# Patient Record
Sex: FEMALE | Race: OTHER | HISPANIC OR LATINO | Employment: UNEMPLOYED | URBAN - METROPOLITAN AREA
[De-identification: names, ages, dates, MRNs, and addresses within clinical notes are randomized per-mention and may not be internally consistent; named-entity substitution may affect disease eponyms.]

---

## 2017-01-03 ENCOUNTER — GENERIC CONVERSION - ENCOUNTER (OUTPATIENT)
Dept: OTHER | Facility: OTHER | Age: 52
End: 2017-01-03

## 2017-01-18 ENCOUNTER — GENERIC CONVERSION - ENCOUNTER (OUTPATIENT)
Dept: OTHER | Facility: OTHER | Age: 52
End: 2017-01-18

## 2017-01-31 ENCOUNTER — HOSPITAL ENCOUNTER (EMERGENCY)
Facility: HOSPITAL | Age: 52
Discharge: HOME | End: 2017-01-31
Attending: EMERGENCY MEDICINE | Admitting: EMERGENCY MEDICINE
Payer: COMMERCIAL

## 2017-01-31 VITALS
OXYGEN SATURATION: 99 % | DIASTOLIC BLOOD PRESSURE: 73 MMHG | SYSTOLIC BLOOD PRESSURE: 136 MMHG | RESPIRATION RATE: 16 BRPM | TEMPERATURE: 98 F | WEIGHT: 209.44 LBS | HEART RATE: 92 BPM

## 2017-01-31 DIAGNOSIS — R73.9 HYPERGLYCEMIA: Primary | ICD-10-CM

## 2017-01-31 LAB
ALBUMIN SERPL BCP-MCNC: 3.6 G/DL (ref 3.5–5)
ALP SERPL-CCNC: 194 U/L (ref 46–116)
ALT SERPL W P-5'-P-CCNC: 105 U/L (ref 12–78)
ANION GAP SERPL CALCULATED.3IONS-SCNC: 7 MMOL/L (ref 4–13)
AST SERPL W P-5'-P-CCNC: 43 U/L (ref 5–45)
BASOPHILS # BLD AUTO: 0.02 THOUSANDS/ΜL (ref 0–0.1)
BASOPHILS NFR BLD AUTO: 0 % (ref 0–1)
BILIRUB SERPL-MCNC: 0.25 MG/DL (ref 0.2–1)
BILIRUB UR QL STRIP: NEGATIVE
BUN SERPL-MCNC: 18 MG/DL (ref 5–25)
CALCIUM SERPL-MCNC: 9.1 MG/DL (ref 8.3–10.1)
CHLORIDE SERPL-SCNC: 97 MMOL/L (ref 100–108)
CLARITY UR: CLEAR
CO2 SERPL-SCNC: 29 MMOL/L (ref 21–32)
COLOR UR: YELLOW
COLOR, POC: NORMAL
CREAT SERPL-MCNC: 1.08 MG/DL (ref 0.6–1.3)
EOSINOPHIL # BLD AUTO: 0.09 THOUSAND/ΜL (ref 0–0.61)
EOSINOPHIL NFR BLD AUTO: 1 % (ref 0–6)
ERYTHROCYTE [DISTWIDTH] IN BLOOD BY AUTOMATED COUNT: 12.6 % (ref 11.6–15.1)
GFR SERPL CREATININE-BSD FRML MDRD: >60 ML/MIN/1.73SQ M
GLUCOSE SERPL-MCNC: 289 MG/DL (ref 65–140)
GLUCOSE SERPL-MCNC: 439 MG/DL (ref 65–140)
GLUCOSE SERPL-MCNC: 439 MG/DL (ref 65–140)
GLUCOSE UR STRIP-MCNC: ABNORMAL MG/DL
HCT VFR BLD AUTO: 42.6 % (ref 34.8–46.1)
HGB BLD-MCNC: 15.4 G/DL (ref 11.5–15.4)
HGB UR QL STRIP.AUTO: NEGATIVE
KETONES UR STRIP-MCNC: NEGATIVE MG/DL
LEUKOCYTE ESTERASE UR QL STRIP: NEGATIVE
LIPASE SERPL-CCNC: 263 U/L (ref 73–393)
LYMPHOCYTES # BLD AUTO: 2.6 THOUSANDS/ΜL (ref 0.6–4.47)
LYMPHOCYTES NFR BLD AUTO: 32 % (ref 14–44)
MCH RBC QN AUTO: 34.4 PG (ref 26.8–34.3)
MCHC RBC AUTO-ENTMCNC: 36.2 G/DL (ref 31.4–37.4)
MCV RBC AUTO: 95 FL (ref 82–98)
MONOCYTES # BLD AUTO: 0.32 THOUSAND/ΜL (ref 0.17–1.22)
MONOCYTES NFR BLD AUTO: 4 % (ref 4–12)
NEUTROPHILS # BLD AUTO: 4.99 THOUSANDS/ΜL (ref 1.85–7.62)
NEUTS SEG NFR BLD AUTO: 63 % (ref 43–75)
NITRITE UR QL STRIP: NEGATIVE
NRBC BLD AUTO-RTO: 0 /100 WBCS
PH UR STRIP.AUTO: 7 [PH] (ref 4.5–8)
PLATELET # BLD AUTO: 281 THOUSANDS/UL (ref 149–390)
PMV BLD AUTO: 10.5 FL (ref 8.9–12.7)
POTASSIUM SERPL-SCNC: 4 MMOL/L (ref 3.5–5.3)
PROT SERPL-MCNC: 8.1 G/DL (ref 6.4–8.2)
PROT UR STRIP-MCNC: NEGATIVE MG/DL
RBC # BLD AUTO: 4.48 MILLION/UL (ref 3.81–5.12)
SODIUM SERPL-SCNC: 133 MMOL/L (ref 136–145)
SP GR UR STRIP.AUTO: 1.01 (ref 1–1.03)
UROBILINOGEN UR QL STRIP.AUTO: 0.2 E.U./DL
WBC # BLD AUTO: 8.02 THOUSAND/UL (ref 4.31–10.16)

## 2017-01-31 PROCEDURE — 96361 HYDRATE IV INFUSION ADD-ON: CPT

## 2017-01-31 PROCEDURE — 96372 THER/PROPH/DIAG INJ SC/IM: CPT

## 2017-01-31 PROCEDURE — 85025 COMPLETE CBC W/AUTO DIFF WBC: CPT | Performed by: EMERGENCY MEDICINE

## 2017-01-31 PROCEDURE — 82948 REAGENT STRIP/BLOOD GLUCOSE: CPT

## 2017-01-31 PROCEDURE — 96360 HYDRATION IV INFUSION INIT: CPT

## 2017-01-31 PROCEDURE — 81002 URINALYSIS NONAUTO W/O SCOPE: CPT | Performed by: EMERGENCY MEDICINE

## 2017-01-31 PROCEDURE — 81003 URINALYSIS AUTO W/O SCOPE: CPT

## 2017-01-31 PROCEDURE — 83690 ASSAY OF LIPASE: CPT | Performed by: EMERGENCY MEDICINE

## 2017-01-31 PROCEDURE — 36415 COLL VENOUS BLD VENIPUNCTURE: CPT | Performed by: EMERGENCY MEDICINE

## 2017-01-31 PROCEDURE — 99285 EMERGENCY DEPT VISIT HI MDM: CPT

## 2017-01-31 PROCEDURE — 80053 COMPREHEN METABOLIC PANEL: CPT | Performed by: EMERGENCY MEDICINE

## 2017-01-31 RX ORDER — ZOLPIDEM TARTRATE 5 MG/1
10 TABLET ORAL
COMMUNITY

## 2017-01-31 RX ORDER — GLIPIZIDE 5 MG/1
5 TABLET ORAL DAILY
COMMUNITY
End: 2018-06-14

## 2017-01-31 RX ORDER — AZATHIOPRINE 50 MG/1
50 TABLET ORAL DAILY
COMMUNITY

## 2017-01-31 RX ORDER — LORAZEPAM 1 MG/1
1 TABLET ORAL 2 TIMES DAILY
COMMUNITY

## 2017-01-31 RX ORDER — PALIPERIDONE 1.5 MG/1
1.5 TABLET, EXTENDED RELEASE ORAL EVERY MORNING
COMMUNITY
End: 2018-06-14

## 2017-01-31 RX ORDER — QUETIAPINE FUMARATE 100 MG/1
100 TABLET, FILM COATED ORAL
COMMUNITY

## 2017-01-31 RX ADMIN — SODIUM CHLORIDE 1000 ML: 0.9 INJECTION, SOLUTION INTRAVENOUS at 14:30

## 2017-01-31 RX ADMIN — INSULIN HUMAN 10 UNITS: 100 INJECTION, SOLUTION PARENTERAL at 16:09

## 2017-04-25 ENCOUNTER — ALLSCRIPTS OFFICE VISIT (OUTPATIENT)
Dept: OTHER | Facility: OTHER | Age: 52
End: 2017-04-25

## 2017-06-10 ENCOUNTER — TRANSCRIBE ORDERS (OUTPATIENT)
Dept: ADMINISTRATIVE | Facility: HOSPITAL | Age: 52
End: 2017-06-10

## 2017-06-10 ENCOUNTER — APPOINTMENT (OUTPATIENT)
Dept: LAB | Facility: HOSPITAL | Age: 52
End: 2017-06-10
Payer: COMMERCIAL

## 2017-06-10 DIAGNOSIS — K58.1 IRRITABLE BOWEL SYNDROME WITH CONSTIPATION: ICD-10-CM

## 2017-06-10 DIAGNOSIS — E66.01 MORBID OBESITY, UNSPECIFIED OBESITY TYPE (HCC): ICD-10-CM

## 2017-06-10 DIAGNOSIS — K75.4 CHRONIC AGGRESSIVE HEPATITIS (HCC): Primary | ICD-10-CM

## 2017-06-10 DIAGNOSIS — E55.9 UNSPECIFIED VITAMIN D DEFICIENCY: ICD-10-CM

## 2017-06-10 DIAGNOSIS — Z98.84 BARIATRIC SURGERY STATUS: ICD-10-CM

## 2017-06-10 DIAGNOSIS — K75.4 CHRONIC AGGRESSIVE HEPATITIS (HCC): ICD-10-CM

## 2017-06-10 DIAGNOSIS — E13.8 DIABETES MELLITUS OF OTHER TYPE WITH COMPLICATION: ICD-10-CM

## 2017-06-10 DIAGNOSIS — E13.8 DIABETES MELLITUS OF OTHER TYPE WITH COMPLICATION: Primary | ICD-10-CM

## 2017-06-10 DIAGNOSIS — K90.2 POSTOPERATIVE BLIND LOOP SYNDROME: ICD-10-CM

## 2017-06-10 LAB
25(OH)D3 SERPL-MCNC: 30.3 NG/ML (ref 30–100)
ALBUMIN SERPL BCP-MCNC: 3.7 G/DL (ref 3.5–5)
ALP SERPL-CCNC: 133 U/L (ref 46–116)
ALT SERPL W P-5'-P-CCNC: 41 U/L (ref 12–78)
ANION GAP SERPL CALCULATED.3IONS-SCNC: 10 MMOL/L (ref 4–13)
AST SERPL W P-5'-P-CCNC: 32 U/L (ref 5–45)
BASOPHILS # BLD AUTO: 0.01 THOUSANDS/ΜL (ref 0–0.1)
BASOPHILS NFR BLD AUTO: 0 % (ref 0–1)
BILIRUB SERPL-MCNC: 0.49 MG/DL (ref 0.2–1)
BUN SERPL-MCNC: 10 MG/DL (ref 5–25)
CALCIUM SERPL-MCNC: 9.2 MG/DL (ref 8.3–10.1)
CHLORIDE SERPL-SCNC: 102 MMOL/L (ref 100–108)
CHOLEST SERPL-MCNC: 196 MG/DL (ref 50–200)
CO2 SERPL-SCNC: 26 MMOL/L (ref 21–32)
CREAT SERPL-MCNC: 0.86 MG/DL (ref 0.6–1.3)
EOSINOPHIL # BLD AUTO: 0.08 THOUSAND/ΜL (ref 0–0.61)
EOSINOPHIL NFR BLD AUTO: 1 % (ref 0–6)
ERYTHROCYTE [DISTWIDTH] IN BLOOD BY AUTOMATED COUNT: 13.5 % (ref 11.6–15.1)
EST. AVERAGE GLUCOSE BLD GHB EST-MCNC: 232 MG/DL
FERRITIN SERPL-MCNC: 142 NG/ML (ref 8–388)
FOLATE SERPL-MCNC: >20 NG/ML (ref 3.1–17.5)
GFR SERPL CREATININE-BSD FRML MDRD: >60 ML/MIN/1.73SQ M
GLUCOSE P FAST SERPL-MCNC: 187 MG/DL (ref 65–99)
HBA1C MFR BLD: 9.7 % (ref 4.2–6.3)
HCT VFR BLD AUTO: 43.7 % (ref 34.8–46.1)
HDLC SERPL-MCNC: 86 MG/DL (ref 40–60)
HGB BLD-MCNC: 14.9 G/DL (ref 11.5–15.4)
INR PPP: 0.93 (ref 0.86–1.16)
IRON SERPL-MCNC: 143 UG/DL (ref 50–170)
LDLC SERPL CALC-MCNC: 88 MG/DL (ref 0–100)
LYMPHOCYTES # BLD AUTO: 2.12 THOUSANDS/ΜL (ref 0.6–4.47)
LYMPHOCYTES NFR BLD AUTO: 27 % (ref 14–44)
MCH RBC QN AUTO: 33.1 PG (ref 26.8–34.3)
MCHC RBC AUTO-ENTMCNC: 34.1 G/DL (ref 31.4–37.4)
MCV RBC AUTO: 97 FL (ref 82–98)
MONOCYTES # BLD AUTO: 0.28 THOUSAND/ΜL (ref 0.17–1.22)
MONOCYTES NFR BLD AUTO: 4 % (ref 4–12)
NEUTROPHILS # BLD AUTO: 5.32 THOUSANDS/ΜL (ref 1.85–7.62)
NEUTS SEG NFR BLD AUTO: 68 % (ref 43–75)
PLATELET # BLD AUTO: 186 THOUSANDS/UL (ref 149–390)
PMV BLD AUTO: 12.2 FL (ref 8.9–12.7)
POTASSIUM SERPL-SCNC: 3.4 MMOL/L (ref 3.5–5.3)
PROT SERPL-MCNC: 7.1 G/DL (ref 6.4–8.2)
PROTHROMBIN TIME: 12.5 SECONDS (ref 12.1–14.4)
PTH-INTACT SERPL-MCNC: 29.9 PG/ML (ref 14–72)
RBC # BLD AUTO: 4.5 MILLION/UL (ref 3.81–5.12)
SODIUM SERPL-SCNC: 138 MMOL/L (ref 136–145)
TRIGL SERPL-MCNC: 112 MG/DL
TSH SERPL DL<=0.05 MIU/L-ACNC: 1.59 UIU/ML (ref 0.36–3.74)
VIT B12 SERPL-MCNC: 1734 PG/ML (ref 100–900)
WBC # BLD AUTO: 7.81 THOUSAND/UL (ref 4.31–10.16)

## 2017-06-10 PROCEDURE — 83540 ASSAY OF IRON: CPT

## 2017-06-10 PROCEDURE — 85025 COMPLETE CBC W/AUTO DIFF WBC: CPT

## 2017-06-10 PROCEDURE — 82607 VITAMIN B-12: CPT

## 2017-06-10 PROCEDURE — 82746 ASSAY OF FOLIC ACID SERUM: CPT

## 2017-06-10 PROCEDURE — 83970 ASSAY OF PARATHORMONE: CPT

## 2017-06-10 PROCEDURE — 80061 LIPID PANEL: CPT

## 2017-06-10 PROCEDURE — 85610 PROTHROMBIN TIME: CPT

## 2017-06-10 PROCEDURE — 84443 ASSAY THYROID STIM HORMONE: CPT

## 2017-06-10 PROCEDURE — 82306 VITAMIN D 25 HYDROXY: CPT

## 2017-06-10 PROCEDURE — 80053 COMPREHEN METABOLIC PANEL: CPT

## 2017-06-10 PROCEDURE — 84425 ASSAY OF VITAMIN B-1: CPT

## 2017-06-10 PROCEDURE — 36415 COLL VENOUS BLD VENIPUNCTURE: CPT

## 2017-06-10 PROCEDURE — 83036 HEMOGLOBIN GLYCOSYLATED A1C: CPT

## 2017-06-10 PROCEDURE — 82728 ASSAY OF FERRITIN: CPT

## 2017-06-14 LAB — VIT B1 BLD-SCNC: 134.2 NMOL/L (ref 66.5–200)

## 2017-12-13 ENCOUNTER — APPOINTMENT (OUTPATIENT)
Dept: LAB | Facility: HOSPITAL | Age: 52
End: 2017-12-13
Payer: COMMERCIAL

## 2017-12-13 ENCOUNTER — TRANSCRIBE ORDERS (OUTPATIENT)
Dept: ADMINISTRATIVE | Facility: HOSPITAL | Age: 52
End: 2017-12-13

## 2017-12-13 DIAGNOSIS — K75.4 CHRONIC AGGRESSIVE HEPATITIS (HCC): Primary | ICD-10-CM

## 2017-12-13 DIAGNOSIS — K75.4 CHRONIC AGGRESSIVE HEPATITIS (HCC): ICD-10-CM

## 2017-12-13 LAB
ALBUMIN SERPL BCP-MCNC: 3.8 G/DL (ref 3.5–5)
ALP SERPL-CCNC: 145 U/L (ref 46–116)
ALT SERPL W P-5'-P-CCNC: 41 U/L (ref 12–78)
ANION GAP SERPL CALCULATED.3IONS-SCNC: 14 MMOL/L (ref 4–13)
AST SERPL W P-5'-P-CCNC: 18 U/L (ref 5–45)
BILIRUB SERPL-MCNC: 0.48 MG/DL (ref 0.2–1)
BUN SERPL-MCNC: 23 MG/DL (ref 5–25)
CALCIUM SERPL-MCNC: 9.3 MG/DL (ref 8.3–10.1)
CHLORIDE SERPL-SCNC: 100 MMOL/L (ref 100–108)
CO2 SERPL-SCNC: 24 MMOL/L (ref 21–32)
CREAT SERPL-MCNC: 0.99 MG/DL (ref 0.6–1.3)
ERYTHROCYTE [DISTWIDTH] IN BLOOD BY AUTOMATED COUNT: 13 % (ref 11.6–15.1)
GFR SERPL CREATININE-BSD FRML MDRD: 77 ML/MIN/1.73SQ M
GLUCOSE SERPL-MCNC: 382 MG/DL (ref 65–140)
HCT VFR BLD AUTO: 40.3 % (ref 34.8–46.1)
HGB BLD-MCNC: 13.7 G/DL (ref 11.5–15.4)
INR PPP: 0.92 (ref 0.86–1.16)
MCH RBC QN AUTO: 34.3 PG (ref 26.8–34.3)
MCHC RBC AUTO-ENTMCNC: 34 G/DL (ref 31.4–37.4)
MCV RBC AUTO: 101 FL (ref 82–98)
PLATELET # BLD AUTO: 249 THOUSANDS/UL (ref 149–390)
PMV BLD AUTO: 10 FL (ref 8.9–12.7)
POTASSIUM SERPL-SCNC: 4.2 MMOL/L (ref 3.5–5.3)
PROT SERPL-MCNC: 6.7 G/DL (ref 6.4–8.2)
PROTHROMBIN TIME: 12.4 SECONDS (ref 12.1–14.4)
RBC # BLD AUTO: 4 MILLION/UL (ref 3.81–5.12)
SODIUM SERPL-SCNC: 138 MMOL/L (ref 136–145)
WBC # BLD AUTO: 9.88 THOUSAND/UL (ref 4.31–10.16)

## 2017-12-13 PROCEDURE — 80053 COMPREHEN METABOLIC PANEL: CPT

## 2017-12-13 PROCEDURE — 85027 COMPLETE CBC AUTOMATED: CPT

## 2017-12-13 PROCEDURE — 36415 COLL VENOUS BLD VENIPUNCTURE: CPT

## 2017-12-13 PROCEDURE — 85610 PROTHROMBIN TIME: CPT

## 2018-01-13 VITALS
TEMPERATURE: 97.3 F | DIASTOLIC BLOOD PRESSURE: 80 MMHG | WEIGHT: 210 LBS | HEIGHT: 62 IN | RESPIRATION RATE: 16 BRPM | HEART RATE: 102 BPM | OXYGEN SATURATION: 97 % | BODY MASS INDEX: 38.64 KG/M2 | SYSTOLIC BLOOD PRESSURE: 132 MMHG

## 2018-01-16 ENCOUNTER — HOSPITAL ENCOUNTER (EMERGENCY)
Facility: HOSPITAL | Age: 53
Discharge: HOME | End: 2018-01-16
Attending: EMERGENCY MEDICINE | Admitting: EMERGENCY MEDICINE
Payer: COMMERCIAL

## 2018-01-16 ENCOUNTER — APPOINTMENT (EMERGENCY)
Dept: CT IMAGING | Facility: HOSPITAL | Age: 53
End: 2018-01-16
Payer: COMMERCIAL

## 2018-01-16 VITALS
DIASTOLIC BLOOD PRESSURE: 57 MMHG | WEIGHT: 161 LBS | RESPIRATION RATE: 18 BRPM | BODY MASS INDEX: 29.63 KG/M2 | HEART RATE: 84 BPM | OXYGEN SATURATION: 99 % | TEMPERATURE: 98 F | SYSTOLIC BLOOD PRESSURE: 104 MMHG | HEIGHT: 62 IN

## 2018-01-16 DIAGNOSIS — N20.0 RENAL CALCULUS, LEFT: Primary | ICD-10-CM

## 2018-01-16 LAB
ALBUMIN SERPL BCP-MCNC: 3.6 G/DL (ref 3.5–5)
ALP SERPL-CCNC: 158 U/L (ref 46–116)
ALT SERPL W P-5'-P-CCNC: 32 U/L (ref 12–78)
ANION GAP SERPL CALCULATED.3IONS-SCNC: 7 MMOL/L (ref 4–13)
AST SERPL W P-5'-P-CCNC: 24 U/L (ref 5–45)
BACTERIA UR QL AUTO: ABNORMAL /HPF
BASOPHILS # BLD AUTO: 0.01 THOUSANDS/ΜL (ref 0–0.1)
BASOPHILS NFR BLD AUTO: 0 % (ref 0–1)
BILIRUB SERPL-MCNC: 0.4 MG/DL (ref 0.2–1)
BILIRUB UR QL STRIP: NEGATIVE
BILIRUB UR QL STRIP: NEGATIVE
BUN SERPL-MCNC: 23 MG/DL (ref 5–25)
CALCIUM SERPL-MCNC: 8.8 MG/DL (ref 8.3–10.1)
CAOX CRY URNS QL MICRO: ABNORMAL /HPF
CHLORIDE SERPL-SCNC: 97 MMOL/L (ref 100–108)
CLARITY UR: CLEAR
CLARITY UR: CLEAR
CO2 SERPL-SCNC: 28 MMOL/L (ref 21–32)
COLOR UR: YELLOW
COLOR UR: YELLOW
COLOR, POC: NORMAL
CREAT SERPL-MCNC: 0.85 MG/DL (ref 0.6–1.3)
EOSINOPHIL # BLD AUTO: 0.01 THOUSAND/ΜL (ref 0–0.61)
EOSINOPHIL NFR BLD AUTO: 0 % (ref 0–6)
ERYTHROCYTE [DISTWIDTH] IN BLOOD BY AUTOMATED COUNT: 14.1 % (ref 11.6–15.1)
GFR SERPL CREATININE-BSD FRML MDRD: 92 ML/MIN/1.73SQ M
GLUCOSE SERPL-MCNC: 376 MG/DL (ref 65–140)
GLUCOSE UR STRIP-MCNC: ABNORMAL MG/DL
GLUCOSE UR STRIP-MCNC: ABNORMAL MG/DL
HCT VFR BLD AUTO: 40.6 % (ref 34.8–46.1)
HGB BLD-MCNC: 14 G/DL (ref 11.5–15.4)
HGB UR QL STRIP.AUTO: ABNORMAL
HGB UR QL STRIP.AUTO: ABNORMAL
KETONES UR STRIP-MCNC: NEGATIVE MG/DL
KETONES UR STRIP-MCNC: NEGATIVE MG/DL
LEUKOCYTE ESTERASE UR QL STRIP: ABNORMAL
LEUKOCYTE ESTERASE UR QL STRIP: ABNORMAL
LIPASE SERPL-CCNC: 110 U/L (ref 73–393)
LYMPHOCYTES # BLD AUTO: 1.17 THOUSANDS/ΜL (ref 0.6–4.47)
LYMPHOCYTES NFR BLD AUTO: 12 % (ref 14–44)
MCH RBC QN AUTO: 35.7 PG (ref 26.8–34.3)
MCHC RBC AUTO-ENTMCNC: 34.5 G/DL (ref 31.4–37.4)
MCV RBC AUTO: 104 FL (ref 82–98)
MONOCYTES # BLD AUTO: 0.13 THOUSAND/ΜL (ref 0.17–1.22)
MONOCYTES NFR BLD AUTO: 1 % (ref 4–12)
NEUTROPHILS # BLD AUTO: 8.82 THOUSANDS/ΜL (ref 1.85–7.62)
NEUTS SEG NFR BLD AUTO: 87 % (ref 43–75)
NITRITE UR QL STRIP: NEGATIVE
NITRITE UR QL STRIP: NEGATIVE
NON-SQ EPI CELLS URNS QL MICRO: ABNORMAL /HPF
NRBC BLD AUTO-RTO: 0 /100 WBCS
PH UR STRIP.AUTO: 5 [PH] (ref 4.5–8)
PH UR STRIP.AUTO: 5 [PH] (ref 4.5–8)
PLATELET # BLD AUTO: 265 THOUSANDS/UL (ref 149–390)
PMV BLD AUTO: 11 FL (ref 8.9–12.7)
POTASSIUM SERPL-SCNC: 4.2 MMOL/L (ref 3.5–5.3)
PROT SERPL-MCNC: 6.8 G/DL (ref 6.4–8.2)
PROT UR STRIP-MCNC: NEGATIVE MG/DL
PROT UR STRIP-MCNC: NEGATIVE MG/DL
RBC # BLD AUTO: 3.92 MILLION/UL (ref 3.81–5.12)
RBC #/AREA URNS AUTO: ABNORMAL /HPF
SODIUM SERPL-SCNC: 132 MMOL/L (ref 136–145)
SP GR UR STRIP.AUTO: 1.02 (ref 1–1.03)
SP GR UR STRIP.AUTO: 1.02 (ref 1–1.03)
UROBILINOGEN UR QL STRIP.AUTO: 0.2 E.U./DL
UROBILINOGEN UR QL STRIP.AUTO: 0.2 E.U./DL
WBC # BLD AUTO: 10.14 THOUSAND/UL (ref 4.31–10.16)
WBC #/AREA URNS AUTO: ABNORMAL /HPF

## 2018-01-16 PROCEDURE — 99284 EMERGENCY DEPT VISIT MOD MDM: CPT

## 2018-01-16 PROCEDURE — 96375 TX/PRO/DX INJ NEW DRUG ADDON: CPT

## 2018-01-16 PROCEDURE — 96374 THER/PROPH/DIAG INJ IV PUSH: CPT

## 2018-01-16 PROCEDURE — 74176 CT ABD & PELVIS W/O CONTRAST: CPT

## 2018-01-16 PROCEDURE — 81001 URINALYSIS AUTO W/SCOPE: CPT

## 2018-01-16 PROCEDURE — 80053 COMPREHEN METABOLIC PANEL: CPT | Performed by: PHYSICIAN ASSISTANT

## 2018-01-16 PROCEDURE — 81002 URINALYSIS NONAUTO W/O SCOPE: CPT | Performed by: EMERGENCY MEDICINE

## 2018-01-16 PROCEDURE — 96361 HYDRATE IV INFUSION ADD-ON: CPT

## 2018-01-16 PROCEDURE — 36415 COLL VENOUS BLD VENIPUNCTURE: CPT | Performed by: PHYSICIAN ASSISTANT

## 2018-01-16 PROCEDURE — 83690 ASSAY OF LIPASE: CPT | Performed by: PHYSICIAN ASSISTANT

## 2018-01-16 PROCEDURE — 85025 COMPLETE CBC W/AUTO DIFF WBC: CPT | Performed by: PHYSICIAN ASSISTANT

## 2018-01-16 RX ORDER — NAPROXEN 250 MG/1
250 TABLET ORAL 2 TIMES DAILY WITH MEALS
Qty: 10 TABLET | Refills: 0 | Status: SHIPPED | OUTPATIENT
Start: 2018-01-16 | End: 2018-06-14

## 2018-01-16 RX ORDER — KETOROLAC TROMETHAMINE 30 MG/ML
15 INJECTION, SOLUTION INTRAMUSCULAR; INTRAVENOUS ONCE
Status: COMPLETED | OUTPATIENT
Start: 2018-01-16 | End: 2018-01-16

## 2018-01-16 RX ORDER — ONDANSETRON 2 MG/ML
4 INJECTION INTRAMUSCULAR; INTRAVENOUS ONCE
Status: COMPLETED | OUTPATIENT
Start: 2018-01-16 | End: 2018-01-16

## 2018-01-16 RX ORDER — HYDROMORPHONE HCL 110MG/55ML
1 PATIENT CONTROLLED ANALGESIA SYRINGE INTRAVENOUS ONCE
Status: DISCONTINUED | OUTPATIENT
Start: 2018-01-16 | End: 2018-01-16

## 2018-01-16 RX ADMIN — KETOROLAC TROMETHAMINE 15 MG: 30 INJECTION, SOLUTION INTRAMUSCULAR at 09:25

## 2018-01-16 RX ADMIN — SODIUM CHLORIDE 1000 ML: 0.9 INJECTION, SOLUTION INTRAVENOUS at 09:02

## 2018-01-16 RX ADMIN — ONDANSETRON 4 MG: 2 INJECTION INTRAMUSCULAR; INTRAVENOUS at 09:03

## 2018-01-16 NOTE — ED PROVIDER NOTES
History  Chief Complaint   Patient presents with    Flank Pain     Left sided flank pain x 3 days, + nausea  Denies urinary symptoms  History of kidney stones     49 yo female with PMHx including renal calculi x 10 presents with L flank pain x 3 days  Started out intermittent, has been constant since yesterday  Admits to nausea but no vomiting  No change in bowel movements  Denies urinary symptoms  Denies hematuria  Denies abdominal pain  ROS positive for headache intermittently x the last few weeks; hx of migraines; not as significant as her previous migraines  Flank pain reminiscent of previous episodes of renal calculi  Rates pain 10/10  Has been taking meloxicam at home for pain  Last CT in our system was 8/2016 identifying nonobstructive stones in the bilateral kidneys  Hx of hysterectomy  Prior to Admission Medications   Prescriptions Last Dose Informant Patient Reported? Taking? LORazepam (ATIVAN) 1 mg tablet   Yes No   Sig: Take 1 mg by mouth every 6 (six) hours as needed for anxiety   QUEtiapine (SEROquel) 100 mg tablet   Yes No   Sig: Take 100 mg by mouth daily at bedtime   azaTHIOprine (IMURAN) 50 mg tablet   Yes No   Sig: Take 50 mg by mouth daily   glipiZIDE (GLUCOTROL) 5 mg tablet   Yes No   Sig: Take 5 mg by mouth daily   oxyCODONE-acetaminophen (PERCOCET) 5-325 mg per tablet   No No   Sig: Take 1 tablet by mouth every 4 (four) hours as needed for moderate pain   Max Daily Amount: 6 tablets   paliperidone (INVEGA) 1 5 MG 24 hr tablet   Yes No   Sig: Take 1 5 mg by mouth every morning   zolpidem (AMBIEN) 5 mg tablet   Yes No   Sig: Take 5 mg by mouth daily at bedtime as needed for sleep      Facility-Administered Medications: None       Past Medical History:   Diagnosis Date    [facility]     [facility]     [facility]     Renal disorder     kidney stones       Past Surgical History:   Procedure Laterality Date    BUNIONECTOMY      HYSTERECTOMY      JOINT REPLACEMENT      KIDNEY STONE SURGERY         History reviewed  No pertinent family history  I have reviewed and agree with the history as documented  Social History   Substance Use Topics    Smoking status: Current Every Day Smoker     Types: Cigarettes    Smokeless tobacco: Never Used    Alcohol use No        Review of Systems   Constitutional: Negative for chills and fever  HENT: Negative for congestion, rhinorrhea and sore throat  Respiratory: Negative for cough, chest tightness and shortness of breath  Cardiovascular: Negative for chest pain  Gastrointestinal: Positive for nausea  Negative for abdominal pain, blood in stool, diarrhea and vomiting  Genitourinary: Positive for flank pain  Negative for dysuria and hematuria  Neurological: Positive for headaches  Negative for dizziness  All other systems reviewed and are negative  Physical Exam  ED Triage Vitals   Temperature Pulse Respirations Blood Pressure SpO2   01/16/18 0812 01/16/18 0812 01/16/18 0812 01/16/18 0812 01/16/18 0812   98 °F (36 7 °C) 87 18 102/55 99 %      Temp Source Heart Rate Source Patient Position - Orthostatic VS BP Location FiO2 (%)   01/16/18 0812 01/16/18 0812 01/16/18 0924 01/16/18 0812 --   Temporal Monitor Lying Right arm       Pain Score       01/16/18 0812       9           Orthostatic Vital Signs  Vitals:    01/16/18 0812 01/16/18 0924   BP: 102/55 104/57   Pulse: 87 84   Patient Position - Orthostatic VS:  Lying       Physical Exam   Constitutional: She is oriented to person, place, and time  She appears well-developed and well-nourished  No distress  HENT:   Head: Normocephalic and atraumatic  Right Ear: External ear normal    Left Ear: External ear normal    Mouth/Throat: Oropharynx is clear and moist    Eyes: Conjunctivae and EOM are normal  Pupils are equal, round, and reactive to light  Neck: Normal range of motion     Cardiovascular: Normal rate, regular rhythm and normal heart sounds  Exam reveals no friction rub  No murmur heard  Pulmonary/Chest: Effort normal and breath sounds normal  No respiratory distress  She has no wheezes  She has no rales  She exhibits no tenderness  Abdominal: Soft  Mild LUQ tenderness but no guarding or rigidity  Tender over the L flank   Musculoskeletal: Normal range of motion  Neurological: She is alert and oriented to person, place, and time  Skin: Skin is warm and dry  She is not diaphoretic  Psychiatric: She has a normal mood and affect  Her behavior is normal  Judgment and thought content normal        ED Medications  Medications   sodium chloride 0 9 % bolus 1,000 mL (1,000 mL Intravenous New Bag 1/16/18 0902)   ondansetron (ZOFRAN) injection 4 mg (4 mg Intravenous Given 1/16/18 0903)   ketorolac (TORADOL) injection 15 mg (15 mg Intravenous Given 1/16/18 0925)       Diagnostic Studies  Results Reviewed     Procedure Component Value Units Date/Time    Comprehensive metabolic panel [[id number]]  (Abnormal) Collected:  01/16/18 0902    Lab Status:  Final result Specimen:  Blood from Hand, Left Updated:  01/16/18 0932     Sodium 132 (L) mmol/L      Potassium 4 2 mmol/L      Chloride 97 (L) mmol/L      CO2 28 mmol/L      Anion Gap 7 mmol/L      BUN 23 mg/dL      Creatinine 0 85 mg/dL      Glucose 376 (H) mg/dL      Calcium 8 8 mg/dL      AST 24 U/L      ALT 32 U/L      Alkaline Phosphatase 158 (H) U/L      Total Protein 6 8 g/dL      Albumin 3 6 g/dL      Total Bilirubin 0 40 mg/dL      eGFR 92 ml/min/1 73sq m     Narrative:         National Kidney Disease Education Program recommendations are as follows:  GFR calculation is accurate only with a steady state creatinine  Chronic Kidney disease less than 60 ml/min/1 73 sq  meters  Kidney failure less than 15 ml/min/1 73 sq  meters      Lipase [[id number]]  (Normal) Collected:  01/16/18 0902    Lab Status:  Final result Specimen:  Blood from Hand, Left Updated:  01/16/18 0932     Lipase 110 u/L     Urine Microscopic [[id number]]  (Abnormal) Collected:  01/16/18 0820    Lab Status:  Final result Specimen:  Urine from Urine, Clean Catch Updated:  01/16/18 0914     RBC, UA 30-50 (A) /hpf      WBC, UA 4-10 (A) /hpf      Epithelial Cells Occasional /hpf      Bacteria, UA Occasional /hpf      Ca Oxalate Agatha, UA Occasional (A) /hpf     CBC and differential [[id number]]  (Abnormal) Collected:  01/16/18 0902    Lab Status:  Final result Specimen:  Blood from Hand, Left Updated:  01/16/18 0913     WBC 10 14 Thousand/uL      RBC 3 92 Million/uL      Hemoglobin 14 0 g/dL      Hematocrit 40 6 %       (H) fL      MCH 35 7 (H) pg      MCHC 34 5 g/dL      RDW 14 1 %      MPV 11 0 fL      Platelets 887 Thousands/uL      nRBC 0 /100 WBCs      Neutrophils Relative 87 (H) %      Lymphocytes Relative 12 (L) %      Monocytes Relative 1 (L) %      Eosinophils Relative 0 %      Basophils Relative 0 %      Neutrophils Absolute 8 82 (H) Thousands/µL      Lymphocytes Absolute 1 17 Thousands/µL      Monocytes Absolute 0 13 (L) Thousand/µL      Eosinophils Absolute 0 01 Thousand/µL      Basophils Absolute 0 01 Thousands/µL     UA w Reflex to Microscopic w Reflex to Culture [[id number]]  (Abnormal) Collected:  01/16/18 0835    Lab Status:  Final result Specimen:  Urine from Urine, Clean Catch Updated:  01/16/18 0858     Color, UA Yellow     Clarity, UA Clear     Specific [place], UA 1 020     pH, UA 5 0     Leukocytes, UA Elevated glucose may cause decreased leukocyte values   See urine microscopic for [facility] result/ (A)     Nitrite, UA Negative     Protein, UA Negative mg/dl      Glucose, UA >=1000 (1%) (A) mg/dl      Ketones, UA Negative mg/dl      Urobilinogen, UA 0 2 E U /dl      Bilirubin, UA Negative     Blood, UA Moderate (A)    POCT urinalysis dipstick [[id number]]  (Normal) Resulted:  01/16/18 0821    Lab Status:  Final result Updated:  01/16/18 0827     Color, UA -    ED Urine Macroscopic [[id number]]  (Abnormal) Collected:  01/16/18 0820    Lab Status:  Final result Specimen:  Urine Updated:  01/16/18 0821     Color, UA Yellow     Clarity, UA Clear     pH, UA 5 0     Leukocytes, UA Elevated glucose may cause decreased leukocyte values  See urine microscopic for [facility] result/ (A)     Nitrite, UA Negative     Protein, UA Negative mg/dl      Glucose, UA >=1000 (1%) (A) mg/dl      Ketones, UA Negative mg/dl      Urobilinogen, UA 0 2 E U /dl      Bilirubin, UA Negative     Blood, UA Moderate (A)     Specific [place], UA 1 025    Narrative:       CLINITEK RESULT                 CT renal stone study abdomen pelvis without contrast   Final Result by Shaquille Potter MD (01/16 0945)      0 3 cm calculus in the mid left ureter at the level of L5-S1 with mild upstream hydronephroureterosis  0 2 cm calculus upper pole left kidney  0 1 cm calculus lower pole right kidney  Workstation performed: [id number]                    Procedures  Procedures       Phone Contacts  ED Phone Contact    ED Course  ED Course as of Jan 16 1010   Tue Jan 16, 2018   0941 Glucosuria noted; BG is 376  Pt states she has been on steroids daily for 2 years for tx of autoimmune hepatitis  States her BG can be up to 500 at home  Giving NSS currently  Awaiting CT report  MDM  Number of Diagnoses or Management Options  Renal calculus, left: established and worsening  Diagnosis management comments: 51 yo female with hx of recurrent renal calculi presents with L flank pain x 3 days  Workup positive for hematuria  Creatinine normal  (Glucose incidentally quite elevated at 376; is diabetic; actually not unusual for the patient given her chronic steroid therapy ) CT showed a 3mm stone in the L ureter with a 1mm stone in the R kidney and 2mm stone in the L kidney  Her pain improved with Toradol  OK for discharge with Naprosyn for pain  Advised follow up with PCP and urology  Gave info for urology   Advised to return to the ED for any changes or worsening pain  She understood and agreed with the tx plan and had no questions  Amount and/or Complexity of Data Reviewed  Clinical lab tests: ordered and reviewed  Tests in the radiology section of CPT®: ordered and reviewed  Tests in the medicine section of CPT®: ordered and reviewed    Patient Progress  Patient progress: improved    CritCare Time    Disposition  Final diagnoses:   Renal calculus, left     Time reflects when diagnosis was documented in both MDM as applicable and the Disposition within this note     Time User Action Codes Description Comment    1/16/2018  9:59 AM Starla Wayne Second Add [N20 0] Renal calculus, left       ED Disposition     ED Disposition Condition Comment    Discharge  Johann Luther discharge to home/self care  Condition at discharge: Good        Follow-up Information     Follow up With Specialties Details Why Contact Info    Gardenia Castanon MD  Call in 1 day  [address] [facility]      Alyx Harper MD Urology Call in 1 day  [address] [facility]  [phone]          Patient's Medications   Discharge Prescriptions    NAPROXEN (NAPROSYN) 250 MG TABLET    Take 1 tablet by mouth 2 (two) times a day with meals for 5 days       Start Date: 1/16/2018 End Date: 1/21/2018       Order Dose: 250 mg       Quantity: 10 tablet    Refills: 0     No discharge procedures on file      ED Provider  Electronically Signed by           Samuel Anderson PA-C  01/16/18 1016

## 2018-01-16 NOTE — DISCHARGE INSTRUCTIONS
Kidney Stones   WHAT YOU NEED TO KNOW:   Kidney stones form in the urinary system when the water and waste in your urine are out of balance  When this happens, certain types of waste crystals separate from the urine  The crystals build up and form kidney stones  You may have 1 or more kidney stones  DISCHARGE INSTRUCTIONS:   Seek care immediately:   · You have vomiting that is not relieved by medicine  Contact your healthcare provider if:   · You have a fever  · You have trouble passing urine  · You see blood in your urine  · You have severe pain  · You have any questions or concerns about your condition or care  Medicines:   · NSAIDs , such as ibuprofen, help decrease swelling, pain, and fever  This medicine is available with or without a doctor's order  NSAIDs can cause stomach bleeding or kidney problems in certain people  If you take blood thinner medicine, always ask your healthcare provider if NSAIDs are safe for you  Always read the medicine label and follow directions  · Prescription medicine  may be given  Ask how to take this medicine safely  · Medicines  to balance your electrolytes may be needed  · Take your medicine as directed  Contact your healthcare provider if you think your medicine is not helping or if you have side effects  Tell him or her if you are allergic to any medicine  Keep a list of the medicines, vitamins, and herbs you take  Include the amounts, and when and why you take them  Bring the list or the pill bottles to follow-up visits  Carry your medicine list with you in case of an emergency  Follow up with your healthcare provider as directed: You may need to return for more tests  Write down your questions so you remember to ask them during your visits  Self-care:   · Drink plenty of liquids  Your healthcare provider may tell you to drink at least 8 to 12 (eight-ounce) cups of liquids each day  This helps flush out the kidney stones when you urinate  Water is the best liquid to drink  · Strain your urine every time you go to the bathroom  Urinate through a strainer or a piece of thin cloth to catch the stones  Take the stones to your healthcare provider so they can be sent to the lab for tests  This will help your healthcare providers plan the best treatment for you  · Eat a variety of healthy foods  Healthy foods include fruits, vegetables, whole-grain breads, low-fat dairy products, beans, and fish  You may need to limit how much sodium (salt) or protein you eat  Ask for information about the best foods for you  · Stay active  Your stones may pass more easily by if you stay active  Ask about the best activities for you  After you pass your kidney stones:  Once you have passed your kidney stones, your healthcare provider may  order a 24-hour urine test  Results from a 24-hour urine test will help your healthcare provider plan ways to prevent more stones from forming  If you are told to do a 24-hour test, your healthcare provider will give you more instructions  © 2017 [facility] Information is for End User's use only and may not be sold, redistributed or otherwise used for commercial purposes  All illustrations and images included in CareNotes® are the copyrighted property of A D A M [employer] Inc  or Byrson Lindsey  The above information is an  only  It is not intended as medical advice for individual conditions or treatments  Talk to your doctor, nurse or pharmacist before following any medical regimen to see if it is safe and effective for you

## 2018-01-16 NOTE — ED ATTENDING ATTESTATION
Martha Amaya MD, saw and evaluated the patient  I have discussed the patient with the resident/non-physician practitioner and agree with the resident's/non-physician practitioner's findings, Plan of Care, and MDM as documented in the resident's/non-physician practitioner's note, except where noted  All available labs and Radiology studies were reviewed  At this point I agree with the current assessment done in the Emergency Department  I have conducted an independent evaluation of this patient a history and physical is as follows:  Patient with hx of DM, Kidney stones, comes with c/o left flank pain for past 3 days, associated with nausea, denies fever, chills, dysuria; feels like her previous kidney stones  On exam, no acute distress, VSS, mild left CVA tenderness, no anterior abdominal tenderness  Chart reviewed including Care Everywhere ([facility]); last CT in August 2016  We will check labs, CT Stone study; give IVF, Zofran, pain meds  UPDATE: Labs reviewed, Gkucose 300s with normal CO2, no anion gap, patient on steriods for Autoimmune hepatitis; IVF give  CT shows small 0 3 cm left ureteric stone with hydronephrosis; Urine no infection  Pain well controlled  We will discharge with urology follow up      Critical Care Time  CritCare Time    Procedures

## 2018-03-16 ENCOUNTER — HOSPITAL ENCOUNTER (EMERGENCY)
Facility: HOSPITAL | Age: 53
Discharge: HOME | End: 2018-03-16
Admitting: EMERGENCY MEDICINE
Payer: COMMERCIAL

## 2018-03-16 VITALS
SYSTOLIC BLOOD PRESSURE: 131 MMHG | TEMPERATURE: 97.6 F | OXYGEN SATURATION: 99 % | DIASTOLIC BLOOD PRESSURE: 87 MMHG | BODY MASS INDEX: 29.45 KG/M2 | WEIGHT: 161 LBS | RESPIRATION RATE: 18 BRPM | HEART RATE: 74 BPM

## 2018-03-16 DIAGNOSIS — K21.9 LARYNGOPHARYNGEAL REFLUX (LPR): Primary | ICD-10-CM

## 2018-03-16 PROCEDURE — 99282 EMERGENCY DEPT VISIT SF MDM: CPT

## 2018-03-16 RX ORDER — PANTOPRAZOLE SODIUM 20 MG/1
20 TABLET, DELAYED RELEASE ORAL 2 TIMES DAILY
Qty: 40 TABLET | Refills: 0 | Status: SHIPPED | OUTPATIENT
Start: 2018-03-16 | End: 2018-07-03

## 2018-03-16 NOTE — ED PROVIDER NOTES
History  Chief Complaint   Patient presents with    Sore Throat     patient states she quit smoking about 2 months ago, reporting a burning sensation in her throat  Symptoms X2 weeks  Denies fevers  Patient presents to the emergency department with mild coarseness a tickle in her throat increased throat clearing and mild soreness this been ongoing for the past month to 6 weeks  Patient reports that she quit smoking about 2 months ago since then her appetite has dramatically increased and she is states that she is eating in the evening and will wake up in the night hungry and will eat in the middle of the night  Patient is on Protonix 20 mg in the morning  She is not having any other true reflux type symptoms  She is not having any dysphagia  Suspect LP are  Discussed importance of follow-up with ENT for direct visualization to ensure nothing to suggest cancer in the larynx patient understands and agrees to ENT follow-up  Discussed instructions for reducing reflux symptoms at night  Prior to Admission Medications   Prescriptions Last Dose Informant Patient Reported? Taking? LORazepam (ATIVAN) 1 mg tablet   Yes No   Sig: Take 1 mg by mouth every 6 (six) hours as needed for anxiety   QUEtiapine (SEROquel) 100 mg tablet   Yes No   Sig: Take 100 mg by mouth daily at bedtime   azaTHIOprine (IMURAN) 50 mg tablet   Yes No   Sig: Take 50 mg by mouth daily   glipiZIDE (GLUCOTROL) 5 mg tablet   Yes No   Sig: Take 5 mg by mouth daily   naproxen (NAPROSYN) 250 mg tablet   No No   Sig: Take 1 tablet by mouth 2 (two) times a day with meals for 5 days   oxyCODONE-acetaminophen (PERCOCET) 5-325 mg per tablet   No No   Sig: Take 1 tablet by mouth every 4 (four) hours as needed for moderate pain   Max Daily Amount: 6 tablets   paliperidone (INVEGA) 1 5 MG 24 hr tablet   Yes No   Sig: Take 1 5 mg by mouth every morning   zolpidem (AMBIEN) 5 mg tablet   Yes No   Sig: Take 5 mg by mouth daily at bedtime as needed for sleep      Facility-Administered Medications: None       Past Medical History:   Diagnosis Date    [facility]     Bipolar 1 disorder ([facility]     Diabetes mellitus [facility]     Renal disorder     kidney stones       Past Surgical History:   Procedure Laterality Date    BUNIONECTOMY      HYSTERECTOMY      JOINT REPLACEMENT      KIDNEY STONE SURGERY         History reviewed  No pertinent family history  I have reviewed and agree with the history as documented  Social History   Substance Use Topics    Smoking status: Former Smoker     Types: Cigarettes    Smokeless tobacco: Never Used    Alcohol use No        Review of Systems    Physical Exam  ED Triage Vitals   Temperature Pulse Respirations Blood Pressure SpO2   03/16/18 1748 03/16/18 1747 03/16/18 1747 03/16/18 1747 03/16/18 1747   97 6 °F (36 4 °C) 74 18 131/87 99 %      Temp Source Heart Rate Source Patient Position - Orthostatic VS BP Location FiO2 (%)   03/16/18 1748 03/16/18 1747 03/16/18 1747 03/16/18 1747 --   Temporal Monitor Sitting Right arm       Pain Score       03/16/18 1747       8           Orthostatic Vital Signs  Vitals:    03/16/18 1747   BP: 131/87   Pulse: 74   Patient Position - Orthostatic VS: Sitting       Physical Exam   Constitutional: She is oriented to person, place, and time  She appears well-developed and well-nourished  HENT:   Head: Normocephalic and atraumatic  Right Ear: External ear normal    Left Ear: External ear normal    Mouth/Throat: Oropharynx is clear and moist    Eyes: Conjunctivae and EOM are normal    Neck: Neck supple  Cardiovascular: Normal rate, regular rhythm and normal heart sounds  Pulmonary/Chest: Effort normal and breath sounds normal    Abdominal: Soft  Bowel sounds are normal    Neurological: She is alert and oriented to person, place, and time  Skin: Skin is warm  Psychiatric: She has a normal mood and affect   Her behavior is normal    Nursing note and vitals reviewed  ED Medications  Medications - No data to display    Diagnostic Studies  Results Reviewed     None                 No orders to display              Procedures  Procedures       Phone Contacts  ED Phone Contact    ED Course  ED Course                                MDM  Number of Diagnoses or Management Options  Laryngopharyngeal reflux (LPR): new and does not require workup  Diagnosis management comments: Will need to see ENT for an and PL  Patient understands the importance of this examination  Risk of Complications, Morbidity, and/or Mortality  General comments: Instructions reviewed including stopping    Patient Progress  Patient progress: stable    CritCare Time    Disposition  Final diagnoses:   Laryngopharyngeal reflux (LPR)     Time reflects when diagnosis was documented in both MDM as applicable and the Disposition within this note     Time User Action Codes Description Comment    3/16/2018  6:17 PM Maggie Matt Add [K21 9] Laryngopharyngeal reflux (LPR)       ED Disposition     ED Disposition Condition Comment    Discharge  Alessandra Savage discharge to home/self care  Condition at discharge: Good        Follow-up Information     Follow up With Specialties Details Why [address]  [facility]   [id number] [address]  [phone]          Patient's Medications   Discharge Prescriptions    PANTOPRAZOLE (PROTONIX) 20 MG TABLET    Take 1 tablet (20 mg total) by mouth 2 (two) times a day Take twice a day before breakfast and half an hour to an hour before dinner  Start Date: 3/16/2018 End Date: --       Order Dose: 20 mg       Quantity: 40 tablet    Refills: 0     No discharge procedures on file      ED Provider  Electronically Signed by           Dennis Singleton PA-C  03/16/18 1696

## 2018-03-16 NOTE — DISCHARGE INSTRUCTIONS
Nothing to eat 2-3 hours before bed  Take medications twice a day  It is very important that she follow up with ear nose and throat to have your larynx directly looked at and further evaluated  Gastroesophageal Reflux Disease   WHAT YOU NEED TO KNOW:   Gastroesophageal reflux occurs when acid and food in the stomach back up into the esophagus  Gastroesophageal reflux disease (GERD) is reflux that occurs more than twice a week for a few weeks  It usually causes heartburn and other symptoms  GERD can cause other health problems over time if it is not treated  DISCHARGE INSTRUCTIONS:   Return to the emergency department if:   · You feel full and cannot burp or vomit  · You have severe chest pain and sudden trouble breathing  · Your bowel movements are black, bloody, or tarry-looking  · Your vomit looks like coffee grounds or has blood in it  Contact your healthcare provider if:   · You vomit large amounts, or you vomit often  · You have trouble breathing after you vomit  · You have trouble swallowing, or pain with swallowing  · You are losing weight without trying  · Your symptoms get worse or do not improve with treatment  · You have questions or concerns about your condition or care  Medicines:   · Medicines  are used to decrease stomach acid  Medicine may also be used to help your lower esophageal sphincter and stomach contract (tighten) more  · Take your medicine as directed  Contact your healthcare provider if you think your medicine is not helping or if you have side effects  Tell him of her if you are allergic to any medicine  Keep a list of the medicines, vitamins, and herbs you take  Include the amounts, and when and why you take them  Bring the list or the pill bottles to follow-up visits  Carry your medicine list with you in case of an emergency  Manage GERD:   · Do not have foods or drinks that may increase heartburn    These include chocolate, peppermint, fried or fatty foods, drinks that contain caffeine, or carbonated drinks (soda)  Other foods include spicy foods, onions, tomatoes, and tomato-based foods  Do not have foods or drinks that can irritate your esophagus, such as citrus fruits, juices, and alcohol  · Do not eat large meals  When you eat a lot of food at one time, your stomach needs more acid to digest it  Eat 6 small meals each day instead of 3 large ones, and eat slowly  Do not eat meals 2 to 3 hours before bedtime  · Elevate the head of your bed  Place 6-inch blocks under the head of your bed frame  You may also use more than one pillow under your head and shoulders while you sleep  · Maintain a healthy weight  If you are overweight, weight loss may help relieve symptoms of GERD  · Do not smoke  Smoking weakens the lower esophageal sphincter and increases the risk of GERD  Ask your healthcare provider for information if you currently smoke and need help to quit  E-cigarettes or smokeless tobacco still contain nicotine  Talk to your healthcare provider before you use these products  · Do not wear clothing that is tight around your waist   Tight clothing can put pressure on your stomach and cause or worsen GERD symptoms  Follow up with your healthcare provider as directed:  Write down your questions so you remember to ask them during your visits  © 2017 [employer]  Information is for End User's use only and may not be sold, redistributed or otherwise used for commercial purposes  All illustrations and images included in CareNotes® are the copyrighted property of [employer]  or [employer]  The above information is an  only  It is not intended as medical advice for individual conditions or treatments  Talk to your doctor, nurse or pharmacist before following any medical regimen to see if it is safe and effective for you        Laryngitis   WHAT YOU NEED TO KNOW:   Laryngitis is when your larynx is swollen because of an infection or irritation  The larynx is also called the voice box because it contains your vocal cords  Your vocal cords also swell and change shape, which can cause your voice to sound different  DISCHARGE INSTRUCTIONS:   Take your medicine as directed  Contact your healthcare provider if you think your medicine is not helping or if you have side effects  Tell him of her if you are allergic to any medicine  Keep a list of the medicines, vitamins, and herbs you take  Include the amounts, and when and why you take them  Bring the list or the pill bottles to follow-up visits  Carry your medicine list with you in case of an emergency  Prevent laryngitis:   · Rest your voice:  Do not shout or scream if you get laryngitis often  This will help prevent swelling and irritation of your larynx  · Avoid irritants and harmful substances:  Do not breathe in chemicals or allergens, such as pollen  Alcohol and tobacco can also irritate your larynx  · Avoid foods and liquids that can cause acid reflux: These may include carbonated drinks, spicy foods and sauces, citrus fruit, peppermint, and chocolate  · Avoid the spread of germs:        [facility] your hands often with soap and water  Carry germ-killing gel with you  You can use the gel to clean your hands when there is no soap and water available  ¨ Do not touch your eyes, nose, or mouth unless you have washed your hands first     ¨ Always cover your mouth when you cough  Cough into a tissue or your shirtsleeve so you do not spread germs from your hands  ¨ Try to avoid people who have a cold or the flu  If you are sick, stay away from others as much as possible  Follow up with your healthcare provider as directed:  Write down your questions so you remember to ask them during your visits  Contact your healthcare provider if:   · You have a fever  · You feel large, tender lumps in your neck      · You are hoarse for more than 7 days     · You have new or increased throat pain  · You have questions about your condition or care  Return to the emergency department if:   · Your throat is bleeding  · You are hoarse for more than 7 days and your chest feels tight  · You are drooling and have trouble swallowing  · You have trouble breathing  © 2017 [employer] Information is for End User's use only and may not be sold, redistributed or otherwise used for commercial purposes  All illustrations and images included in CareNotes® are the copyrighted property of [employer]  or [employer]son Lindsey  The above information is an  only  It is not intended as medical advice for individual conditions or treatments  Talk to your doctor, nurse or pharmacist before following any medical regimen to see if it is safe and effective for you

## 2018-04-07 LAB
ABSOL LYMPHOCYTES (HISTORICAL): 1.5 K/UL (ref 0.5–4)
ALBUMIN SERPL BCP-MCNC: 3.7 G/DL (ref 3–5.2)
ALP SERPL-CCNC: 172 U/L (ref 43–122)
ALT SERPL W P-5'-P-CCNC: 46 U/L (ref 9–52)
ANION GAP SERPL CALCULATED.3IONS-SCNC: 9 MMOL/L (ref 5–14)
AST SERPL W P-5'-P-CCNC: 47 U/L (ref 14–36)
BASOPHILS # BLD AUTO: 0 % (ref 0–1)
BASOPHILS # BLD AUTO: 0 K/UL (ref 0–0.1)
BILIRUB SERPL-MCNC: 0.3 MG/DL
BUN SERPL-MCNC: 21 MG/DL (ref 5–25)
CALCIUM SERPL-MCNC: 9.4 MG/DL (ref 8.4–10.2)
CHLORIDE SERPL-SCNC: 101 MEQ/L (ref 97–108)
CO2 SERPL-SCNC: 29 MMOL/L (ref 22–30)
CREATINE, SERUM (HISTORICAL): 0.59 MG/DL (ref 0.6–1.2)
DEPRECATED RDW RBC AUTO: 12.6 %
EGFR (HISTORICAL): >60 ML/MIN/1.73 M2
EOSINOPHIL # BLD AUTO: 0.1 K/UL (ref 0–0.4)
EOSINOPHIL NFR BLD AUTO: 1 % (ref 0–6)
GLUCOSE SERPL-MCNC: 148 MG/DL (ref 70–99)
HCT VFR BLD AUTO: 39.6 % (ref 36–46)
HGB BLD-MCNC: 13.6 G/DL (ref 12–16)
LYMPHOCYTES NFR BLD AUTO: 14 % (ref 25–45)
MCH RBC QN AUTO: 33.4 PG (ref 26–34)
MCHC RBC AUTO-ENTMCNC: 34.3 % (ref 31–36)
MCV RBC AUTO: 97 FL (ref 80–100)
MONOCYTES # BLD AUTO: 0.6 K/UL (ref 0.2–0.9)
MONOCYTES NFR BLD AUTO: 5 % (ref 1–10)
NEUTROPHILS ABS COUNT (HISTORICAL): 8.5 K/UL (ref 1.8–7.8)
NEUTS SEG NFR BLD AUTO: 80 % (ref 45–65)
PLATELET # BLD AUTO: 320 K/MCL (ref 150–450)
POTASSIUM SERPL-SCNC: 4.1 MEQ/L (ref 3.6–5)
PT - I.N. RATIO (HISTORICAL): 1 RATIO(INR)
PT, PATIENT (HISTORICAL): 9.6 SEC (ref 9.2–11.1)
RBC # BLD AUTO: 4.07 M/MCL (ref 4–5.2)
SODIUM SERPL-SCNC: 139 MEQ/L (ref 137–147)
TOTAL PROTEIN (HISTORICAL): 6.1 G/DL (ref 5.9–8.4)
WBC # BLD AUTO: 10.7 K/MCL (ref 4.5–11)

## 2018-04-23 LAB
ABSOL LYMPHOCYTES (HISTORICAL): 1.3 K/UL (ref 0.5–4)
ALBUMIN SERPL BCP-MCNC: 3.5 G/DL (ref 3–5.2)
ALP SERPL-CCNC: 247 U/L (ref 43–122)
ALT SERPL W P-5'-P-CCNC: 53 U/L (ref 9–52)
ANION GAP SERPL CALCULATED.3IONS-SCNC: 11 MMOL/L (ref 5–14)
AST SERPL W P-5'-P-CCNC: 34 U/L (ref 14–36)
BASOPHILS # BLD AUTO: 0 K/UL (ref 0–0.1)
BASOPHILS # BLD AUTO: 1 % (ref 0–1)
BILIRUB SERPL-MCNC: 0.5 MG/DL
BUN SERPL-MCNC: 18 MG/DL (ref 5–25)
CALCIUM SERPL-MCNC: 9.5 MG/DL (ref 8.4–10.2)
CHLORIDE SERPL-SCNC: 101 MEQ/L (ref 97–108)
CO2 SERPL-SCNC: 28 MMOL/L (ref 22–30)
CREATINE, SERUM (HISTORICAL): 0.5 MG/DL (ref 0.6–1.2)
DEPRECATED RDW RBC AUTO: 12.4 %
EGFR (HISTORICAL): >60 ML/MIN/1.73 M2
EOSINOPHIL # BLD AUTO: 0.1 K/UL (ref 0–0.4)
EOSINOPHIL NFR BLD AUTO: 1 % (ref 0–6)
GLUCOSE SERPL-MCNC: 171 MG/DL (ref 70–99)
HCT VFR BLD AUTO: 37.9 % (ref 36–46)
HGB BLD-MCNC: 12.8 G/DL (ref 12–16)
LYMPHOCYTES NFR BLD AUTO: 14 % (ref 25–45)
MCH RBC QN AUTO: 33.4 PG (ref 26–34)
MCHC RBC AUTO-ENTMCNC: 33.9 % (ref 31–36)
MCV RBC AUTO: 98 FL (ref 80–100)
MONOCYTES # BLD AUTO: 0.5 K/UL (ref 0.2–0.9)
MONOCYTES NFR BLD AUTO: 6 % (ref 1–10)
NEUTROPHILS ABS COUNT (HISTORICAL): 7.9 K/UL (ref 1.8–7.8)
NEUTS SEG NFR BLD AUTO: 78 % (ref 45–65)
PLATELET # BLD AUTO: 423 K/MCL (ref 150–450)
POTASSIUM SERPL-SCNC: 3.9 MEQ/L (ref 3.6–5)
PT - I.N. RATIO (HISTORICAL): 1 RATIO(INR)
PT, PATIENT (HISTORICAL): 9.7 SEC (ref 9.2–11.1)
RBC # BLD AUTO: 3.85 M/MCL (ref 4–5.2)
SODIUM SERPL-SCNC: 139 MEQ/L (ref 137–147)
TOTAL PROTEIN (HISTORICAL): 6.5 G/DL (ref 5.9–8.4)
WBC # BLD AUTO: 9.9 K/MCL (ref 4.5–11)

## 2018-05-25 ENCOUNTER — TRANSCRIBE ORDERS (OUTPATIENT)
Dept: ADMINISTRATIVE | Facility: HOSPITAL | Age: 53
End: 2018-05-25

## 2018-05-25 ENCOUNTER — APPOINTMENT (OUTPATIENT)
Dept: LAB | Facility: HOSPITAL | Age: 53
End: 2018-05-25
Payer: COMMERCIAL

## 2018-05-25 DIAGNOSIS — K75.4 CHRONIC AGGRESSIVE HEPATITIS (HCC): ICD-10-CM

## 2018-05-25 DIAGNOSIS — K75.4 CHRONIC AGGRESSIVE HEPATITIS (HCC): Primary | ICD-10-CM

## 2018-05-25 LAB
ALBUMIN SERPL BCP-MCNC: 3.5 G/DL (ref 3.5–5)
ALP SERPL-CCNC: 213 U/L (ref 46–116)
ALT SERPL W P-5'-P-CCNC: 45 U/L (ref 12–78)
ANION GAP SERPL CALCULATED.3IONS-SCNC: 6 MMOL/L (ref 4–13)
AST SERPL W P-5'-P-CCNC: 50 U/L (ref 5–45)
BILIRUB SERPL-MCNC: 0.45 MG/DL (ref 0.2–1)
BUN SERPL-MCNC: 19 MG/DL (ref 5–25)
CALCIUM SERPL-MCNC: 9.1 MG/DL (ref 8.3–10.1)
CHLORIDE SERPL-SCNC: 102 MMOL/L (ref 100–108)
CO2 SERPL-SCNC: 30 MMOL/L (ref 21–32)
CREAT SERPL-MCNC: 0.83 MG/DL (ref 0.6–1.3)
ERYTHROCYTE [DISTWIDTH] IN BLOOD BY AUTOMATED COUNT: 13.1 % (ref 11.6–15.1)
GFR SERPL CREATININE-BSD FRML MDRD: 94 ML/MIN/1.73SQ M
GLUCOSE P FAST SERPL-MCNC: 157 MG/DL (ref 65–99)
HCT VFR BLD AUTO: 41.2 % (ref 34.8–46.1)
HGB BLD-MCNC: 14 G/DL (ref 11.5–15.4)
IGG SERPL-MCNC: 1050 MG/DL (ref 700–1600)
MCH RBC QN AUTO: 33.5 PG (ref 26.8–34.3)
MCHC RBC AUTO-ENTMCNC: 34 G/DL (ref 31.4–37.4)
MCV RBC AUTO: 99 FL (ref 82–98)
PLATELET # BLD AUTO: 270 THOUSANDS/UL (ref 149–390)
PMV BLD AUTO: 10.4 FL (ref 8.9–12.7)
POTASSIUM SERPL-SCNC: 3.6 MMOL/L (ref 3.5–5.3)
PROT SERPL-MCNC: 7.2 G/DL (ref 6.4–8.2)
RBC # BLD AUTO: 4.18 MILLION/UL (ref 3.81–5.12)
SODIUM SERPL-SCNC: 138 MMOL/L (ref 136–145)
WBC # BLD AUTO: 8.6 THOUSAND/UL (ref 4.31–10.16)

## 2018-05-25 PROCEDURE — 36415 COLL VENOUS BLD VENIPUNCTURE: CPT

## 2018-05-25 PROCEDURE — 80053 COMPREHEN METABOLIC PANEL: CPT

## 2018-05-25 PROCEDURE — 85027 COMPLETE CBC AUTOMATED: CPT

## 2018-05-25 PROCEDURE — 82784 ASSAY IGA/IGD/IGG/IGM EACH: CPT

## 2018-06-05 LAB
ABSOL LYMPHOCYTES (HISTORICAL): 1.8 K/UL (ref 0.5–4)
ALBUMIN SERPL BCP-MCNC: 3.8 G/DL (ref 3–5.2)
ALP SERPL-CCNC: 211 U/L (ref 43–122)
ALT SERPL W P-5'-P-CCNC: 46 U/L (ref 9–52)
ANION GAP SERPL CALCULATED.3IONS-SCNC: 7 MMOL/L (ref 5–14)
AST SERPL W P-5'-P-CCNC: 48 U/L (ref 14–36)
BASOPHILS # BLD AUTO: 0 K/UL (ref 0–0.1)
BASOPHILS # BLD AUTO: 1 % (ref 0–1)
BILIRUB SERPL-MCNC: 0.4 MG/DL
BUN SERPL-MCNC: 17 MG/DL (ref 5–25)
CALCIUM SERPL-MCNC: 9.5 MG/DL (ref 8.4–10.2)
CHLORIDE SERPL-SCNC: 100 MEQ/L (ref 97–108)
CHOLEST SERPL-MCNC: 168 MG/DL
CHOLEST/HDLC SERPL: 2.3 {RATIO}
CO2 SERPL-SCNC: 29 MMOL/L (ref 22–30)
CREATINE, SERUM (HISTORICAL): 0.52 MG/DL (ref 0.6–1.2)
DEPRECATED RDW RBC AUTO: 13.3 %
EGFR (HISTORICAL): >60 ML/MIN/1.73 M2
EOSINOPHIL # BLD AUTO: 0 K/UL (ref 0–0.4)
EOSINOPHIL NFR BLD AUTO: 0 % (ref 0–6)
EST. AVERAGE GLUCOSE BLD GHB EST-MCNC: 180 MG/DL
FERRITIN SERPL-MCNC: 57 NG/ML (ref 11–264)
FOLATE SERPL-MCNC: >20 NG/ML
GLUCOSE FASTING (HISTORICAL): 174 MG/DL (ref 70–99)
HBA1C MFR BLD HPLC: 7.9 %
HCT VFR BLD AUTO: 40.7 % (ref 36–46)
HDLC SERPL-MCNC: 74 MG/DL
HGB BLD-MCNC: 14.2 G/DL (ref 12–16)
IRON SERPL-MCNC: 101 UG/DL (ref 37–170)
LDL/HDL RATIO (HISTORICAL): 1
LDLC SERPL CALC-MCNC: 73 MG/DL
LYMPHOCYTES NFR BLD AUTO: 21 % (ref 25–45)
MCH RBC QN AUTO: 34.3 PG (ref 26–34)
MCHC RBC AUTO-ENTMCNC: 35 % (ref 31–36)
MCV RBC AUTO: 98 FL (ref 80–100)
MONOCYTES # BLD AUTO: 0.4 K/UL (ref 0.2–0.9)
MONOCYTES NFR BLD AUTO: 5 % (ref 1–10)
NEUTROPHILS ABS COUNT (HISTORICAL): 6.3 K/UL (ref 1.8–7.8)
NEUTS SEG NFR BLD AUTO: 73 % (ref 45–65)
PLATELET # BLD AUTO: 311 K/MCL (ref 150–450)
POTASSIUM SERPL-SCNC: 4.1 MEQ/L (ref 3.6–5)
PTH-INTACT SERPL-MCNC: 17.5 PG/ML (ref 16.7–78.9)
RBC # BLD AUTO: 4.15 M/MCL (ref 4–5.2)
SODIUM SERPL-SCNC: 136 MEQ/L (ref 137–147)
TOTAL PROTEIN (HISTORICAL): 6.8 G/DL (ref 5.9–8.4)
TRIGL SERPL-MCNC: 106 MG/DL
TSH SERPL DL<=0.05 MIU/L-ACNC: 0.03 UIU/ML (ref 0.47–4.68)
VIT B12 SERPL-MCNC: >1000 PG/ML (ref 239–931)
VITAMIN D25-HYDROXY (HISTORICAL): 30.7 NG/ML (ref 30–100)
VLDLC SERPL CALC-MCNC: 21 MG/DL (ref 0–40)
WBC # BLD AUTO: 8.5 K/MCL (ref 4.5–11)

## 2018-06-07 ENCOUNTER — APPOINTMENT (OUTPATIENT)
Dept: LAB | Facility: HOSPITAL | Age: 53
End: 2018-06-07
Payer: COMMERCIAL

## 2018-06-07 DIAGNOSIS — K75.4 CHRONIC AGGRESSIVE HEPATITIS (HCC): ICD-10-CM

## 2018-06-07 LAB
ALBUMIN SERPL BCP-MCNC: 3.4 G/DL (ref 3.5–5)
ALP SERPL-CCNC: 217 U/L (ref 46–116)
ALT SERPL W P-5'-P-CCNC: 46 U/L (ref 12–78)
ANION GAP SERPL CALCULATED.3IONS-SCNC: 7 MMOL/L (ref 4–13)
AST SERPL W P-5'-P-CCNC: 36 U/L (ref 5–45)
BILIRUB SERPL-MCNC: 0.36 MG/DL (ref 0.2–1)
BUN SERPL-MCNC: 20 MG/DL (ref 5–25)
CALCIUM SERPL-MCNC: 9 MG/DL (ref 8.3–10.1)
CHLORIDE SERPL-SCNC: 99 MMOL/L (ref 100–108)
CO2 SERPL-SCNC: 28 MMOL/L (ref 21–32)
CREAT SERPL-MCNC: 0.86 MG/DL (ref 0.6–1.3)
ERYTHROCYTE [DISTWIDTH] IN BLOOD BY AUTOMATED COUNT: 12.8 % (ref 11.6–15.1)
GFR SERPL CREATININE-BSD FRML MDRD: 90 ML/MIN/1.73SQ M
GLUCOSE P FAST SERPL-MCNC: 328 MG/DL (ref 65–99)
HCT VFR BLD AUTO: 41.9 % (ref 34.8–46.1)
HGB BLD-MCNC: 14.2 G/DL (ref 11.5–15.4)
IGG SERPL-MCNC: 966 MG/DL (ref 700–1600)
MCH RBC QN AUTO: 33.2 PG (ref 26.8–34.3)
MCHC RBC AUTO-ENTMCNC: 33.9 G/DL (ref 31.4–37.4)
MCV RBC AUTO: 98 FL (ref 82–98)
PLATELET # BLD AUTO: 301 THOUSANDS/UL (ref 149–390)
PMV BLD AUTO: 10 FL (ref 8.9–12.7)
POTASSIUM SERPL-SCNC: 3.8 MMOL/L (ref 3.5–5.3)
PROT SERPL-MCNC: 7.3 G/DL (ref 6.4–8.2)
RBC # BLD AUTO: 4.28 MILLION/UL (ref 3.81–5.12)
SODIUM SERPL-SCNC: 134 MMOL/L (ref 136–145)
WBC # BLD AUTO: 10.52 THOUSAND/UL (ref 4.31–10.16)

## 2018-06-07 PROCEDURE — 82784 ASSAY IGA/IGD/IGG/IGM EACH: CPT

## 2018-06-07 PROCEDURE — 80053 COMPREHEN METABOLIC PANEL: CPT

## 2018-06-07 PROCEDURE — 85027 COMPLETE CBC AUTOMATED: CPT

## 2018-06-07 PROCEDURE — 36415 COLL VENOUS BLD VENIPUNCTURE: CPT

## 2018-06-09 LAB — VITAMIN B1, WHOLE BLOOD (HISTORICAL): 82

## 2018-06-14 ENCOUNTER — HOSPITAL ENCOUNTER (EMERGENCY)
Facility: HOSPITAL | Age: 53
Discharge: HOME | End: 2018-06-14
Payer: COMMERCIAL

## 2018-06-14 ENCOUNTER — HOSPITAL ENCOUNTER (EMERGENCY)
Facility: HOSPITAL | Age: 53
Discharge: HOME | End: 2018-06-14
Attending: EMERGENCY MEDICINE | Admitting: EMERGENCY MEDICINE
Payer: COMMERCIAL

## 2018-06-14 ENCOUNTER — APPOINTMENT (EMERGENCY)
Dept: CT IMAGING | Facility: HOSPITAL | Age: 53
End: 2018-06-14
Payer: COMMERCIAL

## 2018-06-14 VITALS
SYSTOLIC BLOOD PRESSURE: 121 MMHG | HEART RATE: 95 BPM | WEIGHT: 162 LBS | DIASTOLIC BLOOD PRESSURE: 74 MMHG | TEMPERATURE: 98.1 F | BODY MASS INDEX: 29.63 KG/M2 | OXYGEN SATURATION: 95 % | RESPIRATION RATE: 16 BRPM

## 2018-06-14 VITALS
OXYGEN SATURATION: 98 % | DIASTOLIC BLOOD PRESSURE: 80 MMHG | HEART RATE: 80 BPM | TEMPERATURE: 97.8 F | RESPIRATION RATE: 16 BRPM | SYSTOLIC BLOOD PRESSURE: 126 MMHG

## 2018-06-14 DIAGNOSIS — N20.0 KIDNEY STONE ON RIGHT SIDE: Primary | ICD-10-CM

## 2018-06-14 LAB
ALBUMIN SERPL BCP-MCNC: 3.2 G/DL (ref 3.5–5)
ALP SERPL-CCNC: 227 U/L (ref 46–116)
ALT SERPL W P-5'-P-CCNC: 60 U/L (ref 12–78)
ANION GAP SERPL CALCULATED.3IONS-SCNC: 11 MMOL/L (ref 4–13)
AST SERPL W P-5'-P-CCNC: 52 U/L (ref 5–45)
BACTERIA UR QL AUTO: ABNORMAL /HPF
BASOPHILS # BLD AUTO: 0.02 THOUSANDS/ΜL (ref 0–0.1)
BASOPHILS NFR BLD AUTO: 0 % (ref 0–1)
BILIRUB SERPL-MCNC: 0.52 MG/DL (ref 0.2–1)
BILIRUB UR QL STRIP: ABNORMAL
BUN SERPL-MCNC: 19 MG/DL (ref 5–25)
CALCIUM SERPL-MCNC: 9.1 MG/DL (ref 8.3–10.1)
CAOX CRY URNS QL MICRO: ABNORMAL /HPF
CHLORIDE SERPL-SCNC: 99 MMOL/L (ref 100–108)
CLARITY UR: ABNORMAL
CO2 SERPL-SCNC: 27 MMOL/L (ref 21–32)
COLOR UR: YELLOW
CREAT SERPL-MCNC: 0.82 MG/DL (ref 0.6–1.3)
EOSINOPHIL # BLD AUTO: 0.05 THOUSAND/ΜL (ref 0–0.61)
EOSINOPHIL NFR BLD AUTO: 1 % (ref 0–6)
ERYTHROCYTE [DISTWIDTH] IN BLOOD BY AUTOMATED COUNT: 12.7 % (ref 11.6–15.1)
GFR SERPL CREATININE-BSD FRML MDRD: 96 ML/MIN/1.73SQ M
GLUCOSE SERPL-MCNC: 194 MG/DL (ref 65–140)
GLUCOSE UR STRIP-MCNC: ABNORMAL MG/DL
HCT VFR BLD AUTO: 39.2 % (ref 34.8–46.1)
HGB BLD-MCNC: 13.5 G/DL (ref 11.5–15.4)
HGB UR QL STRIP.AUTO: ABNORMAL
KETONES UR STRIP-MCNC: ABNORMAL MG/DL
LEUKOCYTE ESTERASE UR QL STRIP: NEGATIVE
LYMPHOCYTES # BLD AUTO: 1.57 THOUSANDS/ΜL (ref 0.6–4.47)
LYMPHOCYTES NFR BLD AUTO: 16 % (ref 14–44)
MCH RBC QN AUTO: 33.2 PG (ref 26.8–34.3)
MCHC RBC AUTO-ENTMCNC: 34.4 G/DL (ref 31.4–37.4)
MCV RBC AUTO: 96 FL (ref 82–98)
MONOCYTES # BLD AUTO: 0.63 THOUSAND/ΜL (ref 0.17–1.22)
MONOCYTES NFR BLD AUTO: 7 % (ref 4–12)
NEUTROPHILS # BLD AUTO: 7.37 THOUSANDS/ΜL (ref 1.85–7.62)
NEUTS SEG NFR BLD AUTO: 77 % (ref 43–75)
NITRITE UR QL STRIP: NEGATIVE
NON-SQ EPI CELLS URNS QL MICRO: ABNORMAL /HPF
PH UR STRIP.AUTO: 5.5 [PH] (ref 4.5–8)
PLATELET # BLD AUTO: 322 THOUSANDS/UL (ref 149–390)
PMV BLD AUTO: 9.9 FL (ref 8.9–12.7)
POTASSIUM SERPL-SCNC: 4 MMOL/L (ref 3.5–5.3)
PROT SERPL-MCNC: 6.7 G/DL (ref 6.4–8.2)
PROT UR STRIP-MCNC: ABNORMAL MG/DL
RBC # BLD AUTO: 4.07 MILLION/UL (ref 3.81–5.12)
RBC #/AREA URNS AUTO: ABNORMAL /HPF
SODIUM SERPL-SCNC: 137 MMOL/L (ref 136–145)
SP GR UR STRIP.AUTO: >=1.03 (ref 1–1.03)
UROBILINOGEN UR QL STRIP.AUTO: 1 E.U./DL
WBC # BLD AUTO: 9.64 THOUSAND/UL (ref 4.31–10.16)
WBC #/AREA URNS AUTO: ABNORMAL /HPF

## 2018-06-14 PROCEDURE — 36415 COLL VENOUS BLD VENIPUNCTURE: CPT | Performed by: PHYSICIAN ASSISTANT

## 2018-06-14 PROCEDURE — 80053 COMPREHEN METABOLIC PANEL: CPT | Performed by: PHYSICIAN ASSISTANT

## 2018-06-14 PROCEDURE — 96374 THER/PROPH/DIAG INJ IV PUSH: CPT

## 2018-06-14 PROCEDURE — 81001 URINALYSIS AUTO W/SCOPE: CPT

## 2018-06-14 PROCEDURE — 99284 EMERGENCY DEPT VISIT MOD MDM: CPT

## 2018-06-14 PROCEDURE — 85025 COMPLETE CBC W/AUTO DIFF WBC: CPT | Performed by: PHYSICIAN ASSISTANT

## 2018-06-14 PROCEDURE — 96361 HYDRATE IV INFUSION ADD-ON: CPT

## 2018-06-14 PROCEDURE — 81002 URINALYSIS NONAUTO W/O SCOPE: CPT | Performed by: EMERGENCY MEDICINE

## 2018-06-14 PROCEDURE — 74176 CT ABD & PELVIS W/O CONTRAST: CPT

## 2018-06-14 RX ORDER — TRAMADOL HYDROCHLORIDE 50 MG/1
50 TABLET ORAL DAILY
COMMUNITY
Start: 2018-05-07 | End: 2019-05-07

## 2018-06-14 RX ORDER — CETIRIZINE HYDROCHLORIDE 10 MG/1
10 TABLET ORAL
COMMUNITY
Start: 2018-04-25 | End: 2019-04-25

## 2018-06-14 RX ORDER — KETOTIFEN FUMARATE 0.35 MG/ML
SOLUTION/ DROPS OPHTHALMIC DAILY
COMMUNITY
Start: 2015-07-04

## 2018-06-14 RX ORDER — BUDESONIDE 3 MG/1
CAPSULE, COATED PELLETS ORAL
COMMUNITY
Start: 2018-04-04

## 2018-06-14 RX ORDER — ASPIRIN 81 MG/1
81 TABLET ORAL DAILY
COMMUNITY
Start: 2017-12-18

## 2018-06-14 RX ORDER — VARENICLINE TARTRATE 1 MG/1
TABLET, FILM COATED ORAL
COMMUNITY
Start: 2017-12-26 | End: 2018-06-25 | Stop reason: ALTCHOICE

## 2018-06-14 RX ORDER — BIOTIN 10 MG
10 TABLET ORAL
COMMUNITY
Start: 2018-04-25

## 2018-06-14 RX ORDER — FLUOXETINE HYDROCHLORIDE 40 MG/1
40 CAPSULE ORAL DAILY
COMMUNITY
Start: 2017-09-05

## 2018-06-14 RX ORDER — OXYCODONE HYDROCHLORIDE AND ACETAMINOPHEN 5; 325 MG/1; MG/1
1 TABLET ORAL EVERY 4 HOURS PRN
Qty: 6 TABLET | Refills: 0 | Status: SHIPPED | OUTPATIENT
Start: 2018-06-14 | End: 2018-06-17

## 2018-06-14 RX ORDER — KETOROLAC TROMETHAMINE 30 MG/ML
15 INJECTION, SOLUTION INTRAMUSCULAR; INTRAVENOUS ONCE
Status: COMPLETED | OUTPATIENT
Start: 2018-06-14 | End: 2018-06-14

## 2018-06-14 RX ADMIN — KETOROLAC TROMETHAMINE 15 MG: 30 INJECTION, SOLUTION INTRAMUSCULAR at 09:06

## 2018-06-14 RX ADMIN — SODIUM CHLORIDE 1000 ML: 0.9 INJECTION, SOLUTION INTRAVENOUS at 09:06

## 2018-06-14 NOTE — DISCHARGE INSTRUCTIONS
Kidney Stones   WHAT YOU NEED TO KNOW:   Kidney stones form in the urinary system when the water and waste in your urine are out of balance  When this happens, certain types of waste crystals separate from the urine  The crystals build up and form kidney stones  You may have 1 or more kidney stones  DISCHARGE INSTRUCTIONS:   Return to the emergency department if:   · You have vomiting that is not relieved by medicine  Contact your healthcare provider if:   · You have a fever  · You have trouble passing urine  · You see blood in your urine  · You have severe pain  · You have any questions or concerns about your condition or care  Medicines:   · NSAIDs , such as ibuprofen, help decrease swelling, pain, and fever  This medicine is available with or without a doctor's order  NSAIDs can cause stomach bleeding or kidney problems in certain people  If you take blood thinner medicine, always ask your healthcare provider if NSAIDs are safe for you  Always read the medicine label and follow directions  · Prescription medicine  may be given  Ask how to take this medicine safely  · Medicines  to balance your electrolytes may be needed  · Take your medicine as directed  Contact your healthcare provider if you think your medicine is not helping or if you have side effects  Tell him or her if you are allergic to any medicine  Keep a list of the medicines, vitamins, and herbs you take  Include the amounts, and when and why you take them  Bring the list or the pill bottles to follow-up visits  Carry your medicine list with you in case of an emergency  Follow up with your healthcare provider as directed: You may need to return for more tests  Write down your questions so you remember to ask them during your visits  Self-care:   · Drink plenty of liquids  Your healthcare provider may tell you to drink at least 8 to 12 (eight-ounce) cups of liquids each day   This helps flush out the kidney stones when you urinate  Water is the best liquid to drink  · Strain your urine every time you go to the bathroom  Urinate through a strainer or a piece of thin cloth to catch the stones  Take the stones to your healthcare provider so they can be sent to the lab for tests  This will help your healthcare providers plan the best treatment for you  · Eat a variety of healthy foods  Healthy foods include fruits, vegetables, whole-grain breads, low-fat dairy products, beans, and fish  You may need to limit how much sodium (salt) or protein you eat  Ask for information about the best foods for you  · Stay active  Your stones may pass more easily by if you stay active  Ask about the best activities for you  After you pass your kidney stones:  Once you have passed your kidney stones, your healthcare provider may  order a 24-hour urine test  Results from a 24-hour urine test will help your healthcare provider plan ways to prevent more stones from forming  If you are told to do a 24-hour test, your healthcare provider will give you more instructions  © 2017 [facility] [facility] Information is for End User's use only and may not be sold, redistributed or otherwise used for commercial purposes  All illustrations and images included in CareNotes® are the copyrighted property of [employer]  or Bryson Lindsey  The above information is an  only  It is not intended as medical advice for individual conditions or treatments  Talk to your doctor, nurse or pharmacist before following any medical regimen to see if it is safe and effective for you

## 2018-06-14 NOTE — ED PROVIDER NOTES
History  Chief Complaint   Patient presents with    Flank Pain     right sided flank pain x 2 days  Per pt she has reaccurent kidney stones  Pt states she has also had diarrhea  Pt denies urinary symptoms/fevers/nausea/vomiting/cp/sob      47 yo F with PMH DM and recurrent kidney stones x 15 presenting with 2 day history of R flank pain  Pt reports that this is the same pain she has with all of her kidney stones in the past  She reports radiation from the R flank anteriorly towards the groin  Pt reports taking ibuprofen at home without relief  Pt reports diarrhea today  She denies any fevers, chills, night sweats, nausea, vomiting, dysuria, hematuria, urinary urgency or frequency  Differential diagnosis includes kidney stone, uti, pyelo, muscle strain        Flank Pain       Prior to Admission Medications   Prescriptions Last Dose Informant Patient Reported? Taking? Biotin 10 MG TABS   Yes Yes   Sig: Take 10 mg by mouth   FLUoxetine (PROzac) 40 MG capsule   Yes Yes   LORazepam (ATIVAN) 1 mg tablet   Yes Yes   Sig: Take 1 mg by mouth every 6 (six) hours as needed for anxiety   QUEtiapine (SEROquel) 100 mg tablet   Yes Yes   Sig: Take 100 mg by mouth daily at bedtime   aspirin (ASPIR-LOW) 81 mg EC tablet   Yes Yes   azaTHIOprine (IMURAN) 50 mg tablet   Yes Yes   Sig: Take 50 mg by mouth daily   budesonide (ENTOCORT EC) 3 MG capsule   Yes Yes   calcium citrate-Vitamin D 200 mg-250 units   Yes Yes   Sig: Take 2 tablets by mouth   cetirizine (ZyrTEC) 10 mg tablet   Yes Yes   Sig: Take 10 mg by mouth   ketotifen (ZADITOR) 0 025 % ophthalmic solution   Yes Yes   liraglutide (VICTOZA) injection   Yes Yes   pantoprazole (PROTONIX) 20 mg tablet   No Yes   Sig: Take 1 tablet (20 mg total) by mouth 2 (two) times a day Take twice a day before breakfast and half an hour to an hour before dinner     traMADol (ULTRAM) 50 mg tablet   Yes Yes   Sig: Take 50 mg by mouth 2 (two) times a day   varenicline (CHANTIX) 1 mg tablet   Yes Yes   zolpidem (AMBIEN) 5 mg tablet   Yes Yes   Sig: Take 5 mg by mouth daily at bedtime as needed for sleep      Facility-Administered Medications: None       Past Medical History:   Diagnosis Date    Autoimmune hepatitis ([facility] )     Bipolar 1 disorder ([facility] )     Diabetes mellitus ([place] [address] )     Renal disorder     kidney stones       Past Surgical History:   Procedure Laterality Date    BUNIONECTOMY      HYSTERECTOMY      JOINT REPLACEMENT      KIDNEY STONE SURGERY         History reviewed  No pertinent family history  I have reviewed and agree with the history as documented  Social History   Substance Use Topics    Smoking status: Former Smoker     Types: Cigarettes    Smokeless tobacco: Never Used    Alcohol use No        Review of Systems   Genitourinary: Positive for flank pain  All other systems reviewed and are negative  Physical Exam  Physical Exam   Constitutional: She is oriented to person, place, and time  She appears well-developed and well-nourished  No distress  Appears uncomfortable   HENT:   Head: Normocephalic  Eyes: Conjunctivae and EOM are normal    Neck: Normal range of motion  Neck supple  Cardiovascular: Normal rate and regular rhythm  Pulmonary/Chest: Effort normal    Abdominal: Soft  Bowel sounds are normal    Musculoskeletal: Normal range of motion  Pain reproducible to palpation R flank   Neurological: She is alert and oriented to person, place, and time  Skin: Skin is warm and dry  Capillary refill takes less than 2 seconds  She is not diaphoretic  Psychiatric: She has a normal mood and affect  Her behavior is normal    Nursing note and vitals reviewed        Vital Signs  ED Triage Vitals   Temperature Pulse Respirations Blood Pressure SpO2   06/14/18 0833 06/14/18 0827 06/14/18 0827 06/14/18 0827 06/14/18 0827   98 5 °F (36 9 °C) 89 18 125/83 95 %      Temp Source Heart Rate Source Patient Position - Orthostatic VS BP Location FiO2 (%)   06/14/18 0833 06/14/18 0827 06/14/18 0827 06/14/18 0827 --   Oral Monitor Sitting Right arm       Pain Score       06/14/18 0946       Worst Possible Pain           Vitals:    06/14/18 0827 06/14/18 0830 06/14/18 0946   BP: 125/83 125/83 126/80   Pulse: 89 90 80   Patient Position - Orthostatic VS: Sitting  Lying       Visual Acuity      ED Medications  Medications   sodium chloride 0 9 % bolus 1,000 mL (1,000 mL Intravenous New Bag 6/14/18 0906)   ketorolac (TORADOL) injection 15 mg (15 mg Intravenous Given 6/14/18 0906)       Diagnostic Studies  Results Reviewed     Procedure Component Value Units Date/Time    Urine Microscopic [[id number]]  (Abnormal) Collected:  06/14/18 0840    Lab Status:  Final result Specimen:  Urine from Urine, Clean Catch Updated:  06/14/18 0928     RBC, UA Innumerable (A) /hpf      WBC, UA 0-1 (A) /hpf      Epithelial Cells Occasional /hpf      Bacteria, UA Occasional /hpf      Ca Oxalate Agatha, UA Occasional (A) /hpf     CBC and differential [[id number]]  (Abnormal) Collected:  06/14/18 0908    Lab Status:  Final result Specimen:  Blood from Arm, Right Updated:  06/14/18 0916     WBC 9 64 Thousand/uL      RBC 4 07 Million/uL      Hemoglobin 13 5 g/dL      Hematocrit 39 2 %      MCV 96 fL      MCH 33 2 pg      MCHC 34 4 g/dL      RDW 12 7 %      MPV 9 9 fL      Platelets 624 Thousands/uL      Neutrophils Relative 77 (H) %      Lymphocytes Relative 16 %      Monocytes Relative 7 %      Eosinophils Relative 1 %      Basophils Relative 0 %      Neutrophils Absolute 7 37 Thousands/µL      Lymphocytes Absolute 1 57 Thousands/µL      Monocytes Absolute 0 63 Thousand/µL      Eosinophils Absolute 0 05 Thousand/µL      Basophils Absolute 0 02 Thousands/µL     Comprehensive metabolic panel [[id number]] Collected:  06/14/18 0908    Lab Status:   In process Specimen:  Blood from Arm, Right Updated:  06/14/18 0911    POCT urinalysis dipstick [[id number]]  (Abnormal) Resulted:  06/14/18 0837    Lab Status:  Final result Updated: 06/14/18 0837    ED Urine Macroscopic [[id number]]  (Abnormal) Collected:  06/14/18 0840    Lab Status:  Final result Specimen:  Urine Updated:  06/14/18 0836     Color, UA Yellow     Clarity, UA Cloudy     pH, UA 5 5     Leukocytes, UA Negative     Nitrite, UA Negative     Protein,  (2+) (A) mg/dl      Glucose,  (1/10%) (A) mg/dl      Ketones, UA Trace (A) mg/dl      Urobilinogen, UA 1 0 E U /dl      Bilirubin, UA Interference- unable to analyze (A)     Blood, UA Large (A)     Specific Gravity, UA >=1 030    Narrative:       CLIN[facility] RESULT                 CT renal stone study abdomen pelvis without contrast   Final Result by Clyde Irvin MD (06/14 2027)      Mildly obstructing 3 mm calculus in the right distal ureter  Nonobstructing left renal calculi  Workstation performed: [id number]                    Procedures  Procedures       Phone Contacts  ED Phone Contact    ED Course  ED Course as of Jun 14 0948   Thu Jun 14, 2018   9685 Re-assessment of pt-pt with continued pain but did improved with toradol, spoke with pt about imaging and lab results, pt is asking to go home                                MDM  Number of Diagnoses or Management Options  Diagnosis management comments: Hpi, ros and pe all consistent with kidney stone, Spoke with pt about imaging and lab results, will send home with f/u with urology, strainer and instructed to increase fluids, pt understands and is agreeable to plan, stable for d/c    CritCare Time    Disposition  Final diagnoses:   Kidney stone on right side     Time reflects when diagnosis was documented in both MDM as applicable and the Disposition within this note     Time User Action Codes Description Comment    6/14/2018  9:46 AM Jack SAUCEDO Add [N20 0] Kidney stone on right side       ED Disposition     ED Disposition Condition Comment    Discharge  Lu Pallas discharge to home/self care      Condition at discharge: Stable        Follow-up Information     Follow up With Specialties Details Why Contact Info    Megan Aranda, [address]  Nurse Practitioner   [id number] [address]   [address]  [phone] [facility] [place] [facility]   [facility] [id number] [phone]          Patient's Medications   Discharge Prescriptions    OXYCODONE-ACETAMINOPHEN (PERCOCET) 5-325 MG PER TABLET    Take 1 tablet by mouth every 4 (four) hours as needed for moderate pain for up to 2 days Max Daily Amount: 6 tablets       Start Date: 6/14/2018 End Date: 6/16/2018       Order Dose: 1 tablet       Quantity: 6 tablet    Refills: 0     No discharge procedures on file      ED Provider  Electronically Signed by           Ministerio Lawson PA-C  06/14/18 6997

## 2018-06-15 ENCOUNTER — HOSPITAL ENCOUNTER (EMERGENCY)
Facility: HOSPITAL | Age: 53
Discharge: HOME | End: 2018-06-15
Attending: EMERGENCY MEDICINE
Payer: COMMERCIAL

## 2018-06-15 VITALS
WEIGHT: 159 LBS | SYSTOLIC BLOOD PRESSURE: 149 MMHG | DIASTOLIC BLOOD PRESSURE: 86 MMHG | BODY MASS INDEX: 29.08 KG/M2 | TEMPERATURE: 98.1 F | OXYGEN SATURATION: 97 % | HEART RATE: 86 BPM | RESPIRATION RATE: 18 BRPM

## 2018-06-15 DIAGNOSIS — R10.9 RIGHT FLANK PAIN: Primary | ICD-10-CM

## 2018-06-15 PROCEDURE — 99283 EMERGENCY DEPT VISIT LOW MDM: CPT

## 2018-06-15 RX ORDER — OXYCODONE HYDROCHLORIDE AND ACETAMINOPHEN 5; 325 MG/1; MG/1
1 TABLET ORAL ONCE
Status: DISCONTINUED | OUTPATIENT
Start: 2018-06-15 | End: 2018-06-15 | Stop reason: HOSPADM

## 2018-06-15 RX ORDER — MORPHINE SULFATE 4 MG/ML
4 INJECTION, SOLUTION INTRAMUSCULAR; INTRAVENOUS ONCE
Status: DISCONTINUED | OUTPATIENT
Start: 2018-06-15 | End: 2018-06-15 | Stop reason: HOSPADM

## 2018-06-15 NOTE — ED NOTES
Patient very angry, stating "this is taking to long where is the doctor, I am in unimaginable pain"  Explained to patient that we are very busy and that the provider would be in when he is available        Moi Blanton RN  06/14/18 2006

## 2018-06-15 NOTE — ED NOTES
Upon entering room, patient not there, bathrooms checked, room checked several times       Libra Roy RN  06/14/18 2009

## 2018-06-16 NOTE — ED NOTES
Went in to give pt percocet, pt states "well, why would I take that here, I have it at home and it doesn't work" Offered pt shot of toradol that was offered to her earlier, pt states "no, I'm just gonna go, I'm tired" Provider aware  Pt briskly walking towards exit with family member        Jessica Tabares RN  06/15/18 2044

## 2018-06-16 NOTE — ED NOTES
1 unsuccessful IV attempt made, flash of blood seen, catheter would not thread  Pt states "get it out get it out - just give me a percocet or the shot" Provider made aware        Maria Elena Hodge RN  06/15/18 2035

## 2018-06-16 NOTE — ED PROVIDER NOTES
History  Chief Complaint   Patient presents with    Flank Pain     Right sided flank pain starting 3 days ago  Was seen here yesterday and dx with a nonobstructing 3mm stone  "I just can't take the pain, when's it Nara Patel go away?" Reports nausea, no vomiting  Denies urinary sx       53y  o female with PMH of hepatitis, bipolar, kidney stones and DM presents to the ER for right flank pain for 3 days  Patient has been taking Percocet for pain without relief  She describes her pain as sharp and non-radiating  She rates her pain 10/10 and states it comes and goes  She denies fever, chills, chest pain, dyspnea, vomiting, abdominal pain, urinary symptoms, weakness or paresthesias  Associated symptoms: nausea and diarrhea  Patient was seen yesterday in our ER and had blood work and CT which showed a 3mm nonobstructing stone  History provided by:  Patient   used: No        Prior to Admission Medications   Prescriptions Last Dose Informant Patient Reported? Taking?    Biotin 10 MG TABS   Yes Yes   Sig: Take 10 mg by mouth   FLUoxetine (PROzac) 40 MG capsule   Yes Yes   LORazepam (ATIVAN) 1 mg tablet   Yes Yes   Sig: Take 1 mg by mouth every 6 (six) hours as needed for anxiety   QUEtiapine (SEROquel) 100 mg tablet   Yes Yes   Sig: Take 100 mg by mouth daily at bedtime   aspirin (ASPIR-LOW) 81 mg EC tablet   Yes Yes   azaTHIOprine (IMURAN) 50 mg tablet   Yes Yes   Sig: Take 50 mg by mouth daily   budesonide (ENTOCORT EC) 3 MG capsule   Yes Yes   calcium citrate-Vitamin D 200 mg-250 units   Yes Yes   Sig: Take 2 tablets by mouth   cetirizine (ZyrTEC) 10 mg tablet   Yes Yes   Sig: Take 10 mg by mouth   ketotifen (ZADITOR) 0 025 % ophthalmic solution   Yes Yes   liraglutide (VICTOZA) injection   Yes Yes   oxyCODONE-acetaminophen (PERCOCET) 5-325 mg per tablet   No Yes   Sig: Take 1 tablet by mouth every 4 (four) hours as needed for moderate pain for up to 2 days Max Daily Amount: 6 tablets pantoprazole (PROTONIX) 20 mg tablet   No Yes   Sig: Take 1 tablet (20 mg total) by mouth 2 (two) times a day Take twice a day before breakfast and half an hour to an hour before dinner  traMADol (ULTRAM) 50 mg tablet   Yes Yes   Sig: Take 50 mg by mouth 2 (two) times a day   varenicline (CHANTIX) 1 mg tablet   Yes Yes   zolpidem (AMBIEN) 5 mg tablet   Yes Yes   Sig: Take 5 mg by mouth daily at bedtime as needed for sleep      Facility-Administered Medications: None       Past Medical History:   Diagnosis Date    Autoimmune hepatitis ([facility] )     Bipolar 1 disorder ([facility] )     Diabetes mellitus ([place] [address] )     Renal disorder     kidney stones       Past Surgical History:   Procedure Laterality Date    BUNIONECTOMY      HYSTERECTOMY      JOINT REPLACEMENT      KIDNEY STONE SURGERY         History reviewed  No pertinent family history  I have reviewed and agree with the history as documented  Social History   Substance Use Topics    Smoking status: Current Some Day Smoker     Types: Cigarettes    Smokeless tobacco: Never Used    Alcohol use No        Review of Systems   Constitutional: Negative for activity change, appetite change, chills and fever  Eyes: Negative for redness  Respiratory: Negative for shortness of breath  Cardiovascular: Negative for chest pain  Gastrointestinal: Positive for diarrhea and nausea  Negative for abdominal pain and vomiting  Genitourinary: Positive for flank pain  Negative for dysuria, frequency, hematuria and urgency  Musculoskeletal: Negative for neck stiffness  Skin: Negative for rash  Allergic/Immunologic: Negative for food allergies  Neurological: Negative for weakness and numbness  Physical Exam  Physical Exam   Constitutional:  Non-toxic appearance  No distress  HENT:   Head: Normocephalic and atraumatic  Right Ear: Tympanic membrane, external ear and ear canal normal  No drainage, swelling or tenderness  No foreign bodies   Tympanic membrane is not erythematous  No hemotympanum  Left Ear: Tympanic membrane, external ear and ear canal normal  No drainage, swelling or tenderness  No foreign bodies  Tympanic membrane is not erythematous  No hemotympanum  Nose: Nose normal    Mouth/Throat: Uvula is midline, oropharynx is clear and moist and mucous membranes are normal  No uvula swelling  No posterior oropharyngeal edema, posterior oropharyngeal erythema or tonsillar abscesses  No tonsillar exudate  Neck: Normal range of motion and phonation normal  Neck supple  No tracheal deviation present  Cardiovascular: Normal rate, regular rhythm, S1 normal, S2 normal and normal heart sounds  Exam reveals no gallop and no friction rub  No murmur heard  Pulmonary/Chest: Effort normal and breath sounds normal  No respiratory distress  She has no decreased breath sounds  She has no wheezes  She has no rhonchi  She has no rales  She exhibits no tenderness  Abdominal: Soft  Bowel sounds are normal  She exhibits no distension  There is no tenderness  There is CVA tenderness (right)  There is no rebound and no guarding  Neurological: She is alert  GCS eye subscore is 4  GCS verbal subscore is 5  GCS motor subscore is 6  Skin: Skin is warm and dry  No rash noted  Psychiatric: She has a normal mood and affect  Nursing note and vitals reviewed        Vital Signs  ED Triage Vitals [06/15/18 1853]   Temperature Pulse Respirations Blood Pressure SpO2   98 1 °F (36 7 °C) 86 18 149/86 97 %      Temp Source Heart Rate Source Patient Position - Orthostatic VS BP Location FiO2 (%)   Temporal Monitor Sitting Right arm --      Pain Score       --           Vitals:    06/15/18 1853   BP: 149/86   Pulse: 86   Patient Position - Orthostatic VS: Sitting       Visual Acuity      ED Medications  Medications - No data to display    Diagnostic Studies  Results Reviewed     None                 No orders to display              Procedures  Procedures       Phone Contacts  ED Phone Contact    ED Course  ED Course as of Rafiq 16 0138   Fri Rafiq 15, 2018   2037 Patient was stuck for IV and vein blew  Patient would like to try a dose of Percocet for pain  2043 When offered Percocet for pain, patient now stating she would just like to leave  Patient left department prior to me reassessing her  MDM  Number of Diagnoses or Management Options  Right flank pain: established and worsening  Diagnosis management comments: DDX consists of but not limited to: kidney stone, intractable pain    Patient would rather not be admitted to the hospital  Will give IV morphine and reassess pain  If pain improves and patient still wants to go home, will discharge  If pain not improved, will talk about admission  Patient agreeable  2037 Patient was stuck for IV and vein blew  Patient told nurse she would like to try a dose of Percocet for pain  2043 When offered Percocet for pain, which patient requested, patient now stating she would just like to leave  Patient left department prior to me reassessing her or speaking with her  Per nursing staff, patient was stable at time of departure from the ER           Amount and/or Complexity of Data Reviewed  Review and summarize past medical records: yes    Patient Progress  Patient progress: stable    CritCare Time    Disposition  Final diagnoses:   Right flank pain     Time reflects when diagnosis was documented in both MDM as applicable and the Disposition within this note     Time User Action Codes Description Comment    6/15/2018  8:44 PM Tahmina Saldivar Add [R10 9] Right flank pain       ED Disposition     ED Disposition Condition Comment    Left from Room after Provider Exam        Follow-up Information    None         Discharge Medication List as of 6/15/2018  8:45 PM      CONTINUE these medications which have NOT CHANGED    Details   aspirin (ASPIR-LOW) 81 mg EC tablet Starting Mon 12/18/2017, Historical Med azaTHIOprine (IMURAN) 50 mg tablet Take 50 mg by mouth daily, Until Discontinued, Historical Med      Biotin 10 MG TABS Take 10 mg by mouth, Starting Wed 4/25/2018, Historical Med      budesonide (ENTOCORT EC) 3 MG capsule Starting Wed 4/4/2018, Historical Med      calcium citrate-Vitamin D 200 mg-250 units Take 2 tablets by mouth, Starting Tue 1/2/2018, Historical Med      cetirizine (ZyrTEC) 10 mg tablet Take 10 mg by mouth, Starting Wed 4/25/2018, Until Thu 4/25/2019, Historical Med      FLUoxetine (PROzac) 40 MG capsule Starting Tue 9/5/2017, Historical Med      ketotifen (ZADITOR) 0 025 % ophthalmic solution Starting Sat 7/4/2015, Historical Med      liraglutide (VICTOZA) injection Starting Mon 3/19/2018, Historical Med      LORazepam (ATIVAN) 1 mg tablet Take 1 mg by mouth every 6 (six) hours as needed for anxiety, Until Discontinued, Historical Med      oxyCODONE-acetaminophen (PERCOCET) 5-325 mg per tablet Take 1 tablet by mouth every 4 (four) hours as needed for moderate pain for up to 2 days Max Daily Amount: 6 tablets, Starting Thu 6/14/2018, Until Sat 6/16/2018, Print      pantoprazole (PROTONIX) 20 mg tablet Take 1 tablet (20 mg total) by mouth 2 (two) times a day Take twice a day before breakfast and half an hour to an hour before dinner , Starting Fri 3/16/2018, Print      QUEtiapine (SEROquel) 100 mg tablet Take 100 mg by mouth daily at bedtime, Until Discontinued, Historical Med      traMADol (ULTRAM) 50 mg tablet Take 50 mg by mouth 2 (two) times a day, Starting Mon 5/7/2018, Until Tue 5/7/2019, Historical Med      varenicline (CHANTIX) 1 mg tablet Starting Tue 12/26/2017, Historical Med      zolpidem (AMBIEN) 5 mg tablet Take 5 mg by mouth daily at bedtime as needed for sleep, Until Discontinued, Historical Med           No discharge procedures on file      ED Provider  Electronically Signed by           Airam Walker PA-C  06/16/18 0140

## 2018-06-17 ENCOUNTER — HOSPITAL ENCOUNTER (EMERGENCY)
Facility: HOSPITAL | Age: 53
Discharge: HOME | End: 2018-06-17
Attending: EMERGENCY MEDICINE | Admitting: EMERGENCY MEDICINE
Payer: COMMERCIAL

## 2018-06-17 ENCOUNTER — APPOINTMENT (EMERGENCY)
Dept: CT IMAGING | Facility: HOSPITAL | Age: 53
End: 2018-06-17
Payer: COMMERCIAL

## 2018-06-17 VITALS
DIASTOLIC BLOOD PRESSURE: 64 MMHG | WEIGHT: 158.73 LBS | RESPIRATION RATE: 16 BRPM | BODY MASS INDEX: 29.03 KG/M2 | SYSTOLIC BLOOD PRESSURE: 124 MMHG | OXYGEN SATURATION: 96 % | HEART RATE: 76 BPM | TEMPERATURE: 98.3 F

## 2018-06-17 DIAGNOSIS — N20.0 RIGHT KIDNEY STONE: Primary | ICD-10-CM

## 2018-06-17 LAB
ALBUMIN SERPL BCP-MCNC: 3.4 G/DL (ref 3.5–5)
ALP SERPL-CCNC: 219 U/L (ref 46–116)
ALT SERPL W P-5'-P-CCNC: 52 U/L (ref 12–78)
ANION GAP SERPL CALCULATED.3IONS-SCNC: 6 MMOL/L (ref 4–13)
AST SERPL W P-5'-P-CCNC: 39 U/L (ref 5–45)
BACTERIA UR QL AUTO: ABNORMAL /HPF
BASOPHILS # BLD AUTO: 0.01 THOUSANDS/ΜL (ref 0–0.1)
BASOPHILS NFR BLD AUTO: 0 % (ref 0–1)
BILIRUB SERPL-MCNC: 0.26 MG/DL (ref 0.2–1)
BILIRUB UR QL STRIP: ABNORMAL
BUN SERPL-MCNC: 23 MG/DL (ref 5–25)
CALCIUM SERPL-MCNC: 9.1 MG/DL (ref 8.3–10.1)
CAOX CRY URNS QL MICRO: ABNORMAL /HPF
CHLORIDE SERPL-SCNC: 103 MMOL/L (ref 100–108)
CLARITY UR: CLEAR
CO2 SERPL-SCNC: 31 MMOL/L (ref 21–32)
COLOR UR: YELLOW
CREAT SERPL-MCNC: 0.76 MG/DL (ref 0.6–1.3)
EOSINOPHIL # BLD AUTO: 0.12 THOUSAND/ΜL (ref 0–0.61)
EOSINOPHIL NFR BLD AUTO: 2 % (ref 0–6)
ERYTHROCYTE [DISTWIDTH] IN BLOOD BY AUTOMATED COUNT: 13.2 % (ref 11.6–15.1)
GFR SERPL CREATININE-BSD FRML MDRD: 91 ML/MIN/1.73SQ M
GLUCOSE SERPL-MCNC: 127 MG/DL (ref 65–140)
GLUCOSE UR STRIP-MCNC: ABNORMAL MG/DL
HCT VFR BLD AUTO: 38.1 % (ref 34.8–46.1)
HGB BLD-MCNC: 12.7 G/DL (ref 11.5–15.4)
HGB UR QL STRIP.AUTO: ABNORMAL
KETONES UR STRIP-MCNC: NEGATIVE MG/DL
LEUKOCYTE ESTERASE UR QL STRIP: NEGATIVE
LIPASE SERPL-CCNC: 95 U/L (ref 73–393)
LYMPHOCYTES # BLD AUTO: 2.75 THOUSANDS/ΜL (ref 0.6–4.47)
LYMPHOCYTES NFR BLD AUTO: 36 % (ref 14–44)
MAGNESIUM SERPL-MCNC: 2 MG/DL (ref 1.6–2.6)
MCH RBC QN AUTO: 32.9 PG (ref 26.8–34.3)
MCHC RBC AUTO-ENTMCNC: 33.3 G/DL (ref 31.4–37.4)
MCV RBC AUTO: 99 FL (ref 82–98)
MONOCYTES # BLD AUTO: 0.6 THOUSAND/ΜL (ref 0.17–1.22)
MONOCYTES NFR BLD AUTO: 8 % (ref 4–12)
NEUTROPHILS # BLD AUTO: 4.19 THOUSANDS/ΜL (ref 1.85–7.62)
NEUTS SEG NFR BLD AUTO: 55 % (ref 43–75)
NITRITE UR QL STRIP: NEGATIVE
NON-SQ EPI CELLS URNS QL MICRO: ABNORMAL /HPF
NRBC BLD AUTO-RTO: 0 /100 WBCS
PH UR STRIP.AUTO: 5.5 [PH] (ref 4.5–8)
PLATELET # BLD AUTO: 292 THOUSANDS/UL (ref 149–390)
PMV BLD AUTO: 9.9 FL (ref 8.9–12.7)
POTASSIUM SERPL-SCNC: 4.1 MMOL/L (ref 3.5–5.3)
PROT SERPL-MCNC: 7 G/DL (ref 6.4–8.2)
PROT UR STRIP-MCNC: ABNORMAL MG/DL
RBC # BLD AUTO: 3.86 MILLION/UL (ref 3.81–5.12)
RBC #/AREA URNS AUTO: ABNORMAL /HPF
SODIUM SERPL-SCNC: 140 MMOL/L (ref 136–145)
SP GR UR STRIP.AUTO: >=1.03 (ref 1–1.03)
UROBILINOGEN UR QL STRIP.AUTO: 0.2 E.U./DL
WBC # BLD AUTO: 7.67 THOUSAND/UL (ref 4.31–10.16)
WBC #/AREA URNS AUTO: ABNORMAL /HPF

## 2018-06-17 PROCEDURE — 85025 COMPLETE CBC W/AUTO DIFF WBC: CPT | Performed by: EMERGENCY MEDICINE

## 2018-06-17 PROCEDURE — 80053 COMPREHEN METABOLIC PANEL: CPT | Performed by: EMERGENCY MEDICINE

## 2018-06-17 PROCEDURE — 74176 CT ABD & PELVIS W/O CONTRAST: CPT

## 2018-06-17 PROCEDURE — 81001 URINALYSIS AUTO W/SCOPE: CPT

## 2018-06-17 PROCEDURE — 36415 COLL VENOUS BLD VENIPUNCTURE: CPT | Performed by: EMERGENCY MEDICINE

## 2018-06-17 PROCEDURE — 83735 ASSAY OF MAGNESIUM: CPT | Performed by: EMERGENCY MEDICINE

## 2018-06-17 PROCEDURE — 96374 THER/PROPH/DIAG INJ IV PUSH: CPT

## 2018-06-17 PROCEDURE — 99284 EMERGENCY DEPT VISIT MOD MDM: CPT

## 2018-06-17 PROCEDURE — 96375 TX/PRO/DX INJ NEW DRUG ADDON: CPT

## 2018-06-17 PROCEDURE — 87086 URINE CULTURE/COLONY COUNT: CPT

## 2018-06-17 PROCEDURE — 83690 ASSAY OF LIPASE: CPT | Performed by: EMERGENCY MEDICINE

## 2018-06-17 PROCEDURE — 96361 HYDRATE IV INFUSION ADD-ON: CPT

## 2018-06-17 RX ORDER — NAPROXEN 500 MG/1
500 TABLET ORAL 2 TIMES DAILY WITH MEALS
Qty: 20 TABLET | Refills: 0 | Status: SHIPPED | OUTPATIENT
Start: 2018-06-17 | End: 2018-06-27

## 2018-06-17 RX ORDER — TAMSULOSIN HYDROCHLORIDE 0.4 MG/1
0.4 CAPSULE ORAL
Qty: 20 CAPSULE | Refills: 0 | Status: SHIPPED | OUTPATIENT
Start: 2018-06-17 | End: 2018-07-06 | Stop reason: HOSPADM

## 2018-06-17 RX ORDER — OXYCODONE HYDROCHLORIDE AND ACETAMINOPHEN 5; 325 MG/1; MG/1
1 TABLET ORAL EVERY 4 HOURS PRN
Qty: 10 TABLET | Refills: 0 | Status: SHIPPED | OUTPATIENT
Start: 2018-06-17 | End: 2018-06-22 | Stop reason: SDUPTHER

## 2018-06-17 RX ORDER — KETOROLAC TROMETHAMINE 30 MG/ML
15 INJECTION, SOLUTION INTRAMUSCULAR; INTRAVENOUS ONCE
Status: COMPLETED | OUTPATIENT
Start: 2018-06-17 | End: 2018-06-17

## 2018-06-17 RX ORDER — MORPHINE SULFATE 4 MG/ML
4 INJECTION, SOLUTION INTRAMUSCULAR; INTRAVENOUS ONCE
Status: COMPLETED | OUTPATIENT
Start: 2018-06-17 | End: 2018-06-17

## 2018-06-17 RX ORDER — ONDANSETRON 2 MG/ML
4 INJECTION INTRAMUSCULAR; INTRAVENOUS ONCE
Status: COMPLETED | OUTPATIENT
Start: 2018-06-17 | End: 2018-06-17

## 2018-06-17 RX ORDER — ONDANSETRON 4 MG/1
4 TABLET, ORALLY DISINTEGRATING ORAL EVERY 6 HOURS PRN
Qty: 20 TABLET | Refills: 0 | Status: SHIPPED | OUTPATIENT
Start: 2018-06-17

## 2018-06-17 RX ADMIN — ONDANSETRON 4 MG: 2 INJECTION INTRAMUSCULAR; INTRAVENOUS at 19:51

## 2018-06-17 RX ADMIN — SODIUM CHLORIDE 1000 ML: 0.9 INJECTION, SOLUTION INTRAVENOUS at 18:48

## 2018-06-17 RX ADMIN — MORPHINE SULFATE 4 MG: 4 INJECTION INTRAVENOUS at 19:55

## 2018-06-17 RX ADMIN — KETOROLAC TROMETHAMINE 15 MG: 30 INJECTION, SOLUTION INTRAMUSCULAR at 18:48

## 2018-06-17 NOTE — DISCHARGE INSTRUCTIONS
Take the naprosyn regularly, twice daily  Use the percocet if the naprosyn is not working, do not drive while taking this as it will make you drowsy  Use the zofran as needed for nausea, this can be placed under the tongue to dissolve if you are vomiting  Take the tamsulosin regularly, 1 pill daily  The number for another urologist is included in these discharge instructions, if you cannot get in touch with your own urologist, call to be seen in the office for further management of your kidney stone  Kidney Stones   WHAT YOU NEED TO KNOW:   Kidney stones form in the urinary system when the water and waste in your urine are out of balance  When this happens, certain types of waste crystals separate from the urine  The crystals build up and form kidney stones  You may have 1 or more kidney stones  DISCHARGE INSTRUCTIONS:   Seek care immediately:   · You have vomiting that is not relieved by medicine  Contact your healthcare provider if:   · You have a fever  · You have trouble passing urine  · You see blood in your urine  · You have severe pain  · You have any questions or concerns about your condition or care  Medicines:   · NSAIDs , such as ibuprofen, help decrease swelling, pain, and fever  This medicine is available with or without a doctor's order  NSAIDs can cause stomach bleeding or kidney problems in certain people  If you take blood thinner medicine, always ask your healthcare provider if NSAIDs are safe for you  Always read the medicine label and follow directions  · Prescription medicine  may be given  Ask how to take this medicine safely  · Medicines  to balance your electrolytes may be needed  · Take your medicine as directed  Contact your healthcare provider if you think your medicine is not helping or if you have side effects  Tell him or her if you are allergic to any medicine  Keep a list of the medicines, vitamins, and herbs you take   Include the amounts, and when and why you take them  Bring the list or the pill bottles to follow-up visits  Carry your medicine list with you in case of an emergency  Follow up with your healthcare provider as directed: You may need to return for more tests  Write down your questions so you remember to ask them during your visits  Self-care:   · Drink plenty of liquids  Your healthcare provider may tell you to drink at least 8 to 12 (eight-ounce) cups of liquids each day  This helps flush out the kidney stones when you urinate  Water is the best liquid to drink  · Strain your urine every time you go to the bathroom  Urinate through a strainer or a piece of thin cloth to catch the stones  Take the stones to your healthcare provider so they can be sent to the lab for tests  This will help your healthcare providers plan the best treatment for you  · Eat a variety of healthy foods  Healthy foods include fruits, vegetables, whole-grain breads, low-fat dairy products, beans, and fish  You may need to limit how much sodium (salt) or protein you eat  Ask for information about the best foods for you  · Stay active  Your stones may pass more easily by if you stay active  Ask about the best activities for you  After you pass your kidney stones:  Once you have passed your kidney stones, your healthcare provider may  order a 24-hour urine test  Results from a 24-hour urine test will help your healthcare provider plan ways to prevent more stones from forming  If you are told to do a 24-hour test, your healthcare provider will give you more instructions  © 2017 [employer] Information is for End User's use only and may not be sold, redistributed or otherwise used for commercial purposes  All illustrations and images included in CareNotes® are the copyrighted property of [employer]  or [employer]  The above information is an  only   It is not intended as medical advice for individual conditions or treatments  Talk to your doctor, nurse or pharmacist before following any medical regimen to see if it is safe and effective for you

## 2018-06-19 LAB — BACTERIA UR CULT: NORMAL

## 2018-06-20 ENCOUNTER — OFFICE VISIT (OUTPATIENT)
Dept: UROLOGY | Facility: AMBULATORY SURGERY CENTER | Age: 53
End: 2018-06-20
Payer: COMMERCIAL

## 2018-06-20 VITALS
HEIGHT: 63 IN | SYSTOLIC BLOOD PRESSURE: 100 MMHG | DIASTOLIC BLOOD PRESSURE: 70 MMHG | WEIGHT: 162.6 LBS | HEART RATE: 64 BPM | BODY MASS INDEX: 28.81 KG/M2

## 2018-06-20 DIAGNOSIS — N20.0 NEPHROLITHIASIS: Primary | ICD-10-CM

## 2018-06-20 LAB
SL AMB  POCT GLUCOSE, UA: 0
SL AMB LEUKOCYTE ESTERASE,UA: NORMAL
SL AMB POCT BILIRUBIN,UA: 0
SL AMB POCT BLOOD,UA: NORMAL
SL AMB POCT CLARITY,UA: CLEAR
SL AMB POCT COLOR,UA: NORMAL
SL AMB POCT KETONES,UA: 0
SL AMB POCT NITRITE,UA: 0
SL AMB POCT PH,UA: 5
SL AMB POCT SPECIFIC GRAVITY,UA: 1.03
SL AMB POCT URINE PROTEIN: 0
SL AMB POCT UROBILINOGEN: 0

## 2018-06-20 PROCEDURE — 87086 URINE CULTURE/COLONY COUNT: CPT | Performed by: UROLOGY

## 2018-06-20 PROCEDURE — 99214 OFFICE O/P EST MOD 30 MIN: CPT | Performed by: UROLOGY

## 2018-06-20 PROCEDURE — 81002 URINALYSIS NONAUTO W/O SCOPE: CPT | Performed by: UROLOGY

## 2018-06-21 ENCOUNTER — EVALUATION (OUTPATIENT)
Dept: PHYSICAL THERAPY | Facility: CLINIC | Age: 53
End: 2018-06-21
Payer: COMMERCIAL

## 2018-06-21 ENCOUNTER — TELEPHONE (OUTPATIENT)
Dept: UROLOGY | Facility: AMBULATORY SURGERY CENTER | Age: 53
End: 2018-06-21

## 2018-06-21 DIAGNOSIS — M72.2 PLANTAR FASCIAL FIBROMATOSIS OF BOTH FEET: Primary | ICD-10-CM

## 2018-06-21 DIAGNOSIS — N20.0 RIGHT KIDNEY STONE: ICD-10-CM

## 2018-06-21 LAB — BACTERIA UR CULT: NORMAL

## 2018-06-21 PROCEDURE — G8979 MOBILITY GOAL STATUS: HCPCS | Performed by: PHYSICAL THERAPIST

## 2018-06-21 PROCEDURE — 97140 MANUAL THERAPY 1/> REGIONS: CPT | Performed by: PHYSICAL THERAPIST

## 2018-06-21 PROCEDURE — G8978 MOBILITY CURRENT STATUS: HCPCS | Performed by: PHYSICAL THERAPIST

## 2018-06-21 PROCEDURE — 97162 PT EVAL MOD COMPLEX 30 MIN: CPT | Performed by: PHYSICAL THERAPIST

## 2018-06-21 NOTE — TELEPHONE ENCOUNTER
Patient called with pain with her stone  She was just seen yesterday and not scheduled for surgery yet  Patient had been given Naprosyn in ER 6/17 will discuss with provider

## 2018-06-21 NOTE — PROGRESS NOTES
PT Evaluation     Today's date: 2018  Patient name: Mila Jackson  : 1965  MRN: [id number]  Referring provider: OMER Lopez  Dx:   Encounter Diagnosis     ICD-10-CM    1  Plantar fascial fibromatosis of both feet M72 2                   Assessment  Impairments: abnormal or restricted ROM, activity intolerance, impaired balance, impaired physical strength and pain with function    Assessment details: Mila Jackson is a 48 y o  female who presents to physical therapy with Plantar fascial fibromatosis of both feet  Pt has deficits in bilateral LE strength and ankle ROM  Pt has soft tissue restrictions in bilateral plantar fascia and in bilateral gastrocs  Pt has poor joint mobility at 1st MTP joints bilaterally  Pt has difficulty with prolonged walking, sitting, and standing  Mila Jackson would benefit from formal physical therapy to address impairments as detailed, decrease pain, and restore maximal level of function for all home and mobility tasks  Thank you for this referral     Understanding of Dx/Px/POC: good   Prognosis: good    Goals  Short-term goals:  1  Pt will decrease pain by 1-2 points within 4 weeks  2  Pt will improve ROM by 5-10 degrees within 4 weeks  3  Pt will improve strength by 1/2 grade within 4 weeks  4  Pt will improve physical FS score by 7 points by discharge  Long-term goals:  1  Pt will demonstrate independence with HEP by discharge  2  Pt will return to all home tasks with good body mechanics and pain <3/10 by discharge  3  Pt will walk for greater than 1 hour with pain <3/10 by discharge      Plan  Patient would benefit from: PT eval and skilled PT  Planned modality interventions: cryotherapy and thermotherapy: hydrocollator packs  Planned therapy interventions: joint mobilization, manual therapy, neuromuscular re-education, patient education, strengthening, stretching, therapeutic exercise, flexibility, functional ROM exercises and home exercise program  Other planned therapy interventions: IASTM  Frequency: 2x week  Duration in weeks: 4  Treatment plan discussed with: patient        Subjective Evaluation    History of Present Illness  Mechanism of injury: Pt walks a lot  Pt was diagnosed with plantar fasciitis  Pt states that she has good days and bad days  Pt reports that she has pain all day  Pt reports pain is located over central plantar heel  Pt denies arch pain  Pt does not feel that one side is worse than the other  Pt is able to walk for 30-60 minutes prior to increase in pain  Pt had steroid injections that helped for about 3 months but pain returned  Pt takes Meloxicam without relief  Pt has been rolling feet on frozen water bottle with good relief  Pt states that she usually wears flip flops and has recently ordered Crocs  Pt denies numbness and tingling into toes  Pt reports that her toes "lock" with significant pain, resolved with massage to plantar fascial medial band and passive motion of toes  Pt limps when she has significant pain  Pain  At best pain ratin  At worst pain ratin  Location: B feet  Quality: sharp  Relieving factors: ice  Progression: no change    Social Support  Stairs in house: no   Lives with: cousin  Employment status: not working  Exercise history: walking for fitness    Treatments  Previous treatment: injection treatment  Patient Goals  Patient goal: enjoy my summer and be able to walk        Objective     Static Posture     Ankle/Foot   Ankle/Foot (Left): Hallux valgus, metatarsus abductus, pronated and rectus  Ankle/Foot (Right): Hallux valgus, metatarsus abductus, pronated and rectus  Comments  Medium arch height bilaterally    Observations   Left Ankle/Foot   Positive for adhesive scar  Right Ankle/Foot   Positive for adhesive scar  Additional Observation Details  B bunionectomy scars well-healed    Tenderness   Left Ankle/Foot   Tenderness in the medial calcaneus and mid-plantar aspect       Right Ankle/Foot   Tenderness in the medial calcaneus and mid-plantar aspect  Active Range of Motion   Left Ankle/Foot   Dorsiflexion (ke): 12 degrees   Plantar flexion: 40 degrees   Inversion: 38 degrees   Eversion: 20 degrees     Right Ankle/Foot   Dorsiflexion (ke): 8 degrees   Plantar flexion: 34 degrees   Inversion: 25 degrees   Eversion: 12 degrees     Passive Range of Motion   Left Ankle/Foot    Dorsiflexion (ke): 15 degrees     Right Ankle/Foot    Dorsiflexion (ke): 10 degrees   Plantar flexion: 40 degrees   Inversion: 30 degrees   Eversion: 20 degrees     Joint Play   Left Ankle/Foot  Joints within functional limits are the talocrural joint and subtalar joint  Hypomobile in the midfoot and forefoot  Right Ankle/Foot  Joints within functional limits are the talocrural joint and subtalar joint  Hypomobile in the midfoot and forefoot  Additional Joint Play Details  Pt has heel pain with end range dorsiflexion    Strength/Myotome Testing     Left Hip   Planes of Motion   Flexion: 4  External rotation: 4  Internal rotation: 4    Right Hip   Planes of Motion   Flexion: 4  External rotation: 4  Internal rotation: 4    Left Knee   Flexion: 4+  Extension: 4+    Right Knee   Flexion: 4+  Extension: 4+    Left Ankle/Foot   Dorsiflexion: 5  Plantar flexion: 4+  Inversion: 4+  Eversion: 4  Great toe flexion: 4  Great toe extension: 5    Right Ankle/Foot   Dorsiflexion: 5  Plantar flexion: 4+  Inversion: 4+  Eversion: 4  Great toe flexion: 4  Great toe extension: 5    Tests   Left Ankle/Foot   Negative for Summers and Tinel's sign (tarsal tunnel)  Right Ankle/Foot   Negative for Ruben Meals and Tinel's sign (tarsal tunnel)  Ambulation     Quality of Movement During Gait     Additional Quality of Movement During Gait Details  Minimal pronation with gait    Functional Assessment   Squat   Left tibial anterior translation beyond toes and right tibial anterior translation beyond toes  No pain       Single Leg Stance   Left: 5 seconds  Right: 4 seconds    Comments  10x HR: good resupination, even height and WB      Flowsheet Rows      Most Recent Value   PT/OT G-Codes   Current Score  45   Projected Score  40   FOTO information reviewed  Yes   Assessment Type  Evaluation   G code set  Mobility: Walking & Moving Around   Mobility: Walking and Moving Around Current Status ()  CK   Mobility: Walking and Moving Around Goal Status ()  CK          Precautions: arthritis, back pain, depression, DM, headaches, autoimmune hepatitis, kidney stones    Daily Treatment Diary     Manual  6/21/18            IASTM B plantar fascia and gastrocs 12 min            B 1st MPT mobs, gr 2-3 nv                                                       Exercise Diary  6/21/18            Plantar fascia towel stretch 15"x3 ea            gastroc towel stretch 15"x2 ea            bike nv            Side stepping nv            BAPS standing nv            rockerboard nv            SLS nv            Step ups nv            Star drill nv            prostretch nv            Ankle TB 4 way nv            Tandem walking fwd/bwd nv            Towel curls flx/ext nv                                                                                                           Modalities  6/21/18            CP PRN nv

## 2018-06-22 PROBLEM — N20.0 NEPHROLITHIASIS: Status: ACTIVE | Noted: 2018-06-22

## 2018-06-22 RX ORDER — OXYCODONE HYDROCHLORIDE AND ACETAMINOPHEN 5; 325 MG/1; MG/1
1 TABLET ORAL EVERY 4 HOURS PRN
Qty: 10 TABLET | Refills: 0 | Status: ON HOLD | OUTPATIENT
Start: 2018-06-22 | End: 2018-07-06

## 2018-06-22 RX ORDER — OXYCODONE HYDROCHLORIDE AND ACETAMINOPHEN 5; 325 MG/1; MG/1
1 TABLET ORAL EVERY 4 HOURS PRN
Qty: 10 TABLET | Refills: 0 | Status: SHIPPED | OUTPATIENT
Start: 2018-06-22 | End: 2018-06-22

## 2018-06-25 ENCOUNTER — OFFICE VISIT (OUTPATIENT)
Dept: PHYSICAL THERAPY | Facility: CLINIC | Age: 53
End: 2018-06-25
Payer: COMMERCIAL

## 2018-06-25 DIAGNOSIS — M72.2 PLANTAR FASCIAL FIBROMATOSIS OF BOTH FEET: Primary | ICD-10-CM

## 2018-06-25 PROCEDURE — 97110 THERAPEUTIC EXERCISES: CPT | Performed by: PHYSICAL THERAPIST

## 2018-06-25 PROCEDURE — 97150 GROUP THERAPEUTIC PROCEDURES: CPT | Performed by: PHYSICAL THERAPIST

## 2018-06-25 PROCEDURE — 97140 MANUAL THERAPY 1/> REGIONS: CPT | Performed by: PHYSICAL THERAPIST

## 2018-06-25 NOTE — ED PROVIDER NOTES
History  Chief Complaint   Patient presents with    Flank Pain     Patient reports Hx of Kidney stones  Seen here on Friday for R sided flank pain  Dx with kidney stone  Patient reports she has had 15 in the past  Reports ongoing pain  49 YO female presents with Right flank pain  States this has been present for the last 3-4 days  Pt describes this as sharp and aching, constant, radiating around to the Right groin, no known exacerbating or alleviating factors, it has been associated with nausea, no vomiting, states she has diarrhea when the pain is bad  Additionally notes less frequent urination though no burning on urination or blood in urine  Pt states she has a known 3mm stone in the Right, has had ~15 stones in the past and has required extractions for stone smaller than her current one  Pt denies CP/SOB/F/C  History provided by:  Patient and medical records   used: No    Flank Pain   Pain location:  R flank  Pain quality: aching and sharp    Pain radiates to:  Groin  Pain severity:  Moderate  Onset quality:  Gradual  Duration:  6 days  Timing:  Constant  Progression:  Waxing and waning  Chronicity:  Recurrent  Relieved by:  Nothing  Worsened by:  Nothing  Ineffective treatments:  NSAIDs  Associated symptoms: diarrhea and nausea    Associated symptoms: no belching, no chest pain, no chills, no constipation, no cough, no dysuria, no fatigue, no fever, no shortness of breath and no vomiting    Diarrhea:     Quality:  Semi-solid    Severity:  Mild    Duration:  4 days    Timing:  Intermittent    Progression:  Unchanged  Nausea:     Severity:  Moderate    Onset quality:  Gradual    Duration:  4 days    Timing:  Intermittent    Progression:  Waxing and waning      Prior to Admission Medications   Prescriptions Last Dose Informant Patient Reported? Taking?    Biotin 10 MG TABS   Yes Yes   Sig: Take 10 mg by mouth   FLUoxetine (PROzac) 40 MG capsule   Yes Yes   LORazepam (ATIVAN) 1 mg tablet   Yes Yes   Sig: Take 1 mg by mouth every 6 (six) hours as needed for anxiety   QUEtiapine (SEROquel) 100 mg tablet   Yes Yes   Sig: Take 100 mg by mouth daily at bedtime   aspirin (ASPIR-LOW) 81 mg EC tablet   Yes Yes   azaTHIOprine (IMURAN) 50 mg tablet   Yes Yes   Sig: Take 50 mg by mouth daily   budesonide (ENTOCORT EC) 3 MG capsule   Yes Yes   calcium citrate-Vitamin D 200 mg-250 units   Yes Yes   Sig: Take 2 tablets by mouth   cetirizine (ZyrTEC) 10 mg tablet   Yes Yes   Sig: Take 10 mg by mouth   ketotifen (ZADITOR) 0 025 % ophthalmic solution   Yes Yes   liraglutide (VICTOZA) injection   Yes Yes   oxyCODONE-acetaminophen (PERCOCET) 5-325 mg per tablet   No Yes   Sig: Take 1 tablet by mouth every 4 (four) hours as needed for moderate pain for up to 2 days Max Daily Amount: 6 tablets   pantoprazole (PROTONIX) 20 mg tablet   No Yes   Sig: Take 1 tablet (20 mg total) by mouth 2 (two) times a day Take twice a day before breakfast and half an hour to an hour before dinner  traMADol (ULTRAM) 50 mg tablet   Yes Yes   Sig: Take 50 mg by mouth 2 (two) times a day   varenicline (CHANTIX) 1 mg tablet   Yes Yes   zolpidem (AMBIEN) 5 mg tablet   Yes Yes   Sig: Take 5 mg by mouth daily at bedtime as needed for sleep      Facility-Administered Medications: None       Past Medical History:   Diagnosis Date    Autoimmune hepatitis ([facility] )     Bipolar 1 disorder ([facility] )     Diabetes mellitus ([facility] )     Renal disorder     kidney stones       Past Surgical History:   Procedure Laterality Date    BUNIONECTOMY      HYSTERECTOMY      JOINT REPLACEMENT      KIDNEY STONE SURGERY         History reviewed  No pertinent family history  I have reviewed and agree with the history as documented      Social History   Substance Use Topics    Smoking status: Current Some Day Smoker     Types: Cigarettes    Smokeless tobacco: Never Used    Alcohol use No        Review of Systems   Constitutional: Negative for chills, fatigue and fever    HENT: Negative for dental problem  Eyes: Negative for visual disturbance  Respiratory: Negative for cough and shortness of breath  Cardiovascular: Negative for chest pain  Gastrointestinal: Positive for diarrhea and nausea  Negative for abdominal pain, constipation and vomiting  Genitourinary: Positive for flank pain  Negative for dysuria and frequency  Musculoskeletal: Negative for arthralgias  Skin: Negative for rash  Neurological: Negative for dizziness, weakness and light-headedness  Psychiatric/Behavioral: Negative for agitation, behavioral problems and confusion  All other systems reviewed and are negative  Physical Exam  Physical Exam   Constitutional: She is oriented to person, place, and time  She appears well-developed and well-nourished  HENT:   Head: Normocephalic and atraumatic  Eyes: EOM are normal    Neck: Normal range of motion  Cardiovascular: Normal rate, regular rhythm and normal heart sounds  Pulmonary/Chest: Effort normal and breath sounds normal    Abdominal: Soft  There is no tenderness  Right CVA tenderness  Musculoskeletal: Normal range of motion  Neurological: She is alert and oriented to person, place, and time  Skin: Skin is warm and dry  Psychiatric: She has a normal mood and affect  Her behavior is normal  Thought content normal    Nursing note and vitals reviewed        Vital Signs  ED Triage Vitals [06/17/18 1752]   Temperature Pulse Respirations Blood Pressure SpO2   98 3 °F (36 8 °C) 92 18 122/55 96 %      Temp Source Heart Rate Source Patient Position - Orthostatic VS BP Location FiO2 (%)   Oral Monitor Sitting Right arm --      Pain Score       Worst Possible Pain           Vitals:    06/17/18 1752 06/17/18 1953 06/17/18 2007   BP: 122/55 142/70 124/64   Pulse: 92 80 76   Patient Position - Orthostatic VS: Sitting         Visual Acuity      ED Medications  Medications   sodium chloride 0 9 % bolus 1,000 mL (0 mL Intravenous Stopped 6/17/18 2008)   ketorolac (TORADOL) injection 15 mg (15 mg Intravenous Given 6/17/18 1848)   morphine (PF) 4 mg/mL injection 4 mg (4 mg Intravenous Given 6/17/18 1955)   ondansetron (ZOFRAN) injection 4 mg (4 mg Intravenous Given 6/17/18 1951)       Diagnostic Studies  Results Reviewed     Procedure Component Value Units Date/Time    Urine Microscopic [[id number]]  (Abnormal) Collected:  06/17/18 1857    Lab Status:  Final result Specimen:  Urine from Urine, Clean Catch Updated:  06/17/18 1945     RBC, UA Innumerable (A) /hpf      WBC, UA 10-20 (A) /hpf      Epithelial Cells Moderate (A) /hpf      Bacteria, UA Occasional /hpf      Ca Oxalate Agatha, UA Moderate (A) /hpf     Urine culture [[id number]] Collected:  06/17/18 1857    Lab Status:   In process Specimen:  Urine from Urine, Clean Catch Updated:  06/17/18 1945    POCT urinalysis dipstick [[id number]]  (Abnormal) Resulted:  06/17/18 1854    Lab Status:  Final result Specimen:  Urine Updated:  06/17/18 1854    ED Urine Macroscopic [[id number]]  (Abnormal) Collected:  06/17/18 1857    Lab Status:  Final result Specimen:  Urine Updated:  06/17/18 1853     Color, UA Yellow     Clarity, UA Clear     pH, UA 5 5     Leukocytes, UA Negative     Nitrite, UA Negative     Protein, UA 30 (1+) (A) mg/dl      Glucose,  (1/4%) (A) mg/dl      Ketones, UA Negative mg/dl      Urobilinogen, UA 0 2 E U /dl      Bilirubin, UA Interference- unable to analyze (A)     Blood, UA Large (A)     Specific Gravity, UA >=1 030    Narrative:       CLINITEK RESULT    Comprehensive metabolic panel [[id number]]  (Abnormal) Collected:  06/17/18 1825    Lab Status:  Final result Specimen:  Blood from Hand, Right Updated:  06/17/18 1853     Sodium 140 mmol/L      Potassium 4 1 mmol/L      Chloride 103 mmol/L      CO2 31 mmol/L      Anion Gap 6 mmol/L      BUN 23 mg/dL      Creatinine 0 76 mg/dL      Glucose 127 mg/dL      Calcium 9 1 mg/dL      AST 39 U/L      ALT 52 U/L      Alkaline Phosphatase 219 (H) U/L      Total Protein 7 0 g/dL      Albumin 3 4 (L) g/dL      Total Bilirubin 0 26 mg/dL      eGFR 91 ml/min/1 73sq m     Narrative:         National Kidney Disease Education Program recommendations are as follows:  GFR calculation is accurate only with a steady state creatinine  Chronic Kidney disease less than 60 ml/min/1 73 sq  meters  Kidney failure less than 15 ml/min/1 73 sq  meters  Magnesium [[id number]]  (Normal) Collected:  06/17/18 1825    Lab Status:  Final result Specimen:  Blood from Hand, Right Updated:  06/17/18 1853     Magnesium 2 0 mg/dL     Lipase [[id number]]  (Normal) Collected:  06/17/18 1825    Lab Status:  Final result Specimen:  Blood from Hand, Right Updated:  06/17/18 1853     Lipase 95 u/L     CBC and differential [[id number]]  (Abnormal) Collected:  06/17/18 1825    Lab Status:  Final result Specimen:  Blood from Hand, Right Updated:  06/17/18 1833     WBC 7 67 Thousand/uL      RBC 3 86 Million/uL      Hemoglobin 12 7 g/dL      Hematocrit 38 1 %      MCV 99 (H) fL      MCH 32 9 pg      MCHC 33 3 g/dL      RDW 13 2 %      MPV 9 9 fL      Platelets 796 Thousands/uL      nRBC 0 /100 WBCs      Neutrophils Relative 55 %      Lymphocytes Relative 36 %      Monocytes Relative 8 %      Eosinophils Relative 2 %      Basophils Relative 0 %      Neutrophils Absolute 4 19 Thousands/µL      Lymphocytes Absolute 2 75 Thousands/µL      Monocytes Absolute 0 60 Thousand/µL      Eosinophils Absolute 0 12 Thousand/µL      Basophils Absolute 0 01 Thousands/µL                  CT renal stone study abdomen pelvis without contrast   Final Result by Tobias Juarez MD (06/17 1848)      Stable 3 mm distal right ureteral calculus with persistent right hydroureteronephrosis  The stone has not changed its position  Stable left lower pole nonobstructing calculus measures 3 mm                 Workstation performed: [id number]                    Procedures  Procedures       Phone Contacts  ED Phone Contact    ED Course  ED Course as of Jun 18 1029   Sun Jun 17, 2018   1932 Spoke with Dr Irma Ross, urology, regarding pt's return visit to the ED for same pain  Ureteral stone has not moved since previous imaging, pain has not changed  Urology states this would likely still be treated conservatively and a procedure for extraction would likely not be performed  If the pt does wish to stay in the hospital for urology consultation, feels this may be appropriate  Did discuss the options of staying for urology vs continued outpatient management  Offered admission, pt states she would like to continue outpatient management  Will call her urologist again  Will also give number for Dr Irma Ross  Have discussed reasons for return with pt and she states she will come back if necessary  MDM  Number of Diagnoses or Management Options  Right kidney stone: new and requires workup  Diagnosis management comments: 1  Kidney stone - Pt states Hx of similar, known stone (distal uretur)  Will give fluids, pain medications, check urine for infection, electrolytes for kidney function  Will discuss case with urology  Amount and/or Complexity of Data Reviewed  Clinical lab tests: ordered and reviewed  Tests in the radiology section of CPT®: ordered and reviewed  Review and summarize past medical records: yes  Discuss the patient with other providers: yes  Independent visualization of images, tracings, or specimens: yes    Patient Progress  Patient progress: stable    CritCare Time    Disposition  Final diagnoses:   Right kidney stone     Time reflects when diagnosis was documented in both MDM as applicable and the Disposition within this note     Time User Action Codes Description Comment    6/17/2018  7:36 PM Jamin SONG Add [N20 0] Right kidney stone       ED Disposition     ED Disposition Condition Comment    Discharge  Sol Garduno discharge to home/self care      Condition at discharge: Stable        Follow-up Information     Follow up With Specialties Details Why Contact Info    Meli Costa MD Urology Schedule an appointment as soon as possible for a visit  [phone]   [phone]            Discharge Medication List as of 6/17/2018  7:38 PM      START taking these medications    Details   naproxen (NAPROSYN) 500 mg tablet Take 1 tablet (500 mg total) by mouth 2 (two) times a day with meals for 10 days, Starting Sun 6/17/2018, Until Wed 6/27/2018, Print      ondansetron (ZOFRAN-ODT) 4 mg disintegrating tablet Take 1 tablet (4 mg total) by mouth every 6 (six) hours as needed for nausea or vomiting, Starting Sun 6/17/2018, Print      !! oxyCODONE-acetaminophen (PERCOCET) 5-325 mg per tablet Take 1 tablet by mouth every 4 (four) hours as needed for moderate pain for up to 10 days Max Daily Amount: 6 tablets, Starting Sun 6/17/2018, Until Wed 6/27/2018, Print      tamsulosin (FLOMAX) 0 4 mg Take 1 capsule (0 4 mg total) by mouth daily with dinner, Starting Sun 6/17/2018, Print       !! - Potential duplicate medications found  Please discuss with provider        CONTINUE these medications which have NOT CHANGED    Details   aspirin (ASPIR-LOW) 81 mg EC tablet Starting Mon 12/18/2017, Historical Med      azaTHIOprine (IMURAN) 50 mg tablet Take 50 mg by mouth daily, Until Discontinued, Historical Med      Biotin 10 MG TABS Take 10 mg by mouth, Starting Wed 4/25/2018, Historical Med      budesonide (ENTOCORT EC) 3 MG capsule Starting Wed 4/4/2018, Historical Med      calcium citrate-Vitamin D 200 mg-250 units Take 2 tablets by mouth, Starting Tue 1/2/2018, Historical Med      cetirizine (ZyrTEC) 10 mg tablet Take 10 mg by mouth, Starting Wed 4/25/2018, Until Thu 4/25/2019, Historical Med      FLUoxetine (PROzac) 40 MG capsule Starting Tue 9/5/2017, Historical Med      ketotifen (ZADITOR) 0 025 % ophthalmic solution Starting Sat 7/4/2015, Historical Med liraglutide (VICTOZA) injection Starting Mon 3/19/2018, Historical Med      LORazepam (ATIVAN) 1 mg tablet Take 1 mg by mouth every 6 (six) hours as needed for anxiety, Until Discontinued, Historical Med      pantoprazole (PROTONIX) 20 mg tablet Take 1 tablet (20 mg total) by mouth 2 (two) times a day Take twice a day before breakfast and half an hour to an hour before dinner , Starting Fri 3/16/2018, Print      QUEtiapine (SEROquel) 100 mg tablet Take 100 mg by mouth daily at bedtime, Until Discontinued, Historical Med      traMADol (ULTRAM) 50 mg tablet Take 50 mg by mouth 2 (two) times a day, Starting Mon 5/7/2018, Until Tue 5/7/2019, Historical Med      varenicline (CHANTIX) 1 mg tablet Starting Tue 12/26/2017, Historical Med      zolpidem (AMBIEN) 5 mg tablet Take 5 mg by mouth daily at bedtime as needed for sleep, Until Discontinued, Historical Med      !! oxyCODONE-acetaminophen (PERCOCET) 5-325 mg per tablet Take 1 tablet by mouth every 4 (four) hours as needed for moderate pain for up to 2 days Max Daily Amount: 6 tablets, Starting Thu 6/14/2018, Until Sun 6/17/2018, Print       !! - Potential duplicate medications found  Please discuss with provider  No discharge procedures on file      ED Provider  Electronically Signed by           Aziza Polanco MD  06/18/18 5257 The patient underwent a LEFT TOTAL KNEE REPLACEMENT on 6/25/18. The patient received antibiotics consistent with SCIP guidelines. The patient underwent the procedure and had no intra-operative complications. Post-operatively, the patient was seen by medicine and PT. The patient received ASPIRIN for DVTP. The patient received pain medications per orthopedic pain managment protocol and the pain was appropriately controlled. The patient did not have any post-operative medical complications. The patient was discharged in stable condition.

## 2018-06-25 NOTE — ASSESSMENT & PLAN NOTE
I reviewed the imaging results with the patient  She does have a right ureteral calculus and a left nonobstructing intrarenal calculus  We discussed options and the patient wishes to proceed with ureteroscopy and laser lithotripsy  She wishes to have both kidneys treated at the same time  We discussed the risks and benefits of this  She was consented for the procedure and will schedule this in the near future

## 2018-06-25 NOTE — PROGRESS NOTES
Assessment/Plan:    Nephrolithiasis  I reviewed the imaging results with the patient  She does have a right ureteral calculus and a left nonobstructing intrarenal calculus  We discussed options and the patient wishes to proceed with ureteroscopy and laser lithotripsy  She wishes to have both kidneys treated at the same time  We discussed the risks and benefits of this  She was consented for the procedure and will schedule this in the near future  Diagnoses and all orders for this visit:    Nephrolithiasis  -     POCT urine dip  -     Case request operating room: CYSTOSCOPY URETEROSCOPY WITH LITHOTRIPSY HOLMIUM LASER, RETROGRADE PYELOGRAM AND INSERTION STENT URETERAL; Standing  -     Case request operating room: CYSTOSCOPY URETEROSCOPY WITH LITHOTRIPSY HOLMIUM LASER, RETROGRADE PYELOGRAM AND INSERTION STENT URETERAL  -     Urine culture    Other orders  -     Diet NPO; Sips with meds; Standing  -     Place sequential compression device; Standing  -     ceFAZolin (ANCEF) IVPB (premix) 1,000 mg; Infuse 50 mL (1,000 mg total) into a venous catheter once   -     Urine pregnancy test-Holding area only; Standing          Total visit time was 40 minutes of which over 50% was spent on counseling  Subjective:     Patient ID: Kylah Alexis is a 48 y o  female    61-year-old female with history of nephrolithiasis presents for follow-up  The patient was seen on June 14th in the emergency department with right-sided flank pain  CT scan demonstrated a 3 mm distal right ureteral calculus as well as a nonobstructing left intrarenal calculus  The patient re-presented to the ER with the same complaints 3 days later and CT scan demonstrates that the stone has not moved  The patient continues to have intermittent pain  She also states she is now starting have pain on the left side as well  She denies any gross hematuria  She denies any fevers or chills  She has no other complaints            The following portions of the patient's history were reviewed and updated as appropriate: allergies, current medications, past family history, past medical history, past social history, past surgical history and problem list     Review of Systems   Constitutional: Negative  HENT: Negative  Eyes: Negative  Respiratory: Negative  Cardiovascular: Negative  Gastrointestinal: Negative  Endocrine: Negative  Genitourinary:        As noted per HPI   Musculoskeletal: Negative  Skin: Negative  Allergic/Immunologic: Negative  Neurological: Negative  Hematological: Negative  Psychiatric/Behavioral: Negative  Urinary Incontinence Screening      Most Recent Value   Urinary Incontinence   Urinary Incontinence? No   Incomplete emptying? No   Urinary frequency? No   Urinary urgency? No   Urinary hesitancy? Yes   Dysuria (painful difficult urination)? No   Nocturia (waking up to use the bathroom)? Yes [2-3x]   Straining (having to push to go)? No   Weak stream?  No   Intermittent stream?  No   Vaginal pressure? No          Objective:    Physical Exam   Constitutional: She is oriented to person, place, and time  She appears well-developed and well-nourished  HENT:   Head: Normocephalic and atraumatic  Neck: Normal range of motion  Neck supple  Cardiovascular: Intact distal pulses  Pulmonary/Chest: Effort normal    Abdominal: Soft  Bowel sounds are normal  She exhibits no distension and no mass  There is no tenderness  There is no rebound and no guarding  Musculoskeletal: Normal range of motion  Neurological: She is alert and oriented to person, place, and time  Skin: Skin is warm and dry  Psychiatric: She has a normal mood and affect  Vitals reviewed          Results  No results found for: PSA  Lab Results   Component Value Date    GLUCOSE 127 06/17/2018    CALCIUM 9 1 06/17/2018     06/17/2018    K 4 1 06/17/2018    CO2 31 06/17/2018     06/17/2018    BUN 23 06/17/2018 CREATININE 0 76 06/17/2018     Lab Results   Component Value Date    WBC 7 67 06/17/2018    HGB 12 7 06/17/2018    HCT 38 1 06/17/2018    MCV 99 (H) 06/17/2018     06/17/2018       No results found for this or any previous visit (from the past 1 hour(s)) ]

## 2018-06-25 NOTE — PROGRESS NOTES
Daily Note     Today's date: 2018  Patient name: Gurvinder Vilchis  : 1965  MRN: [id number]  Referring provider: OMER Merrill  Dx:   Encounter Diagnosis     ICD-10-CM    1  Plantar fascial fibromatosis of both feet M72 2                   Subjective: Pt arrives with new shoes  Pt states that her slippers at home were uncomfortable but she had fairly good tolerance to new shoes  New Crocs provide adequate length and width but may have too much volume; will continue to monitor  Pt has no new changes in bilateral feet  Reviewed wear schedule with pt who expressed verbal understanding  Pt rates pain 4/10 at start of session with pain worse on L foot  Objective: See treatment diary below    Precautions: arthritis, back pain, depression, DM, headaches, autoimmune hepatitis, kidney stones    Daily Treatment Diary     Manual  18           IASTM B plantar fascia and gastrocs 12 min 15 min           B 1st MPT mobs, gr 2-3 nv nv                                                      Exercise Diary  18           Plantar fascia towel stretch 15"x3 ea 30"x3 ea           gastroc towel stretch 15"x2 ea 30"x3 ea           bike nv 5 min           Side stepping nv @ HR 3 laps           BAPS standing nv nv           rockerboard nv AP/ML 15x ea           SLS nv 15"x3 ea           Step ups nv nv           Star drill nv nv           prostretch nv 30"x3 ea           Ankle TB 4 way nv Org 15x ea           Tandem walking fwd/bwd nv 3 laps ea           Towel curls flx/ext nv 30" ea                                                                                                          Modalities  18           CP PRN nv np                                             Assessment: Tolerated treatment well   Patient demonstrated fatigue post treatment and would benefit from continued PT Pt has good tolerance to IASTM with moderate soft tissue restrictions in bilateral gastroc and medial bands of plantar fascia  Pt has greater difficulty with balance and isolating inversion/eversion, L > R  Pt states that pain is unchanged at end of session, rated 4/10  Pt will apply ice at home as needed  Plan: Continue per plan of care  Progress treatment as tolerated

## 2018-06-25 NOTE — PRE-PROCEDURE INSTRUCTIONS
Pre-Surgery Instructions:   Medication Instructions    aspirin (ASPIR-LOW) 81 mg EC tablet Instructed patient per Anesthesia Guidelines   azaTHIOprine (IMURAN) 50 mg tablet Instructed patient per Anesthesia Guidelines   Biotin 10 MG TABS Instructed patient per Anesthesia Guidelines   budesonide (ENTOCORT EC) 3 MG capsule Instructed patient per Anesthesia Guidelines   calcium citrate-Vitamin D 200 mg-250 units Instructed patient per Anesthesia Guidelines   cetirizine (ZyrTEC) 10 mg tablet Instructed patient per Anesthesia Guidelines   FLUoxetine (PROzac) 40 MG capsule Instructed patient per Anesthesia Guidelines   ketotifen (ZADITOR) 0 025 % ophthalmic solution Instructed patient per Anesthesia Guidelines   liraglutide (VICTOZA) injection Instructed patient per Anesthesia Guidelines   LORazepam (ATIVAN) 1 mg tablet Instructed patient per Anesthesia Guidelines   naproxen (NAPROSYN) 500 mg tablet Instructed patient per Anesthesia Guidelines   ondansetron (ZOFRAN-ODT) 4 mg disintegrating tablet Instructed patient per Anesthesia Guidelines   oxyCODONE-acetaminophen (PERCOCET) 5-325 mg per tablet Instructed patient per Anesthesia Guidelines   pantoprazole (PROTONIX) 20 mg tablet Instructed patient per Anesthesia Guidelines   QUEtiapine (SEROquel) 100 mg tablet Instructed patient per Anesthesia Guidelines   tamsulosin (FLOMAX) 0 4 mg Instructed patient per Anesthesia Guidelines   traMADol (ULTRAM) 50 mg tablet Instructed patient per Anesthesia Guidelines   zolpidem (AMBIEN) 5 mg tablet Instructed patient per Anesthesia Guidelines  Pre-op Showering Instructions for Surgery Patients    Before your operation, you play an important role in decreasing your risk for infection by washing with special antiseptic soap  This is an effective way to reduce bacteria on the skin which may help to prevent infections at the surgical site      Please read the following directions in advance  1  In the week before your operation, purchase a 4 ounce bottle of antiseptic soap containing chlorhexidine gluconate (CHG)  4%  Some brand names include: Aplicare®, Endure, and Hibiclens®  The cost is usually less than $5 00   For your convenience, the [employer] carries the soap   It may also be available at your doctors office or pre-admission testing center, and at most retail pharmacies   If you are allergic or sensitive to soaps containing CHG, please let your doctor know so another antiseptic can be suggested   CHG antiseptic soap is for external use only  2  The day before your operation, follow these instructions carefully to get ready   Please clean linens (sheets) on your bed; you should sleep on clean sheets after your evening shower   Get clean towels and washcloth ready - you need enough for 2 showers   Set aside clean underwear, pajamas, and clothing to wear after the showers     Reminders:   DO NOT use any other soap or body rinse on your skin during or after the antiseptic showers   DO NOT use lotion, powder, deodorant, or perfume/aftershave of any kind on your skin after your antiseptic shower   DO NOT shave any body parts in the 24 hours/day before your operation   DO NOT get the antiseptic soap in your eyes, ears, nose, mouth, or vaginal area    3  You will need to shower the night before AND the morning of your surgery  Shower 1:   The first evening before the operation, take the first shower   First, shampoo your hair with regular shampoo and rinse it completely before you use the antiseptic soap  After washing and rinsing your hair, rinse your body   Next, use a clean washcloth to apply the antiseptic soap and wash your body from the neck down to your toes using ½ bottle of the antiseptic soap   You will use the other ½ bottle for the second shower   Clean the area where your incision will be; lather this area well for about 2 minutes   If you are having head or neck surgery, wash areas with the antiseptic soap   Rinse yourself completely with running water   Use a clean towel to dry off   Wear clean underwear and clothing/pajamas  Shower 2   The morning of your operation, take the second shower following the same steps as Shower 1 using the second ½ of the bottle of antiseptic soap   Use clean cloths and towels to wash and dry yourself   Wear clean underwear and clothing

## 2018-06-27 ENCOUNTER — APPOINTMENT (OUTPATIENT)
Dept: PHYSICAL THERAPY | Facility: CLINIC | Age: 53
End: 2018-06-27
Payer: COMMERCIAL

## 2018-07-02 ENCOUNTER — OFFICE VISIT (OUTPATIENT)
Dept: PHYSICAL THERAPY | Facility: CLINIC | Age: 53
End: 2018-07-02
Payer: COMMERCIAL

## 2018-07-02 DIAGNOSIS — M72.2 PLANTAR FASCIAL FIBROMATOSIS OF BOTH FEET: Primary | ICD-10-CM

## 2018-07-02 DIAGNOSIS — K21.9 GASTROESOPHAGEAL REFLUX DISEASE WITHOUT ESOPHAGITIS: Primary | ICD-10-CM

## 2018-07-02 PROCEDURE — 97140 MANUAL THERAPY 1/> REGIONS: CPT

## 2018-07-02 PROCEDURE — 97110 THERAPEUTIC EXERCISES: CPT

## 2018-07-02 RX ORDER — PANTOPRAZOLE SODIUM 40 MG/1
TABLET, DELAYED RELEASE ORAL EVERY 24 HOURS
COMMUNITY
Start: 2018-03-14 | End: 2018-07-03 | Stop reason: SDUPTHER

## 2018-07-02 NOTE — PROGRESS NOTES
Daily Note     Today's date: 2018  Patient name: Siomara Smith  : 1965  MRN: [id number]  Referring provider: OMER Alejandro  Dx:   Encounter Diagnosis     ICD-10-CM    1  Plantar fascial fibromatosis of both feet M72 2                   Subjective: Pt arrives with new sandals  She states she wants to use these to walk with when she goes to [place] 18  DPT ED advised pt that there is not enough heel support/stability  Pt was advised to wear the shoes she brought to last appointment  She states she can not walk fast in those shoes  She states she will take both pair and switch shoes through out the day  Pt denies pain today but reports pain yesterday grading the pain a 6-7/10 in plantar surface of L heel  Pt continues to deny pain in B feet post PT session          Objective: See treatment diary below    Precautions: arthritis, back pain, depression, DM, headaches, autoimmune hepatitis, kidney stones    Daily Treatment Diary     Manual  18          IASTM B plantar fascia and gastrocs 12 min 15 min PK          B 1st MPT mobs, gr 2-3 nv nv nv                                                     Exercise Diary  18          Plantar fascia towel stretch 15"x3 ea 30"x3 ea 30"x3 ea          gastroc towel stretch 15"x2 ea 30"x3 ea 30"x3 ea          bike nv 5 min 5 min          Side stepping nv @ HR 3 laps [place] TB  x2 laps          BAPS standing nv nv nv          rockerboard nv AP/ML 15x ea AP/ML 20x ea          SLS nv 15"x3 ea np          Step ups nv nv 15x ea          Star drill nv nv nv          prostretch nv 30"x3 ea 30"x3 ea          Ankle TB 4 way nv Org 15x ea Org 20x ea          Tandem walking fwd/bwd nv 3 laps ea 3 laps ea          Towel curls flx/ext nv 30" ea nv                                                                                                         Modalities  18          CP PRN nv np np Assessment: Tolerated treatment well  Patient demonstrated fatigue post treatment and would benefit from continued PT  Pt conintues to have good tolerance to IASTM  Pt declined CP stating she will apply at home  Progress NV if able  Plan: Continue per plan of care  Progress treatment as tolerated

## 2018-07-02 NOTE — TELEPHONE ENCOUNTER
[facility] request refill for Pantoprazole 40mg po q daily  Pharmacy updated  Request routed to Dr Bret Bullock

## 2018-07-03 ENCOUNTER — ANESTHESIA EVENT (OUTPATIENT)
Dept: PERIOP | Facility: HOSPITAL | Age: 53
End: 2018-07-03
Payer: COMMERCIAL

## 2018-07-03 DIAGNOSIS — K21.9 GASTROESOPHAGEAL REFLUX DISEASE WITHOUT ESOPHAGITIS: ICD-10-CM

## 2018-07-03 RX ORDER — PANTOPRAZOLE SODIUM 20 MG/1
TABLET, DELAYED RELEASE ORAL
COMMUNITY
End: 2018-07-19 | Stop reason: SDUPTHER

## 2018-07-03 RX ORDER — PANTOPRAZOLE SODIUM 40 MG/1
40 TABLET, DELAYED RELEASE ORAL EVERY 24 HOURS
Qty: 30 TABLET | Refills: 1 | Status: SHIPPED | OUTPATIENT
Start: 2018-07-03 | End: 2018-07-25 | Stop reason: SDUPTHER

## 2018-07-03 NOTE — TELEPHONE ENCOUNTER
Pt called requested refill protonix also very concerned about bruising  She has stopped aspirin x1 week for surgery but still has bruising

## 2018-07-05 ENCOUNTER — APPOINTMENT (OUTPATIENT)
Dept: PHYSICAL THERAPY | Facility: CLINIC | Age: 53
End: 2018-07-05
Payer: COMMERCIAL

## 2018-07-05 RX ORDER — PANTOPRAZOLE SODIUM 20 MG/1
TABLET, DELAYED RELEASE ORAL
Refills: 0 | OUTPATIENT
Start: 2018-07-05

## 2018-07-05 RX ORDER — PANTOPRAZOLE SODIUM 40 MG/1
40 TABLET, DELAYED RELEASE ORAL EVERY 24 HOURS
Qty: 30 TABLET | Refills: 0 | OUTPATIENT
Start: 2018-07-05

## 2018-07-06 ENCOUNTER — TELEPHONE (OUTPATIENT)
Dept: UROLOGY | Facility: HOSPITAL | Age: 53
End: 2018-07-06

## 2018-07-06 ENCOUNTER — ANESTHESIA (OUTPATIENT)
Dept: PERIOP | Facility: HOSPITAL | Age: 53
End: 2018-07-06
Payer: COMMERCIAL

## 2018-07-06 ENCOUNTER — APPOINTMENT (OUTPATIENT)
Dept: RADIOLOGY | Facility: HOSPITAL | Age: 53
End: 2018-07-06
Payer: COMMERCIAL

## 2018-07-06 ENCOUNTER — LAB REQUISITION (OUTPATIENT)
Dept: LAB | Facility: HOSPITAL | Age: 53
End: 2018-07-06
Payer: COMMERCIAL

## 2018-07-06 ENCOUNTER — HOSPITAL ENCOUNTER (OUTPATIENT)
Facility: HOSPITAL | Age: 53
Setting detail: OUTPATIENT SURGERY
Discharge: HOME | End: 2018-07-06
Attending: UROLOGY | Admitting: UROLOGY
Payer: COMMERCIAL

## 2018-07-06 VITALS
TEMPERATURE: 98.7 F | WEIGHT: 158 LBS | HEIGHT: 62 IN | RESPIRATION RATE: 18 BRPM | OXYGEN SATURATION: 94 % | HEART RATE: 76 BPM | BODY MASS INDEX: 29.08 KG/M2 | DIASTOLIC BLOOD PRESSURE: 70 MMHG | SYSTOLIC BLOOD PRESSURE: 130 MMHG

## 2018-07-06 DIAGNOSIS — N20.0 NEPHROLITHIASIS: ICD-10-CM

## 2018-07-06 DIAGNOSIS — N20.0 CALCULUS OF KIDNEY: ICD-10-CM

## 2018-07-06 DIAGNOSIS — N20.0 RIGHT KIDNEY STONE: ICD-10-CM

## 2018-07-06 LAB — GLUCOSE SERPL-MCNC: 175 MG/DL (ref 65–140)

## 2018-07-06 PROCEDURE — 82360 CALCULUS ASSAY QUANT: CPT | Performed by: UROLOGY

## 2018-07-06 PROCEDURE — 52332 CYSTOSCOPY AND TREATMENT: CPT | Performed by: UROLOGY

## 2018-07-06 PROCEDURE — 52352 CYSTOURETERO W/STONE REMOVE: CPT | Performed by: UROLOGY

## 2018-07-06 PROCEDURE — C2617 STENT, NON-COR, TEM W/O DEL: HCPCS | Performed by: UROLOGY

## 2018-07-06 PROCEDURE — 88300 SURGICAL PATH GROSS: CPT | Performed by: PATHOLOGY

## 2018-07-06 PROCEDURE — C1769 GUIDE WIRE: HCPCS | Performed by: UROLOGY

## 2018-07-06 PROCEDURE — 74450 X-RAY URETHRA/BLADDER: CPT

## 2018-07-06 PROCEDURE — 82948 REAGENT STRIP/BLOOD GLUCOSE: CPT

## 2018-07-06 DEVICE — INLAY OPTIMA URETERAL STENT W/O GUIDEWIRE
Type: IMPLANTABLE DEVICE | Site: URETER | Status: NON-FUNCTIONAL
Brand: BARD® INLAY OPTIMA® URETERAL STENT
Removed: 2022-06-16

## 2018-07-06 RX ORDER — ONDANSETRON 2 MG/ML
4 INJECTION INTRAMUSCULAR; INTRAVENOUS ONCE AS NEEDED
Status: DISCONTINUED | OUTPATIENT
Start: 2018-07-06 | End: 2018-07-06 | Stop reason: HOSPADM

## 2018-07-06 RX ORDER — OXYCODONE HYDROCHLORIDE AND ACETAMINOPHEN 5; 325 MG/1; MG/1
1 TABLET ORAL EVERY 4 HOURS PRN
Status: DISCONTINUED | OUTPATIENT
Start: 2018-07-06 | End: 2018-07-06 | Stop reason: HOSPADM

## 2018-07-06 RX ORDER — SODIUM CHLORIDE, SODIUM LACTATE, POTASSIUM CHLORIDE, CALCIUM CHLORIDE 600; 310; 30; 20 MG/100ML; MG/100ML; MG/100ML; MG/100ML
75 INJECTION, SOLUTION INTRAVENOUS CONTINUOUS
Status: DISCONTINUED | OUTPATIENT
Start: 2018-07-06 | End: 2018-07-06 | Stop reason: HOSPADM

## 2018-07-06 RX ORDER — FENTANYL CITRATE/PF 50 MCG/ML
50 SYRINGE (ML) INJECTION
Status: DISCONTINUED | OUTPATIENT
Start: 2018-07-06 | End: 2018-07-06 | Stop reason: HOSPADM

## 2018-07-06 RX ORDER — ONDANSETRON 2 MG/ML
INJECTION INTRAMUSCULAR; INTRAVENOUS AS NEEDED
Status: DISCONTINUED | OUTPATIENT
Start: 2018-07-06 | End: 2018-07-06 | Stop reason: SURG

## 2018-07-06 RX ORDER — FENTANYL CITRATE 50 UG/ML
INJECTION, SOLUTION INTRAMUSCULAR; INTRAVENOUS AS NEEDED
Status: DISCONTINUED | OUTPATIENT
Start: 2018-07-06 | End: 2018-07-06 | Stop reason: SURG

## 2018-07-06 RX ORDER — OXYBUTYNIN CHLORIDE 5 MG/1
5 TABLET ORAL 3 TIMES DAILY PRN
Qty: 20 TABLET | Refills: 0 | Status: SHIPPED | OUTPATIENT
Start: 2018-07-06

## 2018-07-06 RX ORDER — PROPOFOL 10 MG/ML
INJECTION, EMULSION INTRAVENOUS AS NEEDED
Status: DISCONTINUED | OUTPATIENT
Start: 2018-07-06 | End: 2018-07-06

## 2018-07-06 RX ORDER — MIDAZOLAM HYDROCHLORIDE 1 MG/ML
INJECTION INTRAMUSCULAR; INTRAVENOUS AS NEEDED
Status: DISCONTINUED | OUTPATIENT
Start: 2018-07-06 | End: 2018-07-06 | Stop reason: SURG

## 2018-07-06 RX ORDER — OXYCODONE HYDROCHLORIDE AND ACETAMINOPHEN 5; 325 MG/1; MG/1
1 TABLET ORAL EVERY 4 HOURS PRN
Qty: 20 TABLET | Refills: 0 | Status: SHIPPED | OUTPATIENT
Start: 2018-07-06

## 2018-07-06 RX ORDER — CIPROFLOXACIN 500 MG/1
500 TABLET, FILM COATED ORAL 2 TIMES DAILY
Qty: 6 TABLET | Refills: 0 | Status: SHIPPED | OUTPATIENT
Start: 2018-07-06 | End: 2018-07-09

## 2018-07-06 RX ORDER — FENTANYL CITRATE 50 UG/ML
INJECTION, SOLUTION INTRAMUSCULAR; INTRAVENOUS AS NEEDED
Status: DISCONTINUED | OUTPATIENT
Start: 2018-07-06 | End: 2018-07-06

## 2018-07-06 RX ORDER — PROPOFOL 10 MG/ML
INJECTION, EMULSION INTRAVENOUS AS NEEDED
Status: DISCONTINUED | OUTPATIENT
Start: 2018-07-06 | End: 2018-07-06 | Stop reason: SURG

## 2018-07-06 RX ADMIN — FENTANYL CITRATE 50 MCG: 50 INJECTION, SOLUTION INTRAMUSCULAR; INTRAVENOUS at 08:28

## 2018-07-06 RX ADMIN — FENTANYL CITRATE 50 MCG: 50 INJECTION, SOLUTION INTRAMUSCULAR; INTRAVENOUS at 08:47

## 2018-07-06 RX ADMIN — FENTANYL CITRATE 50 MCG: 50 INJECTION, SOLUTION INTRAMUSCULAR; INTRAVENOUS at 09:32

## 2018-07-06 RX ADMIN — SODIUM CHLORIDE, SODIUM LACTATE, POTASSIUM CHLORIDE, AND CALCIUM CHLORIDE 75 ML/HR: .6; .31; .03; .02 INJECTION, SOLUTION INTRAVENOUS at 07:40

## 2018-07-06 RX ADMIN — ONDANSETRON 4 MG: 2 INJECTION INTRAMUSCULAR; INTRAVENOUS at 09:02

## 2018-07-06 RX ADMIN — MIDAZOLAM HYDROCHLORIDE 2 MG: 1 INJECTION, SOLUTION INTRAMUSCULAR; INTRAVENOUS at 08:09

## 2018-07-06 RX ADMIN — CEFAZOLIN SODIUM 1000 MG: 1 SOLUTION INTRAVENOUS at 08:28

## 2018-07-06 RX ADMIN — MIDAZOLAM HYDROCHLORIDE 2 MG: 1 INJECTION, SOLUTION INTRAMUSCULAR; INTRAVENOUS at 08:25

## 2018-07-06 RX ADMIN — PROPOFOL 200 MG: 10 INJECTION, EMULSION INTRAVENOUS at 08:28

## 2018-07-06 NOTE — TELEPHONE ENCOUNTER
Patient called back and explained everything to her that I did to her cousin Octaviano Delgado  She is a lttle anxious   I explained once I talk to Dr Jagdeep Jackson we will call her to set up an appointment  Patient called after surgery wondering about her stent removal   Called back and talked to her cousin Octaviano Delgado as she just stepped out to walk the dog  Explained that it would be in the office but I hadn't even spoke to Dr Jagdeep Jackson yet of the plan    She is having some bleeding which I explained can be normal she is to drink plenty of water and can take 600 mg of ibuprfen for the pain

## 2018-07-06 NOTE — OP NOTE
OPERATIVE REPORT  PATIENT NAME: Nabeel Khanna    :  1965  MRN: [id number]  Pt Location:  OR ROOM 02    SURGERY DATE: 2018    Surgeon(s) and Role:     Sugar Dobbins MD - Primary    Preop Diagnosis:  Nephrolithiasis [N20 0]    Post-Op Diagnosis Codes:     * Nephrolithiasis [N20 0]    Procedure(s) (LRB):  CYSTOSCOPY GALDINO  URETEROSCOPY;GALDINO  RETROGRADE PYELOGRAM AND INSERTION GALDINO  STENT URETERAL (Bilateral) stone extraction left    Specimen(s):  ID Type Source Tests Collected by Time Destination   1 : Left renal calculus Calculus Ureter, Left TISSUE EXAM Candance Netter, MD 2018 [id number]        Estimated Blood Loss:   Minimal    Drains:       Anesthesia Type:   General    Operative Indications:  Nephrolithiasis [N20 0]      Operative Findings: The right stone appeared to have passed  There were some small stones in the lower pole of the left kidney  The largest one was basketed and removed  Complications:   None    Procedure and Technique:  After informed consent was obtained the patient was brought to the operating room  Preoperative antibiotics were given for infection prophylaxis  Bilateral sequential compressive devices were placed on the lower extremities for DVT prophylaxis  A timeout was performed to identify the patient, surgery to be performed, and correct laterality  The patient was then prepped and draped in standard sterile fashion in the dorsal lithotomy position  A 22 [place] rigid cystoscope was inserted per urethra and into the bladder  A diagnostic cystoscopy was performed that demonstrated no urothelial lesions  The right Ureteral orifice was identified and cannulated with a sensor guidewire up to the level of the renal pelvis under fluoroscopic guidance  The cystoscope was removed and a semirigid ureteroscope was then advanced up the ureter alongside the wire  The entire ureter was inspected and no ureteral calculi was identified   The ureteroscope was backed out and a retrograde pyelogram was performed with a ureteral catheter over the remaining guidewire  There was some mild to moderate hydronephrosis but no filling defects  The guidewire was replaced and a 6 x 22 double-J stent was placed without a string  We then turned our attention to the left ureteral orifice  Again the ureter was cannulated and scoped with a semi rigid ureteroscope in the similar fashion as the right side  No stone was identified within the ureter  We then performed flexible ureteroscopy with a 5 [place] flexible ureteroscope and the entire left kidney was inspected  Some small stones were noted in the lower pole  The largest of the stones was grasped with a 0 tip Nitinol basket and removed  A 5 Ma[place] open-ended ureteral catheter was then placed over the remaining guidewire and a retrograde pyelogram was performed that demonstrated moderate hydronephrosis and no filling defects  The wire was then replaced and the open-ended ureteral catheter was removed  A 6X22 stent was placed under both visual and fluoroscopic guidance over the wire  Image of the stent coiled within the renal pelvis and bladder were saved for the medical record  No string was left on the stent  The patient was awoken and taken to the postanesthesia care unit in stable condition           I was present for the entire procedure    Patient Disposition:  PACU     SIGNATURE: Ute Esquivel MD  DATE: July 6, 2018  TIME: 9:15 AM

## 2018-07-06 NOTE — H&P (VIEW-ONLY)
Assessment/Plan:    Nephrolithiasis  I reviewed the imaging results with the patient  She does have a right ureteral calculus and a left nonobstructing intrarenal calculus  We discussed options and the patient wishes to proceed with ureteroscopy and laser lithotripsy  She wishes to have both kidneys treated at the same time  We discussed the risks and benefits of this  She was consented for the procedure and will schedule this in the near future  Diagnoses and all orders for this visit:    Nephrolithiasis  -     POCT urine dip  -     Case request operating room: CYSTOSCOPY URETEROSCOPY WITH LITHOTRIPSY HOLMIUM LASER, RETROGRADE PYELOGRAM AND INSERTION STENT URETERAL; Standing  -     Case request operating room: CYSTOSCOPY URETEROSCOPY WITH LITHOTRIPSY HOLMIUM LASER, RETROGRADE PYELOGRAM AND INSERTION STENT URETERAL  -     Urine culture    Other orders  -     Diet NPO; Sips with meds; Standing  -     Place sequential compression device; Standing  -     ceFAZolin (ANCEF) IVPB (premix) 1,000 mg; Infuse 50 mL (1,000 mg total) into a venous catheter once   -     Urine pregnancy test-Holding area only; Standing          Total visit time was 40 minutes of which over 50% was spent on counseling  Subjective:     Patient ID: Venkata Vidal is a 48 y o  female    82-year-old female with history of nephrolithiasis presents for follow-up  The patient was seen on June 14th in the emergency department with right-sided flank pain  CT scan demonstrated a 3 mm distal right ureteral calculus as well as a nonobstructing left intrarenal calculus  The patient re-presented to the ER with the same complaints 3 days later and CT scan demonstrates that the stone has not moved  The patient continues to have intermittent pain  She also states she is now starting have pain on the left side as well  She denies any gross hematuria  She denies any fevers or chills  She has no other complaints            The following portions of the patient's history were reviewed and updated as appropriate: allergies, current medications, past family history, past medical history, past social history, past surgical history and problem list     Review of Systems   Constitutional: Negative  HENT: Negative  Eyes: Negative  Respiratory: Negative  Cardiovascular: Negative  Gastrointestinal: Negative  Endocrine: Negative  Genitourinary:        As noted per HPI   Musculoskeletal: Negative  Skin: Negative  Allergic/Immunologic: Negative  Neurological: Negative  Hematological: Negative  Psychiatric/Behavioral: Negative  Urinary Incontinence Screening      Most Recent Value   Urinary Incontinence   Urinary Incontinence? No   Incomplete emptying? No   Urinary frequency? No   Urinary urgency? No   Urinary hesitancy? Yes   Dysuria (painful difficult urination)? No   Nocturia (waking up to use the bathroom)? Yes [2-3x]   Straining (having to push to go)? No   Weak stream?  No   Intermittent stream?  No   Vaginal pressure? No          Objective:    Physical Exam   Constitutional: She is oriented to person, place, and time  She appears well-developed and well-nourished  HENT:   Head: Normocephalic and atraumatic  Neck: Normal range of motion  Neck supple  Cardiovascular: Intact distal pulses  Pulmonary/Chest: Effort normal    Abdominal: Soft  Bowel sounds are normal  She exhibits no distension and no mass  There is no tenderness  There is no rebound and no guarding  Musculoskeletal: Normal range of motion  Neurological: She is alert and oriented to person, place, and time  Skin: Skin is warm and dry  Psychiatric: She has a normal mood and affect  Vitals reviewed          Results  No results found for: PSA  Lab Results   Component Value Date    GLUCOSE 127 06/17/2018    CALCIUM 9 1 06/17/2018     06/17/2018    K 4 1 06/17/2018    CO2 31 06/17/2018     06/17/2018    BUN 23 06/17/2018 CREATININE 0 76 06/17/2018     Lab Results   Component Value Date    WBC 7 67 06/17/2018    HGB 12 7 06/17/2018    HCT 38 1 06/17/2018    MCV 99 (H) 06/17/2018     06/17/2018       No results found for this or any previous visit (from the past 1 hour(s)) ]

## 2018-07-06 NOTE — ANESTHESIA POSTPROCEDURE EVALUATION
Post-Op Assessment Note      CV Status:  Stable    Mental Status:  Alert and awake    Hydration Status:  Euvolemic    PONV Controlled:  Controlled    Airway Patency:  Patent    Post Op Vitals Reviewed:  Yes              BP (P) 118/59 (07/06/18 0914)    Temp 98 °F (36 7 °C) (07/06/18 0914)    Pulse (P) 67 (07/06/18 0914)   Resp (P) 18 (07/06/18 0914)    SpO2 (P) 100 % (07/06/18 0914)

## 2018-07-06 NOTE — DISCHARGE INSTRUCTIONS
CYSTOSCOPY, TURBT, PROSTATE BIOPSY, BLADDER BIOPSY   DISCHARGE INSTRUCTIONS    Care after your surgery:  1  You may have burning or red colored urine the first 24 hours  Your burning should  stop in 24 hours and your urine should clear in 24-48 hours  2  You should drink more fluids unless Dr Libia Noonan advises you not to    3  You should return to normal activities when your urine is clear again  4  You may have a small amount of red drainage from your rectum if you had a prostate  biopsy performed  This should stop in 24 hours  5  Take pain medication as prescribed by your doctor  Call Dr Libia Noonan if you have any of the followin  You can not urinate or you have active or persistent bleeding, clots, back pain, or  pain when you urinate  2  You have chills, fever above 101 degrees, or feel sick  3  You have persistent nausea or vomiting, persistent dizziness, or lightheadedness  4  Call Dr Libia Noonan if you have any questions or concerns about your procedure at:  [phone]      Follow-up with Dr Libia Noonan in 1 week for cysto stent removal in the office

## 2018-07-09 ENCOUNTER — TELEPHONE (OUTPATIENT)
Dept: UROLOGY | Facility: CLINIC | Age: 53
End: 2018-07-09

## 2018-07-09 ENCOUNTER — APPOINTMENT (OUTPATIENT)
Dept: PHYSICAL THERAPY | Facility: CLINIC | Age: 53
End: 2018-07-09
Payer: COMMERCIAL

## 2018-07-09 NOTE — TELEPHONE ENCOUNTER
Patient can also be offered Flomax once a day  Patient may need a stronger bowel regimen such as magnesium citrate if stool softener not effective  Patient needs reassurance that stent colic will improve with hydration and resolution of constipation

## 2018-07-09 NOTE — TELEPHONE ENCOUNTER
Pt managed by Dr Tiffanie Rondon in the [place] office  Pt had surgery 07/06/2018 for bilateral ureteroscopy and bilateral stent insertion  Pt called requesting an appointment for stent removal  Pt experiencing dysuria and right flank pain  Pt stopped drinking  Fluids due to dysuria and urinary frequency  Pt taking Ditropan 5mg TID  Pt has not  her bowels since day before surgery  Taking ibuprofen without relief  Advised pt to start increasing her fluid intake, will help with dysuria  Also to begin stool softeners today BID  Explained to pt need to speak with Dr Tiffanie Rondon re stent removal date  Will email Yaima Gerber and MD Caryle Joanna  Any other meds you can prescribe for stent colic    Thanks

## 2018-07-10 NOTE — TELEPHONE ENCOUNTER
Spoke with patient  Patient reports she is feeling better today  Reports she has now had a bowel movement and has been hydrating with water  Patient instructed to continue to hydrate well with water and take stool softeners  Patient instructed that dehydration and constipation can make stent discomfort worse  Patient verbalized understanding  Offered patient Flomax, patient declined at this time, reports she is more comfortable  Instructed patient that office will call her with appointment for stent removal  Patient knows to call in the meantime with any further concerns or questions

## 2018-07-10 NOTE — TELEPHONE ENCOUNTER
Attempted to call patient to offer recommendations per Zita Clas, left message for patient to return phone call

## 2018-07-10 NOTE — TELEPHONE ENCOUNTER
LM for patient stating that we could removed stent Thursday July 12 @ 3:00 - scheduled and she is to call main office if this does not work for her  Emmie Su confirmed appointment

## 2018-07-11 ENCOUNTER — APPOINTMENT (OUTPATIENT)
Dept: PHYSICAL THERAPY | Facility: CLINIC | Age: 53
End: 2018-07-11
Payer: COMMERCIAL

## 2018-07-12 ENCOUNTER — PROCEDURE VISIT (OUTPATIENT)
Dept: UROLOGY | Facility: AMBULATORY SURGERY CENTER | Age: 53
End: 2018-07-12
Payer: COMMERCIAL

## 2018-07-12 VITALS
BODY MASS INDEX: 29.26 KG/M2 | SYSTOLIC BLOOD PRESSURE: 126 MMHG | DIASTOLIC BLOOD PRESSURE: 72 MMHG | WEIGHT: 159 LBS | HEIGHT: 62 IN | HEART RATE: 70 BPM

## 2018-07-12 DIAGNOSIS — N20.0 NEPHROLITHIASIS: Primary | ICD-10-CM

## 2018-07-12 PROCEDURE — 52310 CYSTOSCOPY AND TREATMENT: CPT | Performed by: UROLOGY

## 2018-07-16 ENCOUNTER — TELEPHONE (OUTPATIENT)
Dept: UROLOGY | Facility: HOSPITAL | Age: 53
End: 2018-07-16

## 2018-07-16 NOTE — TELEPHONE ENCOUNTER
Patient had kidney stone surgery on 7/6/18 with Dr Yanelis Vigil and has ureteral stents removed 7/12  At this point she should have no activity restrictions  May resume physical therapy for her foot  Called and spoke to patient and reviewed that at this time there are no activity restrictions  Offered faxing or emailing the letter and she confirmed we could email it

## 2018-07-16 NOTE — LETTER
July 16, 2018     Alethea Roe is a 48 y o  female  [id number]      To Whom it May Concern:    Alethea Roe is under my professional care  She was seen in my office on 07/12/2018  She may resume physical therapy for her foot as of today  If you have any questions or concerns, please don't hesitate to call           Sincerely,          Dr Joi Gomez      CC: No Recipients

## 2018-07-16 NOTE — TELEPHONE ENCOUNTER
Patient called in regards to a request for a release back to foot therapy after surgery  If approved, letter to be mailed to her home address as listed  Thank you

## 2018-07-17 ENCOUNTER — TRANSCRIBE ORDERS (OUTPATIENT)
Dept: ADMINISTRATIVE | Facility: HOSPITAL | Age: 53
End: 2018-07-17

## 2018-07-17 ENCOUNTER — HOSPITAL ENCOUNTER (OUTPATIENT)
Dept: RADIOLOGY | Facility: HOSPITAL | Age: 53
Discharge: HOME | End: 2018-07-17
Attending: UROLOGY
Payer: COMMERCIAL

## 2018-07-17 ENCOUNTER — APPOINTMENT (OUTPATIENT)
Dept: LAB | Facility: HOSPITAL | Age: 53
End: 2018-07-17
Attending: UROLOGY
Payer: COMMERCIAL

## 2018-07-17 DIAGNOSIS — K75.4 CHRONIC AGGRESSIVE HEPATITIS (HCC): Primary | ICD-10-CM

## 2018-07-17 DIAGNOSIS — K75.4 CHRONIC AGGRESSIVE HEPATITIS (HCC): ICD-10-CM

## 2018-07-17 DIAGNOSIS — N20.0 NEPHROLITHIASIS: ICD-10-CM

## 2018-07-17 LAB
ALBUMIN SERPL BCP-MCNC: 3.5 G/DL (ref 3.5–5)
ALP SERPL-CCNC: 226 U/L (ref 46–116)
ALT SERPL W P-5'-P-CCNC: 38 U/L (ref 12–78)
ANION GAP SERPL CALCULATED.3IONS-SCNC: 9 MMOL/L (ref 4–13)
AST SERPL W P-5'-P-CCNC: 47 U/L (ref 5–45)
BILIRUB SERPL-MCNC: 0.36 MG/DL (ref 0.2–1)
BUN SERPL-MCNC: 17 MG/DL (ref 5–25)
CALCIUM SERPL-MCNC: 8.8 MG/DL (ref 8.3–10.1)
CHLORIDE SERPL-SCNC: 103 MMOL/L (ref 100–108)
CO2 SERPL-SCNC: 29 MMOL/L (ref 21–32)
CREAT SERPL-MCNC: 0.77 MG/DL (ref 0.6–1.3)
ERYTHROCYTE [DISTWIDTH] IN BLOOD BY AUTOMATED COUNT: 13.6 % (ref 11.6–15.1)
GFR SERPL CREATININE-BSD FRML MDRD: 90 ML/MIN/1.73SQ M
GLUCOSE P FAST SERPL-MCNC: 175 MG/DL (ref 65–99)
HCT VFR BLD AUTO: 41 % (ref 34.8–46.1)
HGB BLD-MCNC: 13.5 G/DL (ref 11.5–15.4)
IGG SERPL-MCNC: 824 MG/DL (ref 700–1600)
MAGNESIUM SERPL-MCNC: 1.9 MG/DL (ref 1.6–2.6)
MCH RBC QN AUTO: 33.6 PG (ref 26.8–34.3)
MCHC RBC AUTO-ENTMCNC: 32.9 G/DL (ref 31.4–37.4)
MCV RBC AUTO: 102 FL (ref 82–98)
PHOSPHATE SERPL-MCNC: 3.5 MG/DL (ref 2.7–4.5)
PLATELET # BLD AUTO: 357 THOUSANDS/UL (ref 149–390)
PMV BLD AUTO: 10.3 FL (ref 8.9–12.7)
POTASSIUM SERPL-SCNC: 3.4 MMOL/L (ref 3.5–5.3)
PROT SERPL-MCNC: 6.9 G/DL (ref 6.4–8.2)
PTH-INTACT SERPL-MCNC: 67.1 PG/ML (ref 18.4–80.1)
RBC # BLD AUTO: 4.02 MILLION/UL (ref 3.81–5.12)
SODIUM SERPL-SCNC: 141 MMOL/L (ref 136–145)
URATE SERPL-MCNC: 4.1 MG/DL (ref 2–6.8)
WBC # BLD AUTO: 9.23 THOUSAND/UL (ref 4.31–10.16)

## 2018-07-17 PROCEDURE — 84550 ASSAY OF BLOOD/URIC ACID: CPT

## 2018-07-17 PROCEDURE — 82784 ASSAY IGA/IGD/IGG/IGM EACH: CPT

## 2018-07-17 PROCEDURE — 85027 COMPLETE CBC AUTOMATED: CPT

## 2018-07-17 PROCEDURE — 36415 COLL VENOUS BLD VENIPUNCTURE: CPT

## 2018-07-17 PROCEDURE — 80053 COMPREHEN METABOLIC PANEL: CPT

## 2018-07-17 PROCEDURE — 74018 RADEX ABDOMEN 1 VIEW: CPT

## 2018-07-17 PROCEDURE — 83970 ASSAY OF PARATHORMONE: CPT

## 2018-07-17 PROCEDURE — 84100 ASSAY OF PHOSPHORUS: CPT

## 2018-07-17 PROCEDURE — 83735 ASSAY OF MAGNESIUM: CPT

## 2018-07-17 NOTE — PROGRESS NOTES
Written Consent Obtained  Indications for Procedure:  Bilateral stent removal after ureteroscopy    Physical Exam    Constitutional   General appearance: No acute distress, well appearing and well nourished  Pulmonary   Respiratory effort: No increased work of breathing or signs of respiratory distress  Cardiovascular   Examination of extremities for edema and/or varicosities: Normal     Abdomen   Abdomen: Non-tender, no masses  Liver and spleen: No hepatomegaly or splenomegaly  Genitourinary   External genitalia and vagina: Normal, no lesions appreciated  Urethra: Normal, no discharge  Bladder: Not distended, no tenderness  Musculoskeletal   Gait and station: Normal     Skin   Skin and subcutaneous tissue: Normal without rashes or lesions  Lymphatic   Palpation of lymph nodes in groin: No lymphadenopathy  Additional Exam: Neuro exam nonfocal           The flexible cystoscope was introduced into the urethra and advanced into the bladder  URETHRA:  Normal,  without strictures or lesions  TRIGONE & UOs:   Bilateral stents noted and grasped with a grasper and removed sequentially  BLADDER MUCOSA:  Normal, without neoplasms or other lesions  DETRUSOR: Normal capacity, without flaccidity, without excessive compliance, without trabeculation or diverticula, without uninhibited bladder contractions on fillings  Plan:  Stents removed without difficulty today  We will proceed with metabolic workup  She will follow up in 6 weeks to review these results with imaging

## 2018-07-18 ENCOUNTER — OFFICE VISIT (OUTPATIENT)
Dept: PHYSICAL THERAPY | Facility: CLINIC | Age: 53
End: 2018-07-18
Payer: COMMERCIAL

## 2018-07-18 DIAGNOSIS — M72.2 PLANTAR FASCIAL FIBROMATOSIS OF BOTH FEET: Primary | ICD-10-CM

## 2018-07-18 PROCEDURE — 97110 THERAPEUTIC EXERCISES: CPT

## 2018-07-18 PROCEDURE — 97140 MANUAL THERAPY 1/> REGIONS: CPT

## 2018-07-18 PROCEDURE — 97112 NEUROMUSCULAR REEDUCATION: CPT

## 2018-07-18 NOTE — PROGRESS NOTES
Daily Note     Today's date: 2018  Patient name: Erick Johnston  : 1965  MRN: [id number]  Referring provider: OMER Graham  Dx:   Encounter Diagnosis     ICD-10-CM    1  Plantar fascial fibromatosis of both feet M72 2                   Subjective: Pt presents to PT reporting decreased soreness in B PF stating 4/10 at it's worst since we saw her last   She states her back has been "sore" since surgery; pt was advised to let clinician know  Pt reports a verbal understanding  Pt reports "soreness" in B PF grading it a 3/10  Objective: See treatment diary below    Precautions: arthritis, back pain, depression, DM, headaches, autoimmune hepatitis, kidney stones    Daily Treatment Diary     Manual  18         IASTM B plantar fascia and gastrocs 12 min 15 min PK PK         B 1st MPT mobs, gr 2-3 nv nv nv nv                                                    Exercise Diary  18         Plantar fascia towel stretch 15"x3 ea 30"x3 ea 30"x3 ea 30"x3 ea         gastroc towel stretch 15"x2 ea 30"x3 ea 30"x3 ea 30"x3 ea         bike nv 5 min 5 min 5 min         Side stepping nv @ HR 3 laps [place] TB  x2 laps [place] TB  x3 laps         BAPS standing nv nv nv          rockerboard nv AP/ML 15x ea AP/ML 20x ea AP/ML 30x ea         SLS nv 15"x3 ea np 20"x3 ea         Step ups nv nv 15x ea 1R  20x ea         Star drill nv nv nv nv         prostretch nv 30"x3 ea 30"x3 ea 30"x3 ea         Ankle TB 4 way nv Org 15x ea Org 20x ea nv         Tandem walking fwd/bwd nv 3 laps ea 3 laps ea 3 laps ea         Towel curls flx/ext nv 30" ea nv nv                                                                                                        Modalities  18         CP PRN nv np np np                                           Assessment: Pt demonstrates good tolerance to progressions with no complaints    Progress NV if able       Plan: Continue per plan of care  Progress treatment as tolerated

## 2018-07-19 ENCOUNTER — OFFICE VISIT (OUTPATIENT)
Dept: ENDOCRINOLOGY | Facility: CLINIC | Age: 53
End: 2018-07-19
Payer: COMMERCIAL

## 2018-07-19 VITALS
DIASTOLIC BLOOD PRESSURE: 78 MMHG | BODY MASS INDEX: 29.24 KG/M2 | HEIGHT: 62 IN | WEIGHT: 158.9 LBS | SYSTOLIC BLOOD PRESSURE: 118 MMHG | TEMPERATURE: 97 F | RESPIRATION RATE: 16 BRPM | HEART RATE: 80 BPM

## 2018-07-19 DIAGNOSIS — R94.6 ABNORMAL THYROID FUNCTION TEST: ICD-10-CM

## 2018-07-19 DIAGNOSIS — IMO0001 UNCONTROLLED TYPE 2 DIABETES MELLITUS WITHOUT COMPLICATION, WITHOUT LONG-TERM CURRENT USE OF INSULIN: Primary | ICD-10-CM

## 2018-07-19 PROBLEM — J44.9 COPD (CHRONIC OBSTRUCTIVE PULMONARY DISEASE) (HCC): Status: ACTIVE | Noted: 2017-04-26

## 2018-07-19 LAB
CA PHOS MFR STONE: 5 %
CALCIUM OXALATE DIHYDRATE MFR STONE IR: 20 %
COLOR STONE: NORMAL
COM MFR STONE: 75 %
COMMENT-STONE3: NORMAL
COMPOSITION: NORMAL
LABORATORY COMMENT REPORT: NORMAL
NIDUS STONE QL: NORMAL
PHOTO: NORMAL
SIZE STONE: NORMAL MM
STONE ANALYSIS-IMP: NORMAL
SURFACE CRYSTALS: NORMAL
WT STONE: 2.3 MG

## 2018-07-19 PROCEDURE — 99213 OFFICE O/P EST LOW 20 MIN: CPT | Performed by: INTERNAL MEDICINE

## 2018-07-19 NOTE — PROGRESS NOTES
Xavier Medel 48 y o  female presents for follow up today  Last seen in 4 2018    Interim history  Doing well  No new medical problems or hospitalizations  Autoimmune hepatitis under control- she is taking budesonide for rx  Denies polydipsia/polyuria  Has stopped going to the gym as she couldn't afford it anymore  medication compliance: compliant all of the time,   diabetic diet compliance: compliant most of the time, admits to sweet cravingsin the evenings- eating Ritas, candies  home glucose monitoring: is performed 2 to 3 times a day  Range- fastings 130 to 150, predinner 140 to 160, bedtime 180 to 230  Hypoglycemia- no  Hypoglycemia unawareness-no   weight has remained stable  Current medication regimen- victoza 1 8mg daily  Taking aspirin-yes  Statin rx- no, she has autoimmune hepatitis  SMBG- Glucometer-accuchek    History of Present Illness:  Diabetes mellitus began in 2015  Risk factors include: family history diabetes  mellitus, obesity and over age 41years old  She Has been managed with oral medications  Associated symptoms  At presentation include: blurred vision, burning of extremities, constant hunger, frequent urination, nocturia and polydipsia  Pertinent negatives include chest pain, dental disease, dysesthesias, dyspnea, foot ulcers, frequent infections, hypoglycemic  episodes, increased fatigue, slow healing wounds / sores and weight gain      Last visit with ophthalmology- March 2018  Family history of diabetes- yes    I have reviewed labs which reveal-HgbA1c 7 9% in 6 2018, prior 10 6% on 3 13 18 worsened from HBA1c 7 4 on 11 29 17, normal GFR and LDL controlled at 78      Review of systems  Constitutional- denies fatigue, fever  Eyes:denies redness/swelling/itching/visual loss/diplopia  Ears:denies ear pain/hearing difficulty  Neck: denies neck swelling/pain/stridor  RS: denies wheezing/chest pain/cough/difficulty breathing  CVS: denies palpitations/chest pain/leg swelling/orthopnea/syncope  GI: denies abdominal pain/changes in bowel movements/changes in appetite/refux/nausea  : denies changes in urine color/flow/nocturia  Musculoskeletal: denies difficulty walking/ leg pain/back pain/lfocal weakness  Neuro: denies numbness/tingling/dysesthesias/tremors  Skin/hair: has noticed bruising easily in the past 2 months, denies rash/dry skin/hair loss       Current Outpatient Prescriptions:     aspirin (ASPIR-LOW) 81 mg EC tablet, Take 81 mg by mouth daily  , Disp: , Rfl:     azaTHIOprine (IMURAN) 50 mg tablet, Take 50 mg by mouth daily, Disp: , Rfl:     Biotin 10 MG TABS, Take 10 mg by mouth, Disp: , Rfl:     budesonide (ENTOCORT EC) 3 MG capsule, , Disp: , Rfl:     calcium citrate-Vitamin D 200 mg-250 units, Take 2 tablets by mouth, Disp: , Rfl:     cetirizine (ZyrTEC) 10 mg tablet, Take 10 mg by mouth, Disp: , Rfl:     FLUoxetine (PROzac) 40 MG capsule, Take 40 mg by mouth daily  , Disp: , Rfl:     ketotifen (ZADITOR) 0 025 % ophthalmic solution, Administer to both eyes daily  , Disp: , Rfl:     liraglutide (VICTOZA) injection, Inject 1 8 mg under the skin daily  , Disp: , Rfl:     LORazepam (ATIVAN) 1 mg tablet, Take 1 mg by mouth 2 (two) times a day  , Disp: , Rfl:     naproxen (NAPROSYN) 500 mg tablet, Take 1 tablet (500 mg total) by mouth 2 (two) times a day with meals for 10 days, Disp: 20 tablet, Rfl: 0    ondansetron (ZOFRAN-ODT) 4 mg disintegrating tablet, Take 1 tablet (4 mg total) by mouth every 6 (six) hours as needed for nausea or vomiting (Patient taking differently: Take 4 mg by mouth as needed for nausea or vomiting  ), Disp: 20 tablet, Rfl: 0    oxybutynin (DITROPAN) 5 mg tablet, Take 1 tablet (5 mg total) by mouth 3 (three) times a day as needed (stent discomfort), Disp: 20 tablet, Rfl: 0    oxyCODONE-acetaminophen (PERCOCET) 5-325 mg per tablet, Take 1 tablet by mouth every 4 (four) hours as needed for moderate pain for up to 10 doses Max Daily Amount: 6 tablets, Disp: 20 tablet, Rfl: 0    pantoprazole (PROTONIX) 20 mg tablet, pantoprazole 20 mg tablet,delayed release, Disp: , Rfl:     pantoprazole (PROTONIX) 40 mg tablet, Take 1 tablet (40 mg total) by mouth every 24 hours, Disp: 30 tablet, Rfl: 1    QUEtiapine (SEROquel) 100 mg tablet, Take 100 mg by mouth daily at bedtime, Disp: , Rfl:     traMADol (ULTRAM) 50 mg tablet, Take 50 mg by mouth daily  , Disp: , Rfl:     zolpidem (AMBIEN) 5 mg tablet, Take 10 mg by mouth daily at bedtime as needed for sleep  , Disp: , Rfl:     Allergies   Allergen Reactions    No Active Allergies        Patient Active Problem List   Diagnosis    Nephrolithiasis       Family History   Problem Relation Age of Onset    Diabetes Father     Heart disease Father     Diabetes Mother     Heart disease Mother     Diabetes Sister     Diabetes Family         MELLITUS    Depression Family     Mental illness Family     Obesity Family     Osteoarthritis Family        Social History     Social History    Marital status: Single     Spouse name: N/A    Number of children: N/A    Years of education: N/A     Occupational History    Not on file  Social History Main Topics    Smoking status: Current Every Day Smoker     Years: 30 00     Types: Cigarettes    Smokeless tobacco: Never Used      Comment: FORMER SMOKER; 5 CIGARETTES/DAY, QUIT DATE: 1/1/18 TOBACCO USE AS PER [employer]    Alcohol use No    Drug use: No    Sexual activity: No     Other Topics Concern    Not on file     Social History Narrative    No narrative on file       PHYSICAL EXAM    Vitals:    07/19/18 0945   BP: 118/78   BP Location: Left arm   Patient Position: Sitting   Cuff Size: Adult   Pulse: 80   Resp: 16   Temp: (!) 97 °F (36 1 °C)   TempSrc: Temporal   Weight: 72 1 kg (158 lb 14 4 oz)   Height: 5' 2" (1 575 m)   Body mass index is 29 06 kg/m²        General: AAOx3, NAD, overweight  Eyes; no conjunctival redness, cornea clear, no proptosis, pupils round, reactive to ight  Ears: normal external ears  Mouth/Throat: buccal mucosa moist, no patches, poor dental hygiene uvula midline  Neck: no abnormal cervical LN  Thyroid: no visible goiter, nontender, smooth surface, no palpable nodules, no thrill/bruit  RS: chest clear to auscultation, no added sounds  CVS: no JVD, RRR, no murmurs  Abdomen: obese, no palpable masses/free fluid, percussion tympanitic, normal BS  Extremities: no pedal edema, no cyanosis/clubbing  Neuro: no gross focal neurologic deficits, no tremors  Dermatologic: no skin rash/no wide purple striae, no ecchymoses, no focal hair loss, skin moist to touch  Psych: appropriate mood and affect      ASSESSMENT AND PLAN    1  DIABETES MELLITUS:     Glycemic control:Improving, but still not controlled- dietary counseling provided in detail  Continue victoza, consider adding second agent if hyperglycemia persists or worsens despite dietary changes in 3 months  Labs:Check HgbA1c CMP lipid panel in 9 2018  POC FS monitoring at home: 2 to 3 times a day    2  HTN-Controlled    3  Abnormal TSH- Suppressed to 0 029  Patient asymptomatic,  Probably artefactual result from biotin interference  Recommend checking off biotin in 1 week    4  Overweight BMI 29  Lifestyle changes:  -I counseled patient regarding recommendation to lose 5 to 10% of initial body weight through lifestyle changes, which should include a combination of dietary changes and regular exercise   -I recommend starting a food diary as a first step to beginning dietary changes  - Goal daily caloric intake is not to exceed 1200 calories  - There are multiple free websites available that can also help with counting calories  I gave some specific examples to the patient   -I also recommended exercise with a goal of at least 150 minutes of exercise weekly,  suggested that this could be achieved by at least 30 minutes of moderate physical activity  approximately 5 days per week                Orders Placed This Encounter Procedures    TSH baseline     Standing Status:   Future     Standing Expiration Date:   7/19/2019    T4, free     Standing Status:   Future     Standing Expiration Date:   7/19/2019       RTC in 3 months

## 2018-07-23 ENCOUNTER — APPOINTMENT (OUTPATIENT)
Dept: LAB | Facility: HOSPITAL | Age: 53
End: 2018-07-23
Payer: COMMERCIAL

## 2018-07-23 ENCOUNTER — EVALUATION (OUTPATIENT)
Dept: PHYSICAL THERAPY | Facility: CLINIC | Age: 53
End: 2018-07-23
Payer: COMMERCIAL

## 2018-07-23 ENCOUNTER — APPOINTMENT (OUTPATIENT)
Dept: LAB | Facility: HOSPITAL | Age: 53
End: 2018-07-23
Attending: UROLOGY
Payer: COMMERCIAL

## 2018-07-23 DIAGNOSIS — IMO0001 UNCONTROLLED TYPE 2 DIABETES MELLITUS WITHOUT COMPLICATION, WITHOUT LONG-TERM CURRENT USE OF INSULIN: ICD-10-CM

## 2018-07-23 DIAGNOSIS — N20.0 NEPHROLITHIASIS: ICD-10-CM

## 2018-07-23 DIAGNOSIS — M72.2 PLANTAR FASCIAL FIBROMATOSIS OF BOTH FEET: Primary | ICD-10-CM

## 2018-07-23 LAB
ALBUMIN SERPL BCP-MCNC: 3.6 G/DL (ref 3.5–5)
ALP SERPL-CCNC: 211 U/L (ref 46–116)
ALT SERPL W P-5'-P-CCNC: 28 U/L (ref 12–78)
ANION GAP SERPL CALCULATED.3IONS-SCNC: 8 MMOL/L (ref 4–13)
AST SERPL W P-5'-P-CCNC: 20 U/L (ref 5–45)
BILIRUB SERPL-MCNC: 0.26 MG/DL (ref 0.2–1)
BUN SERPL-MCNC: 14 MG/DL (ref 5–25)
CALCIUM SERPL-MCNC: 9.1 MG/DL (ref 8.3–10.1)
CHLORIDE SERPL-SCNC: 103 MMOL/L (ref 100–108)
CHOLEST SERPL-MCNC: 185 MG/DL (ref 50–200)
CO2 SERPL-SCNC: 30 MMOL/L (ref 21–32)
CREAT SERPL-MCNC: 0.69 MG/DL (ref 0.6–1.3)
CREAT UR-MCNC: 78.1 MG/DL
EST. AVERAGE GLUCOSE BLD GHB EST-MCNC: 166 MG/DL
GFR SERPL CREATININE-BSD FRML MDRD: 101 ML/MIN/1.73SQ M
GLUCOSE P FAST SERPL-MCNC: 143 MG/DL (ref 65–99)
HBA1C MFR BLD: 7.4 % (ref 4.2–6.3)
HDLC SERPL-MCNC: 74 MG/DL (ref 40–60)
LDLC SERPL CALC-MCNC: 97 MG/DL (ref 0–100)
MICROALBUMIN UR-MCNC: 6.1 MG/L (ref 0–20)
MICROALBUMIN/CREAT 24H UR: 8 MG/G CREATININE (ref 0–30)
NONHDLC SERPL-MCNC: 111 MG/DL
POTASSIUM SERPL-SCNC: 3.6 MMOL/L (ref 3.5–5.3)
PROT SERPL-MCNC: 7.1 G/DL (ref 6.4–8.2)
SODIUM SERPL-SCNC: 141 MMOL/L (ref 136–145)
T4 FREE SERPL-MCNC: 0.75 NG/DL (ref 0.76–1.46)
TRIGL SERPL-MCNC: 71 MG/DL
TSH SERPL DL<=0.05 MIU/L-ACNC: 0.58 UIU/ML (ref 0.36–3.74)

## 2018-07-23 PROCEDURE — 82340 ASSAY OF CALCIUM IN URINE: CPT

## 2018-07-23 PROCEDURE — 82436 ASSAY OF URINE CHLORIDE: CPT

## 2018-07-23 PROCEDURE — 82570 ASSAY OF URINE CREATININE: CPT

## 2018-07-23 PROCEDURE — 83735 ASSAY OF MAGNESIUM: CPT

## 2018-07-23 PROCEDURE — 82043 UR ALBUMIN QUANTITATIVE: CPT

## 2018-07-23 PROCEDURE — 80061 LIPID PANEL: CPT

## 2018-07-23 PROCEDURE — 84133 ASSAY OF URINE POTASSIUM: CPT

## 2018-07-23 PROCEDURE — 83945 ASSAY OF OXALATE: CPT

## 2018-07-23 PROCEDURE — 80053 COMPREHEN METABOLIC PANEL: CPT

## 2018-07-23 PROCEDURE — 84443 ASSAY THYROID STIM HORMONE: CPT

## 2018-07-23 PROCEDURE — 84105 ASSAY OF URINE PHOSPHORUS: CPT

## 2018-07-23 PROCEDURE — 82507 ASSAY OF CITRATE: CPT

## 2018-07-23 PROCEDURE — G8979 MOBILITY GOAL STATUS: HCPCS | Performed by: PHYSICAL THERAPIST

## 2018-07-23 PROCEDURE — 84392 ASSAY OF URINE SULFATE: CPT

## 2018-07-23 PROCEDURE — 36415 COLL VENOUS BLD VENIPUNCTURE: CPT

## 2018-07-23 PROCEDURE — 84300 ASSAY OF URINE SODIUM: CPT

## 2018-07-23 PROCEDURE — 84439 ASSAY OF FREE THYROXINE: CPT

## 2018-07-23 PROCEDURE — 97140 MANUAL THERAPY 1/> REGIONS: CPT | Performed by: PHYSICAL THERAPIST

## 2018-07-23 PROCEDURE — 84560 ASSAY OF URINE/URIC ACID: CPT

## 2018-07-23 PROCEDURE — 82140 ASSAY OF AMMONIA: CPT

## 2018-07-23 PROCEDURE — 97150 GROUP THERAPEUTIC PROCEDURES: CPT | Performed by: PHYSICAL THERAPIST

## 2018-07-23 PROCEDURE — G8978 MOBILITY CURRENT STATUS: HCPCS | Performed by: PHYSICAL THERAPIST

## 2018-07-23 PROCEDURE — 83935 ASSAY OF URINE OSMOLALITY: CPT

## 2018-07-23 PROCEDURE — 97110 THERAPEUTIC EXERCISES: CPT | Performed by: PHYSICAL THERAPIST

## 2018-07-23 PROCEDURE — 83036 HEMOGLOBIN GLYCOSYLATED A1C: CPT

## 2018-07-23 PROCEDURE — 81003 URINALYSIS AUTO W/O SCOPE: CPT

## 2018-07-23 PROCEDURE — 82131 AMINO ACIDS SINGLE QUANT: CPT

## 2018-07-23 NOTE — PROGRESS NOTES
PT Re-Evaluation     Today's date: 2018  Patient name: Dave Moise  : 1965  MRN: [id number]  Referring provider: OMER Knapp  Dx:   Encounter Diagnosis     ICD-10-CM    1  Plantar fascial fibromatosis of both feet M72 2                   Assessment  Impairments: abnormal or restricted ROM, activity intolerance, impaired balance, impaired physical strength and pain with function    Assessment details: Dave Moise is a 48 y o  female who presents to physical therapy with Plantar fascial fibromatosis of both feet  Pt states that "sitting is a problem because of my back, standing is a problem because of the feet " Pt continues to have varying tolerance to walking  Pt denies difficulty with stairclimbing  Pt has less soft tissue restrictions in bilateral gastrocs and plantar fascia, with moderate restrictions remaining over bilateral medial calcaneal tubercles  Pt has improved strength, balance and ROM compared to initial eval but continues to have deficits in all areas  Pt has greater difficulty with balance and proprioception on the Dorla Tremayne would benefit from continued formal physical therapy to address impairments as detailed, decrease pain, and restore maximal level of function for all home and mobility tasks  Thank you for this referral     Understanding of Dx/Px/POC: good   Prognosis: good    Goals  Short-term goals:  1  Pt will decrease pain by 1-2 points within 4 weeks  - partially met  2  Pt will improve ROM by 5-10 degrees within 4 weeks  - met  3  Pt will improve strength by 1/2 grade within 4 weeks  - met  4  Pt will improve physical FS score by 7 points by discharge  - partially met  Long-term goals:  1  Pt will demonstrate independence with HEP by discharge  - partially met  2  Pt will return to all home tasks with good body mechanics and pain <3/10 by discharge  - partially met  3  Pt will walk for greater than 1 hour with pain <3/10 by discharge  - partially met    Plan  Patient would benefit from: PT eval  Planned modality interventions: cryotherapy and thermotherapy: hydrocollator packs  Planned therapy interventions: joint mobilization, manual therapy, neuromuscular re-education, patient education, strengthening, stretching, therapeutic exercise, flexibility, functional ROM exercises and home exercise program  Other planned therapy interventions: IASTM  Frequency: 2x week  Duration in weeks: 4  Treatment plan discussed with: patient  Plan details: 2-4 more weeks, then discharge to [facility]        Subjective Evaluation    History of Present Illness  Mechanism of injury: Pt feels about 60% improved since starting therapy  Pt continues to have good days and bad days related to symptoms  Pt denies numbness and tingling into lower extremities  Pt has not noticed significant difference since changing footwear  Pt states that L side is worse than R  Pt reports worst pain over L medial calcaneal tubercle  Pt had 2 week hiatus of therapy secondary to surgery to address kidney stones  Pt does not have follow up with MD scheduled at this time  Pain  At best pain ratin  At worst pain ratin  Location: B feet  Quality: sharp  Relieving factors: ice  Progression: improved    Social Support  Stairs in house: no   Lives with: cousin  Employment status: not working  Exercise history: walking for fitness      Diagnostic Tests  No diagnostic tests performed  Treatments  Previous treatment: injection treatment  Patient Goals  Patient goal: enjoy my summer and be able to walk- partially met        Objective     Static Posture     Ankle/Foot   Ankle/Foot (Left): Hallux valgus, metatarsus abductus, pronated and rectus  Ankle/Foot (Right): Hallux valgus, metatarsus abductus, pronated and rectus  Comments  Medium arch height bilaterally    Observations   Left Ankle/Foot   Positive for adhesive scar  Right Ankle/Foot   Positive for adhesive scar       Additional Observation Details  B bunionectomy scars well-healed; unchanged at RE    Tenderness   Left Ankle/Foot   Tenderness in the medial calcaneus  No tenderness in the mid-plantar aspect  Right Ankle/Foot   Tenderness in the medial calcaneus  No tenderness in the mid-plantar aspect  Additional Tenderness Details  At RE pt continues to have soreness only in mid-plantar fascia    Active Range of Motion   Left Ankle/Foot   Dorsiflexion (ke): 15 degrees   Plantar flexion: 40 degrees   Inversion: 38 degrees   Eversion: 11 degrees     Right Ankle/Foot   Dorsiflexion (ke): 10 degrees   Plantar flexion: 38 degrees   Inversion: 33 degrees   Eversion: 17 degrees     Additional Active Range of Motion Details  At RE- pt flexes toes with active R eversion    Passive Range of Motion   Left Ankle/Foot    Eversion: 20 degrees     Right Ankle/Foot    Dorsiflexion (ke): 14 degrees   Plantar flexion: 43 degrees   Eversion: 20 degrees     Joint Play   Left Ankle/Foot  Joints within functional limits are the talocrural joint, subtalar joint and midfoot  Hypomobile in the forefoot  Right Ankle/Foot  Joints within functional limits are the talocrural joint, subtalar joint, midfoot and forefoot  Additional Joint Play Details  Pt has heel pain with end range dorsiflexion; resolved at RE    Strength/Myotome Testing     Left Hip   Planes of Motion   Flexion: 4+  External rotation: 5  Internal rotation: 4+    Right Hip   Planes of Motion   Flexion: 4  External rotation: 5  Internal rotation: 4+    Left Knee   Flexion: 4+  Extension: 5    Right Knee   Flexion: 4+  Extension: 4+    Left Ankle/Foot   Dorsiflexion: 5  Plantar flexion: 5  Inversion: 4+  Eversion: 4  Great toe flexion: 4+  Great toe extension: 5    Right Ankle/Foot   Dorsiflexion: 5  Plantar flexion: 5  Inversion: 4+  Eversion: 4+  Great toe flexion: 5  Great toe extension: 5    Tests   Left Ankle/Foot   Negative for Summers and Tinel's sign (tarsal tunnel)       Right Ankle/Foot Negative for Colonel Gear and Tinel's sign (tarsal tunnel)  Ambulation     Quality of Movement During Gait     Additional Quality of Movement During Gait Details  Minimal pronation with gait; improved L at RE, unchanged on R    Functional Assessment   Squat   Left tibial anterior translation beyond toes and right tibial anterior translation beyond toes  No pain       Single Leg Stance   Left: 8 seconds  Right: 30 (L hip drop) seconds    Comments  10x HR: good resupination, even height and WB; unchanged at RE          Precautions: arthritis, back pain, depression, DM, headaches, autoimmune hepatitis, kidney stones    Daily Treatment Diary     Manual  6/21/18 6/25/18 7-2-18 7/18/18 7/23/18        IASTM B plantar fascia and gastrocs 12 min 15 min PK PK ED        B 1st MPT mobs, gr 2-3 nv nv nv nv nv        measurements     ED                                      Exercise Diary  6/21/18 6/25/18 7/2/18 7/18/18 7/23/18        Plantar fascia towel stretch 15"x3 ea 30"x3 ea 30"x3 ea 30"x3 ea 30"x3 ea        gastroc towel stretch 15"x2 ea 30"x3 ea 30"x3 ea 30"x3 ea 30"x3 ea        bike nv 5 min 5 min 5 min 5 min        Side stepping nv @ HR 3 laps [place] TB  x2 laps [place] TB  x3 laps nv        BAPS standing nv nv nv  nv        rockerboard nv AP/ML 15x ea AP/ML 20x ea AP/ML 30x ea nv        SLS nv 15"x3 ea np 20"x3 ea nv        Step ups nv nv 15x ea 1R  20x ea nv        Star drill nv nv nv nv nv        prostretch nv 30"x3 ea 30"x3 ea 30"x3 ea 30"x3 ea        Ankle TB 4 way nv Org 15x ea Org 20x ea nv nv        Tandem walking fwd/bwd nv 3 laps ea 3 laps ea 3 laps ea nv        Towel curls flx/ext nv 30" ea nv nv nv        clamshells     nv        Standing SL TB rotation     nv                                                                             Modalities  6/21/18 6/25/18 7-2-18 7/18/18 7/23/18        CP PRN nv np np np np

## 2018-07-24 ENCOUNTER — TELEPHONE (OUTPATIENT)
Dept: ENDOCRINOLOGY | Facility: CLINIC | Age: 53
End: 2018-07-24

## 2018-07-24 DIAGNOSIS — K21.9 GASTROESOPHAGEAL REFLUX DISEASE WITHOUT ESOPHAGITIS: ICD-10-CM

## 2018-07-24 NOTE — TELEPHONE ENCOUNTER
Vicky,  Please call patient and let her know her HbA1c is improving ( 7 4%) but could be better  She must follow my dietary advise      Thanks

## 2018-07-25 RX ORDER — PANTOPRAZOLE SODIUM 40 MG/1
40 TABLET, DELAYED RELEASE ORAL 2 TIMES DAILY
Qty: 60 TABLET | Refills: 0 | Status: SHIPPED | OUTPATIENT
Start: 2018-07-25

## 2018-07-25 NOTE — TELEPHONE ENCOUNTER
Vicky,  I have ordered protonix bid for 1 month through [facility]  She must reduce dose to 40 mg daily thereafter, as bid dosing is not advisable for long periods  Please let pt know    Thanks

## 2018-07-26 ENCOUNTER — APPOINTMENT (OUTPATIENT)
Dept: PHYSICAL THERAPY | Facility: CLINIC | Age: 53
End: 2018-07-26
Payer: COMMERCIAL

## 2018-07-31 ENCOUNTER — OFFICE VISIT (OUTPATIENT)
Dept: PHYSICAL THERAPY | Facility: CLINIC | Age: 53
End: 2018-07-31
Payer: COMMERCIAL

## 2018-07-31 DIAGNOSIS — M72.2 PLANTAR FASCIAL FIBROMATOSIS OF BOTH FEET: Primary | ICD-10-CM

## 2018-07-31 PROCEDURE — 97140 MANUAL THERAPY 1/> REGIONS: CPT | Performed by: PHYSICAL THERAPIST

## 2018-07-31 PROCEDURE — 97150 GROUP THERAPEUTIC PROCEDURES: CPT | Performed by: PHYSICAL THERAPIST

## 2018-07-31 PROCEDURE — 97112 NEUROMUSCULAR REEDUCATION: CPT | Performed by: PHYSICAL THERAPIST

## 2018-07-31 PROCEDURE — 97110 THERAPEUTIC EXERCISES: CPT | Performed by: PHYSICAL THERAPIST

## 2018-07-31 NOTE — PROGRESS NOTES
Daily Note     Today's date: 2018  Patient name: Winnie Odell  : 1965  MRN: [id number]  Referring provider: OMER Andrade  Dx:   Encounter Diagnosis     ICD-10-CM    1  Plantar fascial fibromatosis of both feet M72 2                   Subjective: Pt rates foot pain at about 6/10 at start of session  Pt declined bike for warmup secondary to abscess on medial thigh        Objective: See treatment diary below    Precautions: arthritis, back pain, depression, DM, headaches, autoimmune hepatitis, kidney stones    Daily Treatment Diary     Manual  18       IASTM B plantar fascia and gastrocs 12 min 15 min PK PK ED ED       B 1st MPT mobs, gr 2-3 nv nv nv nv nv nv       measurements     ED                                      Exercise Diary  18       Plantar fascia towel stretch 15"x3 ea 30"x3 ea 30"x3 ea 30"x3 ea 30"x3 ea 30"x3 ea       gastroc towel stretch 15"x2 ea 30"x3 ea 30"x3 ea 30"x3 ea 30"x3 ea nv       bike nv 5 min 5 min 5 min 5 min treadmill, 5 min       Side stepping nv @ HR 3 laps [place] TB  x2 laps [place] TB  x3 laps nv nv       BAPS standing nv nv nv  nv attempted p!       rockerboard nv AP/ML 15x ea AP/ML 20x ea AP/ML 30x ea nv AP/ML 30x ea       SLS nv 15"x3 ea np 20"x3 ea nv 30"x2 ea       Step ups nv nv 15x ea 1R  20x ea nv 1R 20x ea       Star drill nv nv nv nv nv nv       prostretch nv 30"x3 ea 30"x3 ea 30"x3 ea 30"x3 ea 30"x3 ea       Ankle TB 4 way nv Org 15x ea Org 20x ea nv nv grn 20x ea       Tandem walking fwd/bwd nv 3 laps ea 3 laps ea 3 laps ea nv 3 laps ea       Towel curls flx/ext nv 30" ea nv nv nv 1 min ea       clamshells     nv 3"x15 ea       Standing SL TB rotation     nv [place] TB 5x ea                                                                            Modalities  18 7-18       CP PRN nv np np np np declined Assessment: Tolerated treatment well  Patient demonstrated fatigue post treatment and would benefit from continued PT Pt has greater heel restriction with IASTM on R plantar heel at medial calcaneal tubercle  Pt has minimal soft tissue restrictions over bilateral gastrocs and plantar fascia  Pt had greater challenge with balancing on L LE  Pt attempted BAPS standing but had too much pain  Pt was challenged with SL rotations but denies additional pain  Pt reports "it's good" when asked about pain at the end of session, stating that the L foot now bothers her more than the R       Plan: Continue per plan of care  Progress treatment as tolerated

## 2018-08-02 LAB
AMMONIA 24H UR-MRATE: 39 MEQ/24 HR
AMMONIA UR-SCNC: ABNORMAL UG/DL
CA H2 PHOS DIHYD CRY URNS QL MICRO: 1.36 RATIO (ref 0–3)
CALCIUM 24H UR-MCNC: 14.4 MG/DL
CALCIUM 24H UR-MRATE: 316.8 MG/24 HR (ref 100–300)
CHLORIDE 24H UR-SCNC: 51 MMOL/L
CHLORIDE 24H UR-SRATE: 112 MMOL/24 HR (ref 110–250)
CITRATE 24H UR-MCNC: 284 MG/L
CITRATE 24H UR-MRATE: 625 MG/24 HR (ref 320–1240)
COM CRY STONE QL IR: 13.62 RATIO (ref 0–6)
CREAT 24H UR-MCNC: 38.6 MG/DL
CREAT 24H UR-MRATE: 849.2 MG/24 HR (ref 800–1800)
CYSTINE 24H UR-MCNC: 18.47 MG/L
CYSTINE 24H UR-MRATE: 40.63 MG/24 HR (ref 10–100)
MAGNESIUM 24H UR-MRATE: 110 MG/24 HR (ref 12–293)
MAGNESIUM UR-MCNC: 5 MG/DL
NA URATE CRY STONE QL IR: 1.25 RATIO (ref 0–4)
OSMOLALITY UR: 349 MOSMOL/KG (ref 300–900)
OXALATE 24H UR-MRATE: 64 MG/24 HR (ref 4–31)
OXALATE UR-MCNC: 29 MG/L
PH 24H UR: 6.1 [PH]
PHOSPHATE 24H UR-MCNC: 28 MG/DL
PHOSPHATE 24H UR-MRATE: 616 MG/24 HR (ref 400–1300)
PLEASE NOTE (STONE RISK): ABNORMAL
POTASSIUM 24H UR-SCNC: 35.6 MMOL/24 HR (ref 25–125)
POTASSIUM UR-SCNC: 16.2 MMOL/L
PRESERVED URINE: 2200 ML/24 HR (ref 600–1600)
SODIUM 24H UR-SCNC: 56 MMOL/L
SODIUM 24H UR-SRATE: 123 MMOL/24 HR (ref 39–258)
SPECIMEN VOL 24H UR: 2200 ML/24 HR (ref 600–1600)
SULFATE 24H UR-MCNC: 13 MEQ/24 HR (ref 0–30)
SULFATE UR-MCNC: 6 MEQ/L
TRI-PHOS CRY STONE MICRO: 0.01 RATIO (ref 0–1)
URATE 24H UR-MCNC: 20.6 MG/DL
URATE 24H UR-MRATE: 453 MG/24 HR (ref 250–750)
URATE DIHYD CRY STONE QL IR: 0.69 RATIO (ref 0–1.2)

## 2018-08-04 ENCOUNTER — TRANSCRIBE ORDERS (OUTPATIENT)
Dept: ADMINISTRATIVE | Facility: HOSPITAL | Age: 53
End: 2018-08-04

## 2018-08-04 ENCOUNTER — APPOINTMENT (OUTPATIENT)
Dept: LAB | Facility: HOSPITAL | Age: 53
End: 2018-08-04
Payer: COMMERCIAL

## 2018-08-04 DIAGNOSIS — K75.4 AUTOIMMUNE HEPATITIS (HCC): ICD-10-CM

## 2018-08-04 DIAGNOSIS — K75.4 AUTOIMMUNE HEPATITIS (HCC): Primary | ICD-10-CM

## 2018-08-04 LAB
ALBUMIN SERPL BCP-MCNC: 3.6 G/DL (ref 3.5–5)
ALP SERPL-CCNC: 227 U/L (ref 46–116)
ALT SERPL W P-5'-P-CCNC: 34 U/L (ref 12–78)
ANION GAP SERPL CALCULATED.3IONS-SCNC: 8 MMOL/L (ref 4–13)
AST SERPL W P-5'-P-CCNC: 23 U/L (ref 5–45)
BILIRUB SERPL-MCNC: 0.44 MG/DL (ref 0.2–1)
BUN SERPL-MCNC: 13 MG/DL (ref 5–25)
CALCIUM SERPL-MCNC: 9.2 MG/DL (ref 8.3–10.1)
CHLORIDE SERPL-SCNC: 101 MMOL/L (ref 100–108)
CO2 SERPL-SCNC: 30 MMOL/L (ref 21–32)
CREAT SERPL-MCNC: 0.74 MG/DL (ref 0.6–1.3)
ERYTHROCYTE [DISTWIDTH] IN BLOOD BY AUTOMATED COUNT: 13.5 % (ref 11.6–15.1)
GFR SERPL CREATININE-BSD FRML MDRD: 94 ML/MIN/1.73SQ M
GLUCOSE P FAST SERPL-MCNC: 190 MG/DL (ref 65–99)
HCT VFR BLD AUTO: 41.1 % (ref 34.8–46.1)
HGB BLD-MCNC: 13.6 G/DL (ref 11.5–15.4)
IGG SERPL-MCNC: 915 MG/DL (ref 700–1600)
MCH RBC QN AUTO: 32.5 PG (ref 26.8–34.3)
MCHC RBC AUTO-ENTMCNC: 33.1 G/DL (ref 31.4–37.4)
MCV RBC AUTO: 98 FL (ref 82–98)
PLATELET # BLD AUTO: 287 THOUSANDS/UL (ref 149–390)
PMV BLD AUTO: 10.2 FL (ref 8.9–12.7)
POTASSIUM SERPL-SCNC: 3.5 MMOL/L (ref 3.5–5.3)
PROT SERPL-MCNC: 7.2 G/DL (ref 6.4–8.2)
RBC # BLD AUTO: 4.18 MILLION/UL (ref 3.81–5.12)
SODIUM SERPL-SCNC: 139 MMOL/L (ref 136–145)
WBC # BLD AUTO: 8.13 THOUSAND/UL (ref 4.31–10.16)

## 2018-08-04 PROCEDURE — 80053 COMPREHEN METABOLIC PANEL: CPT

## 2018-08-04 PROCEDURE — 85027 COMPLETE CBC AUTOMATED: CPT

## 2018-08-04 PROCEDURE — 82784 ASSAY IGA/IGD/IGG/IGM EACH: CPT

## 2018-08-04 PROCEDURE — 36415 COLL VENOUS BLD VENIPUNCTURE: CPT

## 2018-08-08 ENCOUNTER — OFFICE VISIT (OUTPATIENT)
Dept: PHYSICAL THERAPY | Facility: CLINIC | Age: 53
End: 2018-08-08
Payer: COMMERCIAL

## 2018-08-08 DIAGNOSIS — M72.2 PLANTAR FASCIAL FIBROMATOSIS OF BOTH FEET: Primary | ICD-10-CM

## 2018-08-08 PROCEDURE — 97140 MANUAL THERAPY 1/> REGIONS: CPT

## 2018-08-08 PROCEDURE — 97112 NEUROMUSCULAR REEDUCATION: CPT

## 2018-08-08 PROCEDURE — 97110 THERAPEUTIC EXERCISES: CPT

## 2018-08-08 NOTE — PROGRESS NOTES
Daily Note     Today's date: 2018  Patient name: Siomara Smith  : 1965  MRN: [id number]  Referring provider: OMER Alejandro  Dx:   Encounter Diagnosis     ICD-10-CM    1  Plantar fascial fibromatosis of both feet M72 2                   Subjective: Pt presents to PT denying pain in B feet  She states she had pain yesterday in B heels grading the pain a 7/10  Pt does not know what increased her pain stating she did not do anything different than she normally does  Pt denies pain post PT session  Pt occ c/o discomfort in R hip with treadmill and pro stretch but not with other TE performed  Modified TE to reduce discomfort          Objective: See treatment diary below    Precautions: arthritis, back pain, depression, DM, headaches, autoimmune hepatitis, kidney stones    Daily Treatment Diary     Manual  18      IASTM B plantar fascia and gastrocs 12 min 15 min PK PK ED ED PK      B 1st MPT mobs, gr 2-3 nv nv nv nv nv nv nv      measurements     ED                                      Exercise Diary  18      Plantar fascia towel stretch 15"x3 ea 30"x3 ea 30"x3 ea 30"x3 ea 30"x3 ea 30"x3 ea 30"x3 ea      gastroc towel stretch 15"x2 ea 30"x3 ea 30"x3 ea 30"x3 ea 30"x3 ea nv 30"x3 ea      bike nv 5 min 5 min 5 min 5 min treadmill, 5 min Bike 5 min      Side stepping nv @ HR 3 laps [place] TB  x2 laps [place] TB  x3 laps nv nv Org TB  x2 laps      BAPS standing nv nv nv  nv attempted p! np      rockerboard nv AP/ML 15x ea AP/ML 20x ea AP/ML 30x ea nv AP/ML 30x ea AP/ML 30x ea      SLS nv 15"x3 ea np 20"x3 ea nv 30"x2 ea 30"x 3 ea      Step ups nv nv 15x ea 1R  20x ea nv 1R 20x ea 2 R 15x ea      Star drill nv nv nv nv nv nv nv      prostretch nv 30"x3 ea 30"x3 ea 30"x3 ea 30"x3 ea 30"x3 ea 30"x3 ea      Ankle TB 4 way nv Org 15x ea Org 20x ea nv nv grn 20x ea nv      Tandem walking fwd/bwd nv 3 laps ea 3 laps ea 3 laps ea nv 3 laps ea 3 laps ea      Towel curls flx/ext nv 30" ea nv nv nv 1 min ea nv      clamshells     nv 3"x15 ea nv      Standing SL TB rotation     nv [place] TB 5x ea nv                                                                           Modalities  6/21/18 6/25/18 7-2-18 7/18/18 7/23/18 7/31/18 8/8/18      CP PRN nv np np np np declined declined                                        Assessment: Tolerated treatment well  Pt challenged with SLS balance TE  Patient demonstrated fatigue post treatment and would benefit from continued PT  Plan: Continue per plan of care  Progress treatment as tolerated

## 2018-08-13 ENCOUNTER — OFFICE VISIT (OUTPATIENT)
Dept: PHYSICAL THERAPY | Facility: CLINIC | Age: 53
End: 2018-08-13
Payer: COMMERCIAL

## 2018-08-13 DIAGNOSIS — M72.2 PLANTAR FASCIAL FIBROMATOSIS OF BOTH FEET: Primary | ICD-10-CM

## 2018-08-13 PROCEDURE — 97110 THERAPEUTIC EXERCISES: CPT

## 2018-08-13 PROCEDURE — 97112 NEUROMUSCULAR REEDUCATION: CPT

## 2018-08-13 NOTE — PROGRESS NOTES
Daily Note     Today's date: 2018  Patient name: Monique Wilkinson  : 1965  MRN: [id number]  Referring provider: OMER Saldivar  Dx:   Encounter Diagnosis     ICD-10-CM    1  Plantar fascial fibromatosis of both feet M72 2                   Subjective: Pt presents to PT denying pain in B feet  She reports increased pain Saturday but states "I may have over done it"  She states prolonged walking and frequent trips up and down steps  She also reports performing chores in the house  Pt continues to deny pain post PT session  Pt arrived about 10 mins late and states mid way through treatment session she needed to leave at 8:45 secondary to bus transportation  Therefore manual not performed          Objective: See treatment diary below    Precautions: arthritis, back pain, depression, DM, headaches, autoimmune hepatitis, kidney stones    Daily Treatment Diary     Manual  18     IASTM B plantar fascia and gastrocs 12 min 15 min PK PK ED ED PK nv     B 1st MPT mobs, gr 2-3 nv nv nv nv nv nv nv nv     measurements     ED                                      Exercise Diary  18     Plantar fascia towel stretch 15"x3 ea 30"x3 ea 30"x3 ea 30"x3 ea 30"x3 ea 30"x3 ea 30"x3 ea 30"x3 ea     gastroc towel stretch 15"x2 ea 30"x3 ea 30"x3 ea 30"x3 ea 30"x3 ea nv 30"x3 ea      bike nv 5 min 5 min 5 min 5 min treadmill, 5 min Bike 5 min Bike 5 min     Side stepping nv @ HR 3 laps [place] TB  x2 laps [place] TB  x3 laps nv nv Org TB  x2 laps Org TB  x3 laps     BAPS standing nv nv nv  nv attempted p! np      rockerboard nv AP/ML 15x ea AP/ML 20x ea AP/ML 30x ea nv AP/ML 30x ea AP/ML 30x ea AP/ML 30x ea     SLS nv 15"x3 ea np 20"x3 ea nv 30"x2 ea 30"x 3 ea 30"x 3 ea     Step ups nv nv 15x ea 1R  20x ea nv 1R 20x ea 2 R 15x ea 2 R 15x ea     Star drill nv nv nv nv nv nv nv      prostretch nv 30"x3 ea 30"x3 ea 30"x3 ea 30"x3 ea 30"x3 ea 30"x3 ea      Ankle TB 4 way nv Org 15x ea Org 20x ea nv nv grn 20x ea nv      Tandem walking fwd/bwd nv 3 laps ea 3 laps ea 3 laps ea nv 3 laps ea 3 laps ea 3 laps ea     Towel curls flx/ext nv 30" ea nv nv nv 1 min ea nv      clamshells     nv 3"x15 ea nv      Standing SL TB rotation     nv [place] TB 5x ea nv                                                                           Modalities  6/21/18 6/25/18 7-2-18 7/18/18 7/23/18 7/31/18 8/8/18 8/13/18     CP PRN nv np np np np declined declined declined                                       Assessment: Tolerated treatment well  Patient demonstrated fatigue post treatment and would benefit from continued PT  Plan: Continue per plan of care  Progress treatment as tolerated

## 2018-08-15 ENCOUNTER — APPOINTMENT (OUTPATIENT)
Dept: PHYSICAL THERAPY | Facility: CLINIC | Age: 53
End: 2018-08-15
Payer: COMMERCIAL

## 2018-08-22 ENCOUNTER — EVALUATION (OUTPATIENT)
Dept: PHYSICAL THERAPY | Facility: CLINIC | Age: 53
End: 2018-08-22
Payer: COMMERCIAL

## 2018-08-22 DIAGNOSIS — M72.2 PLANTAR FASCIAL FIBROMATOSIS OF BOTH FEET: Primary | ICD-10-CM

## 2018-08-22 PROCEDURE — G8980 MOBILITY D/C STATUS: HCPCS | Performed by: PHYSICAL THERAPIST

## 2018-08-22 PROCEDURE — 97140 MANUAL THERAPY 1/> REGIONS: CPT | Performed by: PHYSICAL THERAPIST

## 2018-08-22 PROCEDURE — G8979 MOBILITY GOAL STATUS: HCPCS | Performed by: PHYSICAL THERAPIST

## 2018-08-22 NOTE — PROGRESS NOTES
PT Re-Evaluation  and PT Discharge    Today's date: 2018  Patient name: Nette Flannery  : 1965  MRN: [id number]  Referring provider: OMER Nobles  Dx:   Encounter Diagnosis     ICD-10-CM    1  Plantar fascial fibromatosis of both feet M72 2                   Assessment    Assessment details: Nette Flannery is a 48 y o  female who presents to physical therapy with Plantar fascial fibromatosis of both feet  Pt has been able to walk without difficulty and reports better tolerance to standing  Pt reports occasional cramping into plantar fascia, worse at night, not inhibiting activity  Pt has improved bilateral foot and ankle strength, ROM, balance and ambulation compared to last RE  Pt has reached maximal benefit of formal physical therapy and will be discharged to comprehensive [facility] at this time  Pt is agreeable with this plan and will contact office with any additional concerns  Thank you for this referral     Understanding of Dx/Px/POC: good   Prognosis: good    Goals  Short-term goals:  1  Pt will decrease pain by 1-2 points within 4 weeks  - met  2  Pt will improve ROM by 5-10 degrees within 4 weeks  - met  3  Pt will improve strength by 1/2 grade within 4 weeks  - met  4  Pt will improve physical FS score by 7 points by discharge  - met  Long-term goals:  1  Pt will demonstrate independence with HEP by discharge  - met  2  Pt will return to all home tasks with good body mechanics and pain <3/10 by discharge  - met  3  Pt will walk for greater than 1 hour with pain <3/10 by discharge  - met    Plan  Planned therapy interventions: patient education and home exercise program  Other planned therapy interventions: IASTM  Treatment plan discussed with: patient  Plan details: discharge to [facility]        Subjective Evaluation    History of Present Illness  Mechanism of injury: Pt feels about 90% improved since starting therapy  Pt states that she has more good days than bad days   Pt states that "both sides are good " Pt reports cramping in plantar fascia, R > L, at night  Pt has been moderately compliant with therapy attendance, compliant with HEP  Pain  At best pain ratin  At worst pain ratin  Location: B feet  Quality: sharp  Relieving factors: ice  Progression: improved    Social Support  Stairs in house: no   Lives with: cousin  Employment status: not working  Exercise history: walking for fitness      Diagnostic Tests  No diagnostic tests performed  Treatments  Previous treatment: injection treatment  Patient Goals  Patient goal: enjoy my summer and be able to walk- met        Objective     Static Posture     Ankle/Foot   Ankle/Foot (Left): Hallux valgus, metatarsus abductus, pronated and rectus  Ankle/Foot (Right): Hallux valgus, metatarsus abductus, pronated and rectus  Comments  Medium arch height bilaterally    Observations   Left Ankle/Foot   Positive for adhesive scar  Right Ankle/Foot   Positive for adhesive scar  Additional Observation Details  B bunionectomy scars well-healed; unchanged at RE    Tenderness   Left Ankle/Foot   Tenderness in the mid-plantar aspect  No tenderness in the medial calcaneus  Right Ankle/Foot   No tenderness in the medial calcaneus and mid-plantar aspect  Additional Tenderness Details  At RE pt continues to have soreness only in mid-plantar fascia    Active Range of Motion   Left Ankle/Foot   Dorsiflexion (ke): 17 degrees   Plantar flexion: 43 degrees   Inversion: 31 degrees   Eversion: 22 degrees     Right Ankle/Foot   Dorsiflexion (ke): 13 degrees   Plantar flexion: 45 degrees   Inversion: 30 degrees   Eversion: 20 degrees     Joint Play   Left Ankle/Foot  Joints within functional limits are the talocrural joint, subtalar joint, midfoot and forefoot  Right Ankle/Foot  Joints within functional limits are the talocrural joint, subtalar joint, midfoot and forefoot       Additional Joint Play Details  Pt has heel pain with end range dorsiflexion; resolved at RE    Strength/Myotome Testing     Left Hip   Planes of Motion   Flexion: 5  External rotation: 5  Internal rotation: 5    Right Hip   Planes of Motion   Flexion: 5  External rotation: 5  Internal rotation: 5    Left Knee   Flexion: 4+  Extension: 5    Right Knee   Flexion: 4+  Extension: 5    Left Ankle/Foot   Dorsiflexion: 5  Plantar flexion: 5  Inversion: 4+  Eversion: 5  Great toe flexion: 5  Great toe extension: 5    Right Ankle/Foot   Dorsiflexion: 5  Plantar flexion: 5  Inversion: 5  Eversion: 4+  Great toe flexion: 5  Great toe extension: 5    Tests   Left Ankle/Foot   Negative for Summers and Tinel's sign (tarsal tunnel)  Right Ankle/Foot   Negative for Ruben Meals and Tinel's sign (tarsal tunnel)  Ambulation     Quality of Movement During Gait     Additional Quality of Movement During Gait Details  Minimal pronation with gait; improved L at RE, unchanged on R; at RE gait is WNL    Functional Assessment   Squat No pain, no left tibial anterior translation beyond toes and no right tibial anterior translation beyond toes       Single Leg Stance   Left: 17 seconds  Right: 30 seconds    Comments  10x HR: good resupination, even height and WB; unchanged at RE

## 2018-08-27 ENCOUNTER — APPOINTMENT (OUTPATIENT)
Dept: PHYSICAL THERAPY | Facility: CLINIC | Age: 53
End: 2018-08-27
Payer: COMMERCIAL

## 2018-08-29 ENCOUNTER — TELEPHONE (OUTPATIENT)
Dept: ENDOCRINOLOGY | Facility: CLINIC | Age: 53
End: 2018-08-29

## 2018-08-29 NOTE — TELEPHONE ENCOUNTER
Pharmacy lm needs refill of victoza   Called back pharmacist states they spoke to someone who is sending a script for 9ml or 3 pens of Victoza

## 2018-08-31 ENCOUNTER — APPOINTMENT (OUTPATIENT)
Dept: PHYSICAL THERAPY | Facility: CLINIC | Age: 53
End: 2018-08-31
Payer: COMMERCIAL

## 2018-09-07 ENCOUNTER — TELEPHONE (OUTPATIENT)
Dept: BARIATRICS | Facility: CLINIC | Age: 53
End: 2018-09-07

## 2018-09-07 NOTE — TELEPHONE ENCOUNTER
Pt called spoke with front office needs refill of Victoza she stated to Adam Drilling she will be following Dr Lucille Lugo but she is out of med   Per  1 months supply called in to [facility] pharmacy

## 2018-10-11 ENCOUNTER — TELEPHONE (OUTPATIENT)
Dept: ENDOCRINOLOGY | Facility: CLINIC | Age: 53
End: 2018-10-11

## 2018-10-11 NOTE — TELEPHONE ENCOUNTER
Received fax from pharmacy requesting refill Accu-chek smartview strips faxed back to pharmacy without out refill pt followed Dr Julissa Tang at her new facility

## 2018-10-27 ENCOUNTER — TRANSCRIBE ORDERS (OUTPATIENT)
Dept: ADMINISTRATIVE | Facility: HOSPITAL | Age: 53
End: 2018-10-27

## 2018-10-27 ENCOUNTER — APPOINTMENT (OUTPATIENT)
Dept: LAB | Facility: HOSPITAL | Age: 53
End: 2018-10-27
Payer: COMMERCIAL

## 2018-10-27 DIAGNOSIS — K75.4 CHRONIC AGGRESSIVE HEPATITIS (HCC): ICD-10-CM

## 2018-10-27 DIAGNOSIS — K75.4 CHRONIC AGGRESSIVE HEPATITIS (HCC): Primary | ICD-10-CM

## 2018-10-27 LAB
ALBUMIN SERPL BCP-MCNC: 3.8 G/DL (ref 3–5.2)
ALP SERPL-CCNC: 147 U/L (ref 43–122)
ALT SERPL W P-5'-P-CCNC: 27 U/L (ref 9–52)
AST SERPL W P-5'-P-CCNC: 23 U/L (ref 14–36)
BILIRUB DIRECT SERPL-MCNC: 0.1 MG/DL
BILIRUB SERPL-MCNC: 0.5 MG/DL
PROT SERPL-MCNC: 6.6 G/DL (ref 5.9–8.4)

## 2018-10-27 PROCEDURE — 80076 HEPATIC FUNCTION PANEL: CPT

## 2018-10-27 PROCEDURE — 36415 COLL VENOUS BLD VENIPUNCTURE: CPT

## 2018-11-10 ENCOUNTER — TRANSCRIBE ORDERS (OUTPATIENT)
Dept: ADMINISTRATIVE | Facility: HOSPITAL | Age: 53
End: 2018-11-10

## 2018-11-10 ENCOUNTER — APPOINTMENT (OUTPATIENT)
Dept: LAB | Facility: HOSPITAL | Age: 53
End: 2018-11-10
Payer: COMMERCIAL

## 2018-11-10 DIAGNOSIS — K75.4 AUTOIMMUNE HEPATITIS (HCC): Primary | ICD-10-CM

## 2018-11-10 DIAGNOSIS — K75.4 AUTOIMMUNE HEPATITIS (HCC): ICD-10-CM

## 2018-11-10 LAB
AFP-TM SERPL-MCNC: 3 NG/ML (ref 0.5–8)
ALBUMIN SERPL BCP-MCNC: 3.9 G/DL (ref 3–5.2)
ALP SERPL-CCNC: 149 U/L (ref 43–122)
ALT SERPL W P-5'-P-CCNC: 16 U/L (ref 9–52)
ANION GAP SERPL CALCULATED.3IONS-SCNC: 9 MMOL/L (ref 5–14)
AST SERPL W P-5'-P-CCNC: 33 U/L (ref 14–36)
BILIRUB SERPL-MCNC: 0.7 MG/DL
BUN SERPL-MCNC: 19 MG/DL (ref 5–25)
CALCIUM SERPL-MCNC: 9.4 MG/DL (ref 8.4–10.2)
CHLORIDE SERPL-SCNC: 102 MMOL/L (ref 97–108)
CO2 SERPL-SCNC: 26 MMOL/L (ref 22–30)
CREAT SERPL-MCNC: 0.59 MG/DL (ref 0.6–1.2)
ERYTHROCYTE [DISTWIDTH] IN BLOOD BY AUTOMATED COUNT: 13.1 %
GFR SERPL CREATININE-BSD FRML MDRD: 106 ML/MIN/1.73SQ M
GLUCOSE P FAST SERPL-MCNC: 140 MG/DL (ref 70–99)
HCT VFR BLD AUTO: 40 % (ref 36–46)
HGB BLD-MCNC: 13.8 G/DL (ref 12–16)
IGG SERPL-MCNC: 898 MG/DL (ref 700–1600)
MCH RBC QN AUTO: 34.5 PG (ref 26–34)
MCHC RBC AUTO-ENTMCNC: 34.6 G/DL (ref 31–36)
MCV RBC AUTO: 100 FL (ref 80–100)
PLATELET # BLD AUTO: 289 THOUSANDS/UL (ref 150–450)
PMV BLD AUTO: 9.1 FL (ref 8.9–12.7)
POTASSIUM SERPL-SCNC: 3.9 MMOL/L (ref 3.6–5)
PROT SERPL-MCNC: 6.8 G/DL (ref 5.9–8.4)
RBC # BLD AUTO: 4.01 MILLION/UL (ref 4–5.2)
SODIUM SERPL-SCNC: 137 MMOL/L (ref 137–147)
WBC # BLD AUTO: 6.3 THOUSAND/UL (ref 4.5–11)

## 2018-11-10 PROCEDURE — 85027 COMPLETE CBC AUTOMATED: CPT

## 2018-11-10 PROCEDURE — 80053 COMPREHEN METABOLIC PANEL: CPT

## 2018-11-10 PROCEDURE — 36415 COLL VENOUS BLD VENIPUNCTURE: CPT

## 2018-11-10 PROCEDURE — 82105 ALPHA-FETOPROTEIN SERUM: CPT

## 2018-11-10 PROCEDURE — 82784 ASSAY IGA/IGD/IGG/IGM EACH: CPT

## 2018-12-08 ENCOUNTER — APPOINTMENT (OUTPATIENT)
Dept: LAB | Facility: HOSPITAL | Age: 53
End: 2018-12-08
Payer: COMMERCIAL

## 2018-12-08 ENCOUNTER — TRANSCRIBE ORDERS (OUTPATIENT)
Dept: ADMINISTRATIVE | Facility: HOSPITAL | Age: 53
End: 2018-12-08

## 2018-12-08 DIAGNOSIS — K75.4 CHRONIC AGGRESSIVE HEPATITIS (HCC): Primary | ICD-10-CM

## 2018-12-08 DIAGNOSIS — K75.4 CHRONIC AGGRESSIVE HEPATITIS (HCC): ICD-10-CM

## 2018-12-08 LAB
ALBUMIN SERPL BCP-MCNC: 3.9 G/DL (ref 3–5.2)
ALP SERPL-CCNC: 161 U/L (ref 43–122)
ALT SERPL W P-5'-P-CCNC: 17 U/L (ref 9–52)
ANION GAP SERPL CALCULATED.3IONS-SCNC: 8 MMOL/L (ref 5–14)
AST SERPL W P-5'-P-CCNC: 22 U/L (ref 14–36)
BASOPHILS # BLD AUTO: 0.1 THOUSANDS/ΜL (ref 0–0.1)
BASOPHILS NFR BLD AUTO: 1 % (ref 0–1)
BILIRUB SERPL-MCNC: 0.5 MG/DL
BUN SERPL-MCNC: 16 MG/DL (ref 5–25)
CALCIUM SERPL-MCNC: 9.5 MG/DL (ref 8.4–10.2)
CHLORIDE SERPL-SCNC: 101 MMOL/L (ref 97–108)
CO2 SERPL-SCNC: 28 MMOL/L (ref 22–30)
CREAT SERPL-MCNC: 0.62 MG/DL (ref 0.6–1.2)
EOSINOPHIL # BLD AUTO: 0.1 THOUSAND/ΜL (ref 0–0.4)
EOSINOPHIL NFR BLD AUTO: 2 % (ref 0–6)
ERYTHROCYTE [DISTWIDTH] IN BLOOD BY AUTOMATED COUNT: 12.9 %
GFR SERPL CREATININE-BSD FRML MDRD: 105 ML/MIN/1.73SQ M
GLUCOSE P FAST SERPL-MCNC: 172 MG/DL (ref 70–99)
HCT VFR BLD AUTO: 40.2 % (ref 36–46)
HGB BLD-MCNC: 13.8 G/DL (ref 12–16)
INR PPP: 0.99 (ref 0.89–1.1)
LYMPHOCYTES # BLD AUTO: 1.9 THOUSANDS/ΜL (ref 0.5–4)
LYMPHOCYTES NFR BLD AUTO: 25 % (ref 25–45)
MCH RBC QN AUTO: 34 PG (ref 26–34)
MCHC RBC AUTO-ENTMCNC: 34.3 G/DL (ref 31–36)
MCV RBC AUTO: 99 FL (ref 80–100)
MONOCYTES # BLD AUTO: 0.4 THOUSAND/ΜL (ref 0.2–0.9)
MONOCYTES NFR BLD AUTO: 5 % (ref 1–10)
NEUTROPHILS # BLD AUTO: 5.2 THOUSANDS/ΜL (ref 1.8–7.8)
NEUTS SEG NFR BLD AUTO: 68 % (ref 45–65)
PLATELET # BLD AUTO: 340 THOUSANDS/UL (ref 150–450)
PMV BLD AUTO: 8.5 FL (ref 8.9–12.7)
POTASSIUM SERPL-SCNC: 3.8 MMOL/L (ref 3.6–5)
PROT SERPL-MCNC: 7 G/DL (ref 5.9–8.4)
PROTHROMBIN TIME: 10.5 SECONDS (ref 9.5–11.6)
RBC # BLD AUTO: 4.05 MILLION/UL (ref 4–5.2)
SODIUM SERPL-SCNC: 137 MMOL/L (ref 137–147)
WBC # BLD AUTO: 7.6 THOUSAND/UL (ref 4.5–11)

## 2018-12-08 PROCEDURE — 80053 COMPREHEN METABOLIC PANEL: CPT

## 2018-12-08 PROCEDURE — 36415 COLL VENOUS BLD VENIPUNCTURE: CPT

## 2018-12-08 PROCEDURE — 85610 PROTHROMBIN TIME: CPT

## 2018-12-08 PROCEDURE — 85025 COMPLETE CBC W/AUTO DIFF WBC: CPT

## 2019-01-12 ENCOUNTER — APPOINTMENT (OUTPATIENT)
Dept: LAB | Facility: HOSPITAL | Age: 54
End: 2019-01-12
Payer: COMMERCIAL

## 2019-01-12 ENCOUNTER — TRANSCRIBE ORDERS (OUTPATIENT)
Dept: ADMINISTRATIVE | Facility: HOSPITAL | Age: 54
End: 2019-01-12

## 2019-01-12 DIAGNOSIS — K75.4 CHRONIC AGGRESSIVE HEPATITIS (HCC): ICD-10-CM

## 2019-01-12 DIAGNOSIS — K75.4 CHRONIC AGGRESSIVE HEPATITIS (HCC): Primary | ICD-10-CM

## 2019-01-12 LAB
ALBUMIN SERPL BCP-MCNC: 3.7 G/DL (ref 3–5.2)
ALP SERPL-CCNC: 150 U/L (ref 43–122)
ALT SERPL W P-5'-P-CCNC: 18 U/L (ref 9–52)
AST SERPL W P-5'-P-CCNC: 22 U/L (ref 14–36)
BILIRUB DIRECT SERPL-MCNC: 0.1 MG/DL
BILIRUB SERPL-MCNC: 0.3 MG/DL
PROT SERPL-MCNC: 6.5 G/DL (ref 5.9–8.4)

## 2019-01-12 PROCEDURE — 36415 COLL VENOUS BLD VENIPUNCTURE: CPT

## 2019-01-12 PROCEDURE — 80076 HEPATIC FUNCTION PANEL: CPT

## 2019-02-16 ENCOUNTER — TRANSCRIBE ORDERS (OUTPATIENT)
Dept: ADMINISTRATIVE | Facility: HOSPITAL | Age: 54
End: 2019-02-16

## 2019-02-16 ENCOUNTER — APPOINTMENT (OUTPATIENT)
Dept: LAB | Facility: HOSPITAL | Age: 54
End: 2019-02-16
Payer: COMMERCIAL

## 2019-02-16 DIAGNOSIS — K75.4 AUTOIMMUNE HEPATITIS (HCC): ICD-10-CM

## 2019-02-16 DIAGNOSIS — K75.4 AUTOIMMUNE HEPATITIS (HCC): Primary | ICD-10-CM

## 2019-02-16 LAB
ALBUMIN SERPL BCP-MCNC: 3.9 G/DL (ref 3–5.2)
ALP SERPL-CCNC: 160 U/L (ref 43–122)
ALT SERPL W P-5'-P-CCNC: 7 U/L (ref 9–52)
AST SERPL W P-5'-P-CCNC: 39 U/L (ref 14–36)
BILIRUB DIRECT SERPL-MCNC: 0.3 MG/DL
BILIRUB SERPL-MCNC: 0.5 MG/DL
PROT SERPL-MCNC: 6.7 G/DL (ref 5.9–8.4)

## 2019-02-16 PROCEDURE — 80076 HEPATIC FUNCTION PANEL: CPT

## 2019-02-16 PROCEDURE — 36415 COLL VENOUS BLD VENIPUNCTURE: CPT

## 2019-04-12 ENCOUNTER — HOSPITAL ENCOUNTER (EMERGENCY)
Facility: HOSPITAL | Age: 54
Discharge: HOME | End: 2019-04-12
Attending: EMERGENCY MEDICINE | Admitting: EMERGENCY MEDICINE
Payer: COMMERCIAL

## 2019-04-12 VITALS
TEMPERATURE: 95.3 F | SYSTOLIC BLOOD PRESSURE: 112 MMHG | HEIGHT: 62 IN | HEART RATE: 104 BPM | DIASTOLIC BLOOD PRESSURE: 71 MMHG | OXYGEN SATURATION: 97 % | WEIGHT: 156.13 LBS | RESPIRATION RATE: 16 BRPM | BODY MASS INDEX: 28.73 KG/M2

## 2019-04-12 DIAGNOSIS — H00.019 HORDEOLUM EXTERNUM (STYE): Primary | ICD-10-CM

## 2019-04-12 PROCEDURE — 99283 EMERGENCY DEPT VISIT LOW MDM: CPT

## 2019-04-12 PROCEDURE — 99282 EMERGENCY DEPT VISIT SF MDM: CPT | Performed by: EMERGENCY MEDICINE

## 2019-04-12 RX ORDER — ERYTHROMYCIN 5 MG/G
0.5 OINTMENT OPHTHALMIC ONCE
Status: COMPLETED | OUTPATIENT
Start: 2019-04-12 | End: 2019-04-12

## 2019-04-12 RX ORDER — ERYTHROMYCIN 5 MG/G
OINTMENT OPHTHALMIC
Status: COMPLETED
Start: 2019-04-12 | End: 2019-04-12

## 2019-04-12 RX ORDER — ERYTHROMYCIN 5 MG/G
OINTMENT OPHTHALMIC
Qty: 1 G | Refills: 0 | Status: SHIPPED | OUTPATIENT
Start: 2019-04-12

## 2019-04-12 RX ADMIN — ERYTHROMYCIN 0.5 INCH: 5 OINTMENT OPHTHALMIC at 06:32

## 2019-05-16 ENCOUNTER — APPOINTMENT (OUTPATIENT)
Dept: LAB | Facility: HOSPITAL | Age: 54
End: 2019-05-16
Payer: COMMERCIAL

## 2019-05-16 ENCOUNTER — TRANSCRIBE ORDERS (OUTPATIENT)
Dept: ADMINISTRATIVE | Facility: HOSPITAL | Age: 54
End: 2019-05-16

## 2019-05-16 DIAGNOSIS — K75.4 CHRONIC AGGRESSIVE HEPATITIS (HCC): ICD-10-CM

## 2019-05-16 DIAGNOSIS — K75.4 CHRONIC AGGRESSIVE HEPATITIS (HCC): Primary | ICD-10-CM

## 2019-05-16 LAB
ALBUMIN SERPL BCP-MCNC: 4.1 G/DL (ref 3–5.2)
ALP SERPL-CCNC: 181 U/L (ref 43–122)
ALT SERPL W P-5'-P-CCNC: 15 U/L (ref 9–52)
AST SERPL W P-5'-P-CCNC: 39 U/L (ref 14–36)
BASOPHILS # BLD AUTO: 0 THOUSANDS/ΜL (ref 0–0.1)
BASOPHILS NFR BLD AUTO: 1 % (ref 0–1)
BILIRUB DIRECT SERPL-MCNC: 0.1 MG/DL
BILIRUB SERPL-MCNC: 0.4 MG/DL
EOSINOPHIL # BLD AUTO: 0.2 THOUSAND/ΜL (ref 0–0.4)
EOSINOPHIL NFR BLD AUTO: 3 % (ref 0–6)
ERYTHROCYTE [DISTWIDTH] IN BLOOD BY AUTOMATED COUNT: 13.2 %
HCT VFR BLD AUTO: 40.3 % (ref 36–46)
HGB BLD-MCNC: 13.6 G/DL (ref 12–16)
LYMPHOCYTES # BLD AUTO: 2.3 THOUSANDS/ΜL (ref 0.5–4)
LYMPHOCYTES NFR BLD AUTO: 41 % (ref 25–45)
MCH RBC QN AUTO: 32.8 PG (ref 26–34)
MCHC RBC AUTO-ENTMCNC: 33.7 G/DL (ref 31–36)
MCV RBC AUTO: 97 FL (ref 80–100)
MONOCYTES # BLD AUTO: 0.3 THOUSAND/ΜL (ref 0.2–0.9)
MONOCYTES NFR BLD AUTO: 6 % (ref 1–10)
NEUTROPHILS # BLD AUTO: 2.7 THOUSANDS/ΜL (ref 1.8–7.8)
NEUTS SEG NFR BLD AUTO: 49 % (ref 45–65)
PLATELET # BLD AUTO: 298 THOUSANDS/UL (ref 150–450)
PMV BLD AUTO: 8.9 FL (ref 8.9–12.7)
PROT SERPL-MCNC: 7.1 G/DL (ref 5.9–8.4)
RBC # BLD AUTO: 4.13 MILLION/UL (ref 4–5.2)
WBC # BLD AUTO: 5.6 THOUSAND/UL (ref 4.5–11)

## 2019-05-16 PROCEDURE — 80076 HEPATIC FUNCTION PANEL: CPT

## 2019-05-16 PROCEDURE — 36415 COLL VENOUS BLD VENIPUNCTURE: CPT

## 2019-05-16 PROCEDURE — 85025 COMPLETE CBC W/AUTO DIFF WBC: CPT

## 2019-06-12 ENCOUNTER — TRANSCRIBE ORDERS (OUTPATIENT)
Dept: ADMINISTRATIVE | Facility: HOSPITAL | Age: 54
End: 2019-06-12

## 2019-06-12 ENCOUNTER — APPOINTMENT (OUTPATIENT)
Dept: LAB | Facility: HOSPITAL | Age: 54
End: 2019-06-12
Payer: COMMERCIAL

## 2019-06-12 DIAGNOSIS — E11.9 DIABETES MELLITUS WITHOUT COMPLICATION (HCC): ICD-10-CM

## 2019-06-12 DIAGNOSIS — E11.9 DIABETES MELLITUS WITHOUT COMPLICATION (HCC): Primary | ICD-10-CM

## 2019-06-12 LAB
ALBUMIN SERPL BCP-MCNC: 4.1 G/DL (ref 3–5.2)
ALP SERPL-CCNC: 198 U/L (ref 43–122)
ALT SERPL W P-5'-P-CCNC: 17 U/L (ref 9–52)
ANION GAP SERPL CALCULATED.3IONS-SCNC: 7 MMOL/L (ref 5–14)
AST SERPL W P-5'-P-CCNC: 24 U/L (ref 14–36)
BILIRUB SERPL-MCNC: 0.3 MG/DL
BUN SERPL-MCNC: 14 MG/DL (ref 5–25)
CALCIUM SERPL-MCNC: 9.3 MG/DL (ref 8.4–10.2)
CHLORIDE SERPL-SCNC: 103 MMOL/L (ref 97–108)
CHOLEST SERPL-MCNC: 192 MG/DL
CO2 SERPL-SCNC: 31 MMOL/L (ref 22–30)
CREAT SERPL-MCNC: 0.73 MG/DL (ref 0.6–1.2)
CREAT UR-MCNC: 77.1 MG/DL
EST. AVERAGE GLUCOSE BLD GHB EST-MCNC: 128 MG/DL
GFR SERPL CREATININE-BSD FRML MDRD: 95 ML/MIN/1.73SQ M
GLUCOSE P FAST SERPL-MCNC: 98 MG/DL (ref 70–99)
HBA1C MFR BLD: 6.1 % (ref 4.2–6.3)
HDLC SERPL-MCNC: 48 MG/DL (ref 40–59)
LDLC SERPL CALC-MCNC: 114 MG/DL
MICROALBUMIN UR-MCNC: 16.6 MG/L (ref 0–20)
MICROALBUMIN/CREAT 24H UR: 22 MG/G CREATININE (ref 0–30)
NONHDLC SERPL-MCNC: 144 MG/DL
POTASSIUM SERPL-SCNC: 3.8 MMOL/L (ref 3.6–5)
PROT SERPL-MCNC: 7.2 G/DL (ref 5.9–8.4)
SODIUM SERPL-SCNC: 141 MMOL/L (ref 137–147)
TRIGL SERPL-MCNC: 148 MG/DL

## 2019-06-12 PROCEDURE — 82570 ASSAY OF URINE CREATININE: CPT | Performed by: NURSE PRACTITIONER

## 2019-06-12 PROCEDURE — 80053 COMPREHEN METABOLIC PANEL: CPT

## 2019-06-12 PROCEDURE — 36415 COLL VENOUS BLD VENIPUNCTURE: CPT | Performed by: NURSE PRACTITIONER

## 2019-06-12 PROCEDURE — 82043 UR ALBUMIN QUANTITATIVE: CPT | Performed by: NURSE PRACTITIONER

## 2019-06-12 PROCEDURE — 80061 LIPID PANEL: CPT

## 2019-06-12 PROCEDURE — 83036 HEMOGLOBIN GLYCOSYLATED A1C: CPT | Performed by: NURSE PRACTITIONER

## 2019-07-06 ENCOUNTER — APPOINTMENT (OUTPATIENT)
Dept: LAB | Facility: HOSPITAL | Age: 54
End: 2019-07-06
Payer: COMMERCIAL

## 2019-07-06 ENCOUNTER — TRANSCRIBE ORDERS (OUTPATIENT)
Dept: ADMINISTRATIVE | Facility: HOSPITAL | Age: 54
End: 2019-07-06

## 2019-07-06 DIAGNOSIS — K75.4 CHRONIC AGGRESSIVE HEPATITIS (HCC): Primary | ICD-10-CM

## 2019-07-06 DIAGNOSIS — K75.4 CHRONIC AGGRESSIVE HEPATITIS (HCC): ICD-10-CM

## 2019-07-06 LAB
ALBUMIN SERPL BCP-MCNC: 3.9 G/DL (ref 3–5.2)
ALP SERPL-CCNC: 172 U/L (ref 43–122)
ALT SERPL W P-5'-P-CCNC: 19 U/L (ref 9–52)
AST SERPL W P-5'-P-CCNC: 26 U/L (ref 14–36)
BILIRUB DIRECT SERPL-MCNC: 0.1 MG/DL
BILIRUB SERPL-MCNC: 0.3 MG/DL
PROT SERPL-MCNC: 7.1 G/DL (ref 5.9–8.4)

## 2019-07-06 PROCEDURE — 80076 HEPATIC FUNCTION PANEL: CPT

## 2019-07-06 PROCEDURE — 36415 COLL VENOUS BLD VENIPUNCTURE: CPT

## 2019-12-07 ENCOUNTER — APPOINTMENT (EMERGENCY)
Dept: CT IMAGING | Facility: HOSPITAL | Age: 54
End: 2019-12-07
Payer: COMMERCIAL

## 2019-12-07 ENCOUNTER — HOSPITAL ENCOUNTER (EMERGENCY)
Facility: HOSPITAL | Age: 54
Discharge: HOME | End: 2019-12-07
Attending: EMERGENCY MEDICINE
Payer: COMMERCIAL

## 2019-12-07 VITALS
BODY MASS INDEX: 29.56 KG/M2 | TEMPERATURE: 97.3 F | OXYGEN SATURATION: 97 % | DIASTOLIC BLOOD PRESSURE: 82 MMHG | WEIGHT: 161.6 LBS | SYSTOLIC BLOOD PRESSURE: 137 MMHG | RESPIRATION RATE: 18 BRPM | HEART RATE: 103 BPM

## 2019-12-07 DIAGNOSIS — K52.9 COLITIS: Primary | ICD-10-CM

## 2019-12-07 DIAGNOSIS — A04.72 COLITIS DUE TO CLOSTRIDIOIDES DIFFICILE: ICD-10-CM

## 2019-12-07 LAB
ALBUMIN SERPL BCP-MCNC: 4.2 G/DL (ref 3–5.2)
ALP SERPL-CCNC: 164 U/L (ref 43–122)
ALT SERPL W P-5'-P-CCNC: 15 U/L (ref 9–52)
ANION GAP SERPL CALCULATED.3IONS-SCNC: 7 MMOL/L (ref 5–14)
APTT PPP: 29 SECONDS (ref 25–32)
AST SERPL W P-5'-P-CCNC: 19 U/L (ref 14–36)
BACTERIA UR QL AUTO: ABNORMAL /HPF
BASOPHILS # BLD AUTO: 0.1 THOUSANDS/ΜL (ref 0–0.1)
BASOPHILS NFR BLD AUTO: 1 % (ref 0–1)
BILIRUB SERPL-MCNC: 0.4 MG/DL
BILIRUB UR QL STRIP: NEGATIVE
BUN SERPL-MCNC: 13 MG/DL (ref 5–25)
CALCIUM SERPL-MCNC: 9.3 MG/DL (ref 8.4–10.2)
CHLORIDE SERPL-SCNC: 103 MMOL/L (ref 97–108)
CLARITY UR: CLEAR
CO2 SERPL-SCNC: 27 MMOL/L (ref 22–30)
COLOR UR: ABNORMAL
CREAT SERPL-MCNC: 0.62 MG/DL (ref 0.6–1.2)
EOSINOPHIL # BLD AUTO: 0.2 THOUSAND/ΜL (ref 0–0.4)
EOSINOPHIL NFR BLD AUTO: 3 % (ref 0–6)
ERYTHROCYTE [DISTWIDTH] IN BLOOD BY AUTOMATED COUNT: 12.9 %
GFR SERPL CREATININE-BSD FRML MDRD: 104 ML/MIN/1.73SQ M
GLUCOSE SERPL-MCNC: 114 MG/DL (ref 70–99)
GLUCOSE UR STRIP-MCNC: NEGATIVE MG/DL
HCT VFR BLD AUTO: 39.9 % (ref 36–46)
HGB BLD-MCNC: 14 G/DL (ref 12–16)
HGB UR QL STRIP.AUTO: NEGATIVE
INR PPP: 0.94 (ref 0.91–1.09)
KETONES UR STRIP-MCNC: NEGATIVE MG/DL
LEUKOCYTE ESTERASE UR QL STRIP: 25
LIPASE SERPL-CCNC: 57 U/L (ref 23–300)
LYMPHOCYTES # BLD AUTO: 2.2 THOUSANDS/ΜL (ref 0.5–4)
LYMPHOCYTES NFR BLD AUTO: 31 % (ref 25–45)
MCH RBC QN AUTO: 34.1 PG (ref 26–34)
MCHC RBC AUTO-ENTMCNC: 35 G/DL (ref 31–36)
MCV RBC AUTO: 98 FL (ref 80–100)
MONOCYTES # BLD AUTO: 0.4 THOUSAND/ΜL (ref 0.2–0.9)
MONOCYTES NFR BLD AUTO: 6 % (ref 1–10)
NEUTROPHILS # BLD AUTO: 4.3 THOUSANDS/ΜL (ref 1.8–7.8)
NEUTS SEG NFR BLD AUTO: 60 % (ref 45–65)
NITRITE UR QL STRIP: NEGATIVE
NON-SQ EPI CELLS URNS QL MICRO: ABNORMAL /HPF
PH UR STRIP.AUTO: 5 [PH]
PLATELET # BLD AUTO: 300 THOUSANDS/UL (ref 150–450)
PMV BLD AUTO: 8.5 FL (ref 8.9–12.7)
POTASSIUM SERPL-SCNC: 3.9 MMOL/L (ref 3.6–5)
PROT SERPL-MCNC: 7.3 G/DL (ref 5.9–8.4)
PROT UR STRIP-MCNC: NEGATIVE MG/DL
PROTHROMBIN TIME: 10.3 SECONDS (ref 9.8–12)
RBC # BLD AUTO: 4.09 MILLION/UL (ref 4–5.2)
RBC #/AREA URNS AUTO: ABNORMAL /HPF
SODIUM SERPL-SCNC: 137 MMOL/L (ref 137–147)
SP GR UR STRIP.AUTO: 1.01 (ref 1–1.04)
UROBILINOGEN UA: NEGATIVE MG/DL
WBC # BLD AUTO: 7.1 THOUSAND/UL (ref 4.5–11)
WBC #/AREA URNS AUTO: ABNORMAL /HPF

## 2019-12-07 PROCEDURE — 80053 COMPREHEN METABOLIC PANEL: CPT | Performed by: PHYSICIAN ASSISTANT

## 2019-12-07 PROCEDURE — 81003 URINALYSIS AUTO W/O SCOPE: CPT | Performed by: PHYSICIAN ASSISTANT

## 2019-12-07 PROCEDURE — 81001 URINALYSIS AUTO W/SCOPE: CPT | Performed by: PHYSICIAN ASSISTANT

## 2019-12-07 PROCEDURE — 87505 NFCT AGENT DETECTION GI: CPT | Performed by: PHYSICIAN ASSISTANT

## 2019-12-07 PROCEDURE — 85610 PROTHROMBIN TIME: CPT | Performed by: PHYSICIAN ASSISTANT

## 2019-12-07 PROCEDURE — 85025 COMPLETE CBC W/AUTO DIFF WBC: CPT | Performed by: PHYSICIAN ASSISTANT

## 2019-12-07 PROCEDURE — 83690 ASSAY OF LIPASE: CPT | Performed by: PHYSICIAN ASSISTANT

## 2019-12-07 PROCEDURE — 96374 THER/PROPH/DIAG INJ IV PUSH: CPT

## 2019-12-07 PROCEDURE — 74177 CT ABD & PELVIS W/CONTRAST: CPT

## 2019-12-07 PROCEDURE — 99284 EMERGENCY DEPT VISIT MOD MDM: CPT

## 2019-12-07 PROCEDURE — 85730 THROMBOPLASTIN TIME PARTIAL: CPT | Performed by: PHYSICIAN ASSISTANT

## 2019-12-07 PROCEDURE — 96361 HYDRATE IV INFUSION ADD-ON: CPT

## 2019-12-07 PROCEDURE — 99284 EMERGENCY DEPT VISIT MOD MDM: CPT | Performed by: PHYSICIAN ASSISTANT

## 2019-12-07 PROCEDURE — 36415 COLL VENOUS BLD VENIPUNCTURE: CPT | Performed by: PHYSICIAN ASSISTANT

## 2019-12-07 RX ORDER — VANCOMYCIN HYDROCHLORIDE 125 MG/1
125 CAPSULE ORAL 4 TIMES DAILY
Qty: 40 CAPSULE | Refills: 0 | Status: SHIPPED | OUTPATIENT
Start: 2019-12-07 | End: 2019-12-17

## 2019-12-07 RX ORDER — DICYCLOMINE HCL 20 MG
20 TABLET ORAL 2 TIMES DAILY
Qty: 20 TABLET | Refills: 0 | Status: SHIPPED | OUTPATIENT
Start: 2019-12-07

## 2019-12-07 RX ORDER — ONDANSETRON 2 MG/ML
4 INJECTION INTRAMUSCULAR; INTRAVENOUS ONCE
Status: COMPLETED | OUTPATIENT
Start: 2019-12-07 | End: 2019-12-07

## 2019-12-07 RX ORDER — DICYCLOMINE HCL 20 MG
20 TABLET ORAL ONCE
Status: COMPLETED | OUTPATIENT
Start: 2019-12-07 | End: 2019-12-07

## 2019-12-07 RX ADMIN — SODIUM CHLORIDE 1000 ML: 0.9 INJECTION, SOLUTION INTRAVENOUS at 16:50

## 2019-12-07 RX ADMIN — ONDANSETRON 4 MG: 2 INJECTION INTRAMUSCULAR; INTRAVENOUS at 16:49

## 2019-12-07 RX ADMIN — IOHEXOL 100 ML: 350 INJECTION, SOLUTION INTRAVENOUS at 17:29

## 2019-12-07 RX ADMIN — DICYCLOMINE HYDROCHLORIDE 20 MG: 20 TABLET ORAL at 16:51

## 2019-12-07 NOTE — ED PROVIDER NOTES
History  Chief Complaint   Patient presents with    Abdominal Pain     abd pain, nausea, diarrhea for 2 weeks  states she has felt full since yesterday  staets it started on last day of vacation in [facility]     61-year-old female with past medical history of diabetes and hepatitis presenting for evaluation of generalized abdominal cramping and diarrhea  Patient reports she has been having symptoms over the past 2 weeks after coming back from a cruise to the [facility]  Patient states that she started with diarrhea after eating lobster on the cruise  She denies any sick contacts at home or work  She reports going frequently approximately every 20 minutes and describes the diarrhea as watery and very malodorous  Denies any melena or hematochezia  States that she did see her PCP who started her on Imodium and provided sample cup to send for stool cultures  She reports no improvement with the Imodium and came to the ED for evaluation  She denies any dysuria vomiting flank pain fevers chills sweats  Prior to Admission Medications   Prescriptions Last Dose Informant Patient Reported? Taking?    Biotin 10 MG TABS  Self Yes No   Sig: Take 10 mg by mouth   FLUoxetine (PROzac) 40 MG capsule  Self Yes No   Sig: Take 40 mg by mouth daily     LORazepam (ATIVAN) 1 mg tablet  Self Yes No   Sig: Take 1 mg by mouth 2 (two) times a day     QUEtiapine (SEROquel) 100 mg tablet  Self Yes No   Sig: Take 100 mg by mouth daily at bedtime   aspirin (ASPIR-LOW) 81 mg EC tablet  Self Yes No   Sig: Take 81 mg by mouth daily     azaTHIOprine (IMURAN) 50 mg tablet  Self Yes No   Sig: Take 50 mg by mouth daily   budesonide (ENTOCORT EC) 3 MG capsule  Self Yes No   calcium citrate-Vitamin D 200 mg-250 units  Self Yes No   Sig: Take 2 tablets by mouth   cetirizine (ZyrTEC) 10 mg tablet  Self Yes No   Sig: Take 10 mg by mouth   erythromycin (ILOTYCIN) ophthalmic ointment   No No   Sig: Place a 1/2 inch ribbon of ointment into the lower eyelid    ketotifen (ZADITOR) 0 025 % ophthalmic solution  Self Yes No   Sig: Administer to both eyes daily     liraglutide (VICTOZA) injection  Self Yes No   Sig: Inject 1 8 mg under the skin daily     naproxen (NAPROSYN) 500 mg tablet  Self No No   Sig: Take 1 tablet (500 mg total) by mouth 2 (two) times a day with meals for 10 days   ondansetron (ZOFRAN-ODT) 4 mg disintegrating tablet  Self No No   Sig: Take 1 tablet (4 mg total) by mouth every 6 (six) hours as needed for nausea or vomiting   Patient taking differently: Take 4 mg by mouth as needed for nausea or vomiting     oxyCODONE-acetaminophen (PERCOCET) 5-325 mg per tablet  Self No No   Sig: Take 1 tablet by mouth every 4 (four) hours as needed for moderate pain for up to 10 doses Max Daily Amount: 6 tablets   oxybutynin (DITROPAN) 5 mg tablet  Self No No   Sig: Take 1 tablet (5 mg total) by mouth 3 (three) times a day as needed (stent discomfort)   pantoprazole (PROTONIX) 40 mg tablet   No No   Sig: Take 1 tablet (40 mg total) by mouth 2 (two) times a day   zolpidem (AMBIEN) 5 mg tablet  Self Yes No   Sig: Take 10 mg by mouth daily at bedtime as needed for sleep        Facility-Administered Medications: None       Past Medical History:   Diagnosis Date    [facility]immune [facility]     Bipolar 1 disorder ([facility]     Diabetes mellitus [facility]     Renal disorder     kidney stones       Past Surgical History:   Procedure Laterality Date    BARIATRIC SURGERY  05/2017    BUNIONECTOMY      CYSTOSCOPY  07/12/2018    Stent Removal    GASTRIC BYPASS  05/22/2017    HIATAL HERNIA REPAIR  05/22/2017    HYSTERECTOMY      JOINT REPLACEMENT      KIDNEY STONE SURGERY      DC CYSTO/URETERO W/LITHOTRIPSY &INDWELL STENT INSRT Bilateral 7/6/2018    Procedure: CYSTOSCOPY GALDINO  URETEROSCOPY;GALDINO  RETROGRADE PYELOGRAM AND INSERTION GALDINO  STENT URETERAL;  Surgeon: Aysha Dalton MD;  Location: [facility];  Service: Urology    TOTAL SHOULDER REPLACEMENT  2002    VAGINAL HYSTERECTOMY      PARTIAL       Family History   Problem Relation Age of Onset    Diabetes Father     Heart disease Father     Diabetes Mother     Heart disease Mother     Diabetes Sister     Diabetes Family         MELLITUS    Depression Family     Mental illness Family     Obesity Family     Osteoarthritis Family      I have reviewed and agree with the history as documented  Social History     Tobacco Use    Smoking status: Current Every Day Smoker     Packs/day: 1 00     Years: 30 00     Pack years: 30 00     Types: Cigarettes    Smokeless tobacco: Never Used   Substance Use Topics    Alcohol use: No    Drug use: No        Review of Systems   All other systems reviewed and are negative  Physical Exam  Physical Exam   Constitutional: She is oriented to person, place, and time  She appears well-nourished  Non-toxic appearance  She does not appear ill  No distress  overweight   HENT:   Head: Normocephalic and atraumatic  Eyes: Conjunctivae are normal    EOM grossly intact   Neck: Normal range of motion  Neck supple  No JVD present  Cardiovascular: Normal rate  Pulmonary/Chest: Effort normal    Abdominal: Soft  Normal appearance  Bowel sounds are increased  There is tenderness (generalized)  There is no rigidity, no rebound and no guarding  Musculoskeletal:   FROM, steady gait, cap refill brisk, strength and sensation grossly intact throughout   Neurological: She is alert and oriented to person, place, and time  Skin: Skin is warm and dry  Capillary refill takes less than 2 seconds  Psychiatric: She has a normal mood and affect  Her behavior is normal    Nursing note and vitals reviewed        Vital Signs  ED Triage Vitals [12/07/19 1605]   Temperature Pulse Respirations Blood Pressure SpO2   (!) 97 3 °F (36 3 °C) 103 18 137/82 97 %      Temp Source Heart Rate Source Patient Position - Orthostatic VS BP Location FiO2 (%)   Tympanic Monitor Sitting Left arm --      Pain Score --           Vitals:    12/07/19 1605   BP: 137/82   Pulse: 103   Patient Position - Orthostatic VS: Sitting         Visual Acuity      ED Medications  Medications   sodium chloride 0 9 % bolus 1,000 mL (0 mL Intravenous Stopped 12/7/19 1826)   dicyclomine (BENTYL) tablet 20 mg (20 mg Oral Given 12/7/19 1651)   ondansetron (ZOFRAN) injection 4 mg (4 mg Intravenous Given 12/7/19 1649)   iohexol (OMNIPAQUE) 350 MG/ML injection (MULTI-DOSE) 100 mL (100 mL Intravenous Given 12/7/19 1729)       Diagnostic Studies  Results Reviewed     Procedure Component Value Units Date/Time    Urine Microscopic [[id number]]  (Abnormal) Collected:  12/07/19 1747    Lab Status:  Final result Specimen:  Urine, Other Updated:  12/07/19 1808     RBC, UA 0-1 /hpf      WBC, UA 0-1 /hpf      Epithelial Cells Occasional /hpf      Bacteria, UA Occasional /hpf     UA w Reflex to Microscopic w Reflex to Culture [[id number]]  (Abnormal) Collected:  12/07/19 1747    Lab Status:  Final result Specimen:  Urine, Other Updated:  12/07/19 1800     Color, UA Straw     Clarity, UA Clear     Specific Gravity, UA 1 010     pH, UA 5 0     Leukocytes, UA 25 0     Nitrite, UA Negative     Protein, UA Negative mg/dl      Glucose, UA Negative mg/dl      Ketones, UA Negative mg/dl      Bilirubin, UA Negative     Blood, UA Negative     UROBILINOGEN UA Negative mg/dL     Protime-INR [[id number]]  (Normal) Collected:  12/07/19 1645    Lab Status:  Final result Specimen:  Blood from Arm, Left Updated:  12/07/19 1711     Protime 10 3 seconds      INR 0 94    APTT [[id number]]  (Normal) Collected:  12/07/19 1645    Lab Status:  Final result Specimen:  Blood from Arm, Left Updated:  12/07/19 1711     PTT 29 seconds     Lipase [[id number]]  (Normal) Collected:  12/07/19 1645    Lab Status:  Final result Specimen:  Blood from Arm, Left Updated:  12/07/19 1709     Lipase 57 u/L     Comprehensive metabolic panel [[id number]]  (Abnormal) Collected:  12/07/19 1645    Lab Status: Final result Specimen:  Blood from Arm, Left Updated:  12/07/19 1709     Sodium 137 mmol/L      Potassium 3 9 mmol/L      Chloride 103 mmol/L      CO2 27 mmol/L      ANION GAP 7 mmol/L      BUN 13 mg/dL      Creatinine 0 62 mg/dL      Glucose 114 mg/dL      Calcium 9 3 mg/dL      AST 19 U/L      ALT 15 U/L      Alkaline Phosphatase 164 U/L      Total Protein 7 3 g/dL      Albumin 4 2 g/dL      Total Bilirubin 0 40 mg/dL      eGFR 104 ml/min/1 73sq m     Narrative:       National Kidney Disease Foundation guidelines for Chronic Kidney Disease (CKD):     Stage 1 with normal or high GFR (GFR > 90 mL/min/1 73 square meters)    Stage 2 Mild CKD (GFR = 60-89 mL/min/1 73 square meters)    Stage 3A Moderate CKD (GFR = 45-59 mL/min/1 73 square meters)    Stage 3B Moderate CKD (GFR = 30-44 mL/min/1 73 square meters)    Stage 4 Severe CKD (GFR = 15-29 mL/min/1 73 square meters)    Stage 5 End Stage CKD (GFR <15 mL/min/1 73 square meters)  Note: GFR calculation is accurate only with a steady state creatinine    CBC and differential [[id number]]  (Abnormal) Collected:  12/07/19 1645    Lab Status:  Final result Specimen:  Blood from Arm, Left Updated:  12/07/19 1705     WBC 7 10 Thousand/uL      RBC 4 09 Million/uL      Hemoglobin 14 0 g/dL      Hematocrit 39 9 %      MCV 98 fL      MCH 34 1 pg      MCHC 35 0 g/dL      RDW 12 9 %      MPV 8 5 fL      Platelets 758 Thousands/uL      Neutrophils Relative 60 %      Lymphocytes Relative 31 %      Monocytes Relative 6 %      Eosinophils Relative 3 %      Basophils Relative 1 %      Neutrophils Absolute 4 30 Thousands/µL      Lymphocytes Absolute 2 20 Thousands/µL      Monocytes Absolute 0 40 Thousand/µL      Eosinophils Absolute 0 20 Thousand/µL      Basophils Absolute 0 10 Thousands/µL     Stool Enteric Bacterial Panel by PCR [[id number]] Collected:  12/07/19 1645    Lab Status:   In process Specimen:  Stool from Rectum Updated:  12/07/19 1656    Clostridium difficile toxin by PCR [[id number]]     Lab Status:  No result Specimen:  Stool from Per Rectum                  CT abdomen pelvis with contrast   Final Result by Mina Sagastume MD (12/07 1749)      Status post gastric bypass  No bowel obstruction  Mild thickening of the excludnonspecific enteritis/colitis     ed portion of the stomach may indicate a gastritis  Fluid-filled hyperemic loops of distal small bowel and proximal colon in keeping with a nonspecific enteritis/colitis  The study was marked in [facility] for immediate notification  Workstation performed: [id number]                    Procedures  Procedures         ED Course                               MDM  Number of Diagnoses or Management Options  Colitis due to Clostridioides difficile:   Colitis:   Diagnosis management comments: 49 yo F presenting for evaluation of generalized abdominal cramping and diarrhea, pt did bring stool sample with her today that was provided by her PCP for outpatient testing, she was unable to give us an additional sample for cdiff testing here, pt describes the diarrhea as very malodorous and watery with frequent episodes daily, concern for cdiff, will place her on vanc PO, she is afebrile, labs unremarkable, ct does show evidence to suggest enteritis/colitis, advised her to increase fluids, vitals are stable and reassuring, f/u with pcp and gi as an outpatient    All labs and imaging discussed with patient, strict return to ED precautions discussed  Pt verbalizes understanding and agrees with plan  Pt is stable for discharge    Portions of the record may have been created with voice recognition software  Occasional wrong word or "sound a like" substitutions may have occurred due to the inherent limitations of voice recognition software  Read the chart carefully and recognize, using context, where substitutions have occurred            Disposition  Final diagnoses:   Colitis   Colitis due to Clostridioides difficile     Time reflects when diagnosis was documented in both MDM as applicable and the Disposition within this note     Time User Action Codes Description Comment    12/7/2019  6:15 PM Josiah Deleon Add [K52 9] Colitis     12/7/2019  6:17 PM Josiah Deleon Add [A04 72] Colitis due to Clostridioides difficile       ED Disposition     ED Disposition Condition Date/Time Comment    Discharge Stable Sat Dec 7, 2019  6:15 PM Yvonne Sanchez discharge to home/self care              Follow-up Information     Follow up With Specialties Details Why Contact Info Additional [address] Family Medicine Call in 1 day  [address], [address] [place] [phone]  [address], [address], [address] [facility], [address] [place] [address], [address] [place] [address] [facility] Call in 1 day  [address] [facility] [address] [facility] [address]  Agata Guallpa [address] [facility] [place], [address], [address] [place] [address], [facility], [address] [place] [address], [address] [phone]          Discharge Medication List as of 12/7/2019  6:18 PM      START taking these medications    Details   dicyclomine (BENTYL) 20 mg tablet Take 1 tablet (20 mg total) by mouth 2 (two) times a day, Starting Sat 12/7/2019, Print      vancomycin (VANCOCIN) 125 MG capsule Take 1 capsule (125 mg total) by mouth 4 (four) times a day for 10 days, Starting Sat 12/7/2019, Until Tue 12/17/2019, Print         CONTINUE these medications which have NOT CHANGED    Details   aspirin (ASPIR-LOW) 81 mg EC tablet Take 81 mg by mouth daily  , Starting Mon 12/18/2017, Historical Med      azaTHIOprine (IMURAN) 50 mg tablet Take 50 mg by mouth daily, Historical Med      Biotin 10 MG TABS Take 10 mg by mouth, Starting Wed 4/25/2018, Historical Med      budesonide (ENTOCORT EC) 3 MG capsule Starting Wed 4/4/2018, Historical Med calcium citrate-Vitamin D 200 mg-250 units Take 2 tablets by mouth, Starting Tue 1/2/2018, Historical Med      cetirizine (ZyrTEC) 10 mg tablet Take 10 mg by mouth, Starting Wed 4/25/2018, Until Thu 4/25/2019, Historical Med      erythromycin (ILOTYCIN) ophthalmic ointment Place a 1/2 inch ribbon of ointment into the lower eyelid  , Print      FLUoxetine (PROzac) 40 MG capsule Take 40 mg by mouth daily  , Starting Tue 9/5/2017, Historical Med      ketotifen (ZADITOR) 0 025 % ophthalmic solution Administer to both eyes daily  , Starting Sat 7/4/2015, Historical Med      liraglutide (VICTOZA) injection Inject 1 8 mg under the skin daily  , Starting Mon 3/19/2018, Historical Med      LORazepam (ATIVAN) 1 mg tablet Take 1 mg by mouth 2 (two) times a day  , Historical Med      naproxen (NAPROSYN) 500 mg tablet Take 1 tablet (500 mg total) by mouth 2 (two) times a day with meals for 10 days, Starting Sun 6/17/2018, Until Wed 6/27/2018, Print      ondansetron (ZOFRAN-ODT) 4 mg disintegrating tablet Take 1 tablet (4 mg total) by mouth every 6 (six) hours as needed for nausea or vomiting, Starting Sun 6/17/2018, Print      oxybutynin (DITROPAN) 5 mg tablet Take 1 tablet (5 mg total) by mouth 3 (three) times a day as needed (stent discomfort), Starting Fri 7/6/2018, Print      oxyCODONE-acetaminophen (PERCOCET) 5-325 mg per tablet Take 1 tablet by mouth every 4 (four) hours as needed for moderate pain for up to 10 doses Max Daily Amount: 6 tablets, Starting Fri 7/6/2018, Print      pantoprazole (PROTONIX) 40 mg tablet Take 1 tablet (40 mg total) by mouth 2 (two) times a day, Starting Wed 7/25/2018, Normal      QUEtiapine (SEROquel) 100 mg tablet Take 100 mg by mouth daily at bedtime, Historical Med      zolpidem (AMBIEN) 5 mg tablet Take 10 mg by mouth daily at bedtime as needed for sleep  , Historical Med           No discharge procedures on file      ED Provider  Electronically Signed by           Debora Brink TERESA  12/07/19 2016

## 2019-12-08 LAB
CAMPYLOBACTER DNA SPEC NAA+PROBE: NORMAL
SALMONELLA DNA SPEC QL NAA+PROBE: NORMAL
SHIGA TOXIN STX GENE SPEC NAA+PROBE: NORMAL
SHIGELLA DNA SPEC QL NAA+PROBE: NORMAL

## 2020-11-04 ENCOUNTER — LAB (OUTPATIENT)
Dept: LAB | Facility: HOSPITAL | Age: 55
End: 2020-11-04
Payer: COMMERCIAL

## 2020-11-04 ENCOUNTER — TRANSCRIBE ORDERS (OUTPATIENT)
Dept: LAB | Facility: HOSPITAL | Age: 55
End: 2020-11-04

## 2020-11-04 DIAGNOSIS — R41.3 MEMORY LOSS: Primary | ICD-10-CM

## 2020-11-04 DIAGNOSIS — R41.3 MEMORY LOSS: ICD-10-CM

## 2020-11-04 LAB
BASOPHILS # BLD AUTO: 0.05 THOUSAND/UL (ref 0–0.1)
BASOPHILS NFR MAR MANUAL: 1 % (ref 0–1)
CRP SERPL QL: 8.2 MG/L
EOSINOPHIL # BLD AUTO: 0.2 THOUSAND/UL (ref 0–0.4)
EOSINOPHIL NFR BLD MANUAL: 4 % (ref 0–6)
ERYTHROCYTE [DISTWIDTH] IN BLOOD BY AUTOMATED COUNT: 13.2 %
ERYTHROCYTE [SEDIMENTATION RATE] IN BLOOD: 21 MM/HOUR (ref 0–29)
HCT VFR BLD AUTO: 41.7 % (ref 36–46)
HCYS SERPL-SCNC: 11.5 UMOL/L (ref 3.7–11.2)
HGB BLD-MCNC: 14.1 G/DL (ref 12–16)
LYMPHOCYTES # BLD AUTO: 1.67 THOUSAND/UL (ref 0.5–4)
LYMPHOCYTES # BLD AUTO: 34 % (ref 25–45)
MCH RBC QN AUTO: 33.8 PG (ref 26–34)
MCHC RBC AUTO-ENTMCNC: 33.8 G/DL (ref 31–36)
MCV RBC AUTO: 100 FL (ref 80–100)
MONOCYTES # BLD AUTO: 0.2 THOUSAND/UL (ref 0.2–0.9)
MONOCYTES NFR BLD AUTO: 4 % (ref 1–10)
NEUTS BAND NFR BLD MANUAL: 1 % (ref 0–8)
NEUTS SEG # BLD: 2.74 THOUSAND/UL (ref 1.8–7.8)
NEUTS SEG NFR BLD AUTO: 55 %
PLATELET # BLD AUTO: 321 THOUSANDS/UL (ref 150–450)
PLATELET BLD QL SMEAR: ADEQUATE
PMV BLD AUTO: 8.5 FL (ref 8.9–12.7)
RBC # BLD AUTO: 4.17 MILLION/UL (ref 4–5.2)
RBC MORPH BLD: NORMAL
TOTAL CELLS COUNTED SPEC: 100
TSH SERPL DL<=0.05 MIU/L-ACNC: 1.35 UIU/ML (ref 0.47–4.68)
VARIANT LYMPHS # BLD AUTO: 1 % (ref 0–0)
VIT B12 SERPL-MCNC: 826 PG/ML (ref 100–900)
WBC # BLD AUTO: 4.9 THOUSAND/UL (ref 4.5–11)

## 2020-11-04 PROCEDURE — 85007 BL SMEAR W/DIFF WBC COUNT: CPT

## 2020-11-04 PROCEDURE — 85027 COMPLETE CBC AUTOMATED: CPT

## 2020-11-04 PROCEDURE — 83090 ASSAY OF HOMOCYSTEINE: CPT

## 2020-11-04 PROCEDURE — 85652 RBC SED RATE AUTOMATED: CPT

## 2020-11-04 PROCEDURE — 86140 C-REACTIVE PROTEIN: CPT

## 2020-11-04 PROCEDURE — 82607 VITAMIN B-12: CPT

## 2020-11-04 PROCEDURE — 36415 COLL VENOUS BLD VENIPUNCTURE: CPT

## 2020-11-04 PROCEDURE — 84443 ASSAY THYROID STIM HORMONE: CPT

## 2020-12-08 ENCOUNTER — TRANSCRIBE ORDERS (OUTPATIENT)
Dept: ADMINISTRATIVE | Facility: HOSPITAL | Age: 55
End: 2020-12-08

## 2020-12-08 DIAGNOSIS — R29.818 OTHER SYMPTOMS AND SIGNS INVOLVING THE NERVOUS SYSTEM: Primary | ICD-10-CM

## 2021-03-10 DIAGNOSIS — Z23 ENCOUNTER FOR IMMUNIZATION: ICD-10-CM

## 2021-03-16 ENCOUNTER — IMMUNIZATIONS (OUTPATIENT)
Dept: FAMILY MEDICINE CLINIC | Facility: HOSPITAL | Age: 56
End: 2021-03-16

## 2021-03-16 DIAGNOSIS — Z23 ENCOUNTER FOR IMMUNIZATION: Primary | ICD-10-CM

## 2021-03-16 PROCEDURE — 91301 SARS-COV-2 / COVID-19 MRNA VACCINE (MODERNA) 100 MCG: CPT

## 2021-03-16 PROCEDURE — 0011A SARS-COV-2 / COVID-19 MRNA VACCINE (MODERNA) 100 MCG: CPT

## 2021-03-23 ENCOUNTER — TRANSCRIBE ORDERS (OUTPATIENT)
Dept: LAB | Facility: HOSPITAL | Age: 56
End: 2021-03-23

## 2021-03-23 ENCOUNTER — APPOINTMENT (OUTPATIENT)
Dept: LAB | Facility: HOSPITAL | Age: 56
End: 2021-03-23
Payer: COMMERCIAL

## 2021-03-23 DIAGNOSIS — K75.4 AUTOIMMUNE HEPATITIS (HCC): Primary | ICD-10-CM

## 2021-03-23 DIAGNOSIS — K75.4 AUTOIMMUNE HEPATITIS (HCC): ICD-10-CM

## 2021-03-23 LAB
ALBUMIN SERPL BCP-MCNC: 4 G/DL (ref 3–5.2)
ALP SERPL-CCNC: 168 U/L (ref 43–122)
ALT SERPL W P-5'-P-CCNC: 17 U/L (ref 9–52)
ANION GAP SERPL CALCULATED.3IONS-SCNC: 4 MMOL/L (ref 5–14)
AST SERPL W P-5'-P-CCNC: 31 U/L (ref 14–36)
BILIRUB SERPL-MCNC: 0.4 MG/DL
BUN SERPL-MCNC: 16 MG/DL (ref 5–25)
CALCIUM SERPL-MCNC: 9.6 MG/DL (ref 8.4–10.2)
CHLORIDE SERPL-SCNC: 101 MMOL/L (ref 97–108)
CO2 SERPL-SCNC: 31 MMOL/L (ref 22–30)
CREAT SERPL-MCNC: 0.59 MG/DL (ref 0.6–1.2)
GFR SERPL CREATININE-BSD FRML MDRD: 104 ML/MIN/1.73SQ M
GLUCOSE P FAST SERPL-MCNC: 119 MG/DL (ref 70–99)
POTASSIUM SERPL-SCNC: 4.1 MMOL/L (ref 3.6–5)
PROT SERPL-MCNC: 7.1 G/DL (ref 5.9–8.4)
SODIUM SERPL-SCNC: 136 MMOL/L (ref 137–147)

## 2021-03-23 PROCEDURE — 80053 COMPREHEN METABOLIC PANEL: CPT

## 2021-03-23 PROCEDURE — 36415 COLL VENOUS BLD VENIPUNCTURE: CPT

## 2021-04-14 ENCOUNTER — APPOINTMENT (OUTPATIENT)
Dept: LAB | Facility: HOSPITAL | Age: 56
End: 2021-04-14
Payer: COMMERCIAL

## 2021-04-14 ENCOUNTER — IMMUNIZATIONS (OUTPATIENT)
Dept: FAMILY MEDICINE CLINIC | Facility: HOSPITAL | Age: 56
End: 2021-04-14

## 2021-04-14 DIAGNOSIS — K75.4 AUTOIMMUNE HEPATITIS (HCC): ICD-10-CM

## 2021-04-14 DIAGNOSIS — Z23 ENCOUNTER FOR IMMUNIZATION: Primary | ICD-10-CM

## 2021-04-14 LAB
ALBUMIN SERPL BCP-MCNC: 4 G/DL (ref 3–5.2)
ALP SERPL-CCNC: 166 U/L (ref 43–122)
ALT SERPL W P-5'-P-CCNC: 32 U/L
ANION GAP SERPL CALCULATED.3IONS-SCNC: 5 MMOL/L (ref 5–14)
AST SERPL W P-5'-P-CCNC: 40 U/L (ref 14–36)
BILIRUB SERPL-MCNC: 0.25 MG/DL
BUN SERPL-MCNC: 13 MG/DL (ref 5–25)
CALCIUM SERPL-MCNC: 9.6 MG/DL (ref 8.4–10.2)
CHLORIDE SERPL-SCNC: 99 MMOL/L (ref 97–108)
CO2 SERPL-SCNC: 32 MMOL/L (ref 22–30)
CREAT SERPL-MCNC: 0.56 MG/DL (ref 0.6–1.2)
ERYTHROCYTE [DISTWIDTH] IN BLOOD BY AUTOMATED COUNT: 13.8 %
GFR SERPL CREATININE-BSD FRML MDRD: 106 ML/MIN/1.73SQ M
GLUCOSE P FAST SERPL-MCNC: 129 MG/DL (ref 70–99)
HCT VFR BLD AUTO: 39 % (ref 36–46)
HGB BLD-MCNC: 12.9 G/DL (ref 12–16)
MCH RBC QN AUTO: 33.9 PG (ref 26–34)
MCHC RBC AUTO-ENTMCNC: 33.1 G/DL (ref 31–36)
MCV RBC AUTO: 102 FL (ref 80–100)
PLATELET # BLD AUTO: 324 THOUSANDS/UL (ref 150–450)
PMV BLD AUTO: 9.2 FL (ref 8.9–12.7)
POTASSIUM SERPL-SCNC: 3.9 MMOL/L (ref 3.6–5)
PROT SERPL-MCNC: 7.1 G/DL (ref 5.9–8.4)
RBC # BLD AUTO: 3.81 MILLION/UL (ref 4–5.2)
SODIUM SERPL-SCNC: 136 MMOL/L (ref 137–147)
WBC # BLD AUTO: 5.4 THOUSAND/UL (ref 4.5–11)

## 2021-04-14 PROCEDURE — 80053 COMPREHEN METABOLIC PANEL: CPT

## 2021-04-14 PROCEDURE — 91301 SARS-COV-2 / COVID-19 MRNA VACCINE (MODERNA) 100 MCG: CPT

## 2021-04-14 PROCEDURE — 86256 FLUORESCENT ANTIBODY TITER: CPT

## 2021-04-14 PROCEDURE — 85027 COMPLETE CBC AUTOMATED: CPT

## 2021-04-14 PROCEDURE — 0012A SARS-COV-2 / COVID-19 MRNA VACCINE (MODERNA) 100 MCG: CPT

## 2021-04-14 PROCEDURE — 36415 COLL VENOUS BLD VENIPUNCTURE: CPT

## 2021-04-15 LAB — MITOCHONDRIA M2 IGG SER-ACNC: <20 UNITS (ref 0–20)

## 2021-09-22 ENCOUNTER — HOSPITAL ENCOUNTER (EMERGENCY)
Facility: HOSPITAL | Age: 56
Discharge: HOME | End: 2021-09-22
Attending: EMERGENCY MEDICINE
Payer: COMMERCIAL

## 2021-09-22 VITALS
RESPIRATION RATE: 16 BRPM | TEMPERATURE: 96.4 F | DIASTOLIC BLOOD PRESSURE: 81 MMHG | WEIGHT: 151.9 LBS | BODY MASS INDEX: 27.78 KG/M2 | OXYGEN SATURATION: 96 % | HEART RATE: 101 BPM | SYSTOLIC BLOOD PRESSURE: 134 MMHG

## 2021-09-22 DIAGNOSIS — G43.909 MIGRAINE HEADACHE: Primary | ICD-10-CM

## 2021-09-22 PROCEDURE — 96375 TX/PRO/DX INJ NEW DRUG ADDON: CPT

## 2021-09-22 PROCEDURE — 96374 THER/PROPH/DIAG INJ IV PUSH: CPT

## 2021-09-22 PROCEDURE — 99284 EMERGENCY DEPT VISIT MOD MDM: CPT | Performed by: EMERGENCY MEDICINE

## 2021-09-22 PROCEDURE — 96361 HYDRATE IV INFUSION ADD-ON: CPT

## 2021-09-22 PROCEDURE — 99283 EMERGENCY DEPT VISIT LOW MDM: CPT

## 2021-09-22 RX ORDER — METOCLOPRAMIDE HYDROCHLORIDE 5 MG/ML
10 INJECTION INTRAMUSCULAR; INTRAVENOUS ONCE
Status: COMPLETED | OUTPATIENT
Start: 2021-09-22 | End: 2021-09-22

## 2021-09-22 RX ORDER — DIPHENHYDRAMINE HYDROCHLORIDE 50 MG/ML
25 INJECTION INTRAMUSCULAR; INTRAVENOUS ONCE
Status: COMPLETED | OUTPATIENT
Start: 2021-09-22 | End: 2021-09-22

## 2021-09-22 RX ORDER — KETOROLAC TROMETHAMINE 30 MG/ML
30 INJECTION, SOLUTION INTRAMUSCULAR; INTRAVENOUS ONCE
Status: COMPLETED | OUTPATIENT
Start: 2021-09-22 | End: 2021-09-22

## 2021-09-22 RX ADMIN — KETOROLAC TROMETHAMINE 30 MG: 30 INJECTION, SOLUTION INTRAMUSCULAR; INTRAVENOUS at 19:29

## 2021-09-22 RX ADMIN — DIPHENHYDRAMINE HYDROCHLORIDE 25 MG: 50 INJECTION, SOLUTION INTRAMUSCULAR; INTRAVENOUS at 19:31

## 2021-09-22 RX ADMIN — METOCLOPRAMIDE 10 MG: 5 INJECTION, SOLUTION INTRAMUSCULAR; INTRAVENOUS at 19:35

## 2021-09-22 RX ADMIN — SODIUM CHLORIDE 1000 ML: 0.9 INJECTION, SOLUTION INTRAVENOUS at 19:24

## 2021-09-22 NOTE — ED PROVIDER NOTES
History  Chief Complaint   Patient presents with    Migraine     Pt states she had a concussion in December, with frequent migraines since then  Today her headache is not managed by her medications  Has nausea, some vision changes on right side, which pt reports is usual for her migraines     48 yo female with a history of DM, bipolar disorder, nephrolithiasis, and migraine headaches presents to the ED complaining of a headache  The patient says she suffered a concussion in December and has had frequent migraine headaches since that time  She reports a severe "throbbing" pain behind the right eye that radiates into the right temple  (+) Associated photophobia, "blurred" vision, and nausea  No vomiting  No neck pain/stiffness  She denies fevers/chills  The patient says her symptoms are entirely consistent with her usual migraine headaches  She took her home medications without relief then came to the ED  No other specific complaints  Prior to Admission Medications   Prescriptions Last Dose Informant Patient Reported? Taking?    Biotin 10 MG TABS  Self Yes No   Sig: Take 10 mg by mouth   FLUoxetine (PROzac) 40 MG capsule  Self Yes No   Sig: Take 40 mg by mouth daily     LORazepam (ATIVAN) 1 mg tablet  Self Yes No   Sig: Take 1 mg by mouth 2 (two) times a day     QUEtiapine (SEROquel) 100 mg tablet  Self Yes No   Sig: Take 100 mg by mouth daily at bedtime   aspirin (ASPIR-LOW) 81 mg EC tablet  Self Yes No   Sig: Take 81 mg by mouth daily     azaTHIOprine (IMURAN) 50 mg tablet  Self Yes No   Sig: Take 50 mg by mouth daily   budesonide (ENTOCORT EC) 3 MG capsule  Self Yes No   calcium citrate-Vitamin D 200 mg-250 units  Self Yes No   Sig: Take 2 tablets by mouth   cetirizine (ZyrTEC) 10 mg tablet  Self Yes No   Sig: Take 10 mg by mouth   dicyclomine (BENTYL) 20 mg tablet   No No   Sig: Take 1 tablet (20 mg total) by mouth 2 (two) times a day   erythromycin (ILOTYCIN) ophthalmic ointment   No No   Sig: Place a 1/2 inch ribbon of ointment into the lower eyelid    ketotifen (ZADITOR) 0 025 % ophthalmic solution  Self Yes No   Sig: Administer to both eyes daily     liraglutide (VICTOZA) injection  Self Yes No   Sig: Inject 1 8 mg under the skin daily     naproxen (NAPROSYN) 500 mg tablet  Self No No   Sig: Take 1 tablet (500 mg total) by mouth 2 (two) times a day with meals for 10 days   ondansetron (ZOFRAN-ODT) 4 mg disintegrating tablet  Self No No   Sig: Take 1 tablet (4 mg total) by mouth every 6 (six) hours as needed for nausea or vomiting   Patient taking differently: Take 4 mg by mouth as needed for nausea or vomiting     oxyCODONE-acetaminophen (PERCOCET) 5-325 mg per tablet  Self No No   Sig: Take 1 tablet by mouth every 4 (four) hours as needed for moderate pain for up to 10 doses Max Daily Amount: 6 tablets   oxybutynin (DITROPAN) 5 mg tablet  Self No No   Sig: Take 1 tablet (5 mg total) by mouth 3 (three) times a day as needed (stent discomfort)   pantoprazole (PROTONIX) 40 mg tablet   No No   Sig: Take 1 tablet (40 mg total) by mouth 2 (two) times a day   zolpidem (AMBIEN) 5 mg tablet  Self Yes No   Sig: Take 10 mg by mouth daily at bedtime as needed for sleep        Facility-Administered Medications: None       Past Medical History:   Diagnosis Date    Autoimmune hepatitis ([facility]     Bipolar 1 disorder ([facility] [address] [facility]     [facility]     Renal disorder     kidney stones       Past Surgical History:   Procedure Laterality Date    BARIATRIC SURGERY  05/2017    BUNIONECTOMY      CYSTOSCOPY  07/12/2018    Stent Removal    GASTRIC BYPASS  05/22/2017    HIATAL HERNIA REPAIR  05/22/2017    HYSTERECTOMY      JOINT REPLACEMENT      KIDNEY STONE SURGERY      CA CYSTO/URETERO W/LITHOTRIPSY &INDWELL STENT INSRT Bilateral 7/6/2018    Procedure: CYSTOSCOPY GALDINO  URETEROSCOPY;GALDINO  RETROGRADE PYELOGRAM AND INSERTION GALDINO  STENT URETERAL;  Surgeon: Ashley Andres MD;  Location:  MAIN OR;  Service: Urology    TOTAL SHOULDER REPLACEMENT  2002    VAGINAL HYSTERECTOMY      PARTIAL       Family History   Problem Relation Age of Onset    Diabetes Father     Heart disease Father     Diabetes Mother     Heart disease Mother     Diabetes Sister     Diabetes Family         MELLITUS    Depression Family     Mental illness Family     Obesity Family     Osteoarthritis Family      I have reviewed and agree with the history as documented  E-Cigarette/Vaping     E-Cigarette/Vaping Substances     Social History     Tobacco Use    Smoking status: Former Smoker     Packs/day: 1 00     Years: 30 00     Pack years: 30 00     Types: Cigarettes    Smokeless tobacco: Never Used   Substance Use Topics    Alcohol use: No    Drug use: No       Review of Systems   Constitutional: Negative for chills and fever  HENT: Negative for sore throat  Eyes: Positive for photophobia and visual disturbance  Negative for pain and redness  Respiratory: Negative for shortness of breath  Cardiovascular: Negative for chest pain  Gastrointestinal: Positive for nausea  Negative for abdominal pain, diarrhea and vomiting  Endocrine: Negative for cold intolerance and heat intolerance  Genitourinary: Negative for dysuria and frequency  Musculoskeletal: Negative for back pain  Skin: Negative for rash  Allergic/Immunologic: Negative for immunocompromised state  Neurological: Positive for headaches  Negative for dizziness, weakness, light-headedness and numbness  Hematological: Negative for adenopathy  Psychiatric/Behavioral: Negative for self-injury  Physical Exam  Physical Exam  Constitutional:       General: She is not in acute distress  Appearance: She is well-developed  HENT:      Head: Normocephalic and atraumatic  Eyes:      Extraocular Movements: Extraocular movements intact  Conjunctiva/sclera: Conjunctivae normal       Pupils: Pupils are equal, round, and reactive to light     Cardiovascular: Rate and Rhythm: Normal rate and regular rhythm  Pulmonary:      Effort: Pulmonary effort is normal       Breath sounds: Normal breath sounds  Abdominal:      General: There is no distension  Palpations: Abdomen is soft  Tenderness: There is no abdominal tenderness  Musculoskeletal:         General: Normal range of motion  Cervical back: Normal range of motion and neck supple  Skin:     General: Skin is warm and dry  Neurological:      General: No focal deficit present  Mental Status: She is alert and oriented to person, place, and time  Cranial Nerves: Cranial nerves are intact  Sensory: Sensation is intact  Motor: Motor function is intact  Coordination: Coordination is intact  Gait: Gait is intact  Deep Tendon Reflexes: Reflexes are normal and symmetric           Vital Signs  ED Triage Vitals [09/22/21 1857]   Temperature Pulse Respirations Blood Pressure SpO2   (!) 96 4 °F (35 8 °C) 101 16 134/81 96 %      Temp Source Heart Rate Source Patient Position - Orthostatic VS BP Location FiO2 (%)   Tympanic Monitor Sitting Left arm --      Pain Score       Worst Possible Pain           Vitals:    09/22/21 1857   BP: 134/81   Pulse: 101   Patient Position - Orthostatic VS: Sitting         Visual Acuity      ED Medications  Medications   sodium chloride 0 9 % bolus 1,000 mL (0 mL Intravenous Stopped 9/22/21 2020)   ketorolac (TORADOL) injection 30 mg (30 mg Intravenous Given 9/22/21 1929)   metoclopramide (REGLAN) injection 10 mg (10 mg Intravenous Given 9/22/21 1935)   diphenhydrAMINE (BENADRYL) injection 25 mg (25 mg Intravenous Given 9/22/21 1931)       Diagnostic Studies  Results Reviewed     None                 No orders to display              Procedures  Procedures         ED Course                             SBIRT 20yo+      Most Recent Value   SBIRT (24 yo +)   In order to provide better care to our patients, we are screening all of our patients for alcohol and drug use  Would it be okay to ask you these screening questions? Yes Filed at: 09/22/2021 1901   Initial Alcohol Screen: US AUDIT-C    1  How often do you have a drink containing alcohol?  0 Filed at: 09/22/2021 1901   2  How many drinks containing alcohol do you have on a typical day you are drinking? 0 Filed at: 09/22/2021 1901   3b  FEMALE Any Age, or MALE 65+: How often do you have 4 or more drinks on one occassion? 0 Filed at: 09/22/2021 1901   Audit-C Score  0 Filed at: 09/22/2021 1901   SARITA: How many times in the past year have you    Used an illegal drug or used a prescription medication for non-medical reasons? Never Filed at: 09/22/2021 1901                    [facility]  Number of Diagnoses or Management Options  Migraine headache  Diagnosis management comments: The patient is uncomfortable appearing with stable vital signs and a benign exam  Headache is entirely consistent with her usual migraines  Very low clinical suspicion for an acute life-threatening intracranial emergency  Will administer IVFs, Reglan, Benadryl, and Toradol  Will continue to monitor in the ED     2009 All symptoms resolved  The patient says she feels "great" and is requesting discharge  Plan for follow up with her PCP later this week for reassessment  She is agreeable to this plan  Strict return precautions provided  Patient Progress  Patient progress: improved      Disposition  Final diagnoses:   Migraine headache     Time reflects when diagnosis was documented in both MDM as applicable and the Disposition within this note     Time User Action Codes Description Comment    9/22/2021  8:10 PM Chao Clarke [[id number]] Migraine headache       ED Disposition     ED Disposition Condition Date/Time Comment    Discharge Stable Wed Sep 22, 2021  8:10 PM Pam Martinez discharge to home/self care              Follow-up Information     Follow up With Specialties Details Why Contact Info    Noni Rosario MD [facility] Medicine Schedule an appointment as soon as possible for a visit   [address] [place] [id number]            Discharge Medication List as of 9/22/2021  8:12 PM      CONTINUE these medications which have NOT CHANGED    Details   aspirin (ASPIR-LOW) 81 mg EC tablet Take 81 mg by mouth daily  , Starting Mon 12/18/2017, Historical Med      azaTHIOprine (IMURAN) 50 mg tablet Take 50 mg by mouth daily, Historical Med      Biotin 10 MG TABS Take 10 mg by mouth, Starting Wed 4/25/2018, Historical Med      budesonide (ENTOCORT EC) 3 MG capsule Starting Wed 4/4/2018, Historical Med      calcium citrate-Vitamin D 200 mg-250 units Take 2 tablets by mouth, Starting Tue 1/2/2018, Historical Med      cetirizine (ZyrTEC) 10 mg tablet Take 10 mg by mouth, Starting Wed 4/25/2018, Until Thu 4/25/2019, Historical Med      dicyclomine (BENTYL) 20 mg tablet Take 1 tablet (20 mg total) by mouth 2 (two) times a day, Starting Sat 12/7/2019, Print      erythromycin (ILOTYCIN) ophthalmic ointment Place a 1/2 inch ribbon of ointment into the lower eyelid  , Print      FLUoxetine (PROzac) 40 MG capsule Take 40 mg by mouth daily  , Starting Tue 9/5/2017, Historical Med      ketotifen (ZADITOR) 0 025 % ophthalmic solution Administer to both eyes daily  , Starting Sat 7/4/2015, Historical Med      liraglutide (VICTOZA) injection Inject 1 8 mg under the skin daily  , Starting Mon 3/19/2018, Historical Med      LORazepam (ATIVAN) 1 mg tablet Take 1 mg by mouth 2 (two) times a day  , Historical Med      naproxen (NAPROSYN) 500 mg tablet Take 1 tablet (500 mg total) by mouth 2 (two) times a day with meals for 10 days, Starting Sun 6/17/2018, Until Wed 6/27/2018, Print      ondansetron (ZOFRAN-ODT) 4 mg disintegrating tablet Take 1 tablet (4 mg total) by mouth every 6 (six) hours as needed for nausea or vomiting, Starting Sun 6/17/2018, Print      oxybutynin (DITROPAN) 5 mg tablet Take 1 tablet (5 mg total) by mouth 3 (three) times a day as needed (stent discomfort), Starting Fri 7/6/2018, Print      oxyCODONE-acetaminophen (PERCOCET) 5-325 mg per tablet Take 1 tablet by mouth every 4 (four) hours as needed for moderate pain for up to 10 doses Max Daily Amount: 6 tablets, Starting Fri 7/6/2018, Print      pantoprazole (PROTONIX) 40 mg tablet Take 1 tablet (40 mg total) by mouth 2 (two) times a day, Starting Wed 7/25/2018, Normal      QUEtiapine (SEROquel) 100 mg tablet Take 100 mg by mouth daily at bedtime, Historical Med      zolpidem (AMBIEN) 5 mg tablet Take 10 mg by mouth daily at bedtime as needed for sleep  , Historical Med           No discharge procedures on file      PDMP Review     None          ED Provider  Electronically Signed by           Peter Koo MD  09/22/21 1213

## 2022-02-18 ENCOUNTER — APPOINTMENT (OUTPATIENT)
Dept: LAB | Facility: HOSPITAL | Age: 57
End: 2022-02-18
Payer: COMMERCIAL

## 2022-02-18 ENCOUNTER — OFFICE VISIT (OUTPATIENT)
Dept: LAB | Facility: HOSPITAL | Age: 57
End: 2022-02-18
Payer: COMMERCIAL

## 2022-02-18 DIAGNOSIS — E55.9 AVITAMINOSIS D: ICD-10-CM

## 2022-02-18 DIAGNOSIS — Z79.899 ENCOUNTER FOR LONG-TERM (CURRENT) USE OF OTHER MEDICATIONS: ICD-10-CM

## 2022-02-18 LAB
ATRIAL RATE: 91 BPM
P AXIS: 49 DEGREES
PR INTERVAL: 156 MS
QRS AXIS: 2 DEGREES
QRSD INTERVAL: 76 MS
QT INTERVAL: 388 MS
QTC INTERVAL: 477 MS
T WAVE AXIS: 28 DEGREES
VENTRICULAR RATE: 91 BPM

## 2022-02-18 PROCEDURE — 80061 LIPID PANEL: CPT

## 2022-02-18 PROCEDURE — 80053 COMPREHEN METABOLIC PANEL: CPT

## 2022-02-18 PROCEDURE — 93010 ELECTROCARDIOGRAM REPORT: CPT | Performed by: INTERNAL MEDICINE

## 2022-02-18 PROCEDURE — 93005 ELECTROCARDIOGRAM TRACING: CPT

## 2022-02-21 ENCOUNTER — APPOINTMENT (OUTPATIENT)
Dept: LAB | Facility: HOSPITAL | Age: 57
End: 2022-02-21
Payer: COMMERCIAL

## 2022-02-21 LAB
25(OH)D3 SERPL-MCNC: 18.7 NG/ML (ref 30–100)
ALBUMIN SERPL BCP-MCNC: 4.3 G/DL (ref 3–5.2)
ALP SERPL-CCNC: 286 U/L (ref 43–122)
ALT SERPL W P-5'-P-CCNC: 63 U/L
ANION GAP SERPL CALCULATED.3IONS-SCNC: 8 MMOL/L (ref 5–14)
AST SERPL W P-5'-P-CCNC: 68 U/L (ref 14–36)
BILIRUB SERPL-MCNC: 0.26 MG/DL
BUN SERPL-MCNC: 14 MG/DL (ref 5–25)
CALCIUM SERPL-MCNC: 9 MG/DL (ref 8.4–10.2)
CHLORIDE SERPL-SCNC: 100 MMOL/L (ref 97–108)
CHOLEST SERPL-MCNC: 170 MG/DL
CO2 SERPL-SCNC: 29 MMOL/L (ref 22–30)
CREAT SERPL-MCNC: 0.54 MG/DL (ref 0.6–1.2)
GFR SERPL CREATININE-BSD FRML MDRD: 106 ML/MIN/1.73SQ M
GLUCOSE P FAST SERPL-MCNC: 242 MG/DL (ref 70–99)
HDLC SERPL-MCNC: 62 MG/DL
LDLC SERPL CALC-MCNC: 73 MG/DL
NONHDLC SERPL-MCNC: 108 MG/DL
POTASSIUM SERPL-SCNC: 4.1 MMOL/L (ref 3.6–5)
PROT SERPL-MCNC: 8 G/DL (ref 5.9–8.4)
SODIUM SERPL-SCNC: 137 MMOL/L (ref 137–147)
T4 FREE SERPL-MCNC: 0.67 NG/DL (ref 0.76–1.46)
TRIGL SERPL-MCNC: 176 MG/DL

## 2022-02-21 PROCEDURE — 36415 COLL VENOUS BLD VENIPUNCTURE: CPT

## 2022-02-21 PROCEDURE — 82306 VITAMIN D 25 HYDROXY: CPT

## 2022-02-21 PROCEDURE — 84439 ASSAY OF FREE THYROXINE: CPT

## 2022-02-22 ENCOUNTER — APPOINTMENT (OUTPATIENT)
Dept: LAB | Facility: HOSPITAL | Age: 57
End: 2022-02-22
Payer: COMMERCIAL

## 2022-02-22 DIAGNOSIS — E55.9 AVITAMINOSIS D: ICD-10-CM

## 2022-02-22 DIAGNOSIS — Z79.899 ENCOUNTER FOR LONG-TERM (CURRENT) USE OF OTHER MEDICATIONS: ICD-10-CM

## 2022-02-22 LAB
BASOPHILS # BLD AUTO: 0 THOUSANDS/ΜL (ref 0–0.1)
BASOPHILS NFR BLD AUTO: 0 % (ref 0–1)
EOSINOPHIL # BLD AUTO: 0.1 THOUSAND/ΜL (ref 0–0.4)
EOSINOPHIL NFR BLD AUTO: 2 % (ref 0–6)
ERYTHROCYTE [DISTWIDTH] IN BLOOD BY AUTOMATED COUNT: 13.9 %
HCT VFR BLD AUTO: 40.9 % (ref 36–46)
HGB BLD-MCNC: 13.7 G/DL (ref 12–16)
LYMPHOCYTES # BLD AUTO: 1.8 THOUSANDS/ΜL (ref 0.5–4)
LYMPHOCYTES NFR BLD AUTO: 24 % (ref 25–45)
MCH RBC QN AUTO: 32.6 PG (ref 26–34)
MCHC RBC AUTO-ENTMCNC: 33.5 G/DL (ref 31–36)
MCV RBC AUTO: 97 FL (ref 80–100)
MONOCYTES # BLD AUTO: 0.5 THOUSAND/ΜL (ref 0.2–0.9)
MONOCYTES NFR BLD AUTO: 6 % (ref 1–10)
NEUTROPHILS # BLD AUTO: 5 THOUSANDS/ΜL (ref 1.8–7.8)
NEUTS SEG NFR BLD AUTO: 67 % (ref 45–65)
PLATELET # BLD AUTO: 277 THOUSANDS/UL (ref 150–450)
PMV BLD AUTO: 9.1 FL (ref 8.9–12.7)
RBC # BLD AUTO: 4.2 MILLION/UL (ref 4–5.2)
WBC # BLD AUTO: 7.4 THOUSAND/UL (ref 4.5–11)

## 2022-02-22 PROCEDURE — 85025 COMPLETE CBC W/AUTO DIFF WBC: CPT

## 2022-02-22 PROCEDURE — 36415 COLL VENOUS BLD VENIPUNCTURE: CPT

## 2022-05-24 ENCOUNTER — APPOINTMENT (OUTPATIENT)
Dept: LAB | Facility: HOSPITAL | Age: 57
End: 2022-05-24
Payer: COMMERCIAL

## 2022-05-24 DIAGNOSIS — K75.4 CHRONIC AGGRESSIVE HEPATITIS (HCC): ICD-10-CM

## 2022-05-24 LAB
ALBUMIN SERPL BCP-MCNC: 4.3 G/DL (ref 3–5.2)
ALP SERPL-CCNC: 175 U/L (ref 43–122)
ALT SERPL W P-5'-P-CCNC: 21 U/L
AST SERPL W P-5'-P-CCNC: 28 U/L (ref 14–36)
BILIRUB DIRECT SERPL-MCNC: 0.29 MG/DL
BILIRUB SERPL-MCNC: 0.43 MG/DL
PROT SERPL-MCNC: 7.7 G/DL (ref 5.9–8.4)

## 2022-05-24 PROCEDURE — 36415 COLL VENOUS BLD VENIPUNCTURE: CPT

## 2022-05-24 PROCEDURE — 80076 HEPATIC FUNCTION PANEL: CPT

## 2022-06-14 ENCOUNTER — HOSPITAL ENCOUNTER (EMERGENCY)
Facility: HOSPITAL | Age: 57
Discharge: HOME | End: 2022-06-14
Attending: EMERGENCY MEDICINE | Admitting: EMERGENCY MEDICINE
Payer: COMMERCIAL

## 2022-06-14 ENCOUNTER — TELEPHONE (OUTPATIENT)
Dept: OTHER | Facility: HOSPITAL | Age: 57
End: 2022-06-14

## 2022-06-14 ENCOUNTER — APPOINTMENT (EMERGENCY)
Dept: CT IMAGING | Facility: HOSPITAL | Age: 57
End: 2022-06-14
Payer: COMMERCIAL

## 2022-06-14 VITALS
WEIGHT: 148.37 LBS | RESPIRATION RATE: 18 BRPM | DIASTOLIC BLOOD PRESSURE: 85 MMHG | SYSTOLIC BLOOD PRESSURE: 141 MMHG | BODY MASS INDEX: 27.14 KG/M2 | OXYGEN SATURATION: 99 % | TEMPERATURE: 98.5 F | HEART RATE: 98 BPM

## 2022-06-14 DIAGNOSIS — N13.30 HYDRONEPHROSIS OF LEFT KIDNEY: ICD-10-CM

## 2022-06-14 DIAGNOSIS — N20.0 NEPHROLITHIASIS: Primary | ICD-10-CM

## 2022-06-14 LAB
ALBUMIN SERPL BCP-MCNC: 3.9 G/DL (ref 3–5.2)
ALP SERPL-CCNC: 187 U/L (ref 43–122)
ALT SERPL W P-5'-P-CCNC: 30 U/L
ANION GAP SERPL CALCULATED.3IONS-SCNC: 6 MMOL/L (ref 5–14)
AST SERPL W P-5'-P-CCNC: 59 U/L (ref 14–36)
BACTERIA UR QL AUTO: ABNORMAL /HPF
BASOPHILS # BLD AUTO: 0.03 THOUSANDS/ΜL (ref 0–0.1)
BASOPHILS NFR BLD AUTO: 0 % (ref 0–1)
BILIRUB SERPL-MCNC: 0.18 MG/DL
BILIRUB UR QL STRIP: NEGATIVE
BUN SERPL-MCNC: 19 MG/DL (ref 5–25)
CALCIUM SERPL-MCNC: 8.7 MG/DL (ref 8.4–10.2)
CHLORIDE SERPL-SCNC: 106 MMOL/L (ref 97–108)
CLARITY UR: CLEAR
CO2 SERPL-SCNC: 27 MMOL/L (ref 22–30)
COLOR UR: ABNORMAL
CREAT SERPL-MCNC: 0.52 MG/DL (ref 0.6–1.2)
EOSINOPHIL # BLD AUTO: 0.17 THOUSAND/ΜL (ref 0–0.61)
EOSINOPHIL NFR BLD AUTO: 2 % (ref 0–6)
ERYTHROCYTE [DISTWIDTH] IN BLOOD BY AUTOMATED COUNT: 12.4 % (ref 11.6–15.1)
GFR SERPL CREATININE-BSD FRML MDRD: 107 ML/MIN/1.73SQ M
GLUCOSE SERPL-MCNC: 93 MG/DL (ref 70–99)
GLUCOSE UR STRIP-MCNC: ABNORMAL MG/DL
HCT VFR BLD AUTO: 39.3 % (ref 34.8–46.1)
HGB BLD-MCNC: 12.8 G/DL (ref 11.5–15.4)
HGB UR QL STRIP.AUTO: 150
IMM GRANULOCYTES # BLD AUTO: 0.01 THOUSAND/UL (ref 0–0.2)
IMM GRANULOCYTES NFR BLD AUTO: 0 % (ref 0–2)
KETONES UR STRIP-MCNC: NEGATIVE MG/DL
LEUKOCYTE ESTERASE UR QL STRIP: NEGATIVE
LIPASE SERPL-CCNC: 96 U/L (ref 23–300)
LYMPHOCYTES # BLD AUTO: 2.15 THOUSANDS/ΜL (ref 0.6–4.47)
LYMPHOCYTES NFR BLD AUTO: 31 % (ref 14–44)
MCH RBC QN AUTO: 32.5 PG (ref 26.8–34.3)
MCHC RBC AUTO-ENTMCNC: 32.6 G/DL (ref 31.4–37.4)
MCV RBC AUTO: 100 FL (ref 82–98)
MONOCYTES # BLD AUTO: 0.43 THOUSAND/ΜL (ref 0.17–1.22)
MONOCYTES NFR BLD AUTO: 6 % (ref 4–12)
NEUTROPHILS # BLD AUTO: 4.26 THOUSANDS/ΜL (ref 1.85–7.62)
NEUTS SEG NFR BLD AUTO: 61 % (ref 43–75)
NITRITE UR QL STRIP: NEGATIVE
NON-SQ EPI CELLS URNS QL MICRO: ABNORMAL /HPF
NRBC BLD AUTO-RTO: 0 /100 WBCS
PH UR STRIP.AUTO: 6 [PH]
PLATELET # BLD AUTO: 331 THOUSANDS/UL (ref 149–390)
PMV BLD AUTO: 9.3 FL (ref 8.9–12.7)
POTASSIUM SERPL-SCNC: 3.7 MMOL/L (ref 3.6–5)
PROT SERPL-MCNC: 7.2 G/DL (ref 5.9–8.4)
PROT UR STRIP-MCNC: NEGATIVE MG/DL
RBC # BLD AUTO: 3.94 MILLION/UL (ref 3.81–5.12)
RBC #/AREA URNS AUTO: ABNORMAL /HPF
SODIUM SERPL-SCNC: 139 MMOL/L (ref 137–147)
SP GR UR STRIP.AUTO: 1.02 (ref 1–1.04)
UROBILINOGEN UA: NEGATIVE MG/DL
WBC # BLD AUTO: 7.05 THOUSAND/UL (ref 4.31–10.16)
WBC #/AREA URNS AUTO: ABNORMAL /HPF

## 2022-06-14 PROCEDURE — 99285 EMERGENCY DEPT VISIT HI MDM: CPT | Performed by: EMERGENCY MEDICINE

## 2022-06-14 PROCEDURE — 81001 URINALYSIS AUTO W/SCOPE: CPT | Performed by: EMERGENCY MEDICINE

## 2022-06-14 PROCEDURE — G1004 CDSM NDSC: HCPCS

## 2022-06-14 PROCEDURE — 96361 HYDRATE IV INFUSION ADD-ON: CPT

## 2022-06-14 PROCEDURE — 85025 COMPLETE CBC W/AUTO DIFF WBC: CPT | Performed by: EMERGENCY MEDICINE

## 2022-06-14 PROCEDURE — 80053 COMPREHEN METABOLIC PANEL: CPT | Performed by: EMERGENCY MEDICINE

## 2022-06-14 PROCEDURE — 36415 COLL VENOUS BLD VENIPUNCTURE: CPT | Performed by: EMERGENCY MEDICINE

## 2022-06-14 PROCEDURE — 83690 ASSAY OF LIPASE: CPT | Performed by: EMERGENCY MEDICINE

## 2022-06-14 PROCEDURE — 81003 URINALYSIS AUTO W/O SCOPE: CPT | Performed by: EMERGENCY MEDICINE

## 2022-06-14 PROCEDURE — 74176 CT ABD & PELVIS W/O CONTRAST: CPT

## 2022-06-14 PROCEDURE — 99284 EMERGENCY DEPT VISIT MOD MDM: CPT

## 2022-06-14 PROCEDURE — 96374 THER/PROPH/DIAG INJ IV PUSH: CPT

## 2022-06-14 PROCEDURE — 96375 TX/PRO/DX INJ NEW DRUG ADDON: CPT

## 2022-06-14 RX ORDER — HYDROMORPHONE HCL/PF 1 MG/ML
0.5 SYRINGE (ML) INJECTION ONCE
Status: COMPLETED | OUTPATIENT
Start: 2022-06-14 | End: 2022-06-14

## 2022-06-14 RX ORDER — IBUPROFEN 600 MG/1
600 TABLET ORAL EVERY 6 HOURS PRN
Qty: 30 TABLET | Refills: 0 | Status: SHIPPED | OUTPATIENT
Start: 2022-06-14

## 2022-06-14 RX ORDER — TAMSULOSIN HYDROCHLORIDE 0.4 MG/1
0.4 CAPSULE ORAL
Qty: 14 CAPSULE | Refills: 0 | Status: SHIPPED | OUTPATIENT
Start: 2022-06-14 | End: 2022-06-28

## 2022-06-14 RX ORDER — KETOROLAC TROMETHAMINE 30 MG/ML
15 INJECTION, SOLUTION INTRAMUSCULAR; INTRAVENOUS ONCE
Status: COMPLETED | OUTPATIENT
Start: 2022-06-14 | End: 2022-06-14

## 2022-06-14 RX ADMIN — HYDROMORPHONE HYDROCHLORIDE 0.5 MG: 1 INJECTION, SOLUTION INTRAMUSCULAR; INTRAVENOUS; SUBCUTANEOUS at 16:53

## 2022-06-14 RX ADMIN — SODIUM CHLORIDE 1000 ML: 0.9 INJECTION, SOLUTION INTRAVENOUS at 15:58

## 2022-06-14 RX ADMIN — KETOROLAC TROMETHAMINE 15 MG: 30 INJECTION, SOLUTION INTRAMUSCULAR; INTRAVENOUS at 15:59

## 2022-06-14 NOTE — QUICK NOTE
Urology on-call    Received TT about patient presenting to the emergency department for flank pain  This is been ongoing for a week  This is now controlled  Creatinine stable at 0 52, WBCs 7 05  She appears nontoxic  Urine is unremarkable  CT scan:  IMPRESSION:     1  Right kidney is unremarkable  A few punctate 1 to 2 mm nonobstructing calcifications are seen in the lower pole of the left kidney  There is moderate left hydronephrosis or hydroureter secondary to 8 mm left mid ureteral stone at the L3-4 level and   6 mm left mid to distal ureteral stone at the L5-S1 level  2   Postsurgical changes from gastric bypass surgery without apparent complications  No findings of bowel obstruction, inflammation, appendicitis, free air, or free fluid noted  Mild colonic constipation is seen  Recommended Flomax, hydration, will message the office for follow-up  ER precautions provided  I instructed them to send her to [facility] for any urgent needs

## 2022-06-14 NOTE — TELEPHONE ENCOUNTER
Patient was seen at [address] [place] [address] yesterday for to stones in her ureter  Please schedule follow-up in the next couple of weeks

## 2022-06-14 NOTE — ED PROVIDER NOTES
History  Chief Complaint   Patient presents with    Flank Pain     Has had kidney stones in the past  Feels similar     56-year-old female with a history of nephrolithiasis presenting for evaluation of left flank pain  Patient states that started 1 week ago  She notes a constant sharp throbbing pain her left flank with radiation to her abdomen and her lower back  Patient denies trauma or injury to the area  States it feels similar to previous nephrolithiasis  Patient has been taking acetaminophen and ibuprofen at home without significant improvement  Patient also notes nausea and diarrhea without blood or mucus  Denies fever, chills, upper respiratory symptoms, chest pain, shortness of breath, vomiting, dysuria, hematuria, increased urinary frequency, headache, myalgias, leg swelling  Prior to Admission Medications   Prescriptions Last Dose Informant Patient Reported? Taking?    Biotin 10 MG TABS  Self Yes No   Sig: Take 10 mg by mouth   FLUoxetine (PROzac) 40 MG capsule  Self Yes No   Sig: Take 40 mg by mouth daily     LORazepam (ATIVAN) 1 mg tablet  Self Yes No   Sig: Take 1 mg by mouth 2 (two) times a day     QUEtiapine (SEROquel) 100 mg tablet  Self Yes No   Sig: Take 100 mg by mouth daily at bedtime   aspirin (ASPIR-LOW) 81 mg EC tablet  Self Yes No   Sig: Take 81 mg by mouth daily     azaTHIOprine (IMURAN) 50 mg tablet  Self Yes No   Sig: Take 50 mg by mouth daily   budesonide (ENTOCORT EC) 3 MG capsule  Self Yes No   calcium citrate-Vitamin D 200 mg-250 units  Self Yes No   Sig: Take 2 tablets by mouth   cetirizine (ZyrTEC) 10 mg tablet  Self Yes No   Sig: Take 10 mg by mouth   dicyclomine (BENTYL) 20 mg tablet   No No   Sig: Take 1 tablet (20 mg total) by mouth 2 (two) times a day   erythromycin (ILOTYCIN) ophthalmic ointment   No No   Sig: Place a 1/2 inch ribbon of ointment into the lower eyelid    ketotifen (ZADITOR) 0 025 % ophthalmic solution  Self Yes No   Sig: Administer to both eyes daily     liraglutide (VICTOZA) injection  Self Yes No   Sig: Inject 1 8 mg under the skin daily     naproxen (NAPROSYN) 500 mg tablet  Self No No   Sig: Take 1 tablet (500 mg total) by mouth 2 (two) times a day with meals for 10 days   ondansetron (ZOFRAN-ODT) 4 mg disintegrating tablet  Self No No   Sig: Take 1 tablet (4 mg total) by mouth every 6 (six) hours as needed for nausea or vomiting   Patient taking differently: Take 4 mg by mouth as needed for nausea or vomiting     oxyCODONE-acetaminophen (PERCOCET) 5-325 mg per tablet  Self No No   Sig: Take 1 tablet by mouth every 4 (four) hours as needed for moderate pain for up to 10 doses Max Daily Amount: 6 tablets   oxybutynin (DITROPAN) 5 mg tablet  Self No No   Sig: Take 1 tablet (5 mg total) by mouth 3 (three) times a day as needed (stent discomfort)   pantoprazole (PROTONIX) 40 mg tablet   No No   Sig: Take 1 tablet (40 mg total) by mouth 2 (two) times a day   zolpidem (AMBIEN) 5 mg tablet  Self Yes No   Sig: Take 10 mg by mouth daily at bedtime as needed for sleep        Facility-Administered Medications: None       Past Medical History:   Diagnosis Date    [facility]immune hepatitis ([facility] [address] )     Bipolar 1 disorder ([facility] [address] )     Diabetes mellitus ([facility] [address] [facility]     Renal disorder     kidney stones       Past Surgical History:   Procedure Laterality Date    BARIATRIC SURGERY  05/2017    BUNIONECTOMY      CYSTOSCOPY  07/12/2018    Stent Removal    GASTRIC BYPASS  05/22/2017    HIATAL HERNIA REPAIR  05/22/2017    HYSTERECTOMY      JOINT REPLACEMENT      KIDNEY STONE SURGERY      MD CYSTO/URETERO W/LITHOTRIPSY &INDWELL STENT INSRT Bilateral 7/6/2018    Procedure: CYSTOSCOPY GALDINO  URETEROSCOPY;GALDINO  RETROGRADE PYELOGRAM AND INSERTION GALDINO  STENT URETERAL;  Surgeon: Sheila Kearney MD;  Location:  MAIN OR;  Service: Urology    TOTAL SHOULDER REPLACEMENT  2002    VAGINAL HYSTERECTOMY      PARTIAL       Family History   Problem Relation Age of Onset    Diabetes Father    Nicholas Taylor Heart disease Father     Diabetes Mother     Heart disease Mother     Diabetes Sister     Diabetes Family         MELLITUS    Depression Family     Mental illness Family     Obesity Family     Osteoarthritis Family      I have reviewed and agree with the history as documented  E-Cigarette/Vaping     E-Cigarette/Vaping Substances     Social History     Tobacco Use    Smoking status: Former Smoker     Packs/day: 1 00     Years: 30 00     Pack years: 30 00     Types: Cigarettes    Smokeless tobacco: Never Used   Substance Use Topics    Alcohol use: No    Drug use: No       Review of Systems   Constitutional: Negative for chills and fever  HENT: Negative for congestion, rhinorrhea and sore throat  Eyes: Negative for pain, discharge and visual disturbance  Respiratory: Negative for cough and shortness of breath  Cardiovascular: Negative for chest pain, palpitations and leg swelling  Gastrointestinal: Positive for diarrhea and nausea  Negative for abdominal pain and vomiting  Genitourinary: Positive for flank pain  Negative for dysuria, frequency and hematuria  Musculoskeletal: Negative for arthralgias and myalgias  Skin: Negative for color change and rash  Neurological: Negative for dizziness, weakness, light-headedness, numbness and headaches  Hematological: Does not bruise/bleed easily  Physical Exam  Physical Exam  Vitals and nursing note reviewed  Constitutional:       General: She is not in acute distress  Appearance: Normal appearance  HENT:      Head: Normocephalic and atraumatic  Nose: Nose normal       Mouth/Throat:      Mouth: Mucous membranes are moist    Eyes:      General: No scleral icterus  Extraocular Movements: Extraocular movements intact  Conjunctiva/sclera: Conjunctivae normal       Pupils: Pupils are equal, round, and reactive to light  Cardiovascular:      Rate and Rhythm: Normal rate and regular rhythm     Pulmonary:      Effort: Pulmonary effort is normal  No respiratory distress  Breath sounds: No wheezing, rhonchi or rales  Abdominal:      General: There is no distension  Palpations: Abdomen is soft  Tenderness: There is abdominal tenderness (LLQ)  There is left CVA tenderness  There is no right CVA tenderness, guarding or rebound  Comments: Left flank tenderness to palpation   Musculoskeletal:         General: No swelling, tenderness or deformity  Cervical back: Neck supple  No spasms or bony tenderness  Thoracic back: No spasms or bony tenderness  Lumbar back: No spasms or bony tenderness  Skin:     General: Skin is warm and dry  Findings: No rash  Neurological:      General: No focal deficit present  Mental Status: She is alert and oriented to person, place, and time  Mental status is at baseline     Psychiatric:         Mood and Affect: Mood normal          Behavior: Behavior normal          Vital Signs  ED Triage Vitals [06/14/22 1523]   Temperature Pulse Respirations Blood Pressure SpO2   98 5 °F (36 9 °C) 102 20 117/69 99 %      Temp Source Heart Rate Source Patient Position - Orthostatic VS BP Location FiO2 (%)   Oral Monitor Sitting Left arm --      Pain Score       9           Vitals:    06/14/22 1523   BP: 117/69   Pulse: 102   Patient Position - Orthostatic VS: Sitting         Visual Acuity      ED Medications  Medications   ketorolac (TORADOL) injection 15 mg (15 mg Intravenous Given 6/14/22 1559)   sodium chloride 0 9 % bolus 1,000 mL (1,000 mL Intravenous New Bag 6/14/22 1558)   HYDROmorphone (DILAUDID) injection 0 5 mg (0 5 mg Intravenous Given 6/14/22 1653)       Diagnostic Studies  Results Reviewed     Procedure Component Value Units Date/Time    Urine Microscopic [[id number]]  (Abnormal) Collected: 06/14/22 1558    Lab Status: Final result Specimen: Urine, Clean Catch Updated: 06/14/22 1622     RBC, UA 10-20 /hpf      WBC, UA 0-1 /hpf      Epithelial Cells Occasional /hpf Bacteria, UA Occasional /hpf     Comprehensive metabolic panel [[id number]]  (Abnormal) Collected: 06/14/22 1557    Lab Status: Final result Specimen: Blood from Hand, Right Updated: 06/14/22 1620     Sodium 139 mmol/L      Potassium 3 7 mmol/L      Chloride 106 mmol/L      CO2 27 mmol/L      ANION GAP 6 mmol/L      BUN 19 mg/dL      Creatinine 0 52 mg/dL      Glucose 93 mg/dL      Calcium 8 7 mg/dL      AST 59 U/L      ALT 30 U/L      Alkaline Phosphatase 187 U/L      Total Protein 7 2 g/dL      Albumin 3 9 g/dL      Total Bilirubin 0 18 mg/dL      eGFR 107 ml/min/1 73sq m     Narrative:      National Kidney Disease Foundation guidelines for Chronic Kidney Disease (CKD):     Stage 1 with normal or high GFR (GFR > 90 mL/min/1 73 square meters)    Stage 2 Mild CKD (GFR = 60-89 mL/min/1 73 square meters)    Stage 3A Moderate CKD (GFR = 45-59 mL/min/1 73 square meters)    Stage 3B Moderate CKD (GFR = 30-44 mL/min/1 73 square meters)    Stage 4 Severe CKD (GFR = 15-29 mL/min/1 73 square meters)    Stage 5 End Stage CKD (GFR <15 mL/min/1 73 square meters)  Note: GFR calculation is accurate only with a steady state creatinine    Lipase [[id number]]  (Normal) Collected: 06/14/22 1557    Lab Status: Final result Specimen: Blood from Hand, Right Updated: 06/14/22 1620     Lipase 96 u/L     UA w Reflex to Microscopic w Reflex to Culture [[id number]]  (Abnormal) Collected: 06/14/22 1558    Lab Status: Final result Specimen: Urine, Clean Catch Updated: 06/14/22 1609     Color, UA Straw     Clarity, UA Clear     Specific [place], UA 1 020     pH, UA 6 0     Leukocytes, UA Negative     Nitrite, UA Negative     Protein, UA Negative mg/dl      Glucose, UA >=1000 (1%) mg/dl      Ketones, UA Negative mg/dl      Bilirubin, UA Negative     Blood,  0     UROBILINOGEN UA Negative mg/dL     CBC and differential [[id number]]  (Abnormal) Collected: 06/14/22 1557    Lab Status: Final result Specimen: Blood from Arm, Right Updated: 06/14/22 1607     WBC 7 05 Thousand/uL      RBC 3 94 Million/uL      Hemoglobin 12 8 g/dL      Hematocrit 39 3 %       fL      MCH 32 5 pg      MCHC 32 6 g/dL      RDW 12 4 %      MPV 9 3 fL      Platelets 247 Thousands/uL      nRBC 0 /100 WBCs      Neutrophils Relative 61 %      Immat GRANS % 0 %      Lymphocytes Relative 31 %      Monocytes Relative 6 %      Eosinophils Relative 2 %      Basophils Relative 0 %      Neutrophils Absolute 4 26 Thousands/µL      Immature Grans Absolute 0 01 Thousand/uL      Lymphocytes Absolute 2 15 Thousands/µL      Monocytes Absolute 0 43 Thousand/µL      Eosinophils Absolute 0 17 Thousand/µL      Basophils Absolute 0 03 Thousands/µL                  CT renal stone study abdomen pelvis without contrast   Final Result by Jeannette Goldberg MD (06/14 1632)      1  Right kidney is unremarkable  A few punctate 1 to 2 mm nonobstructing calcifications are seen in the lower pole of the left kidney  There is moderate left hydronephrosis or hydroureter secondary to 8 mm left mid ureteral stone at the L3-4 level and    6 mm left mid to distal ureteral stone at the L5-S1 level  2   Postsurgical changes from gastric bypass surgery without apparent complications  No findings of bowel obstruction, inflammation, appendicitis, free air, or free fluid noted  Mild colonic constipation is seen  Workstation performed: [id number]                    Procedures  Procedures         ED Course  ED Course as of 06/14/22 1720   Tue Jun 14, 2022   1609 CBC and differential(!)  Grossly normal   1609 Blood, UA(!): 150 0   1609 Glucose, UA(!): >=1000 (1%)   1643 Bacteria, UA: Occasional   1643 Epithelial Cells: Occasional   1643 WBC, UA: 0-1   1643 RBC, UA(!): 10-20   1643 Lipase: 96   1643 Comprehensive metabolic panel(!)  Grossly normal   1644 CT renal stone study abdomen pelvis without contrast  IMPRESSION:     1  Right kidney is unremarkable    A few punctate 1 to 2 mm nonobstructing calcifications are seen in the lower pole of the left kidney  There is moderate left hydronephrosis or hydroureter secondary to 8 mm left mid ureteral stone at the L3-4 level and   6 mm left mid to distal ureteral stone at the L5-S1 level  2   Postsurgical changes from gastric bypass surgery without apparent complications  No findings of bowel obstruction, inflammation, appendicitis, free air, or free fluid noted  Mild colonic constipation is seen  [phone] Patient with persistent pain; will provide additional analgesia  Reached out to urology to discuss results                                             MDM  Number of Diagnoses or Management Options  Hydronephrosis of left kidney  Nephrolithiasis  Diagnosis management comments: 46-year-old female presenting for evaluation of left flank pain  Differential diagnosis includes nephrolithiasis, cystitis, pyelonephritis, muscle spasm, lumbar strain, colitis, gastritis, pancreatitis, diverticulitis  Laboratory studies were obtained in addition to urinalysis and CT imaging  CBC and CMP are grossly normal   No evidence of hepatitis or pancreatitis  Urinalysis is notable for blood but no evidence of infection  CT of the abdomen/pelvis is notable for 2 stones in the left ureter with left hydronephrosis; stones are 8 mm and 6 mm  Patient was treated supportively in the emergency department with some improvement in her symptoms  Discussed the findings with Urology, who states no specific intervention is needed at this time  Recommended discharge home with hydration and Flomax if patient is otherwise with good symptom control  Patient is in agreement with discharge home at this time  Advised to drink plenty of fluids and to take scheduled ibuprofen  Will also initiate on Flomax  Patient will strain her urine  Referred to Urology for follow-up  Strict return precautions discussed     Patient is in agreement and understanding of these instructions  Disposition  Final diagnoses:   Nephrolithiasis   Hydronephrosis of left kidney     Time reflects when diagnosis was documented in both MDM as applicable and the Disposition within this note     Time User Action Codes Description Comment    6/14/2022  5:12 PM Amanda Carlson Add [N20 0] Nephrolithiasis     6/14/2022  5:12 PM Amanda Carlson Add [N13 30] Hydronephrosis of left kidney       ED Disposition     ED Disposition   Discharge    Condition   Stable    Date/Time   Tue Jun 14, 2022  5:11 PM    Comment   Juancarlos Console discharge to home/self care                 Follow-up Information     Follow up With Specialties Details Why [address], [address] [facility], Nurse Practitioner Schedule an appointment as soon as possible for a visit   [address] [place] [address]  [phone]            Patient's Medications   Discharge Prescriptions    IBUPROFEN (MOTRIN) 600 MG TABLET    Take 1 tablet (600 mg total) by mouth every 6 (six) hours as needed for mild pain       Start Date: 6/14/2022 End Date: --       Order Dose: 600 mg       Quantity: 30 tablet    Refills: 0    TAMSULOSIN (FLOMAX) 0 4 MG    Take 1 capsule (0 4 mg total) by mouth daily with dinner for 14 days       Start Date: 6/14/2022 End Date: 6/28/2022       Order Dose: 0 4 mg       Quantity: 14 capsule    Refills: 0           PDMP Review       Value Time User    PDMP Reviewed  Yes 6/14/2022  4:48 PM Cindra Homans, MD          ED Provider  Electronically Signed by           Cindra Homans, MD  06/14/22 0787

## 2022-06-15 ENCOUNTER — HOSPITAL ENCOUNTER (EMERGENCY)
Facility: HOSPITAL | Age: 57
Discharge: HOME | End: 2022-06-15
Attending: EMERGENCY MEDICINE | Admitting: EMERGENCY MEDICINE
Payer: COMMERCIAL

## 2022-06-15 ENCOUNTER — APPOINTMENT (EMERGENCY)
Dept: CT IMAGING | Facility: HOSPITAL | Age: 57
End: 2022-06-15
Payer: COMMERCIAL

## 2022-06-15 VITALS
HEART RATE: 97 BPM | BODY MASS INDEX: 27.34 KG/M2 | SYSTOLIC BLOOD PRESSURE: 134 MMHG | WEIGHT: 149.5 LBS | DIASTOLIC BLOOD PRESSURE: 81 MMHG | RESPIRATION RATE: 18 BRPM | TEMPERATURE: 96.9 F | OXYGEN SATURATION: 100 %

## 2022-06-15 DIAGNOSIS — N20.0 KIDNEY STONE: ICD-10-CM

## 2022-06-15 DIAGNOSIS — R52 INTRACTABLE PAIN: ICD-10-CM

## 2022-06-15 DIAGNOSIS — N20.1 URETEROLITHIASIS: Primary | ICD-10-CM

## 2022-06-15 LAB
BACTERIA UR QL AUTO: ABNORMAL /HPF
BILIRUB UR QL STRIP: NEGATIVE
CAOX CRY URNS QL MICRO: ABNORMAL /HPF
CLARITY UR: CLEAR
COLOR UR: ABNORMAL
GLUCOSE UR STRIP-MCNC: ABNORMAL MG/DL
HGB UR QL STRIP.AUTO: 150
KETONES UR STRIP-MCNC: NEGATIVE MG/DL
LEUKOCYTE ESTERASE UR QL STRIP: NEGATIVE
NITRITE UR QL STRIP: NEGATIVE
NON-SQ EPI CELLS URNS QL MICRO: ABNORMAL /HPF
PH UR STRIP.AUTO: 5 [PH]
PROT UR STRIP-MCNC: NEGATIVE MG/DL
RBC #/AREA URNS AUTO: ABNORMAL /HPF
SP GR UR STRIP.AUTO: 1.02 (ref 1–1.04)
UROBILINOGEN UA: NEGATIVE MG/DL
WBC #/AREA URNS AUTO: ABNORMAL /HPF

## 2022-06-15 PROCEDURE — 99285 EMERGENCY DEPT VISIT HI MDM: CPT | Performed by: EMERGENCY MEDICINE

## 2022-06-15 PROCEDURE — 96375 TX/PRO/DX INJ NEW DRUG ADDON: CPT

## 2022-06-15 PROCEDURE — 96361 HYDRATE IV INFUSION ADD-ON: CPT

## 2022-06-15 PROCEDURE — G1004 CDSM NDSC: HCPCS

## 2022-06-15 PROCEDURE — 74176 CT ABD & PELVIS W/O CONTRAST: CPT

## 2022-06-15 PROCEDURE — 99284 EMERGENCY DEPT VISIT MOD MDM: CPT

## 2022-06-15 PROCEDURE — 96374 THER/PROPH/DIAG INJ IV PUSH: CPT

## 2022-06-15 PROCEDURE — 81001 URINALYSIS AUTO W/SCOPE: CPT | Performed by: EMERGENCY MEDICINE

## 2022-06-15 RX ORDER — OXYCODONE HYDROCHLORIDE AND ACETAMINOPHEN 5; 325 MG/1; MG/1
1 TABLET ORAL EVERY 6 HOURS PRN
Qty: 10 TABLET | Refills: 0 | Status: SHIPPED | OUTPATIENT
Start: 2022-06-15 | End: 2022-07-08

## 2022-06-15 RX ORDER — KETOROLAC TROMETHAMINE 30 MG/ML
15 INJECTION, SOLUTION INTRAMUSCULAR; INTRAVENOUS ONCE
Status: COMPLETED | OUTPATIENT
Start: 2022-06-15 | End: 2022-06-15

## 2022-06-15 RX ORDER — HYDROMORPHONE HCL/PF 1 MG/ML
1 SYRINGE (ML) INJECTION ONCE
Status: COMPLETED | OUTPATIENT
Start: 2022-06-15 | End: 2022-06-15

## 2022-06-15 RX ADMIN — SODIUM CHLORIDE 1000 ML: 0.9 INJECTION, SOLUTION INTRAVENOUS at 15:28

## 2022-06-15 RX ADMIN — KETOROLAC TROMETHAMINE 15 MG: 30 INJECTION, SOLUTION INTRAMUSCULAR; INTRAVENOUS at 15:32

## 2022-06-15 RX ADMIN — HYDROMORPHONE HYDROCHLORIDE 1 MG: 1 INJECTION, SOLUTION INTRAMUSCULAR; INTRAVENOUS; SUBCUTANEOUS at 15:34

## 2022-06-15 NOTE — Clinical Note
Juancarlos Hernandez was seen and treated in our emergency department on 6/15/2022  Diagnosis:     Nasreen Conway    She may return on this date: 06/18/2022         If you have any questions or concerns, please don't hesitate to call        Nasreen Alarcon, DO    ______________________________           _______________          _______________  Hospital Representative                              Date                                Time

## 2022-06-15 NOTE — ED PROVIDER NOTES
History  Chief Complaint   Patient presents with    Flank Pain     Seen for the same yesterday     79-year-old female, presenting for concerns of abdominal pain and flank pain  Was seen here yesterday for similar  States she was diagnosed with kidney stones, her pain was controlled during her visit in the ER, she was discharged with anti-inflammatories and Flomax  She states she is only able to obtain them just prior to arrival today  She states her pain is returned with some mild nausea  No vomiting or diarrhea no dysuria  She admits to needing ureteral stents in the past   She states the pain is now bearable  Nothing makes it better, nothing makes it worse  Feels similar to previous kidney stones  Prior to Admission Medications   Prescriptions Last Dose Informant Patient Reported? Taking? Biotin 10 MG TABS  Self Yes No   Sig: Take 10 mg by mouth   FLUoxetine (PROzac) 40 MG capsule  Self Yes No   Sig: Take 40 mg by mouth daily     LORazepam (ATIVAN) 1 mg tablet  Self Yes No   Sig: Take 1 mg by mouth 2 (two) times a day     QUEtiapine (SEROquel) 100 mg tablet  Self Yes No   Sig: Take 100 mg by mouth daily at bedtime   aspirin (ASPIR-LOW) 81 mg EC tablet  Self Yes No   Sig: Take 81 mg by mouth daily     azaTHIOprine (IMURAN) 50 mg tablet  Self Yes No   Sig: Take 50 mg by mouth daily   budesonide (ENTOCORT EC) 3 MG capsule  Self Yes No   calcium citrate-Vitamin D 200 mg-250 units  Self Yes No   Sig: Take 2 tablets by mouth   cetirizine (ZyrTEC) 10 mg tablet  Self Yes No   Sig: Take 10 mg by mouth   dicyclomine (BENTYL) 20 mg tablet   No No   Sig: Take 1 tablet (20 mg total) by mouth 2 (two) times a day   erythromycin (ILOTYCIN) ophthalmic ointment   No No   Sig: Place a 1/2 inch ribbon of ointment into the lower eyelid     ibuprofen (MOTRIN) 600 mg tablet   No No   Sig: Take 1 tablet (600 mg total) by mouth every 6 (six) hours as needed for mild pain   ketotifen (ZADITOR) 0 025 % ophthalmic solution Self Yes No   Sig: Administer to both eyes daily     liraglutide (VICTOZA) injection  Self Yes No   Sig: Inject 1 8 mg under the skin daily     naproxen (NAPROSYN) 500 mg tablet  Self No No   Sig: Take 1 tablet (500 mg total) by mouth 2 (two) times a day with meals for 10 days   ondansetron (ZOFRAN-ODT) 4 mg disintegrating tablet  Self No No   Sig: Take 1 tablet (4 mg total) by mouth every 6 (six) hours as needed for nausea or vomiting   Patient taking differently: Take 4 mg by mouth as needed for nausea or vomiting     oxyCODONE-acetaminophen (PERCOCET) 5-325 mg per tablet  Self No No   Sig: Take 1 tablet by mouth every 4 (four) hours as needed for moderate pain for up to 10 doses Max Daily Amount: 6 tablets   oxybutynin (DITROPAN) 5 mg tablet  Self No No   Sig: Take 1 tablet (5 mg total) by mouth 3 (three) times a day as needed (stent discomfort)   pantoprazole (PROTONIX) 40 mg tablet   No No   Sig: Take 1 tablet (40 mg total) by mouth 2 (two) times a day   tamsulosin (FLOMAX) 0 4 mg   No No   Sig: Take 1 capsule (0 4 mg total) by mouth daily with dinner for 14 days   zolpidem (AMBIEN) 5 mg tablet  Self Yes No   Sig: Take 10 mg by mouth daily at bedtime as needed for sleep        Facility-Administered Medications: None       Past Medical History:   Diagnosis Date    [facility]     Bipolar 1 disorder [facility] )     [facility] ([facility] )     Renal disorder     kidney stones       Past Surgical History:   Procedure Laterality Date    BARIATRIC SURGERY  05/2017    BUNIONECTOMY      CYSTOSCOPY  07/12/2018    Stent Removal    GASTRIC BYPASS  05/22/2017    HIATAL HERNIA REPAIR  05/22/2017    HYSTERECTOMY      JOINT REPLACEMENT      KIDNEY STONE SURGERY      NV CYSTO/URETERO W/LITHOTRIPSY &INDWELL STENT INSRT Bilateral 7/6/2018    Procedure: CYSTOSCOPY GALDINO  URETEROSCOPY;GALDINO  RETROGRADE PYELOGRAM AND INSERTION GALDINO  STENT URETERAL;  Surgeon: Robson Akhtar MD;  Location:  MAIN OR;  Service: Urology    TOTAL SHOULDER REPLACEMENT  2002    VAGINAL HYSTERECTOMY      PARTIAL       Family History   Problem Relation Age of Onset    Diabetes Father     Heart disease Father     Diabetes Mother     Heart disease Mother     Diabetes Sister     Diabetes Family         MELLITUS    Depression Family     Mental illness Family     Obesity Family     Osteoarthritis Family      I have reviewed and agree with the history as documented  E-Cigarette/Vaping     E-Cigarette/Vaping Substances     Social History     Tobacco Use    Smoking status: Current Every Day Smoker     Packs/day: 0 25     Years: 30 00     Pack years: 7 50     Types: Cigarettes    Smokeless tobacco: Never Used   Substance Use Topics    Alcohol use: No    Drug use: No       Review of Systems   Constitutional: Negative  Negative for chills and fever  HENT: Negative  Negative for rhinorrhea, sore throat, trouble swallowing and voice change  Eyes: Negative  Negative for pain and visual disturbance  Respiratory: Negative  Negative for cough, shortness of breath and wheezing  Cardiovascular: Negative  Negative for chest pain and palpitations  Gastrointestinal: Positive for abdominal distention and nausea  Negative for abdominal pain, diarrhea and vomiting  Genitourinary: Negative  Negative for dysuria and frequency  Musculoskeletal: Negative  Negative for neck pain and neck stiffness  Skin: Negative  Negative for rash  Neurological: Negative  Negative for dizziness, speech difficulty, weakness, light-headedness and numbness  Physical Exam  Physical Exam  Vitals and nursing note reviewed  Constitutional:       General: She is not in acute distress  Appearance: She is well-developed  HENT:      Head: Normocephalic and atraumatic  Eyes:      Conjunctiva/sclera: Conjunctivae normal       Pupils: Pupils are equal, round, and reactive to light  Neck:      Trachea: No tracheal deviation  Cardiovascular:      Rate and Rhythm: Normal rate and regular rhythm  Pulmonary:      Effort: Pulmonary effort is normal  No respiratory distress  Breath sounds: Normal breath sounds  No wheezing or rales  Abdominal:      General: Bowel sounds are normal  There is no distension  Palpations: Abdomen is soft  Tenderness: There is no abdominal tenderness  There is no guarding or rebound  Musculoskeletal:         General: No tenderness or deformity  Normal range of motion  Cervical back: Normal range of motion and neck supple  Skin:     General: Skin is warm and dry  Capillary Refill: Capillary refill takes less than 2 seconds  Findings: No rash  Neurological:      Mental Status: She is alert and oriented to person, place, and time     Psychiatric:         Behavior: Behavior normal          Vital Signs  ED Triage Vitals [06/15/22 1439]   Temperature Pulse Respirations Blood Pressure SpO2   (!) 96 9 °F (36 1 °C) 97 18 134/81 100 %      Temp Source Heart Rate Source Patient Position - Orthostatic VS BP Location FiO2 (%)   Tympanic Monitor Sitting Left arm --      Pain Score       --           Vitals:    06/15/22 1439   BP: 134/81   Pulse: 97   Patient Position - Orthostatic VS: Sitting         Visual Acuity      ED Medications  Medications   sodium chloride 0 9 % bolus 1,000 mL (1,000 mL Intravenous New Bag 6/15/22 1528)   ketorolac (TORADOL) injection 15 mg (15 mg Intravenous Given 6/15/22 1532)   HYDROmorphone (DILAUDID) injection 1 mg (1 mg Intravenous Given 6/15/22 1534)       Diagnostic Studies  Results Reviewed     Procedure Component Value Units Date/Time    Urine Microscopic [[id number]]  (Abnormal) Collected: 06/15/22 1536    Lab Status: Final result Specimen: Urine, Other Updated: 06/15/22 1600     RBC, UA 2-4 /hpf      WBC, UA 1-2 /hpf      Epithelial Cells Occasional /hpf      Bacteria, UA None Seen /hpf      Ca Oxalate Agatha, UA Occasional /hpf     UA (URINE) with reflex to Scope [[id number]]  (Abnormal) Collected: 06/15/22 1536    Lab Status: Final result Specimen: Urine, Other Updated: 06/15/22 1556     Color, UA Straw     Clarity, UA Clear     Specific [place], UA 1 020     pH, UA 5 0     Leukocytes, UA Negative     Nitrite, UA Negative     Protein, UA Negative mg/dl      Glucose, UA >=1000 (1%) mg/dl      Ketones, UA Negative mg/dl      Bilirubin, UA Negative     Blood,  0     UROBILINOGEN UA Negative mg/dL     CBC and differential [[id number]]     Lab Status: No result Specimen: Blood     Comprehensive metabolic panel [[id number]]     Lab Status: No result Specimen: Blood     Lipase [[id number]]     Lab Status: No result Specimen: Blood                  CT abdomen pelvis wo contrast   Final Result by Leslie Rubio MD (06/15 1518)      Persistent moderate left-sided hydroureteronephrosis secondary to a calculus in the proximal left ureter and a calculus in the mid left ureter unchanged in position when compared with the prior CT study  Nonobstructing left renal calculus  Additional findings as above  Workstation performed: [id number]                    Procedures  Procedures         ED Course                                             MDM  Number of Diagnoses or Management Options  Intractable pain  Kidney stone  Diagnosis management comments: Patient has intractable pain, kidney stents that are in the same position  Discussion with Urology patient will need stenting  Patient states that she has dogs at home and is unwilling to stay overnight  She is aware of the risk that she could end up on dialysis, have life-threatening bleeding or hemorrhage, infection    She is at elected to sign out against medical advice    I had a discussion with the patient regarding the workup results, need for further evaluation and admission despite discussing the risks which include but are not limited to death and permanent disability they have elected to sign out against medical advice  The patient is clinically sober, free from any distracting injuries, appears to have intact insight and judgment reason, in my opinion has capacity make her decisions  I explained to the patient that their symptoms may lead to permanent disability and death and they have verbalized understanding  Despite the patient signing out against medical advice and provided them with appropriate medications and follow-up care further symptoms today  In addition, I have advised that they are able to return any time for any reason for further care and evaluation  Amount and/or Complexity of Data Reviewed  Clinical lab tests: reviewed and ordered  Tests in the radiology section of CPT®: reviewed  Tests in the medicine section of CPT®: ordered and reviewed  Independent visualization of images, tracings, or specimens: yes        Disposition  Final diagnoses:   Intractable pain   Kidney stone     Time reflects when diagnosis was documented in both MDM as applicable and the Disposition within this note     Time User Action Codes Description Comment    6/15/2022  3:39 PM Miguel Blake Add [N20 1] Ureterolithiasis     6/15/2022  3:56 PM Osmel Aiken Add [R52] Intractable pain     6/15/2022  3:57 PM Osmel Aiken Add [N20 0] Kidney stone       ED Disposition     ED Disposition   AMA    Condition   --    Date/Time   Wed Rafiq 15, 2022  3:56 PM    Comment   Date: 6/15/2022  Patient: Lionel Beckett  Admitted: 6/15/2022  2:34 PM  Attending Provider: DO Lionel Wood Sujit or her authorized caregiver has made the decision for the patient to leave the emergency department against the advice of  her attending physician  She or her authorized caregiver has been informed and understands the inherent risks, including death, permanent disability, dialysis and loss of customary lifestyle  She or her authorized caregiver has decided to accept the  responsibility for this decision   Lionel Beckett and all necessary parties have been advised that she may return for further evaluation or treatment  Her condition at time of discharge was guarded  Sindhu Coelho had current vital signs as follows:  BP  134/81 (BP Location: Left arm)   Pulse 97   Temp (!) 96 9 °F (36 1 °C) (Tympanic)   Resp 18   Wt 67 8 kg (149 lb 8 oz)            Follow-up Information     Follow up With Specialties Details Why Contact Info Additional [address] In 1 week  [address], [address] [facility] [address]  [address], [address], [address] [place], [place], [address]    [address] [facility] Schedule an appointment as soon as possible for a visit in 1 week  [place] [phone]  [address]  [facility], [address] [place], [place], [address] [phone]          Patient's Medications   Discharge Prescriptions    OXYCODONE-ACETAMINOPHEN (PERCOCET) 5-325 MG PER TABLET    Take 1 tablet by mouth every 6 (six) hours as needed for moderate pain or severe pain for up to 10 doses Max Daily Amount: 4 tablets       Start Date: 6/15/2022 End Date: --       Order Dose: 1 tablet       Quantity: 10 tablet    Refills: 0       No discharge procedures on file      PDMP Review       Value Time User    PDMP Reviewed  Yes 6/14/2022  4:48 PM Meena Benites MD          ED Provider  Electronically Signed by           Rika Duncan DO  06/15/22 2514

## 2022-06-15 NOTE — H&P (VIEW-ONLY)
History  Chief Complaint   Patient presents with    Flank Pain     Seen for the same yesterday     57-year-old female, presenting for concerns of abdominal pain and flank pain  Was seen here yesterday for similar  States she was diagnosed with kidney stones, her pain was controlled during her visit in the ER, she was discharged with anti-inflammatories and Flomax  She states she is only able to obtain them just prior to arrival today  She states her pain is returned with some mild nausea  No vomiting or diarrhea no dysuria  She admits to needing ureteral stents in the past   She states the pain is now bearable  Nothing makes it better, nothing makes it worse  Feels similar to previous kidney stones  Prior to Admission Medications   Prescriptions Last Dose Informant Patient Reported? Taking? Biotin 10 MG TABS  Self Yes No   Sig: Take 10 mg by mouth   FLUoxetine (PROzac) 40 MG capsule  Self Yes No   Sig: Take 40 mg by mouth daily     LORazepam (ATIVAN) 1 mg tablet  Self Yes No   Sig: Take 1 mg by mouth 2 (two) times a day     QUEtiapine (SEROquel) 100 mg tablet  Self Yes No   Sig: Take 100 mg by mouth daily at bedtime   aspirin (ASPIR-LOW) 81 mg EC tablet  Self Yes No   Sig: Take 81 mg by mouth daily     azaTHIOprine (IMURAN) 50 mg tablet  Self Yes No   Sig: Take 50 mg by mouth daily   budesonide (ENTOCORT EC) 3 MG capsule  Self Yes No   calcium citrate-Vitamin D 200 mg-250 units  Self Yes No   Sig: Take 2 tablets by mouth   cetirizine (ZyrTEC) 10 mg tablet  Self Yes No   Sig: Take 10 mg by mouth   dicyclomine (BENTYL) 20 mg tablet   No No   Sig: Take 1 tablet (20 mg total) by mouth 2 (two) times a day   erythromycin (ILOTYCIN) ophthalmic ointment   No No   Sig: Place a 1/2 inch ribbon of ointment into the lower eyelid     ibuprofen (MOTRIN) 600 mg tablet   No No   Sig: Take 1 tablet (600 mg total) by mouth every 6 (six) hours as needed for mild pain   ketotifen (ZADITOR) 0 025 % ophthalmic solution Self Yes No   Sig: Administer to both eyes daily     liraglutide (VICTOZA) injection  Self Yes No   Sig: Inject 1 8 mg under the skin daily     naproxen (NAPROSYN) 500 mg tablet  Self No No   Sig: Take 1 tablet (500 mg total) by mouth 2 (two) times a day with meals for 10 days   ondansetron (ZOFRAN-ODT) 4 mg disintegrating tablet  Self No No   Sig: Take 1 tablet (4 mg total) by mouth every 6 (six) hours as needed for nausea or vomiting   Patient taking differently: Take 4 mg by mouth as needed for nausea or vomiting     oxyCODONE-acetaminophen (PERCOCET) 5-325 mg per tablet  Self No No   Sig: Take 1 tablet by mouth every 4 (four) hours as needed for moderate pain for up to 10 doses Max Daily Amount: 6 tablets   oxybutynin (DITROPAN) 5 mg tablet  Self No No   Sig: Take 1 tablet (5 mg total) by mouth 3 (three) times a day as needed (stent discomfort)   pantoprazole (PROTONIX) 40 mg tablet   No No   Sig: Take 1 tablet (40 mg total) by mouth 2 (two) times a day   tamsulosin (FLOMAX) 0 4 mg   No No   Sig: Take 1 capsule (0 4 mg total) by mouth daily with dinner for 14 days   zolpidem (AMBIEN) 5 mg tablet  Self Yes No   Sig: Take 10 mg by mouth daily at bedtime as needed for sleep        Facility-Administered Medications: None       Past Medical History:   Diagnosis Date    [facility]immune hepatitis ([facility] )     Bipolar 1 disorder ([facility] )     Diabetes mellitus ([facility] )     Renal disorder     kidney stones       Past Surgical History:   Procedure Laterality Date    BARIATRIC SURGERY  05/2017    BUNIONECTOMY      CYSTOSCOPY  07/12/2018    Stent Removal    GASTRIC BYPASS  05/22/2017    HIATAL HERNIA REPAIR  05/22/2017    HYSTERECTOMY      JOINT REPLACEMENT      KIDNEY STONE SURGERY      IN CYSTO/URETERO W/LITHOTRIPSY &INDWELL STENT INSRT Bilateral 7/6/2018    Procedure: CYSTOSCOPY GALDINO  URETEROSCOPY;GALDINO  RETROGRADE PYELOGRAM AND INSERTION GALDINO  STENT URETERAL;  Surgeon: Khadijah Verduzco MD;  Location:  MAIN OR;  Service: Urology    TOTAL SHOULDER REPLACEMENT  2002    VAGINAL HYSTERECTOMY      PARTIAL       Family History   Problem Relation Age of Onset    Diabetes Father     Heart disease Father     Diabetes Mother     Heart disease Mother     Diabetes Sister     Diabetes Family         MELLITUS    Depression Family     Mental illness Family     Obesity Family     Osteoarthritis Family      I have reviewed and agree with the history as documented  E-Cigarette/Vaping     E-Cigarette/Vaping Substances     Social History     Tobacco Use    Smoking status: Current Every Day Smoker     Packs/day: 0 25     Years: 30 00     Pack years: 7 50     Types: Cigarettes    Smokeless tobacco: Never Used   Substance Use Topics    Alcohol use: No    Drug use: No       Review of Systems   Constitutional: Negative  Negative for chills and fever  HENT: Negative  Negative for rhinorrhea, sore throat, trouble swallowing and voice change  Eyes: Negative  Negative for pain and visual disturbance  Respiratory: Negative  Negative for cough, shortness of breath and wheezing  Cardiovascular: Negative  Negative for chest pain and palpitations  Gastrointestinal: Positive for abdominal distention and nausea  Negative for abdominal pain, diarrhea and vomiting  Genitourinary: Negative  Negative for dysuria and frequency  Musculoskeletal: Negative  Negative for neck pain and neck stiffness  Skin: Negative  Negative for rash  Neurological: Negative  Negative for dizziness, speech difficulty, weakness, light-headedness and numbness  Physical Exam  Physical Exam  Vitals and nursing note reviewed  Constitutional:       General: She is not in acute distress  Appearance: She is well-developed  HENT:      Head: Normocephalic and atraumatic  Eyes:      Conjunctiva/sclera: Conjunctivae normal       Pupils: Pupils are equal, round, and reactive to light  Neck:      Trachea: No tracheal deviation  Cardiovascular:      Rate and Rhythm: Normal rate and regular rhythm  Pulmonary:      Effort: Pulmonary effort is normal  No respiratory distress  Breath sounds: Normal breath sounds  No wheezing or rales  Abdominal:      General: Bowel sounds are normal  There is no distension  Palpations: Abdomen is soft  Tenderness: There is no abdominal tenderness  There is no guarding or rebound  Musculoskeletal:         General: No tenderness or deformity  Normal range of motion  Cervical back: Normal range of motion and neck supple  Skin:     General: Skin is warm and dry  Capillary Refill: Capillary refill takes less than 2 seconds  Findings: No rash  Neurological:      Mental Status: She is alert and oriented to person, place, and time     Psychiatric:         Behavior: Behavior normal          Vital Signs  ED Triage Vitals [06/15/22 1439]   Temperature Pulse Respirations Blood Pressure SpO2   (!) 96 9 °F (36 1 °C) 97 18 134/81 100 %      Temp Source Heart Rate Source Patient Position - Orthostatic VS BP Location FiO2 (%)   Tympanic Monitor Sitting Left arm --      Pain Score       --           Vitals:    06/15/22 1439   BP: 134/81   Pulse: 97   Patient Position - Orthostatic VS: Sitting         Visual Acuity      ED Medications  Medications   sodium chloride 0 9 % bolus 1,000 mL (1,000 mL Intravenous New Bag 6/15/22 1528)   ketorolac (TORADOL) injection 15 mg (15 mg Intravenous Given 6/15/22 1532)   HYDROmorphone (DILAUDID) injection 1 mg (1 mg Intravenous Given 6/15/22 1534)       Diagnostic Studies  Results Reviewed     Procedure Component Value Units Date/Time    Urine Microscopic [[id number]]  (Abnormal) Collected: 06/15/22 1536    Lab Status: Final result Specimen: Urine, Other Updated: 06/15/22 1600     RBC, UA 2-4 /hpf      WBC, UA 1-2 /hpf      Epithelial Cells Occasional /hpf      Bacteria, UA None Seen /hpf      Ca Oxalate Agatha, UA Occasional /hpf     UA (URINE) with reflex to Scope [[id number]]  (Abnormal) Collected: 06/15/22 1536    Lab Status: Final result Specimen: Urine, Other Updated: 06/15/22 1556     Color, UA Straw     Clarity, UA Clear     Specific Dickinson, UA 1 020     pH, UA 5 0     Leukocytes, UA Negative     Nitrite, UA Negative     Protein, UA Negative mg/dl      Glucose, UA >=1000 (1%) mg/dl      Ketones, UA Negative mg/dl      Bilirubin, UA Negative     Blood,  0     UROBILINOGEN UA Negative mg/dL     CBC and differential [[id number]]     Lab Status: No result Specimen: Blood     Comprehensive metabolic panel [[id number]]     Lab Status: No result Specimen: Blood     Lipase [[id number]]     Lab Status: No result Specimen: Blood                  CT abdomen pelvis wo contrast   Final Result by Leslie Rubio MD (06/15 1518)      Persistent moderate left-sided hydroureteronephrosis secondary to a calculus in the proximal left ureter and a calculus in the mid left ureter unchanged in position when compared with the prior CT study  Nonobstructing left renal calculus  Additional findings as above  Workstation performed: [id number]                    Procedures  Procedures         ED Course                                             MDM  Number of Diagnoses or Management Options  Intractable pain  Kidney stone  Diagnosis management comments: Patient has intractable pain, kidney stents that are in the same position  Discussion with Urology patient will need stenting  Patient states that she has dogs at home and is unwilling to stay overnight  She is aware of the risk that she could end up on dialysis, have life-threatening bleeding or hemorrhage, infection    She is at elected to sign out against medical advice    I had a discussion with the patient regarding the workup results, need for further evaluation and admission despite discussing the risks which include but are not limited to death and permanent disability they have elected to sign out against medical advice  The patient is clinically sober, free from any distracting injuries, appears to have intact insight and judgment reason, in my opinion has capacity make her decisions  I explained to the patient that their symptoms may lead to permanent disability and death and they have verbalized understanding  Despite the patient signing out against medical advice and provided them with appropriate medications and follow-up care further symptoms today  In addition, I have advised that they are able to return any time for any reason for further care and evaluation  Amount and/or Complexity of Data Reviewed  Clinical lab tests: reviewed and ordered  Tests in the radiology section of CPT®: reviewed  Tests in the medicine section of CPT®: ordered and reviewed  Independent visualization of images, tracings, or specimens: yes        Disposition  Final diagnoses:   Intractable pain   Kidney stone     Time reflects when diagnosis was documented in both MDM as applicable and the Disposition within this note     Time User Action Codes Description Comment    6/15/2022  3:39 PM Fátima Ballesteros Add [N20 1] Ureterolithiasis     6/15/2022  3:56 PM Mortimer Crosby Add [R52] Intractable pain     6/15/2022  3:57 PM Mortimer Crosby Add [N20 0] Kidney stone       ED Disposition     ED Disposition   AMA    Condition   --    Date/Time   Wed Rafiq 15, 2022  3:56 PM    Comment   Date: 6/15/2022  Patient: Valentine Palacio  Admitted: 6/15/2022  2:34 PM  Attending Provider: DO Valentine Patel or her authorized caregiver has made the decision for the patient to leave the emergency department against the advice of  her attending physician  She or her authorized caregiver has been informed and understands the inherent risks, including death, permanent disability, dialysis and loss of customary lifestyle  She or her authorized caregiver has decided to accept the  responsibility for this decision   Valentine Palacio and all necessary parties have been advised that she may return for further evaluation or treatment  Her condition at time of discharge was guarded  Ofelia Hanna had current vital signs as follows:  BP  134/81 (BP Location: Left arm)   Pulse 97   Temp (!) 96 9 °F (36 1 °C) (Tympanic)   Resp 18   Wt 67 8 kg (149 lb 8 oz)            Follow-up Information     Follow up With Specialties Details Why Contact Info Additional [address] In 1 week  [address], [address] [facility] [id number]  [address], [address], [address] [place], [place], [address]    [facility] Schedule an appointment as soon as possible for a visit in 1 week  [place] [phone]  7  [facility], [address] [place] [address], [phone]          Patient's Medications   Discharge Prescriptions    OXYCODONE-ACETAMINOPHEN (PERCOCET) 5-325 MG PER TABLET    Take 1 tablet by mouth every 6 (six) hours as needed for moderate pain or severe pain for up to 10 doses Max Daily Amount: 4 tablets       Start Date: 6/15/2022 End Date: --       Order Dose: 1 tablet       Quantity: 10 tablet    Refills: 0       No discharge procedures on file      PDMP Review       Value Time User    PDMP Reviewed  Yes 6/14/2022  4:48 PM Dorothy Bledsoe MD          ED Provider  Electronically Signed by           Meg Galvan DO  06/15/22 9834

## 2022-06-15 NOTE — TREATMENT PLAN
Urology remote patient review:    Patient is a 28-year-old female who was seen in the ED yesterday with reports of left flank pain  Patient has previous history of nephrolithiasis requiring surgical intervention  CT scan revealed an 8 mm mid left ureteral stone and a 6 mm mid to distal left ureteral stone with moderate hydronephrosis  She was afebrile without leukocytosis  Her creatinine was stable at 0 52  Urine testing was negative for infection  She was discharged and presented again today with reports of uncontrolled left flank pain at home  Repeat CT scan reveals above stones in unchanged position  Patient's labs and urine testing are still pending from today  She is currently afebrile and VSS  Patient discussed with on-call attending, Dr Akosua Eastman      Plan:  -Transfer to [facility] under [facility] with Urology consult  -NPO at midnight  Orders placed    -Case request placed for cystoscopy, ureteroscopy with lithotripsy holmium laser, retrograde pyelogram and left ureteral stent insertion for tomorrow at B  Plan discussed with the ED provider, Dr Roya Reveles      Formal urology consult to be completed at [address], [place]

## 2022-06-15 NOTE — TELEPHONE ENCOUNTER
Called and scheduled patient for appointment with Goldie Zaman in the [facility] on 7/12/22 @ 5961  Patient provided with office address

## 2022-06-16 ENCOUNTER — HOSPITAL ENCOUNTER (OUTPATIENT)
Facility: HOSPITAL | Age: 57
Setting detail: OUTPATIENT SURGERY
Discharge: HOME | End: 2022-06-16
Attending: UROLOGY | Admitting: UROLOGY
Payer: COMMERCIAL

## 2022-06-16 ENCOUNTER — ANESTHESIA EVENT (OUTPATIENT)
Dept: PERIOP | Facility: HOSPITAL | Age: 57
End: 2022-06-16
Payer: COMMERCIAL

## 2022-06-16 ENCOUNTER — ANESTHESIA (OUTPATIENT)
Dept: PERIOP | Facility: HOSPITAL | Age: 57
End: 2022-06-16
Payer: COMMERCIAL

## 2022-06-16 ENCOUNTER — APPOINTMENT (OUTPATIENT)
Dept: RADIOLOGY | Facility: HOSPITAL | Age: 57
End: 2022-06-16
Payer: COMMERCIAL

## 2022-06-16 VITALS
TEMPERATURE: 97.4 F | WEIGHT: 149 LBS | OXYGEN SATURATION: 99 % | HEIGHT: 62 IN | HEART RATE: 89 BPM | DIASTOLIC BLOOD PRESSURE: 61 MMHG | SYSTOLIC BLOOD PRESSURE: 96 MMHG | RESPIRATION RATE: 16 BRPM | BODY MASS INDEX: 27.42 KG/M2

## 2022-06-16 DIAGNOSIS — N20.1 URETEROLITHIASIS: Primary | ICD-10-CM

## 2022-06-16 PROBLEM — Z98.84 S/P GASTRIC BYPASS: Status: ACTIVE | Noted: 2022-06-16

## 2022-06-16 LAB
GLUCOSE SERPL-MCNC: 102 MG/DL (ref 65–140)
GLUCOSE SERPL-MCNC: 102 MG/DL (ref 65–140)

## 2022-06-16 PROCEDURE — 82948 REAGENT STRIP/BLOOD GLUCOSE: CPT

## 2022-06-16 PROCEDURE — C1769 GUIDE WIRE: HCPCS | Performed by: UROLOGY

## 2022-06-16 PROCEDURE — C1758 CATHETER, URETERAL: HCPCS | Performed by: UROLOGY

## 2022-06-16 PROCEDURE — 52356 CYSTO/URETERO W/LITHOTRIPSY: CPT | Performed by: UROLOGY

## 2022-06-16 PROCEDURE — C2617 STENT, NON-COR, TEM W/O DEL: HCPCS | Performed by: UROLOGY

## 2022-06-16 PROCEDURE — 74420 UROGRAPHY RTRGR +-KUB: CPT

## 2022-06-16 PROCEDURE — C1894 INTRO/SHEATH, NON-LASER: HCPCS | Performed by: UROLOGY

## 2022-06-16 DEVICE — INLAY OPTIMA URETERAL STENT W/O GUIDEWIRE
Type: IMPLANTABLE DEVICE | Site: URETER | Status: FUNCTIONAL
Brand: BARD® INLAY OPTIMA® URETERAL STENT

## 2022-06-16 RX ORDER — ONDANSETRON 2 MG/ML
INJECTION INTRAMUSCULAR; INTRAVENOUS AS NEEDED
Status: DISCONTINUED | OUTPATIENT
Start: 2022-06-16 | End: 2022-06-16

## 2022-06-16 RX ORDER — FENTANYL CITRATE/PF 50 MCG/ML
50 SYRINGE (ML) INJECTION
Status: DISCONTINUED | OUTPATIENT
Start: 2022-06-16 | End: 2022-06-16 | Stop reason: HOSPADM

## 2022-06-16 RX ORDER — FENTANYL CITRATE 50 UG/ML
INJECTION, SOLUTION INTRAMUSCULAR; INTRAVENOUS AS NEEDED
Status: DISCONTINUED | OUTPATIENT
Start: 2022-06-16 | End: 2022-06-16

## 2022-06-16 RX ORDER — KETOROLAC TROMETHAMINE 30 MG/ML
INJECTION, SOLUTION INTRAMUSCULAR; INTRAVENOUS AS NEEDED
Status: DISCONTINUED | OUTPATIENT
Start: 2022-06-16 | End: 2022-06-16

## 2022-06-16 RX ORDER — MAGNESIUM HYDROXIDE 1200 MG/15ML
LIQUID ORAL AS NEEDED
Status: DISCONTINUED | OUTPATIENT
Start: 2022-06-16 | End: 2022-06-16 | Stop reason: HOSPADM

## 2022-06-16 RX ORDER — OXYCODONE HYDROCHLORIDE AND ACETAMINOPHEN 5; 325 MG/1; MG/1
1 TABLET ORAL EVERY 4 HOURS PRN
Status: DISCONTINUED | OUTPATIENT
Start: 2022-06-16 | End: 2022-06-16 | Stop reason: HOSPADM

## 2022-06-16 RX ORDER — MIDAZOLAM HYDROCHLORIDE 2 MG/2ML
INJECTION, SOLUTION INTRAMUSCULAR; INTRAVENOUS AS NEEDED
Status: DISCONTINUED | OUTPATIENT
Start: 2022-06-16 | End: 2022-06-16

## 2022-06-16 RX ORDER — HYDROMORPHONE HCL/PF 1 MG/ML
0.5 SYRINGE (ML) INJECTION
Status: DISCONTINUED | OUTPATIENT
Start: 2022-06-16 | End: 2022-06-16 | Stop reason: HOSPADM

## 2022-06-16 RX ORDER — CEPHALEXIN 500 MG/1
500 CAPSULE ORAL EVERY 12 HOURS SCHEDULED
Qty: 6 CAPSULE | Refills: 0 | Status: SHIPPED | OUTPATIENT
Start: 2022-06-16 | End: 2022-06-19

## 2022-06-16 RX ORDER — LIDOCAINE HYDROCHLORIDE 10 MG/ML
0.5 INJECTION, SOLUTION EPIDURAL; INFILTRATION; INTRACAUDAL; PERINEURAL ONCE AS NEEDED
Status: DISCONTINUED | OUTPATIENT
Start: 2022-06-16 | End: 2022-06-16 | Stop reason: HOSPADM

## 2022-06-16 RX ORDER — SODIUM CHLORIDE, SODIUM LACTATE, POTASSIUM CHLORIDE, CALCIUM CHLORIDE 600; 310; 30; 20 MG/100ML; MG/100ML; MG/100ML; MG/100ML
75 INJECTION, SOLUTION INTRAVENOUS CONTINUOUS
Status: DISCONTINUED | OUTPATIENT
Start: 2022-06-16 | End: 2022-06-16 | Stop reason: HOSPADM

## 2022-06-16 RX ORDER — ONDANSETRON 2 MG/ML
4 INJECTION INTRAMUSCULAR; INTRAVENOUS ONCE AS NEEDED
Status: DISCONTINUED | OUTPATIENT
Start: 2022-06-16 | End: 2022-06-16 | Stop reason: HOSPADM

## 2022-06-16 RX ORDER — LIDOCAINE HYDROCHLORIDE 10 MG/ML
INJECTION, SOLUTION EPIDURAL; INFILTRATION; INTRACAUDAL; PERINEURAL AS NEEDED
Status: DISCONTINUED | OUTPATIENT
Start: 2022-06-16 | End: 2022-06-16

## 2022-06-16 RX ORDER — PROPOFOL 10 MG/ML
INJECTION, EMULSION INTRAVENOUS AS NEEDED
Status: DISCONTINUED | OUTPATIENT
Start: 2022-06-16 | End: 2022-06-16

## 2022-06-16 RX ORDER — CEFAZOLIN SODIUM 1 G/3ML
INJECTION, POWDER, FOR SOLUTION INTRAMUSCULAR; INTRAVENOUS AS NEEDED
Status: DISCONTINUED | OUTPATIENT
Start: 2022-06-16 | End: 2022-06-16

## 2022-06-16 RX ADMIN — MIDAZOLAM 2 MG: 1 INJECTION INTRAMUSCULAR; INTRAVENOUS at 11:17

## 2022-06-16 RX ADMIN — ONDANSETRON 4 MG: 2 INJECTION INTRAMUSCULAR; INTRAVENOUS at 11:32

## 2022-06-16 RX ADMIN — SODIUM CHLORIDE, SODIUM LACTATE, POTASSIUM CHLORIDE, AND CALCIUM CHLORIDE: .6; .31; .03; .02 INJECTION, SOLUTION INTRAVENOUS at 11:19

## 2022-06-16 RX ADMIN — FENTANYL CITRATE 25 MCG: 50 INJECTION INTRAMUSCULAR; INTRAVENOUS at 11:32

## 2022-06-16 RX ADMIN — CEFAZOLIN SODIUM 1000 MG: 1 INJECTION, POWDER, FOR SOLUTION INTRAMUSCULAR; INTRAVENOUS at 11:23

## 2022-06-16 RX ADMIN — FENTANYL CITRATE 25 MCG: 50 INJECTION INTRAMUSCULAR; INTRAVENOUS at 11:29

## 2022-06-16 RX ADMIN — KETOROLAC TROMETHAMINE 15 MG: 30 INJECTION, SOLUTION INTRAMUSCULAR at 11:48

## 2022-06-16 RX ADMIN — PROPOFOL 200 MG: 10 INJECTION, EMULSION INTRAVENOUS at 11:22

## 2022-06-16 RX ADMIN — LIDOCAINE HYDROCHLORIDE 50 MG: 10 INJECTION, SOLUTION EPIDURAL; INFILTRATION; INTRACAUDAL; PERINEURAL at 11:22

## 2022-06-16 NOTE — ANESTHESIA POSTPROCEDURE EVALUATION
Post-Op Assessment Note    CV Status:  Stable  Pain Score: 0    Pain management: adequate     Mental Status:  Alert and awake   Hydration Status:  Euvolemic   PONV Controlled:  Controlled   Airway Patency:  Patent      Post Op Vitals Reviewed: Yes      Staff: CRNA   Comments: VSS, SV        No complications documented      BP   118/64   Temp  97 5   Pulse  91   Resp   17   SpO2   100% RA

## 2022-06-16 NOTE — INTERVAL H&P NOTE
H&P reviewed  After examining the patient I find no changes in the patients condition since the H&P had been written  Vitals:    06/16/22 0949   BP: 125/84   Pulse: 91   Resp: 18   Temp: (!) 96 8 °F (36 °C)   SpO2: 99%     Patient now presents for ureteroscopy  Procedure, risks, and benefits discussed  Consent obtained for left ureteroscopy, laser, stone extraction, stent  Understands this may require 2 stage procedure  All questions answered to their satisfaction

## 2022-06-16 NOTE — ANESTHESIA PREPROCEDURE EVALUATION
Procedure:  CYSTOSCOPY URETEROSCOPY WITH LITHOTRIPSY HOLMIUM LASER, RETROGRADE PYELOGRAM AND INSERTION STENT URETERAL (Left Bladder)    Relevant Problems   CARDIO   (+) Hypertension      ENDO   (+) Type 2 diabetes mellitus without complication (HCC)      GI/HEPATIC   (+) Autoimmune hepatitis (HCC)      /RENAL   (+) Nephrolithiasis      MUSCULOSKELETAL   (+) Arthritis      PULMONARY   (+) COPD (chronic obstructive pulmonary disease) (HCC)      Other   (+) Bipolar affective disorder (HCC)   (+) S/P gastric bypass        Physical Exam    Airway    Mallampati score: II  TM Distance: >3 FB  Neck ROM: full     Dental       Cardiovascular      Pulmonary      Other Findings        Anesthesia Plan  ASA Score- 3     Anesthesia Type- general with ASA Monitors  Additional Monitors:   Airway Plan: LMA      Comment: Recent labs personally reviewed:  Lab Results       Component                Value               Date                       WBC                      7 05                06/14/2022                 HGB                      12 8                06/14/2022                 PLT                      331                 06/14/2022            Lab Results       Component                Value               Date                       NA                       136 (L)             06/05/2018                 K                        3 7                 06/14/2022                 BUN                      19                  06/14/2022                 CREATININE               0 52 (L)            06/14/2022                 GLUCOSE                  171 (H)             04/23/2018            Lab Results       Component                Value               Date                       PTT                      29                  12/07/2019             Lab Results       Component                Value               Date                       INR                      0 94                12/07/2019              Blood type       I, [place] Darvin Hoffmann MD, have personally seen and evaluated the patient prior to anesthetic care  I have reviewed the pre-anesthetic record, medical history, allergies, medications and any other medical records if appropriate to the anesthetic care  If a CRNA is involved in the case, I have reviewed the CRNA assessment, if present, and agree  I consented the patient for general anesthesia with appropriate airway support as indicated  We reviewed the risks associated including PONV, sore throat, allergic reaction to anesthetics and management plan to address these issues  We discussed the indication and risks associated with any invasive monitors that would be placed  We discussed post op pain control and expectations  We discussed rare complications including hypoxia, perioperative cardiac and neurologic events, and death based on the patient's baseline risk  All questions and concerns were addressed          Plan Factors-Exercise tolerance (METS): >4 METS  Chart reviewed  Imaging results reviewed  Existing labs reviewed  Patient summary reviewed  Patient is a current smoker  Patient instructed to abstain from smoking on day of procedure  Patient did not smoke on day of surgery  Obstructive sleep apnea risk education given perioperatively  Induction- intravenous  Postoperative Plan-     Informed Consent- Anesthetic plan and risks discussed with patient  I personally reviewed this patient with the CRNA  Discussed and agreed on the Anesthesia Plan with the CRNA  Naomie Puri

## 2022-06-16 NOTE — OP NOTE
OPERATIVE REPORT  PATIENT NAME: Brinda Snider    :  1965  MRN: [id number]  Pt Location:  CYSTO ROOM 01    SURGERY DATE: 2022    Surgeon(s) and Role:     Dayanara Vale MD - Primary    Preop Diagnosis:  Ureterolithiasis [N20 1]    Post-Op Diagnosis Codes:     * Ureterolithiasis [N20 1]    Procedure(s) (LRB):  CYSTOSCOPY URETEROSCOPY WITH LITHOTRIPSY HOLMIUM LASER, RETROGRADE PYELOGRAM AND INSERTION STENT URETERAL (Left)    Specimen(s):  * No specimens in log *    Estimated Blood Loss:   Minimal    Drains:  * No LDAs found *    Anesthesia Type:   General    Operative Indications:  Ureterolithiasis [N20 1]      Operative Findings:  Multiple left ureteral calculi    Complications:   None    Procedure and Technique:  Patient identified, brought to the operating, placed on table supine position  After induction general anesthesia she was placed in dorsal lithotomy position prepped draped usual sterile fashion  A formal time-out was performed  The 25 Swedish rigid cystoscope was placed per urethra into the bladder  Guidewire was passed into left ureter  Fluoroscopy confirmed 2 ureteral calculi 1 more distal and 1 proximally  The semi rigid ureteral scope was placed alongside the wire  Holmium laser fiber was placed and laser lithotripsy was performed until the distal stone was completely dusted  I then passed the scope proximally  I could not reach the proximal stone with the rigid scope  A second guidewire was then placed followed by access sheath  5 3 Swedish flexible ureteral scope was then placed through the access sheath  The stone was visualized at the UPJ  Holmium laser fiber was placed only the lithotripsy was performed  The stone was then displaced into the kidney midpole  Other stone followed and completely lasered and fragmented into small fragments  These were too small to retrieve  At the conclusion there is no significant stone burden left    Ureter was carefully evaluated on withdrawal the scope  The wire was backloaded to cystoscope and 5 Georgian catheter was placed  Retrograde pyelogram was performed revealing mild hydronephrosis and no obstruction of the urinary tract  Ureteral length was measured  Wire was replaced and a 6 [employer] [place] by 24 cm double-J stent with string was placed  Position was confirmed with fluoroscopy  Patient tolerated procedure well  I was present for the entire procedure    Patient Disposition:  PACU      Plan:  Stent/string until Monday 6/20  May be removed at home or in office      SIGNATURE: Nathalie Hodgkins, MD  DATE: June 16, 2022  TIME: 11:55 AM

## 2022-06-17 NOTE — TELEPHONE ENCOUNTER
Post Op Note    Ja Becerra is a 56 y o  female s/p CYSTOSCOPY URETEROSCOPY WITH LITHOTRIPSY HOLMIUM LASER, RETROGRADE PYELOGRAM AND INSERTION STENT URETERAL (Left Bladder) performed 6/16/2022  Ja Becerra had surgery by Dr Akosua Eastman     How would you rate your pain on a scale from 1 to 10, 10 being the worst pain ever? Patient has some pain that is on/off but the pain medication prescribed assists with that kind of pain  Do you have any difficulty urinating? No     Do you have any other questions or concerns that I can address at this time? Patient states she is doing well otherwise  Advised of OTC pain medication, hydration and use of a warm heating pad  Advised of Dr Machelle Lowe recommendation of removing the stent on Monday  Patient stated she is comfortable removing it herself  Education provided on removal  Advised she can remove the stent Monday morning and I will contact her Monday afternoon to see how she is doing  Advised she can call the office if she has any questions or concerns

## 2022-06-20 ENCOUNTER — NURSE TRIAGE (OUTPATIENT)
Dept: OTHER | Facility: OTHER | Age: 57
End: 2022-06-20

## 2022-06-20 DIAGNOSIS — N20.0 CALCULUS OF KIDNEY: Primary | ICD-10-CM

## 2022-06-20 NOTE — TELEPHONE ENCOUNTER
Regarding: Post surgery pain dehydrated  ----- Message from Salena Lynn sent at 6/20/2022  9:27 AM EDT -----  "I had surgery on 6/16 and I still have stones inside and no pain meds   I'm also dehydrated "

## 2022-06-20 NOTE — TELEPHONE ENCOUNTER
Patient will wait to hear back from the office regarding if she should go back to the ER now or come into the office  She removed her stent this morning, urinating well but still having pain  Took Motrin 600mgs  Yesterday with no relief  Please call her back

## 2022-06-20 NOTE — TELEPHONE ENCOUNTER
Reason for Disposition   [1] SEVERE pain (e g , excruciating, scale 8-10) AND [2] present > 1 hour    Answer Assessment - Initial Assessment Questions  1  LOCATION: "Where does it hurt?" (e g , left, right)      Left flank pain  2  ONSET: "When did the pain start?"      It has been going on since before the surgery  She state that she did not accept and pain med script after her procedure thinking that she could deal with the pain  3  SEVERITY: "How bad is the pain?" (e g , Scale 1-10; mild, moderate, or severe)    - MILD (1-3): doesn't interfere with normal activities     - MODERATE (4-7): interferes with normal activities or awakens from sleep     - SEVERE (8-10): excruciating pain and patient unable to do normal activities (stays in bed)        #12- took Motrin 600mgs  yesterday and it did not relieve her pain  4  PATTERN: "Does the pain come and go, or is it constant?"       Comes and go  5  CAUSE: "What do you think is causing the pain?"      Kidney stone  6  OTHER SYMPTOMS:  "Do you have any other symptoms?" (e g , fever, abdominal pain, vomiting, leg weakness, burning with urination, blood in urine)      She thinks that she has a fever because she feels chilled  Does not have a thermometer  Blood in her urine  She is drinking and urinating  7  PREGNANCY:  "Is there any chance you are pregnant?" "When was your last menstrual period?"      N/A    Protocols used:  FLANK PAIN-ADULT-

## 2022-06-20 NOTE — TELEPHONE ENCOUNTER
Recommend the following for post stent pull colic  All residual fragments after lasering small enough to pass as gravel/dust per OP note    motrin 600mg every 6 hours for the next 24 hours and 60 oz water today  flomax 0 4mg once a day for the rest of the week  Finished keflex yesterday

## 2022-06-21 NOTE — TELEPHONE ENCOUNTER
Called the patient who states she is feeling much better today than yesterday  Discussed with patient follow up should be in 3-4  Months with imaging and then AP visit  Orders placed for USK/KUB  Scripts mailed to patient  Patient will schedule own imaging and then call the office for an appointment

## 2022-06-23 ENCOUNTER — HOSPITAL ENCOUNTER (EMERGENCY)
Facility: HOSPITAL | Age: 57
Discharge: HOME | End: 2022-06-23
Attending: EMERGENCY MEDICINE
Payer: COMMERCIAL

## 2022-06-23 ENCOUNTER — APPOINTMENT (EMERGENCY)
Dept: CT IMAGING | Facility: HOSPITAL | Age: 57
End: 2022-06-23
Payer: COMMERCIAL

## 2022-06-23 VITALS
OXYGEN SATURATION: 100 % | TEMPERATURE: 98.5 F | HEART RATE: 98 BPM | DIASTOLIC BLOOD PRESSURE: 75 MMHG | SYSTOLIC BLOOD PRESSURE: 150 MMHG | RESPIRATION RATE: 18 BRPM | BODY MASS INDEX: 28.1 KG/M2 | WEIGHT: 153.66 LBS

## 2022-06-23 DIAGNOSIS — N13.30 HYDRONEPHROSIS: ICD-10-CM

## 2022-06-23 DIAGNOSIS — N12 PYELONEPHRITIS: Primary | ICD-10-CM

## 2022-06-23 LAB
ALBUMIN SERPL BCP-MCNC: 3.7 G/DL (ref 3–5.2)
ALP SERPL-CCNC: 396 U/L (ref 43–122)
ALT SERPL W P-5'-P-CCNC: 57 U/L
ANION GAP SERPL CALCULATED.3IONS-SCNC: 5 MMOL/L (ref 5–14)
AST SERPL W P-5'-P-CCNC: 56 U/L (ref 14–36)
BACTERIA UR QL AUTO: ABNORMAL /HPF
BASOPHILS # BLD AUTO: 0.05 THOUSANDS/ΜL (ref 0–0.1)
BASOPHILS NFR BLD AUTO: 0 % (ref 0–1)
BILIRUB SERPL-MCNC: 0.65 MG/DL
BILIRUB UR QL STRIP: NEGATIVE
BUN SERPL-MCNC: 13 MG/DL (ref 5–25)
CALCIUM SERPL-MCNC: 8.4 MG/DL (ref 8.4–10.2)
CHLORIDE SERPL-SCNC: 101 MMOL/L (ref 97–108)
CLARITY UR: ABNORMAL
CO2 SERPL-SCNC: 28 MMOL/L (ref 22–30)
COLOR UR: ABNORMAL
CREAT SERPL-MCNC: 0.55 MG/DL (ref 0.6–1.2)
EOSINOPHIL # BLD AUTO: 0.18 THOUSAND/ΜL (ref 0–0.61)
EOSINOPHIL NFR BLD AUTO: 2 % (ref 0–6)
ERYTHROCYTE [DISTWIDTH] IN BLOOD BY AUTOMATED COUNT: 12.7 % (ref 11.6–15.1)
FLUAV RNA RESP QL NAA+PROBE: NEGATIVE
FLUBV RNA RESP QL NAA+PROBE: NEGATIVE
GFR SERPL CREATININE-BSD FRML MDRD: 105 ML/MIN/1.73SQ M
GLUCOSE SERPL-MCNC: 177 MG/DL (ref 70–99)
GLUCOSE UR STRIP-MCNC: ABNORMAL MG/DL
HCT VFR BLD AUTO: 36.1 % (ref 34.8–46.1)
HGB BLD-MCNC: 11.8 G/DL (ref 11.5–15.4)
HGB UR QL STRIP.AUTO: 150
IMM GRANULOCYTES # BLD AUTO: 0.05 THOUSAND/UL (ref 0–0.2)
IMM GRANULOCYTES NFR BLD AUTO: 0 % (ref 0–2)
KETONES UR STRIP-MCNC: NEGATIVE MG/DL
LEUKOCYTE ESTERASE UR QL STRIP: 500
LIPASE SERPL-CCNC: 25 U/L (ref 23–300)
LYMPHOCYTES # BLD AUTO: 0.85 THOUSANDS/ΜL (ref 0.6–4.47)
LYMPHOCYTES NFR BLD AUTO: 8 % (ref 14–44)
MCH RBC QN AUTO: 33.2 PG (ref 26.8–34.3)
MCHC RBC AUTO-ENTMCNC: 32.7 G/DL (ref 31.4–37.4)
MCV RBC AUTO: 102 FL (ref 82–98)
MONOCYTES # BLD AUTO: 0.93 THOUSAND/ΜL (ref 0.17–1.22)
MONOCYTES NFR BLD AUTO: 8 % (ref 4–12)
NEUTROPHILS # BLD AUTO: 9.31 THOUSANDS/ΜL (ref 1.85–7.62)
NEUTS SEG NFR BLD AUTO: 82 % (ref 43–75)
NITRITE UR QL STRIP: NEGATIVE
NON-SQ EPI CELLS URNS QL MICRO: ABNORMAL /HPF
NRBC BLD AUTO-RTO: 0 /100 WBCS
OTHER STN SPEC: ABNORMAL
PH UR STRIP.AUTO: 6 [PH]
PLATELET # BLD AUTO: 336 THOUSANDS/UL (ref 149–390)
PMV BLD AUTO: 9.3 FL (ref 8.9–12.7)
POTASSIUM SERPL-SCNC: 4.3 MMOL/L (ref 3.6–5)
PROT SERPL-MCNC: 7.4 G/DL (ref 5.9–8.4)
PROT UR STRIP-MCNC: NEGATIVE MG/DL
RBC # BLD AUTO: 3.55 MILLION/UL (ref 3.81–5.12)
RBC #/AREA URNS AUTO: ABNORMAL /HPF
RSV RNA RESP QL NAA+PROBE: NEGATIVE
SARS-COV-2 RNA RESP QL NAA+PROBE: NEGATIVE
SODIUM SERPL-SCNC: 134 MMOL/L (ref 137–147)
SP GR UR STRIP.AUTO: 1.01 (ref 1–1.04)
UROBILINOGEN UA: NEGATIVE MG/DL
WBC # BLD AUTO: 11.37 THOUSAND/UL (ref 4.31–10.16)
WBC #/AREA URNS AUTO: ABNORMAL /HPF

## 2022-06-23 PROCEDURE — 96365 THER/PROPH/DIAG IV INF INIT: CPT

## 2022-06-23 PROCEDURE — 99284 EMERGENCY DEPT VISIT MOD MDM: CPT | Performed by: EMERGENCY MEDICINE

## 2022-06-23 PROCEDURE — 85025 COMPLETE CBC W/AUTO DIFF WBC: CPT | Performed by: EMERGENCY MEDICINE

## 2022-06-23 PROCEDURE — 36415 COLL VENOUS BLD VENIPUNCTURE: CPT | Performed by: EMERGENCY MEDICINE

## 2022-06-23 PROCEDURE — 0241U HB NFCT DS VIR RESP RNA 4 TRGT: CPT | Performed by: EMERGENCY MEDICINE

## 2022-06-23 PROCEDURE — 83690 ASSAY OF LIPASE: CPT | Performed by: EMERGENCY MEDICINE

## 2022-06-23 PROCEDURE — 96375 TX/PRO/DX INJ NEW DRUG ADDON: CPT

## 2022-06-23 PROCEDURE — 80053 COMPREHEN METABOLIC PANEL: CPT | Performed by: EMERGENCY MEDICINE

## 2022-06-23 PROCEDURE — 74176 CT ABD & PELVIS W/O CONTRAST: CPT

## 2022-06-23 PROCEDURE — 99284 EMERGENCY DEPT VISIT MOD MDM: CPT

## 2022-06-23 PROCEDURE — 81001 URINALYSIS AUTO W/SCOPE: CPT | Performed by: EMERGENCY MEDICINE

## 2022-06-23 RX ORDER — SULFAMETHOXAZOLE AND TRIMETHOPRIM 800; 160 MG/1; MG/1
1 TABLET ORAL 2 TIMES DAILY
Qty: 14 TABLET | Refills: 0 | Status: SHIPPED | OUTPATIENT
Start: 2022-06-23 | End: 2022-06-30

## 2022-06-23 RX ORDER — ONDANSETRON 2 MG/ML
4 INJECTION INTRAMUSCULAR; INTRAVENOUS ONCE
Status: COMPLETED | OUTPATIENT
Start: 2022-06-23 | End: 2022-06-23

## 2022-06-23 RX ORDER — TRAMADOL HYDROCHLORIDE 50 MG/1
50 TABLET ORAL EVERY 8 HOURS PRN
Qty: 9 TABLET | Refills: 0 | Status: SHIPPED | OUTPATIENT
Start: 2022-06-23 | End: 2022-06-26

## 2022-06-23 RX ORDER — KETOROLAC TROMETHAMINE 30 MG/ML
15 INJECTION, SOLUTION INTRAMUSCULAR; INTRAVENOUS ONCE
Status: COMPLETED | OUTPATIENT
Start: 2022-06-23 | End: 2022-06-23

## 2022-06-23 RX ORDER — CEFTRIAXONE 1 G/50ML
1000 INJECTION, SOLUTION INTRAVENOUS ONCE
Status: COMPLETED | OUTPATIENT
Start: 2022-06-23 | End: 2022-06-23

## 2022-06-23 RX ADMIN — ONDANSETRON 4 MG: 2 INJECTION INTRAMUSCULAR; INTRAVENOUS at 08:26

## 2022-06-23 RX ADMIN — CEFTRIAXONE 1000 MG: 1 INJECTION, SOLUTION INTRAVENOUS at 10:00

## 2022-06-23 RX ADMIN — KETOROLAC TROMETHAMINE 15 MG: 30 INJECTION, SOLUTION INTRAMUSCULAR; INTRAVENOUS at 08:26

## 2022-06-23 NOTE — Clinical Note
Gelacio Washington was seen and treated in our emergency department on 6/23/2022  Diagnosis:     Parris Mohs    She may return on this date: 06/26/2022         If you have any questions or concerns, please don't hesitate to call        Sameer Mckay DO    ______________________________           _______________          _______________  Hospital Representative                              Date                                Time

## 2022-06-23 NOTE — ED PROVIDER NOTES
History  Chief Complaint   Patient presents with    Flank Pain     Left flank    Cough     72-year-old female, on her 3rd visit now for flank pain  History of kidney stones  Was seen at another hospital and that were, underwent lithotripsy, states she had a ureteral stent that was placed but was removed over this past weekend  States she is concerned because she also now has a headache and sore throat  Would also like to be tested for COVID this time because her family said it is possible she might have a  Nothing makes her symptoms significantly better or worse  Prior to Admission Medications   Prescriptions Last Dose Informant Patient Reported? Taking? Biotin 10 MG TABS  Self Yes No   Sig: Take 10 mg by mouth   FLUoxetine (PROzac) 40 MG capsule  Self Yes No   Sig: Take 40 mg by mouth daily     LORazepam (ATIVAN) 1 mg tablet  Self Yes No   Sig: Take 1 mg by mouth 2 (two) times a day   QUEtiapine (SEROquel) 100 mg tablet  Self Yes No   Sig: Take 100 mg by mouth daily at bedtime   aspirin (ECOTRIN LOW STRENGTH) 81 mg EC tablet  Self Yes No   Sig: Take 81 mg by mouth daily     azaTHIOprine (IMURAN) 50 mg tablet  Self Yes No   Sig: Take 50 mg by mouth daily   budesonide (ENTOCORT EC) 3 MG capsule  Self Yes No   calcium citrate-Vitamin D 200 mg-250 units  Self Yes No   Sig: Take 2 tablets by mouth   cetirizine (ZyrTEC) 10 mg tablet  Self Yes No   Sig: Take 10 mg by mouth   dicyclomine (BENTYL) 20 mg tablet   No No   Sig: Take 1 tablet (20 mg total) by mouth 2 (two) times a day   erythromycin (ILOTYCIN) ophthalmic ointment   No No   Sig: Place a 1/2 inch ribbon of ointment into the lower eyelid     ibuprofen (MOTRIN) 600 mg tablet   No No   Sig: Take 1 tablet (600 mg total) by mouth every 6 (six) hours as needed for mild pain   ketotifen (ZADITOR) 0 025 % ophthalmic solution  Self Yes No   Sig: Administer to both eyes daily     liraglutide (VICTOZA) injection  Self Yes No   Sig: Inject 1 8 mg under the skin daily     naproxen (NAPROSYN) 500 mg tablet  Self No No   Sig: Take 1 tablet (500 mg total) by mouth 2 (two) times a day with meals for 10 days   ondansetron (ZOFRAN-ODT) 4 mg disintegrating tablet  Self No No   Sig: Take 1 tablet (4 mg total) by mouth every 6 (six) hours as needed for nausea or vomiting   Patient taking differently: Take 4 mg by mouth as needed for nausea or vomiting   oxyCODONE-acetaminophen (PERCOCET) 5-325 mg per tablet  Self No No   Sig: Take 1 tablet by mouth every 4 (four) hours as needed for moderate pain for up to 10 doses Max Daily Amount: 6 tablets   oxyCODONE-acetaminophen (Percocet) 5-325 mg per tablet   No No   Sig: Take 1 tablet by mouth every 6 (six) hours as needed for moderate pain or severe pain for up to 10 doses Max Daily Amount: 4 tablets   oxybutynin (DITROPAN) 5 mg tablet  Self No No   Sig: Take 1 tablet (5 mg total) by mouth 3 (three) times a day as needed (stent discomfort)   pantoprazole (PROTONIX) 40 mg tablet   No No   Sig: Take 1 tablet (40 mg total) by mouth 2 (two) times a day   tamsulosin (FLOMAX) 0 4 mg   No No   Sig: Take 1 capsule (0 4 mg total) by mouth daily with dinner for 14 days   zolpidem (AMBIEN) 5 mg tablet  Self Yes No   Sig: Take 10 mg by mouth daily at bedtime as needed for sleep        Facility-Administered Medications: None       Past Medical History:   Diagnosis Date    [facility]     Bipolar 1 disorder [facility]     Diabetes mellitus [facility]     Kidney stone     Renal disorder     kidney stones       Past Surgical History:   Procedure Laterality Date    BARIATRIC SURGERY  05/2017    BUNIONECTOMY      CYSTOSCOPY  07/12/2018    Stent Removal    FL RETROGRADE PYELOGRAM  6/16/2022    GASTRIC BYPASS  05/22/2017    HIATAL HERNIA REPAIR  05/22/2017    HYSTERECTOMY      JOINT REPLACEMENT      KIDNEY STONE SURGERY      NV CYSTO/URETERO W/LITHOTRIPSY &INDWELL STENT INSRT Bilateral 7/6/2018    Procedure: CYSTOSCOPY GALDINO  URETEROSCOPY;GALDINO  RETROGRADE PYELOGRAM AND INSERTION GALDINO  STENT URETERAL;  Surgeon: Edward Jackson MD;  Location[facility];  Service: Urology    [place] CYSTO/URETERO W/LITHOTRIPSY &INDWELL STENT INSRT Left 6/16/2022    Procedure: CYSTOSCOPY URETEROSCOPY WITH LITHOTRIPSY HOLMIUM LASER, RETROGRADE PYELOGRAM AND INSERTION STENT URETERAL;  Surgeon: Rubina Schwartz MD;  [facility];  Service: Urology    TOTAL SHOULDER REPLACEMENT  2002    VAGINAL HYSTERECTOMY      PARTIAL       Family History   Problem Relation Age of Onset    Diabetes Father     Heart disease Father     Diabetes Mother     Heart disease Mother     Diabetes Sister     Diabetes Family         MELLITUS    Depression Family     Mental illness Family     Obesity Family     Osteoarthritis Family      I have reviewed and agree with the history as documented  E-Cigarette/Vaping     E-Cigarette/Vaping Substances     Social History     Tobacco Use    Smoking status: Current Every Day Smoker     Packs/day: 0 25     Years: 30 00     Pack years: 7 50     Types: Cigarettes    Smokeless tobacco: Never Used   Substance Use Topics    Alcohol use: No    Drug use: No       Review of Systems   Constitutional: Negative  Negative for chills and fever  HENT: Negative  Negative for rhinorrhea, sore throat, trouble swallowing and voice change  Eyes: Negative  Negative for pain and visual disturbance  Respiratory: Negative  Negative for cough, shortness of breath and wheezing  Cardiovascular: Negative  Negative for chest pain and palpitations  Gastrointestinal: Positive for abdominal pain  Negative for diarrhea, nausea and vomiting  Genitourinary: Negative  Negative for dysuria and frequency  Musculoskeletal: Negative  Negative for neck pain and neck stiffness  Skin: Negative  Negative for rash  Neurological: Negative  Negative for dizziness, speech difficulty, weakness, light-headedness and numbness         Physical Exam  Physical Exam  Vitals and nursing note reviewed  Constitutional:       General: She is not in acute distress  Appearance: She is well-developed  HENT:      Head: Normocephalic and atraumatic  Eyes:      Conjunctiva/sclera: Conjunctivae normal       Pupils: Pupils are equal, round, and reactive to light  Neck:      Trachea: No tracheal deviation  Cardiovascular:      Rate and Rhythm: Normal rate and regular rhythm  Pulmonary:      Effort: Pulmonary effort is normal  No respiratory distress  Breath sounds: Normal breath sounds  No wheezing or rales  Abdominal:      General: Bowel sounds are normal  There is no distension  Palpations: Abdomen is soft  Tenderness: There is no abdominal tenderness  There is no guarding or rebound  Musculoskeletal:         General: No tenderness or deformity  Normal range of motion  Cervical back: Normal range of motion and neck supple  Skin:     General: Skin is warm and dry  Capillary Refill: Capillary refill takes less than 2 seconds  Findings: No rash  Neurological:      Mental Status: She is alert and oriented to person, place, and time     Psychiatric:         Behavior: Behavior normal          Vital Signs  ED Triage Vitals   Temperature Pulse Respirations Blood Pressure SpO2   06/23/22 0708 06/23/22 0708 06/23/22 0708 06/23/22 0708 06/23/22 0708   98 5 °F (36 9 °C) (!) 118 18 156/77 100 %      Temp Source Heart Rate Source Patient Position - Orthostatic VS BP Location FiO2 (%)   06/23/22 0708 06/23/22 0708 06/23/22 0708 06/23/22 0708 --   Tympanic Monitor Sitting Left arm       Pain Score       06/23/22 0826       10 - Worst Possible Pain           Vitals:    06/23/22 0708 06/23/22 1001   BP: 156/77 150/75   Pulse: (!) 118 98   Patient Position - Orthostatic VS: Sitting Lying         Visual Acuity      ED Medications  Medications   ketorolac (TORADOL) injection 15 mg (15 mg Intravenous Given 6/23/22 0826)   ondansetron [facility] injection 4 mg (4 mg Intravenous Given 6/23/22 0826)   cefTRIAXone (ROCEPHIN) IVPB (premix in dextrose) 1,000 mg 50 mL (0 mg Intravenous Stopped 6/23/22 1027)       Diagnostic Studies  Results Reviewed     Procedure Component Value Units Date/Time    COVID/FLU/RSV - 2 hour TAT [[id number]]  (Normal) Collected: 06/23/22 0817    Lab Status: Final result Specimen: Nares from Nose Updated: 06/23/22 0903     SARS-CoV-2 Negative     INFLUENZA A PCR Negative     INFLUENZA B PCR Negative     RSV PCR Negative    Narrative:      FOR PEDIATRIC PATIENTS - copy/paste COVID Guidelines URL to browser: [email or web address]  [employer]    SARS-CoV-2 assay is a Nucleic Acid Amplification assay intended for the  qualitative detection of nucleic acid from SARS-CoV-2 in nasopharyngeal  swabs  Results are for the presumptive identification of SARS-CoV-2 RNA  Positive results are indicative of infection with SARS-CoV-2, the virus  causing COVID-19, but do not rule out bacterial infection or co-infection  with other viruses  Laboratories within the [place] and its  territories are required to report all positive results to the appropriate  public health authorities  Negative results do not preclude SARS-CoV-2  infection and should not be used as the sole basis for treatment or other  patient management decisions  Negative results must be combined with  clinical observations, patient history, and epidemiological information  This test has not been FDA cleared or approved  This test has been authorized by FDA under an Emergency Use Authorization  (EUA)  This test is only authorized for the duration of time the  declaration that circumstances exist justifying the authorization of the  emergency use of an in vitro diagnostic tests for detection of SARS-CoV-2  virus and/or diagnosis of COVID-19 infection under section 564(b)(1) of  the Act, 21 U  S C  444WHC-7(O)(8), unless the authorization is terminated  or revoked sooner  The test has been validated but independent review by FDA  and CLIA is pending  Test performed using [employer] GeneXpert: This RT-PCR assay targets N2,  a region unique to SARS-CoV-2  A conserved region in the E-gene was chosen  for pan-Sarbecovirus detection which includes SARS-CoV-2      Urine Microscopic [[id number]]  (Abnormal) Collected: 06/23/22 0815    Lab Status: Final result Specimen: Urine, Clean Catch Updated: 06/23/22 0845     RBC, UA 2-4 /hpf      WBC, UA Innumerable /hpf      Epithelial Cells Moderate /hpf      Bacteria, UA Moderate /hpf      OTHER OBSERVATIONS Yeast Cells Present    Comprehensive metabolic panel [[id number]]  (Abnormal) Collected: 06/23/22 0817    Lab Status: Final result Specimen: Blood from Hand, Right Updated: 06/23/22 0839     Sodium 134 mmol/L      Potassium 4 3 mmol/L      Chloride 101 mmol/L      CO2 28 mmol/L      ANION GAP 5 mmol/L      BUN 13 mg/dL      Creatinine 0 55 mg/dL      Glucose 177 mg/dL      Calcium 8 4 mg/dL      AST 56 U/L      ALT 57 U/L      Alkaline Phosphatase 396 U/L      Total Protein 7 4 g/dL      Albumin 3 7 g/dL      Total Bilirubin 0 65 mg/dL      eGFR 105 ml/min/1 73sq m     Narrative:      Hemolysis  National Kidney Disease Foundation guidelines for Chronic Kidney Disease (CKD):     Stage 1 with normal or high GFR (GFR > 90 mL/min/1 73 square meters)    Stage 2 Mild CKD (GFR = 60-89 mL/min/1 73 square meters)    Stage 3A Moderate CKD (GFR = 45-59 mL/min/1 73 square meters)    Stage 3B Moderate CKD (GFR = 30-44 mL/min/1 73 square meters)    Stage 4 Severe CKD (GFR = 15-29 mL/min/1 73 square meters)    Stage 5 End Stage CKD (GFR <15 mL/min/1 73 square meters)  Note: GFR calculation is accurate only with a steady state creatinine    Lipase [[id number]]  (Normal) Collected: 06/23/22 0817    Lab Status: Final result Specimen: Blood from Hand, Right Updated: 06/23/22 0839     Lipase 25 u/L     Narrative: Hemolysis    CBC and differential [[id number]]  (Abnormal) Collected: 06/23/22 0817    Lab Status: Final result Specimen: Blood from Hand, Right Updated: 06/23/22 0827     WBC 11 37 Thousand/uL      RBC 3 55 Million/uL      Hemoglobin 11 8 g/dL      Hematocrit 36 1 %       fL      MCH 33 2 pg      MCHC 32 7 g/dL      RDW 12 7 %      MPV 9 3 fL      Platelets 238 Thousands/uL      nRBC 0 /100 WBCs      Neutrophils Relative 82 %      Immat GRANS % 0 %      Lymphocytes Relative 8 %      Monocytes Relative 8 %      Eosinophils Relative 2 %      Basophils Relative 0 %      Neutrophils Absolute 9 31 Thousands/µL      Immature Grans Absolute 0 05 Thousand/uL      Lymphocytes Absolute 0 85 Thousands/µL      Monocytes Absolute 0 93 Thousand/µL      Eosinophils Absolute 0 18 Thousand/µL      Basophils Absolute 0 05 Thousands/µL     UA (URINE) with reflex to Scope [[id number]]  (Abnormal) Collected: 06/23/22 0815    Lab Status: Final result Specimen: Urine, Clean Catch Updated: 06/23/22 0824     Color, UA Straw     Clarity, UA Slightly Cloudy     Specific [place], UA 1 010     pH, UA 6 0     Leukocytes,  0     Nitrite, UA Negative     Protein, UA Negative mg/dl      Glucose, UA >=1000 (1%) mg/dl      Ketones, UA Negative mg/dl      Bilirubin, UA Negative     Occult Blood,  0     UROBILINOGEN UA Negative mg/dL                  CT renal stone study abdomen pelvis wo contrast   Final Result by Shaheen Malone MD (06/23 7315)      1  Status post removal of large obstructing left ureteral calculi though there is increased moderate hydroureteronephrosis with perinephric stranding  No obstructing stone is identified and this may be related to edema  2   Small calculi in the bladder and left kidney  3   Stable trace left pleural effusion  The study was marked in [facility] for immediate notification        Workstation performed: [id number]                    Procedures  Procedures         ED Course SBIRT 20yo+    Flowsheet Row Most Recent Value   SBIRT (23 yo +)    In order to provide better care to our patients, we are screening all of our patients for alcohol and drug use  Would it be okay to ask you these screening questions? No Filed at: 06/23/2022 4885                    [facility]  Number of Diagnoses or Management Options  Hydronephrosis  Pyelonephritis  Diagnosis management comments: I have reviewed the patient's visit and any testing done in the emergency department  They have verbalized their understanding of any testing done today and have no further questions or concerns regarding their care in the emergency room  They will follow up with their primary care physician as well as with any specialist in their discharge instructions  Strict return precautions were discussed  Amount and/or Complexity of Data Reviewed  Clinical lab tests: ordered and reviewed  Tests in the radiology section of CPT®: ordered and reviewed  Tests in the medicine section of CPT®: ordered and reviewed  Independent visualization of images, tracings, or specimens: yes        Disposition  Final diagnoses:   Pyelonephritis   Hydronephrosis     Time reflects when diagnosis was documented in both MDM as applicable and the Disposition within this note     Time User Action Codes Description Comment    6/23/2022 10:08 AM [place] Add [N12] Pyelonephritis     6/23/2022 10:08 AM [place] Add [N13 30] Hydronephrosis       ED Disposition     ED Disposition   Discharge    Condition   Stable    Date/Time   Thu Jun 23, 2022 [place] 66 discharge to home/self care                 Follow-up Information     Follow up With Specialties Details Why Contact Info Additional [address] In 1 week  [address], [address] [facility] [address]  [address] [place] [address], [address] [place] [address] [address], [address] [place], [place], [address]    [address] [place] [address] Urology [place] [facility]   [facility] [address]  [address]  [place] [address]  [address]  [facility] [place], [address], [place], [place], [address] [phone]          Discharge Medication List as of 6/23/2022 10:10 AM      START taking these medications    Details   sulfamethoxazole-trimethoprim (BACTRIM DS) 800-160 mg per tablet Take 1 tablet by mouth 2 (two) times a day for 7 days smx-tmp DS (BACTRIM) 800-160 mg tabs (1tab q12 D10), Starting Thu 6/23/2022, Until Thu 6/30/2022, Normal      traMADol (ULTRAM) 50 mg tablet Take 1 tablet (50 mg total) by mouth every 8 (eight) hours as needed for severe pain for up to 3 days, Starting Thu 6/23/2022, Until Sun 6/26/2022 at 2359, Normal         CONTINUE these medications which have NOT CHANGED    Details   aspirin (ECOTRIN LOW STRENGTH) 81 mg EC tablet Take 81 mg by mouth daily  , Starting Mon 12/18/2017, Historical Med      azaTHIOprine (IMURAN) 50 mg tablet Take 50 mg by mouth daily, Historical Med      Biotin 10 MG TABS Take 10 mg by mouth, Starting Wed 4/25/2018, Historical Med      budesonide (ENTOCORT EC) 3 MG capsule Starting Wed 4/4/2018, Historical Med      calcium citrate-Vitamin D 200 mg-250 units Take 2 tablets by mouth, Starting Tue 1/2/2018, Historical Med      cetirizine (ZyrTEC) 10 mg tablet Take 10 mg by mouth, Starting Wed 4/25/2018, Until Thu 4/25/2019, Historical Med      dicyclomine (BENTYL) 20 mg tablet Take 1 tablet (20 mg total) by mouth 2 (two) times a day, Starting Sat 12/7/2019, Print      erythromycin (ILOTYCIN) ophthalmic ointment Place a 1/2 inch ribbon of ointment into the lower eyelid  , Print      FLUoxetine (PROzac) 40 MG capsule Take 40 mg by mouth daily  , Starting Tue 9/5/2017, Historical Med      ibuprofen (MOTRIN) 600 mg tablet Take 1 tablet (600 mg total) by mouth every 6 (six) hours as needed for mild pain, Starting Tue 6/14/2022, Normal      ketotifen (ZADITOR) 0 025 % ophthalmic solution Administer to both eyes daily  , Starting Sat 7/4/2015, Historical Med      liraglutide (VICTOZA) injection Inject 1 8 mg under the skin daily  , Starting Mon 3/19/2018, Historical Med      LORazepam (ATIVAN) 1 mg tablet Take 1 mg by mouth 2 (two) times a day, Historical Med      naproxen (NAPROSYN) 500 mg tablet Take 1 tablet (500 mg total) by mouth 2 (two) times a day with meals for 10 days, Starting Sun 6/17/2018, Until Wed 6/27/2018, Print      ondansetron (ZOFRAN-ODT) 4 mg disintegrating tablet Take 1 tablet (4 mg total) by mouth every 6 (six) hours as needed for nausea or vomiting, Starting Sun 6/17/2018, Print      oxybutynin (DITROPAN) 5 mg tablet Take 1 tablet (5 mg total) by mouth 3 (three) times a day as needed (stent discomfort), Starting Fri 7/6/2018, Print      !! oxyCODONE-acetaminophen (PERCOCET) 5-325 mg per tablet Take 1 tablet by mouth every 4 (four) hours as needed for moderate pain for up to 10 doses Max Daily Amount: 6 tablets, Starting Fri 7/6/2018, Print      !! oxyCODONE-acetaminophen (Percocet) 5-325 mg per tablet Take 1 tablet by mouth every 6 (six) hours as needed for moderate pain or severe pain for up to 10 doses Max Daily Amount: 4 tablets, Starting Wed 6/15/2022, Normal      pantoprazole (PROTONIX) 40 mg tablet Take 1 tablet (40 mg total) by mouth 2 (two) times a day, Starting Wed 7/25/2018, Normal      QUEtiapine (SEROquel) 100 mg tablet Take 100 mg by mouth daily at bedtime, Historical Med      tamsulosin (FLOMAX) 0 4 mg Take 1 capsule (0 4 mg total) by mouth daily with dinner for 14 days, Starting Tue 6/14/2022, Until Tue 6/28/2022, Normal      zolpidem (AMBIEN) 5 mg tablet Take 10 mg by mouth daily at bedtime as needed for sleep  , Historical Med       !! - Potential duplicate medications found  Please discuss with provider            No discharge procedures on file      PDMP Review       Value Time User    PDMP Reviewed  Yes 6/14/2022  4:48 PM Migue Tapia MD          ED Provider  Electronically Signed by           Harley Wilder DO  06/23/22 4080

## 2022-07-07 ENCOUNTER — TELEPHONE (OUTPATIENT)
Dept: UROLOGY | Facility: CLINIC | Age: 57
End: 2022-07-07

## 2022-07-07 ENCOUNTER — OFFICE VISIT (OUTPATIENT)
Dept: URGENT CARE | Age: 57
End: 2022-07-07
Payer: COMMERCIAL

## 2022-07-07 VITALS
SYSTOLIC BLOOD PRESSURE: 117 MMHG | OXYGEN SATURATION: 98 % | HEIGHT: 62 IN | DIASTOLIC BLOOD PRESSURE: 70 MMHG | TEMPERATURE: 96.1 F | HEART RATE: 97 BPM | BODY MASS INDEX: 26.31 KG/M2 | WEIGHT: 143 LBS

## 2022-07-07 DIAGNOSIS — R30.0 DYSURIA: Primary | ICD-10-CM

## 2022-07-07 LAB
SL AMB  POCT GLUCOSE, UA: ABNORMAL
SL AMB LEUKOCYTE ESTERASE,UA: ABNORMAL
SL AMB POCT BILIRUBIN,UA: ABNORMAL
SL AMB POCT BLOOD,UA: ABNORMAL
SL AMB POCT CLARITY,UA: ABNORMAL
SL AMB POCT COLOR,UA: ABNORMAL
SL AMB POCT KETONES,UA: ABNORMAL
SL AMB POCT NITRITE,UA: ABNORMAL
SL AMB POCT PH,UA: 5
SL AMB POCT SPECIFIC GRAVITY,UA: 1.01
SL AMB POCT URINE PROTEIN: ABNORMAL
SL AMB POCT UROBILINOGEN: ABNORMAL

## 2022-07-07 PROCEDURE — 87106 FUNGI IDENTIFICATION YEAST: CPT | Performed by: NURSE PRACTITIONER

## 2022-07-07 PROCEDURE — 87077 CULTURE AEROBIC IDENTIFY: CPT | Performed by: NURSE PRACTITIONER

## 2022-07-07 PROCEDURE — 87086 URINE CULTURE/COLONY COUNT: CPT | Performed by: NURSE PRACTITIONER

## 2022-07-07 PROCEDURE — 81002 URINALYSIS NONAUTO W/O SCOPE: CPT | Performed by: NURSE PRACTITIONER

## 2022-07-07 PROCEDURE — 99213 OFFICE O/P EST LOW 20 MIN: CPT | Performed by: NURSE PRACTITIONER

## 2022-07-07 NOTE — PROGRESS NOTES
NAME: Joe Cueto is a 56 y o  female  : 1965    MRN: [id number]    /70   Pulse 97   Temp (!) 96 1 °F (35 6 °C) Comment: rechecked temp tympanically  Ht 5' 2" (1 575 m)   Wt 64 9 kg (143 lb)   SpO2 98%   BMI 26 16 kg/m²     Assessment and Plan   Dysuria [R30 0]  1  Dysuria  POCT urine dip    Transfer to other facility    Urine culture       Hernando Palacios was seen today for possible uti  Diagnoses and all orders for this visit:    Dysuria  -     POCT urine dip  -     Transfer to other facility  -     Urine culture        Patient Instructions   Patient Instructions   Pt going to the ER for further evaluation      Proceed to the nearest ER if symptoms worsen, Follow up with your PCP  Continue to social distance, wash your hands, and wear your masks  Please continue to follow the CDC  gov guidelines daily for they are subject to change on COVID-19    Chief Complaint     Chief Complaint   Patient presents with    Possible UTI     Had kidney stones 3 weeks ago had procedure done to remove, burns when pees- has pain, cloudy urine-         History of Present Illness     This is a 56year old female who presents with painful urination and lower pelvis pain for 2 weeks  She had left ureteroscopy, laser, stone extraction, stent placement on 22  She was seen in the ED on the 22 and placed on Bactrim for 10 days and had IV ceftriaxone that day was well  She finished it 3 days ago  She has burning with urination since the stent placed, no abdominal pain but had pelvic pain  Sugars are elevated at home and no CVA tenderness  Pt didn't call urologist and urine dip was done today  Lg amt of glucose noted and is on DM that would cause this  She has had suffered with kidney stones for the past 10 years, discussed with pt due to post op procedure, no infection noted on urine dip to call urology and discuss or would need to go to the ER  Pt [facility]         Review of Systems   Review of Systems   Constitutional: Negative  Negative for chills and fever  HENT: Negative  Gastrointestinal: Negative for abdominal pain, constipation, diarrhea and nausea  Genitourinary: Positive for dysuria, hematuria and pelvic pain  Negative for difficulty urinating, flank pain, vaginal bleeding, vaginal discharge and vaginal pain  When urinating "it hurts"   Musculoskeletal: Negative  Neurological: Negative  Current Medications       Current Outpatient Medications:     aspirin (ECOTRIN LOW STRENGTH) 81 mg EC tablet, Take 81 mg by mouth daily  , Disp: , Rfl:     azaTHIOprine (IMURAN) 50 mg tablet, Take 50 mg by mouth daily, Disp: , Rfl:     FLUoxetine (PROzac) 40 MG capsule, Take 40 mg by mouth daily  , Disp: , Rfl:     pantoprazole (PROTONIX) 40 mg tablet, Take 1 tablet (40 mg total) by mouth 2 (two) times a day, Disp: 60 tablet, Rfl: 0    zolpidem (AMBIEN) 5 mg tablet, Take 10 mg by mouth daily at bedtime as needed for sleep  , Disp: , Rfl:     Biotin 10 MG TABS, Take 10 mg by mouth (Patient not taking: Reported on 7/7/2022), Disp: , Rfl:     budesonide (ENTOCORT EC) 3 MG capsule, , Disp: , Rfl:     calcium citrate-Vitamin D 200 mg-250 units, Take 2 tablets by mouth, Disp: , Rfl:     cetirizine (ZyrTEC) 10 mg tablet, Take 10 mg by mouth, Disp: , Rfl:     dicyclomine (BENTYL) 20 mg tablet, Take 1 tablet (20 mg total) by mouth 2 (two) times a day (Patient not taking: Reported on 7/7/2022), Disp: 20 tablet, Rfl: 0    erythromycin (ILOTYCIN) ophthalmic ointment, Place a 1/2 inch ribbon of ointment into the lower eyelid   (Patient not taking: Reported on 7/7/2022), Disp: 1 g, Rfl: 0    ibuprofen (MOTRIN) 600 mg tablet, Take 1 tablet (600 mg total) by mouth every 6 (six) hours as needed for mild pain (Patient not taking: Reported on 7/7/2022), Disp: 30 tablet, Rfl: 0    ketotifen (ZADITOR) 0 025 % ophthalmic solution, Administer to both eyes daily   (Patient not taking: Reported on 7/7/2022), Disp: , Rfl:     liraglutide (VICTOZA) injection, Inject 1 8 mg under the skin daily   (Patient not taking: Reported on 7/7/2022), Disp: , Rfl:     LORazepam (ATIVAN) 1 mg tablet, Take 1 mg by mouth 2 (two) times a day (Patient not taking: Reported on 7/7/2022), Disp: , Rfl:     naproxen (NAPROSYN) 500 mg tablet, Take 1 tablet (500 mg total) by mouth 2 (two) times a day with meals for 10 days, Disp: 20 tablet, Rfl: 0    ondansetron (ZOFRAN-ODT) 4 mg disintegrating tablet, Take 1 tablet (4 mg total) by mouth every 6 (six) hours as needed for nausea or vomiting (Patient taking differently: Take 4 mg by mouth as needed for nausea or vomiting), Disp: 20 tablet, Rfl: 0    oxybutynin (DITROPAN) 5 mg tablet, Take 1 tablet (5 mg total) by mouth 3 (three) times a day as needed (stent discomfort) (Patient not taking: Reported on 7/7/2022), Disp: 20 tablet, Rfl: 0    oxyCODONE-acetaminophen (PERCOCET) 5-325 mg per tablet, Take 1 tablet by mouth every 4 (four) hours as needed for moderate pain for up to 10 doses Max Daily Amount: 6 tablets (Patient not taking: Reported on 7/7/2022), Disp: 20 tablet, Rfl: 0    oxyCODONE-acetaminophen (Percocet) 5-325 mg per tablet, Take 1 tablet by mouth every 6 (six) hours as needed for moderate pain or severe pain for up to 10 doses Max Daily Amount: 4 tablets (Patient not taking: Reported on 7/7/2022), Disp: 10 tablet, Rfl: 0    QUEtiapine (SEROquel) 100 mg tablet, Take 100 mg by mouth daily at bedtime (Patient not taking: Reported on 7/7/2022), Disp: , Rfl:     tamsulosin (FLOMAX) 0 4 mg, Take 1 capsule (0 4 mg total) by mouth daily with dinner for 14 days, Disp: 14 capsule, Rfl: 0    Current Allergies     Allergies as of 07/07/2022 - Reviewed 07/07/2022   Allergen Reaction Noted    No active allergies  06/06/2018              Past Medical History:   Diagnosis Date    [facility]immune hepatitis ([facility] )     Bipolar 1 disorder ([facility] )     Diabetes mellitus ([facility] )     Kidney stone  Renal disorder     kidney stones       Past Surgical History:   Procedure Laterality Date    BARIATRIC SURGERY  05/2017    BUNIONECTOMY      CYSTOSCOPY  07/12/2018    Stent Removal    FL RETROGRADE PYELOGRAM  6/16/2022    GASTRIC BYPASS  05/22/2017    HIATAL HERNIA REPAIR  05/22/2017    HYSTERECTOMY      JOINT REPLACEMENT      KIDNEY STONE SURGERY      AK CYSTO/URETERO W/LITHOTRIPSY &INDWELL STENT INSRT Bilateral 7/6/2018    Procedure: CYSTOSCOPY GALDINO  URETEROSCOPY;GALDINO  RETROGRADE PYELOGRAM AND INSERTION GALDINO  STENT URETERAL;  Surgeon: Forest Hartman MD;  [facility];  Service: Urology    AK CYSTO/URETERO W/LITHOTRIPSY &INDWELL STENT INSRT Left 6/16/2022    Procedure: CYSTOSCOPY URETEROSCOPY WITH LITHOTRIPSY HOLMIUM LASER, RETROGRADE PYELOGRAM AND INSERTION STENT URETERAL;  Surgeon: Jean Gonsales MD;  [facility];  Service: Urology    TOTAL SHOULDER REPLACEMENT  2002    VAGINAL HYSTERECTOMY      PARTIAL       Family History   Problem Relation Age of Onset    Diabetes Father     Heart disease Father     Diabetes Mother     Heart disease Mother     Diabetes Sister     Diabetes Family         MELLITUS    Depression Family     Mental illness Family     Obesity Family     Osteoarthritis Family          Medications have been verified  The following portions of the patient's history were reviewed and updated as appropriate: allergies, current medications, past family history, past medical history, past social history, past surgical history and problem list     Objective   /70   Pulse 97   Temp (!) 96 1 °F (35 6 °C) Comment: rechecked temp tympanically  Ht 5' 2" (1 575 m)   Wt 64 9 kg (143 lb)   SpO2 98%   BMI 26 16 kg/m²      Physical Exam     Physical Exam  Constitutional:       Appearance: Normal appearance  Cardiovascular:      Rate and Rhythm: Normal rate and regular rhythm     Pulmonary:      Effort: Pulmonary effort is normal       Breath sounds: Normal breath sounds and air entry  No decreased breath sounds, wheezing or rhonchi  Abdominal:      General: There is no distension  Palpations: Abdomen is soft  Tenderness: There is abdominal tenderness (mild)  There is no right CVA tenderness or left CVA tenderness  Comments: Low back pain present, not over flank area  Skin:     General: Skin is warm  Capillary Refill: Capillary refill takes less than 2 seconds  Neurological:      General: No focal deficit present  Mental Status: She is alert and oriented to person, place, and time  Psychiatric:         Attention and Perception: Attention normal          Mood and Affect: Mood normal                Note: Portions of this record may have been created with voice recognition software  Occasional wrong word or "sound a like" substitutions may have occurred due to the inherent limitations of voice recognition software  Please read the chart carefully and recognize, using context, where substitutions have occurred  OSMAR Turner

## 2022-07-07 NOTE — TELEPHONE ENCOUNTER
Patient called stated that she went to urgent care  she is complaining of burning and pain with urination  She had surgery with Dr Rk Serrano on 6/16/22   Looks like patient never made a follow up appointment    Scheduled pateint with enedina 7/8/22 at 8 am

## 2022-07-08 ENCOUNTER — OFFICE VISIT (OUTPATIENT)
Dept: UROLOGY | Facility: CLINIC | Age: 57
End: 2022-07-08
Payer: COMMERCIAL

## 2022-07-08 VITALS
DIASTOLIC BLOOD PRESSURE: 78 MMHG | SYSTOLIC BLOOD PRESSURE: 122 MMHG | HEIGHT: 62 IN | HEART RATE: 82 BPM | WEIGHT: 146 LBS | BODY MASS INDEX: 26.87 KG/M2

## 2022-07-08 DIAGNOSIS — R39.9 UTI SYMPTOMS: Primary | ICD-10-CM

## 2022-07-08 DIAGNOSIS — N20.0 NEPHROLITHIASIS: ICD-10-CM

## 2022-07-08 PROBLEM — R30.0 DYSURIA: Status: ACTIVE | Noted: 2022-07-08

## 2022-07-08 LAB
SL AMB  POCT GLUCOSE, UA: 2000
SL AMB LEUKOCYTE ESTERASE,UA: ABNORMAL
SL AMB POCT BILIRUBIN,UA: ABNORMAL
SL AMB POCT BLOOD,UA: ABNORMAL
SL AMB POCT CLARITY,UA: ABNORMAL
SL AMB POCT COLOR,UA: YELLOW
SL AMB POCT KETONES,UA: ABNORMAL
SL AMB POCT NITRITE,UA: ABNORMAL
SL AMB POCT PH,UA: 7
SL AMB POCT SPECIFIC GRAVITY,UA: 1
SL AMB POCT URINE PROTEIN: ABNORMAL
SL AMB POCT UROBILINOGEN: 0.2

## 2022-07-08 PROCEDURE — 81002 URINALYSIS NONAUTO W/O SCOPE: CPT | Performed by: NURSE PRACTITIONER

## 2022-07-08 PROCEDURE — 99213 OFFICE O/P EST LOW 20 MIN: CPT | Performed by: NURSE PRACTITIONER

## 2022-07-08 RX ORDER — ALBUTEROL SULFATE 90 UG/1
AEROSOL, METERED RESPIRATORY (INHALATION)
COMMUNITY
Start: 2022-02-08

## 2022-07-08 RX ORDER — PHENAZOPYRIDINE HYDROCHLORIDE 200 MG/1
200 TABLET, FILM COATED ORAL
Qty: 10 TABLET | Refills: 0 | Status: SHIPPED | OUTPATIENT
Start: 2022-07-08

## 2022-07-08 RX ORDER — CEPHALEXIN 500 MG/1
500 CAPSULE ORAL EVERY 8 HOURS SCHEDULED
Qty: 21 CAPSULE | Refills: 0 | Status: SHIPPED | OUTPATIENT
Start: 2022-07-08 | End: 2022-07-15

## 2022-07-08 NOTE — ASSESSMENT & PLAN NOTE
· Urine culture pending  · Begin Keflex 500 mg t i d  · Begin Pyridium 200 mg p r n    · ER precautions reviewed  · Follow-up if symptoms fail to improve

## 2022-07-08 NOTE — PROGRESS NOTES
Assessment and plan:     Dysuria  · Urine culture pending  · Begin Keflex 500 mg t i d  · Begin Pyridium 200 mg p r n  · ER precautions reviewed  · Follow-up if symptoms fail to improve    Nephrolithiasis  · S/P URS with stone extraction 06/16/2022  · Ultrasound kidney bladder scheduled for September 2022  · Follow-up is scheduled      OSMAR Morris    History of Present Illness     Crista Mora is a 56 y o  female s/p cystoscopy, ureteroscopy, laser lithotripsy, retrograde pyelogram insertion of ureteral stent on 06/16/2022  She presents today with urge urinary incontinence, urinary frequency and painful urination  [facility]  She was found to have moderate blood large glucose  Her leukocytes and nitrates were negative on point of care testing  Urine culture is pending  Her urine dip in the office today is positive for leukocytes and moderate blood  She has follow-up ultrasound scheduled for September 2022  Due to the upcoming weekend and her symptoms will start her on Keflex and adjust the antibiotics accordingly  Denies gross hematuria  Denies any fevers or chills  Denies hx of recurrent UTI      Laboratory     Lab Results   Component Value Date    BUN 13 06/23/2022    CREATININE 0 55 (L) 06/23/2022       No components found for: GFR    Lab Results   Component Value Date    GLUCOSE 171 (H) 04/23/2018    CALCIUM 8 4 06/23/2022     (L) 06/05/2018    K 4 3 06/23/2022    CO2 28 06/23/2022     06/23/2022       Lab Results   Component Value Date    WBC 11 37 (H) 06/23/2022    HGB 11 8 06/23/2022    HCT 36 1 06/23/2022     (H) 06/23/2022     06/23/2022       No results found for: PSA    Recent Results (from the past 1 hour(s))   POCT urine dip    Collection Time: 07/08/22  7:58 AM   Result Value Ref Range    LEUKOCYTE ESTERASE,UA +++     NITRITE,UA -     SL AMB POCT UROBILINOGEN 0 2     POCT URINE PROTEIN +      PH,UA 7 0     BLOOD,UA mod SPECIFIC GRAVITY,UA 1 005     KETONES,UA -     BILIRUBIN,UA -     GLUCOSE, UA 2,000      COLOR,UA yellow     CLARITY,UA cloudy        @RESULT(URINEMICROSCOPIC)@    @RESULT(URINECULTURE)@    Radiology     CT ABDOMEN AND PELVIS WITHOUT IV CONTRAST - LOW DOSE RENAL STONE 6/23/2022     INDICATION:   recent procedure, flank pain, hx of kidney stones      COMPARISON:  6/15/2022     TECHNIQUE:  Low radiation dose thin section CT examination of the abdomen and pelvis was performed without intravenous or oral contrast according to a protocol specifically designed to evaluate for urinary tract calculus  Axial, sagittal, and coronal 2D   reformatted images were created from the source data and submitted for interpretation  Evaluation for pathology in the abdomen and pelvis that is unrelated to urinary tract calculi is limited        Radiation dose length product (DLP) for this visit:  209 mGy-cm   This examination, like all CT scans performed in the [facility], was performed utilizing techniques to minimize radiation dose exposure, including the use of iterative   reconstruction and automated exposure control      URINARY TRACT FINDINGS:     RIGHT KIDNEY AND URETER:  No urinary tract calculi  No hydronephrosis or hydroureter      LEFT KIDNEY AND URETER:  There continues to be moderate left-sided hydroureteronephrosis to the level of the bladder with mild perinephric stranding  This is increased compared to the prior study though the previous stones have been removed  No   obstructing calculus is identified    There are small nonobstructing stone fragments within the mid and lower pole of the left kidney      URINARY BLADDER:  There is a punctate calculus in the bladder      ADDITIONAL FINDINGS:     LOWER CHEST:  There is a trace left pleural effusion, stable      SOLID VISCERA: Limited low radiation dose noncontrast CT evaluation demonstrates no clinically significant abnormality of the imaged portions of the liver, spleen, pancreas, or adrenal glands        GALLBLADDER/BILIARY TREE:  No calcified gallstones  No pericholecystic inflammatory change  No biliary dilatation      STOMACH AND BOWEL:  Status post gastric bypass without complication      APPENDIX:  No findings to suggest appendicitis      ABDOMINOPELVIC CAVITY:  No ascites  No pneumoperitoneum  No lymphadenopathy      REPRODUCTIVE ORGANS:  Surgical changes of prior hysterectomy      ABDOMINAL WALL/INGUINAL REGIONS:  Unremarkable      OSSEOUS STRUCTURES:  No acute fracture or destructive osseous lesion      IMPRESSION:     1   Status post removal of large obstructing left ureteral calculi though there is increased moderate hydroureteronephrosis with perinephric stranding  No obstructing stone is identified and this may be related to edema      2   Small calculi in the bladder and left kidney      3   Stable trace left pleural effusion      The study was marked in EPIC for immediate notification  Review of Systems     Review of Systems   Constitutional: Negative for activity change, appetite change, chills, fatigue, fever and unexpected weight change  HENT: Negative for facial swelling  Eyes: Negative for discharge  Respiratory: Negative  Negative for cough and shortness of breath  Cardiovascular: Negative for chest pain and leg swelling  Gastrointestinal: Negative  Negative for abdominal distention, abdominal pain, constipation, diarrhea, nausea and vomiting  Endocrine: Negative  Genitourinary: Positive for dysuria, frequency and urgency  Negative for decreased urine volume, difficulty urinating, enuresis, flank pain, genital sores and hematuria  Urge incontinence   Musculoskeletal: Negative for back pain and myalgias  Skin: Negative for pallor and rash  Allergic/Immunologic: Negative  Negative for immunocompromised state  Neurological: Negative for facial asymmetry and speech difficulty     Psychiatric/Behavioral: Negative for agitation and confusion  Allergies     Allergies   Allergen Reactions    No Active Allergies        Physical Exam     Physical Exam  Vitals reviewed  Constitutional:       General: She is not in acute distress  Appearance: Normal appearance  She is normal weight  She is not ill-appearing, toxic-appearing or diaphoretic  HENT:      Head: Normocephalic and atraumatic  Eyes:      General: No scleral icterus  Cardiovascular:      Rate and Rhythm: Normal rate  Pulmonary:      Effort: Pulmonary effort is normal  No respiratory distress  Abdominal:      General: Abdomen is flat  There is no distension  Palpations: Abdomen is soft  Tenderness: There is no abdominal tenderness  There is no right CVA tenderness, left CVA tenderness, guarding or rebound  Comments: Mild lower abdominal discomfort with palpation   Musculoskeletal:         General: No swelling  Cervical back: Normal range of motion  Right lower leg: No edema  Left lower leg: No edema  Skin:     General: Skin is warm and dry  Coloration: Skin is not jaundiced or pale  Findings: No rash  Neurological:      General: No focal deficit present  Mental Status: She is alert and oriented to person, place, and time  Gait: Gait normal    Psychiatric:         Mood and Affect: Mood normal          Behavior: Behavior normal          Thought Content:  Thought content normal          Judgment: Judgment normal          Vital Signs     Vitals:    07/08/22 0748   BP: 122/78   BP Location: Left arm   Patient Position: Sitting   Cuff Size: Adult   Pulse: 82   Weight: 66 2 kg (146 lb)   Height: 5' 2" (1 575 m)       Current Medications       Current Outpatient Medications:     albuterol (PROVENTIL HFA,VENTOLIN HFA) 90 mcg/act inhaler, INHALE 2 PUFFS BY MOUTH EVERY FOUR HOURS FOR FIVE DAYS, Disp: , Rfl:     aspirin (ECOTRIN LOW STRENGTH) 81 mg EC tablet, Take 81 mg by mouth daily  , Disp: , Rfl:    azaTHIOprine (IMURAN) 50 mg tablet, Take 50 mg by mouth daily, Disp: , Rfl:     cephalexin (KEFLEX) 500 mg capsule, Take 1 capsule (500 mg total) by mouth every 8 (eight) hours for 7 days, Disp: 21 capsule, Rfl: 0    FLUoxetine (PROzac) 40 MG capsule, Take 40 mg by mouth daily  , Disp: , Rfl:     pantoprazole (PROTONIX) 40 mg tablet, Take 1 tablet (40 mg total) by mouth 2 (two) times a day, Disp: 60 tablet, Rfl: 0    phenazopyridine (PYRIDIUM) 200 mg tablet, Take 1 tablet (200 mg total) by mouth 3 (three) times a day with meals, Disp: 10 tablet, Rfl: 0    QUEtiapine (SEROquel) 100 mg tablet, Take 100 mg by mouth daily at bedtime, Disp: , Rfl:     zolpidem (AMBIEN) 5 mg tablet, Take 10 mg by mouth daily at bedtime as needed for sleep  , Disp: , Rfl:     Biotin 10 MG TABS, Take 10 mg by mouth (Patient not taking: No sig reported), Disp: , Rfl:     budesonide (ENTOCORT EC) 3 MG capsule, , Disp: , Rfl:     calcium citrate-Vitamin D 200 mg-250 units, Take 2 tablets by mouth (Patient not taking: Reported on 7/8/2022), Disp: , Rfl:     cetirizine (ZyrTEC) 10 mg tablet, Take 10 mg by mouth (Patient not taking: Reported on 7/8/2022), Disp: , Rfl:     dicyclomine (BENTYL) 20 mg tablet, Take 1 tablet (20 mg total) by mouth 2 (two) times a day (Patient not taking: No sig reported), Disp: 20 tablet, Rfl: 0    erythromycin (ILOTYCIN) ophthalmic ointment, Place a 1/2 inch ribbon of ointment into the lower eyelid   (Patient not taking: No sig reported), Disp: 1 g, Rfl: 0    ibuprofen (MOTRIN) 600 mg tablet, Take 1 tablet (600 mg total) by mouth every 6 (six) hours as needed for mild pain (Patient not taking: No sig reported), Disp: 30 tablet, Rfl: 0    ketotifen (ZADITOR) 0 025 % ophthalmic solution, Administer to both eyes daily   (Patient not taking: No sig reported), Disp: , Rfl:     liraglutide (VICTOZA) injection, Inject 1 8 mg under the skin daily   (Patient not taking: No sig reported), Disp: , Rfl:    LORazepam (ATIVAN) 1 mg tablet, Take 1 mg by mouth 2 (two) times a day (Patient not taking: No sig reported), Disp: , Rfl:     naproxen (NAPROSYN) 500 mg tablet, Take 1 tablet (500 mg total) by mouth 2 (two) times a day with meals for 10 days, Disp: 20 tablet, Rfl: 0    ondansetron (ZOFRAN-ODT) 4 mg disintegrating tablet, Take 1 tablet (4 mg total) by mouth every 6 (six) hours as needed for nausea or vomiting (Patient not taking: No sig reported), Disp: 20 tablet, Rfl: 0    oxybutynin (DITROPAN) 5 mg tablet, Take 1 tablet (5 mg total) by mouth 3 (three) times a day as needed (stent discomfort) (Patient not taking: No sig reported), Disp: 20 tablet, Rfl: 0    oxyCODONE-acetaminophen (PERCOCET) 5-325 mg per tablet, Take 1 tablet by mouth every 4 (four) hours as needed for moderate pain for up to 10 doses Max Daily Amount: 6 tablets (Patient not taking: No sig reported), Disp: 20 tablet, Rfl: 0    tamsulosin (FLOMAX) 0 4 mg, Take 1 capsule (0 4 mg total) by mouth daily with dinner for 14 days, Disp: 14 capsule, Rfl: 0    Active Problems     Patient Active Problem List   Diagnosis    Nephrolithiasis    Arthritis    [facility]    Bipolar affective disorder ([facility]    COPD (chronic obstructive pulmonary disease) ([facility]    Hypertension    Type 2 diabetes mellitus without complication (HCC)    S/P gastric bypass    Dysuria       Past Medical History     Past Medical History:   Diagnosis Date    [facility]     Bipolar 1 disorder ([facility]     [facility]     Kidney stone     Renal disorder     kidney stones       Surgical History     Past Surgical History:   Procedure Laterality Date    BARIATRIC SURGERY  05/2017    BUNIONECTOMY      CYSTOSCOPY  07/12/2018    Stent Removal    FL RETROGRADE PYELOGRAM  6/16/2022    GASTRIC BYPASS  05/22/2017    HIATAL HERNIA REPAIR  05/22/2017    HYSTERECTOMY      JOINT REPLACEMENT      KIDNEY STONE SURGERY      FL CYSTO/URETERO W/LITHOTRIPSY &INDWELL STENT INSRT Bilateral 7/6/2018    Procedure: CYSTOSCOPY GALDINO  URETEROSCOPY;GALDINO  RETROGRADE PYELOGRAM AND INSERTION GALDINO  STENT URETERAL;  Surgeon: Jatinder Mancilla MD;  [facility];  Service: Urology    MA CYSTO/URETERO W/LITHOTRIPSY &INDWELL STENT INSRT Left 6/16/2022    Procedure: CYSTOSCOPY URETEROSCOPY WITH LITHOTRIPSY HOLMIUM LASER, RETROGRADE PYELOGRAM AND INSERTION STENT URETERAL;  Surgeon: Valetnin Tiwari MD;  [facility];  Service: Urology    TOTAL SHOULDER REPLACEMENT  2002    VAGINAL HYSTERECTOMY      PARTIAL       Family History     Family History   Problem Relation Age of Onset    Diabetes Father     Heart disease Father     Diabetes Mother     Heart disease Mother     Diabetes Sister     Diabetes Family         MELLITUS    Depression Family     Mental illness Family     Obesity Family     Osteoarthritis Family        Social History     Social History     Social History     Tobacco Use   Smoking Status Current Every Day Smoker    Packs/day: 0 25    Years: 30 00    Pack years: 7 50    Types: Cigarettes   Smokeless Tobacco Never Used       Past Surgical History:   Procedure Laterality Date    BARIATRIC SURGERY  05/2017    BUNIONECTOMY      CYSTOSCOPY  07/12/2018    Stent Removal    FL RETROGRADE PYELOGRAM  6/16/2022    GASTRIC BYPASS  05/22/2017    HIATAL HERNIA REPAIR  05/22/2017    HYSTERECTOMY      JOINT REPLACEMENT      KIDNEY STONE SURGERY      MA CYSTO/URETERO W/LITHOTRIPSY &INDWELL STENT INSRT Bilateral 7/6/2018    Procedure: CYSTOSCOPY GALDINO  URETEROSCOPY;GALDINO  RETROGRADE PYELOGRAM AND INSERTION GALDINO  STENT URETERAL;  Surgeon: Jatinder Mancilla MD;  [facility];  Service: Urology    MA CYSTO/URETERO W/LITHOTRIPSY &INDWELL STENT INSRT Left 6/16/2022    Procedure: CYSTOSCOPY URETEROSCOPY WITH LITHOTRIPSY HOLMIUM LASER, RETROGRADE PYELOGRAM AND INSERTION STENT URETERAL;  Surgeon: Valentin Tiwari MD;  [facility]; Service: Urology    TOTAL SHOULDER REPLACEMENT  2002    VAGINAL HYSTERECTOMY      PARTIAL         The following portions of the patient's history were reviewed and updated as appropriate: allergies, current medications, past family history, past medical history, past social history, past surgical history and problem list    Please note :  Voice dictation software has been used to create this document  There may be inadvertent transcription errors      [id number] [id number] Tricia Mini

## 2022-07-08 NOTE — ASSESSMENT & PLAN NOTE
· S/P URS with stone extraction 06/16/2022  · Ultrasound kidney bladder scheduled for September 2022  · Follow-up is scheduled

## 2022-07-11 ENCOUNTER — TELEPHONE (OUTPATIENT)
Dept: OTHER | Facility: HOSPITAL | Age: 57
End: 2022-07-11

## 2022-07-11 NOTE — TELEPHONE ENCOUNTER
Called [facility] and spoke with Holly Adams asking to ID contaminants on patient's urine culture

## 2022-07-11 NOTE — TELEPHONE ENCOUNTER
Please call the lab and see if they can place her culture separately  If they are unable to do this please have patient go for repeat urine testing  If there mixed contaminants at that time the need to platelet separately for us    Thank you

## 2022-07-12 LAB
BACTERIA UR CULT: ABNORMAL

## 2022-09-19 ENCOUNTER — NURSE TRIAGE (OUTPATIENT)
Dept: PHYSICAL THERAPY | Facility: OTHER | Age: 57
End: 2022-09-19

## 2022-09-19 DIAGNOSIS — M54.50 ACUTE EXACERBATION OF CHRONIC LOW BACK PAIN: Primary | ICD-10-CM

## 2022-09-19 DIAGNOSIS — G89.29 ACUTE EXACERBATION OF CHRONIC LOW BACK PAIN: Primary | ICD-10-CM

## 2022-09-19 NOTE — TELEPHONE ENCOUNTER
Additional Information   Negative: Is this related to a work injury?  Negative: Is this related to an MVA?  Negative: Are you currently recieving homecare services?  Negative: Has the patient had unexplained weight loss?  Negative: Is the patient experiencing blood in sputum?  Negative: Has the patient experienced major trauma? (fall from height, high speed collision, direct blow to spine) and is also experiencing nausea, light-headedness, or loss of consciousness?  Negative: Does the patient have a fever?  Negative: Is the patient experiencing urine retention?  Negative: Is the patient experiencing acute drop foot or paralysis?  Affirmative: Is this a chronic condition? Background - Initial Assessment  Clinical complaint: left sided low back pain - Denies radiation  Date of onset: Pt has hx of intermittent chronic Left sided low back pain after a fall- Progressively worsened  Acute episode started about 1 week ago- Denies any injury -  Frequency of pain: constant  Quality of pain: sharp and shooting    Protocols used: [facility] COMPREHENSIVE SPINE PROGRAM PROTOCOL    PATIENT FILLED OUT ON-LINE FORM FOR [facility] CSP  This RN did review the Comprehensive Spine Program in detail and what we offer for their back or neck pain  Patient has been seen by her PCP [facility]) who recommended physical therapy  Patient stated she attended, but would like to have the evaluation with [facility]  Patient is agreeable to triage and would like to proceed w/ Evaluation/Sessions with [facility] therapist   Triaged and NO RF s/s present  Patients information was sent to the preferred site and patient made aware clerical would be calling to schedule appointment  Contact information for the [place] site was provided for the patient as well  Nurse also offered a call from the [facility] counselor d/t SBT score  Patient declined  Patient was pleasant and appropriate during this encounter      Patient appreciative of call   Nurse wished patient well and ENCOURAGED to schedule eval   Patient did not voice any questions and/or concerns  All information about patients intended careplan reviewed  Patient in agreement with nurse's explanation of referrals and treatment plan  Nurse wished her well and to CB if needed

## 2022-12-01 ENCOUNTER — HOSPITAL ENCOUNTER (EMERGENCY)
Facility: HOSPITAL | Age: 57
Discharge: HOME | End: 2022-12-01
Attending: EMERGENCY MEDICINE

## 2022-12-01 ENCOUNTER — APPOINTMENT (EMERGENCY)
Dept: CT IMAGING | Facility: HOSPITAL | Age: 57
End: 2022-12-01

## 2022-12-01 VITALS
SYSTOLIC BLOOD PRESSURE: 137 MMHG | OXYGEN SATURATION: 97 % | HEART RATE: 91 BPM | RESPIRATION RATE: 16 BRPM | WEIGHT: 156.31 LBS | DIASTOLIC BLOOD PRESSURE: 84 MMHG | BODY MASS INDEX: 28.59 KG/M2 | TEMPERATURE: 97.6 F

## 2022-12-01 DIAGNOSIS — B37.9 YEAST INFECTION: ICD-10-CM

## 2022-12-01 DIAGNOSIS — R93.89 ABNORMAL FINDING ON CT SCAN: ICD-10-CM

## 2022-12-01 DIAGNOSIS — R10.9 RIGHT FLANK PAIN: Primary | ICD-10-CM

## 2022-12-01 DIAGNOSIS — N20.0 NEPHROLITHIASIS: ICD-10-CM

## 2022-12-01 LAB
ALBUMIN SERPL BCP-MCNC: 4.2 G/DL (ref 3.5–5)
ALP SERPL-CCNC: 188 U/L (ref 43–122)
ALT SERPL W P-5'-P-CCNC: 33 U/L
ANION GAP SERPL CALCULATED.3IONS-SCNC: 6 MMOL/L (ref 5–14)
AST SERPL W P-5'-P-CCNC: 35 U/L (ref 14–36)
BACTERIA UR QL AUTO: ABNORMAL /HPF
BASOPHILS # BLD AUTO: 0.04 THOUSANDS/ÂΜL (ref 0–0.1)
BASOPHILS NFR BLD AUTO: 1 % (ref 0–1)
BILIRUB SERPL-MCNC: 0.32 MG/DL (ref 0.2–1)
BILIRUB UR QL STRIP: NEGATIVE
BUN SERPL-MCNC: 16 MG/DL (ref 5–25)
CALCIUM SERPL-MCNC: 9.2 MG/DL (ref 8.4–10.2)
CAOX CRY URNS QL MICRO: ABNORMAL /HPF
CHLORIDE SERPL-SCNC: 98 MMOL/L (ref 96–108)
CLARITY UR: CLEAR
CO2 SERPL-SCNC: 32 MMOL/L (ref 21–32)
COLOR UR: YELLOW
CREAT SERPL-MCNC: 0.69 MG/DL (ref 0.6–1.2)
EOSINOPHIL # BLD AUTO: 0.17 THOUSAND/ÂΜL (ref 0–0.61)
EOSINOPHIL NFR BLD AUTO: 3 % (ref 0–6)
ERYTHROCYTE [DISTWIDTH] IN BLOOD BY AUTOMATED COUNT: 11.8 % (ref 11.6–15.1)
GFR SERPL CREATININE-BSD FRML MDRD: 98 ML/MIN/1.73SQ M
GLUCOSE SERPL-MCNC: 104 MG/DL (ref 70–99)
GLUCOSE UR STRIP-MCNC: ABNORMAL MG/DL
HCT VFR BLD AUTO: 43 % (ref 34.8–46.1)
HGB BLD-MCNC: 14.1 G/DL (ref 11.5–15.4)
HGB UR QL STRIP.AUTO: NEGATIVE
IMM GRANULOCYTES # BLD AUTO: 0.03 THOUSAND/UL (ref 0–0.2)
IMM GRANULOCYTES NFR BLD AUTO: 1 % (ref 0–2)
KETONES UR STRIP-MCNC: NEGATIVE MG/DL
LEUKOCYTE ESTERASE UR QL STRIP: NEGATIVE
LYMPHOCYTES # BLD AUTO: 2.22 THOUSANDS/ÂΜL (ref 0.6–4.47)
LYMPHOCYTES NFR BLD AUTO: 36 % (ref 14–44)
MCH RBC QN AUTO: 33.4 PG (ref 26.8–34.3)
MCHC RBC AUTO-ENTMCNC: 32.8 G/DL (ref 31.4–37.4)
MCV RBC AUTO: 102 FL (ref 82–98)
MONOCYTES # BLD AUTO: 0.4 THOUSAND/ÂΜL (ref 0.17–1.22)
MONOCYTES NFR BLD AUTO: 6 % (ref 4–12)
NEUTROPHILS # BLD AUTO: 3.37 THOUSANDS/ÂΜL (ref 1.85–7.62)
NEUTS SEG NFR BLD AUTO: 53 % (ref 43–75)
NITRITE UR QL STRIP: NEGATIVE
NON-SQ EPI CELLS URNS QL MICRO: ABNORMAL /HPF
NRBC BLD AUTO-RTO: 0 /100 WBCS
OTHER STN SPEC: ABNORMAL
PH UR STRIP.AUTO: 6.5 [PH]
PLATELET # BLD AUTO: 309 THOUSANDS/UL (ref 149–390)
PMV BLD AUTO: 9.2 FL (ref 8.9–12.7)
POTASSIUM SERPL-SCNC: 4 MMOL/L (ref 3.5–5.3)
PROT SERPL-MCNC: 7.9 G/DL (ref 6.4–8.4)
PROT UR STRIP-MCNC: NEGATIVE MG/DL
RBC # BLD AUTO: 4.22 MILLION/UL (ref 3.81–5.12)
RBC #/AREA URNS AUTO: ABNORMAL /HPF
SODIUM SERPL-SCNC: 136 MMOL/L (ref 135–147)
SP GR UR STRIP.AUTO: 1.01 (ref 1–1.04)
UROBILINOGEN UA: 1 MG/DL
WBC # BLD AUTO: 6.23 THOUSAND/UL (ref 4.31–10.16)
WBC #/AREA URNS AUTO: ABNORMAL /HPF

## 2022-12-01 RX ORDER — SENNOSIDES 8.6 MG
650 CAPSULE ORAL EVERY 8 HOURS PRN
Qty: 30 TABLET | Refills: 0 | Status: SHIPPED | OUTPATIENT
Start: 2022-12-01

## 2022-12-01 RX ORDER — ACETAMINOPHEN 325 MG/1
650 TABLET ORAL ONCE
Status: COMPLETED | OUTPATIENT
Start: 2022-12-01 | End: 2022-12-01

## 2022-12-01 RX ORDER — FLUCONAZOLE 100 MG/1
200 TABLET ORAL ONCE
Status: COMPLETED | OUTPATIENT
Start: 2022-12-01 | End: 2022-12-01

## 2022-12-01 RX ORDER — ONDANSETRON 2 MG/ML
4 INJECTION INTRAMUSCULAR; INTRAVENOUS ONCE
Status: COMPLETED | OUTPATIENT
Start: 2022-12-01 | End: 2022-12-01

## 2022-12-01 RX ORDER — KETOROLAC TROMETHAMINE 30 MG/ML
15 INJECTION, SOLUTION INTRAMUSCULAR; INTRAVENOUS ONCE
Status: COMPLETED | OUTPATIENT
Start: 2022-12-01 | End: 2022-12-01

## 2022-12-01 RX ORDER — LIDOCAINE 40 MG/G
CREAM TOPICAL AS NEEDED
Qty: 30 G | Refills: 0 | Status: SHIPPED | OUTPATIENT
Start: 2022-12-01

## 2022-12-01 RX ADMIN — KETOROLAC TROMETHAMINE 15 MG: 30 INJECTION, SOLUTION INTRAMUSCULAR; INTRAVENOUS at 09:05

## 2022-12-01 RX ADMIN — ONDANSETRON 4 MG: 2 INJECTION INTRAMUSCULAR; INTRAVENOUS at 09:03

## 2022-12-01 RX ADMIN — KETOROLAC TROMETHAMINE 15 MG: 30 INJECTION, SOLUTION INTRAMUSCULAR; INTRAVENOUS at 10:05

## 2022-12-01 RX ADMIN — ACETAMINOPHEN 650 MG: 325 TABLET ORAL at 10:04

## 2022-12-01 RX ADMIN — FLUCONAZOLE 200 MG: 100 TABLET ORAL at 09:56

## 2022-12-01 NOTE — ED PROVIDER NOTES
History  Chief Complaint   Patient presents with   • Flank Pain     Rt flank pain for 3 days  Has had kidney stones in the past     57-year-old female with past medical history of kidney stones, pyelonephritis, type 2 diabetes mellitus, autoimmune hepatitis, bipolar disorder presents to emergency department complaining of right flank pain x 3 days  History provided by:  Patient and medical records  Flank Pain  Pain location:  R flank  Pain radiates to:  Does not radiate  Duration:  3 days  Relieved by:  Nothing  Worsened by:  Nothing  Ineffective treatments:  None tried (she states she did not take any medications PTA because they would not work and "it'd be a waste of medication")  Associated symptoms: diarrhea and nausea    Associated symptoms: no chest pain, no chills, no constipation, no cough, no dysuria, no fatigue, no hematuria, no shortness of breath, no sore throat, no vaginal bleeding, no vaginal discharge and no vomiting    Associated symptoms comment:  +urinary frequency   Diarrhea:     Quality:  Semi-solid    Number of occurrences:  2    Diarrhea duration: yesterday only     Progression:  Resolved  Risk factors: multiple surgeries (hx of gastric bypass and B/L stents and stone removal )    Risk factors: not pregnant        Prior to Admission Medications   Prescriptions Last Dose Informant Patient Reported? Taking?    Biotin 10 MG TABS   Yes No   Sig: Take 10 mg by mouth   Patient not taking: No sig reported   FLUoxetine (PROzac) 40 MG capsule   Yes No   Sig: Take 40 mg by mouth daily     LORazepam (ATIVAN) 1 mg tablet   Yes No   Sig: Take 1 mg by mouth 2 (two) times a day   Patient not taking: No sig reported   QUEtiapine (SEROquel) 100 mg tablet   Yes No   Sig: Take 100 mg by mouth daily at bedtime   albuterol (PROVENTIL HFA,VENTOLIN HFA) 90 mcg/act inhaler   Yes No   Sig: INHALE 2 PUFFS BY MOUTH EVERY FOUR HOURS FOR FIVE DAYS   aspirin (ECOTRIN LOW STRENGTH) 81 mg EC tablet   Yes No   Sig: Take 81 mg by mouth daily     azaTHIOprine (IMURAN) 50 mg tablet   Yes No   Sig: Take 50 mg by mouth daily   budesonide (ENTOCORT EC) 3 MG capsule   Yes No   Patient not taking: No sig reported   calcium citrate-Vitamin D 200 mg-250 units   Yes No   Sig: Take 2 tablets by mouth   Patient not taking: Reported on 7/8/2022   cetirizine (ZyrTEC) 10 mg tablet   Yes No   Sig: Take 10 mg by mouth   Patient not taking: Reported on 7/8/2022   dicyclomine (BENTYL) 20 mg tablet   No No   Sig: Take 1 tablet (20 mg total) by mouth 2 (two) times a day   Patient not taking: No sig reported   erythromycin (ILOTYCIN) ophthalmic ointment   No No   Sig: Place a 1/2 inch ribbon of ointment into the lower eyelid     Patient not taking: No sig reported   ibuprofen (MOTRIN) 600 mg tablet   No No   Sig: Take 1 tablet (600 mg total) by mouth every 6 (six) hours as needed for mild pain   Patient not taking: No sig reported   ketotifen (ZADITOR) 0 025 % ophthalmic solution   Yes No   Sig: Administer to both eyes daily     Patient not taking: No sig reported   liraglutide (VICTOZA) injection   Yes No   Sig: Inject 1 8 mg under the skin daily     Patient not taking: No sig reported   naproxen (NAPROSYN) 500 mg tablet   No No   Sig: Take 1 tablet (500 mg total) by mouth 2 (two) times a day with meals for 10 days   ondansetron (ZOFRAN-ODT) 4 mg disintegrating tablet   No No   Sig: Take 1 tablet (4 mg total) by mouth every 6 (six) hours as needed for nausea or vomiting   Patient not taking: No sig reported   oxyCODONE-acetaminophen (PERCOCET) 5-325 mg per tablet   No No   Sig: Take 1 tablet by mouth every 4 (four) hours as needed for moderate pain for up to 10 doses Max Daily Amount: 6 tablets   Patient not taking: No sig reported   oxybutynin (DITROPAN) 5 mg tablet   No No   Sig: Take 1 tablet (5 mg total) by mouth 3 (three) times a day as needed (stent discomfort)   Patient not taking: No sig reported   pantoprazole (PROTONIX) 40 mg tablet   No No   Sig: Take 1 tablet (40 mg total) by mouth 2 (two) times a day   phenazopyridine (PYRIDIUM) 200 mg tablet   No No   Sig: Take 1 tablet (200 mg total) by mouth 3 (three) times a day with meals   tamsulosin (FLOMAX) 0 4 mg   No No   Sig: Take 1 capsule (0 4 mg total) by mouth daily with dinner for 14 days   zolpidem (AMBIEN) 5 mg tablet   Yes No   Sig: Take 10 mg by mouth daily at bedtime as needed for sleep        Facility-Administered Medications: None       Past Medical History:   Diagnosis Date   • Autoimmune hepatitis ([facility] )    • Bipolar 1 disorder ([facility] )    • Diabetes mellitus ([facility] )    • Kidney stone    • Renal disorder     kidney stones       Past Surgical History:   Procedure Laterality Date   • BARIATRIC SURGERY  05/2017   • BUNIONECTOMY     • CYSTOSCOPY  07/12/2018    Stent Removal   • FL RETROGRADE PYELOGRAM  6/16/2022   • GASTRIC BYPASS  05/22/2017   • HIATAL HERNIA REPAIR  05/22/2017   • HYSTERECTOMY     • JOINT REPLACEMENT     • KIDNEY STONE SURGERY     • ND CYSTO/URETERO W/LITHOTRIPSY &INDWELL STENT INSRT Bilateral 7/6/2018    Procedure: CYSTOSCOPY GALDINO  URETEROSCOPY;GALDINO  RETROGRADE PYELOGRAM AND INSERTION GALDINO  STENT URETERAL;  Surgeon: Jun Oswald MD;  Location:  MAIN OR;  Service: Urology   • ND CYSTO/URETERO W/LITHOTRIPSY &INDWELL STENT INSRT Left 6/16/2022    Procedure: CYSTOSCOPY URETEROSCOPY WITH LITHOTRIPSY HOLMIUM LASER, RETROGRADE PYELOGRAM AND INSERTION STENT URETERAL;  Surgeon: Chastity Hernandez MD;  Location:  MAIN OR;  Service: Urology   • TOTAL SHOULDER REPLACEMENT  2002   • VAGINAL HYSTERECTOMY      PARTIAL       Family History   Problem Relation Age of Onset   • Diabetes Father    • Heart disease Father    • Diabetes Mother    • Heart disease Mother    • Diabetes Sister    • Diabetes Family         MELLITUS   • Depression Family    • Mental illness Family    • Obesity Family    • Osteoarthritis Family      I have reviewed and agree with the history as documented      E-Cigarette/Vaping   • E-Cigarette Use Never User      E-Cigarette/Vaping Substances     Social History     Tobacco Use   • Smoking status: Every Day     Packs/day: 0 50     Years: 30 00     Pack years: 15 00     Types: Cigarettes   • Smokeless tobacco: Never   Vaping Use   • Vaping Use: Never used   Substance Use Topics   • Alcohol use: No   • Drug use: No       Review of Systems   Constitutional: Negative for chills and fatigue  HENT: Negative for congestion, rhinorrhea and sore throat  Respiratory: Negative for cough and shortness of breath  Cardiovascular: Negative for chest pain and leg swelling  Gastrointestinal: Positive for diarrhea and nausea  Negative for abdominal distention, abdominal pain, blood in stool, constipation and vomiting  Genitourinary: Positive for flank pain and frequency  Negative for decreased urine volume, difficulty urinating, dysuria, hematuria, pelvic pain, urgency, vaginal bleeding and vaginal discharge  Musculoskeletal: Negative for back pain and neck pain  Skin: Negative for color change, rash and wound  Neurological: Negative for weakness and headaches  All other systems reviewed and are negative  Physical Exam  Physical Exam  Vitals and nursing note reviewed  Constitutional:       General: She is awake  She is not in acute distress  Appearance: Normal appearance  She is not ill-appearing, toxic-appearing or diaphoretic  HENT:      Head: Normocephalic and atraumatic  Jaw: No swelling  Right Ear: External ear normal       Left Ear: External ear normal       Mouth/Throat:      Lips: Pink  Mouth: Mucous membranes are moist    Eyes:      General: Lids are normal  Vision grossly intact  No scleral icterus  Right eye: No discharge  Left eye: No discharge  Conjunctiva/sclera: Conjunctivae normal    Cardiovascular:      Rate and Rhythm: Normal rate and regular rhythm  Heart sounds: Normal heart sounds     Pulmonary:      Effort: Pulmonary effort is normal  No respiratory distress  Breath sounds: Normal breath sounds  Abdominal:      General: There is no distension  Palpations: Abdomen is soft  Tenderness: There is no abdominal tenderness  There is right CVA tenderness  There is no left CVA tenderness or guarding  Musculoskeletal:      Cervical back: Neck supple  Right lower leg: No edema  Left lower leg: No edema  Skin:     General: Skin is warm and dry  Coloration: Skin is not jaundiced or pale  Findings: No rash  Neurological:      Mental Status: She is alert  Gait: Gait normal    Psychiatric:         Mood and Affect: Mood normal          Behavior: Behavior normal          Thought Content:  Thought content normal          Vital Signs  ED Triage Vitals   Temperature Pulse Respirations Blood Pressure SpO2   12/01/22 0759 12/01/22 0759 12/01/22 0759 12/01/22 0759 12/01/22 0759   97 6 °F (36 4 °C) 92 20 127/79 100 %      Temp Source Heart Rate Source Patient Position - Orthostatic VS BP Location FiO2 (%)   12/01/22 0759 12/01/22 0759 12/01/22 0759 12/01/22 0759 --   Tympanic Monitor Sitting Left arm       Pain Score       12/01/22 0905       9           Vitals:    12/01/22 0759 12/01/22 0959   BP: 127/79 137/84   Pulse: 92 91   Patient Position - Orthostatic VS: Sitting Sitting         Visual Acuity      ED Medications  Medications   ketorolac (TORADOL) injection 15 mg (15 mg Intravenous Given 12/1/22 0905)   ondansetron (ZOFRAN) injection 4 mg (4 mg Intravenous Given 12/1/22 0903)   fluconazole (DIFLUCAN) tablet 200 mg (200 mg Oral Given 12/1/22 0956)   ketorolac (TORADOL) injection 15 mg (15 mg Intravenous Given 12/1/22 1005)   acetaminophen (TYLENOL) tablet 650 mg (650 mg Oral Given 12/1/22 1004)       Diagnostic Studies  Results Reviewed     Procedure Component Value Units Date/Time    Comprehensive metabolic panel [[id number]]  (Abnormal) Collected: 12/01/22 0906    Lab Status: Final result Specimen: Blood from Arm, Right Updated: 12/01/22 1000     Sodium 136 mmol/L      Potassium 4 0 mmol/L      Chloride 98 mmol/L      CO2 32 mmol/L      ANION GAP 6 mmol/L      BUN 16 mg/dL      Creatinine 0 69 mg/dL      Glucose 104 mg/dL      Calcium 9 2 mg/dL      AST 35 U/L      ALT 33 U/L      Alkaline Phosphatase 188 U/L      Total Protein 7 9 g/dL      Albumin 4 2 g/dL      Total Bilirubin 0 32 mg/dL      eGFR 98 ml/min/1 73sq m     Narrative:      National Kidney Disease Foundation guidelines for Chronic Kidney Disease (CKD):   •  Stage 1 with normal or high GFR (GFR > 90 mL/min/1 73 square meters)  •  Stage 2 Mild CKD (GFR = 60-89 mL/min/1 73 square meters)  •  Stage 3A Moderate CKD (GFR = 45-59 mL/min/1 73 square meters)  •  Stage 3B Moderate CKD (GFR = 30-44 mL/min/1 73 square meters)  •  Stage 4 Severe CKD (GFR = 15-29 mL/min/1 73 square meters)  •  Stage 5 End Stage CKD (GFR <15 mL/min/1 73 square meters)  Note: GFR calculation is accurate only with a steady state creatinine    Urine Microscopic [[id number]]  (Abnormal) Collected: 12/01/22 0855    Lab Status: Final result Specimen: Urine Updated: 12/01/22 0907     RBC, UA None Seen /hpf      WBC, UA None Seen /hpf      Epithelial Cells Occasional /hpf      Bacteria, UA Occasional /hpf      Ca Oxalate Agatha, UA Occasional /hpf      OTHER OBSERVATIONS Yeast Cells Present    CBC and differential [[id number]]  (Abnormal) Collected: 12/01/22 0900    Lab Status: Final result Specimen: Blood from Arm, Right Updated: 12/01/22 0906     WBC 6 23 Thousand/uL      RBC 4 22 Million/uL      Hemoglobin 14 1 g/dL      Hematocrit 43 0 %       fL      MCH 33 4 pg      MCHC 32 8 g/dL      RDW 11 8 %      MPV 9 2 fL      Platelets 405 Thousands/uL      nRBC 0 /100 WBCs      Neutrophils Relative 53 %      Immat GRANS % 1 %      Lymphocytes Relative 36 %      Monocytes Relative 6 %      Eosinophils Relative 3 %      Basophils Relative 1 %      Neutrophils Absolute 3 37 Thousands/µL Immature Grans Absolute 0 03 Thousand/uL      Lymphocytes Absolute 2 22 Thousands/µL      Monocytes Absolute 0 40 Thousand/µL      Eosinophils Absolute 0 17 Thousand/µL      Basophils Absolute 0 04 Thousands/µL     UA w Reflex to Microscopic w Reflex to Culture [[id number]]  (Abnormal) Collected: 12/01/22 0855    Lab Status: Final result Specimen: Urine Updated: 12/01/22 0856     Color, UA Yellow     Clarity, UA Clear     Specific Gravity, UA 1 015     pH, UA 6 5     Leukocytes, UA Negative     Nitrite, UA Negative     Protein, UA Negative mg/dl      Glucose, UA >=1000 (1%) mg/dl      Ketones, UA Negative mg/dl      Bilirubin, UA Negative     Occult Blood, UA Negative     UROBILINOGEN UA 1 0 mg/dL                  CT renal stone study abdomen pelvis wo contrast   Final Result by Ross Loya MD (12/01 1025)      1  No obstructive uropathy  Small nonobstructing renal calculi  2   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, 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   Is has been stable in retrospect for 3 years reassuring for benignity  However, given that the lesion is only partially imaged, complete evaluation    with follow-up nonemergent contrast-enhanced chest CT is recommended  The study was marked in [facility] for immediate notification  Workstation performed: [id number]                    Procedures  Procedures         ED Course  ED Course as of 12/01/22 1433   Thu Dec 01, 2022   1001 Patient requesting more pain medications  Has self reported hx of IVDA  1028 IMPRESSION:     1  No obstructive uropathy  Small nonobstructing renal calculi      2   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 No Filed at: 12/01/2022 6040                    MDM  Number of Diagnoses or Management Options  Abnormal finding on CT scan  Nephrolithiasis  Right flank pain  Yeast infection  Diagnosis management comments: VSS  Afebrile  No acute distress  Right flank tenderness noted  No abdominal tenderness  otherwise benign exam   History of pyelonephritis and ureterolithiasis requiring lithotripsy and stents  Order UA, CT   UA without hematuria, leukocytes or nitrites  Yeast noted will give dose of fluconazole  Labs without significant acute findings  CT showing bilateral nonobstructing renal calculi  No ureterolithiasis  No hydronephrosis  Additional incidental finding of stable lesion in mediastinum, patient informed of finding in that she follow-up with PCP to schedule outpatient chest CT for follow-up  Plan for supportive care at home  All imaging and/or lab testing discussed with patient, strict return to ED precautions discussed  Patient recommended to follow up promptly with appropriate outpatient provider  Patient and/or family members verbalizes understanding and agrees with plan  Patient and/or family members were given opportunity to ask questions, all questions were answered at this time  Patient is stable for discharge      Portions of the record may have been created with voice recognition software  Occasional wrong word or "sound a like" substitutions may have occurred due to the inherent limitations of voice recognition software  Read the chart carefully and recognize, using context, where substitutions have occurred            Amount and/or Complexity of Data Reviewed  Clinical lab tests: ordered and reviewed  Tests in the radiology section of CPT®: ordered and reviewed  Decide to obtain previous medical records or to obtain history from someone other than the patient: yes        Disposition  Final diagnoses:   Nephrolithiasis   Right flank pain   Abnormal finding on CT scan   Yeast infection     Time reflects when diagnosis was documented in both MDM as applicable and the Disposition within this note     Time User Action Codes Description Comment    12/1/2022 10:33 AM Zachary Haymaker Add [N20 0] Nephrolithiasis     12/1/2022 10:34 AM Zachary Haymaker Add [R10 9] Right flank pain     12/1/2022 10:34 AM Zachary Haymaker Modify [N20 0] Nephrolithiasis     12/1/2022 10:34 AM Zachary Haymaker Modify [R10 9] Right flank pain     12/1/2022 10:34 AM Zachary Haymaker Add [R93 89] Abnormal finding on CT scan     12/1/2022 10:36 AM Zachary Haymaker Add [B37 9] Yeast infection       ED Disposition     ED Disposition   Discharge    Condition   Stable    Date/Time   Thu Dec 1, 2022 10:35 AM    Comment   Marcelo Lat discharge to home/self care  Follow-up Information     Follow up With Specialties Details Why Contact Info Additional Information    Gamaliel Gamez MD Family Medicine Schedule an appointment as soon as possible for a visit  For follow up regarding your symptoms, and to discuss repeat Chest CT that was recommended today   [address]  [address]  [facility] [id number]  [facility] Schedule an appointment as soon as possible for a visit  For follow up regarding your symptoms [address] [phone]  [address]  [facility], [address], [address],  [facility]          Discharge Medication List as of 12/1/2022 10:42 AM      START taking these medications    Details   acetaminophen (TYLENOL) 650 mg CR tablet Take 1 tablet (650 mg total) by mouth every 8 (eight) hours as needed for mild pain, Starting Thu 12/1/2022, Normal      lidocaine (LMX) 4 % cream Apply topically as needed for mild pain, Starting Thu 12/1/2022, Normal         CONTINUE these medications which have NOT CHANGED    Details   albuterol (PROVENTIL HFA,VENTOLIN HFA) 90 mcg/act inhaler INHALE 2 PUFFS BY MOUTH EVERY FOUR HOURS FOR FIVE DAYS, Historical Med      aspirin (ECOTRIN LOW STRENGTH) 81 mg EC tablet Take 81 mg by mouth daily  , Starting Mon 12/18/2017, Historical Med      azaTHIOprine (IMURAN) 50 mg tablet Take 50 mg by mouth daily, Historical Med      Biotin 10 MG TABS Take 10 mg by mouth, Starting Wed 4/25/2018, Historical Med      budesonide (ENTOCORT EC) 3 MG capsule Starting Wed 4/4/2018, Historical Med      calcium citrate-Vitamin D 200 mg-250 units Take 2 tablets by mouth, Starting Tue 1/2/2018, Historical Med      cetirizine (ZyrTEC) 10 mg tablet Take 10 mg by mouth, Starting Wed 4/25/2018, Until Thu 4/25/2019, Historical Med      dicyclomine (BENTYL) 20 mg tablet Take 1 tablet (20 mg total) by mouth 2 (two) times a day, Starting Sat 12/7/2019, Print      erythromycin (ILOTYCIN) ophthalmic ointment Place a 1/2 inch ribbon of ointment into the lower eyelid  , Print      FLUoxetine (PROzac) 40 MG capsule Take 40 mg by mouth daily  , Starting Tue 9/5/2017, Historical Med      ibuprofen (MOTRIN) 600 mg tablet Take 1 tablet (600 mg total) by mouth every 6 (six) hours as needed for mild pain, Starting Tue 6/14/2022, Normal      ketotifen (ZADITOR) 0 025 % ophthalmic solution Administer to both eyes daily  , Starting Sat 7/4/2015, Historical Med      liraglutide (VICTOZA) injection Inject 1 8 mg under the skin daily  , Starting Mon 3/19/2018, Historical Med      LORazepam (ATIVAN) 1 mg tablet Take 1 mg by mouth 2 (two) times a day, Historical Med      naproxen (NAPROSYN) 500 mg tablet Take 1 tablet (500 mg total) by mouth 2 (two) times a day with meals for 10 days, Starting Sun 6/17/2018, Until Wed 6/27/2018, Print      ondansetron (ZOFRAN-ODT) 4 mg disintegrating tablet Take 1 tablet (4 mg total) by mouth every 6 (six) hours as needed for nausea or vomiting, Starting Sun 6/17/2018, Print      oxybutynin (DITROPAN) 5 mg tablet Take 1 tablet (5 mg total) by mouth 3 (three) times a day as needed (stent discomfort), Starting Fri 7/6/2018, Print      oxyCODONE-acetaminophen (PERCOCET) 5-325 mg per tablet Take 1 tablet by mouth every 4 (four) hours as needed for moderate pain for up to 10 doses Max Daily Amount: 6 tablets, Starting Fri 7/6/2018, Print      pantoprazole (PROTONIX) 40 mg tablet Take 1 tablet (40 mg total) by mouth 2 (two) times a day, Starting Wed 7/25/2018, Normal      phenazopyridine (PYRIDIUM) 200 mg tablet Take 1 tablet (200 mg total) by mouth 3 (three) times a day with meals, Starting Fri 7/8/2022, Normal      QUEtiapine (SEROquel) 100 mg tablet Take 100 mg by mouth daily at bedtime, Historical Med      tamsulosin (FLOMAX) 0 4 mg Take 1 capsule (0 4 mg total) by mouth daily with dinner for 14 days, Starting Tue 6/14/2022, Until Tue 6/28/2022, Normal      zolpidem (AMBIEN) 5 mg tablet Take 10 mg by mouth daily at bedtime as needed for sleep  , Historical Med             No discharge procedures on file      PDMP Review       Value Time User    PDMP Reviewed  Yes 6/14/2022  4:48 PM Rebekah Middleton MD          ED Provider  Electronically Signed by           Milton Kramer PA-C  12/01/22 9180

## 2022-12-01 NOTE — DISCHARGE INSTRUCTIONS
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

## 2023-01-07 ENCOUNTER — APPOINTMENT (EMERGENCY)
Dept: CT IMAGING | Facility: HOSPITAL | Age: 58
End: 2023-01-07

## 2023-01-07 ENCOUNTER — HOSPITAL ENCOUNTER (EMERGENCY)
Facility: HOSPITAL | Age: 58
Discharge: HOME | End: 2023-01-07
Attending: EMERGENCY MEDICINE

## 2023-01-07 VITALS
BODY MASS INDEX: 27.6 KG/M2 | RESPIRATION RATE: 16 BRPM | DIASTOLIC BLOOD PRESSURE: 83 MMHG | HEART RATE: 95 BPM | TEMPERATURE: 98.1 F | WEIGHT: 150.9 LBS | OXYGEN SATURATION: 99 % | SYSTOLIC BLOOD PRESSURE: 136 MMHG
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
## 2023-01-07 LAB
ANION GAP SERPL CALCULATED.3IONS-SCNC: 6 MMOL/L (ref 5–14)
BASOPHILS # BLD AUTO: 0.04 THOUSANDS/ÂΜL (ref 0–0.1)
BASOPHILS NFR BLD AUTO: 1 % (ref 0–1)
BUN SERPL-MCNC: 17 MG/DL (ref 5–25)
CALCIUM SERPL-MCNC: 9.2 MG/DL (ref 8.4–10.2)
CHLORIDE SERPL-SCNC: 101 MMOL/L (ref 96–108)
CO2 SERPL-SCNC: 28 MMOL/L (ref 21–32)
CREAT SERPL-MCNC: 0.5 MG/DL (ref 0.6–1.2)
EOSINOPHIL # BLD AUTO: 0.12 THOUSAND/ÂΜL (ref 0–0.61)
EOSINOPHIL NFR BLD AUTO: 2 % (ref 0–6)
ERYTHROCYTE [DISTWIDTH] IN BLOOD BY AUTOMATED COUNT: 12.3 % (ref 11.6–15.1)
GFR SERPL CREATININE-BSD FRML MDRD: 109 ML/MIN/1.73SQ M
GLUCOSE SERPL-MCNC: 136 MG/DL (ref 70–99)
HCT VFR BLD AUTO: 43.6 % (ref 34.8–46.1)
HGB BLD-MCNC: 14.6 G/DL (ref 11.5–15.4)
IMM GRANULOCYTES # BLD AUTO: 0.02 THOUSAND/UL (ref 0–0.2)
IMM GRANULOCYTES NFR BLD AUTO: 0 % (ref 0–2)
LYMPHOCYTES # BLD AUTO: 2.35 THOUSANDS/ÂΜL (ref 0.6–4.47)
LYMPHOCYTES NFR BLD AUTO: 36 % (ref 14–44)
MCH RBC QN AUTO: 33.6 PG (ref 26.8–34.3)
MCHC RBC AUTO-ENTMCNC: 33.5 G/DL (ref 31.4–37.4)
MCV RBC AUTO: 100 FL (ref 82–98)
MONOCYTES # BLD AUTO: 0.48 THOUSAND/ÂΜL (ref 0.17–1.22)
MONOCYTES NFR BLD AUTO: 7 % (ref 4–12)
NEUTROPHILS # BLD AUTO: 3.58 THOUSANDS/ÂΜL (ref 1.85–7.62)
NEUTS SEG NFR BLD AUTO: 54 % (ref 43–75)
NRBC BLD AUTO-RTO: 0 /100 WBCS
PLATELET # BLD AUTO: 330 THOUSANDS/UL (ref 149–390)
PMV BLD AUTO: 9.1 FL (ref 8.9–12.7)
POTASSIUM SERPL-SCNC: 4.4 MMOL/L (ref 3.5–5.3)
RBC # BLD AUTO: 4.35 MILLION/UL (ref 3.81–5.12)
SODIUM SERPL-SCNC: 135 MMOL/L (ref 135–147)
WBC # BLD AUTO: 6.59 THOUSAND/UL (ref 4.31–10.16)

## 2023-01-07 RX ORDER — KETOROLAC TROMETHAMINE 30 MG/ML
15 INJECTION, SOLUTION INTRAMUSCULAR; INTRAVENOUS ONCE
Status: COMPLETED | OUTPATIENT
Start: 2023-01-07 | End: 2023-01-07

## 2023-01-07 RX ORDER — NAPROXEN 500 MG/1
500 TABLET ORAL 2 TIMES DAILY WITH MEALS
Qty: 30 TABLET | Refills: 0 | Status: SHIPPED | OUTPATIENT
Start: 2023-01-07

## 2023-01-07 RX ORDER — METHOCARBAMOL 500 MG/1
500 TABLET, FILM COATED ORAL 2 TIMES DAILY
Qty: 20 TABLET | Refills: 0 | Status: SHIPPED | OUTPATIENT
Start: 2023-01-07

## 2023-01-07 RX ADMIN — KETOROLAC TROMETHAMINE 15 MG: 30 INJECTION, SOLUTION INTRAMUSCULAR; INTRAVENOUS at 23:10

## 2023-01-07 RX ADMIN — MORPHINE SULFATE 2 MG: 2 INJECTION, SOLUTION INTRAMUSCULAR; INTRAVENOUS at 21:38

## 2023-01-08 NOTE — DISCHARGE INSTRUCTIONS
You have been evaluated in the Emergency Department today for back and neck pain  Your evaluation did not find evidence of medical conditions requiring emergent intervention at this time  We recommend you take 600mg ibuprofen every 6 hours or tylenol 650mg every 6 hours as needed for pain  If needed, you can alternate these medications so that you take one medication every 3 hours  For instance, at noon take ibuprofen, then at 3pm take tylenol, then at 6pm take ibuprofen  You may take the naproxen instead of the ibuprofen  Please schedule an appointment for follow up with your primary care physician this week  Return to the Emergency Department if you experience worsening pain, numbness, tingling, change of color in your toes, or any other concerning symptoms  Thank you for choosing us for your care

## 2023-01-08 NOTE — ED PROVIDER NOTES
History  Chief Complaint   Patient presents with   • Fall     Pt states he fell down [place] 4 stairs backwards hitiing her head, neck, and lower back  Denies LOC  HPI  Patient is a 79-year-old female with past medical history of bipolar 1 currently controlled on Prozac, Seroquel presenting after a fall  Patient reports that she went to go take her dog for her walk  Reports that her left leg gave out on her and she fell backwards down approximately 4-8 stairs hitting her head and neck and lower back  Unknown loss of consciousness  Not currently on blood thinners other than ASA 81 mg  Denies any vision or hearing changes  Denies any abdominal pain denies any chest pain denies any extremity pain  Prior to Admission Medications   Prescriptions Last Dose Informant Patient Reported? Taking?    Biotin 10 MG TABS   Yes No   Sig: Take 10 mg by mouth   Patient not taking: No sig reported   FLUoxetine (PROzac) 40 MG capsule   Yes No   Sig: Take 40 mg by mouth daily     LORazepam (ATIVAN) 1 mg tablet   Yes No   Sig: Take 1 mg by mouth 2 (two) times a day   Patient not taking: No sig reported   QUEtiapine (SEROquel) 100 mg tablet   Yes No   Sig: Take 100 mg by mouth daily at bedtime   acetaminophen (TYLENOL) 650 mg CR tablet   No No   Sig: Take 1 tablet (650 mg total) by mouth every 8 (eight) hours as needed for mild pain   albuterol (PROVENTIL HFA,VENTOLIN HFA) 90 mcg/act inhaler   Yes No   Sig: INHALE 2 PUFFS BY MOUTH EVERY FOUR HOURS FOR FIVE DAYS   aspirin (ECOTRIN LOW STRENGTH) 81 mg EC tablet   Yes No   Sig: Take 81 mg by mouth daily     azaTHIOprine (IMURAN) 50 mg tablet   Yes No   Sig: Take 50 mg by mouth daily   budesonide (ENTOCORT EC) 3 MG capsule   Yes No   Patient not taking: No sig reported   calcium citrate-Vitamin D 200 mg-250 units   Yes No   Sig: Take 2 tablets by mouth   Patient not taking: Reported on 7/8/2022   cetirizine (ZyrTEC) 10 mg tablet   Yes No   Sig: Take 10 mg by mouth   Patient not taking: Reported on 7/8/2022   dicyclomine (BENTYL) 20 mg tablet   No No   Sig: Take 1 tablet (20 mg total) by mouth 2 (two) times a day   Patient not taking: No sig reported   erythromycin (ILOTYCIN) ophthalmic ointment   No No   Sig: Place a 1/2 inch ribbon of ointment into the lower eyelid     Patient not taking: No sig reported   ibuprofen (MOTRIN) 600 mg tablet   No No   Sig: Take 1 tablet (600 mg total) by mouth every 6 (six) hours as needed for mild pain   Patient not taking: No sig reported   ketotifen (ZADITOR) 0 025 % ophthalmic solution   Yes No   Sig: Administer to both eyes daily     Patient not taking: No sig reported   lidocaine (LMX) 4 % cream   No No   Sig: Apply topically as needed for mild pain   liraglutide (VICTOZA) injection   Yes No   Sig: Inject 1 8 mg under the skin daily     Patient not taking: No sig reported   naproxen (NAPROSYN) 500 mg tablet   No No   Sig: Take 1 tablet (500 mg total) by mouth 2 (two) times a day with meals for 10 days   ondansetron (ZOFRAN-ODT) 4 mg disintegrating tablet   No No   Sig: Take 1 tablet (4 mg total) by mouth every 6 (six) hours as needed for nausea or vomiting   Patient not taking: No sig reported   oxyCODONE-acetaminophen (PERCOCET) 5-325 mg per tablet   No No   Sig: Take 1 tablet by mouth every 4 (four) hours as needed for moderate pain for up to 10 doses Max Daily Amount: 6 tablets   Patient not taking: No sig reported   oxybutynin (DITROPAN) 5 mg tablet   No No   Sig: Take 1 tablet (5 mg total) by mouth 3 (three) times a day as needed (stent discomfort)   Patient not taking: No sig reported   pantoprazole (PROTONIX) 40 mg tablet   No No   Sig: Take 1 tablet (40 mg total) by mouth 2 (two) times a day   phenazopyridine (PYRIDIUM) 200 mg tablet   No No   Sig: Take 1 tablet (200 mg total) by mouth 3 (three) times a day with meals   tamsulosin (FLOMAX) 0 4 mg   No No   Sig: Take 1 capsule (0 4 mg total) by mouth daily with dinner for 14 days   zolpidem (AMBIEN) 5 mg tablet   Yes No   Sig: Take 10 mg by mouth daily at bedtime as needed for sleep        Facility-Administered Medications: None       Past Medical History:   Diagnosis Date   • Autoimmune hepatitis ([facility] [address] )    • Bipolar 1 disorder ([facility] [address] )    • Diabetes mellitus ([facility] [address] )    • Kidney stone    • Renal disorder     kidney stones       Past Surgical History:   Procedure Laterality Date   • BARIATRIC SURGERY  05/2017   • BUNIONECTOMY     • CYSTOSCOPY  07/12/2018    Stent Removal   • FL RETROGRADE PYELOGRAM  6/16/2022   • GASTRIC BYPASS  05/22/2017   • HIATAL HERNIA REPAIR  05/22/2017   • HYSTERECTOMY     • JOINT REPLACEMENT     • KIDNEY STONE SURGERY     • PA CYSTO/URETERO W/LITHOTRIPSY &INDWELL STENT INSRT Bilateral 7/6/2018    Procedure: CYSTOSCOPY GALDINO  URETEROSCOPY;GALDINO  RETROGRADE PYELOGRAM AND INSERTION GALDINO  STENT URETERAL;  Surgeon: Donnie Verdugo MD;  Location:  MAIN OR;  Service: Urology   • PA CYSTO/URETERO W/LITHOTRIPSY &INDWELL STENT INSRT Left 6/16/2022    Procedure: CYSTOSCOPY URETEROSCOPY WITH LITHOTRIPSY HOLMIUM LASER, RETROGRADE PYELOGRAM AND INSERTION STENT URETERAL;  Surgeon: Rachele Yeh MD;  Location: [facility];  Service: Urology   • TOTAL SHOULDER REPLACEMENT  2002   • VAGINAL HYSTERECTOMY      PARTIAL       Family History   Problem Relation Age of Onset   • Diabetes Father    • Heart disease Father    • Diabetes Mother    • Heart disease Mother    • Diabetes Sister    • Diabetes Family         MELLITUS   • Depression Family    • Mental illness Family    • Obesity Family    • Osteoarthritis Family      I have reviewed and agree with the history as documented      E-Cigarette/Vaping   • E-Cigarette Use Never User      E-Cigarette/Vaping Substances   • Nicotine No    • THC No    • CBD No    • Flavoring No    • Other No    • Unknown No      Social History     Tobacco Use   • Smoking status: Every Day     Packs/day: 0 50     Years: 30 00     Pack years: 15 00     Types: Cigarettes   • Smokeless tobacco: Never   Vaping Use   • Vaping Use: Never used   Substance Use Topics   • Alcohol use: No   • Drug use: No       Review of Systems   Constitutional: Negative for chills and fever  HENT: Negative for congestion and sore throat  Eyes: Negative for redness and visual disturbance  Respiratory: Negative for cough and shortness of breath  Cardiovascular: Negative for chest pain and palpitations  Gastrointestinal: Negative for constipation, diarrhea, nausea and vomiting  Genitourinary: Negative for dysuria, hematuria, vaginal bleeding and vaginal discharge  Musculoskeletal: Positive for back pain and neck pain  Negative for myalgias  Skin: Negative for rash and wound  Allergic/Immunologic: Negative for immunocompromised state  Neurological: Negative for seizures and syncope  Psychiatric/Behavioral: Negative for confusion, self-injury and suicidal ideas  Physical Exam  Physical Exam  Vitals and nursing note reviewed  Constitutional:       General: She is not in acute distress  Appearance: Normal appearance  She is well-developed  She is not ill-appearing  HENT:      Head: Normocephalic and atraumatic  No raccoon eyes  Right Ear: External ear normal       Left Ear: External ear normal       Nose: Nose normal  No congestion  Mouth/Throat:      Lips: Pink  Mouth: Mucous membranes are moist    Eyes:      General: Lids are normal  No scleral icterus  Conjunctiva/sclera: Conjunctivae normal    Neck:      Trachea: No tracheal deviation  Comments: Midline tenderness to the right side over C5-C6  Placed in cervical collar shortly after arrival  Cardiovascular:      Rate and Rhythm: Normal rate and regular rhythm  Pulses:           Radial pulses are 2+ on the right side and 2+ on the left side  Heart sounds: No murmur heard  No friction rub  Pulmonary:      Effort: Pulmonary effort is normal  No respiratory distress  Breath sounds:  No wheezing or rales    Abdominal:      General: Abdomen is flat  Tenderness: There is no abdominal tenderness  There is no guarding or rebound  Musculoskeletal:         General: No swelling or signs of injury  Cervical back: Normal range of motion  No rigidity or tenderness  Spinous process tenderness present  Normal range of motion  Thoracic back: Tenderness present  No signs of trauma  Normal range of motion  Lumbar back: Bony tenderness present  No tenderness  Negative right straight leg raise test and negative left straight leg raise test         Back:       Right lower leg: No edema  Left lower leg: No edema  Comments: Tenderness palpation over the thoracic to lumbar spine to the right side no obvious overlying skin abnormalities or step-offs or deformities  Skin:     General: Skin is warm and dry  Coloration: Skin is not jaundiced  Findings: No rash  Neurological:      Mental Status: She is alert and oriented to person, place, and time  Mental status is at baseline  Psychiatric:         Behavior: Behavior normal  Behavior is cooperative           Vital Signs  ED Triage Vitals [01/07/23 2048]   Temperature Pulse Respirations Blood Pressure SpO2   98 1 °F (36 7 °C) 103 20 123/81 98 %      Temp Source Heart Rate Source Patient Position - Orthostatic VS BP Location FiO2 (%)   Tympanic Monitor Sitting Left arm --      Pain Score       --           Vitals:    01/07/23 2048   BP: 123/81   Pulse: 103   Patient Position - Orthostatic VS: Sitting         Visual Acuity      ED Medications  Medications   morphine injection 2 mg (has no administration in time range)       Diagnostic Studies  Results Reviewed     Procedure Component Value Units Date/Time    CBC and differential [[id number]]     Lab Status: No result Specimen: Blood     Basic metabolic panel [[id number]]     Lab Status: No result Specimen: Blood                  CT spine cervical without contrast    (Results Pending)   CT head without contrast    (Results Pending)   CT spine thoracic without contrast    (Results Pending)   CT spine lumbar without contrast    (Results Pending)              Procedures  Procedures         ED Course  ED Course as of 01/07/23 2337   Sat Jan 07, 2023   2240 Sodium: 135   2240 Potassium: 4 4   2240 Chloride: 101   2240 Creatinine(!): 0 50   2240 WBC: 6 59   2240 Hemoglobin: 14 6   2258 IMPRESSION:     No acute intracranial abnormality  2259 IMPRESSION:     No cervical spine fracture or traumatic malalignment  2259 IMPRESSION:     No evidence of traumatic injury  2259    IMPRESSION:     No evidence of traumatic injury  Work-up is reassuring will discharge on a course of naproxen recommending Tylenol and naproxen intermittently for pain  Advise follow-up with PCP  Return precautions discussed  Will discharge in stable condition  MDM   Patient on arrival is ambulatory to room is in no acute distress, vital signs stable, afebrile  On exam lungs clear auscultation, heart without murmurs rubs or gallops abdomen soft nontender  Patient does have a significant pain in areas described above extremities without any decreased range of motion radial pulse 2+ DP pulse 2+ bilaterally  Will obtain CT head, cervical, lumbar and thoracic spine  We will treat morphine  Disposition  Final diagnoses:   None     ED Disposition     None      Follow-up Information    None         Patient's Medications   Discharge Prescriptions    No medications on file       No discharge procedures on file      PDMP Review       Value Time User    PDMP Reviewed  Yes 6/14/2022  4:48 PM Adriano Gordon MD          ED Provider  Electronically Signed by           Cee Molina MD  01/07/23 3492

## 2023-01-08 NOTE — ED NOTES
Patient was taking her dog out for a walk, left leg gave out while taking a step and she slid onto her but and down claudia her but, back, neck and the back of her head were struck when she was sliding down the flight of steps, now has pain in the back of her head, neck, upper back mid-back and down into right hip       Leo Morales RN  01/07/23 7172

## 2023-01-23 ENCOUNTER — HOSPITAL ENCOUNTER (EMERGENCY)
Facility: HOSPITAL | Age: 58
Discharge: HOME | End: 2023-01-23
Attending: EMERGENCY MEDICINE

## 2023-01-23 ENCOUNTER — APPOINTMENT (EMERGENCY)
Dept: CT IMAGING | Facility: HOSPITAL | Age: 58
End: 2023-01-23

## 2023-01-23 VITALS
HEART RATE: 91 BPM | WEIGHT: 155.9 LBS | SYSTOLIC BLOOD PRESSURE: 135 MMHG | RESPIRATION RATE: 20 BRPM | BODY MASS INDEX: 28.51 KG/M2 | DIASTOLIC BLOOD PRESSURE: 76 MMHG | TEMPERATURE: 97.4 F | OXYGEN SATURATION: 100 %

## 2023-01-23 DIAGNOSIS — K29.70 GASTRITIS: ICD-10-CM

## 2023-01-23 DIAGNOSIS — R10.9 ABDOMINAL PAIN: Primary | ICD-10-CM

## 2023-01-23 LAB
ALBUMIN SERPL BCP-MCNC: 4.1 G/DL (ref 3.5–5)
ALP SERPL-CCNC: 185 U/L (ref 43–122)
ALT SERPL W P-5'-P-CCNC: 30 U/L
ANION GAP SERPL CALCULATED.3IONS-SCNC: 6 MMOL/L (ref 5–14)
AST SERPL W P-5'-P-CCNC: 35 U/L (ref 14–36)
ATRIAL RATE: 91 BPM
BACTERIA UR QL AUTO: NORMAL /HPF
BASOPHILS # BLD AUTO: 0.03 THOUSANDS/ÂΜL (ref 0–0.1)
BASOPHILS NFR BLD AUTO: 0 % (ref 0–1)
BILIRUB SERPL-MCNC: 0.41 MG/DL (ref 0.2–1)
BILIRUB UR QL STRIP: NEGATIVE
BUN SERPL-MCNC: 17 MG/DL (ref 5–25)
CALCIUM SERPL-MCNC: 9.4 MG/DL (ref 8.4–10.2)
CHLORIDE SERPL-SCNC: 104 MMOL/L (ref 96–108)
CLARITY UR: CLEAR
CO2 SERPL-SCNC: 28 MMOL/L (ref 21–32)
COLOR UR: ABNORMAL
CREAT SERPL-MCNC: 0.63 MG/DL (ref 0.6–1.2)
EOSINOPHIL # BLD AUTO: 0.15 THOUSAND/ÂΜL (ref 0–0.61)
EOSINOPHIL NFR BLD AUTO: 2 % (ref 0–6)
ERYTHROCYTE [DISTWIDTH] IN BLOOD BY AUTOMATED COUNT: 12 % (ref 11.6–15.1)
GFR SERPL CREATININE-BSD FRML MDRD: 100 ML/MIN/1.73SQ M
GLUCOSE SERPL-MCNC: 140 MG/DL (ref 70–99)
GLUCOSE UR STRIP-MCNC: ABNORMAL MG/DL
HCT VFR BLD AUTO: 42.7 % (ref 34.8–46.1)
HGB BLD-MCNC: 14 G/DL (ref 11.5–15.4)
HGB UR QL STRIP.AUTO: NEGATIVE
IMM GRANULOCYTES # BLD AUTO: 0.02 THOUSAND/UL (ref 0–0.2)
IMM GRANULOCYTES NFR BLD AUTO: 0 % (ref 0–2)
KETONES UR STRIP-MCNC: NEGATIVE MG/DL
LEUKOCYTE ESTERASE UR QL STRIP: NEGATIVE
LIPASE SERPL-CCNC: 68 U/L (ref 23–300)
LYMPHOCYTES # BLD AUTO: 1.78 THOUSANDS/ÂΜL (ref 0.6–4.47)
LYMPHOCYTES NFR BLD AUTO: 25 % (ref 14–44)
MCH RBC QN AUTO: 33.6 PG (ref 26.8–34.3)
MCHC RBC AUTO-ENTMCNC: 32.8 G/DL (ref 31.4–37.4)
MCV RBC AUTO: 102 FL (ref 82–98)
MONOCYTES # BLD AUTO: 0.49 THOUSAND/ÂΜL (ref 0.17–1.22)
MONOCYTES NFR BLD AUTO: 7 % (ref 4–12)
NEUTROPHILS # BLD AUTO: 4.7 THOUSANDS/ÂΜL (ref 1.85–7.62)
NEUTS SEG NFR BLD AUTO: 66 % (ref 43–75)
NITRITE UR QL STRIP: NEGATIVE
NON-SQ EPI CELLS URNS QL MICRO: NORMAL /HPF
NRBC BLD AUTO-RTO: 0 /100 WBCS
P AXIS: 32 DEGREES
PH UR STRIP.AUTO: 5 [PH]
PLATELET # BLD AUTO: 317 THOUSANDS/UL (ref 149–390)
PMV BLD AUTO: 9.3 FL (ref 8.9–12.7)
POTASSIUM SERPL-SCNC: 4.4 MMOL/L (ref 3.5–5.3)
PR INTERVAL: 144 MS
PROT SERPL-MCNC: 8 G/DL (ref 6.4–8.4)
PROT UR STRIP-MCNC: NEGATIVE MG/DL
QRS AXIS: -5 DEGREES
QRSD INTERVAL: 78 MS
QT INTERVAL: 372 MS
QTC INTERVAL: 457 MS
RBC # BLD AUTO: 4.17 MILLION/UL (ref 3.81–5.12)
RBC #/AREA URNS AUTO: NORMAL /HPF
SODIUM SERPL-SCNC: 138 MMOL/L (ref 135–147)
SP GR UR STRIP.AUTO: 1.02 (ref 1–1.04)
T WAVE AXIS: 35 DEGREES
UROBILINOGEN UA: NEGATIVE MG/DL
VENTRICULAR RATE: 91 BPM
WBC # BLD AUTO: 7.17 THOUSAND/UL (ref 4.31–10.16)
WBC #/AREA URNS AUTO: NORMAL /HPF

## 2023-01-23 RX ORDER — PANTOPRAZOLE SODIUM 40 MG/10ML
40 INJECTION, POWDER, LYOPHILIZED, FOR SOLUTION INTRAVENOUS ONCE
Status: COMPLETED | OUTPATIENT
Start: 2023-01-23 | End: 2023-01-23

## 2023-01-23 RX ORDER — MAGNESIUM HYDROXIDE/ALUMINUM HYDROXICE/SIMETHICONE 120; 1200; 1200 MG/30ML; MG/30ML; MG/30ML
15 SUSPENSION ORAL ONCE
Status: COMPLETED | OUTPATIENT
Start: 2023-01-23 | End: 2023-01-23

## 2023-01-23 RX ORDER — LIDOCAINE HYDROCHLORIDE 20 MG/ML
15 SOLUTION OROPHARYNGEAL ONCE
Status: COMPLETED | OUTPATIENT
Start: 2023-01-23 | End: 2023-01-23

## 2023-01-23 RX ADMIN — SODIUM CHLORIDE 1000 ML: 0.9 INJECTION, SOLUTION INTRAVENOUS at 01:50

## 2023-01-23 RX ADMIN — IOHEXOL 85 ML: 350 INJECTION, SOLUTION INTRAVENOUS at 04:01

## 2023-01-23 RX ADMIN — IOHEXOL 30 ML: 300 INJECTION, SOLUTION INTRAVENOUS at 02:12

## 2023-01-23 RX ADMIN — LIDOCAINE HYDROCHLORIDE 15 ML: 20 SOLUTION ORAL; TOPICAL at 01:50

## 2023-01-23 RX ADMIN — PANTOPRAZOLE SODIUM 40 MG: 40 INJECTION, POWDER, FOR SOLUTION INTRAVENOUS at 01:52

## 2023-01-23 RX ADMIN — ALUMINUM HYDROXIDE, MAGNESIUM HYDROXIDE, AND SIMETHICONE 15 ML: 200; 200; 20 SUSPENSION ORAL at 01:50

## 2023-01-23 NOTE — ED NOTES
Patient returned from cat scan - states that she is feeling better and is going to leave  Patient was informed to check her chart for the results of her cat scan and follow up appropriately with her family doctor or return to the ED if needed  She states she will do same  Patient also was informed of the possible worst thing that could happen to her would be death and she understood  Patient was pleasant and cooperative with discharge process       Dorothea Ferguson RN  01/23/23 2742

## 2023-01-23 NOTE — ED NOTES
Patient has completed her omniopaque  She states that she is feeling much better and actually wants to go home  Patient was encouraged to stay since she is this far along with the cat  scan process to complete the study  Seems agreeable at this time  OOB to the bathroom       Johana Vasques RN  01/23/23   Daphne Garcia RN  01/23/23 2235

## 2023-01-23 NOTE — ED NOTES
Patient reports relief of her pain and discomfort from meds given  She states "what a relief " Is presently drinking for her cat scan       Mara Ferrer RN  01/23/23 6356

## 2023-01-23 NOTE — ED PROVIDER NOTES
History  Chief Complaint   Patient presents with   • Abdominal Pain     Pt reports 8-9 days of midepigastric/RUQ pain  Pt reports nausea and bloating wwhen eating  Last BM was "a few days ago"       Abdominal Pain  Pain location:  Epigastric and RUQ  Pain quality: aching, cramping and gnawing    Pain radiates to:  Does not radiate  Pain severity:  Moderate  Onset quality:  Gradual  Duration:  8 days  Timing:  Constant  Progression:  Waxing and waning  Chronicity:  New  Relieved by:  Nothing  Exacerbated by: eating  Ineffective treatments:  None tried  Associated symptoms: anorexia, chills, nausea and vomiting (twice)    Associated symptoms: no chest pain, no constipation, no cough, no diarrhea, no dysuria, no fatigue, no fever, no flatus, no hematemesis, no hematochezia, no hematuria, no melena, no shortness of breath, no sore throat, no vaginal bleeding and no vaginal discharge        Prior to Admission Medications   Prescriptions Last Dose Informant Patient Reported? Taking?    Biotin 10 MG TABS   Yes No   Sig: Take 10 mg by mouth   Patient not taking: No sig reported   FLUoxetine (PROzac) 40 MG capsule   Yes No   Sig: Take 40 mg by mouth daily     LORazepam (ATIVAN) 1 mg tablet   Yes No   Sig: Take 1 mg by mouth 2 (two) times a day   Patient not taking: No sig reported   QUEtiapine (SEROquel) 100 mg tablet   Yes No   Sig: Take 100 mg by mouth daily at bedtime   acetaminophen (TYLENOL) 650 mg CR tablet   No No   Sig: Take 1 tablet (650 mg total) by mouth every 8 (eight) hours as needed for mild pain   albuterol (PROVENTIL HFA,VENTOLIN HFA) 90 mcg/act inhaler   Yes No   Sig: INHALE 2 PUFFS BY MOUTH EVERY FOUR HOURS FOR FIVE DAYS   aspirin (ECOTRIN LOW STRENGTH) 81 mg EC tablet   Yes No   Sig: Take 81 mg by mouth daily     azaTHIOprine (IMURAN) 50 mg tablet   Yes No   Sig: Take 50 mg by mouth daily   budesonide (ENTOCORT EC) 3 MG capsule   Yes No   Patient not taking: No sig reported   calcium citrate-Vitamin D 200 mg-250 units   Yes No   Sig: Take 2 tablets by mouth   Patient not taking: Reported on 7/8/2022   cetirizine (ZyrTEC) 10 mg tablet   Yes No   Sig: Take 10 mg by mouth   Patient not taking: Reported on 7/8/2022   dicyclomine (BENTYL) 20 mg tablet   No No   Sig: Take 1 tablet (20 mg total) by mouth 2 (two) times a day   Patient not taking: No sig reported   erythromycin (ILOTYCIN) ophthalmic ointment   No No   Sig: Place a 1/2 inch ribbon of ointment into the lower eyelid     Patient not taking: No sig reported   ibuprofen (MOTRIN) 600 mg tablet   No No   Sig: Take 1 tablet (600 mg total) by mouth every 6 (six) hours as needed for mild pain   Patient not taking: No sig reported   ketotifen (ZADITOR) 0 025 % ophthalmic solution   Yes No   Sig: Administer to both eyes daily     Patient not taking: No sig reported   lidocaine (LMX) 4 % cream   No No   Sig: Apply topically as needed for mild pain   liraglutide (VICTOZA) injection   Yes No   Sig: Inject 1 8 mg under the skin daily     Patient not taking: No sig reported   methocarbamol (ROBAXIN) 500 mg tablet Not Taking  No No   Sig: Take 1 tablet (500 mg total) by mouth 2 (two) times a day   Patient not taking: Reported on 1/23/2023   naproxen (Naprosyn) 500 mg tablet Not Taking  No No   Sig: Take 1 tablet (500 mg total) by mouth 2 (two) times a day with meals   Patient not taking: Reported on 1/23/2023   ondansetron (ZOFRAN-ODT) 4 mg disintegrating tablet   No No   Sig: Take 1 tablet (4 mg total) by mouth every 6 (six) hours as needed for nausea or vomiting   Patient not taking: No sig reported   oxyCODONE-acetaminophen (PERCOCET) 5-325 mg per tablet   No No   Sig: Take 1 tablet by mouth every 4 (four) hours as needed for moderate pain for up to 10 doses Max Daily Amount: 6 tablets   Patient not taking: No sig reported   oxybutynin (DITROPAN) 5 mg tablet   No No   Sig: Take 1 tablet (5 mg total) by mouth 3 (three) times a day as needed (stent discomfort)   Patient not taking: No sig reported   pantoprazole (PROTONIX) 40 mg tablet   No No   Sig: Take 1 tablet (40 mg total) by mouth 2 (two) times a day   phenazopyridine (PYRIDIUM) 200 mg tablet   No No   Sig: Take 1 tablet (200 mg total) by mouth 3 (three) times a day with meals   tamsulosin (FLOMAX) 0 4 mg   No No   Sig: Take 1 capsule (0 4 mg total) by mouth daily with dinner for 14 days   zolpidem (AMBIEN) 5 mg tablet   Yes No   Sig: Take 10 mg by mouth daily at bedtime as needed for sleep        Facility-Administered Medications: None       Past Medical History:   Diagnosis Date   • Autoimmune hepatitis ([facility] )    • Bipolar 1 disorder ([facility] [address] )    • [facility]    • Kidney stone    • Renal disorder     kidney stones       Past Surgical History:   Procedure Laterality Date   • BARIATRIC SURGERY  05/2017   • BUNIONECTOMY     • CYSTOSCOPY  07/12/2018    Stent Removal   • FL RETROGRADE PYELOGRAM  6/16/2022   • GASTRIC BYPASS  05/22/2017   • HIATAL HERNIA REPAIR  05/22/2017   • HYSTERECTOMY     • JOINT REPLACEMENT     • KIDNEY STONE SURGERY     • W[place] CYSTO/URETERO W/LITHOTRIPSY &INDWELL STENT INSRT Bilateral 7/6/2018    Procedure: CYSTOSCOPY GALDINO  URETEROSCOPY;GALDINO  RETROGRADE PYELOGRAM AND INSERTION GALDINO  STENT URETERAL;  Surgeon: Inder Torres MD;  Location:  MAIN OR;  Service: Urology   • [place] CYSTO/URETERO W/LITHOTRIPSY &INDWELL STENT INSRT Left 6/16/2022    Procedure: CYSTOSCOPY URETEROSCOPY WITH LITHOTRIPSY HOLMIUM LASER, RETROGRADE PYELOGRAM AND INSERTION STENT URETERAL;  Surgeon: Carlos Hoffmann MD;  Location:  MAIN OR;  Service: Urology   • TOTAL SHOULDER REPLACEMENT  2002   • VAGINAL HYSTERECTOMY      PARTIAL       Family History   Problem Relation Age of Onset   • Diabetes Father    • Heart disease Father    • Diabetes Mother    • Heart disease Mother    • Diabetes Sister    • Diabetes Family         MELLITUS   • Depression Family    • Mental illness Family    • Obesity Family    • Osteoarthritis Family      I have reviewed and agree with the history as documented  E-Cigarette/Vaping   • E-Cigarette Use Never User      E-Cigarette/Vaping Substances   • Nicotine No    • THC No    • CBD No    • Flavoring No    • Other No    • Unknown No      Social History     Tobacco Use   • Smoking status: Every Day     Packs/day: 0 50     Years: 30 00     Pack years: 15 00     Types: Cigarettes   • Smokeless tobacco: Never   Vaping Use   • Vaping Use: Never used   Substance Use Topics   • Alcohol use: No   • Drug use: No       Review of Systems   Constitutional: Positive for chills  Negative for fatigue and fever  HENT: Negative for sore throat  Respiratory: Negative for cough, chest tightness and shortness of breath  Cardiovascular: Negative for chest pain  Gastrointestinal: Positive for abdominal pain, anorexia, nausea and vomiting (twice)  Negative for constipation, diarrhea, flatus, hematemesis, hematochezia and melena  Genitourinary: Negative for dysuria, hematuria, vaginal bleeding and vaginal discharge  All other systems reviewed and are negative  Physical Exam  Physical Exam  Vitals and nursing note reviewed  Constitutional:       General: She is not in acute distress  Appearance: Normal appearance  She is well-developed  She is not ill-appearing, toxic-appearing or diaphoretic  HENT:      Head: Normocephalic and atraumatic  Right Ear: External ear normal       Left Ear: External ear normal       Nose: Nose normal  No congestion  Mouth/Throat:      Mouth: Mucous membranes are moist       Pharynx: Oropharynx is clear  Eyes:      Conjunctiva/sclera: Conjunctivae normal       Pupils: Pupils are equal, round, and reactive to light  Cardiovascular:      Rate and Rhythm: Normal rate and regular rhythm  Heart sounds: Normal heart sounds  Pulmonary:      Effort: Pulmonary effort is normal  No respiratory distress  Breath sounds: Normal breath sounds  No wheezing     Abdominal: General: Bowel sounds are normal       Palpations: Abdomen is soft  Tenderness: There is abdominal tenderness in the right upper quadrant and epigastric area  There is no right CVA tenderness, left CVA tenderness, guarding or rebound  Negative signs include Patricio's sign and McBurney's sign  Musculoskeletal:         General: No tenderness or deformity  Cervical back: Normal range of motion and neck supple  No rigidity  Skin:     General: Skin is warm and dry  Capillary Refill: Capillary refill takes less than 2 seconds  Findings: No rash  Neurological:      General: No focal deficit present  Mental Status: She is alert and oriented to person, place, and time     Psychiatric:         Mood and Affect: Mood normal          Behavior: Behavior normal          Vital Signs  ED Triage Vitals   Temperature Pulse Respirations Blood Pressure SpO2   01/23/23 0123 01/23/23 0123 01/23/23 0123 01/23/23 0123 01/23/23 0123   (!) 97 4 °F (36 3 °C) 96 20 118/59 100 %      Temp Source Heart Rate Source Patient Position - Orthostatic VS BP Location FiO2 (%)   01/23/23 0123 01/23/23 0123 01/23/23 0123 01/23/23 0123 --   Oral Monitor Sitting Left arm       Pain Score       01/23/23 0300       No Pain           Vitals:    01/23/23 0123 01/23/23 0300 01/23/23 0411   BP: 118/59 129/67 135/76   Pulse: 96 84 91   Patient Position - Orthostatic VS: Sitting Lying Sitting         Visual Acuity      ED Medications  Medications   sodium chloride 0 9 % bolus 1,000 mL (0 mL Intravenous Stopped 1/23/23 0308)   pantoprazole (PROTONIX) injection 40 mg (40 mg Intravenous Given 1/23/23 0152)   Lidocaine Viscous HCl (XYLOCAINE) 2 % mucosal solution 15 mL (15 mL Swish & Spit Given 1/23/23 0150)   aluminum-magnesium hydroxide-simethicone (MYLANTA) oral suspension 15 mL (15 mL Oral Given 1/23/23 0150)   iohexol (OMNIPAQUE) 300 mg/mL injection 30 mL (30 mL Oral Given 1/23/23 0212)   iohexol (OMNIPAQUE) 350 MG/ML injection (SINGLE-DOSE) 85 mL (85 mL Intravenous Given 1/23/23 0401)       Diagnostic Studies  Results Reviewed     Procedure Component Value Units Date/Time    Lipase [[id number]]  (Normal) Collected: 01/23/23 0149    Lab Status: Final result Specimen: Blood from Hand, Right Updated: 01/23/23 0224     Lipase 68 u/L     Comprehensive metabolic panel [[id number]]  (Abnormal) Collected: 01/23/23 0149    Lab Status: Final result Specimen: Blood from Hand, Right Updated: 01/23/23 0224     Sodium 138 mmol/L      Potassium 4 4 mmol/L      Chloride 104 mmol/L      CO2 28 mmol/L      ANION GAP 6 mmol/L      BUN 17 mg/dL      Creatinine 0 63 mg/dL      Glucose 140 mg/dL      Calcium 9 4 mg/dL      AST 35 U/L      ALT 30 U/L      Alkaline Phosphatase 185 U/L      Total Protein 8 0 g/dL      Albumin 4 1 g/dL      Total Bilirubin 0 41 mg/dL      eGFR 100 ml/min/1 73sq m     Narrative:      National Kid[employer] Disease Foundation guidelines for Chronic Kidney Disease (CKD):   •  Stage 1 with normal or high GFR (GFR > 90 mL/min/1 73 square meters)  •  Stage 2 Mild CKD (GFR = 60-89 mL/min/1 73 square meters)  •  Stage 3A Moderate CKD (GFR = 45-59 mL/min/1 73 square meters)  •  Stage 3B Moderate CKD (GFR = 30-44 mL/min/1 73 square meters)  •  Stage 4 Severe CKD (GFR = 15-29 mL/min/1 73 square meters)  •  Stage 5 End Stage CKD (GFR <15 mL/min/1 73 square meters)  Note: GFR calculation is accurate only with a steady state creatinine    Urine Microscopic [[id number]]  (Normal) Collected: 01/23/23 0149    Lab Status: Final result Specimen: Urine, Clean Catch Updated: 01/23/23 0216     RBC, UA None Seen /hpf      WBC, UA 0-1 /hpf      Epithelial Cells Occasional /hpf      Bacteria, UA None Seen /hpf     UA (URINE) with reflex to Scope [[id number]]  (Abnormal) Collected: 01/23/23 0149    Lab Status: Final result Specimen: Urine, Clean Catch Updated: 01/23/23 0214     Color, UA Straw     Clarity, UA Clear     Specific [place], UA 1 020     pH, UA 5 0 Leukocytes, UA Negative     Nitrite, UA Negative     Protein, UA Negative mg/dl      Glucose, UA >=1000 (1%) mg/dl      Ketones, UA Negative mg/dl      Bilirubin, UA Negative     Occult Blood, UA Negative     UROBILINOGEN UA Negative mg/dL     CBC and differential [[id number]]  (Abnormal) Collected: 01/23/23 0149    Lab Status: Final result Specimen: Blood from Hand, Right Updated: 01/23/23 0203     WBC 7 17 Thousand/uL      RBC 4 17 Million/uL      Hemoglobin 14 0 g/dL      Hematocrit 42 7 %       fL      MCH 33 6 pg      MCHC 32 8 g/dL      RDW 12 0 %      MPV 9 3 fL      Platelets 254 Thousands/uL      nRBC 0 /100 WBCs      Neutrophils Relative 66 %      Immat GRANS % 0 %      Lymphocytes Relative 25 %      Monocytes Relative 7 %      Eosinophils Relative 2 %      Basophils Relative 0 %      Neutrophils Absolute 4 70 Thousands/µL      Immature Grans Absolute 0 02 Thousand/uL      Lymphocytes Absolute 1 78 Thousands/µL      Monocytes Absolute 0 49 Thousand/µL      Eosinophils Absolute 0 15 Thousand/µL      Basophils Absolute 0 03 Thousands/µL                  CT abdomen pelvis with contrast   Final Result by Jeff Salas MD (01/23 0413)      No evidence of acute intra-abdominal or pelvic pathology            Workstation performed: [id number]                    Procedures  ECG 12 Lead Documentation Only    Date/Time: 1/23/2023 1:42 AM  Performed by: Mila Pineda DO  Authorized by: Mila Pineda DO     ECG reviewed by me, the ED Provider: yes    Patient location:  ED  Rate:     ECG rate:  91    ECG rate assessment: normal    Rhythm:     Rhythm: sinus rhythm    Ectopy:     Ectopy: none    QRS:     QRS axis:  Normal  Conduction:     Conduction: normal    ST segments:     ST segments:  Normal  T waves:     T waves: non-specific               ED Course  ED Course as of 01/23/23 0416 Mon Jan 23, 2023   0327 Pain improved with meds  Labs unremarkable  CT pending       1477 Patient has returned from CAT scan and again is insisting that she be allowed to leave that she feels fine  Awaiting CT read  SBIRT 22yo+    Flowsheet Row Most Recent Value   SBIRT (23 yo +)    In order to provide better care to our patients, we are screening all of our patients for alcohol and drug use  Would it be okay to ask you these screening questions? No Filed at: 01/23/2023 0153                    Medical Decision Making  46-year-old female presents with complaint of more than 1 week of epigastric and right upper quadrant pain  She has had some nausea and vomiting but denies chest pain, difficulty breathing, or other acute issues  Differential diagnosis includes pancreatitis, gastritis, peptic ulcer disease, cholecystitis, hepatitis, or cardiac etiology  EKG and labs are unremarkable  Patient symptoms resolved fully with medications provided  She was insistent that she leave prior to the CT scan being resulted therefore prescription medications were not given although were considered  She was advised of the need for outpatient follow-up along with reasons to return to the ER  Abdominal pain: acute illness or injury  Gastritis: acute illness or injury  Amount and/or Complexity of Data Reviewed  Labs: ordered  Decision-making details documented in ED Course  Radiology: ordered and independent interpretation performed  Risk  OTC drugs  Prescription drug management            Disposition  Final diagnoses:   Abdominal pain   Gastritis     Time reflects when diagnosis was documented in both MDM as applicable and the Disposition within this note     Time User Action Codes Description Comment    1/23/2023  4:14 AM Colie Fell Add [R10 9] Abdominal pain     1/23/2023  4:14 AM Colie Fell Add [K29 70] Gastritis       ED Disposition     ED Disposition   AMA    Condition   --    Date/Time   Mon Jan 23, 2023  4:16 AM    Comment   --         Follow-up Information     Follow up With Specialties Details Why [address] [facility]  Call   [address] [facility]  [address]  [place]   [address]  [id number] [phone]            Patient's Medications   Discharge Prescriptions    No medications on file       No discharge procedures on file      PDMP Review       Value Time User    PDMP Reviewed  Yes 6/14/2022  4:48 PM Jose Ramon Maria MD          ED Provider  Electronically Signed by           Estelle Madrid DO  01/23/23 [id number]

## 2023-02-11 ENCOUNTER — APPOINTMENT (EMERGENCY)
Dept: CT IMAGING | Facility: HOSPITAL | Age: 58
End: 2023-02-11

## 2023-02-11 ENCOUNTER — HOSPITAL ENCOUNTER (EMERGENCY)
Facility: HOSPITAL | Age: 58
Discharge: HOME | End: 2023-02-11
Attending: EMERGENCY MEDICINE

## 2023-02-11 VITALS
WEIGHT: 151.7 LBS | DIASTOLIC BLOOD PRESSURE: 63 MMHG | OXYGEN SATURATION: 100 % | HEART RATE: 101 BPM | TEMPERATURE: 98 F | SYSTOLIC BLOOD PRESSURE: 151 MMHG | RESPIRATION RATE: 20 BRPM | BODY MASS INDEX: 27.75 KG/M2

## 2023-02-11 DIAGNOSIS — R51.9 HEADACHE: Primary | ICD-10-CM

## 2023-02-11 RX ORDER — KETOROLAC TROMETHAMINE 30 MG/ML
15 INJECTION, SOLUTION INTRAMUSCULAR; INTRAVENOUS ONCE
Status: COMPLETED | OUTPATIENT
Start: 2023-02-11 | End: 2023-02-11

## 2023-02-11 RX ORDER — DIPHENHYDRAMINE HYDROCHLORIDE 50 MG/ML
25 INJECTION INTRAMUSCULAR; INTRAVENOUS ONCE
Status: COMPLETED | OUTPATIENT
Start: 2023-02-11 | End: 2023-02-11

## 2023-02-11 RX ORDER — METOCLOPRAMIDE HYDROCHLORIDE 5 MG/ML
10 INJECTION INTRAMUSCULAR; INTRAVENOUS ONCE
Status: COMPLETED | OUTPATIENT
Start: 2023-02-11 | End: 2023-02-11

## 2023-02-11 RX ORDER — ACETAMINOPHEN, ASPIRIN AND CAFFEINE 250; 250; 65 MG/1; MG/1; MG/1
1 TABLET, FILM COATED ORAL EVERY 6 HOURS PRN
Qty: 30 TABLET | Refills: 0 | Status: SHIPPED | OUTPATIENT
Start: 2023-02-11

## 2023-02-11 RX ADMIN — SODIUM CHLORIDE 1000 ML: 0.9 INJECTION, SOLUTION INTRAVENOUS at 01:23

## 2023-02-11 RX ADMIN — DIPHENHYDRAMINE HYDROCHLORIDE 25 MG: 50 INJECTION, SOLUTION INTRAMUSCULAR; INTRAVENOUS at 01:23

## 2023-02-11 RX ADMIN — KETOROLAC TROMETHAMINE 15 MG: 30 INJECTION, SOLUTION INTRAMUSCULAR; INTRAVENOUS at 01:22

## 2023-02-11 RX ADMIN — METOCLOPRAMIDE 10 MG: 5 INJECTION, SOLUTION INTRAMUSCULAR; INTRAVENOUS at 01:23

## 2023-02-11 NOTE — DISCHARGE INSTRUCTIONS
Use medication as directed  Return for new or worsening complaints  Follow-up with neurology  Call tomorrow to make an appointment

## 2023-02-11 NOTE — ED PROVIDER NOTES
History  Chief Complaint   Patient presents with   • Headache     Neck and lower back     82-year-old female with history of headaches presents complaining of worsening headaches  Patient has had a history of headaches since an injury that occurred in January this year and states that she has had a headache almost every day since however in the past 1 week her headache has gotten worse and she now notices floaters in her vision  Denies any new injury or trauma  Tried taking Aleve without relief  Denies chest pain or shortness of breath  Denies any other complaints  History provided by:  Patient   used: No        Prior to Admission Medications   Prescriptions Last Dose Informant Patient Reported? Taking?    Biotin 10 MG TABS   Yes No   Sig: Take 10 mg by mouth   Patient not taking: No sig reported   FLUoxetine (PROzac) 40 MG capsule   Yes No   Sig: Take 40 mg by mouth daily     LORazepam (ATIVAN) 1 mg tablet   Yes No   Sig: Take 1 mg by mouth 2 (two) times a day   Patient not taking: No sig reported   QUEtiapine (SEROquel) 100 mg tablet   Yes No   Sig: Take 100 mg by mouth daily at bedtime   acetaminophen (TYLENOL) 650 mg CR tablet   No No   Sig: Take 1 tablet (650 mg total) by mouth every 8 (eight) hours as needed for mild pain   albuterol (PROVENTIL HFA,VENTOLIN HFA) 90 mcg/act inhaler   Yes No   Sig: INHALE 2 PUFFS BY MOUTH EVERY FOUR HOURS FOR FIVE DAYS   aspirin (ECOTRIN LOW STRENGTH) 81 mg EC tablet   Yes No   Sig: Take 81 mg by mouth daily     azaTHIOprine (IMURAN) 50 mg tablet   Yes No   Sig: Take 50 mg by mouth daily   budesonide (ENTOCORT EC) 3 MG capsule   Yes No   Patient not taking: No sig reported   calcium citrate-Vitamin D 200 mg-250 units   Yes No   Sig: Take 2 tablets by mouth   Patient not taking: Reported on 7/8/2022   cetirizine (ZyrTEC) 10 mg tablet   Yes No   Sig: Take 10 mg by mouth   Patient not taking: Reported on 7/8/2022   dicyclomine (BENTYL) 20 mg tablet   No No   Sig: Take 1 tablet (20 mg total) by mouth 2 (two) times a day   Patient not taking: No sig reported   erythromycin (ILOTYCIN) ophthalmic ointment   No No   Sig: Place a 1/2 inch ribbon of ointment into the lower eyelid     Patient not taking: No sig reported   ibuprofen (MOTRIN) 600 mg tablet   No No   Sig: Take 1 tablet (600 mg total) by mouth every 6 (six) hours as needed for mild pain   Patient not taking: No sig reported   ketotifen (ZADITOR) 0 025 % ophthalmic solution   Yes No   Sig: Administer to both eyes daily     Patient not taking: No sig reported   lidocaine (LMX) 4 % cream   No No   Sig: Apply topically as needed for mild pain   liraglutide (VICTOZA) injection   Yes No   Sig: Inject 1 8 mg under the skin daily     Patient not taking: No sig reported   methocarbamol (ROBAXIN) 500 mg tablet   No No   Sig: Take 1 tablet (500 mg total) by mouth 2 (two) times a day   Patient not taking: Reported on 1/23/2023   naproxen (Naprosyn) 500 mg tablet   No No   Sig: Take 1 tablet (500 mg total) by mouth 2 (two) times a day with meals   Patient not taking: Reported on 1/23/2023   ondansetron (ZOFRAN-ODT) 4 mg disintegrating tablet   No No   Sig: Take 1 tablet (4 mg total) by mouth every 6 (six) hours as needed for nausea or vomiting   Patient not taking: No sig reported   oxyCODONE-acetaminophen (PERCOCET) 5-325 mg per tablet   No No   Sig: Take 1 tablet by mouth every 4 (four) hours as needed for moderate pain for up to 10 doses Max Daily Amount: 6 tablets   Patient not taking: No sig reported   oxybutynin (DITROPAN) 5 mg tablet   No No   Sig: Take 1 tablet (5 mg total) by mouth 3 (three) times a day as needed (stent discomfort)   Patient not taking: No sig reported   pantoprazole (PROTONIX) 40 mg tablet   No No   Sig: Take 1 tablet (40 mg total) by mouth 2 (two) times a day   phenazopyridine (PYRIDIUM) 200 mg tablet   No No   Sig: Take 1 tablet (200 mg total) by mouth 3 (three) times a day with meals tamsulosin (FLOMAX) 0 4 mg   No No   Sig: Take 1 capsule (0 4 mg total) by mouth daily with dinner for 14 days   zolpidem (AMBIEN) 5 mg tablet   Yes No   Sig: Take 10 mg by mouth daily at bedtime as needed for sleep        Facility-Administered Medications: None       Past Medical History:   Diagnosis Date   • Autoimmune hepatitis ([facility] )    • Bipolar 1 disorder ([facility] )    • [facility] mellitus [facility] )    • Kidney stone    • Renal disorder     kidney stones       Past Surgical History:   Procedure Laterality Date   • BARIATRIC SURGERY  05/2017   • BUNIONECTOMY     • CYSTOSCOPY  07/12/2018    Stent Removal   • FL RETROGRADE PYELOGRAM  6/16/2022   • GASTRIC BYPASS  05/22/2017   • HIATAL HERNIA REPAIR  05/22/2017   • HYSTERECTOMY     • JOINT REPLACEMENT     • KIDNEY STONE SURGERY     • WI CYSTO/URETERO W/LITHOTRIPSY &INDWELL STENT INSRT Bilateral 7/6/2018    Procedure: CYSTOSCOPY GALDINO  URETEROSCOPY;GALDINO  RETROGRADE PYELOGRAM AND INSERTION GALDINO  STENT URETERAL;  Surgeon: Gearline Cooks, MD;  Location:  MAIN OR;  Service: Urology   • WI CYSTO/URETERO W/LITHOTRIPSY &INDWELL STENT INSRT Left 6/16/2022    Procedure: CYSTOSCOPY URETEROSCOPY WITH LITHOTRIPSY HOLMIUM LASER, RETROGRADE PYELOGRAM AND INSERTION STENT URETERAL;  Surgeon: Tyrone Rose MD;  Location:  MAIN OR;  Service: Urology   • TOTAL SHOULDER REPLACEMENT  2002   • VAGINAL HYSTERECTOMY      PARTIAL       Family History   Problem Relation Age of Onset   • Diabetes Father    • Heart disease Father    • Diabetes Mother    • Heart disease Mother    • Diabetes Sister    • Diabetes Family         MELLITUS   • Depression Family    • Mental illness Family    • Obesity Family    • Osteoarthritis Family      I have reviewed and agree with the history as documented      E-Cigarette/Vaping   • E-Cigarette Use Never User      E-Cigarette/Vaping Substances   • Nicotine No    • THC No    • CBD No    • Flavoring No    • Other No    • Unknown No      Social History     Tobacco Use • Smoking status: Every Day     Packs/day: 0 50     Years: 30 00     Pack years: 15 00     Types: Cigarettes   • Smokeless tobacco: Never   Vaping Use   • Vaping Use: Never used   Substance Use Topics   • Alcohol use: No   • Drug use: No       Review of Systems   Constitutional: Negative  Negative for chills and fatigue  HENT: Negative for ear pain and sore throat  Eyes: Positive for photophobia and visual disturbance  Negative for redness  Respiratory: Negative for apnea, cough and shortness of breath  Cardiovascular: Negative for chest pain  Gastrointestinal: Positive for nausea  Negative for abdominal pain and vomiting  Genitourinary: Negative for dysuria  Musculoskeletal: Negative for arthralgias, neck pain and neck stiffness  Skin: Negative for rash  Neurological: Positive for headaches  Negative for dizziness, tremors, syncope and weakness  Psychiatric/Behavioral: Negative for suicidal ideas  Physical Exam  Physical Exam  Constitutional:       General: She is not in acute distress  Appearance: She is well-developed  She is not diaphoretic  Eyes:      Pupils: Pupils are equal, round, and reactive to light  Cardiovascular:      Rate and Rhythm: Normal rate and regular rhythm  Pulmonary:      Effort: Pulmonary effort is normal  No respiratory distress  Breath sounds: Normal breath sounds  Abdominal:      General: Bowel sounds are normal  There is no distension  Palpations: Abdomen is soft  Musculoskeletal:         General: Normal range of motion  Cervical back: Normal range of motion and neck supple  Skin:     General: Skin is warm and dry  Neurological:      General: No focal deficit present  Mental Status: She is alert and oriented to person, place, and time  Cranial Nerves: No cranial nerve deficit  Sensory: No sensory deficit  Motor: No weakness        Gait: Gait normal          Vital Signs  ED Triage Vitals   Temperature Pulse Respirations Blood Pressure SpO2   02/11/23 0046 02/11/23 0046 02/11/23 0046 02/11/23 0046 02/11/23 0046   98 °F (36 7 °C) 101 20 151/63 100 %      Temp Source Heart Rate Source Patient Position - Orthostatic VS BP Location FiO2 (%)   02/11/23 0046 02/11/23 0046 02/11/23 0046 02/11/23 0046 --   Tympanic Monitor Sitting Left arm       Pain Score       02/11/23 0122       10 - Worst Possible Pain           Vitals:    02/11/23 0046   BP: 151/63   Pulse: 101   Patient Position - Orthostatic VS: Sitting         Visual Acuity      ED Medications  Medications   metoclopramide (REGLAN) injection 10 mg (10 mg Intravenous Given 2/11/23 0123)   diphenhydrAMINE (BENADRYL) injection 25 mg (25 mg Intravenous Given 2/11/23 0123)   ketorolac (TORADOL) injection 15 mg (15 mg Intravenous Given 2/11/23 0122)   sodium chloride 0 9 % bolus 1,000 mL (1,000 mL Intravenous New Bag 2/11/23 0123)       Diagnostic Studies  Results Reviewed     None                 CT spine cervical without contrast   Final Result by Jackie Clifton DO (02/11 0214)      No cervical spine fracture or traumatic malalignment  Workstation performed: [id number]         CT head without contrast   Final Result by Jackie Clifton DO (02/11 5916)      No acute intracranial abnormality  Isodense lesion in the suprasellar cistern similar to prior study  Further evaluation with MRI can be obtained  The study was marked in [facility] for immediate notification  Workstation performed: [id number]                    Procedures  Procedures         ED Course                                             Medical Decision Making  Patient presenting complaining of continued headache since a fall earlier in January  Initial differential was concussion versus intracranial pathology versus migraine headache  Patient was found to have an isodense lesion on CT scan however patient states she was already aware of this and needs to follow-up with neurology  Patient was given neurology referral and advised to get an MRI  Demonstrates understanding  Symptoms today thought to be related to migraine headache as patient had complete symptomatic relief with medications given  Patient is educated on return precautions supportive care and ambulate other department  Headache: acute illness or injury  Amount and/or Complexity of Data Reviewed  Radiology: ordered  Risk  OTC drugs  Prescription drug management  Disposition  Final diagnoses:   Headache     Time reflects when diagnosis was documented in both MDM as applicable and the Disposition within this note     Time User Action Codes Description Comment    2/11/2023  2:24 AM Chaparro Perla Add [R51 9] Headache       ED Disposition     ED Disposition   Discharge    Condition   Stable    Date/Time   Sat Feb 11, 2023  2:24 AM    Comment   Tom Needs discharge to home/self care  Follow-up Information     Follow up With Specialties Details Why Contact Info Additional [address] [facility] Emergency Department Emergency Medicine Go to  If symptoms worsen [address] [address] [address] [facility] Emergency Department    [facility] Neurology [facility] Neurology Schedule an appointment as soon as possible for a visit  If symptoms worsen [address] [facility] [id number] [phone] [facility] [place] [address], [address] [phone]          Patient's Medications   Discharge Prescriptions    ASPIRIN-ACETAMINOPHEN-CAFFEINE (EXCEDRIN MIGRAINE) 250-250-65 MG PER TABLET    Take 1 tablet by mouth every 6 (six) hours as needed for headaches       Start Date: 2/11/2023 End Date: --       Order Dose: 1 tablet       Quantity: 30 tablet    Refills: 0       No discharge procedures on file      PDMP Review Value Time User    PDMP Reviewed  Yes 6/14/2022  4:48 PM Lexi Herrera MD          ED Provider  Electronically Signed by           Harriett Velazquez PA-C  02/11/23 5902

## 2023-02-16 ENCOUNTER — CONSULT (OUTPATIENT)
Dept: NEUROSURGERY | Facility: CLINIC | Age: 58
End: 2023-02-16

## 2023-02-16 VITALS
TEMPERATURE: 97.6 F | OXYGEN SATURATION: 96 % | SYSTOLIC BLOOD PRESSURE: 116 MMHG | RESPIRATION RATE: 16 BRPM | WEIGHT: 151 LBS | DIASTOLIC BLOOD PRESSURE: 76 MMHG | HEART RATE: 102 BPM | BODY MASS INDEX: 27.79 KG/M2 | HEIGHT: 62 IN

## 2023-02-16 DIAGNOSIS — R93.0 ABNORMAL CT OF THE HEAD: Primary | ICD-10-CM

## 2023-02-16 DIAGNOSIS — M54.12 RADICULOPATHY, CERVICAL: ICD-10-CM

## 2023-02-16 NOTE — ASSESSMENT & PLAN NOTE
Patient seen outpatient office today as a new patient for further work-up and evaluation of headaches and neck pain  · Patient recently presented to the ED on 2/11 with worsening headaches  · She states her headaches and neck pain started after she sustained a fall on her steps outside on January 7 striking the back of her head and neck with positive LOC, she presented to the ED at that time  · She states over the past month her headaches and neck pain have worsened  She is also complaining of right hand clumsiness as well as dropping things over the past few weeks  · Exam: Right-sided Leilani's, R WE/WF 4/5, R IOs weakness, R hand clumsiness with rapid hand movement, B/L deltoid 4/5  · Imaging demonstrated isodense lesion in the super cellular cistern as well as anterior listhesis of C2 on C3 with multilevel cervical degenerative changes  Imaging:    CT head wo 2/11/23:No acute intracranial abnormality  Isodense lesion in the suprasellar cistern similar to prior study  Further evaluation with MRI can be obtained      CT cervical spine wo 2/1/23:Anterolisthesis of C2 on C3 is seen  Moderate multilevel cervical degenerative changes are noted  No critical central canal stenosis  Plan:  · Reviewed imaging with patient  · Patient states she started seeing physical therapy last week but only saw them once and is unsure if it is helping  She denies seeing pain management or receiving injections in the past  · Ordered MRI brain with and without contrast to further evaluate isodense lesion in super cellular cistern  · Also ordered MRI cervical spine without contrast to further assess in etiology given patient's symptoms    Also ordered cervical flexion/extension x-rays given anterolisthesis of C2 on C3  · Also referred patient to ophthalmology for ongoing right eye floaters  · Patient will follow-up in the next few weeks once imaging completed or sooner symptoms worsen  · Patient made aware to seek care sooner if she develops any new or worsening neurological change or red flag signs  · Patient made aware to contact neurosurgery with any further questions or concerns

## 2023-02-16 NOTE — PROGRESS NOTES
Neurosurgery Office Note  Ashley Ryan 66 y o  female MRN: [id number]      Assessment/Plan     Abnormal CT of the head  Patient seen outpatient office today as a new patient for further work-up and evaluation of headaches and neck pain  · Patient recently presented to the ED on 2/11 with worsening headaches  · She states her headaches and neck pain started after she sustained a fall on her steps outside on January 7 striking the back of her head and neck with positive LOC, she presented to the ED at that time  · She states over the past month her headaches and neck pain have worsened  She is also complaining of right hand clumsiness as well as dropping things over the past few weeks  · Exam: Right-sided Leilani's, R WE/WF 4/5, R IOs weakness, R hand clumsiness with rapid hand movement, B/L deltoid 4/5  · Imaging demonstrated isodense lesion in the super cellular cistern as well as anterior listhesis of C2 on C3 with multilevel cervical degenerative changes  Imaging:    CT head wo 2/11/23:No acute intracranial abnormality  Isodense lesion in the suprasellar cistern similar to prior study  Further evaluation with MRI can be obtained      CT cervical spine wo 2/1/23:Anterolisthesis of C2 on C3 is seen  Moderate multilevel cervical degenerative changes are noted  No critical central canal stenosis  Plan:  · Reviewed imaging with patient  · Patient states she started seeing physical therapy last week but only saw them once and is unsure if it is helping  She denies seeing pain management or receiving injections in the past  · Ordered MRI brain with and without contrast to further evaluate isodense lesion in super cellular cistern  · Also ordered MRI cervical spine without contrast to further assess in etiology given patient's symptoms    Also ordered cervical flexion/extension x-rays given anterolisthesis of C2 on C3  · Also referred patient to ophthalmology for ongoing right eye floaters  · Patient will follow-up in the next few weeks once imaging completed or sooner symptoms worsen  · Patient made aware to seek care sooner if she develops any new or worsening neurological change or red flag signs  · Patient made aware to contact neurosurgery with any further questions or concerns  Radiculopathy, cervical  See above plan       Diagnoses and all orders for this visit:    Abnormal CT of the head  -     MRI brain with and without contrast; Future  -     Ambulatory Referral to Ophthalmology; Future  -     BUN; Future  -     Creatinine, serum; Future    Radiculopathy, cervical  -     MRI cervical spine without contrast; Future  -     XR spine cervical complete 6+ vw flex/ext/obl; Future              CHIEF COMPLAINT    Chief Complaint   Patient presents with   • Consult   • Neck Pain       HISTORY    History of Present Illness     66y o  year old female with past medical history significant for type 2 diabetes, COPD, hypertension, arthritis, bipolar disorder, and kidney stones  Patient seen in outpatient office today for further work-up and evaluation of headaches and neck pain  Patient states her headaches and neck pain started in January when she sustained a fall on her step striking the back of her head and neck with positive LOC, patient states she did present to the ED at that time  She states since that time her headaches and neck pain have significantly worsened over the past month  She is currently complaining of 10/10 skull base pain as well as headache which is constant, she states her neck pain radiates into the tops of her bilateral shoulders  Patient states sleeping will worsen her pain and she has to get up and move around  She states she has tried multiple medications without any relief  She states she has also tried heat or ice  She also endorses more right hand clumsiness as well as dropping things over the past few weeks    Patient also endorses photosensitivity as well as some eye pain with eye movement  She also reports since her fall in January she has right eye floaters which has been ongoing and she has not seen ophthalmologist   She denies any recent falls or traumas but states her balance is off at times secondary to her left leg giving out on her since she slipped and fell on ice 2-3 years ago  She denies any difficulty with fine motor skills but does endorse dropping objects with her right hand  She recently presented to the ED on 2/11 with worsening headaches, imaging demonstrated a isodense lesion in super cellular cistern as well as degenerative changes in her cervical spine with anterior listhesis of C2-3  Patient states she started physical therapy last week but is unsure if it helps and she has only gone once  She states she has not seen pain management or received any injections in the past   Patient states she is on aspirin for heart health  Patient also with history of type 2 diabetes, she states she has not checked her blood sugar like she supposed to, reiterated the importance of checking her blood sugar as prescribed by her PCP, spoke with patient about reaching out to her PCP in regards to diabetes management  Spoke with patient how if her blood sugars are significantly elevated this can cause eye problems  Ordered MRI brain with and without contrast to further evaluate super cellular cistern lesion  Also ordered MRI cervical spine without contrast to further assess for any etiology given patient's symptoms as well as right hand clumsiness and right Leilani's  Also ordered cervical spine flexion/extension x-rays to assess for any instability  Also referred patient to ophthalmology for ongoing right eye floaters  Patient will follow-up in the next few weeks once imaging completed or sooner if symptoms worsen    HPI    See Discussion    REVIEW OF SYSTEMS    Review of Systems   Constitutional: Negative  Eyes: Positive for visual disturbance (floaters)  Respiratory: Negative  Cardiovascular: Negative  Gastrointestinal: Negative  Endocrine: Negative  Genitourinary: Negative  Musculoskeletal: Positive for gait problem, neck pain (radiatng into shoulder blades) and neck stiffness  PT- started last week   Allergic/Immunologic: Negative  Neurological: Positive for dizziness and headaches (occipital HA)  Hematological: Bruises/bleeds easily (asa 81 mg)  Psychiatric/Behavioral: Negative        ROS reviewed with patient and agree and changes are made as needed    Meds/Allergies     Current Outpatient Medications   Medication Sig Dispense Refill   • aspirin (ECOTRIN LOW STRENGTH) 81 mg EC tablet Take 81 mg by mouth daily       • aspirin-acetaminophen-caffeine (EXCEDRIN MIGRAINE) 250-250-65 MG per tablet Take 1 tablet by mouth every 6 (six) hours as needed for headaches 30 tablet 0   • azaTHIOprine (IMURAN) 50 mg tablet Take 50 mg by mouth daily     • FLUoxetine (PROzac) 40 MG capsule Take 40 mg by mouth daily       • QUEtiapine (SEROquel) 100 mg tablet Take 100 mg by mouth daily at bedtime     • zolpidem (AMBIEN) 5 mg tablet Take 10 mg by mouth daily at bedtime as needed for sleep       • acetaminophen (TYLENOL) 650 mg CR tablet Take 1 tablet (650 mg total) by mouth every 8 (eight) hours as needed for mild pain 30 tablet 0   • albuterol (PROVENTIL HFA,VENTOLIN HFA) 90 mcg/act inhaler INHALE 2 PUFFS BY MOUTH EVERY FOUR HOURS FOR FIVE DAYS     • Biotin 10 MG TABS Take 10 mg by mouth (Patient not taking: No sig reported)     • budesonide (ENTOCORT EC) 3 MG capsule  (Patient not taking: No sig reported)     • calcium citrate-Vitamin D 200 mg-250 units Take 2 tablets by mouth (Patient not taking: Reported on 7/8/2022)     • cetirizine (ZyrTEC) 10 mg tablet Take 10 mg by mouth (Patient not taking: Reported on 7/8/2022)     • dicyclomine (BENTYL) 20 mg tablet Take 1 tablet (20 mg total) by mouth 2 (two) times a day (Patient not taking: No sig reported) 20 tablet 0   • erythromycin (ILOTYCIN) ophthalmic ointment Place a 1/2 inch ribbon of ointment into the lower eyelid   (Patient not taking: No sig reported) 1 g 0   • ibuprofen (MOTRIN) 600 mg tablet Take 1 tablet (600 mg total) by mouth every 6 (six) hours as needed for mild pain (Patient not taking: No sig reported) 30 tablet 0   • ketotifen (ZADITOR) 0 025 % ophthalmic solution Administer to both eyes daily   (Patient not taking: No sig reported)     • lidocaine (LMX) 4 % cream Apply topically as needed for mild pain 30 g 0   • liraglutide (VICTOZA) injection Inject 1 8 mg under the skin daily   (Patient not taking: No sig reported)     • LORazepam (ATIVAN) 1 mg tablet Take 1 mg by mouth 2 (two) times a day (Patient not taking: Reported on 7/7/2022)     • methocarbamol (ROBAXIN) 500 mg tablet Take 1 tablet (500 mg total) by mouth 2 (two) times a day (Patient not taking: Reported on 1/23/2023) 20 tablet 0   • naproxen (Naprosyn) 500 mg tablet Take 1 tablet (500 mg total) by mouth 2 (two) times a day with meals (Patient not taking: Reported on 1/23/2023) 30 tablet 0   • ondansetron (ZOFRAN-ODT) 4 mg disintegrating tablet Take 1 tablet (4 mg total) by mouth every 6 (six) hours as needed for nausea or vomiting (Patient not taking: No sig reported) 20 tablet 0   • oxybutynin (DITROPAN) 5 mg tablet Take 1 tablet (5 mg total) by mouth 3 (three) times a day as needed (stent discomfort) (Patient not taking: No sig reported) 20 tablet 0   • oxyCODONE-acetaminophen (PERCOCET) 5-325 mg per tablet Take 1 tablet by mouth every 4 (four) hours as needed for moderate pain for up to 10 doses Max Daily Amount: 6 tablets (Patient not taking: No sig reported) 20 tablet 0   • pantoprazole (PROTONIX) 40 mg tablet Take 1 tablet (40 mg total) by mouth 2 (two) times a day 60 tablet 0   • phenazopyridine (PYRIDIUM) 200 mg tablet Take 1 tablet (200 mg total) by mouth 3 (three) times a day with meals 10 tablet 0   • tamsulosin (FLOMAX) 0 4 mg Take 1 capsule (0 4 mg total) by mouth daily with dinner for 14 days 14 capsule 0     No current facility-administered medications for this visit  Allergies   Allergen Reactions   • No Active Allergies        PAST HISTORY    Past Medical History:   Diagnosis Date   • Autoimmune hepatitis ([facility] )    • Bipolar 1 disorder ([facility] )    • Diabetes mellitus ([facility] )    • Kidney stone    • Renal disorder     kidney stones       Past Surgical History:   Procedure Laterality Date   • BARIATRIC SURGERY  05/2017   • BUNIONECTOMY     • CYSTOSCOPY  07/12/2018    Stent Removal   • FL RETROGRADE PYELOGRAM  6/16/2022   • GASTRIC BYPASS  05/22/2017   • HIATAL HERNIA REPAIR  05/22/2017   • HYSTERECTOMY     • JOINT REPLACEMENT     • KIDNEY STONE SURGERY     • AZ CYSTO/URETERO W/LITHOTRIPSY &INDWELL STENT INSRT Bilateral 7/6/2018    Procedure: CYSTOSCOPY GALDINO  URETEROSCOPY;GALDINO  RETROGRADE PYELOGRAM AND INSERTION GALDINO  STENT URETERAL;  Surgeon: Tiara Owens MD;  Location:  MAIN OR;  Service: Urology   • AZ CYSTO/URETERO W/LITHOTRIPSY &INDWELL STENT INSRT Left 6/16/2022    Procedure: CYSTOSCOPY URETEROSCOPY WITH LITHOTRIPSY HOLMIUM LASER, RETROGRADE PYELOGRAM AND INSERTION STENT URETERAL;  Surgeon: Alycia Weldon MD;  Location: [facility];  Service: Urology   • TOTAL SHOULDER REPLACEMENT  2002   • VAGINAL HYSTERECTOMY      PARTIAL       Social History     Tobacco Use   • Smoking status: Every Day     Packs/day: 0 50     Years: 30 00     Pack years: 15 00     Types: Cigarettes   • Smokeless tobacco: Never   Vaping Use   • Vaping Use: Never used   Substance Use Topics   • Alcohol use: No   • Drug use: No       Family History   Problem Relation Age of Onset   • Diabetes Father    • Heart disease Father    • Diabetes Mother    • Heart disease Mother    • Diabetes Sister    • Diabetes Family         MELLITUS   • Depression Family    • Mental illness Family    • Obesity Family    • Osteoarthritis Family          Above history personally reviewed  EXAM    Vitals:Blood pressure 116/76, pulse 102, temperature 97 6 °F (36 4 °C), resp  rate 16, height 5' 2" (1 575 m), weight 68 5 kg (151 lb), SpO2 96 %  ,Body mass index is 27 62 kg/m²  Physical Exam  Vitals reviewed  Constitutional:       General: She is awake  She is not in acute distress  Appearance: Normal appearance  She is not ill-appearing  HENT:      Head: Normocephalic and atraumatic  Eyes:      Extraocular Movements: Extraocular movements intact and EOM normal       Conjunctiva/sclera: Conjunctivae normal    Cardiovascular:      Rate and Rhythm: Normal rate  Pulmonary:      Effort: Pulmonary effort is normal  No respiratory distress  Chest:      Chest wall: No tenderness  Abdominal:      General: There is no distension  Palpations: Abdomen is soft  Tenderness: There is no abdominal tenderness  Musculoskeletal:         General: Normal range of motion  Cervical back: Normal range of motion and neck supple  Tenderness present  Spinous process tenderness and muscular tenderness present  Skin:     General: Skin is warm and dry  Neurological:      Mental Status: She is alert and oriented to person, place, and time  Coordination: Finger-Nose-Finger Test normal       Gait: Gait is intact  Deep Tendon Reflexes:      Reflex Scores:       Bicep reflexes are 2+ on the right side and 2+ on the left side  Patellar reflexes are 2+ on the right side and 2+ on the left side  Psychiatric:         Attention and Perception: Attention and perception normal          Mood and Affect: Mood and affect normal          Speech: Speech normal          Behavior: Behavior normal  Behavior is cooperative  Thought Content: Thought content normal          Cognition and Memory: Cognition and memory normal          Judgment: Judgment normal          Neurologic Exam     Mental Status   Oriented to person, place, and time  Follows 2 step commands  Attention: normal  Concentration: normal    Speech: speech is normal   Level of consciousness: alert  Knowledge: good  Able to perform simple calculations  Able to name object  Normal comprehension  Cranial Nerves     CN II   Right visual field deficit: intact except supranasal stated 2 fingers when it was one  Left visual field deficit: none     CN III, IV, VI   Extraocular motions are normal    CN III: no CN III palsy  CN VI: no CN VI palsy  Nystagmus: none   Diplopia: none  Conjugate gaze: present    CN V   Facial sensation intact  CN VII   Facial expression full, symmetric  CN VIII   CN VIII normal    Hearing: intact    CN IX, X   CN IX normal      CN XI   CN XI normal      CN XII   CN XII normal    Patient would not let me assess pupils 2/2 photophobia    Patient also complains of eye pain with moving eyes to right side     Motor Exam   Muscle bulk: normal  Overall muscle tone: normal  Right arm pronator drift: absent  Left arm pronator drift: absent    Strength   Strength 5/5 except as noted  R WE/WF 4/5  R IOs weakness  Right hand clumsiness with rapid hand movement    B/L deltoid 4/5     Sensory Exam   Light touch normal    Proprioception normal    JPS and DST intact     Gait, Coordination, and Reflexes     Gait  Gait: normal    Coordination   Finger to nose coordination: normal    Tremor   Resting tremor: absent  Intention tremor: absent  Action tremor: absent    Reflexes   Right biceps: 2+  Left biceps: 2+  Right patellar: 2+  Left patellar: 2+  Right Diane: present  Left Diane: absent  Right ankle clonus: absent  Left ankle clonus: absent        MEDICAL DECISION MAKING    Imaging Studies:     CT head without contrast    Result Date: 2/11/2023  Narrative: CT BRAIN - WITHOUT CONTRAST INDICATION:   Headache, chronic, new features or increased frequency headache new features  COMPARISON:  January 7, 2023 TECHNIQUE:  CT examination of the brain was performed    In addition to axial images, sagittal and coronal 2D reformatted images were created and submitted for interpretation  Radiation dose length product (DLP) for this visit:  806 mGy-cm   This examination, like all CT scans performed in the [facility], was performed utilizing techniques to minimize radiation dose exposure, including the use of iterative reconstruction and automated exposure control  IMAGE QUALITY:  Diagnostic  FINDINGS: PARENCHYMA:  No intracranial mass, mass effect or midline shift  No CT signs of acute infarction  No acute parenchymal hemorrhage  VENTRICLES AND EXTRA-AXIAL SPACES:  There is a 1 2 x 1 3 cm isodense lesion seen within the suprasellar cistern similar to prior study  VISUALIZED ORBITS: Normal visualized orbits  PARANASAL SINUSES: Normal visualized paranasal sinuses  CALVARIUM AND EXTRACRANIAL SOFT TISSUES:  Normal      Impression: No acute intracranial abnormality  Isodense lesion in the suprasellar cistern similar to prior study  Further evaluation with MRI can be obtained  The study was marked in [facility] for immediate notification  Workstation performed: [id number]     CT spine cervical without contrast    Result Date: 2/11/2023  Narrative: CT CERVICAL SPINE - WITHOUT CONTRAST INDICATION:   midline pain  COMPARISON:  January 7, 2023 TECHNIQUE:  CT examination of the cervical spine was performed without intravenous contrast   Contiguous axial images were obtained  Sagittal and coronal reconstructions were performed  Radiation dose length product (DLP) for this visit:  412 mGy-cm   This examination, like all CT scans performed in the [facility], was performed utilizing techniques to minimize radiation dose exposure, including the use of iterative reconstruction and automated exposure control  IMAGE QUALITY:  Diagnostic  FINDINGS: ALIGNMENT:  Normal alignment of the cervical spine  Anterolisthesis of C2 on C3 is seen  VERTEBRAE:  No fracture   DEGENERATIVE CHANGES:  Moderate multilevel cervical degenerative changes are noted  No critical central canal stenosis  PREVERTEBRAL AND PARASPINAL SOFT TISSUES: Unremarkable THORACIC INLET:  Normal      Impression: No cervical spine fracture or traumatic malalignment  Workstation performed: [id number]     CT abdomen pelvis with contrast    Result Date: 1/23/2023  Narrative: CT ABDOMEN AND PELVIS WITH IV CONTRAST INDICATION:   Epigastric pain epigastric/ruq pain  COMPARISON:  None  TECHNIQUE:  CT examination of the abdomen and pelvis was performed  Axial, sagittal, and coronal 2D reformatted images were created from the source data and submitted for interpretation  Radiation dose length product (DLP) for this visit:  558 mGy-cm   This examination, like all CT scans performed in the [facility], was performed utilizing techniques to minimize radiation dose exposure, including the use of iterative reconstruction and automated exposure control  IV Contrast:  30 mL of iohexol (OMNIPAQUE) 85 mL of iohexol (OMNIPAQUE) Enteric Contrast:  Enteric contrast was not administered  FINDINGS: ABDOMEN LOWER CHEST:  Stable cystic lesion along the left margin of 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  LIVER/BILIARY TREE:  Unremarkable  GALLBLADDER:  No calcified gallstones  No pericholecystic inflammatory change  SPLEEN:  Unremarkable  PANCREAS:  Unremarkable  ADRENAL GLANDS:  Unremarkable  KIDNEYS/URETERS:  Unremarkable  No hydronephrosis  STOMACH AND BOWEL:  Status post gastric bypass  APPENDIX:  No findings to suggest appendicitis  ABDOMINOPELVIC CAVITY:  No ascites  No pneumoperitoneum  No lymphadenopathy  VESSELS:  Unremarkable for patient's age  PELVIS REPRODUCTIVE ORGANS:  Surgical changes of prior hysterectomy  URINARY BLADDER:  Unremarkable  ABDOMINAL WALL/INGUINAL REGIONS:  Unremarkable  OSSEOUS STRUCTURES:  No acute fracture or destructive osseous lesion       Impression: No evidence of acute intra-abdominal or pelvic pathology Workstation performed: [id number]       I have personally reviewed pertinent reports     and I have personally reviewed pertinent films in PACS

## 2023-02-16 NOTE — PATIENT INSTRUCTIONS
Ordered MRI brain with and without contrast to assess further assess isodense lesion in super cellular cistern    Also ordered MRI cervical spine without contrast to assess for any etiology given patient's symptoms    Also ordered cervical spine flexion/extension x-rays to assess for any instability given C2-3 anterolisthesis    Also referred patient to ophthalmology for ongoing right eye floaters    Spoke with patient but reaching out to her PCP in regards to diabetic management and to check blood sugars more frequent

## 2023-02-20 ENCOUNTER — TELEPHONE (OUTPATIENT)
Dept: NEUROLOGY | Facility: CLINIC | Age: 58
End: 2023-02-20

## 2023-02-20 NOTE — TELEPHONE ENCOUNTER
Patient left voicemail requesting pain medication  Call returned to patient  [phone]  Left voicemail that she has not been seen by [facility] neurology, per chart review she follows with [facility]

## 2023-02-22 ENCOUNTER — TELEPHONE (OUTPATIENT)
Dept: NEUROSURGERY | Facility: CLINIC | Age: 58
End: 2023-02-22

## 2023-02-22 NOTE — TELEPHONE ENCOUNTER
Got notification that  this pt cx her follow up with us for 3/13/23 last night via [facility] - but her 2 mris are still on ?  Rosalia will call pt today

## 2023-02-22 NOTE — TELEPHONE ENCOUNTER
Called and LVM for patient advising her that I sent her records over to [facility] for sight (ophthalmology who takes their insurance) I also asked her to call the office back to r/s her f/u appt

## 2023-06-09 ENCOUNTER — APPOINTMENT (EMERGENCY)
Dept: CT IMAGING | Facility: HOSPITAL | Age: 58
End: 2023-06-09
Payer: COMMERCIAL

## 2023-06-09 ENCOUNTER — HOSPITAL ENCOUNTER (EMERGENCY)
Facility: HOSPITAL | Age: 58
Discharge: HOME | End: 2023-06-09
Attending: EMERGENCY MEDICINE
Payer: COMMERCIAL

## 2023-06-09 VITALS
DIASTOLIC BLOOD PRESSURE: 74 MMHG | TEMPERATURE: 98 F | OXYGEN SATURATION: 93 % | SYSTOLIC BLOOD PRESSURE: 142 MMHG | RESPIRATION RATE: 18 BRPM | HEART RATE: 104 BPM | BODY MASS INDEX: 27.44 KG/M2 | WEIGHT: 150 LBS

## 2023-06-09 DIAGNOSIS — S30.1XXA ABDOMINAL HEMATOMA: ICD-10-CM
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
## 2023-06-09 LAB
ALBUMIN SERPL BCP-MCNC: 3.9 G/DL (ref 3.5–5)
ALP SERPL-CCNC: 173 U/L (ref 34–104)
ALT SERPL W P-5'-P-CCNC: 34 U/L (ref 7–52)
ANION GAP SERPL CALCULATED.3IONS-SCNC: 9 MMOL/L (ref 4–13)
AST SERPL W P-5'-P-CCNC: 43 U/L (ref 13–39)
BASOPHILS # BLD AUTO: 0.03 THOUSANDS/ÂΜL (ref 0–0.1)
BASOPHILS NFR BLD AUTO: 1 % (ref 0–1)
BILIRUB SERPL-MCNC: 0.25 MG/DL (ref 0.2–1)
BUN SERPL-MCNC: 19 MG/DL (ref 5–25)
CALCIUM SERPL-MCNC: 9 MG/DL (ref 8.4–10.2)
CHLORIDE SERPL-SCNC: 104 MMOL/L (ref 96–108)
CO2 SERPL-SCNC: 22 MMOL/L (ref 21–32)
CREAT SERPL-MCNC: 0.79 MG/DL (ref 0.6–1.3)
EOSINOPHIL # BLD AUTO: 0.11 THOUSAND/ÂΜL (ref 0–0.61)
EOSINOPHIL NFR BLD AUTO: 2 % (ref 0–6)
ERYTHROCYTE [DISTWIDTH] IN BLOOD BY AUTOMATED COUNT: 11.9 % (ref 11.6–15.1)
GFR SERPL CREATININE-BSD FRML MDRD: 83 ML/MIN/1.73SQ M
GLUCOSE SERPL-MCNC: 336 MG/DL (ref 65–140)
HCT VFR BLD AUTO: 39.8 % (ref 34.8–46.1)
HGB BLD-MCNC: 13.1 G/DL (ref 11.5–15.4)
IMM GRANULOCYTES # BLD AUTO: 0.01 THOUSAND/UL (ref 0–0.2)
IMM GRANULOCYTES NFR BLD AUTO: 0 % (ref 0–2)
LYMPHOCYTES # BLD AUTO: 2.1 THOUSANDS/ÂΜL (ref 0.6–4.47)
LYMPHOCYTES NFR BLD AUTO: 45 % (ref 14–44)
MCH RBC QN AUTO: 33.4 PG (ref 26.8–34.3)
MCHC RBC AUTO-ENTMCNC: 32.9 G/DL (ref 31.4–37.4)
MCV RBC AUTO: 102 FL (ref 82–98)
MONOCYTES # BLD AUTO: 0.29 THOUSAND/ÂΜL (ref 0.17–1.22)
MONOCYTES NFR BLD AUTO: 6 % (ref 4–12)
NEUTROPHILS # BLD AUTO: 2.17 THOUSANDS/ÂΜL (ref 1.85–7.62)
NEUTS SEG NFR BLD AUTO: 46 % (ref 43–75)
NRBC BLD AUTO-RTO: 0 /100 WBCS
PLATELET # BLD AUTO: 247 THOUSANDS/UL (ref 149–390)
PMV BLD AUTO: 9.7 FL (ref 8.9–12.7)
POTASSIUM SERPL-SCNC: 3.4 MMOL/L (ref 3.5–5.3)
PROT SERPL-MCNC: 6.8 G/DL (ref 6.4–8.4)
RBC # BLD AUTO: 3.92 MILLION/UL (ref 3.81–5.12)
SODIUM SERPL-SCNC: 135 MMOL/L (ref 135–147)
WBC # BLD AUTO: 4.71 THOUSAND/UL (ref 4.31–10.16)

## 2023-06-09 PROCEDURE — G1004 CDSM NDSC: HCPCS

## 2023-06-09 PROCEDURE — 74177 CT ABD & PELVIS W/CONTRAST: CPT

## 2023-06-09 PROCEDURE — 99284 EMERGENCY DEPT VISIT MOD MDM: CPT

## 2023-06-09 PROCEDURE — 80053 COMPREHEN METABOLIC PANEL: CPT | Performed by: EMERGENCY MEDICINE

## 2023-06-09 PROCEDURE — 71260 CT THORAX DX C+: CPT

## 2023-06-09 PROCEDURE — 36415 COLL VENOUS BLD VENIPUNCTURE: CPT | Performed by: EMERGENCY MEDICINE

## 2023-06-09 PROCEDURE — 85025 COMPLETE CBC W/AUTO DIFF WBC: CPT | Performed by: EMERGENCY MEDICINE

## 2023-06-09 RX ORDER — MORPHINE SULFATE 4 MG/ML
4 INJECTION, SOLUTION INTRAMUSCULAR; INTRAVENOUS ONCE
Status: COMPLETED | OUTPATIENT
Start: 2023-06-09 | End: 2023-06-09

## 2023-06-09 RX ORDER — OXYCODONE HYDROCHLORIDE AND ACETAMINOPHEN 5; 325 MG/1; MG/1
1 TABLET ORAL EVERY 6 HOURS PRN
Qty: 12 TABLET | Refills: 0 | Status: SHIPPED | OUTPATIENT
Start: 2023-06-09 | End: 2023-06-19

## 2023-06-09 RX ADMIN — IOHEXOL 100 ML: 350 INJECTION, SOLUTION INTRAVENOUS at 18:10

## 2023-06-09 RX ADMIN — MORPHINE SULFATE 4 MG: 4 INJECTION, SOLUTION INTRAMUSCULAR; INTRAVENOUS at 17:56
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
## 2023-07-05 ENCOUNTER — OFFICE VISIT (OUTPATIENT)
Dept: OBGYN CLINIC | Facility: OTHER | Age: 58
End: 2023-07-05
Payer: COMMERCIAL

## 2023-07-05 ENCOUNTER — APPOINTMENT (OUTPATIENT)
Dept: RADIOLOGY | Facility: OTHER | Age: 58
End: 2023-07-05
Payer: COMMERCIAL

## 2023-07-05 VITALS
HEIGHT: 62 IN | BODY MASS INDEX: 26.68 KG/M2 | DIASTOLIC BLOOD PRESSURE: 79 MMHG | HEART RATE: 101 BPM | WEIGHT: 145 LBS | SYSTOLIC BLOOD PRESSURE: 114 MMHG

## 2023-07-05 DIAGNOSIS — M25.551 RIGHT HIP PAIN: ICD-10-CM

## 2023-07-05 DIAGNOSIS — M70.62 TROCHANTERIC BURSITIS OF BOTH HIPS: ICD-10-CM

## 2023-07-05 DIAGNOSIS — M25.532 LEFT WRIST PAIN: ICD-10-CM

## 2023-07-05 DIAGNOSIS — M54.16 RADICULOPATHY, LUMBAR REGION: ICD-10-CM

## 2023-07-05 DIAGNOSIS — M25.551 RIGHT HIP PAIN: Primary | ICD-10-CM

## 2023-07-05 DIAGNOSIS — M70.61 TROCHANTERIC BURSITIS OF BOTH HIPS: ICD-10-CM

## 2023-07-05 PROCEDURE — 73521 X-RAY EXAM HIPS BI 2 VIEWS: CPT

## 2023-07-05 PROCEDURE — 99204 OFFICE O/P NEW MOD 45 MIN: CPT | Performed by: ORTHOPAEDIC SURGERY

## 2023-07-05 PROCEDURE — 20610 DRAIN/INJ JOINT/BURSA W/O US: CPT | Performed by: ORTHOPAEDIC SURGERY

## 2023-07-05 RX ORDER — CARBOXYMETHYLCELLULOSE SODIUM 0.5 G/100ML
SOLUTION/ DROPS OPHTHALMIC
COMMUNITY
Start: 2023-06-16

## 2023-07-05 RX ORDER — TIZANIDINE HYDROCHLORIDE 6 MG/1
6 CAPSULE, GELATIN COATED ORAL 3 TIMES DAILY
COMMUNITY
Start: 2023-06-01 | End: 2024-05-31

## 2023-07-05 RX ORDER — TRAMADOL HYDROCHLORIDE 50 MG/1
TABLET ORAL
COMMUNITY
Start: 2023-06-16

## 2023-07-05 RX ORDER — AMITRIPTYLINE HYDROCHLORIDE 50 MG/1
TABLET, FILM COATED ORAL
COMMUNITY
Start: 2023-07-03

## 2023-07-05 RX ORDER — TRIAMCINOLONE ACETONIDE 40 MG/ML
40 INJECTION, SUSPENSION INTRA-ARTICULAR; INTRAMUSCULAR
Status: COMPLETED | OUTPATIENT
Start: 2023-07-05 | End: 2023-07-05

## 2023-07-05 RX ORDER — VARENICLINE TARTRATE 1 MG/1
TABLET, FILM COATED ORAL
COMMUNITY
Start: 2023-03-30

## 2023-07-05 RX ORDER — AMITRIPTYLINE HYDROCHLORIDE 25 MG/1
TABLET, FILM COATED ORAL
COMMUNITY
Start: 2023-06-20

## 2023-07-05 RX ORDER — TIZANIDINE 4 MG/1
TABLET ORAL
COMMUNITY
Start: 2023-07-05

## 2023-07-05 RX ORDER — FAMOTIDINE 40 MG/1
TABLET, FILM COATED ORAL
COMMUNITY
Start: 2023-05-30

## 2023-07-05 RX ORDER — TOPIRAMATE 25 MG/1
25 TABLET ORAL 2 TIMES DAILY
COMMUNITY
Start: 2023-03-03 | End: 2024-03-02

## 2023-07-05 RX ORDER — RIZATRIPTAN BENZOATE 10 MG/1
TABLET ORAL
COMMUNITY
Start: 2023-06-16

## 2023-07-05 RX ORDER — DULAGLUTIDE 1.5 MG/.5ML
INJECTION, SOLUTION SUBCUTANEOUS
COMMUNITY
Start: 2023-06-27

## 2023-07-05 RX ORDER — TIZANIDINE 2 MG/1
TABLET ORAL
COMMUNITY
Start: 2023-05-15

## 2023-07-05 RX ORDER — EMPAGLIFLOZIN 25 MG/1
TABLET, FILM COATED ORAL
COMMUNITY
Start: 2023-05-01

## 2023-07-05 RX ADMIN — TRIAMCINOLONE ACETONIDE 40 MG: 40 INJECTION, SUSPENSION INTRA-ARTICULAR; INTRAMUSCULAR at 09:15

## 2023-07-05 NOTE — PROGRESS NOTES
Orthopaedic Surgery - Office Note  Mariana Clark (10 y.o. female)   : 1965   MRN: [id number]  Encounter Date: 2023    Chief Complaint   Patient presents with   • Right Hip - Pain   • Left Hip - Pain       Assessment / Plan  B/l hip trochanteric bursitis, lumbar radiculopathy. Most likely is symptomatic from both etiologies. She needs to work with PT and do HEP as well as see the spine and pain team. She was provided with a left hip trochanteric bursa injection today but should not continue to get CSI. · CSI of left trochanteric bursa was performed  · Activity as tolerated  · WBAT  · Home exercise program reviewed  · Focus on progressing strength  · Anti-inflammatories or Tylenol prn pain  Return if symptoms worsen or fail to improve. Referral to comprehensive spine and pain    History of Present Illness  Mariana Clark is a 66 y.o. female who presents for evaluation of b/l hip pain. She has lateral hip pain for 6 years. L>R hip pain, pain is dull, occasionally radiates to her buttock and posterior leg on the left. She was seen by Dr Nathaniel Julien on 23 for CSI L troch bursa which significant relief but only helps for a few months. She gets b/l hip bursa injects every 5 months. She has completed PT in the past but does not keep up with exercises. She had MRI R hip last year that showed abductor tendonitis/partial tear and MRI lumbar showing L5/S1 HNP with L L5 nerve root impingement. She takes tramadol but is not helping. Her occupation is a care giver. Review of Systems  Pertinent items are noted in HPI. All other systems were reviewed and are negative. Physical Exam  /79 (BP Location: Left arm, Patient Position: Sitting, Cuff Size: Adult)   Pulse 101   Ht 5' 2" (1.575 m) Comment: verbal  Wt 65.8 kg (145 lb)   BMI 26.52 kg/m²   Cons: Appears well. No apparent distress. Psych: Alert. Oriented x3. Mood and affect normal.  Eyes: PERRLA, EOMI  Resp: Normal effort.   No audible wheezing or stridor. CV: Palpable pulse. No discernable arrhythmia. No LE edema. Lymph:  No palpable cervical, axillary, or inguinal lymphadenopathy. Skin: Warm. No palpable masses. No visible lesions. Neuro: Normal muscle tone. Normal and symmetric DTR's. Bilateral Hip Exam  Alignment / Posture:  Normal lumbar alignment. Normal resting hip posture. Normal knee alignment. Inspection:  No swelling. No edema. No erythema. No ecchymosis. No muscle atrophy. No deformity. Palpation:  b/l trochanters, L SIJ tenderness. No effusion. No warmth. No crepitus. No clicking, catching, or snapping. ROM:  Hip Flexion 120. Hip Abduction 40. Hip ER 40. Hip IR 20. Normal knee ROM. Strength:  5/5 hip flexors and abductors. 5/5 quadriceps and hamstrings. Stability:  (-) Logroll. (-) Abd-Ext-ER test.  Tests:  (+) FADDIR. (+) JUSTO. (-) Stinchfield. (-) Monty Huber. (-) Straight leg raise. Neurovascular:  Sensation intact in DP/SP/George/Sa/T nerve distributions. 2+ DP & PT pulses. Gait:  Normal.    Studies Reviewed  XR of bilateral hip - There is calcification adjacent to the right trochanteric bursa and sublte cam lesion to right femoral head. no fracture, dislocation, or significant degenerative changes. Large joint arthrocentesis: L greater trochanteric bursa  Universal Protocol:  Consent: Verbal consent obtained. Risks and benefits: risks, benefits and alternatives were discussed  Consent given by: patient  Time out: Immediately prior to procedure a "time out" was called to verify the correct patient, procedure, equipment, support staff and site/side marked as required.   Timeout called at: 7/5/2023 10:16 AM.  Patient understanding: patient states understanding of the procedure being performed  Site marked: the operative site was marked  Patient identity confirmed: verbally with patient    Supporting Documentation  Indications: pain and diagnostic evaluation   Procedure Details  Location: hip - L greater trochanteric bursa  Preparation: Patient was prepped and draped in the usual sterile fashion  Needle size: 22 G  Ultrasound guidance: no  Approach: lateral  Medications administered: 40 mg triamcinolone acetonide 40 mg/mL    Patient tolerance: patient tolerated the procedure well with no immediate complications  Dressing:  Sterile dressing applied             Medical, Surgical, Family, and Social History  The patient's medical history, family history, and social history, were reviewed and updated as appropriate. Past Medical History:   Diagnosis Date   • Autoimmune hepatitis ([address])    • Bipolar 1 disorder ([address])    • Diabetes mellitus ([address])    • Kidney stone    • Renal disorder     kidney stones       Past Surgical History:   Procedure Laterality Date   • BARIATRIC SURGERY  05/2017   • BUNIONECTOMY     • CYSTOSCOPY  07/12/2018    Stent Removal   • FL RETROGRADE PYELOGRAM  6/16/2022   • GASTRIC BYPASS  05/22/2017   • HIATAL HERNIA REPAIR  05/22/2017   • HYSTERECTOMY     • JOINT REPLACEMENT     • KIDNEY STONE SURGERY     • NY CYSTO/URETERO W/LITHOTRIPSY &INDWELL STENT INSRT Bilateral 7/6/2018    Procedure: CYSTOSCOPY GALDINO. URETEROSCOPY;GALDINO. RETROGRADE PYELOGRAM AND INSERTION GALDINO. STENT URETERAL;  Surgeon: Cristobal Keating MD;  Location:  MAIN OR;  Service: Urology   • [place] CYSTO/URETERO W/LITHOTRIPSY &INDWELL STENT INSRT Left 6/16/2022    Procedure: CYSTOSCOPY URETEROSCOPY WITH LITHOTRIPSY HOLMIUM LASER, RETROGRADE PYELOGRAM AND INSERTION STENT URETERAL;  Surgeon: Priscila Nicole MD;  Location: [facility];  Service: Urology   • TOTAL SHOULDER REPLACEMENT  2002   • VAGINAL HYSTERECTOMY      PARTIAL       Family History   Problem Relation Age of Onset   • Diabetes Father    • Heart disease Father    • Diabetes Mother    • Heart disease Mother    • Diabetes Sister    • Diabetes Family         MELLITUS   • Depression Family    • Mental illness Family    • Obesity Family    • Osteoarthritis Family        Social History     Occupational History   • Not on file   Tobacco Use   • Smoking status: Every Day     Packs/day: 0.25     Years: 30.00     Total pack years: 7.50     Types: Cigarettes   • Smokeless tobacco: Never   Vaping Use   • Vaping Use: Never used   Substance and Sexual Activity   • Alcohol use: No   • Drug use: No   • Sexual activity: Not on file       Allergies   Allergen Reactions   • No Active Allergies          Current Outpatient Medications:   •  amitriptyline (ELAVIL) 25 mg tablet, , Disp: , Rfl:   •  amitriptyline (ELAVIL) 50 mg tablet, , Disp: , Rfl:   •  aspirin (ECOTRIN LOW STRENGTH) 81 mg EC tablet, Take 81 mg by mouth daily  , Disp: , Rfl:   •  aspirin-acetaminophen-caffeine (EXCEDRIN MIGRAINE) 250-250-65 MG per tablet, Take 1 tablet by mouth every 6 (six) hours as needed for headaches, Disp: 30 tablet, Rfl: 0  •  azaTHIOprine (IMURAN) 50 mg tablet, Take 50 mg by mouth daily, Disp: , Rfl:   •  famotidine (PEPCID) 40 MG tablet, , Disp: , Rfl:   •  FLUoxetine (PROzac) 40 MG capsule, Take 40 mg by mouth daily  , Disp: , Rfl:   •  Jardiance 25 MG TABS, , Disp: , Rfl:   •  LORazepam (ATIVAN) 1 mg tablet, Take 1 mg by mouth 2 (two) times a day, Disp: , Rfl:   •  Lubricating Plus Eye Drops 0.5 % SOLN, , Disp: , Rfl:   •  QUEtiapine (SEROquel) 100 mg tablet, Take 400 mg by mouth daily at bedtime, Disp: , Rfl:   •  rizatriptan (MAXALT) 10 mg tablet, , Disp: , Rfl:   •  tiZANidine (ZANAFLEX) 2 mg tablet, , Disp: , Rfl:   •  tiZANidine (ZANAFLEX) 4 mg tablet, , Disp: , Rfl:   •  TiZANidine (ZANAFLEX) 6 MG capsule, Take 6 mg by mouth Three times a day, Disp: , Rfl:   •  topiramate (TOPAMAX) 25 mg tablet, Take 25 mg by mouth 2 (two) times a day, Disp: , Rfl:   •  traMADol (ULTRAM) 50 mg tablet, , Disp: , Rfl:   •  Trulicity 1.5 YG/7.5JI injection, , Disp: , Rfl:   •  varenicline (CHANTIX) 1 mg tablet, , Disp: , Rfl:   •  zolpidem (AMBIEN) 5 mg tablet, Take 10 mg by mouth daily at bedtime as needed for sleep  , Disp: , Rfl:   • acetaminophen (TYLENOL) 650 mg CR tablet, Take 1 tablet (650 mg total) by mouth every 8 (eight) hours as needed for mild pain, Disp: 30 tablet, Rfl: 0  •  albuterol (PROVENTIL HFA,VENTOLIN HFA) 90 mcg/act inhaler, INHALE 2 PUFFS BY MOUTH EVERY FOUR HOURS FOR FIVE DAYS, Disp: , Rfl:   •  Biotin 10 MG TABS, Take 10 mg by mouth (Patient not taking: No sig reported), Disp: , Rfl:   •  budesonide (ENTOCORT EC) 3 MG capsule, , Disp: , Rfl:   •  calcium citrate-Vitamin D 200 mg-250 units, Take 2 tablets by mouth (Patient not taking: Reported on 7/8/2022), Disp: , Rfl:   •  cetirizine (ZyrTEC) 10 mg tablet, Take 10 mg by mouth (Patient not taking: Reported on 7/8/2022), Disp: , Rfl:   •  dicyclomine (BENTYL) 20 mg tablet, Take 1 tablet (20 mg total) by mouth 2 (two) times a day (Patient not taking: No sig reported), Disp: 20 tablet, Rfl: 0  •  erythromycin (ILOTYCIN) ophthalmic ointment, Place a 1/2 inch ribbon of ointment into the lower eyelid.  (Patient not taking: No sig reported), Disp: 1 g, Rfl: 0  •  ibuprofen (MOTRIN) 600 mg tablet, Take 1 tablet (600 mg total) by mouth every 6 (six) hours as needed for mild pain (Patient not taking: No sig reported), Disp: 30 tablet, Rfl: 0  •  ketotifen (ZADITOR) 0.025 % ophthalmic solution, Administer to both eyes daily   (Patient not taking: No sig reported), Disp: , Rfl:   •  lidocaine (LMX) 4 % cream, Apply topically as needed for mild pain, Disp: 30 g, Rfl: 0  •  liraglutide (VICTOZA) injection, Inject 1.8 mg under the skin daily   (Patient not taking: No sig reported), Disp: , Rfl:   •  methocarbamol (ROBAXIN) 500 mg tablet, Take 1 tablet (500 mg total) by mouth 2 (two) times a day (Patient not taking: Reported on 1/23/2023), Disp: 20 tablet, Rfl: 0  •  naproxen (Naprosyn) 500 mg tablet, Take 1 tablet (500 mg total) by mouth 2 (two) times a day with meals (Patient not taking: Reported on 1/23/2023), Disp: 30 tablet, Rfl: 0  •  ondansetron (ZOFRAN-ODT) 4 mg disintegrating tablet, Take 1 tablet (4 mg total) by mouth every 6 (six) hours as needed for nausea or vomiting (Patient not taking: No sig reported), Disp: 20 tablet, Rfl: 0  •  oxybutynin (DITROPAN) 5 mg tablet, Take 1 tablet (5 mg total) by mouth 3 (three) times a day as needed (stent discomfort) (Patient not taking: No sig reported), Disp: 20 tablet, Rfl: 0  •  oxyCODONE-acetaminophen (PERCOCET) 5-325 mg per tablet, Take 1 tablet by mouth every 4 (four) hours as needed for moderate pain for up to 10 doses Max Daily Amount: 6 tablets (Patient not taking: No sig reported), Disp: 20 tablet, Rfl: 0  •  pantoprazole (PROTONIX) 40 mg tablet, Take 1 tablet (40 mg total) by mouth 2 (two) times a day, Disp: 60 tablet, Rfl: 0  •  phenazopyridine (PYRIDIUM) 200 mg tablet, Take 1 tablet (200 mg total) by mouth 3 (three) times a day with meals, Disp: 10 tablet, Rfl: 0  •  tamsulosin (FLOMAX) 0.4 mg, Take 1 capsule (0.4 mg total) by mouth daily with dinner for 14 days, Disp: 14 capsule, Rfl: 0      Isiah Flores MD    Scribe Attestation    I,:   am acting as a scribe while in the presence of the attending physician.:       I,:   personally performed the services described in this documentation    as scribed in my presence.:

## 2023-07-06 ENCOUNTER — TELEPHONE (OUTPATIENT)
Dept: PHYSICAL THERAPY | Facility: OTHER | Age: 58
End: 2023-07-06

## 2023-07-06 NOTE — TELEPHONE ENCOUNTER
Call placed to the patient per [facility] referral.    Message states the person you are calling is unable to take calls at this time. No option to LVM. This is the 1st attempt to reach the patient. Will defer per protocol.

## 2023-07-14 NOTE — TELEPHONE ENCOUNTER
Call placed to the patient per [facility] referral.     UNABLE TO LEAVE A MESSAGE:  "the person you are calling is unable to take calls at this time". .     This is the 2nd attempt to reach the patient.   Will defer per protocol.

## 2023-07-15 ENCOUNTER — APPOINTMENT (EMERGENCY)
Dept: RADIOLOGY | Facility: HOSPITAL | Age: 58
End: 2023-07-15
Payer: COMMERCIAL

## 2023-07-15 ENCOUNTER — HOSPITAL ENCOUNTER (EMERGENCY)
Facility: HOSPITAL | Age: 58
Discharge: HOME | End: 2023-07-15
Attending: EMERGENCY MEDICINE | Admitting: EMERGENCY MEDICINE
Payer: COMMERCIAL

## 2023-07-15 VITALS
TEMPERATURE: 97.5 F | DIASTOLIC BLOOD PRESSURE: 88 MMHG | RESPIRATION RATE: 20 BRPM | HEART RATE: 98 BPM | BODY MASS INDEX: 26.06 KG/M2 | OXYGEN SATURATION: 95 % | SYSTOLIC BLOOD PRESSURE: 141 MMHG | WEIGHT: 142.5 LBS

## 2023-07-15 DIAGNOSIS — S99.912A INJURY OF LEFT ANKLE, INITIAL ENCOUNTER: ICD-10-CM
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
## 2023-07-15 DIAGNOSIS — S00.83XA CONTUSION OF FOREHEAD, INITIAL ENCOUNTER: ICD-10-CM

## 2023-07-15 DIAGNOSIS — M54.2 NECK PAIN ON LEFT SIDE: ICD-10-CM

## 2023-07-15 PROCEDURE — 96372 THER/PROPH/DIAG INJ SC/IM: CPT

## 2023-07-15 PROCEDURE — 99284 EMERGENCY DEPT VISIT MOD MDM: CPT

## 2023-07-15 PROCEDURE — 73610 X-RAY EXAM OF ANKLE: CPT

## 2023-07-15 RX ORDER — KETOROLAC TROMETHAMINE 30 MG/ML
15 INJECTION, SOLUTION INTRAMUSCULAR; INTRAVENOUS ONCE
Status: COMPLETED | OUTPATIENT
Start: 2023-07-15 | End: 2023-07-15

## 2023-07-15 RX ORDER — LIDOCAINE 50 MG/G
2 PATCH TOPICAL ONCE
Status: DISCONTINUED | OUTPATIENT
Start: 2023-07-15 | End: 2023-07-15 | Stop reason: HOSPADM

## 2023-07-15 RX ORDER — LIDOCAINE 50 MG/G
1 PATCH TOPICAL EVERY 24 HOURS
Qty: 15 PATCH | Refills: 0 | Status: SHIPPED | OUTPATIENT
Start: 2023-07-15 | End: 2023-07-30

## 2023-07-15 RX ADMIN — KETOROLAC TROMETHAMINE 15 MG: 30 INJECTION, SOLUTION INTRAMUSCULAR; INTRAVENOUS at 09:55

## 2023-07-15 RX ADMIN — LIDOCAINE 2 PATCH: 50 PATCH TOPICAL at 09:52
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
## 2023-07-15 NOTE — DISCHARGE INSTRUCTIONS
Follow up with PCP, PT, [facility]. Return to ED for new or worsening symptoms as discussed.
06-Jan-2021

## 2023-08-02 ENCOUNTER — APPOINTMENT (OUTPATIENT)
Dept: LAB | Facility: HOSPITAL | Age: 58
End: 2023-08-02
Payer: COMMERCIAL

## 2023-08-02 ENCOUNTER — OFFICE VISIT (OUTPATIENT)
Dept: LAB | Facility: HOSPITAL | Age: 58
End: 2023-08-02
Payer: COMMERCIAL

## 2023-08-02 DIAGNOSIS — Z79.899 ENCOUNTER FOR LONG-TERM (CURRENT) USE OF OTHER MEDICATIONS: ICD-10-CM

## 2023-08-02 DIAGNOSIS — E55.9 AVITAMINOSIS D: ICD-10-CM

## 2023-08-02 DIAGNOSIS — R93.0 ABNORMAL CT OF THE HEAD: ICD-10-CM

## 2023-08-02 LAB
25(OH)D3 SERPL-MCNC: 24.4 NG/ML (ref 30–100)
ALBUMIN SERPL BCP-MCNC: 4.3 G/DL (ref 3.5–5)
ALP SERPL-CCNC: 185 U/L (ref 34–104)
ALT SERPL W P-5'-P-CCNC: 22 U/L (ref 7–52)
ANION GAP SERPL CALCULATED.3IONS-SCNC: 9 MMOL/L
AST SERPL W P-5'-P-CCNC: 19 U/L (ref 13–39)
ATRIAL RATE: 98 BPM
BASOPHILS # BLD AUTO: 0.03 THOUSANDS/ÂΜL (ref 0–0.1)
BASOPHILS NFR BLD AUTO: 0 % (ref 0–1)
BILIRUB SERPL-MCNC: 0.31 MG/DL (ref 0.2–1)
BUN SERPL-MCNC: 18 MG/DL (ref 5–25)
BUN SERPL-MCNC: 18 MG/DL (ref 5–25)
CALCIUM SERPL-MCNC: 9.6 MG/DL (ref 8.4–10.2)
CHLORIDE SERPL-SCNC: 98 MMOL/L (ref 96–108)
CO2 SERPL-SCNC: 27 MMOL/L (ref 21–32)
CREAT SERPL-MCNC: 0.73 MG/DL (ref 0.6–1.3)
CREAT SERPL-MCNC: 0.76 MG/DL (ref 0.6–1.3)
EOSINOPHIL # BLD AUTO: 0.08 THOUSAND/ÂΜL (ref 0–0.61)
EOSINOPHIL NFR BLD AUTO: 1 % (ref 0–6)
ERYTHROCYTE [DISTWIDTH] IN BLOOD BY AUTOMATED COUNT: 12.3 % (ref 11.6–15.1)
GFR SERPL CREATININE-BSD FRML MDRD: 87 ML/MIN/1.73SQ M
GFR SERPL CREATININE-BSD FRML MDRD: 92 ML/MIN/1.73SQ M
GLUCOSE P FAST SERPL-MCNC: 194 MG/DL (ref 65–99)
HCT VFR BLD AUTO: 42.9 % (ref 34.8–46.1)
HGB BLD-MCNC: 14.2 G/DL (ref 11.5–15.4)
IMM GRANULOCYTES # BLD AUTO: 0.04 THOUSAND/UL (ref 0–0.2)
IMM GRANULOCYTES NFR BLD AUTO: 1 % (ref 0–2)
LYMPHOCYTES # BLD AUTO: 1.53 THOUSANDS/ÂΜL (ref 0.6–4.47)
LYMPHOCYTES NFR BLD AUTO: 22 % (ref 14–44)
MCH RBC QN AUTO: 33.3 PG (ref 26.8–34.3)
MCHC RBC AUTO-ENTMCNC: 33.1 G/DL (ref 31.4–37.4)
MCV RBC AUTO: 101 FL (ref 82–98)
MONOCYTES # BLD AUTO: 0.38 THOUSAND/ÂΜL (ref 0.17–1.22)
MONOCYTES NFR BLD AUTO: 6 % (ref 4–12)
NEUTROPHILS # BLD AUTO: 4.83 THOUSANDS/ÂΜL (ref 1.85–7.62)
NEUTS SEG NFR BLD AUTO: 70 % (ref 43–75)
NRBC BLD AUTO-RTO: 0 /100 WBCS
P AXIS: 131 DEGREES
PLATELET # BLD AUTO: 306 THOUSANDS/UL (ref 149–390)
PMV BLD AUTO: 9.9 FL (ref 8.9–12.7)
POTASSIUM SERPL-SCNC: 3.7 MMOL/L (ref 3.5–5.3)
PR INTERVAL: 142 MS
PROT SERPL-MCNC: 7.5 G/DL (ref 6.4–8.4)
QRS AXIS: 191 DEGREES
QRSD INTERVAL: 84 MS
QT INTERVAL: 368 MS
QTC INTERVAL: 469 MS
RBC # BLD AUTO: 4.26 MILLION/UL (ref 3.81–5.12)
SODIUM SERPL-SCNC: 134 MMOL/L (ref 135–147)
T WAVE AXIS: 145 DEGREES
T4 SERPL-MCNC: 3.23 UG/DL (ref 6.09–12.23)
TSH SERPL DL<=0.05 MIU/L-ACNC: 0.43 UIU/ML (ref 0.45–4.5)
VENTRICULAR RATE: 98 BPM
VIT B12 SERPL-MCNC: 482 PG/ML (ref 180–914)
WBC # BLD AUTO: 6.89 THOUSAND/UL (ref 4.31–10.16)

## 2023-08-02 PROCEDURE — 82306 VITAMIN D 25 HYDROXY: CPT

## 2023-08-02 PROCEDURE — 84436 ASSAY OF TOTAL THYROXINE: CPT

## 2023-08-02 PROCEDURE — 80053 COMPREHEN METABOLIC PANEL: CPT

## 2023-08-02 PROCEDURE — 36415 COLL VENOUS BLD VENIPUNCTURE: CPT

## 2023-08-02 PROCEDURE — 84520 ASSAY OF UREA NITROGEN: CPT

## 2023-08-02 PROCEDURE — 93010 ELECTROCARDIOGRAM REPORT: CPT

## 2023-08-02 PROCEDURE — 93005 ELECTROCARDIOGRAM TRACING: CPT

## 2023-08-02 PROCEDURE — 84443 ASSAY THYROID STIM HORMONE: CPT

## 2023-08-02 PROCEDURE — 82565 ASSAY OF CREATININE: CPT

## 2023-08-02 PROCEDURE — 82607 VITAMIN B-12: CPT

## 2023-08-02 PROCEDURE — 85025 COMPLETE CBC W/AUTO DIFF WBC: CPT

## 2023-09-19 ENCOUNTER — HOSPITAL ENCOUNTER (OUTPATIENT)
Facility: HOSPITAL | Age: 58
Setting detail: OBSERVATION
Discharge: LEFT BEFORE DISCHARGE | End: 2023-09-20
Attending: EMERGENCY MEDICINE | Admitting: INTERNAL MEDICINE
Payer: COMMERCIAL

## 2023-09-19 ENCOUNTER — APPOINTMENT (EMERGENCY)
Dept: CT IMAGING | Facility: HOSPITAL | Age: 58
End: 2023-09-19
Payer: COMMERCIAL

## 2023-09-19 ENCOUNTER — APPOINTMENT (EMERGENCY)
Dept: RADIOLOGY | Facility: HOSPITAL | Age: 58
End: 2023-09-19
Payer: COMMERCIAL

## 2023-09-19 DIAGNOSIS — R40.4 UNRESPONSIVE EPISODE: ICD-10-CM

## 2023-09-19 DIAGNOSIS — E87.6 HYPOKALEMIA: ICD-10-CM

## 2023-09-19 DIAGNOSIS — R41.82 ALTERED MENTAL STATUS: Primary | ICD-10-CM

## 2023-09-19 DIAGNOSIS — R73.9 HYPERGLYCEMIA: ICD-10-CM

## 2023-09-19 PROBLEM — R89.2 ABNORMAL DRUG SCREEN: Status: ACTIVE | Noted: 2023-09-19

## 2023-09-19 PROBLEM — R55 SYNCOPE: Status: ACTIVE | Noted: 2023-09-19

## 2023-09-19 LAB
ALBUMIN SERPL BCP-MCNC: 3.6 G/DL (ref 3.5–5)
ALP SERPL-CCNC: 185 U/L (ref 34–104)
ALT SERPL W P-5'-P-CCNC: 25 U/L (ref 7–52)
AMPHETAMINES SERPL QL SCN: NEGATIVE
ANION GAP SERPL CALCULATED.3IONS-SCNC: 5 MMOL/L
APAP SERPL-MCNC: <10 UG/ML (ref 10–20)
AST SERPL W P-5'-P-CCNC: 42 U/L (ref 13–39)
BACTERIA UR QL AUTO: NORMAL /HPF
BARBITURATES UR QL: NEGATIVE
BASOPHILS # BLD AUTO: 0.01 THOUSANDS/ÂΜL (ref 0–0.1)
BASOPHILS NFR BLD AUTO: 0 % (ref 0–1)
BENZODIAZ UR QL: NEGATIVE
BILIRUB SERPL-MCNC: 0.33 MG/DL (ref 0.2–1)
BILIRUB UR QL STRIP: NEGATIVE
BNP SERPL-MCNC: 27 PG/ML (ref 0–100)
BUN SERPL-MCNC: 15 MG/DL (ref 5–25)
CALCIUM SERPL-MCNC: 8.5 MG/DL (ref 8.4–10.2)
CARDIAC TROPONIN I PNL SERPL HS: 2 NG/L
CHLORIDE SERPL-SCNC: 109 MMOL/L (ref 96–108)
CK SERPL-CCNC: 356 U/L (ref 26–192)
CLARITY UR: CLEAR
CO2 SERPL-SCNC: 22 MMOL/L (ref 21–32)
COCAINE UR QL: NEGATIVE
COLOR UR: YELLOW
CREAT SERPL-MCNC: 0.63 MG/DL (ref 0.6–1.3)
EOSINOPHIL # BLD AUTO: 0.01 THOUSAND/ÂΜL (ref 0–0.61)
EOSINOPHIL NFR BLD AUTO: 0 % (ref 0–6)
ERYTHROCYTE [DISTWIDTH] IN BLOOD BY AUTOMATED COUNT: 13.4 % (ref 11.6–15.1)
ETHANOL SERPL-MCNC: <10 MG/DL
GFR SERPL CREATININE-BSD FRML MDRD: 100 ML/MIN/1.73SQ M
GLUCOSE SERPL-MCNC: 134 MG/DL (ref 65–140)
GLUCOSE SERPL-MCNC: 208 MG/DL (ref 65–140)
GLUCOSE UR STRIP-MCNC: ABNORMAL MG/DL
HCT VFR BLD AUTO: 39.4 % (ref 34.8–46.1)
HGB BLD-MCNC: 12.7 G/DL (ref 11.5–15.4)
HGB UR QL STRIP.AUTO: NEGATIVE
IMM GRANULOCYTES # BLD AUTO: 0.04 THOUSAND/UL (ref 0–0.2)
IMM GRANULOCYTES NFR BLD AUTO: 1 % (ref 0–2)
KETONES UR STRIP-MCNC: NEGATIVE MG/DL
LEUKOCYTE ESTERASE UR QL STRIP: ABNORMAL
LYMPHOCYTES # BLD AUTO: 0.56 THOUSANDS/ÂΜL (ref 0.6–4.47)
LYMPHOCYTES NFR BLD AUTO: 7 % (ref 14–44)
MCH RBC QN AUTO: 34 PG (ref 26.8–34.3)
MCHC RBC AUTO-ENTMCNC: 32.2 G/DL (ref 31.4–37.4)
MCV RBC AUTO: 106 FL (ref 82–98)
MONOCYTES # BLD AUTO: 0.34 THOUSAND/ÂΜL (ref 0.17–1.22)
MONOCYTES NFR BLD AUTO: 4 % (ref 4–12)
NEUTROPHILS # BLD AUTO: 7.21 THOUSANDS/ÂΜL (ref 1.85–7.62)
NEUTS SEG NFR BLD AUTO: 88 % (ref 43–75)
NITRITE UR QL STRIP: NEGATIVE
NON-SQ EPI CELLS URNS QL MICRO: NORMAL /HPF
NRBC BLD AUTO-RTO: 0 /100 WBCS
OPIATES UR QL SCN: POSITIVE
OXYCODONE+OXYMORPHONE UR QL SCN: NEGATIVE
PCP UR QL: NEGATIVE
PH UR STRIP.AUTO: 7 [PH] (ref 4.5–8)
PLATELET # BLD AUTO: 240 THOUSANDS/UL (ref 149–390)
PMV BLD AUTO: 9.8 FL (ref 8.9–12.7)
POTASSIUM SERPL-SCNC: 3.4 MMOL/L (ref 3.5–5.3)
PROT SERPL-MCNC: 6 G/DL (ref 6.4–8.4)
PROT UR STRIP-MCNC: NEGATIVE MG/DL
RBC # BLD AUTO: 3.73 MILLION/UL (ref 3.81–5.12)
RBC #/AREA URNS AUTO: NORMAL /HPF
SALICYLATES SERPL-MCNC: <5 MG/DL (ref 3–20)
SODIUM SERPL-SCNC: 136 MMOL/L (ref 135–147)
SP GR UR STRIP.AUTO: 1.02 (ref 1–1.03)
THC UR QL: NEGATIVE
UROBILINOGEN UR QL STRIP.AUTO: 0.2 E.U./DL
WBC # BLD AUTO: 8.17 THOUSAND/UL (ref 4.31–10.16)
WBC #/AREA URNS AUTO: NORMAL /HPF

## 2023-09-19 PROCEDURE — 80143 DRUG ASSAY ACETAMINOPHEN: CPT | Performed by: EMERGENCY MEDICINE

## 2023-09-19 PROCEDURE — 80307 DRUG TEST PRSMV CHEM ANLYZR: CPT | Performed by: EMERGENCY MEDICINE

## 2023-09-19 PROCEDURE — 80179 DRUG ASSAY SALICYLATE: CPT | Performed by: EMERGENCY MEDICINE

## 2023-09-19 PROCEDURE — 70450 CT HEAD/BRAIN W/O DYE: CPT

## 2023-09-19 PROCEDURE — 99285 EMERGENCY DEPT VISIT HI MDM: CPT | Performed by: EMERGENCY MEDICINE

## 2023-09-19 PROCEDURE — 82550 ASSAY OF CK (CPK): CPT | Performed by: EMERGENCY MEDICINE

## 2023-09-19 PROCEDURE — 82077 ASSAY SPEC XCP UR&BREATH IA: CPT | Performed by: EMERGENCY MEDICINE

## 2023-09-19 PROCEDURE — 81001 URINALYSIS AUTO W/SCOPE: CPT

## 2023-09-19 PROCEDURE — 71046 X-RAY EXAM CHEST 2 VIEWS: CPT

## 2023-09-19 PROCEDURE — G1004 CDSM NDSC: HCPCS

## 2023-09-19 PROCEDURE — 82948 REAGENT STRIP/BLOOD GLUCOSE: CPT

## 2023-09-19 PROCEDURE — 85025 COMPLETE CBC W/AUTO DIFF WBC: CPT | Performed by: EMERGENCY MEDICINE

## 2023-09-19 PROCEDURE — 36415 COLL VENOUS BLD VENIPUNCTURE: CPT | Performed by: EMERGENCY MEDICINE

## 2023-09-19 PROCEDURE — 83880 ASSAY OF NATRIURETIC PEPTIDE: CPT | Performed by: EMERGENCY MEDICINE

## 2023-09-19 PROCEDURE — 99285 EMERGENCY DEPT VISIT HI MDM: CPT

## 2023-09-19 PROCEDURE — 93005 ELECTROCARDIOGRAM TRACING: CPT

## 2023-09-19 PROCEDURE — 84484 ASSAY OF TROPONIN QUANT: CPT | Performed by: EMERGENCY MEDICINE

## 2023-09-19 PROCEDURE — 80053 COMPREHEN METABOLIC PANEL: CPT | Performed by: EMERGENCY MEDICINE

## 2023-09-19 RX ORDER — NICOTINE 21 MG/24HR
1 PATCH, TRANSDERMAL 24 HOURS TRANSDERMAL DAILY
Status: DISCONTINUED | OUTPATIENT
Start: 2023-09-20 | End: 2023-09-20 | Stop reason: HOSPADM

## 2023-09-19 RX ORDER — FLUOXETINE HYDROCHLORIDE 20 MG/1
40 CAPSULE ORAL DAILY
Status: DISCONTINUED | OUTPATIENT
Start: 2023-09-20 | End: 2023-09-20 | Stop reason: HOSPADM

## 2023-09-19 RX ORDER — TOPIRAMATE 25 MG/1
25 TABLET ORAL 2 TIMES DAILY
Status: DISCONTINUED | OUTPATIENT
Start: 2023-09-19 | End: 2023-09-20 | Stop reason: HOSPADM

## 2023-09-19 RX ORDER — HEPARIN SODIUM 5000 [USP'U]/ML
5000 INJECTION, SOLUTION INTRAVENOUS; SUBCUTANEOUS EVERY 8 HOURS SCHEDULED
Status: DISCONTINUED | OUTPATIENT
Start: 2023-09-19 | End: 2023-09-19

## 2023-09-19 RX ORDER — AMITRIPTYLINE HYDROCHLORIDE 50 MG/1
50 TABLET, FILM COATED ORAL
Status: DISCONTINUED | OUTPATIENT
Start: 2023-09-20 | End: 2023-09-20 | Stop reason: HOSPADM

## 2023-09-19 RX ORDER — ONDANSETRON 2 MG/ML
4 INJECTION INTRAMUSCULAR; INTRAVENOUS EVERY 6 HOURS PRN
Status: DISCONTINUED | OUTPATIENT
Start: 2023-09-19 | End: 2023-09-20 | Stop reason: HOSPADM

## 2023-09-19 RX ORDER — NALOXONE HYDROCHLORIDE 1 MG/ML
1 INJECTION PARENTERAL ONCE
Status: COMPLETED | OUTPATIENT
Start: 2023-09-19 | End: 2023-09-19

## 2023-09-19 RX ORDER — AZATHIOPRINE 50 MG/1
50 TABLET ORAL DAILY
Status: DISCONTINUED | OUTPATIENT
Start: 2023-09-20 | End: 2023-09-20 | Stop reason: HOSPADM

## 2023-09-19 RX ORDER — INSULIN LISPRO 100 [IU]/ML
1-5 INJECTION, SOLUTION INTRAVENOUS; SUBCUTANEOUS
Status: DISCONTINUED | OUTPATIENT
Start: 2023-09-19 | End: 2023-09-20 | Stop reason: HOSPADM

## 2023-09-19 RX ORDER — PANTOPRAZOLE SODIUM 40 MG/1
40 TABLET, DELAYED RELEASE ORAL 2 TIMES DAILY
Status: DISCONTINUED | OUTPATIENT
Start: 2023-09-19 | End: 2023-09-20 | Stop reason: HOSPADM

## 2023-09-19 RX ADMIN — PANTOPRAZOLE SODIUM 40 MG: 40 TABLET, DELAYED RELEASE ORAL at 21:10

## 2023-09-19 RX ADMIN — TOPIRAMATE 25 MG: 25 TABLET, FILM COATED ORAL at 21:10

## 2023-09-19 NOTE — ED PROVIDER NOTES
History  Chief Complaint   Patient presents with   • Altered Mental Status     Pt found unresponsive on the floor by her sister, sister though it was her sugar,  for EMS, got 2mg narcan PTA by EMS. 61-year-old female presents for evaluation of unresponsive episode. Patient was found unresponsive on her floor with unknown downtime. EMS states that patient regained consciousness with treatment with Narcan and has been confused since. Patient answers some questions and is unable provide any meaningful history. History provided by:  Patient and EMS personnel  History limited by:  Mental status change  Altered Mental Status      Prior to Admission Medications   Prescriptions Last Dose Informant Patient Reported? Taking?    Biotin 10 MG TABS  Self Yes No   Sig: Take 10 mg by mouth   Patient not taking: No sig reported   Diclofenac Sodium (VOLTAREN) 1 %   No No   Sig: Apply 2 g topically 4 (four) times a day   FLUoxetine (PROzac) 40 MG capsule  Self Yes No   Sig: Take 40 mg by mouth daily     Jardiance 25 MG TABS   Yes No   LORazepam (ATIVAN) 1 mg tablet  Self Yes No   Sig: Take 1 mg by mouth 2 (two) times a day   Lubricating Plus Eye Drops 0.5 % SOLN   Yes No   QUEtiapine (SEROquel) 100 mg tablet  Self Yes No   Sig: Take 400 mg by mouth daily at bedtime   TiZANidine (ZANAFLEX) 6 MG capsule   Yes No   Sig: Take 6 mg by mouth Three times a day   Trulicity 1.5 PU/9.7TI injection   Yes No   acetaminophen (TYLENOL) 650 mg CR tablet   No No   Sig: Take 1 tablet (650 mg total) by mouth every 8 (eight) hours as needed for mild pain   albuterol (PROVENTIL HFA,VENTOLIN HFA) 90 mcg/act inhaler  Self Yes No   Sig: INHALE 2 PUFFS BY MOUTH EVERY FOUR HOURS FOR FIVE DAYS   amitriptyline (ELAVIL) 25 mg tablet   Yes No   amitriptyline (ELAVIL) 50 mg tablet   Yes No   aspirin (ECOTRIN LOW STRENGTH) 81 mg EC tablet  Self Yes No   Sig: Take 81 mg by mouth daily     aspirin-acetaminophen-caffeine (EXCEDRIN MIGRAINE) 250-250-65 MG per tablet   No No   Sig: Take 1 tablet by mouth every 6 (six) hours as needed for headaches   azaTHIOprine (IMURAN) 50 mg tablet  Self Yes No   Sig: Take 50 mg by mouth daily   budesonide (ENTOCORT EC) 3 MG capsule  Self Yes No   Patient not taking: No sig reported   calcium citrate-Vitamin D 200 mg-250 units  Self Yes No   Sig: Take 2 tablets by mouth   Patient not taking: Reported on 7/8/2022   cetirizine (ZyrTEC) 10 mg tablet  Self Yes No   Sig: Take 10 mg by mouth   Patient not taking: Reported on 7/8/2022   dicyclomine (BENTYL) 20 mg tablet  Self No No   Sig: Take 1 tablet (20 mg total) by mouth 2 (two) times a day   Patient not taking: No sig reported   erythromycin (ILOTYCIN) ophthalmic ointment  Self No No   Sig: Place a 1/2 inch ribbon of ointment into the lower eyelid.    Patient not taking: No sig reported   famotidine (PEPCID) 40 MG tablet   Yes No   ibuprofen (MOTRIN) 600 mg tablet  Self No No   Sig: Take 1 tablet (600 mg total) by mouth every 6 (six) hours as needed for mild pain   Patient not taking: No sig reported   ketotifen (ZADITOR) 0.025 % ophthalmic solution  Self Yes No   Sig: Administer to both eyes daily     Patient not taking: No sig reported   lidocaine (LIDODERM) 5 %   No No   Sig: Apply 1 patch topically over 12 hours every 24 hours for 15 days Remove & Discard patch within 12 hours or as directed by MD   lidocaine (LMX) 4 % cream   No No   Sig: Apply topically as needed for mild pain   liraglutide (VICTOZA) injection  Self Yes No   Sig: Inject 1.8 mg under the skin daily     Patient not taking: No sig reported   methocarbamol (ROBAXIN) 500 mg tablet   No No   Sig: Take 1 tablet (500 mg total) by mouth 2 (two) times a day   Patient not taking: Reported on 1/23/2023   naproxen (Naprosyn) 500 mg tablet   No No   Sig: Take 1 tablet (500 mg total) by mouth 2 (two) times a day with meals   Patient not taking: Reported on 1/23/2023   ondansetron (ZOFRAN-ODT) 4 mg disintegrating tablet  Self No No   Sig: Take 1 tablet (4 mg total) by mouth every 6 (six) hours as needed for nausea or vomiting   Patient not taking: No sig reported   oxyCODONE-acetaminophen (PERCOCET) 5-325 mg per tablet  Self No No   Sig: Take 1 tablet by mouth every 4 (four) hours as needed for moderate pain for up to 10 doses Max Daily Amount: 6 tablets   Patient not taking: No sig reported   oxybutynin (DITROPAN) 5 mg tablet  Self No No   Sig: Take 1 tablet (5 mg total) by mouth 3 (three) times a day as needed (stent discomfort)   Patient not taking: No sig reported   pantoprazole (PROTONIX) 40 mg tablet  Self No No   Sig: Take 1 tablet (40 mg total) by mouth 2 (two) times a day   phenazopyridine (PYRIDIUM) 200 mg tablet   No No   Sig: Take 1 tablet (200 mg total) by mouth 3 (three) times a day with meals   rizatriptan (MAXALT) 10 mg tablet   Yes No   tamsulosin (FLOMAX) 0.4 mg   No No   Sig: Take 1 capsule (0.4 mg total) by mouth daily with dinner for 14 days   tiZANidine (ZANAFLEX) 2 mg tablet   Yes No   tiZANidine (ZANAFLEX) 4 mg tablet   Yes No   topiramate (TOPAMAX) 25 mg tablet   Yes No   Sig: Take 25 mg by mouth 2 (two) times a day   traMADol (ULTRAM) 50 mg tablet   Yes No   varenicline (CHANTIX) 1 mg tablet   Yes No   zolpidem (AMBIEN) 5 mg tablet  Self Yes No   Sig: Take 10 mg by mouth daily at bedtime as needed for sleep        Facility-Administered Medications: None       Past Medical History:   Diagnosis Date   • Autoimmune hepatitis ([address])    • Bipolar 1 disorder (HCC)    • Chronic headaches 2021   • Diabetes mellitus ([address])    • Kidney stone    • Renal disorder     kidney stones       Past Surgical History:   Procedure Laterality Date   • BARIATRIC SURGERY  05/2017   • BUNIONECTOMY     • CYSTOSCOPY  07/12/2018    Stent Removal   • FL RETROGRADE PYELOGRAM  6/16/2022   • GASTRIC BYPASS  05/22/2017   • HIATAL HERNIA REPAIR  05/22/2017   • HYSTERECTOMY     • JOINT REPLACEMENT     • KIDNEY STONE SURGERY     • NH CYSTO/URETERO W/LITHOTRIPSY &INDWELL STENT INSRT Bilateral 7/6/2018    Procedure: CYSTOSCOPY GALDINO. URETEROSCOPY;GALDINO. RETROGRADE PYELOGRAM AND INSERTION GALDINO. STENT URETERAL;  Surgeon: Nighat Michelle MD;  Location:  MAIN OR;  Service: Urology   • PA CYSTO/URETERO W/LITHOTRIPSY &INDWELL STENT INSRT Left 6/16/2022    Procedure: CYSTOSCOPY URETEROSCOPY WITH LITHOTRIPSY HOLMIUM LASER, RETROGRADE PYELOGRAM AND INSERTION STENT URETERAL;  Surgeon: Mariano Hammond MD;  Location: [facility];  Service: Urology   • TOTAL SHOULDER REPLACEMENT  2002   • VAGINAL HYSTERECTOMY      PARTIAL       Family History   Problem Relation Age of Onset   • Diabetes Father    • Heart disease Father    • Diabetes Mother    • Heart disease Mother    • Diabetes Sister    • Diabetes Family         MELLITUS   • Depression Family    • Mental illness Family    • Obesity Family    • Osteoarthritis Family      I have reviewed and agree with the history as documented. E-Cigarette/Vaping   • E-Cigarette Use Never User      E-Cigarette/Vaping Substances   • Nicotine No    • THC No    • CBD No    • Flavoring No    • Other No    • Unknown No      Social History     Tobacco Use   • Smoking status: Every Day     Packs/day: 0.25     Years: 30.00     Total pack years: 7.50     Types: Cigarettes   • Smokeless tobacco: Never   Vaping Use   • Vaping Use: Never used   Substance Use Topics   • Alcohol use: No   • Drug use: No       Review of Systems   Unable to perform ROS: Mental status change       Physical Exam  Physical Exam  Vitals and nursing note reviewed. Constitutional:       Appearance: She is well-developed. HENT:      Head: Normocephalic and atraumatic. Eyes:      Extraocular Movements: Extraocular movements intact. Pupils: Pupils are equal, round, and reactive to light. Cardiovascular:      Rate and Rhythm: Normal rate and regular rhythm. Pulmonary:      Effort: Pulmonary effort is normal.      Breath sounds: Normal breath sounds. Abdominal:      Tenderness: There is no abdominal tenderness. There is no guarding. Musculoskeletal:         General: No swelling or tenderness. Cervical back: Normal range of motion and neck supple. Neurological:      Mental Status: She is alert. She is disoriented and confused. Cranial Nerves: No cranial nerve deficit, dysarthria or facial asymmetry. Sensory: No sensory deficit. Motor: No weakness.          Vital Signs  ED Triage Vitals   Temperature Pulse Respirations Blood Pressure SpO2   09/19/23 1725 09/19/23 1722 09/19/23 1722 09/19/23 1722 09/19/23 1722   97.7 °F (36.5 °C) 104 15 119/78 100 %      Temp Source Heart Rate Source Patient Position - Orthostatic VS BP Location FiO2 (%)   09/19/23 1725 09/19/23 1722 09/19/23 1722 09/19/23 1722 --   Oral Monitor Lying Right arm       Pain Score       --                  Vitals:    09/19/23 1722   BP: 119/78   Pulse: 104   Patient Position - Orthostatic VS: Lying         Visual Acuity      ED Medications  Medications   naloxone (FOR EMS ONLY) (NARCAN) 2 MG/2ML injection 2 mg (0 mg Does not apply Given to EMS 9/19/23 1738)       Diagnostic Studies  Results Reviewed     Procedure Component Value Units Date/Time    HS Troponin I 2hr [[id number]]     Lab Status: No result Specimen: Blood     HS Troponin 0hr (reflex protocol) [[id number]]  (Normal) Collected: 09/19/23 1739    Lab Status: Final result Specimen: Blood from Arm, Left Updated: 09/19/23 1816     hs TnI 0hr 2 ng/L     B-Type Natriuretic Peptide(BNP) [[id number]]  (Normal) Collected: 09/19/23 1739    Lab Status: Final result Specimen: Blood from Arm, Left Updated: 09/19/23 1816     BNP 27 pg/mL     Ethanol [[id number]]  (Normal) Collected: 09/19/23 1739    Lab Status: Final result Specimen: Blood from Arm, Left Updated: 09/19/23 1811     Ethanol Lvl <10 mg/dL     Comprehensive metabolic panel [[id number]]  (Abnormal) Collected: 09/19/23 1742    Lab Status: Final result Specimen: Blood from Arm, Left Updated: 09/19/23 1811     Sodium 136 mmol/L      Potassium 3.4 mmol/L      Chloride 109 mmol/L      CO2 22 mmol/L      ANION GAP 5 mmol/L      BUN 15 mg/dL      Creatinine 0.63 mg/dL      Glucose 208 mg/dL      Calcium 8.5 mg/dL      AST 42 U/L      ALT 25 U/L      Alkaline Phosphatase 185 U/L      Total Protein 6.0 g/dL      Albumin 3.6 g/dL      Total Bilirubin 0.33 mg/dL      eGFR 100 ml/min/1.73sq m     Narrative:      [place] guidelines for Chronic Kidney Disease (CKD):   •  Stage 1 with normal or high GFR (GFR > 90 mL/min/1.73 square meters)  •  Stage 2 Mild CKD (GFR = 60-89 mL/min/1.73 square meters)  •  Stage 3A Moderate CKD (GFR = 45-59 mL/min/1.73 square meters)  •  Stage 3B Moderate CKD (GFR = 30-44 mL/min/1.73 square meters)  •  Stage 4 Severe CKD (GFR = 15-29 mL/min/1.73 square meters)  •  Stage 5 End Stage CKD (GFR <15 mL/min/1.73 square meters)  Note: GFR calculation is accurate only with a steady state creatinine    CK [[id number]]  (Abnormal) Collected: 09/19/23 1742    Lab Status: Final result Specimen: Blood from Arm, Left Updated: 09/19/23 1811     Total  U/L     Salicylate level [[id number]]  (Normal) Collected: 09/19/23 1742    Lab Status: Final result Specimen: Blood from Arm, Left Updated: 98/34/10 4665     Salicylate Lvl <5 mg/dL     Acetaminophen level-If concentration is detectable, please discuss with medical  on call. [[id number]]  (Abnormal) Collected: 09/19/23 1742    Lab Status: Final result Specimen: Blood from Arm, Left Updated: 09/19/23 1811     Acetaminophen Level <10 ug/mL     Urine Microscopic [[id number]] Collected: 09/19/23 1749    Lab Status: In process Specimen: Urine, Clean Catch Updated: 09/19/23 1757    Rapid drug screen, urine [[id number]] Collected: 09/19/23 1746    Lab Status:  In process Specimen: Urine, Clean Catch Updated: 09/19/23 1754    CBC and differential [[id number]]  (Abnormal) Collected: 09/19/23 3102    Lab Status: Final result Specimen: Blood from Arm, Left Updated: 09/19/23 1753     WBC 8.17 Thousand/uL      RBC 3.73 Million/uL      Hemoglobin 12.7 g/dL      Hematocrit 39.4 %       fL      MCH 34.0 pg      MCHC 32.2 g/dL      RDW 13.4 %      MPV 9.8 fL      Platelets 886 Thousands/uL      nRBC 0 /100 WBCs      Neutrophils Relative 88 %      Immat GRANS % 1 %      Lymphocytes Relative 7 %      Monocytes Relative 4 %      Eosinophils Relative 0 %      Basophils Relative 0 %      Neutrophils Absolute 7.21 Thousands/µL      Immature Grans Absolute 0.04 Thousand/uL      Lymphocytes Absolute 0.56 Thousands/µL      Monocytes Absolute 0.34 Thousand/µL      Eosinophils Absolute 0.01 Thousand/µL      Basophils Absolute 0.01 Thousands/µL     Urine Macroscopic, POC [[id number]]  (Abnormal) Collected: 09/19/23 1749    Lab Status: Final result Specimen: Urine Updated: 09/19/23 1751     Color, UA Yellow     Clarity, UA Clear     pH, UA 7.0     Leukocytes, UA Elevated glucose may cause decreased leukocyte values. See urine microscopic for UWBC result     Nitrite, UA Negative     Protein, UA Negative mg/dl      Glucose, UA >=1000 (1%) mg/dl      Ketones, UA Negative mg/dl      Urobilinogen, UA 0.2 E.U./dl      Bilirubin, UA Negative     Occult Blood, UA Negative     Specific Gravity, UA 1.020    Narrative:      CLINITEK RESULT                 XR chest 2 views   ED Interpretation by Jacek Teixeira MD (09/19 1826)   Primary reviewed: no acute abnormality      CT head without contrast   Final Result by Cathy Treviño MD (09/19 1820)      No acute intracranial abnormality.                   Workstation performed: [id number]                    Procedures  ECG 12 Lead Documentation Only    Date/Time: 9/19/2023 6:34 PM    Performed by: Jacek Teixeira MD  Authorized by: Jacek Teixeira MD    ECG reviewed by me, the ED Provider: yes    Patient location:  ED  Rate:     ECG rate:  104    ECG rate assessment: tachycardic Rhythm:     Rhythm: sinus tachycardia    Ectopy:     Ectopy: none    QRS:     QRS axis:  Normal    QRS intervals:  Normal  Conduction:     Conduction: normal    ST segments:     ST segments:  Normal  T waves:     T waves: normal               ED Course  ED Course as of 09/19/23 1835   Tue Sep 19, 2023   [address] Work up reviewed and benign, no obvious cause of AMS, will admit to slim                                             Medical Decision Making  Unresponsive episode and altered mental status- we will do CT head to rule out acute sinus pathology stroke, bleed, will do cardiac work-up throughout acute consultation of acute coronary syndrome for pneumonia, pneumothorax, congestive heart failure, electrolyte abnormality, rhabdomyolysis, coma panel/UDS to rule out toxicology, urine dip for UTI, admit. Amount and/or Complexity of Data Reviewed  Labs: ordered. Radiology: ordered and independent interpretation performed. Disposition  Final diagnoses: Altered mental status   Unresponsive episode   Hyperglycemia   Hypokalemia     Time reflects when diagnosis was documented in both MDM as applicable and the Disposition within this note     Time User Action Codes Description Comment    9/19/2023  6:30 PM Feng Arnold Add [R41.82] Altered mental status     9/19/2023  6:31 PM Monroeyn Catalina Add [R41.89] Unresponsive episode     9/19/2023  6:31 PM Monroeyn Catalina Add [R73.9] Hyperglycemia     9/19/2023  6:31 PM Feng Chakrabortyman Add [E87.6] Hypokalemia       ED Disposition     ED Disposition   Admit    Condition   Stable    Date/Time   Tue Sep 19, 2023  6:30 PM    Comment   Case was discussed with Dr. Saida Maki and the patient's admission status was agreed to be Admission Status: observation status to the service of Dr. Saida Maki . Follow-up Information    None         Patient's Medications   Discharge Prescriptions    No medications on file       No discharge procedures on file.     PDMP Review       Value Time User PDMP Reviewed  Yes 6/14/2022  4:48 PM Shahriar Joya MD          ED Provider  Electronically Signed by           Jaxson Boyer MD  09/19/23 7813

## 2023-09-20 ENCOUNTER — APPOINTMENT (OUTPATIENT)
Dept: CT IMAGING | Facility: HOSPITAL | Age: 58
End: 2023-09-20
Payer: COMMERCIAL

## 2023-09-20 VITALS
SYSTOLIC BLOOD PRESSURE: 130 MMHG | OXYGEN SATURATION: 93 % | DIASTOLIC BLOOD PRESSURE: 88 MMHG | HEIGHT: 62 IN | RESPIRATION RATE: 18 BRPM | HEART RATE: 101 BPM | WEIGHT: 137.35 LBS | TEMPERATURE: 97.8 F | BODY MASS INDEX: 25.27 KG/M2

## 2023-09-20 PROBLEM — R51.9 HEADACHE: Status: ACTIVE | Noted: 2023-09-20

## 2023-09-20 PROBLEM — R41.89 UNRESPONSIVENESS: Status: ACTIVE | Noted: 2023-09-19

## 2023-09-20 PROBLEM — G93.41 ACUTE METABOLIC ENCEPHALOPATHY: Status: ACTIVE | Noted: 2023-09-20

## 2023-09-20 LAB
ATRIAL RATE: 104 BPM
P AXIS: 55 DEGREES
PR INTERVAL: 150 MS
QRS AXIS: -4 DEGREES
QRSD INTERVAL: 84 MS
QT INTERVAL: 368 MS
QTC INTERVAL: 483 MS
T WAVE AXIS: 17 DEGREES
VENTRICULAR RATE: 104 BPM

## 2023-09-20 PROCEDURE — 70496 CT ANGIOGRAPHY HEAD: CPT

## 2023-09-20 PROCEDURE — G1004 CDSM NDSC: HCPCS

## 2023-09-20 PROCEDURE — 93010 ELECTROCARDIOGRAM REPORT: CPT

## 2023-09-20 PROCEDURE — 99223 1ST HOSP IP/OBS HIGH 75: CPT | Performed by: PHYSICIAN ASSISTANT

## 2023-09-20 PROCEDURE — 70498 CT ANGIOGRAPHY NECK: CPT

## 2023-09-20 RX ORDER — ACETAMINOPHEN 325 MG/1
975 TABLET ORAL EVERY 8 HOURS SCHEDULED
Status: DISCONTINUED | OUTPATIENT
Start: 2023-09-20 | End: 2023-09-20 | Stop reason: HOSPADM

## 2023-09-20 RX ORDER — GABAPENTIN 300 MG/1
300 CAPSULE ORAL
Status: DISCONTINUED | OUTPATIENT
Start: 2023-09-20 | End: 2023-09-20 | Stop reason: HOSPADM

## 2023-09-20 RX ORDER — QUETIAPINE FUMARATE 200 MG/1
400 TABLET, FILM COATED ORAL
Status: DISCONTINUED | OUTPATIENT
Start: 2023-09-20 | End: 2023-09-20 | Stop reason: HOSPADM

## 2023-09-20 RX ADMIN — IOHEXOL 85 ML: 350 INJECTION, SOLUTION INTRAVENOUS at 01:52

## 2023-09-20 NOTE — ASSESSMENT & PLAN NOTE
Unwitnessed.,  No intraoral lesions. Improved w/narcan but was significantly confused and sedate/dysarthric after.   Suspect 2* polypharmacy although pt did have mild right facial droop w/extinction/dysmetria issues/slurred speech now improved after 2 falls  Monitor on telemetry, check orthostatics, consult neuro for evaluation for further diagnostics such as EEG vs MRI

## 2023-09-20 NOTE — ASSESSMENT & PLAN NOTE
Frontal headache and neck pain now improving to just frontal headache.  cta head/neck negative for dissection.   No pain w/neck flexion upon repeat examination  Supportive care

## 2023-09-20 NOTE — H&P
[facility]  H&P  Name: Barrera Arcos 66 y.o. female I MRN: [id number]  Unit/Bed#: 1575 [facility] 2 -01 I Date of Admission: 9/19/2023   Date of Service: 9/20/2023 I Hospital Day: 0      Assessment/Plan   * Acute metabolic encephalopathy  Assessment & Plan  Improving. Suspect due to polypharmacy from gabapentin, seroquel, vicodin for hip pain which pt is prescribed. Also has [place] but denies taking any the day pta.    -Pt was initially somnolent w/mild dysarthria/word finding difficulties, right mild facial droop and extinction issues w/dysmetria/ataxia like s/sx w/severe frontal headache and neck pain difficulty w/neck flexion. .  Had d/w sister who noted pt had fallen hit her head the day pta and then was found approx 24h later in her home where she lives alone unresponsive on the floor. She did wake up initially somewhat to narcan but had s/sx as noted above.  -ct head w/o acute pathology, but d/w neurology given mixed picture on time from down and s/sx onset for headache as well as concern for neck pain/difficulty w/flexion obtained cta head/neck to r/o fx/dissection. c collar was ordered but pt refused. cta head/neck thankfully unremarkable w/resolution of all s/ssx thereafter  -neuro checks q 4h, consult neurology for considerartion of MRI vs further monitoring given unusual presentation, avoid oversedation      Unresponsiveness  Assessment & Plan  Unwitnessed.,  No intraoral lesions. Improved w/narcan but was significantly confused and sedate/dysarthric after. Suspect 2* polypharmacy although pt did have mild right facial droop w/extinction/dysmetria issues/slurred speech now improved after 2 falls  Monitor on telemetry, check orthostatics, consult neuro for evaluation for further diagnostics such as EEG vs MRI    Headache  Assessment & Plan  Frontal headache and neck pain now improving to just frontal headache.  cta head/neck negative for dissection.   No pain w/neck flexion upon repeat examination  Supportive care    Abnormal drug screen  Assessment & Plan  pdmp confirmed prescribed vuicodin as well as ambien (5mg from one provider) and 10mg from another  Pt reports she has not taken [place] yet but did her vicodin as well as with her seroquel/gabapetin for her bipolar d/o  Will cautiously resume her seroquel gabapentin for this evening, topamax      S/P gastric bypass  Assessment & Plan  Avoid nsaids    Type 2 diabetes mellitus without complication [facility])  Assessment & Plan  Lab Results   Component Value Date    HGBA1C 7.5 (H) 10/19/2022       Recent Labs     09/19/23  2154   POCGLU 134       Blood Sugar Average: Last 72 hrs:  (P) 134   Hold jardiance/trulicity start ssi/poc bs while ip    Hypertension  Assessment & Plan  Not on pharmacotherapy    Bipolar affective disorder (HCC)  Assessment & Plan  Continue seroquel/gabapentin topamax/elavil       VTE Pharmacologic Prophylaxis: VTE Score: 5 High Risk (Score >/= 5) - Pharmacological DVT Prophylaxis Ordered: heparin. Sequential Compression Devices Ordered. Code Status: Level 1 - Full Code   Discussion with family: Updated  (sister) at bedside. Anticipated Length of Stay: Patient will be admitted on an observation basis with an anticipated length of stay of less than 2 midnights secondary to acute metabolic encephalopathy. Total Time Spent on Date of Encounter in care of patient:  mins. This time was spent on one or more of the following: performing physical exam; counseling and coordination of care; obtaining or reviewing history; documenting in the medical record; reviewing/ordering tests, medications or procedures; communicating with other healthcare professionals and discussing with patient's family/caregivers. Chief Complaint: unresponsive found on ground improved w/narcan prehospital but confused.     History of Present Illness:  Shellie Louis is a 66 y.o. female with a PMH of bipolar d/o chronic hip pain on vicodin, insomnia on gabapentin, seroquel prescribed ambien, who presents with unresponsive episode and confusion. .hpi constructed by d/w pt pt's sister by phone and review of emr. Pt lives alone w/her dog. She initially presented unresponsive found by her sister in the afternoon lying on the ground between her hallway and her bathroom. She rec'd narcan w/improvement in mentation but was still very confused. Admission was requested after ct head was unremarkable and UDS was + for opioids. Pt herself initially did not recall any of the events of the last day or so. She remembered events 2 days pta where she was making dinner with her sister and was able to articulate making watercress something with her sister. She was demonstrating dysarthria/word finding difficulties, was tremulous/hyperkinetic and felt off. She didn't recall anything from the day of or prior to the day of admission. After d/w sister by phone, it was discovered patient had had a fall visiting her third sister who was in the hospital and hit 'her mouth' per the sister, Julia Broderick. She was fine initially however and went home, but then on the day of admission, the pt's sister did not hear from her all day which is unusual for her and found her door locked and the dog barking inside and had to get the landlord to let her in where she found her sister unconscious on the floor. No pills were noted around the house/on her. She does not recall the pt having any cyanosis/foaming at the mouth or discoloration. Pt did revive w/narcan by EMS but was still significantly confused thereafter. Case was d/w neurology after this was reveaeled given delayed presentation of s/sx and discovery that pt started to c/o headache/neck pain. cta head/neck was pursued which was negative. We will admit for ams. Review of Systems:  Review of Systems   Constitutional: Negative for chills and fever. Respiratory: Negative for shortness of breath. Cardiovascular: Negative for chest pain. Musculoskeletal: Positive for neck pain. Neurological: Positive for headaches. Psychiatric/Behavioral: Positive for confusion. All other systems reviewed and are negative. Past Medical and Surgical History:   Past Medical History:   Diagnosis Date   • Autoimmune hepatitis ([address])    • Bipolar 1 disorder ([address])    • Chronic headaches 2021   • Diabetes mellitus ([address])    • Kidney stone    • Renal disorder     kidney stones       Past Surgical History:   Procedure Laterality Date   • BARIATRIC SURGERY  05/2017   • BUNIONECTOMY     • CYSTOSCOPY  07/12/2018    Stent Removal   • FL RETROGRADE PYELOGRAM  6/16/2022   • GASTRIC BYPASS  05/22/2017   • HIATAL HERNIA REPAIR  05/22/2017   • HYSTERECTOMY     • JOINT REPLACEMENT     • KIDNEY STONE SURGERY     • TN CYSTO/URETERO W/LITHOTRIPSY &INDWELL STENT INSRT Bilateral 7/6/2018    Procedure: CYSTOSCOPY GALDINO. URETEROSCOPY;GALDINO. RETROGRADE PYELOGRAM AND INSERTION GALDINO. STENT URETERAL;  Surgeon: Ellis Hull MD;  Location: [facility];  Service: Urology   • TN CYSTO/URETERO W/LITHOTRIPSY &INDWELL STENT INSRT Left 6/16/2022    Procedure: CYSTOSCOPY URETEROSCOPY WITH LITHOTRIPSY HOLMIUM LASER, RETROGRADE PYELOGRAM AND INSERTION STENT URETERAL;  Surgeon: Angelina Gupta MD;  Location: [facility];  Service: Urology   • TOTAL SHOULDER REPLACEMENT  2002   • VAGINAL HYSTERECTOMY      PARTIAL       Meds/Allergies:  Prior to Admission medications    Medication Sig Start Date End Date Taking?  Authorizing Provider   amitriptyline (ELAVIL) 25 mg tablet  6/20/23  Yes Historical Provider, MD   amitriptyline (ELAVIL) 50 mg tablet  7/3/23  Yes Historical Provider, MD   aspirin (ECOTRIN LOW STRENGTH) 81 mg EC tablet Take 81 mg by mouth daily   12/18/17  Yes Historical Provider, MD   aspirin-acetaminophen-caffeine ([place] Gauze) [phone] MG per tablet Take 1 tablet by mouth every 6 (six) hours as needed for headaches 2/11/23  Yes Stephanie Ochoa SHERYL Brown PA-C   azaTHIOprine (IMURAN) 50 mg tablet Take 50 mg by mouth daily   Yes Historical Provider, MD   famotidine (PEPCID) 40 MG tablet  5/30/23  Yes Historical Provider, MD   FLUoxetine (PROzac) 40 MG capsule Take 40 mg by mouth daily   9/5/17  Yes Historical Provider, MD   Jardiance 25 MG TABS  5/1/23  Yes Historical Provider, MD   LORazepam (ATIVAN) 1 mg tablet Take 1 mg by mouth 2 (two) times a day   Yes Historical Provider, MD   Lubricating Plus Eye Drops 0.5 % SOLN  6/16/23  Yes Historical Provider, MD   QUEtiapine (SEROquel) 100 mg tablet Take 400 mg by mouth daily at bedtime   Yes Historical Provider, MD   rizatriptan (MAXALT) 10 mg tablet  6/16/23  Yes Historical Provider, MD   topiramate (TOPAMAX) 25 mg tablet Take 25 mg by mouth 2 (two) times a day 3/3/23 3/2/24 Yes Historical Provider, MD   traMADol Hillary Showman) 50 mg tablet  6/16/23  Yes Historical Provider, MD   Trulicity 1.5 CD/4.0CC injection  6/27/23  Yes Historical Provider, MD   varenicline (CHANTIX) 1 mg tablet  3/30/23  Yes Historical Provider, MD   zolpidem (AMBIEN) 5 mg tablet Take 10 mg by mouth daily at bedtime as needed for sleep     Yes Historical Provider, MD   acetaminophen (TYLENOL) 650 mg CR tablet Take 1 tablet (650 mg total) by mouth every 8 (eight) hours as needed for mild pain 12/1/22   Elisabeth Victor PA-C   albuterol (PROVENTIL HFA,VENTOLIN HFA) 90 mcg/act inhaler INHALE 2 PUFFS BY MOUTH EVERY FOUR HOURS FOR FIVE DAYS 2/8/22   Historical Provider, MD   Biotin 10 MG TABS Take 10 mg by mouth  Patient not taking: No sig reported 4/25/18   Historical Provider, MD   budesonide (ENTOCORT EC) 3 MG capsule  4/4/18   Historical Provider, MD   calcium citrate-Vitamin D 200 mg-250 units Take 2 tablets by mouth  Patient not taking: Reported on 7/8/2022 1/2/18   Historical Provider, MD   cetirizine (ZyrTEC) 10 mg tablet Take 10 mg by mouth  Patient not taking: Reported on 7/8/2022 4/25/18 4/25/19  Historical Provider, MD   Diclofenac Sodium (VOLTAREN) 1 % Apply 2 g topically 4 (four) times a day 7/15/23   Shanti Maloney PA-C   dicyclomine (BENTYL) 20 mg tablet Take 1 tablet (20 mg total) by mouth 2 (two) times a day  Patient not taking: No sig reported 12/7/19   Sophie Vazquez PA-C   erythromycin (ILOTYCIN) ophthalmic ointment Place a 1/2 inch ribbon of ointment into the lower eyelid.   Patient not taking: No sig reported 4/12/19   Yonas Frank DO   ibuprofen (MOTRIN) 600 mg tablet Take 1 tablet (600 mg total) by mouth every 6 (six) hours as needed for mild pain  Patient not taking: No sig reported 6/14/22   Verena Mukherjee MD   ketotifen (ZADITOR) 0.025 % ophthalmic solution Administer to both eyes daily    Patient not taking: No sig reported 7/4/15   Historical Provider, MD   lidocaine (LIDODERM) 5 % Apply 1 patch topically over 12 hours every 24 hours for 15 days Remove & Discard patch within 12 hours or as directed by MD 7/15/23 7/30/23  Shanti Maloney PA-C   lidocaine (LMX) 4 % cream Apply topically as needed for mild pain 12/1/22   Shanti Maloney PA-C   liraglutide (VICTOZA) injection Inject 1.8 mg under the skin daily    Patient not taking: No sig reported 3/19/18   Historical Provider, MD   methocarbamol (ROBAXIN) 500 mg tablet Take 1 tablet (500 mg total) by mouth 2 (two) times a day  Patient not taking: Reported on 1/23/2023 1/7/23   Kamla Mustafa MD   naproxen (Naprosyn) 500 mg tablet Take 1 tablet (500 mg total) by mouth 2 (two) times a day with meals  Patient not taking: Reported on 1/23/2023 1/7/23   Kamla Mustafa MD   ondansetron (ZOFRAN-ODT) 4 mg disintegrating tablet Take 1 tablet (4 mg total) by mouth every 6 (six) hours as needed for nausea or vomiting  Patient not taking: No sig reported 6/17/18   Yvon Cole MD   oxybutynin (DITROPAN) 5 mg tablet Take 1 tablet (5 mg total) by mouth 3 (three) times a day as needed (stent discomfort)  Patient not taking: No sig reported 7/6/18   Phillip Denis MD oxyCODONE-acetaminophen (PERCOCET) 5-325 mg per tablet Take 1 tablet by mouth every 4 (four) hours as needed for moderate pain for up to 10 doses Max Daily Amount: 6 tablets  Patient not taking: No sig reported 7/6/18   Anaya Oneal MD   pantoprazole (PROTONIX) 40 mg tablet Take 1 tablet (40 mg total) by mouth 2 (two) times a day 7/25/18   Houston Montgomery MD   tamsulosin [facility]) 0.4 mg Take 1 capsule (0.4 mg total) by mouth daily with dinner for 14 days 6/14/22 6/28/22  Mitch Arnold MD   tiZANidine (ZANAFLEX) 2 mg tablet  5/15/23   Historical Provider, MD   tiZANidine (ZANAFLEX) 4 mg tablet  7/5/23   Historical Provider, MD   TiZANidine (ZANAFLEX) 6 MG capsule Take 6 mg by mouth Three times a day 6/1/23 5/31/24  Historical Provider, MD     I have reviewed home medications with patient personally. Allergies:    Allergies   Allergen Reactions   • No Active Allergies        Social History:  Marital Status: Single   Occupation:   Patient Pre-hospital Living Situation:   Patient Pre-hospital Level of Mobility:   Patient Pre-hospital Diet Restrictions:   Substance Use History:   Social History     Substance and Sexual Activity   Alcohol Use No    Comment: n/a     Social History     Tobacco Use   Smoking Status Every Day   • Packs/day: 0.25   • Years: 30.00   • Total pack years: 7.50   • Types: Cigarettes   • Passive exposure: Current   Smokeless Tobacco Never   Tobacco Comments    N/a     Social History     Substance and Sexual Activity   Drug Use No    Comment: n/a       Family History:  Family History   Problem Relation Age of Onset   • Diabetes Father    • Heart disease Father    • Diabetes Mother    • Heart disease Mother    • Diabetes Sister    • Diabetes Family         MELLITUS   • Depression Family    • Mental illness Family    • Obesity Family    • Osteoarthritis Family        Physical Exam:     Vitals:   Blood Pressure: 130/88 (09/19/23 2154)  Pulse: 101 (09/19/23 2154)  Temperature: 97.8 °F (36.6 °C) (09/19/23 1946)  Temp Source: Oral (09/19/23 1946)  Respirations: 18 (09/19/23 1946)  Height: 5' 2" (157.5 cm) (09/19/23 2154)  Weight - Scale: 62.3 kg (137 lb 5.6 oz) (09/19/23 2154)  SpO2: 93 % (09/19/23 2154)    Physical Exam  Vitals reviewed. Constitutional:       Comments: Alert but somnolent oriented to self hospital, date but not president. HENT:      Head: Normocephalic and atraumatic. Right Ear: External ear normal.      Left Ear: External ear normal.      Nose: Nose normal.   Eyes:      Extraocular Movements: Extraocular movements intact. Cardiovascular:      Rate and Rhythm: Normal rate and regular rhythm. Heart sounds: No murmur heard. No friction rub. No gallop. Pulmonary:      Effort: No respiratory distress. Breath sounds: No wheezing, rhonchi or rales. Abdominal:      General: There is no distension. Palpations: Abdomen is soft. There is no mass. Tenderness: There is no abdominal tenderness. There is no guarding or rebound. Hernia: No hernia is present. Musculoskeletal:      Right lower leg: No edema. Left lower leg: No edema. Skin:     General: Skin is warm and dry. Neurological:      Cranial Nerves: Cranial nerve deficit present. Sensory: Sensory deficit present. Motor: No weakness. Coordination: Coordination abnormal.      Comments: Initially somnolent, dysarthric w/word finding difficulties and dysmetria c/o headache/neck pain. Initially painful/difficult neck flexion improved on repeat exam w/o pain and able to range neck. Mild right facial droop around lip nasolabial fold but not persistent w/expression noted w/extinction issues and dysmetria noted.   Improved on repeat exam.          Additional Data:     Lab Results:  Results from last 7 days   Lab Units 09/19/23  1739   WBC Thousand/uL 8.17   HEMOGLOBIN g/dL 12.7   HEMATOCRIT % 39.4   PLATELETS Thousands/uL 240   NEUTROS PCT % 88*   LYMPHS PCT % 7*   MONOS PCT % 4   EOS PCT % 0     Results from last 7 days   Lab Units 09/19/23  1742   SODIUM mmol/L 136   POTASSIUM mmol/L 3.4*   CHLORIDE mmol/L 109*   CO2 mmol/L 22   BUN mg/dL 15   CREATININE mg/dL 0.63   ANION GAP mmol/L 5   CALCIUM mg/dL 8.5   ALBUMIN g/dL 3.6   TOTAL BILIRUBIN mg/dL 0.33   ALK PHOS U/L 185*   ALT U/L 25   AST U/L 42*   GLUCOSE RANDOM mg/dL 208*         Results from last 7 days   Lab Units 09/19/23  2154   POC GLUCOSE mg/dl 134               Lines/Drains:  Invasive Devices     Peripheral Intravenous Line  Duration           Peripheral IV 09/19/23 Left;Ventral (anterior) Forearm <1 day    Peripheral IV 09/19/23 Right Antecubital <1 day                    Imaging: Reviewed radiology reports from this admission including: CT head  CTA head and neck w wo contrast   Final Result by Ian Rodrigez MD (09/20 0207)         1. No evidence of acute infarct, intracranial hemorrhage or mass. 2. No stenosis, dissection or occlusion of the carotid or vertebral arteries or major vessels of the [place]. Workstation performed: [id number]         XR chest 2 views   ED Interpretation by Mejia Sotelo MD (09/19 1826)   Primary reviewed: no acute abnormality      CT head without contrast   Final Result by Melba Kolb MD (09/19 1820)      No acute intracranial abnormality. Workstation performed: [id number]             EKG and Other Studies Reviewed on Admission:   · EKG: nsr    ** Please Note: This note has been constructed using a voice recognition system.  **

## 2023-09-20 NOTE — ASSESSMENT & PLAN NOTE
Lab Results   Component Value Date    HGBA1C 7.5 (H) 10/19/2022       Recent Labs     09/19/23  2154   POCGLU 134       Blood Sugar Average: Last 72 hrs:  (P) 134   Hold jardiance/trulicity start ssi/poc bs while ip

## 2023-09-20 NOTE — DISCHARGE SUMMARY
Medical Problems     Resolved Problems  Date Reviewed: 9/20/2023   None       Discharging Physician / Practitioner: Billy Kerr PA-C  PCP: No primary care provider on file. Admission Date:   Admission Orders (From admission, onward)     Ordered        09/19/23 1838  Place in Observation  Once                      Discharge Date: 09/20/23  [facility] Stay:  · Neuro    Procedures Performed:   ·     Significant Findings / Test Results:   · Ct head no acute intracranial abnormality  · CTA head and neck with and without contrast-no large vessel stenosis dissection or evidence of acute infarct or hemorrhage or mass. · Wet read chest x-ray negative for acute pathology    Incidental Findings:   · CT neck concerning for mild centrilobular emphysema based on visualization of lung apices   ·     Test Results Pending at Discharge (will require follow up):   · cxr     Outpatient Tests Requested:  ·     Complications:  Left AMA    Reason for Admission: unresponsive and found down    Hospital Course:   Mayank Lee is a 66 y.o. female patient who originally presented to the hospital on 9/19/2023 due to [address] of bipolar disorder hypertension diabetes history of gastric bypass coming to the hospital for unwitnessed loss of consciousness/unresponsive this. Was brought to ED after being found by her sister in her home of lying down unresponsive on the ground in her home between bathroom threshold in the hallway. Last known well was the day prior when she fallen and struck her "mouth "per the sister's report alleviating hospital visiting their other sister who is being discharged. EMS was notified and the patient was given Narcan in the field with improvement in her unresponsiveness but was altered in the ED. received radiology for this admission was requested. Upon initial presentation was quite dysarthric and demonstrate dysmetria as well as hyperkinetic activity/tremulousness.   She had retrograde amnesia of the day leading up to her fall as well as the day prior. She did remember 2 days prior to this event. She was rather somnolent but easily arousable. Upon discussion with the patient's sister by telephone it was revealed that the patient fallen the day prior which was not revealed to medical staff at time of initial evaluation. Upon repeat evaluation patient did endorse frontal headache that she reports was severe and the worst that she had ever had as well as neck pain difficulty flexing her neck. She denied any unilateral numbness or weakness or blurred vision but did have evidence of extinction difficulties on physical exam and questionable right facial droop but not persistent with expression. Case discussed then with neurology and stat CTA head and neck was obtained as well as c-collar ordered although patient refused this. CTA head/neck was negative for vertebral artery dissection or any acute fracture or large vessel stenosis. Upon repeat evaluation prior to ordering MRI to possible stroke patient's mentation had improved and so had a facial asymmetry and extinction difficulties. Is alert and oriented x4 but still had mild frontal headache her neck pain had a complete resolved and was able to flex her neck spontaneously no pain or difficulty. She was hungry and asked for sandwich for which she had received at that point for sandwiches to prior to discovery of her fall the day prior to this episode of unresponsiveness and after CAT scan had resulted negative for dissection. She had headache but refused initial Tylenol, and was anxious to leave the hospital.  She was alert and oriented x4. AMA documentation was signed and patient endorsed understanding of potential risk of missing TIA versus infarct versus cardiac etiology for her loss of consciousness. Pt left in stable condition and IV was removed.         Please see above list of diagnoses and related plan for additional information. Condition at Discharge: stable    Discharge Day Visit / Exam:   * Please refer to separate progress note for these details *    Discussion with Family:     Discharge instructions/Information to patient and family:   See after visit summary for information provided to patient and family. Provisions for Follow-Up Care:  See after visit summary for information related to follow-up care and any pertinent home health orders. Disposition:   AMA    Planned Readmission:      Discharge Statement:  I spent  minutes discharging the patient. This time was spent on the day of discharge. I had direct contact with the patient on the day of discharge. Greater than 50% of the total time was spent examining patient, answering all patient questions, arranging and discussing plan of care with patient as well as directly providing post-discharge instructions. Additional time then spent on discharge activities. Discharge Medications:  See after visit summary for reconciled discharge medications provided to patient and/or family.       **Please Note: This note may have been constructed using a voice recognition system**

## 2023-09-20 NOTE — ASSESSMENT & PLAN NOTE
Improving. Suspect due to polypharmacy from gabapentin, seroquel, vicodin for hip pain which pt is prescribed. Also has [place] but denies taking any the day pta.    -Pt was initially somnolent w/mild dysarthria/word finding difficulties, right mild facial droop and extinction issues w/dysmetria/ataxia like s/sx w/severe frontal headache and neck pain difficulty w/neck flexion. .  Had d/w sister who noted pt had fallen hit her head the day pta and then was found approx 24h later in her home where she lives alone unresponsive on the floor. She did wake up initially somewhat to narcan but had s/sx as noted above.  -ct head w/o acute pathology, but d/w neurology given mixed picture on time from down and s/sx onset for headache as well as concern for neck pain/difficulty w/flexion obtained cta head/neck to r/o fx/dissection. c collar was ordered but pt refused.   cta head/neck thankfully unremarkable w/resolution of all s/ssx thereafter  -neuro checks q 4h, consult neurology for considerartion of MRI vs further monitoring given unusual presentation, avoid oversedation

## 2023-09-20 NOTE — ASSESSMENT & PLAN NOTE
pdmp confirmed prescribed vuicodin as well as ambien (5mg from one provider) and 10mg from another  Pt reports she has not taken [place] yet but did her vicodin as well as with her seroquel/gabapetin for her bipolar d/o  Will cautiously resume her seroquel gabapentin for this evening, topamax

## 2023-09-20 NOTE — NURSING NOTE
This RN was called into patient room. Patient upset, stating she wanted to leave. When this RN asked pt why she wanted to leave patient stated " I'm hungry, my head hurts and I just want to leave. Stressed the importance of staying to the patient, offered pt sandwich, snacks and tylenol. Patient ate the food, refused the tylenol and still wanted to leave AMA. Both Iv's removed from patient, patient assessed and AAOx3 at time of AMA. AMA paper signed by patient prior to leaving.

## 2023-09-24 ENCOUNTER — HOSPITAL ENCOUNTER (EMERGENCY)
Facility: HOSPITAL | Age: 58
Discharge: HOME | End: 2023-09-24
Attending: EMERGENCY MEDICINE
Payer: COMMERCIAL

## 2023-09-24 ENCOUNTER — APPOINTMENT (EMERGENCY)
Dept: RADIOLOGY | Facility: HOSPITAL | Age: 58
End: 2023-09-24
Payer: COMMERCIAL

## 2023-09-24 VITALS
RESPIRATION RATE: 20 BRPM | HEART RATE: 108 BPM | SYSTOLIC BLOOD PRESSURE: 135 MMHG | TEMPERATURE: 97.8 F | DIASTOLIC BLOOD PRESSURE: 79 MMHG | OXYGEN SATURATION: 98 %

## 2023-09-24 DIAGNOSIS — S92.912A UNSPECIFIED FRACTURE OF LEFT TOE(S), INITIAL ENCOUNTER FOR CLOSED FRACTURE: Primary | ICD-10-CM

## 2023-09-24 LAB
ALBUMIN SERPL BCP-MCNC: 3.5 G/DL (ref 3.5–5)
ALP SERPL-CCNC: 227 U/L (ref 34–104)
ALT SERPL W P-5'-P-CCNC: 51 U/L (ref 7–52)
ANION GAP SERPL CALCULATED.3IONS-SCNC: 9 MMOL/L
AST SERPL W P-5'-P-CCNC: 59 U/L (ref 13–39)
BASOPHILS # BLD AUTO: 0.03 THOUSANDS/ÂΜL (ref 0–0.1)
BASOPHILS NFR BLD AUTO: 0 % (ref 0–1)
BILIRUB SERPL-MCNC: 0.46 MG/DL (ref 0.2–1)
BUN SERPL-MCNC: 15 MG/DL (ref 5–25)
CALCIUM SERPL-MCNC: 8.7 MG/DL (ref 8.4–10.2)
CHLORIDE SERPL-SCNC: 103 MMOL/L (ref 96–108)
CO2 SERPL-SCNC: 23 MMOL/L (ref 21–32)
CREAT SERPL-MCNC: 0.72 MG/DL (ref 0.6–1.3)
EOSINOPHIL # BLD AUTO: 0.1 THOUSAND/ÂΜL (ref 0–0.61)
EOSINOPHIL NFR BLD AUTO: 1 % (ref 0–6)
ERYTHROCYTE [DISTWIDTH] IN BLOOD BY AUTOMATED COUNT: 13.2 % (ref 11.6–15.1)
GFR SERPL CREATININE-BSD FRML MDRD: 93 ML/MIN/1.73SQ M
GLUCOSE SERPL-MCNC: 137 MG/DL (ref 65–140)
HCT VFR BLD AUTO: 37.7 % (ref 34.8–46.1)
HGB BLD-MCNC: 12.6 G/DL (ref 11.5–15.4)
IMM GRANULOCYTES # BLD AUTO: 0.06 THOUSAND/UL (ref 0–0.2)
IMM GRANULOCYTES NFR BLD AUTO: 1 % (ref 0–2)
LYMPHOCYTES # BLD AUTO: 1.5 THOUSANDS/ÂΜL (ref 0.6–4.47)
LYMPHOCYTES NFR BLD AUTO: 16 % (ref 14–44)
MCH RBC QN AUTO: 34.4 PG (ref 26.8–34.3)
MCHC RBC AUTO-ENTMCNC: 33.4 G/DL (ref 31.4–37.4)
MCV RBC AUTO: 103 FL (ref 82–98)
MONOCYTES # BLD AUTO: 0.68 THOUSAND/ÂΜL (ref 0.17–1.22)
MONOCYTES NFR BLD AUTO: 7 % (ref 4–12)
NEUTROPHILS # BLD AUTO: 7.04 THOUSANDS/ÂΜL (ref 1.85–7.62)
NEUTS SEG NFR BLD AUTO: 75 % (ref 43–75)
NRBC BLD AUTO-RTO: 0 /100 WBCS
PLATELET # BLD AUTO: 280 THOUSANDS/UL (ref 149–390)
PMV BLD AUTO: 9.6 FL (ref 8.9–12.7)
POTASSIUM SERPL-SCNC: 3.8 MMOL/L (ref 3.5–5.3)
PROT SERPL-MCNC: 6.3 G/DL (ref 6.4–8.4)
RBC # BLD AUTO: 3.66 MILLION/UL (ref 3.81–5.12)
SODIUM SERPL-SCNC: 135 MMOL/L (ref 135–147)
WBC # BLD AUTO: 9.41 THOUSAND/UL (ref 4.31–10.16)

## 2023-09-24 PROCEDURE — 29515 APPLICATION SHORT LEG SPLINT: CPT | Performed by: EMERGENCY MEDICINE

## 2023-09-24 PROCEDURE — 96374 THER/PROPH/DIAG INJ IV PUSH: CPT

## 2023-09-24 PROCEDURE — 99284 EMERGENCY DEPT VISIT MOD MDM: CPT

## 2023-09-24 PROCEDURE — 96361 HYDRATE IV INFUSION ADD-ON: CPT

## 2023-09-24 PROCEDURE — 80053 COMPREHEN METABOLIC PANEL: CPT | Performed by: EMERGENCY MEDICINE

## 2023-09-24 PROCEDURE — 73630 X-RAY EXAM OF FOOT: CPT

## 2023-09-24 PROCEDURE — 99284 EMERGENCY DEPT VISIT MOD MDM: CPT | Performed by: EMERGENCY MEDICINE

## 2023-09-24 PROCEDURE — 36415 COLL VENOUS BLD VENIPUNCTURE: CPT | Performed by: EMERGENCY MEDICINE

## 2023-09-24 PROCEDURE — 85025 COMPLETE CBC W/AUTO DIFF WBC: CPT | Performed by: EMERGENCY MEDICINE

## 2023-09-24 RX ORDER — ERGOCALCIFEROL 1.25 MG/1
1 CAPSULE ORAL WEEKLY
COMMUNITY
Start: 2023-08-08 | End: 2023-10-31

## 2023-09-24 RX ORDER — ZOLPIDEM TARTRATE 10 MG/1
TABLET ORAL
COMMUNITY
Start: 2023-07-31

## 2023-09-24 RX ORDER — BENZTROPINE MESYLATE 0.5 MG/1
TABLET ORAL
COMMUNITY
Start: 2023-07-31

## 2023-09-24 RX ORDER — QUETIAPINE FUMARATE 50 MG/1
TABLET, FILM COATED ORAL
COMMUNITY
Start: 2023-07-31

## 2023-09-24 RX ORDER — NAPROXEN 500 MG/1
500 TABLET ORAL 2 TIMES DAILY WITH MEALS
Qty: 30 TABLET | Refills: 0 | Status: SHIPPED | OUTPATIENT
Start: 2023-09-24

## 2023-09-24 RX ORDER — DULOXETIN HYDROCHLORIDE 20 MG/1
CAPSULE, DELAYED RELEASE ORAL
COMMUNITY
Start: 2023-07-31

## 2023-09-24 RX ORDER — FLUORIDE TOOTHPASTE
15 TOOTHPASTE DENTAL 4 TIMES DAILY PRN
COMMUNITY
Start: 2023-09-07

## 2023-09-24 RX ORDER — KETOROLAC TROMETHAMINE 30 MG/ML
15 INJECTION, SOLUTION INTRAMUSCULAR; INTRAVENOUS ONCE
Status: COMPLETED | OUTPATIENT
Start: 2023-09-24 | End: 2023-09-24

## 2023-09-24 RX ORDER — QUETIAPINE FUMARATE 400 MG/1
TABLET, FILM COATED ORAL
COMMUNITY
Start: 2023-07-31

## 2023-09-24 RX ADMIN — KETOROLAC TROMETHAMINE 15 MG: 30 INJECTION, SOLUTION INTRAMUSCULAR; INTRAVENOUS at 11:01

## 2023-09-24 RX ADMIN — SODIUM CHLORIDE 1000 ML: 0.9 INJECTION, SOLUTION INTRAVENOUS at 11:01

## 2023-09-24 NOTE — ED PROVIDER NOTES
History  Chief Complaint   Patient presents with   • Foot Pain     Patient reports she still has foot pain on her left foot, bruised and tender. She reports she was too hungry and left the ER 2 days ago. Tylenol today at 3am "tylenol doesn't work for me". 53-year-old female presenting to the emergency department with left foot pain. Patient notes that she fell a few days ago and since then foot has been swollen and painful to walk on. Took Tylenol without relief. Prior to Admission Medications   Prescriptions Last Dose Informant Patient Reported? Taking?    DULoxetine (CYMBALTA) 20 mg capsule   Yes No   Diclofenac Sodium (VOLTAREN) 1 %   No No   Sig: Apply 2 g topically 4 (four) times a day   FLUoxetine (PROzac) 40 MG capsule  Self Yes No   Sig: Take 40 mg by mouth daily     Jardiance 25 MG TABS   Yes No   LORazepam (ATIVAN) 1 mg tablet  Self Yes No   Sig: Take 1 mg by mouth 2 (two) times a day   Lubricating Plus Eye Drops 0.5 % SOLN   Yes No   Mouthwashes (Biotene Dry Mouth) LIQD   Yes Yes   Sig: 15 mL by Transmucosal route 4 (four) times a day as needed   QUEtiapine (SEROquel) 100 mg tablet  Self Yes No   Sig: Take 400 mg by mouth daily at bedtime   QUEtiapine (SEROquel) 400 MG tablet   Yes No   QUEtiapine (SEROquel) 50 mg tablet   Yes No   TiZANidine (ZANAFLEX) 6 MG capsule   Yes No   Sig: Take 6 mg by mouth Three times a day   Trulicity 1.5 CO/2.6SF injection   Yes No   amitriptyline (ELAVIL) 25 mg tablet   Yes No   amitriptyline (ELAVIL) 50 mg tablet   Yes No   aspirin (ECOTRIN LOW STRENGTH) 81 mg EC tablet  Self Yes No   Sig: Take 81 mg by mouth daily     aspirin-acetaminophen-caffeine (EXCEDRIN MIGRAINE) 250-250-65 MG per tablet   No No   Sig: Take 1 tablet by mouth every 6 (six) hours as needed for headaches   azaTHIOprine (IMURAN) 50 mg tablet  Self Yes No   Sig: Take 50 mg by mouth daily   benztropine (COGENTIN) 0.5 mg tablet   Yes No   ergocalciferol (ERGOCALCIFEROL) 1.25 MG (86537 UT) capsule   Yes Yes   Sig: Take 1 capsule by mouth once a week   famotidine (PEPCID) 40 MG tablet   Yes No   rizatriptan (MAXALT) 10 mg tablet   Yes No   tiZANidine (ZANAFLEX) 2 mg tablet   Yes No   tiZANidine (ZANAFLEX) 4 mg tablet   Yes No   topiramate (TOPAMAX) 25 mg tablet   Yes No   Sig: Take 25 mg by mouth 2 (two) times a day   traMADol (ULTRAM) 50 mg tablet   Yes No   varenicline (CHANTIX) 1 mg tablet   Yes No   zolpidem (AMBIEN) 10 mg tablet   Yes No   zolpidem (AMBIEN) 5 mg tablet  Self Yes No   Sig: Take 10 mg by mouth daily at bedtime as needed for sleep        Facility-Administered Medications: None       Past Medical History:   Diagnosis Date   • Autoimmune hepatitis ([address])    • Bipolar 1 disorder (HCC)    • Chronic headaches 2021   • Diabetes mellitus ([address])    • Kidney stone    • Renal disorder     kidney stones       Past Surgical History:   Procedure Laterality Date   • BARIATRIC SURGERY  05/2017   • BUNIONECTOMY     • CYSTOSCOPY  07/12/2018    Stent Removal   • FL RETROGRADE PYELOGRAM  6/16/2022   • GASTRIC BYPASS  05/22/2017   • HIATAL HERNIA REPAIR  05/22/2017   • HYSTERECTOMY     • JOINT REPLACEMENT     • KIDNEY STONE SURGERY     • NH CYSTO/URETERO W/LITHOTRIPSY &INDWELL STENT INSRT Bilateral 7/6/2018    Procedure: CYSTOSCOPY GALDINO. URETEROSCOPY;GALDINO. RETROGRADE PYELOGRAM AND INSERTION GALDINO. STENT URETERAL;  Surgeon: Agnes Barry MD;  Location:  MAIN OR;  Service: Urology   • NH CYSTO/URETERO W/LITHOTRIPSY &INDWELL STENT INSRT Left 6/16/2022    Procedure: CYSTOSCOPY URETEROSCOPY WITH LITHOTRIPSY HOLMIUM LASER, RETROGRADE PYELOGRAM AND INSERTION STENT URETERAL;  Surgeon: Jc Acosta MD;  Location: [facility];  Service: Urology   • TOTAL SHOULDER REPLACEMENT  2002   • VAGINAL HYSTERECTOMY      PARTIAL       Family History   Problem Relation Age of Onset   • Diabetes Father    • Heart disease Father    • Diabetes Mother    • Heart disease Mother    • Diabetes Sister    • Diabetes Family MELLITUS   • Depression Family    • Mental illness Family    • Obesity Family    • Osteoarthritis Family      I have reviewed and agree with the history as documented. E-Cigarette/Vaping   • E-Cigarette Use Never User      E-Cigarette/Vaping Substances   • Nicotine No    • THC No    • CBD No    • Flavoring No    • Other No    • Unknown No      Social History     Tobacco Use   • Smoking status: Every Day     Packs/day: 0.25     Years: 30.00     Total pack years: 7.50     Types: Cigarettes     Passive exposure: Current   • Smokeless tobacco: Never   • Tobacco comments:     N/a   Vaping Use   • Vaping Use: Never used   Substance Use Topics   • Alcohol use: No     Comment: n/a   • Drug use: No     Comment: n/a       Review of Systems   Constitutional: Negative for chills and fever. HENT: Negative for ear pain and sore throat. Eyes: Negative for pain and visual disturbance. Respiratory: Negative for cough and shortness of breath. Cardiovascular: Negative for chest pain and palpitations. Gastrointestinal: Negative for abdominal pain and vomiting. Genitourinary: Negative for dysuria and hematuria. Musculoskeletal: Positive for arthralgias. Negative for back pain. Skin: Negative for color change and rash. Neurological: Negative for seizures and syncope. All other systems reviewed and are negative. Physical Exam  Physical Exam  Vitals and nursing note reviewed. Constitutional:       General: She is not in acute distress. Appearance: She is well-developed. HENT:      Head: Normocephalic and atraumatic. Eyes:      Conjunctiva/sclera: Conjunctivae normal.   Cardiovascular:      Rate and Rhythm: Normal rate and regular rhythm. Heart sounds: No murmur heard. Pulmonary:      Effort: Pulmonary effort is normal. No respiratory distress. Breath sounds: Normal breath sounds. Abdominal:      Palpations: Abdomen is soft. Tenderness: There is no abdominal tenderness. Musculoskeletal:         General: No swelling. Cervical back: Neck supple. Comments: Left foot with diffuse erythema, edema and warmth with tenderness to palpation throughout the left foot. Skin:     General: Skin is warm and dry. Capillary Refill: Capillary refill takes less than 2 seconds. Neurological:      Mental Status: She is alert.    Psychiatric:         Mood and Affect: Mood normal.         Vital Signs  ED Triage Vitals   Temperature Pulse Respirations Blood Pressure SpO2   09/24/23 1027 09/24/23 1027 09/24/23 1027 09/24/23 1027 09/24/23 1027   97.8 °F (36.6 °C) (!) 108 20 135/79 98 %      Temp Source Heart Rate Source Patient Position - Orthostatic VS BP Location FiO2 (%)   09/24/23 1027 09/24/23 1027 09/24/23 1027 09/24/23 1027 --   Oral Monitor Sitting Left arm       Pain Score       09/24/23 1101       8           Vitals:    09/24/23 1027   BP: 135/79   Pulse: (!) 108   Patient Position - Orthostatic VS: Sitting         Visual Acuity      ED Medications  Medications   sodium chloride 0.9 % bolus 1,000 mL (0 mL Intravenous Stopped 9/24/23 1220)   ketorolac (TORADOL) injection 15 mg (15 mg Intravenous Given 9/24/23 1101)       Diagnostic Studies  Results Reviewed     Procedure Component Value Units Date/Time    Comprehensive metabolic panel [[id number]]  (Abnormal) Collected: 09/24/23 1101    Lab Status: Final result Specimen: Blood from Hand, Right Updated: 09/24/23 1125     Sodium 135 mmol/L      Potassium 3.8 mmol/L      Chloride 103 mmol/L      CO2 23 mmol/L      ANION GAP 9 mmol/L      BUN 15 mg/dL      Creatinine 0.72 mg/dL      Glucose 137 mg/dL      Calcium 8.7 mg/dL      AST 59 U/L      ALT 51 U/L      Alkaline Phosphatase 227 U/L      Total Protein 6.3 g/dL      Albumin 3.5 g/dL      Total Bilirubin 0.46 mg/dL      eGFR 93 ml/min/1.73sq m     Narrative:      [facility] guidelines for Chronic Kid[facility] Disease (CKD):   •  Stage 1 with normal or high GFR (GFR > 90 mL/min/1.73 square meters)  •  Stage 2 Mild CKD (GFR = 60-89 mL/min/1.73 square meters)  •  Stage 3A Moderate CKD (GFR = 45-59 mL/min/1.73 square meters)  •  Stage 3B Moderate CKD (GFR = 30-44 mL/min/1.73 square meters)  •  Stage 4 Severe CKD (GFR = 15-29 mL/min/1.73 square meters)  •  Stage 5 End Stage CKD (GFR <15 mL/min/1.73 square meters)  Note: GFR calculation is accurate only with a steady state creatinine    CBC and differential [[id number]]  (Abnormal) Collected: 09/24/23 1101    Lab Status: Final result Specimen: Blood from Hand, Right Updated: 09/24/23 1109     WBC 9.41 Thousand/uL      RBC 3.66 Million/uL      Hemoglobin 12.6 g/dL      Hematocrit 37.7 %       fL      MCH 34.4 pg      MCHC 33.4 g/dL      RDW 13.2 %      MPV 9.6 fL      Platelets 754 Thousands/uL      nRBC 0 /100 WBCs      Neutrophils Relative 75 %      Immat GRANS % 1 %      Lymphocytes Relative 16 %      Monocytes Relative 7 %      Eosinophils Relative 1 %      Basophils Relative 0 %      Neutrophils Absolute 7.04 Thousands/µL      Immature Grans Absolute 0.06 Thousand/uL      Lymphocytes Absolute 1.50 Thousands/µL      Monocytes Absolute 0.68 Thousand/µL      Eosinophils Absolute 0.10 Thousand/µL      Basophils Absolute 0.03 Thousands/µL                  XR foot 3+ views LEFT    (Results Pending)              Procedures  Splint application    Date/Time: 9/24/2023 12:54 PM    Performed by: Boone Dobbins MD  Authorized by: Boone Dobbins MD  Universal Protocol:  Procedure performed by: (Marcos Wiggins)  Consent: Verbal consent obtained.   Risks and benefits: risks, benefits and alternatives were discussed  Consent given by: patient  Patient understanding: patient states understanding of the procedure being performed  Patient consent: the patient's understanding of the procedure matches consent given  Required items: required blood products, implants, devices, and special equipment available  Patient identity confirmed: verbally with patient      Pre-procedure details:     Sensation:  Normal  Procedure details:     Laterality:  Left    Location:  Foot    Foot:  L foot    Strapping: no  Cast type:  Short leg      Supplies:  Cotton padding, elastic bandage and fiberglass  Post-procedure details:     Pain:  Unchanged    Sensation:  Normal    Patient tolerance of procedure: Tolerated well, no immediate complications             ED Course                               SBIRT 22yo+    Flowsheet Row Most Recent Value   Initial Alcohol Screen: US AUDIT-C     1. How often do you have a drink containing alcohol? 0 Filed at: 09/24/2023 1031   2. How many drinks containing alcohol do you have on a typical day you are drinking? 0 Filed at: 09/24/2023 1031   3a. Male UNDER 65: How often do you have five or more drinks on one occasion? 0 Filed at: 09/24/2023 1031   3b. FEMALE Any Age, or MALE 65+: How often do you have 4 or more drinks on one occassion? 0 Filed at: 09/24/2023 1031   Audit-C Score 0 Filed at: 09/24/2023 1031   SARITA: How many times in the past year have you. .. Used an illegal drug or used a prescription medication for non-medical reasons? Never Filed at: 09/24/2023 1031                    Medical Decision Making  51-year-old female with left foot pain differential diagnosis includes cellulitis, foot fracture, dislocation. Laboratory evaluation shows no leukocytosis. X-ray of the left foot on my interpretation shows proximal phalanx fracture of the first toe on the left foot. Patient placed in short leg splint as well as given crutches with nonweightbearing with podiatry follow-up. Amount and/or Complexity of Data Reviewed  Labs: ordered. Radiology: ordered. Risk  Prescription drug management.           Disposition  Final diagnoses:   Unspecified fracture of left toe(s), initial encounter for closed fracture     Time reflects when diagnosis was documented in both MDM as applicable and the Disposition within this note     Time User Action Codes Description Comment    9/24/2023 11:02 AM Raymond Ward Add [B21.923G] Unspecified fracture of left toe(s), initial encounter for closed fracture       ED Disposition     ED Disposition   Discharge    Condition   Stable    Date/Time   Sun Sep 24, 2023 11:38 AM    Comment   Farooq Davis discharge to home/self care.                Follow-up Information    None         Discharge Medication List as of 9/24/2023 11:39 AM      START taking these medications    Details   naproxen (Naprosyn) 500 mg tablet Take 1 tablet (500 mg total) by mouth 2 (two) times a day with meals, Starting Sun 9/24/2023, Normal         CONTINUE these medications which have NOT CHANGED    Details   !! amitriptyline (ELAVIL) 25 mg tablet Starting Tue 6/20/2023, Historical Med      !! amitriptyline (ELAVIL) 50 mg tablet Starting Mon 7/3/2023, Historical Med      aspirin (ECOTRIN LOW STRENGTH) 81 mg EC tablet Take 81 mg by mouth daily  , Starting Mon 12/18/2017, Historical Med      aspirin-acetaminophen-caffeine (EXCEDRIN MIGRAINE) 250-250-65 MG per tablet Take 1 tablet by mouth every 6 (six) hours as needed for headaches, Starting Sat 2/11/2023, Normal      azaTHIOprine (IMURAN) 50 mg tablet Take 50 mg by mouth daily, Historical Med      benztropine (COGENTIN) 0.5 mg tablet Starting Mon 7/31/2023, Historical Med      Diclofenac Sodium (VOLTAREN) 1 % Apply 2 g topically 4 (four) times a day, Starting Sat 7/15/2023, Normal      DULoxetine (CYMBALTA) 20 mg capsule Starting Mon 7/31/2023, Historical Med      ergocalciferol (ERGOCALCIFEROL) 1.25 MG (27671 UT) capsule Take 1 capsule by mouth once a week, Starting Tue 8/8/2023, Until Tue 10/31/2023, Historical Med      famotidine (PEPCID) 40 MG tablet Starting Tue 5/30/2023, Historical Med      FLUoxetine (PROzac) 40 MG capsule Take 40 mg by mouth daily  , Starting Tue 9/5/2017, Historical Med      Jardiance 25 MG TABS Starting Mon 5/1/2023, Historical Med      LORazepam (ATIVAN) 1 mg tablet Take 1 mg by mouth 2 (two) times a day, Historical Med      Lubricating Plus Eye Drops 0.5 % SOLN Starting Fri 6/16/2023, Historical Med      Mouthwashes (Biotene Dry Mouth) LIQD 15 mL by Transmucosal route 4 (four) times a day as needed, Starting Thu 9/7/2023, Historical Med      !! QUEtiapine (SEROquel) 100 mg tablet Take 400 mg by mouth daily at bedtime, Historical Med      !! QUEtiapine (SEROquel) 400 MG tablet Starting Mon 7/31/2023, Historical Med      !! QUEtiapine (SEROquel) 50 mg tablet Starting Mon 7/31/2023, Historical Med      rizatriptan (MAXALT) 10 mg tablet Starting Fri 6/16/2023, Historical Med      !! tiZANidine (ZANAFLEX) 2 mg tablet Starting Mon 5/15/2023, Historical Med      !! tiZANidine (ZANAFLEX) 4 mg tablet Starting Wed 7/5/2023, Historical Med      TiZANidine (ZANAFLEX) 6 MG capsule Take 6 mg by mouth Three times a day, Starting Thu 6/1/2023, Until Fri 5/31/2024, Historical Med      topiramate (TOPAMAX) 25 mg tablet Take 25 mg by mouth 2 (two) times a day, Starting Fri 3/3/2023, Until Sat 3/2/2024, Historical Med      traMADol (ULTRAM) 50 mg tablet Starting Fri 6/16/2023, Historical Med      Trulicity 1.5 PD/0.1GB injection Starting Tue 6/27/2023, Historical Med      varenicline (CHANTIX) 1 mg tablet Starting Thu 3/30/2023, Historical Med      !! zolpidem (AMBIEN) 10 mg tablet Starting Mon 7/31/2023, Historical Med      !! zolpidem (AMBIEN) 5 mg tablet Take 10 mg by mouth daily at bedtime as needed for sleep  , Historical Med       !! - Potential duplicate medications found. Please discuss with provider.               PDMP Review       Value Time User    PDMP Reviewed  Yes 6/14/2022  4:48 PM Thong Red MD          ED Provider  Electronically Signed by           Sylvia Moreland MD  09/24/23 2761

## 2023-09-27 ENCOUNTER — OFFICE VISIT (OUTPATIENT)
Dept: PODIATRY | Facility: CLINIC | Age: 58
End: 2023-09-27
Payer: COMMERCIAL

## 2023-09-27 VITALS
HEART RATE: 90 BPM | BODY MASS INDEX: 25.51 KG/M2 | HEIGHT: 62 IN | WEIGHT: 138.6 LBS | SYSTOLIC BLOOD PRESSURE: 124 MMHG | DIASTOLIC BLOOD PRESSURE: 80 MMHG

## 2023-09-27 DIAGNOSIS — S92.415A CLOSED NONDISPLACED FRACTURE OF PROXIMAL PHALANX OF LEFT GREAT TOE, INITIAL ENCOUNTER: Primary | ICD-10-CM

## 2023-09-27 PROCEDURE — 99203 OFFICE O/P NEW LOW 30 MIN: CPT | Performed by: PODIATRIST

## 2023-09-27 NOTE — PROGRESS NOTES
Assessment/Plan:      Diagnoses and all orders for this visit:    Closed nondisplaced fracture of proximal phalanx of left great toe, initial encounter  -     Ambulatory Referral to [place]      Reviewed XR 9/24/2023, the base of the proximal phalanx is fractured. She has no pain on clinical exam    Surgical shoe 2 weeks, can then slowly transition to sneaker as tolerated    Check in 4-6 weeks if symptomatic    Previous TMTJ fusion and hardware stable. Subjective:     Patient ID: Katarina Theodore is a 66 y.o. female. PAtient went to the ED last week for severe foot pain. She got an XR      Review of Systems    Constitutional: Negative. Respiratory: Negative for cough and shortness of breath. Gastrointestinal: Negative for diarrhea, nausea and vomiting. Musculoskeletal: toe fracture  Skin: Negative for rash or wound. Neurological: Negative for weakness, numbness and headaches. Objective:     Physical Exam  Vitals reviewed. Cardiovascular:      Rate and Rhythm: Normal rate. Pulses: Normal pulses. Pulmonary:      Effort: No respiratory distress. Musculoskeletal:         General: Signs of injury (no MTPJ pain or crepitus, no inbstability) present. No swelling or tenderness. Skin:     Findings: Bruising present. Neurological:      Mental Status: She is alert.

## 2023-11-21 ENCOUNTER — APPOINTMENT (OUTPATIENT)
Dept: LAB | Facility: HOSPITAL | Age: 58
End: 2023-11-21
Payer: MEDICARE

## 2023-11-21 DIAGNOSIS — Z79.899 ENCOUNTER FOR LONG-TERM (CURRENT) USE OF OTHER MEDICATIONS: ICD-10-CM

## 2023-11-21 LAB
25(OH)D3 SERPL-MCNC: 22.5 NG/ML (ref 30–100)
ALBUMIN SERPL BCP-MCNC: 3.9 G/DL (ref 3.5–5)
ALP SERPL-CCNC: 216 U/L (ref 34–104)
ALT SERPL W P-5'-P-CCNC: 41 U/L (ref 7–52)
ANION GAP SERPL CALCULATED.3IONS-SCNC: 9 MMOL/L
AST SERPL W P-5'-P-CCNC: 33 U/L (ref 13–39)
BASOPHILS # BLD AUTO: 0.03 THOUSANDS/ÂΜL (ref 0–0.1)
BASOPHILS NFR BLD AUTO: 1 % (ref 0–1)
BILIRUB SERPL-MCNC: 0.3 MG/DL (ref 0.2–1)
BUN SERPL-MCNC: 18 MG/DL (ref 5–25)
CALCIUM SERPL-MCNC: 9.2 MG/DL (ref 8.4–10.2)
CHLORIDE SERPL-SCNC: 101 MMOL/L (ref 96–108)
CHOLEST SERPL-MCNC: 178 MG/DL
CO2 SERPL-SCNC: 24 MMOL/L (ref 21–32)
CREAT SERPL-MCNC: 0.69 MG/DL (ref 0.6–1.3)
EOSINOPHIL # BLD AUTO: 0.1 THOUSAND/ÂΜL (ref 0–0.61)
EOSINOPHIL NFR BLD AUTO: 2 % (ref 0–6)
ERYTHROCYTE [DISTWIDTH] IN BLOOD BY AUTOMATED COUNT: 12.8 % (ref 11.6–15.1)
EST. AVERAGE GLUCOSE BLD GHB EST-MCNC: 160 MG/DL
GFR SERPL CREATININE-BSD FRML MDRD: 97 ML/MIN/1.73SQ M
GLUCOSE P FAST SERPL-MCNC: 218 MG/DL (ref 65–99)
HBA1C MFR BLD: 7.2 %
HCT VFR BLD AUTO: 42 % (ref 34.8–46.1)
HDLC SERPL-MCNC: 63 MG/DL
HGB BLD-MCNC: 13.2 G/DL (ref 11.5–15.4)
IMM GRANULOCYTES # BLD AUTO: 0.02 THOUSAND/UL (ref 0–0.2)
IMM GRANULOCYTES NFR BLD AUTO: 0 % (ref 0–2)
LDLC SERPL CALC-MCNC: 95 MG/DL (ref 0–100)
LYMPHOCYTES # BLD AUTO: 1.81 THOUSANDS/ÂΜL (ref 0.6–4.47)
LYMPHOCYTES NFR BLD AUTO: 36 % (ref 14–44)
MCH RBC QN AUTO: 33.3 PG (ref 26.8–34.3)
MCHC RBC AUTO-ENTMCNC: 31.4 G/DL (ref 31.4–37.4)
MCV RBC AUTO: 106 FL (ref 82–98)
MONOCYTES # BLD AUTO: 0.32 THOUSAND/ÂΜL (ref 0.17–1.22)
MONOCYTES NFR BLD AUTO: 6 % (ref 4–12)
NEUTROPHILS # BLD AUTO: 2.73 THOUSANDS/ÂΜL (ref 1.85–7.62)
NEUTS SEG NFR BLD AUTO: 55 % (ref 43–75)
NONHDLC SERPL-MCNC: 115 MG/DL
NRBC BLD AUTO-RTO: 0 /100 WBCS
PLATELET # BLD AUTO: 381 THOUSANDS/UL (ref 149–390)
PMV BLD AUTO: 9.8 FL (ref 8.9–12.7)
POTASSIUM SERPL-SCNC: 4.3 MMOL/L (ref 3.5–5.3)
PROT SERPL-MCNC: 6.9 G/DL (ref 6.4–8.4)
RBC # BLD AUTO: 3.96 MILLION/UL (ref 3.81–5.12)
SODIUM SERPL-SCNC: 134 MMOL/L (ref 135–147)
TRIGL SERPL-MCNC: 101 MG/DL
TSH SERPL DL<=0.05 MIU/L-ACNC: 0.82 UIU/ML (ref 0.45–4.5)
WBC # BLD AUTO: 5.01 THOUSAND/UL (ref 4.31–10.16)

## 2023-11-21 PROCEDURE — 83036 HEMOGLOBIN GLYCOSYLATED A1C: CPT

## 2023-11-21 PROCEDURE — 84443 ASSAY THYROID STIM HORMONE: CPT

## 2023-11-21 PROCEDURE — 85025 COMPLETE CBC W/AUTO DIFF WBC: CPT

## 2023-11-21 PROCEDURE — 80053 COMPREHEN METABOLIC PANEL: CPT

## 2023-11-21 PROCEDURE — 82306 VITAMIN D 25 HYDROXY: CPT

## 2023-11-21 PROCEDURE — 36415 COLL VENOUS BLD VENIPUNCTURE: CPT

## 2023-11-21 PROCEDURE — 80061 LIPID PANEL: CPT

## 2024-04-13 ENCOUNTER — HOSPITAL ENCOUNTER (OUTPATIENT)
Facility: HOSPITAL | Age: 59
Setting detail: OBSERVATION
Discharge: HOME | End: 2024-04-14
Attending: EMERGENCY MEDICINE | Admitting: INTERNAL MEDICINE
Payer: COMMERCIAL

## 2024-04-13 ENCOUNTER — APPOINTMENT (EMERGENCY)
Dept: CT IMAGING | Facility: HOSPITAL | Age: 59
End: 2024-04-13
Payer: COMMERCIAL

## 2024-04-13 DIAGNOSIS — Q40.2 GASTRIC DUPLICATION CYST: ICD-10-CM

## 2024-04-13 DIAGNOSIS — Z79.899 POLYPHARMACY: ICD-10-CM

## 2024-04-13 DIAGNOSIS — F31.9 BIPOLAR AFFECTIVE DISORDER (HCC): ICD-10-CM

## 2024-04-13 DIAGNOSIS — R56.9 SEIZURE (HCC): Primary | ICD-10-CM

## 2024-04-13 PROBLEM — R94.31 QT PROLONGATION: Status: ACTIVE | Noted: 2024-04-13

## 2024-04-13 PROBLEM — F17.210 DEPENDENCE ON NICOTINE FROM CIGARETTES: Status: ACTIVE | Noted: 2024-04-13

## 2024-04-13 LAB
2HR DELTA HS TROPONIN: >2 NG/L
4HR DELTA HS TROPONIN: >2 NG/L
ALBUMIN SERPL BCP-MCNC: 3.6 G/DL (ref 3.5–5)
ALP SERPL-CCNC: 295 U/L (ref 34–104)
ALT SERPL W P-5'-P-CCNC: 33 U/L (ref 7–52)
ANION GAP SERPL CALCULATED.3IONS-SCNC: 6 MMOL/L (ref 4–13)
APAP SERPL-MCNC: <2 UG/ML (ref 10–20)
AST SERPL W P-5'-P-CCNC: 39 U/L (ref 13–39)
ATRIAL RATE: 100 BPM
ATRIAL RATE: 101 BPM
ATRIAL RATE: 121 BPM
BACTERIA UR QL AUTO: ABNORMAL /HPF
BASOPHILS # BLD AUTO: 0.03 THOUSANDS/ÂΜL (ref 0–0.1)
BASOPHILS NFR BLD AUTO: 0 % (ref 0–1)
BILIRUB SERPL-MCNC: 0.34 MG/DL (ref 0.2–1)
BILIRUB UR QL STRIP: NEGATIVE
BUN SERPL-MCNC: 20 MG/DL (ref 5–25)
CALCIUM SERPL-MCNC: 9.1 MG/DL (ref 8.4–10.2)
CARDIAC TROPONIN I PNL SERPL HS: 4 NG/L
CARDIAC TROPONIN I PNL SERPL HS: 4 NG/L
CARDIAC TROPONIN I PNL SERPL HS: <2 NG/L
CHLORIDE SERPL-SCNC: 100 MMOL/L (ref 96–108)
CLARITY UR: CLEAR
CO2 SERPL-SCNC: 27 MMOL/L (ref 21–32)
COLOR UR: YELLOW
CREAT SERPL-MCNC: 0.57 MG/DL (ref 0.6–1.3)
EOSINOPHIL # BLD AUTO: 0.05 THOUSAND/ÂΜL (ref 0–0.61)
EOSINOPHIL NFR BLD AUTO: 1 % (ref 0–6)
ERYTHROCYTE [DISTWIDTH] IN BLOOD BY AUTOMATED COUNT: 13.5 % (ref 11.6–15.1)
ETHANOL SERPL-MCNC: <10 MG/DL
GFR SERPL CREATININE-BSD FRML MDRD: 103 ML/MIN/1.73SQ M
GLUCOSE SERPL-MCNC: 121 MG/DL (ref 65–140)
GLUCOSE SERPL-MCNC: 142 MG/DL (ref 65–140)
GLUCOSE SERPL-MCNC: 163 MG/DL (ref 65–140)
GLUCOSE SERPL-MCNC: 315 MG/DL (ref 65–140)
GLUCOSE UR STRIP-MCNC: ABNORMAL MG/DL
HCT VFR BLD AUTO: 40.6 % (ref 34.8–46.1)
HGB BLD-MCNC: 13.6 G/DL (ref 11.5–15.4)
HGB UR QL STRIP.AUTO: NEGATIVE
IMM GRANULOCYTES # BLD AUTO: 0.03 THOUSAND/UL (ref 0–0.2)
IMM GRANULOCYTES NFR BLD AUTO: 0 % (ref 0–2)
KETONES UR STRIP-MCNC: ABNORMAL MG/DL
LEUKOCYTE ESTERASE UR QL STRIP: ABNORMAL
LIPASE SERPL-CCNC: 17 U/L (ref 11–82)
LYMPHOCYTES # BLD AUTO: 2.02 THOUSANDS/ÂΜL (ref 0.6–4.47)
LYMPHOCYTES NFR BLD AUTO: 30 % (ref 14–44)
MCH RBC QN AUTO: 33.1 PG (ref 26.8–34.3)
MCHC RBC AUTO-ENTMCNC: 33.5 G/DL (ref 31.4–37.4)
MCV RBC AUTO: 99 FL (ref 82–98)
MONOCYTES # BLD AUTO: 0.33 THOUSAND/ÂΜL (ref 0.17–1.22)
MONOCYTES NFR BLD AUTO: 5 % (ref 4–12)
MUCOUS THREADS UR QL AUTO: ABNORMAL
NEUTROPHILS # BLD AUTO: 4.32 THOUSANDS/ÂΜL (ref 1.85–7.62)
NEUTS SEG NFR BLD AUTO: 64 % (ref 43–75)
NITRITE UR QL STRIP: NEGATIVE
NON-SQ EPI CELLS URNS QL MICRO: ABNORMAL /HPF
NRBC BLD AUTO-RTO: 0 /100 WBCS
P AXIS: 46 DEGREES
P AXIS: 62 DEGREES
P AXIS: 75 DEGREES
PH UR STRIP.AUTO: 5.5 [PH] (ref 4.5–8)
PLATELET # BLD AUTO: 333 THOUSANDS/UL (ref 149–390)
PMV BLD AUTO: 9.9 FL (ref 8.9–12.7)
POTASSIUM SERPL-SCNC: 4.2 MMOL/L (ref 3.5–5.3)
PR INTERVAL: 128 MS
PR INTERVAL: 164 MS
PR INTERVAL: 168 MS
PROT SERPL-MCNC: 6.8 G/DL (ref 6.4–8.4)
PROT UR STRIP-MCNC: NEGATIVE MG/DL
QRS AXIS: 25 DEGREES
QRS AXIS: 28 DEGREES
QRS AXIS: 5 DEGREES
QRSD INTERVAL: 94 MS
QT INTERVAL: 358 MS
QT INTERVAL: 396 MS
QT INTERVAL: 396 MS
QTC INTERVAL: 508 MS
QTC INTERVAL: 510 MS
QTC INTERVAL: 513 MS
RBC # BLD AUTO: 4.11 MILLION/UL (ref 3.81–5.12)
RBC #/AREA URNS AUTO: ABNORMAL /HPF
SALICYLATES SERPL-MCNC: <5 MG/DL (ref 3–20)
SODIUM SERPL-SCNC: 133 MMOL/L (ref 135–147)
SP GR UR STRIP.AUTO: >=1.03 (ref 1–1.03)
T WAVE AXIS: 47 DEGREES
T WAVE AXIS: 48 DEGREES
T WAVE AXIS: 64 DEGREES
TSH SERPL DL<=0.05 MIU/L-ACNC: 3.44 UIU/ML (ref 0.45–4.5)
UROBILINOGEN UR QL STRIP.AUTO: 0.2 E.U./DL
VENTRICULAR RATE: 100 BPM
VENTRICULAR RATE: 101 BPM
VENTRICULAR RATE: 121 BPM
WBC # BLD AUTO: 6.78 THOUSAND/UL (ref 4.31–10.16)
WBC #/AREA URNS AUTO: ABNORMAL /HPF

## 2024-04-13 PROCEDURE — 80143 DRUG ASSAY ACETAMINOPHEN: CPT | Performed by: PHYSICIAN ASSISTANT

## 2024-04-13 PROCEDURE — 83690 ASSAY OF LIPASE: CPT | Performed by: PHYSICIAN ASSISTANT

## 2024-04-13 PROCEDURE — 99285 EMERGENCY DEPT VISIT HI MDM: CPT

## 2024-04-13 PROCEDURE — 84484 ASSAY OF TROPONIN QUANT: CPT | Performed by: PHYSICIAN ASSISTANT

## 2024-04-13 PROCEDURE — 99215 OFFICE O/P EST HI 40 MIN: CPT | Performed by: PSYCHIATRY & NEUROLOGY

## 2024-04-13 PROCEDURE — 71260 CT THORAX DX C+: CPT

## 2024-04-13 PROCEDURE — 74177 CT ABD & PELVIS W/CONTRAST: CPT

## 2024-04-13 PROCEDURE — 82948 REAGENT STRIP/BLOOD GLUCOSE: CPT

## 2024-04-13 PROCEDURE — 85025 COMPLETE CBC W/AUTO DIFF WBC: CPT | Performed by: PHYSICIAN ASSISTANT

## 2024-04-13 PROCEDURE — 81001 URINALYSIS AUTO W/SCOPE: CPT

## 2024-04-13 PROCEDURE — 93010 ELECTROCARDIOGRAM REPORT: CPT

## 2024-04-13 PROCEDURE — 36415 COLL VENOUS BLD VENIPUNCTURE: CPT | Performed by: PHYSICIAN ASSISTANT

## 2024-04-13 PROCEDURE — 93005 ELECTROCARDIOGRAM TRACING: CPT

## 2024-04-13 PROCEDURE — 99245 OFF/OP CONSLTJ NEW/EST HI 55: CPT | Performed by: EMERGENCY MEDICINE

## 2024-04-13 PROCEDURE — 99223 1ST HOSP IP/OBS HIGH 75: CPT | Performed by: INTERNAL MEDICINE

## 2024-04-13 PROCEDURE — 80179 DRUG ASSAY SALICYLATE: CPT | Performed by: PHYSICIAN ASSISTANT

## 2024-04-13 PROCEDURE — 70496 CT ANGIOGRAPHY HEAD: CPT

## 2024-04-13 PROCEDURE — 70498 CT ANGIOGRAPHY NECK: CPT

## 2024-04-13 PROCEDURE — 99285 EMERGENCY DEPT VISIT HI MDM: CPT | Performed by: PHYSICIAN ASSISTANT

## 2024-04-13 PROCEDURE — 82077 ASSAY SPEC XCP UR&BREATH IA: CPT | Performed by: PHYSICIAN ASSISTANT

## 2024-04-13 PROCEDURE — 84443 ASSAY THYROID STIM HORMONE: CPT | Performed by: PHYSICIAN ASSISTANT

## 2024-04-13 PROCEDURE — 80053 COMPREHEN METABOLIC PANEL: CPT | Performed by: PHYSICIAN ASSISTANT

## 2024-04-13 RX ORDER — NICOTINE 21 MG/24HR
1 PATCH, TRANSDERMAL 24 HOURS TRANSDERMAL DAILY
Status: DISCONTINUED | OUTPATIENT
Start: 2024-04-13 | End: 2024-04-14 | Stop reason: HOSPADM

## 2024-04-13 RX ORDER — LORAZEPAM 2 MG/ML
0.5 INJECTION INTRAMUSCULAR EVERY 6 HOURS PRN
Status: DISCONTINUED | OUTPATIENT
Start: 2024-04-13 | End: 2024-04-14 | Stop reason: HOSPADM

## 2024-04-13 RX ORDER — SIMETHICONE 125 MG
125 TABLET,CHEWABLE ORAL EVERY 6 HOURS PRN
COMMUNITY
End: 2024-04-14

## 2024-04-13 RX ORDER — GABAPENTIN 400 MG/1
400 CAPSULE ORAL 3 TIMES DAILY
COMMUNITY

## 2024-04-13 RX ORDER — MAGNESIUM HYDROXIDE/ALUMINUM HYDROXICE/SIMETHICONE 120; 1200; 1200 MG/30ML; MG/30ML; MG/30ML
30 SUSPENSION ORAL EVERY 6 HOURS PRN
Status: DISCONTINUED | OUTPATIENT
Start: 2024-04-13 | End: 2024-04-14 | Stop reason: HOSPADM

## 2024-04-13 RX ORDER — SODIUM CHLORIDE, SODIUM GLUCONATE, SODIUM ACETATE, POTASSIUM CHLORIDE, MAGNESIUM CHLORIDE, SODIUM PHOSPHATE, DIBASIC, AND POTASSIUM PHOSPHATE .53; .5; .37; .037; .03; .012; .00082 G/100ML; G/100ML; G/100ML; G/100ML; G/100ML; G/100ML; G/100ML
125 INJECTION, SOLUTION INTRAVENOUS CONTINUOUS
Status: DISCONTINUED | OUTPATIENT
Start: 2024-04-13 | End: 2024-04-14 | Stop reason: HOSPADM

## 2024-04-13 RX ORDER — PANTOPRAZOLE SODIUM 40 MG/1
40 TABLET, DELAYED RELEASE ORAL DAILY
COMMUNITY

## 2024-04-13 RX ORDER — FAMOTIDINE 20 MG/1
20 TABLET, FILM COATED ORAL
Status: DISCONTINUED | OUTPATIENT
Start: 2024-04-14 | End: 2024-04-14 | Stop reason: HOSPADM

## 2024-04-13 RX ORDER — LINACLOTIDE 72 UG/1
1 CAPSULE, GELATIN COATED ORAL DAILY
COMMUNITY

## 2024-04-13 RX ORDER — ACETAMINOPHEN 325 MG/1
650 TABLET ORAL EVERY 6 HOURS PRN
Status: DISCONTINUED | OUTPATIENT
Start: 2024-04-13 | End: 2024-04-14 | Stop reason: HOSPADM

## 2024-04-13 RX ADMIN — LORAZEPAM 0.5 MG: 2 INJECTION INTRAMUSCULAR; INTRAVENOUS at 22:59

## 2024-04-13 RX ADMIN — SODIUM CHLORIDE, SODIUM GLUCONATE, SODIUM ACETATE, POTASSIUM CHLORIDE, MAGNESIUM CHLORIDE, SODIUM PHOSPHATE, DIBASIC, AND POTASSIUM PHOSPHATE 125 ML/HR: .53; .5; .37; .037; .03; .012; .00082 INJECTION, SOLUTION INTRAVENOUS at 16:00

## 2024-04-13 RX ADMIN — IOHEXOL 100 ML: 350 INJECTION, SOLUTION INTRAVENOUS at 11:43

## 2024-04-13 NOTE — ASSESSMENT & PLAN NOTE
History of memory difficulties with remote history of seizures  PT, OT cognitive evaluation  May benefit from home services/home nursing

## 2024-04-13 NOTE — H&P
[facility]  H&P  Name: Edith Klein 59 y.o. female I MRN: [id number]  Unit/Bed#: ED-03 I Date of Admission: 4/13/2024   Date of Service: 4/13/2024 I Hospital Day: 0      Assessment/Plan   * Acute metabolic encephalopathy  Assessment & Plan  History of memory difficulties with remote history of seizures  PT, OT cognitive evaluation  May benefit from home services/home nursing    Seizure (HCC)  Assessment & Plan  Patient presents with seizure likely secondary to polypharmacy  History of seizures in the past but has been off of medication for some time  Neurology consult for evaluation  EEG and MRI ordered for completeness    QT prolongation  Assessment & Plan  Suspect secondary to home medications  Hold QTc prolonging meds  EKG in the morning for follow-up    Dependence on nicotine from cigarettes  Assessment & Plan  Nicotine patch    COPD (chronic obstructive pulmonary disease) ([facility])  Assessment & Plan  Does not appear to be in acute exacerbation    Bipolar affective disorder ([facility])  Assessment & Plan  Hold home medications for now  Ativan as needed         VTE Prophylaxis: Low risk ambulation  Code Status: Level 1 full code    Anticipated Length of Stay:  Patient will be admitted on an Observation basis with an anticipated length of stay of less than 2 midnights.   Justification for Hospital Stay: Observation and management of polypharmacy, anticholinergic toxicity, seizure disorder    Total Time for Visit, including Counseling / Coordination of Care: I have spent a total time of 70 minutes on 04/13/24 in caring for this patient including Diagnostic results, Instructions for management, Patient and family education, Impressions, Counseling / Coordination of care, Reviewing / ordering tests, medicine, procedures  , Obtaining or reviewing history  , and Communicating with other healthcare professionals .    Chief Complaint:   Seizure    History of Present Illness:    Edith Klein is a 59 y.o.  female With past medical history of IBS, chronic low back pain, bipolar disorder presents to the hospital after witnessed seizure.  Patient was postictal upon EMS arrival.  They were multiple discrepancies noted between pill bottles and last fill date.  Both Ambien and Seroquel were empty and these were filled on the first of the month.  Patient was seen by toxicology who suspect that acute encephalopathy and seizure was related to polypharmacy.  Recommendation for admission for supportive care and benzos as needed.    Review of Systems:    Review of Systems   Constitutional:  Negative for chills and fever.   HENT:  Negative for sore throat and trouble swallowing.    Eyes:  Negative for photophobia and visual disturbance.   Respiratory:  Negative for shortness of breath and wheezing.    Cardiovascular:  Negative for chest pain and palpitations.   Gastrointestinal:  Negative for constipation, diarrhea, nausea and vomiting.   Genitourinary:  Negative for difficulty urinating and dysuria.   Musculoskeletal:  Negative for arthralgias and myalgias.   Skin:  Negative for rash and wound.   Neurological:  Positive for tremors. Negative for dizziness, light-headedness and headaches.       Past Medical and Surgical History:     Past Medical History:   Diagnosis Date    Autoimmune hepatitis (HCC)     Bipolar 1 disorder (HCC)     Chronic headaches 2021    Diabetes mellitus (HCC)     Kidney stone     Renal disorder     kidney stones    Seizure disorder (HCC)        Past Surgical History:   Procedure Laterality Date    BARIATRIC SURGERY  05/2017    BUNIONECTOMY      CYSTOSCOPY  07/12/2018    Stent Removal    FL RETROGRADE PYELOGRAM  6/16/2022    GASTRIC BYPASS  05/22/2017    HIATAL HERNIA REPAIR  05/22/2017    HYSTERECTOMY      JOINT REPLACEMENT      KIDNEY STONE SURGERY      AR CYSTO/URETERO W/LITHOTRIPSY &INDWELL STENT INSRT Bilateral 7/6/2018    Procedure: CYSTOSCOPY GALDINO. URETEROSCOPY;GALDINO.  RETROGRADE PYELOGRAM AND INSERTION  GALDINO.STENT URETERAL;  Surgeon: Jacob Gerber MD;  Location: [facility];  Service: Urology    AK CYSTO/URETERO W/LITHOTRIPSY &INDWELL STENT INSRT Left 6/16/2022    Procedure: CYSTOSCOPY URETEROSCOPY WITH LITHOTRIPSY HOLMIUM LASER, RETROGRADE PYELOGRAM AND INSERTION STENT URETERAL;  Surgeon: Asad Geiger MD;  Location: [facility];  Service: Urology    TOTAL SHOULDER REPLACEMENT  2002    VAGINAL HYSTERECTOMY      PARTIAL       Meds/Allergies:    Prior to Admission medications    Medication Sig Start Date End Date Taking? Authorizing Provider   amitriptyline (ELAVIL) 50 mg tablet Take 50 mg by mouth daily at bedtime 7/3/23  Yes Historical Provider, MD   azaTHIOprine (IMURAN) 50 mg tablet Take 100 mg by mouth daily   Yes Historical Provider, MD   gabapentin (NEURONTIN) 400 mg capsule Take 400 mg by mouth 3 (three) times a day   Yes Historical Provider, MD   Jardiance 25 MG TABS  5/1/23  Yes Historical Provider, MD   linaCLOtide (Linzess) 72 MCG CAPS Take 1 capsule by mouth daily   Yes Historical Provider, MD   pantoprazole (PROTONIX) 40 mg tablet Take 40 mg by mouth daily   Yes Historical Provider, MD   QUEtiapine (SEROquel) 400 MG tablet Take 800 mg by mouth daily at bedtime 7/31/23  Yes Historical Provider, MD   QUEtiapine (SEROquel) 50 mg tablet Take 50 mg by mouth daily at bedtime 7/31/23  Yes Historical Provider, MD   simethicone (MYLICON) 125 MG chewable tablet Chew 125 mg every 6 (six) hours as needed for flatulence   Yes Historical Provider, MD   varenicline (CHANTIX) 1 mg tablet Take 1 mg by mouth 2 (two) times a day 3/30/23  Yes Historical Provider, MD   zolpidem (AMBIEN) 10 mg tablet  7/31/23  Yes Historical Provider, MD   amitriptyline (ELAVIL) 25 mg tablet  6/20/23   Historical Provider, MD   aspirin (ECOTRIN LOW STRENGTH) 81 mg EC tablet Take 81 mg by mouth daily   12/18/17   Historical Provider, MD   aspirin-acetaminophen-caffeine (EXCEDRIN MIGRAINE) 250-250-65 MG per tablet Take 1 tablet by mouth  every 6 (six) hours as needed for headaches 2/11/23   Marii Brown PA-C   benztropine (COGENTIN) 0.5 mg tablet  7/31/23   Historical Provider, MD   Diclofenac Sodium (VOLTAREN) 1 % Apply 2 g topically 4 (four) times a day 7/15/23   Jeannette Bledsoe PA-C   DULoxetine (CYMBALTA) 20 mg capsule  7/31/23   Historical Provider, MD   ergocalciferol (ERGOCALCIFEROL) 1.25 MG (54447 UT) capsule Take 1 capsule by mouth once a week 8/8/23 10/31/23  Historical Provider, MD   famotidine (PEPCID) 40 MG tablet  5/30/23   Historical Provider, MD   FLUoxetine (PROzac) 40 MG capsule Take 40 mg by mouth daily   9/5/17   Historical Provider, MD   LORazepam (ATIVAN) 1 mg tablet Take 1 mg by mouth 2 (two) times a day    Historical Provider, MD   Lubricating Plus Eye Drops 0.5 % SOLN  6/16/23   Historical Provider, MD   Mouthwashes (Biotene Dry Mouth) LIQD 15 mL by Transmucosal route 4 (four) times a day as needed 9/7/23   Historical Provider, MD   naproxen (Naprosyn) 500 mg tablet Take 1 tablet (500 mg total) by mouth 2 (two) times a day with meals 9/24/23   Sen Garcia MD   QUEtiapine (SEROquel) 100 mg tablet Take 400 mg by mouth daily at bedtime  Patient not taking: Reported on 4/13/2024    Historical Provider, MD   rizatriptan (MAXALT) 10 mg tablet  6/16/23   Historical Provider, MD   tiZANidine (ZANAFLEX) 2 mg tablet  5/15/23   Historical Provider, MD   tiZANidine (ZANAFLEX) 4 mg tablet  7/5/23   Historical Provider, MD   TiZANidine (ZANAFLEX) 6 MG capsule Take 6 mg by mouth Three times a day  Patient not taking: Reported on 9/27/2023 6/1/23 5/31/24  Historical Provider, MD   topiramate (TOPAMAX) 25 mg tablet Take 25 mg by mouth 2 (two) times a day 3/3/23 3/2/24  Historical Provider, MD   traMADol (ULTRAM) 50 mg tablet  6/16/23   Historical Provider, MD Ramirezulicity 1.5 MG/0.5ML injection  6/27/23   Historical Provider, MD   zolpidem (AMBIEN) 5 mg tablet Take 10 mg by mouth daily at bedtime as needed for sleep       Historical Provider, MD       Allergies: No Known Allergies    Social History:     Marital Status: Single   Substance Use History:   Social History     Substance and Sexual Activity   Alcohol Use No    Comment: n/a     Social History     Tobacco Use   Smoking Status Every Day    Current packs/day: 0.25    Average packs/day: 0.3 packs/day for 30.0 years (7.5 ttl pk-yrs)    Types: Cigarettes    Passive exposure: Current   Smokeless Tobacco Never   Tobacco Comments    N/a     Social History     Substance and Sexual Activity   Drug Use No    Comment: n/a       Family History:    Pertinent family history reviewed    Physical Exam:     Vitals:   Blood Pressure: 102/78 (04/13/24 1300)  Pulse: 100 (04/13/24 1300)  Temperature: 98.8 °F (37.1 °C) (04/13/24 1026)  Temp Source: Oral (04/13/24 1026)  Respirations: (!) 28 (04/13/24 1300)  SpO2: 92 % (04/13/24 1300)    Physical Exam  Vitals reviewed.   Constitutional:       General: She is not in acute distress.     Appearance: She is well-developed. She is not ill-appearing, toxic-appearing or diaphoretic.   HENT:      Head: Normocephalic and atraumatic.      Mouth/Throat:      Mouth: Mucous membranes are moist.      Pharynx: No oropharyngeal exudate.   Eyes:      General: No scleral icterus.     Extraocular Movements: Extraocular movements intact.      Conjunctiva/sclera: Conjunctivae normal.   Cardiovascular:      Rate and Rhythm: Normal rate and regular rhythm.      Heart sounds: Normal heart sounds.   Pulmonary:      Effort: Pulmonary effort is normal. No respiratory distress.      Breath sounds: Normal breath sounds. No wheezing or rales.   Abdominal:      General: There is no distension.      Palpations: Abdomen is soft.      Tenderness: There is no abdominal tenderness. There is no guarding or rebound.   Musculoskeletal:         General: No swelling, tenderness or deformity.      Cervical back: Normal range of motion.   Skin:     General: Skin is warm and dry.    Neurological:      General: No focal deficit present.      Mental Status: She is alert. Mental status is at baseline.      Comments: Resting tremors of extremities   Psychiatric:         Mood and Affect: Mood normal.         Behavior: Behavior normal.         Thought Content: Thought content normal.         Judgment: Judgment normal.          Additional Data:     Lab Results: I have reviewed pertinent results     Results from last 7 days   Lab Units 04/13/24  1015   WBC Thousand/uL 6.78   HEMOGLOBIN g/dL 13.6   HEMATOCRIT % 40.6   PLATELETS Thousands/uL 333   SEGS PCT % 64   LYMPHO PCT % 30   MONO PCT % 5   EOS PCT % 1     Results from last 7 days   Lab Units 04/13/24  1120   SODIUM mmol/L 133*   POTASSIUM mmol/L 4.2   CHLORIDE mmol/L 100   CO2 mmol/L 27   BUN mg/dL 20   CREATININE mg/dL 0.57*   ANION GAP mmol/L 6   CALCIUM mg/dL 9.1   ALBUMIN g/dL 3.6   TOTAL BILIRUBIN mg/dL 0.34   ALK PHOS U/L 295*   ALT U/L 33   AST U/L 39   GLUCOSE RANDOM mg/dL 163*         Results from last 7 days   Lab Units 04/13/24  1300   POC GLUCOSE mg/dl 121               Imaging: I have reviewed pertinent imaging     CTA head and neck with and without contrast   Final Result by Wilber Freeman DO (04/13 1218)      CT brain: No acute intracranial abnormality.      CT angiography: Mild atherosclerotic disease of the carotid bifurcations and intracranial internal carotid arteries with no moderate to high-grade stenosis and no occlusive disease. No evidence of acute arterial injury.                  Workstation performed: [id number]         CT chest abdomen pelvis w contrast   Final Result by Chirag Neff MD (04/13 1230)      Mild patchy dependent atelectasis in the visualized lower lobes with trace left pleural effusion.      Stable tiny 2 mm pulm nodule left lower lobe.      Minimal dependent secretions noted within the right mainstem bronchus.      Probable constipation.      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      Stable additional findings as above.               Workstation performed: [id number]         CT recon only cervical spine (No Charge)   Final Result by Wilber Freeman DO (04/13 1220)      No acute osseous abnormality. Mild cervical degenerative change.         Workstation performed: [id number]         MRI inpatient order    (Results Pending)       EKG, Pathology, and Other Studies Reviewed on Admission:   EKG: NSR, prolonged qt     Allscripts / Epic Records Reviewed    ** Please Note: This note has been constructed using a voice recognition system. **

## 2024-04-13 NOTE — ED NOTES
Provider walked out of room, & a moment later pt was standing at end of bed trying to pull everything off, assisted back into bed & hooked back up for monitoring.     Francesca Hess RN  04/13/24 0280

## 2024-04-13 NOTE — ED NOTES
Provider at bedside.  Pt continues to state that she wants to leave AMA.  Provider aware.     Francesca Hess RN  04/13/24 5215

## 2024-04-13 NOTE — ASSESSMENT & PLAN NOTE
Patient presents with seizure likely secondary to polypharmacy  History of seizures in the past but has been off of medication for some time  Neurology consult for evaluation  EEG and MRI ordered for completeness

## 2024-04-13 NOTE — ED NOTES
Family brought in med bottles & said some are empty that shouldn't be.  Pt was suicidal approx 2 months ago.  Pt denies attempting to kill self at this time.  Bottles of meds:  Zolpidem 10mg 30 tablets - empty bottle, Quetiapine 400mg  60 tablets - only 12.5 tablets in bottle, Quetipine 50mg - 30 tablet - empty bottle.     Francesca Hess, RN  04/13/24 9325

## 2024-04-13 NOTE — PLAN OF CARE
Problem: SAFETY ADULT  Goal: Patient will remain free of falls  Description: INTERVENTIONS:  - Educate patient/family on patient safety including physical limitations  - Instruct patient to call for assistance with activity   - Consult OT/PT to assist with strengthening/mobility   - Keep Call bell within reach  - Keep bed low and locked with side rails adjusted as appropriate  - Keep care items and personal belongings within reach  - Initiate and maintain comfort rounds  - Make Fall Risk Sign visible to staff  - Offer Toileting every 2 Hours, in advance of need  - Initiate/Maintain bed alarm  - Obtain necessary fall risk management equipment: walker  - Apply yellow socks and bracelet for high fall risk patients  - Consider moving patient to room near nurses station  Outcome: Progressing  Goal: Maintain or return to baseline ADL function  Description: INTERVENTIONS:  -  Assess patient's ability to carry out ADLs; assess patient's baseline for ADL function and identify physical deficits which impact ability to perform ADLs (bathing, care of mouth/teeth, toileting, grooming, dressing, etc.)  - Assess/evaluate cause of self-care deficits   - Assess range of motion  - Assess patient's mobility; develop plan if impaired  - Assess patient's need for assistive devices and provide as appropriate  - Encourage maximum independence but intervene and supervise when necessary  - Involve family in performance of ADLs  - Assess for home care needs following discharge   - Consider OT consult to assist with ADL evaluation and planning for discharge  - Provide patient education as appropriate  Outcome: Progressing  Goal: Maintains/Returns to pre admission functional level  Description: INTERVENTIONS:  - Perform AM-PAC 6 Click Basic Mobility/ Daily Activity assessment daily.  - Set and communicate daily mobility goal to care team and patient/family/caregiver.   - Collaborate with rehabilitation services on mobility goals if  consulted  - Perform Range of Motion 3 times a day.  - Reposition patient every 2 hours.  - Dangle patient 3 times a day  - Stand patient 3 times a day  - Ambulate patient 3 times a day  - Out of bed to chair 3 times a day   - Out of bed for meals 3 times a day  - Out of bed for toileting  - Record patient progress and toleration of activity level   Outcome: Progressing     Problem: DISCHARGE PLANNING  Goal: Discharge to home or other facility with appropriate resources  Description: INTERVENTIONS:  - Identify barriers to discharge w/patient and caregiver  - Arrange for needed discharge resources and transportation as appropriate  - Identify discharge learning needs (meds, wound care, etc.)  - Arrange for interpretive services to assist at discharge as needed  - Refer to Case Management Department for coordinating discharge planning if the patient needs post-hospital services based on physician/advanced practitioner order or complex needs related to functional status, cognitive ability, or social support system  Outcome: Progressing     Problem: Knowledge Deficit  Goal: Patient/family/caregiver demonstrates understanding of disease process, treatment plan, medications, and discharge instructions  Description: Complete learning assessment and assess knowledge base.  Interventions:  - Provide teaching at level of understanding  - Provide teaching via preferred learning methods  Outcome: Progressing     Problem: NEUROSENSORY - ADULT  Goal: Achieves stable or improved neurological status  Description: INTERVENTIONS  - Monitor and report changes in neurological status  - Monitor vital signs such as temperature, blood pressure, glucose, and any other labs ordered   - Initiate measures to prevent increased intracranial pressure  - Monitor for seizure activity and implement precautions if appropriate      Outcome: Progressing  Goal: Remains free of injury related to seizures activity  Description: INTERVENTIONS  - Maintain  airway, patient safety  and administer oxygen as ordered  - Monitor patient for seizure activity, document and report duration and description of seizure to physician/advanced practitioner  - If seizure occurs,  ensure patient safety during seizure  - Reorient patient post seizure  - Seizure pads on all 4 side rails  - Instruct patient/family to notify RN of any seizure activity including if an aura is experienced  - Instruct patient/family to call for assistance with activity based on nursing assessment  - Administer anti-seizure medications if ordered    Outcome: Progressing     Problem: METABOLIC, FLUID AND ELECTROLYTES - ADULT  Goal: Glucose maintained within target range  Description: INTERVENTIONS:  - Monitor Blood Glucose as ordered  - Assess for signs and symptoms of hyperglycemia and hypoglycemia  - Administer ordered medications to maintain glucose within target range  - Assess nutritional intake and initiate nutrition service referral as needed  Outcome: Progressing     Problem: METABOLIC, FLUID AND ELECTROLYTES - ADULT  Goal: Glucose maintained within target range  Description: INTERVENTIONS:  - Monitor Blood Glucose as ordered  - Assess for signs and symptoms of hyperglycemia and hypoglycemia  - Administer ordered medications to maintain glucose within target range  - Assess nutritional intake and initiate nutrition service referral as needed  Outcome: Progressing

## 2024-04-13 NOTE — CONSULTS
Consultation - Neurology   Edith Klein 59 y.o. female MRN: [id number]  Unit/Bed#: [place] [address] -01 Encounter: [id number]      Assessment/Plan   Assessment:  -Reported witnessed generalized convulsion by family member at home.  No known prior history  -Described as postictal on EMS arrival and in the emergency department, now resolved  -Probable medication missed administration-potentially taking multiple extra doses of Seroquel, Ambien (seizures and lowered seizure threshold are reported potential adverse effects with quetiapine)  -Toxic encephalopathy, probably secondary to above, now appears resolved  Plan:  Low likelihood of new onset epilepsy.  MRI brain as has been ordered is not unreasonable, but if she is otherwise stable for discharge and this has not yet been completed, it could be done as an outpatient.  Similarly, she does not need to stay extra days for EEG testing, as this is relatively low yield, and there is seems to be plausible explanation for symptomatic seizure related to overdose.  I would only pursue EEG in the future if there is a recurrent seizure without clear provocation.  Provided she is deemed medically stable tomorrow from a cardiac standpoint (vis-à-vis QT prolongation), with no further neurologic events, then neurology is okay with discharge.  She would not need specific neurologic follow-up for this issue, and as noted below, has already been referred to [facility] neurology for evaluation of cognitive decline.  I reinforced the importance of taking her medications as prescribed, and suggested that she should allow her sister to assist either with pillboxes or med supervision.      Physician Requesting Consult: Jesse Moreno DO  Reason for Consult / Principal Problem: Witnessed seizure  Hx and PE limited by: Patient is limited historian, with no recall of the events leading to admission, however does seem reliable with respect to her knowledge of her past medical history (in  particular the absence of prior seizure disorder)    HPI: Edith Klein is a 59 y.o. year old female who presents with a reported witnessed seizure.  Per chart review, family (sister with whom she lives) found her on the floor of her bedroom.  After assisting her back to bed she witnessed a 2-minute convulsion.  Chart reports that she has a remote history of seizures, but  the patient denies this, and I cannot find independent verification either with seizure disorder listed in her history prior to now, or any neurologic notes in that regard.  There was an admission in September 2023 where she presented after being found unresponsive with some retrograde amnesia, partially improved with Narcan with some residual confusion.  She has been referred to [facility] neurology with an upcoming appointment in October, for evaluation of possible dementia.      She was somewhat confused on presentation, and additional history obtained from her family indicated that a couple of her medication bottles had fewer than expected pills (Seroquel, amitriptyline, Ambien).  Toxicology was consulted in the emergency room and recommended holding Seroquel, Elavil, Ambien.  Her laboratory studies showed reasonably balanced electrolytes.  There was elevated alk phos, and significant glucosuria.  There is been no seizure recurrence since her arrival.  Past neurologic history is notable for reported migraine or posttraumatic headaches, and prior suicidality.  She denies prior history of CNS infection, does report hitting her head due to falls.  No known family history of epilepsy.  To her knowledge she achieved milestones at appropriate ages.  She has eighth-grade education.    Inpatient consult to Neurology  Consult performed by: Óscar Jones DO  Consult ordered by: Jesse Moreno DO          Review of Systems   Constitutional: Negative.    HENT: Negative.          Edentulous   Eyes: Negative.    Respiratory: Negative.      Cardiovascular: Negative.    Gastrointestinal: Negative.    Endocrine: Negative.    Genitourinary: Negative.    Musculoskeletal: Negative.         Right shoulder replacement   Skin: Negative.    Neurological: Negative.    Psychiatric/Behavioral:  Positive for confusion. The patient is nervous/anxious.        Historical Information   Past Medical History:   Diagnosis Date    Autoimmune hepatitis (HCC)     Bipolar 1 disorder (HCC)     Chronic headaches 2021    Diabetes mellitus (HCC)     Kidney stone     Renal disorder     kidney stones    Seizure disorder (HCC)      Past Surgical History:   Procedure Laterality Date    BARIATRIC SURGERY  05/2017    BUNIONECTOMY      CYSTOSCOPY  07/12/2018    Stent Removal    FL RETROGRADE PYELOGRAM  6/16/2022    GASTRIC BYPASS  05/22/2017    HIATAL HERNIA REPAIR  05/22/2017    HYSTERECTOMY      JOINT REPLACEMENT      KIDNEY STONE SURGERY      [place] CYSTO/URETERO W/LITHOTRIPSY &INDWELL STENT INSRT Bilateral 7/6/2018    Procedure: CYSTOSCOPY GALDINO. URETEROSCOPY;GALDINO.  RETROGRADE PYELOGRAM AND INSERTION GALDINO.STENT URETERAL;  Surgeon: Jacob Gerber MD;  [facility];  Service: Urology    [place] CYSTO/URETERO W/LITHOTRIPSY &INDWELL STENT INSRT Left 6/16/2022    Procedure: CYSTOSCOPY URETEROSCOPY WITH LITHOTRIPSY HOLMIUM LASER, RETROGRADE PYELOGRAM AND INSERTION STENT URETERAL;  Surgeon: Asad Geiger MD;  [facility];  Service: Urology    TOTAL SHOULDER REPLACEMENT  2002    VAGINAL HYSTERECTOMY      PARTIAL     Social History   Social History     Substance and Sexual Activity   Alcohol Use No    Comment: n/a     Social History     Substance and Sexual Activity   Drug Use No    Comment: n/a     Social History     Tobacco Use   Smoking Status Every Day    Current packs/day: 0.25    Average packs/day: 0.3 packs/day for 30.0 years (7.5 ttl pk-yrs)    Types: Cigarettes    Passive exposure: Current   Smokeless Tobacco Never   Tobacco Comments    N/a     Family History:   Family  History   Problem Relation Age of Onset    Diabetes Father     Heart disease Father     Diabetes Mother     Heart disease Mother     Diabetes Sister     Diabetes Family         MELLITUS    Depression Family     Mental illness Family     Obesity Family     Osteoarthritis Family            Meds/Allergies   current meds:   Current Facility-Administered Medications   Medication Dose Route Frequency    acetaminophen (TYLENOL) tablet 650 mg  650 mg Oral Q6H PRN    aluminum-magnesium hydroxide-simethicone (MAALOX) oral suspension 30 mL  30 mL Oral Q6H PRN    [START ON 4/14/2024] aspirin (ECOTRIN LOW STRENGTH) EC tablet 81 mg  81 mg Oral Daily    [START ON 4/14/2024] famotidine (PEPCID) tablet 20 mg  20 mg Oral Daily Before Breakfast    LORazepam (ATIVAN) injection 0.5 mg  0.5 mg Intravenous Q6H PRN    multi-electrolyte (PLASMALYTE-A/ISOLYTE-S PH 7.4) IV solution  125 mL/hr Intravenous Continuous    nicotine (NICODERM CQ) 21 mg/24 hr TD 24 hr patch 1 patch  1 patch Transdermal Daily    and PTA meds:   Prior to Admission Medications   Prescriptions Last Dose Informant Patient Reported? Taking?   Diclofenac Sodium (VOLTAREN) 1 %   No No   Sig: Apply 2 g topically 4 (four) times a day   Jardiance 25 MG TABS   Yes Yes   LORazepam (ATIVAN) 1 mg tablet  Self Yes No   Sig: Take 1 mg by mouth 2 (two) times a day   Lubricating Plus Eye Drops 0.5 % SOLN   Yes No   Mouthwashes (Biotene Dry Mouth) LIQD   Yes No   Sig: 15 mL by Transmucosal route 4 (four) times a day as needed   QUEtiapine (SEROquel) 100 mg tablet Not Taking Self Yes No   Sig: Take 400 mg by mouth daily at bedtime   Patient not taking: Reported on 4/13/2024   QUEtiapine (SEROquel) 400 MG tablet   Yes Yes   Sig: Take 800 mg by mouth daily at bedtime   QUEtiapine (SEROquel) 50 mg tablet   Yes Yes   Sig: Take 50 mg by mouth daily at bedtime   TiZANidine (ZANAFLEX) 6 MG capsule   Yes No   Sig: Take 6 mg by mouth Three times a day   Patient not taking: Reported on 9/27/2023    Trulicity 1.5 MG/0.5ML injection   Yes No   amitriptyline (ELAVIL) 25 mg tablet Not Taking  Yes No   Patient not taking: Reported on 4/13/2024   amitriptyline (ELAVIL) 50 mg tablet   Yes Yes   Sig: Take 50 mg by mouth daily at bedtime   aspirin (ECOTRIN LOW STRENGTH) 81 mg EC tablet  Self Yes No   Sig: Take 81 mg by mouth daily     aspirin-acetaminophen-caffeine (EXCEDRIN MIGRAINE) 250-250-65 MG per tablet   No No   Sig: Take 1 tablet by mouth every 6 (six) hours as needed for headaches   azaTHIOprine (IMURAN) 50 mg tablet  Self Yes Yes   Sig: Take 100 mg by mouth daily   ergocalciferol (ERGOCALCIFEROL) 1.25 MG (82432 UT) capsule   Yes No   Sig: Take 1 capsule by mouth once a week   famotidine (PEPCID) 40 MG tablet   Yes No   gabapentin (NEURONTIN) 400 mg capsule   Yes Yes   Sig: Take 400 mg by mouth 3 (three) times a day   linaCLOtide (Linzess) 72 MCG CAPS   Yes Yes   Sig: Take 1 capsule by mouth daily   naproxen (Naprosyn) 500 mg tablet   No No   Sig: Take 1 tablet (500 mg total) by mouth 2 (two) times a day with meals   pantoprazole (PROTONIX) 40 mg tablet   Yes Yes   Sig: Take 40 mg by mouth daily   rizatriptan (MAXALT) 10 mg tablet   Yes No   Patient not taking: Reported on 9/27/2023   simethicone (MYLICON) 125 MG chewable tablet   Yes Yes   Sig: Chew 125 mg every 6 (six) hours as needed for flatulence   tiZANidine (ZANAFLEX) 2 mg tablet   Yes No   Patient not taking: Reported on 9/27/2023   tiZANidine (ZANAFLEX) 4 mg tablet   Yes No   Patient not taking: Reported on 9/27/2023   topiramate (TOPAMAX) 25 mg tablet   Yes No   Sig: Take 25 mg by mouth 2 (two) times a day   traMADol (ULTRAM) 50 mg tablet   Yes No   varenicline (CHANTIX) 1 mg tablet   Yes Yes   Sig: Take 1 mg by mouth 2 (two) times a day   zolpidem (AMBIEN) 10 mg tablet   Yes Yes   Sig: Take 10 mg by mouth daily at bedtime   zolpidem (AMBIEN) 5 mg tablet Not Taking Self Yes No   Sig: Take 10 mg by mouth daily at bedtime as needed for sleep      Patient not taking: Reported on 2024      Facility-Administered Medications: None     Note that this home list of meds includes  prescriptions    No Known Allergies    Objective   Vitals:Blood pressure 110/77, pulse 97, temperature 98.3 °F (36.8 °C), temperature source Oral, resp. rate 19, SpO2 91%.,There is no height or weight on file to calculate BMI.  No intake or output data in the 24 hours ending 24 1590    Invasive Devices:   Invasive Devices       Peripheral Intravenous Line  Duration             Peripheral IV 24 Distal;Right;Upper;Ventral (anterior) Arm <1 day                    Physical Exam  Vitals reviewed.   Constitutional:       General: She is not in acute distress.     Appearance: She is not ill-appearing or toxic-appearing.   HENT:      Head: Normocephalic and atraumatic.      Nose: Nose normal. No congestion or rhinorrhea.      Mouth/Throat:      Mouth: Mucous membranes are moist.      Pharynx: Oropharynx is clear. No oropharyngeal exudate or posterior oropharyngeal erythema.   Eyes:      General: No scleral icterus.     Conjunctiva/sclera: Conjunctivae normal.   Cardiovascular:      Rate and Rhythm: Normal rate and regular rhythm.      Pulses: Normal pulses.   Pulmonary:      Effort: Pulmonary effort is normal.      Breath sounds: Normal breath sounds.   Abdominal:      General: Abdomen is flat. There is no distension.   Musculoskeletal:         General: No swelling, deformity or signs of injury.      Cervical back: Normal range of motion and neck supple. No rigidity.      Right lower leg: No edema.      Left lower leg: No edema.      Comments: Limited right shoulder abduction, prior replacement   Skin:     General: Skin is warm and dry.      Coloration: Skin is not jaundiced.   Neurological:      General: No focal deficit present.      Mental Status: She is alert.      Cranial Nerves: Cranial nerves 2-12 are intact.   Psychiatric:         Mood and Affect: Mood normal.      " Comments: Appears somewhat hypomanic       Neurologic Exam     Mental Status   Awake alert   oriented correctly to location, season, not date.  Year ?\" 2014\"  She speaks fluently without paraphasic errors.  No overt word finding difficulty.  Able to follow 1 and two-step commands.  Able to spell forward word forward, not reverse it.  Able to tell me the president, , and prior president.       Cranial Nerves   Cranial nerves II through XII intact.     Motor Exam   Muscle bulk: decreased  Overall muscle tone: normal  Right arm pronator drift: absent  Left arm pronator drift: absent    Strength   Strength 5/5 except as noted.   Right deltoid: 3/5    Sensory Exam   Intact and symmetric to primary modalities.  No extinction     Gait, Coordination, and Reflexes Gait evaluation deferred.  Reflexes were 1-2+ throughout.  Plantar responses are flexor.  Coordination testing was normal with bilateral upper limbs.  No tremor or involuntary movements, though she does appear restless       Lab Results: I have personally reviewed pertinent reports.    Imaging Studies: I have personally reviewed pertinent films in PACS.  Head CT and CTA are relatively unremarkable.  An MRI brain performed in 2023 [facility] was reported to demonstrate slightly thickened infundibulum, otherwise normal  EKG, Pathology, and Other Studies: I have personally reviewed pertinent reports.        Code Status: Level 1 - Full Code  Advance Directive and Living Will:      Power of :    POLST:        "

## 2024-04-13 NOTE — CONSULTS
Consultation - Medical Toxicology  Edith Klein 59 y.o. female MRN: [id number]  Unit/Bed#: [facility]-03 Encounter: [id number]      Reason for Consult / Principal Problem: Altered mental status, seizure  Inpatient consult to Toxicology  Consult performed by: Fabian Jeong DO  Consult ordered by: Maggie Matt PA-C        04/13/24      ASSESSMENT:  Acute encephalopathy  Seizure  Anticholinergic Toxidrome-mild    RECOMMENDATIONS:  I reviewed all meds and PDMP. Pertinent meds include zolpidem, amitriptyline, quetiapine as active meds. She also has been prescribed tramadol, but states she hasn't had that in a couple months. There is also fluoxetine, duloxetine listed in the chart, but it appears they are old and not active.     Certainly TCA'a can lower seizure threshold. We know she is taking that. Tramadol is notorious for causing seizures, but it's not clear if she has access to that now. Fluoxetine and duloxetine can also lower seizure threshold, But again its not clear if she has access to those at this point. Overuse of zolpidem can theoretically result in withdrawal. I have a lower suspicion of that base on my eval    She appears mildly anticholinergic, likely from TCA and quetiapine together. Care for now remains supportive. Benzos can be given as needed for recurrent seizure or agitation. Hold quetiapine, amitriptyline, zolpidem for now. She should not be prescribed tramadol going forward.     Monitor for urinary retention given the anticholinergic effects of meds.     For further questions, please call [facility]'s  Service or Patient Access Center to reach the medical  on call.     Please see additional teaching note below (if available)    Hx and PE limited by: encephalopathy    HPI: Edith Klein is a 59 y.o. year old female who presents with altered mental status. She was found by her family on the floor. They helped her up into bed, at which point she had a reported generalized seizure. She  was brought to the ED where she was noted to appear post-ictal. Med tox opinion is requested for whether or not medication could be responsible for this presentation. I reviewed all med lists and PDMP. Family also brought in a bag of pill bottles.     Review of Systems   Unable to perform ROS: Mental status change       Historical Information   Past Medical History:   Diagnosis Date    Autoimmune hepatitis (HCC)     Bipolar 1 disorder (HCC)     Chronic headaches 2021    Diabetes mellitus (HCC)     Kidney stone     Renal disorder     kidney stones    Seizure disorder (HCC)      Past Surgical History:   Procedure Laterality Date    BARIATRIC SURGERY  05/2017    BUNIONECTOMY      CYSTOSCOPY  07/12/2018    Stent Removal    FL RETROGRADE PYELOGRAM  6/16/2022    GASTRIC BYPASS  05/22/2017    HIATAL HERNIA REPAIR  05/22/2017    HYSTERECTOMY      JOINT REPLACEMENT      KIDNEY STONE SURGERY      [place] CYSTO/URETERO W/LITHOTRIPSY &INDWELL STENT INSRT Bilateral 7/6/2018    Procedure: CYSTOSCOPY GALDINO. URETEROSCOPY;GALDINO.  RETROGRADE PYELOGRAM AND INSERTION GALDINO.STENT URETERAL;  Surgeon: Jacob Gerber MD;  Location: [facility];  Service: Urology    [place] CYSTO/URETERO W/LITHOTRIPSY &INDWELL STENT INSRT Left 6/16/2022    Procedure: CYSTOSCOPY URETEROSCOPY WITH LITHOTRIPSY HOLMIUM LASER, RETROGRADE PYELOGRAM AND INSERTION STENT URETERAL;  Surgeon: Asad Geiger MD;  [facility];  Service: Urology    TOTAL SHOULDER REPLACEMENT  2002    VAGINAL HYSTERECTOMY      PARTIAL     Social History   Social History     Substance and Sexual Activity   Alcohol Use No    Comment: n/a     Social History     Substance and Sexual Activity   Drug Use No    Comment: n/a     Social History     Tobacco Use   Smoking Status Every Day    Current packs/day: 0.25    Average packs/day: 0.3 packs/day for 30.0 years (7.5 ttl pk-yrs)    Types: Cigarettes    Passive exposure: Current   Smokeless Tobacco Never   Tobacco Comments    N/a     Family History    Problem Relation Age of Onset    Diabetes Father     Heart disease Father     Diabetes Mother     Heart disease Mother     Diabetes Sister     Diabetes Family         MELLITUS    Depression Family     Mental illness Family     Obesity Family     Osteoarthritis Family         Prior to Admission medications    Medication Sig Start Date End Date Taking? Authorizing Provider   amitriptyline (ELAVIL) 25 mg tablet  6/20/23   Historical Provider, MD   amitriptyline (ELAVIL) 50 mg tablet  7/3/23   Historical Provider, MD   aspirin (ECOTRIN LOW STRENGTH) 81 mg EC tablet Take 81 mg by mouth daily   12/18/17   Historical Provider, MD   aspirin-acetaminophen-caffeine (EXCEDRIN MIGRAINE) 250-250-65 MG per tablet Take 1 tablet by mouth every 6 (six) hours as needed for headaches 2/11/23   Marii Brown PA-C   azaTHIOprine (IMURAN) 50 mg tablet Take 50 mg by mouth daily    Historical Provider, MD   benztropine (COGENTIN) 0.5 mg tablet  7/31/23   Historical Provider, MD   Diclofenac Sodium (VOLTAREN) 1 % Apply 2 g topically 4 (four) times a day 7/15/23   Jeannette Bledsoe PA-C   DULoxetine (CYMBALTA) 20 mg capsule  7/31/23   Historical Provider, MD   ergocalciferol (ERGOCALCIFEROL) 1.25 MG (01935 UT) capsule Take 1 capsule by mouth once a week 8/8/23 10/31/23  Historical Provider, MD   famotidine (PEPCID) 40 MG tablet  5/30/23   Historical Provider, MD   FLUoxetine (PROzac) 40 MG capsule Take 40 mg by mouth daily   9/5/17   Historical Provider, MD   Jardiance 25 MG TABS  5/1/23   Historical Provider, MD   LORazepam (ATIVAN) 1 mg tablet Take 1 mg by mouth 2 (two) times a day    Historical Provider, MD   Lubricating Plus Eye Drops 0.5 % SOLN  6/16/23   Historical Provider, MD   Mouthwashes (Biotene Dry Mouth) LIQD 15 mL by Transmucosal route 4 (four) times a day as needed 9/7/23   Historical Provider, MD   naproxen (Naprosyn) 500 mg tablet Take 1 tablet (500 mg total) by mouth 2 (two) times a day with meals 9/24/23   Virat  Rafiq Garcia MD   QUEtiapine (SEROquel) 100 mg tablet Take 400 mg by mouth daily at bedtime    Historical Provider, MD   QUEtiapine (SEROquel) 400 MG tablet  7/31/23   Historical Provider, MD   QUEtiapine (SEROquel) 50 mg tablet  7/31/23   Historical Provider, MD   rizatriptan (MAXALT) 10 mg tablet  6/16/23   Historical Provider, MD   tiZANidine (ZANAFLEX) 2 mg tablet  5/15/23   Historical Provider, MD   tiZANidine (ZANAFLEX) 4 mg tablet  7/5/23   Historical Provider, MD   TiZANidine (ZANAFLEX) 6 MG capsule Take 6 mg by mouth Three times a day  Patient not taking: Reported on 9/27/2023 6/1/23 5/31/24  Historical Provider, MD   topiramate (TOPAMAX) 25 mg tablet Take 25 mg by mouth 2 (two) times a day 3/3/23 3/2/24  Historical Provider, MD   traMADol (ULTRAM) 50 mg tablet  6/16/23   Historical Provider, MD   Trulicity 1.5 MG/0.5ML injection  6/27/23   Historical Provider, MD   varenicline (CHANTIX) 1 mg tablet  3/30/23   Historical Provider, MD   zolpidem (AMBIEN) 10 mg tablet  7/31/23   Historical Provider, MD   zolpidem (AMBIEN) 5 mg tablet Take 10 mg by mouth daily at bedtime as needed for sleep      Historical Provider, MD       No current facility-administered medications for this encounter.       No Known Allergies    Objective     No intake or output data in the 24 hours ending 04/13/24 1140    Invasive Devices:   Peripheral IV 04/13/24 Distal;Right;Upper;Ventral (anterior) Arm (Active)   Site Assessment WDL 04/13/24 1105   Dressing Type Transparent;Securing device 04/13/24 1105   Line Status Blood return noted;Flushed;Flushed & Clamped 04/13/24 1105   Dressing Status Clean;Dry;Intact 04/13/24 1105       Vitals   Vitals:    04/13/24 0951 04/13/24 1026   BP: 103/55    TempSrc:  Oral   Pulse: (!) 117    Resp: 22    Temp:  98.8 °F (37.1 °C)       Physical Exam  Constitutional:       General: She is not in acute distress.  Cardiovascular:      Rate and Rhythm: Tachycardia present.   Pulmonary:      Effort:  "Pulmonary effort is normal.      Breath sounds: Normal breath sounds.   Abdominal:      Palpations: Abdomen is soft.      Comments: Hypoactive BS   Skin:     Comments: Dry axillae B/L   Neurological:      Mental Status: She is alert.      Comments: Oriented to person and place. Speech a little pressured and mumbling. No clonus or hyperreflexia           EKG, Pathology, and Other Studies: I have personally reviewed pertinent reports.    EKG: Sinus tachycardia at 121. QRS wnl. Qtc 508ms    Lab Results: I have personally reviewed pertinent reports.      Labs:  Results from last 7 days   Lab Units 04/13/24  1015   WBC Thousand/uL 6.78   HEMOGLOBIN g/dL 13.6   HEMATOCRIT % 40.6   PLATELETS Thousands/uL 333   SEGS PCT % 64   LYMPHO PCT % 30   MONO PCT % 5   EOS PCT % 1          Invalid input(s): \"LABALBU\"           No results found for: \"TROPONINI\"          Invalid input(s): \"EXTPREGUR\"    Imaging Studies: I have personally reviewed pertinent reports.      Counseling / Coordination of Care  Total floor / unit time spent today 41 minutes. Greater than 50% of total time was spent with the patient and / or family counseling and / or coordination of care.         "

## 2024-04-13 NOTE — ED ATTENDING ATTESTATION
4/13/2024  I, Jamie Kenny MD, saw and evaluated the patient. I have discussed the patient with the resident/non-physician practitioner and agree with the resident's/non-physician practitioner's findings, Plan of Care, and MDM as documented in the resident's/non-physician practitioner's note, except where noted. All available labs and Radiology studies were reviewed.  I was present for key portions of any procedure(s) performed by the resident/non-physician practitioner and I was immediately available to provide assistance.       At this point I agree with the current assessment done in the Emergency Department.  I have conducted an independent evaluation of this patient a history and physical is as follows:  I supervised the Advanced Practitioner.? I performed, in its entirety, the assessment and plan component of the visit.  I agree with the Advanced Practitioner's note with the following assessment and plan:      Patient has a witnessed seizure but then there is also a question of possibly missing medications of Seroquel and zolpidem which was just filled in April.  She has a cervical collar she is alert and she is answering questions.  She does not have a fever.  She is moving all 4 extremities.  She was tachycardic.  Given the missing medications tox consult performed and the patient will need admission to the hospital.  Labs and CTs ordered/reviewed.    Jamie Kenny MD 04/13/24       ED Course         Critical Care Time  Procedures

## 2024-04-13 NOTE — ED NOTES
Patient transported from CT.  Provider at bedside(toxicology)     Francesca Hess RN  04/13/24 5222

## 2024-04-13 NOTE — ED PROVIDER NOTES
"History  Chief Complaint   Patient presents with    Seizure - Prior Hx Of     Pt found on ground by family this morning, placed in bed then pt had witnessed seizure (\"about 2 mins\"). Post Ictal upon EMS arrival. C-Coller in place upon arrival, Per EMS Sat was 100% - placed coller on pt & decreased to 91% then placed on 2L O2.  Blood glucose 262 per EMS     Pt presents to the ED after she had a seizure today - family found her laying on ground - got her up to bed and then she seized. Unsure how she got to floor - prior seizure vs trip and fall. Hx of falls. But had seizure lasting about 2 min,   EMS called,   Sister here with pt - states she has come to be with her - not letting her help with meds - but she discovered a bunch of empty pill bottles - that she didn't realize she had taken too many - filled 4/1- shouldn't be empty yet - they came in with her.  Pt denies taking any extra. Sister  reports she has a hx of SI /and attempt in past - pt denies now  Sister states she thinks she is forgetting and retaking meds - but unsure and unsure when she has been doing this.           Prior to Admission Medications   Prescriptions Last Dose Informant Patient Reported? Taking?   Diclofenac Sodium (VOLTAREN) 1 %   No No   Sig: Apply 2 g topically 4 (four) times a day   Jardiance 25 MG TABS   Yes Yes   LORazepam (ATIVAN) 1 mg tablet  Self Yes No   Sig: Take 1 mg by mouth 2 (two) times a day   Lubricating Plus Eye Drops 0.5 % SOLN   Yes No   Mouthwashes (Biotene Dry Mouth) LIQD   Yes No   Sig: 15 mL by Transmucosal route 4 (four) times a day as needed   QUEtiapine (SEROquel) 100 mg tablet Not Taking Self Yes No   Sig: Take 400 mg by mouth daily at bedtime   Patient not taking: Reported on 4/13/2024   QUEtiapine (SEROquel) 400 MG tablet   Yes Yes   Sig: Take 800 mg by mouth daily at bedtime   QUEtiapine (SEROquel) 50 mg tablet   Yes Yes   Sig: Take 50 mg by mouth daily at bedtime   TiZANidine (ZANAFLEX) 6 MG capsule   Yes No "   Sig: Take 6 mg by mouth Three times a day   Patient not taking: Reported on 9/27/2023   Trulicity 1.5 MG/0.5ML injection   Yes No   amitriptyline (ELAVIL) 25 mg tablet Not Taking  Yes No   Patient not taking: Reported on 4/13/2024   amitriptyline (ELAVIL) 50 mg tablet   Yes Yes   Sig: Take 50 mg by mouth daily at bedtime   aspirin (ECOTRIN LOW STRENGTH) 81 mg EC tablet  Self Yes No   Sig: Take 81 mg by mouth daily     aspirin-acetaminophen-caffeine (EXCEDRIN MIGRAINE) 250-250-65 MG per tablet   No No   Sig: Take 1 tablet by mouth every 6 (six) hours as needed for headaches   azaTHIOprine (IMURAN) 50 mg tablet  Self Yes Yes   Sig: Take 100 mg by mouth daily   ergocalciferol (ERGOCALCIFEROL) 1.25 MG (16332 UT) capsule   Yes No   Sig: Take 1 capsule by mouth once a week   famotidine (PEPCID) 40 MG tablet   Yes No   gabapentin (NEURONTIN) 400 mg capsule   Yes Yes   Sig: Take 400 mg by mouth 3 (three) times a day   linaCLOtide (Linzess) 72 MCG CAPS   Yes Yes   Sig: Take 1 capsule by mouth daily   naproxen (Naprosyn) 500 mg tablet   No No   Sig: Take 1 tablet (500 mg total) by mouth 2 (two) times a day with meals   pantoprazole (PROTONIX) 40 mg tablet   Yes Yes   Sig: Take 40 mg by mouth daily   rizatriptan (MAXALT) 10 mg tablet   Yes No   Patient not taking: Reported on 9/27/2023   simethicone (MYLICON) 125 MG chewable tablet   Yes Yes   Sig: Chew 125 mg every 6 (six) hours as needed for flatulence   tiZANidine (ZANAFLEX) 2 mg tablet   Yes No   Patient not taking: Reported on 9/27/2023   tiZANidine (ZANAFLEX) 4 mg tablet   Yes No   Patient not taking: Reported on 9/27/2023   topiramate (TOPAMAX) 25 mg tablet   Yes No   Sig: Take 25 mg by mouth 2 (two) times a day   traMADol (ULTRAM) 50 mg tablet   Yes No   varenicline (CHANTIX) 1 mg tablet   Yes Yes   Sig: Take 1 mg by mouth 2 (two) times a day   zolpidem (AMBIEN) 10 mg tablet   Yes Yes   Sig: Take 10 mg by mouth daily at bedtime   zolpidem (AMBIEN) 5 mg tablet Not  Taking Self Yes No   Sig: Take 10 mg by mouth daily at bedtime as needed for sleep     Patient not taking: Reported on 4/13/2024      Facility-Administered Medications: None       Past Medical History:   Diagnosis Date    Autoimmune hepatitis (HCC)     Bipolar 1 disorder (HCC)     Chronic headaches 2021    Diabetes mellitus (HCC)     Kidney stone     Renal disorder     kidney stones    Seizure disorder (HCC)        Past Surgical History:   Procedure Laterality Date    BARIATRIC SURGERY  05/2017    BUNIONECTOMY      CYSTOSCOPY  07/12/2018    Stent Removal    FL RETROGRADE PYELOGRAM  6/16/2022    GASTRIC BYPASS  05/22/2017    HIATAL HERNIA REPAIR  05/22/2017    HYSTERECTOMY      JOINT REPLACEMENT      KIDNEY STONE SURGERY      CT CYSTO/URETERO W/LITHOTRIPSY &INDWELL STENT INSRT Bilateral 7/6/2018    Procedure: CYSTOSCOPY GALDINO. URETEROSCOPY;GALDINO.  RETROGRADE PYELOGRAM AND INSERTION GALDINO.STENT URETERAL;  Surgeon: Jacob Gerber MD;  Location: [facility];  Service: Urology    CT CYSTO/URETERO W/LITHOTRIPSY &INDWELL STENT INSRT Left 6/16/2022    Procedure: CYSTOSCOPY URETEROSCOPY WITH LITHOTRIPSY HOLMIUM LASER, RETROGRADE PYELOGRAM AND INSERTION STENT URETERAL;  Surgeon: Asad Geiger MD;  Location: [facility];  Service: Urology    TOTAL SHOULDER REPLACEMENT  2002    VAGINAL HYSTERECTOMY      PARTIAL       Family History   Problem Relation Age of Onset    Diabetes Father     Heart disease Father     Diabetes Mother     Heart disease Mother     Diabetes Sister     Diabetes Family         MELLITUS    Depression Family     Mental illness Family     Obesity Family     Osteoarthritis Family      I have reviewed and agree with the history as documented.    E-Cigarette/Vaping    E-Cigarette Use Never User      E-Cigarette/Vaping Substances    Nicotine No     THC No     CBD No     Flavoring No     Other No     Unknown No      Social History     Tobacco Use    Smoking status: Every Day     Current packs/day: 0.25     Average  packs/day: 0.3 packs/day for 30.0 years (7.5 ttl pk-yrs)     Types: Cigarettes     Passive exposure: Current    Smokeless tobacco: Never    Tobacco comments:     N/a   Vaping Use    Vaping status: Never Used   Substance Use Topics    Alcohol use: No     Comment: n/a    Drug use: No     Comment: n/a       Review of Systems   Constitutional:  Negative for fever.   HENT: Negative.     Respiratory: Negative.     Cardiovascular: Negative.    Gastrointestinal: Negative.    Genitourinary: Negative.    Neurological:  Positive for dizziness and seizures.   All other systems reviewed and are negative.      Physical Exam  Physical Exam  Vitals and nursing note reviewed.   Constitutional:       Appearance: She is well-developed.   HENT:      Head: Normocephalic.      Comments: No scalp tenderness or hematoma     Right Ear: Tympanic membrane and external ear normal.      Left Ear: Tympanic membrane and external ear normal.   Eyes:      Conjunctiva/sclera: Conjunctivae normal.   Cardiovascular:      Rate and Rhythm: Normal rate and regular rhythm.      Heart sounds: Normal heart sounds.   Pulmonary:      Effort: Pulmonary effort is normal.      Breath sounds: Normal breath sounds.   Abdominal:      General: Bowel sounds are normal.      Palpations: Abdomen is soft.   Musculoskeletal:         General: Normal range of motion.      Cervical back: Neck supple.   Lymphadenopathy:      Cervical: No cervical adenopathy.   Skin:     General: Skin is warm.      Findings: No rash.   Neurological:      Mental Status: She is alert.      Cranial Nerves: No cranial nerve deficit.      Sensory: Sensation is intact.      Motor: No weakness, atrophy, seizure activity or pronator drift.      Coordination: Romberg sign negative. Finger-Nose-Finger Test normal.      Comments: Initially pt is alert and oriented - but at times is confused -this improves in time - post ictal    Psychiatric:         Behavior: Behavior normal.         Vital Signs  ED  Triage Vitals   Temperature Pulse Respirations Blood Pressure SpO2   04/13/24 1026 04/13/24 0951 04/13/24 0951 04/13/24 0951 04/13/24 0951   98.8 °F (37.1 °C) (!) 117 22 103/55 (!) 89 %      Temp Source Heart Rate Source Patient Position - Orthostatic VS BP Location FiO2 (%)   04/13/24 1026 04/13/24 1300 -- -- --   Oral Monitor         Pain Score       04/13/24 0951       No Pain           Vitals:    04/13/24 0951 04/13/24 1300 04/13/24 1430 04/13/24 1500   BP: 103/55 102/78     Pulse: (!) 117 100 100 101         Visual Acuity      ED Medications  Medications   LORazepam (ATIVAN) injection 0.5 mg (has no administration in time range)   multi-electrolyte (PLASMALYTE-A/ISOLYTE-S PH 7.4) IV solution (has no administration in time range)   iohexol (OMNIPAQUE) 350 MG/ML injection (MULTI-DOSE) 100 mL (100 mL Intravenous Given 4/13/24 1143)       Diagnostic Studies  Results Reviewed       Procedure Component Value Units Date/Time    HS Troponin I 4hr [[id number]]  (Normal) Collected: 04/13/24 1425    Lab Status: Final result Specimen: Blood from Arm, Left Updated: 04/13/24 1457     hs TnI 4hr 4 ng/L      Delta 4hr hsTnI >2 ng/L     HS Troponin I 2hr [[id number]]  (Normal) Collected: 04/13/24 1256    Lab Status: Final result Specimen: Blood from Arm, Left Updated: 04/13/24 1335     hs TnI 2hr 4 ng/L      Delta 2hr hsTnI >2 ng/L     Fingerstick Glucose (POCT) [[id number]]  (Normal) Collected: 04/13/24 1300    Lab Status: Final result Specimen: Blood Updated: 04/13/24 1301     POC Glucose 121 mg/dl     Comprehensive metabolic panel [[id number]]  (Abnormal) Collected: 04/13/24 1120    Lab Status: Final result Specimen: Blood from Hand, Right Updated: 04/13/24 1218     Sodium 133 mmol/L      Potassium 4.2 mmol/L      Chloride 100 mmol/L      CO2 27 mmol/L      ANION GAP 6 mmol/L      BUN 20 mg/dL      Creatinine 0.57 mg/dL      Glucose 163 mg/dL      Calcium 9.1 mg/dL      AST 39 U/L      ALT 33 U/L      Alkaline Phosphatase 295  U/L      Total Protein 6.8 g/dL      Albumin 3.6 g/dL      Total Bilirubin 0.34 mg/dL      eGFR 103 ml/min/1.73sq m     Narrative:      National Kid[employer] Foundation guidelines for Chronic Kidney Disease (CKD):     Stage 1 with normal or high GFR (GFR > 90 mL/min/1.73 square meters)    Stage 2 Mild CKD (GFR = 60-89 mL/min/1.73 square meters)    Stage 3A Moderate CKD (GFR = 45-59 mL/min/1.73 square meters)    Stage 3B Moderate CKD (GFR = 30-44 mL/min/1.73 square meters)    Stage 4 Severe CKD (GFR = 15-29 mL/min/1.73 square meters)    Stage 5 End Stage CKD (GFR <15 mL/min/1.73 square meters)  Note: GFR calculation is accurate only with a steady state creatinine    Lipase [[id number]]  (Normal) Collected: 04/13/24 1120    Lab Status: Final result Specimen: Blood from Hand, Right Updated: 04/13/24 1218     Lipase 17 u/L     Acetaminophen level-If concentration is detectable, please discuss with medical  on call. [[id number]]  (Abnormal) Collected: 04/13/24 1120    Lab Status: Final result Specimen: Blood from Hand, Right Updated: 04/13/24 1218     Acetaminophen Level <2 ug/mL     Salicylate level [[id number]]  (Normal) Collected: 04/13/24 1120    Lab Status: Final result Specimen: Blood from Hand, Right Updated: 04/13/24 1218     Salicylate Lvl <5 mg/dL     Ethanol [[id number]]  (Normal) Collected: 04/13/24 1120    Lab Status: Final result Specimen: Blood from Arm, Right Updated: 04/13/24 1147     Ethanol Lvl <10 mg/dL     Urine Microscopic [[id number]]  (Abnormal) Collected: 04/13/24 1054    Lab Status: Final result Specimen: Urine, Clean Catch Updated: 04/13/24 1127     RBC, UA 1-2 /hpf      WBC, UA 2-4 /hpf      Epithelial Cells Occasional /hpf      Bacteria, UA Occasional /hpf      MUCUS THREADS Occasional    TSH, 3rd generation with Free T4 reflex [[id number]]  (Normal) Collected: 04/13/24 1015    Lab Status: Final result Specimen: Blood from Arm, Left Updated: 04/13/24 1113     [address] 3.442  uIU/mL     HS Troponin 0hr (reflex protocol) [[id number]]  (Normal) Collected: 04/13/24 1015    Lab Status: Final result Specimen: Blood from Arm, Left Updated: 04/13/24 1104     hs TnI 0hr <2 ng/L     Urine Macroscopic, POC [[id number]]  (Abnormal) Collected: 04/13/24 1054    Lab Status: Final result Specimen: Urine Updated: 04/13/24 1056     Color, UA Yellow     Clarity, UA Clear     pH, UA 5.5     Leukocytes, UA Elevated glucose may cause decreased leukocyte values. See urine microscopic for UWBC result     Nitrite, UA Negative     Protein, UA Negative mg/dl      Glucose, UA >=1000 (1%) mg/dl      Ketones, UA Trace mg/dl      Urobilinogen, UA 0.2 E.U./dl      Bilirubin, UA Negative     Occult Blood, UA Negative     Specific Gravity, UA >=1.030    Narrative:      CLINITEK RESULT    CBC and differential [[id number]]  (Abnormal) Collected: 04/13/24 1015    Lab Status: Final result Specimen: Blood from Arm, Left Updated: 04/13/24 1037     WBC 6.78 Thousand/uL      RBC 4.11 Million/uL      Hemoglobin 13.6 g/dL      Hematocrit 40.6 %      MCV 99 fL      MCH 33.1 pg      MCHC 33.5 g/dL      RDW 13.5 %      MPV 9.9 fL      Platelets 333 Thousands/uL      nRBC 0 /100 WBCs      Segmented % 64 %      Immature Grans % 0 %      Lymphocytes % 30 %      Monocytes % 5 %      Eosinophils Relative 1 %      Basophils Relative 0 %      Absolute Neutrophils 4.32 Thousands/µL      Absolute Immature Grans 0.03 Thousand/uL      Absolute Lymphocytes 2.02 Thousands/µL      Absolute Monocytes 0.33 Thousand/µL      Eosinophils Absolute 0.05 Thousand/µL      Basophils Absolute 0.03 Thousands/µL                    CTA head and neck with and without contrast   Final Result by Wilber Freeman DO (04/13 1218)      CT brain: No acute intracranial abnormality.      CT angiography: Mild atherosclerotic disease of the carotid bifurcations and intracranial internal carotid arteries with no moderate to high-grade stenosis and no occlusive  disease. No evidence of acute arterial injury.                  Workstation performed: [id number]         CT chest abdomen pelvis w contrast   Final Result by Chirag Neff MD (04/13 1230)      Mild patchy dependent atelectasis in the visualized lower lobes with trace left pleural effusion.      Stable tiny 2 mm pulm nodule left lower lobe.      Minimal dependent secretions noted within the right mainstem bronchus.      Probable constipation.      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      Stable additional findings as above.               Workstation performed: [id number]         CT recon only cervical spine (No Charge)   Final Result by Wilber Freeman DO (04/13 1220)      No acute osseous abnormality. Mild cervical degenerative change.         Workstation performed: [id number]         MRI inpatient order    (Results Pending)              Procedures  ECG 12 Lead Documentation Only    Date/Time: 4/13/2024 10:57 AM    Performed by: Maggie Matt PA-C  Authorized by: Maggie Matt PA-C    Indications / Diagnosis:  Potential overdose/seizure/trauma  ECG reviewed by me, the ED Provider: yes    Patient location:  ED  Previous ECG:     Previous ECG:  Compared to current    Comparison ECG info:  Sept 2023    Similarity:  Changes noted  Rate:     ECG rate:  121    ECG rate assessment: tachycardic    Rhythm:     Rhythm: sinus tachycardia    Ectopy:     Ectopy: none    Conduction:     Conduction: normal    ST segments:     ST segments:  Normal  T waves:     T waves: normal    Other findings:     Other findings: LAE    Comments:      No qtc prolongation   ECG 12 Lead Documentation Only    Date/Time: 4/13/2024 3:49 PM    Performed by: Maggie Matt PA-C  Authorized by: Maggie Matt PA-C    Indications / Diagnosis:  Delta  Patient location:  ED  Previous ECG:     Previous ECG:  Compared to current    Comparison ECG info:  2 hr repeat    Similarity:  No  change  Rate:     ECG rate:  101    ECG rate assessment: tachycardic    Rhythm:     Rhythm: sinus tachycardia    Ectopy:     Ectopy: none    QRS:     QRS axis:  Normal  Conduction:     Conduction: normal    ST segments:     ST segments:  Normal  T waves:     T waves: normal             ED Course  ED Course as of 04/13/24 1552   Sat Apr 13, 2024   1019 Hx si attempt in past  Daughter started zolpidem 4/1- bottle is empty  Quetiapine - 50mg -filled 4/1- bottle empty   1020 Also seroquel 400mg - 2 tabs HS - had 60 tabs only 12 left - filled on 4/1    1057 Discussed case with DR Kenny    1147 Bacteria, UA: Occasional  No UTI   1147 hs TnI 0hr: <2  negative   1147 WBC: 6.78  normal   1148 Ct head - reviewed by myself - no acute bleed - awaiting offical read    1148 ETHANOL: <10  negative   1312 Pt states she doesn't want to stay - will feed and get phone so she can work on arranging care for her dog. Awaiting family to arrive.    1432 Sister here - after lenghy discussion with pt and sister pt agrees to stay.                                              Medical Decision Making  Will get cta head and neck - with seizure/fall/trauma/ confusion  Will also get ct neck for spinal fracture - in c collar from EMS  Will get ct abdomen and pelvis - weight loss   Hypoxia on arrival - will eval chest for pneumonia, pneumothorax.       Amount and/or Complexity of Data Reviewed  Independent Historian: caregiver     Details: Sister   External Data Reviewed: labs, radiology and notes.  Labs: ordered. Decision-making details documented in ED Course.  Radiology: ordered and independent interpretation performed.  ECG/medicine tests: ordered and independent interpretation performed.  Discussion of management or test interpretation with external provider(s): Discussed with DR Kenny who also saw pt   Discussed with toxicology who also saw pt.     Risk  Prescription drug management.  Decision regarding hospitalization.  Risk Details: Pt  initially insisting she wanted to leave [facility] - after multiple discussions pt agrees to stay              Disposition  Final diagnoses:   Seizure (HCC)   Polypharmacy   Gastric duplication cyst     Time reflects when diagnosis was documented in both MDM as applicable and the Disposition within this note       Time User Action Codes Description Comment    4/13/2024 11:11 AM Maggie Matt [R56.9] Seizure (HCC)     4/13/2024  2:48 PM Maggie Matt [Z79.899] Polypharmacy     4/13/2024  2:49 PM Maggie Matt [Q40.2] Gastric duplication cyst           ED Disposition       ED Disposition   Admit    Condition   Stable    Date/Time   Sat Apr 13, 2024  2:48 PM    Comment   Case was discussed with Tiffanie   and the patient's admission status was agreed to be Admission Status: observation status to the service of Dr. Moreno  .               Follow-up Information    None         Patient's Medications   Discharge Prescriptions    No medications on file       No discharge procedures on file.    PDMP Review         Value Time User    PDMP Reviewed  Yes 6/14/2022  4:48 PM Amanda Carlson MD            ED Provider  Electronically Signed by             Maggie Matt PA-C  04/13/24 [id number]

## 2024-04-14 ENCOUNTER — APPOINTMENT (OUTPATIENT)
Dept: MRI IMAGING | Facility: HOSPITAL | Age: 59
End: 2024-04-14
Payer: COMMERCIAL

## 2024-04-14 VITALS
HEART RATE: 102 BPM | RESPIRATION RATE: 21 BRPM | SYSTOLIC BLOOD PRESSURE: 111 MMHG | TEMPERATURE: 97.8 F | OXYGEN SATURATION: 92 % | DIASTOLIC BLOOD PRESSURE: 91 MMHG

## 2024-04-14 LAB
ANION GAP SERPL CALCULATED.3IONS-SCNC: 6 MMOL/L (ref 4–13)
ATRIAL RATE: 103 BPM
BUN SERPL-MCNC: 21 MG/DL (ref 5–25)
CALCIUM SERPL-MCNC: 8.6 MG/DL (ref 8.4–10.2)
CHLORIDE SERPL-SCNC: 102 MMOL/L (ref 96–108)
CO2 SERPL-SCNC: 30 MMOL/L (ref 21–32)
CREAT SERPL-MCNC: 0.57 MG/DL (ref 0.6–1.3)
ERYTHROCYTE [DISTWIDTH] IN BLOOD BY AUTOMATED COUNT: 13.6 % (ref 11.6–15.1)
GFR SERPL CREATININE-BSD FRML MDRD: 103 ML/MIN/1.73SQ M
GLUCOSE SERPL-MCNC: 112 MG/DL (ref 65–140)
GLUCOSE SERPL-MCNC: 125 MG/DL (ref 65–140)
GLUCOSE SERPL-MCNC: 222 MG/DL (ref 65–140)
HCT VFR BLD AUTO: 39.5 % (ref 34.8–46.1)
HGB BLD-MCNC: 13 G/DL (ref 11.5–15.4)
MAGNESIUM SERPL-MCNC: 2.1 MG/DL (ref 1.9–2.7)
MCH RBC QN AUTO: 31.8 PG (ref 26.8–34.3)
MCHC RBC AUTO-ENTMCNC: 32.9 G/DL (ref 31.4–37.4)
MCV RBC AUTO: 97 FL (ref 82–98)
P AXIS: 56 DEGREES
PHOSPHATE SERPL-MCNC: 3.6 MG/DL (ref 2.7–4.5)
PLATELET # BLD AUTO: 361 THOUSANDS/UL (ref 149–390)
PMV BLD AUTO: 9.8 FL (ref 8.9–12.7)
POTASSIUM SERPL-SCNC: 4 MMOL/L (ref 3.5–5.3)
PR INTERVAL: 166 MS
QRS AXIS: -1 DEGREES
QRSD INTERVAL: 86 MS
QT INTERVAL: 384 MS
QTC INTERVAL: 503 MS
RBC # BLD AUTO: 4.09 MILLION/UL (ref 3.81–5.12)
SODIUM SERPL-SCNC: 138 MMOL/L (ref 135–147)
T WAVE AXIS: 41 DEGREES
VENTRICULAR RATE: 103 BPM
WBC # BLD AUTO: 7.31 THOUSAND/UL (ref 4.31–10.16)

## 2024-04-14 PROCEDURE — 93010 ELECTROCARDIOGRAM REPORT: CPT

## 2024-04-14 PROCEDURE — 84100 ASSAY OF PHOSPHORUS: CPT | Performed by: INTERNAL MEDICINE

## 2024-04-14 PROCEDURE — 93005 ELECTROCARDIOGRAM TRACING: CPT

## 2024-04-14 PROCEDURE — 85027 COMPLETE CBC AUTOMATED: CPT | Performed by: INTERNAL MEDICINE

## 2024-04-14 PROCEDURE — 82948 REAGENT STRIP/BLOOD GLUCOSE: CPT

## 2024-04-14 PROCEDURE — 83735 ASSAY OF MAGNESIUM: CPT | Performed by: INTERNAL MEDICINE

## 2024-04-14 PROCEDURE — 80048 BASIC METABOLIC PNL TOTAL CA: CPT | Performed by: INTERNAL MEDICINE

## 2024-04-14 PROCEDURE — 99239 HOSP IP/OBS DSCHRG MGMT >30: CPT | Performed by: INTERNAL MEDICINE

## 2024-04-14 RX ORDER — FLUOXETINE HYDROCHLORIDE 40 MG/1
80 CAPSULE ORAL DAILY
Start: 2024-04-14 | End: 2024-04-14

## 2024-04-14 RX ORDER — FLUOXETINE HYDROCHLORIDE 40 MG/1
80 CAPSULE ORAL DAILY
Qty: 60 CAPSULE | Refills: 0 | Status: SHIPPED | OUTPATIENT
Start: 2024-04-14 | End: 2024-05-14

## 2024-04-14 RX ORDER — INSULIN LISPRO 100 [IU]/ML
1-5 INJECTION, SOLUTION INTRAVENOUS; SUBCUTANEOUS
Status: DISCONTINUED | OUTPATIENT
Start: 2024-04-14 | End: 2024-04-14 | Stop reason: HOSPADM

## 2024-04-14 RX ADMIN — FAMOTIDINE 20 MG: 20 TABLET, FILM COATED ORAL at 08:42

## 2024-04-14 RX ADMIN — ASPIRIN 81 MG: 81 TABLET, COATED ORAL at 08:42

## 2024-04-14 RX ADMIN — INSULIN LISPRO 2 UNITS: 100 INJECTION, SOLUTION INTRAVENOUS; SUBCUTANEOUS at 08:42

## 2024-04-14 RX ADMIN — SODIUM CHLORIDE, SODIUM GLUCONATE, SODIUM ACETATE, POTASSIUM CHLORIDE, MAGNESIUM CHLORIDE, SODIUM PHOSPHATE, DIBASIC, AND POTASSIUM PHOSPHATE 125 ML/HR: .53; .5; .37; .037; .03; .012; .00082 INJECTION, SOLUTION INTRAVENOUS at 00:01

## 2024-04-14 NOTE — DISCHARGE INSTR - AVS FIRST PAGE
Dear Edith Klein,     It was our pleasure to care for you here at [facility].  It is our hope that we were always able to exceed the expected standards for your care during your stay.  You were hospitalized due to altered mental status .  You were cared for on the 2nd floor by Chente Horton DO with the [facility] who covers for your primary care physician (PCP), Deana Higginbotham PA-C, while you were hospitalized.  If you have any questions or concerns related to this hospitalization, you may contact us at .  For follow up as well as any medication refills, we recommend that you follow up with your primary care physician.  A registered nurse will reach out to you by phone within a few days after your discharge to answer any additional questions that you may have after going home.  However, at this time we provide for you here, the most important instructions / recommendations at discharge:     Notable Medication Adjustments -   See discharge list  No medications changed  Please get a pill box to make sure you are taking the correct medications  Testing Required after Discharge -   PCP Follow up one week  Neurology at [facility] in 1 week  Important follow up information -   None  Other Instructions -   None  Please review this entire after visit summary as additional general instructions including medication list, appointments, activity, diet, any pertinent wound care, and other additional recommendations from your care team that may be provided for you.      Sincerely,     Chente Horton DO and Nurse Alanna GEORGE

## 2024-04-14 NOTE — ASSESSMENT & PLAN NOTE
Suspect secondary to home medications  Hold QTc prolonging meds  EKG in the morning for follow-up  Resolved

## 2024-04-14 NOTE — DISCHARGE SUMMARY
[facility]  Discharge- Edith Klein 1965, 59 y.o. female MRN: [id number]  Unit/Bed#: [place] [address] -01 Encounter: [id number]  Primary Care Provider: Deana Higginbotham PA-C   Date and time admitted to hospital: 4/13/2024  9:37 AM      Admitting Provider:  Jesse Moreno DO  Discharge Provider:  Chente Horton DO  Admission Date: 4/13/2024       Discharge Date: 04/14/24   LOS: 0  Primary Care Physician at Discharge: Deana Higginbotham PA-C [phone]    HOSPITAL COURSE:  Edith Klein is a 59 y.o. female who presented with seizure-like activity.  The patient was found on her bathroom floor and was assisted back where she was noted to have a 2-minute convulsion.  The patient has a reported history of remote seizures although none found in the medical record.  She had previous admission in September 2023 where she presented after being found unresponsive with retrogram amnesia.  This improved with Narcan and she had some residual confusion.  The patient is pending outpatient neurology evaluation at [facility].  The patient medications were held, she was noted to have fewer pills in her bottle including Seroquel amitriptyline and Ambien.  Toxicology was consulted and recommended holding her sedating agents.  The patient had no other evidence of any other toxidrome, her electrolytes remained stable.  She does have a past medical history of posttraumatic headaches, no history of stroke and no family history of epilepsy.      The patient was evaluated in consultation by the neurology service.  She remained stable on the general medical floor, she requested that her Seroquel and Ambien be restarted.  Her family members did come to the hospital at her request with plans for discharge.  It is felt that her symptoms are likely secondary to polypharmacy.  Neurology felt that there was a low likelihood of new onset epilepsy.  MRI of the brain was ordered and the patient was sent for  neuroimaging however she did not complete this.  It was recommended that this can be done as an outpatient and the patient was aware of need for further neurologic follow-up.    Patient was otherwise cleared from a neurologic standpoint.  She should follow-up with her providers.  It is recommended that she obtain a pillbox to ensure appropriate medication compliance.    At the time of discharge the patient was tolerating oral diet they were without acute complaint and they were medically cleared for discharge.  All questions were answered the patient's satisfaction and they were in agreement with the discharge plan.    DISCHARGE DIAGNOSES  * Acute metabolic encephalopathy  Assessment & Plan  History of memory difficulties with remote history of seizures  PT, OT cognitive evaluation  May benefit from home services/home nursing  Attempted MRI, patient went to MRI and then declined further testing  Patient's sister did come to the hospital and she was discharged to her family members  This is all polypharmacy, patient on multiple sedating medications  Medication list reviewed from 4/2/2024-this was a PCP visit and only those medications were restarted    QT prolongation  Assessment & Plan  Suspect secondary to home medications  Hold QTc prolonging meds  EKG in the morning for follow-up  Resolved    Dependence on nicotine from cigarettes  Assessment & Plan  Nicotine patch    Seizure (HCC)  Assessment & Plan  Patient presents with seizure likely secondary to polypharmacy  History of seizures in the past but has been off of medication for some time  Neurology consult for evaluation reviewed  Deferred MRI to outpatient neurologic providers, patient and family aware    COPD (chronic obstructive pulmonary disease) (HCC)  Assessment & Plan  Does not appear to be in acute exacerbation    Bipolar affective disorder (HCC)  Assessment & Plan  Continue home medications      CONSULTING PROVIDERS   IP CONSULT TO TOXICOLOGY  IP  CONSULT TO NEUROLOGY    PROCEDURES PERFORMED  * No surgery found *    RADIOLOGY RESULTS  CT chest abdomen pelvis w contrast    Result Date: 4/13/2024  Impression: Mild patchy dependent atelectasis in the visualized lower lobes with trace left pleural effusion. Stable tiny 2 mm pulm nodule left lower lobe. Minimal dependent secretions noted within the right mainstem bronchus. Probable constipation. 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 Stable additional findings as above. Workstation performed: [id number]     CT recon only cervical spine (No Charge)    Result Date: 4/13/2024  Impression: No acute osseous abnormality. Mild cervical degenerative change. Workstation performed: [id number]     CTA head and neck with and without contrast    Result Date: 4/13/2024  Impression: CT brain: No acute intracranial abnormality. CT angiography: Mild atherosclerotic disease of the carotid bifurcations and intracranial internal carotid arteries with no moderate to high-grade stenosis and no occlusive disease. No evidence of acute arterial injury. Workstation performed: [id number]       LABS  Results from last 7 days   Lab Units 04/14/24  0601 04/13/24  1015   WBC Thousand/uL 7.31 6.78   HEMOGLOBIN g/dL 13.0 13.6   HEMATOCRIT % 39.5 40.6   MCV fL 97 99*   PLATELETS Thousands/uL 361 333     Results from last 7 days   Lab Units 04/14/24  0601 04/13/24  1120   SODIUM mmol/L 138 133*   POTASSIUM mmol/L 4.0 4.2   CHLORIDE mmol/L 102 100   CO2 mmol/L 30 27   BUN mg/dL 21 20   CREATININE mg/dL 0.57* 0.57*   CALCIUM mg/dL 8.6 9.1   ALBUMIN g/dL  --  3.6   TOTAL BILIRUBIN mg/dL  --  0.34   ALK PHOS U/L  --  295*   ALT U/L  --  33   AST U/L  --  39   EGFR ml/min/1.73sq m 103 103   GLUCOSE RANDOM mg/dL 125 163*     Results from last 7 days   Lab Units 04/13/24  1425 04/13/24  1256 04/13/24  1015   HS TNI 0HR ng/L  --   --  <2   HS TNI 2HR ng/L  --  4  --    HS TNI 4HR ng/L 4  --   --                Results from last 7 days   Lab Units 04/14/24  0729 04/14/24  0120 04/13/24  2250 04/13/24  1617 04/13/24  1300   POC GLUCOSE mg/dl 222* 112 315* 142* 121         Results from last 7 days   Lab Units 04/13/24  1015   TSH 3RD GENERATON uIU/mL 3.442               Cultures:   Results from last 7 days   Lab Units 04/13/24  1054   COLOR UA  Yellow   CLARITY UA  Clear   SPEC GRAV UA  >=1.030   PH UA  5.5   LEUKOCYTES UA  Elevated glucose may cause decreased leukocyte values. See urine microscopic for UWBC result*   NITRITE UA  Negative   GLUCOSE UA mg/dl >=1000 (1%)*   KETONES UA mg/dl Trace*   BILIRUBIN UA  Negative   BLOOD UA  Negative      Results from last 7 days   Lab Units 04/13/24  1054   RBC UA /hpf 1-2   WBC UA /hpf 2-4*   EPITHELIAL CELLS WET PREP /hpf Occasional   BACTERIA UA /hpf Occasional                  PHYSICAL EXAM:  Vitals:   Blood Pressure: 111/91 (04/14/24 0729)  Pulse: 102 (04/14/24 0729)  Temperature: 97.8 °F (36.6 °C) (04/14/24 0729)  Temp Source: Oral (04/14/24 0729)  Respirations: 21 (04/14/24 0729)  SpO2: 92 % (04/14/24 0729)      General: well appearing, no acute distress  HEENT: atraumatic, PERRLA, moist mucosa, normal pharynx, normal tonsils and adenoids, normal tongue, no fluid in sinuses  Neck: Trachea midline, no carotid bruit, no masses  Respiratory: normal chest wall expansion, CTA B  Cardiovascular: RRR, no m/r/g, Normal S1 and S2  Abdomen: Soft, non-tender, non-distended, normal bowel sounds in all quadrants, no hepatosplenomegaly, no tympany  Rectal: deferred  Musculoskeletal: Moves all  Integumentary: warm, dry, and pink, with no visible rash, purpura, or petechia  Heme/Lymph: no lymphadenopathy, no bruises  Neurological: Cranial Nerves II-XII grossly intact  Psychiatric: cooperative with normal mood, affect, and cognition       Discharge Disposition: Home/Self Care    AM-PAC Basic Mobility:  Basic Mobility Inpatient Raw Score: 21    JH-HLM Achieved: 7: Walk 25 feet or more  JH-HLM  Goal: 6: Walk 10 steps or more    HLM Goal listed above. Continue with ongoing physical therapy and encourage appropriate mobility to improve upon HLM goals.      Test Results Pending at Discharge:           Medications   Summary of Medication Adjustments made as a result of this hospitalization: See discharge summary and AVS for medication changes  Medication Dosing Tapers - Please refer to Discharge Medication List for details on any medication dosing tapers (if applicable to patient).  Discharge Medication List: See after visit summary for reconciled discharge medications.     Diet restrictions:         Diet Orders   (From admission, onward)                 Start     Ordered    04/13/24 1559  Diet Regular; Regular House  Diet effective now        References:    Adult Nutrition Support Algorithm    RD Therapeutic Diet Order Protocol   Question Answer Comment   Diet Type Regular    Regular Regular House    RD to adjust diet per protocol? Yes        04/13/24 1558                  Activity restrictions: No strenuous activity  Discharge Condition: good    Outpatient Follow-Up and Discharge Instructions  See after visit summary section titled Discharge Instructions for information provided to patient and family.      Code Status: Level 1 - Full Code  Discharge Statement   I spent 86 minutes discharging the patient. This time was spent on the day of discharge. Greater than 50% of total time was spent with the patient and / or family counseling and / or coordination of care.    ** Please Note: This note was completed in part utilizing [employer] Dragon [employer] One Software.  Grammatical errors, random word insertions, spelling mistakes, and incomplete sentences may be an occasional consequence of this system secondary to software limitations, ambient noise, and hardware issues.  If you have any questions or concerns about the content, text, or information contained within the body of this dictation, please contact the  provider for clarification.**

## 2024-04-14 NOTE — ASSESSMENT & PLAN NOTE
History of memory difficulties with remote history of seizures  PT, OT cognitive evaluation  May benefit from home services/home nursing  Attempted MRI, patient went to MRI and then declined further testing  Patient's sister did come to the hospital and she was discharged to her family members  This is all polypharmacy, patient on multiple sedating medications  Medication list reviewed from 4/2/2024-this was a PCP visit and only those medications were restarted

## 2024-04-14 NOTE — PLAN OF CARE
Problem: SAFETY ADULT  Goal: Patient will remain free of falls  Description: INTERVENTIONS:  - Educate patient/family on patient safety including physical limitations  - Instruct patient to call for assistance with activity   - Consult OT/PT to assist with strengthening/mobility   - Keep Call bell within reach  - Keep bed low and locked with side rails adjusted as appropriate  - Keep care items and personal belongings within reach  - Initiate and maintain comfort rounds  - Make Fall Risk Sign visible to staff  - Offer Toileting every 2 Hours, in advance of need  - Initiate/Maintain bed alarm  - Obtain necessary fall risk management equipment: walker  - Apply yellow socks and bracelet for high fall risk patients  - Consider moving patient to room near nurses station  Outcome: Progressing  Goal: Maintain or return to baseline ADL function  Description: INTERVENTIONS:  -  Assess patient's ability to carry out ADLs; assess patient's baseline for ADL function and identify physical deficits which impact ability to perform ADLs (bathing, care of mouth/teeth, toileting, grooming, dressing, etc.)  - Assess/evaluate cause of self-care deficits   - Assess range of motion  - Assess patient's mobility; develop plan if impaired  - Assess patient's need for assistive devices and provide as appropriate  - Encourage maximum independence but intervene and supervise when necessary  - Involve family in performance of ADLs  - Assess for home care needs following discharge   - Consider OT consult to assist with ADL evaluation and planning for discharge  - Provide patient education as appropriate  Outcome: Progressing  Goal: Maintains/Returns to pre admission functional level  Description: INTERVENTIONS:  - Perform AM-PAC 6 Click Basic Mobility/ Daily Activity assessment daily.  - Set and communicate daily mobility goal to care team and patient/family/caregiver.   - Collaborate with rehabilitation services on mobility goals if  consulted  - Perform Range of Motion 3 times a day.  - Reposition patient every 2 hours.  - Dangle patient 3 times a day  - Stand patient 3 times a day  - Ambulate patient 3 times a day  - Out of bed to chair 3 times a day   - Out of bed for meals 3 times a day  - Out of bed for toileting  - Record patient progress and toleration of activity level   Outcome: Progressing     Problem: DISCHARGE PLANNING  Goal: Discharge to home or other facility with appropriate resources  Description: INTERVENTIONS:  - Identify barriers to discharge w/patient and caregiver  - Arrange for needed discharge resources and transportation as appropriate  - Identify discharge learning needs (meds, wound care, etc.)  - Arrange for interpretive services to assist at discharge as needed  - Refer to Case Management Department for coordinating discharge planning if the patient needs post-hospital services based on physician/advanced practitioner order or complex needs related to functional status, cognitive ability, or social support system  Outcome: Progressing     Problem: Knowledge Deficit  Goal: Patient/family/caregiver demonstrates understanding of disease process, treatment plan, medications, and discharge instructions  Description: Complete learning assessment and assess knowledge base.  Interventions:  - Provide teaching at level of understanding  - Provide teaching via preferred learning methods  Outcome: Progressing     Problem: NEUROSENSORY - ADULT  Goal: Achieves stable or improved neurological status  Description: INTERVENTIONS  - Monitor and report changes in neurological status  - Monitor vital signs such as temperature, blood pressure, glucose, and any other labs ordered   - Initiate measures to prevent increased intracranial pressure  - Monitor for seizure activity and implement precautions if appropriate      Outcome: Progressing  Goal: Remains free of injury related to seizures activity  Description: INTERVENTIONS  - Maintain  airway, patient safety  and administer oxygen as ordered  - Monitor patient for seizure activity, document and report duration and description of seizure to physician/advanced practitioner  - If seizure occurs,  ensure patient safety during seizure  - Reorient patient post seizure  - Seizure pads on all 4 side rails  - Instruct patient/family to notify RN of any seizure activity including if an aura is experienced  - Instruct patient/family to call for assistance with activity based on nursing assessment  - Administer anti-seizure medications if ordered    Outcome: Progressing     Problem: METABOLIC, FLUID AND ELECTROLYTES - ADULT  Goal: Glucose maintained within target range  Description: INTERVENTIONS:  - Monitor Blood Glucose as ordered  - Assess for signs and symptoms of hyperglycemia and hypoglycemia  - Administer ordered medications to maintain glucose within target range  - Assess nutritional intake and initiate nutrition service referral as needed  Outcome: Progressing     Problem: Prexisting or High Potential for Compromised Skin Integrity  Goal: Skin integrity is maintained or improved  Description: INTERVENTIONS:  - Identify patients at risk for skin breakdown  - Assess and monitor skin integrity  - Assess and monitor nutrition and hydration status  - Monitor labs   - Assess for incontinence   - Turn and reposition patient  - Assist with mobility/ambulation  - Relieve pressure over bony prominences  - Avoid friction and shearing  - Provide appropriate hygiene as needed including keeping skin clean and dry  - Evaluate need for skin moisturizer/barrier cream  - Collaborate with interdisciplinary team   - Patient/family teaching  - Consider wound care consult   Outcome: Progressing

## 2024-04-14 NOTE — PROGRESS NOTES
"Patient called for nurse at 2240 and requested her \"night time medication\". Nurse checked patient's MAR and there were no scheduled medication to give at time of request. Patient specifically requested her Seroquel and Ambien as she stated \"You guys aren't giving my night medication or have any food here\" \"I need to take those meds or I won't sleep\". This writer informed patient that those medications are not currently scheduled to be administer. Patient became visible anxious and stated, \"well if you're not going to give me my medication then I'm leaving. Nurse able to redirect momentarily, and administered PRN Ativan for increased anxiety. SLIM messaged at [phone] via tiger connect and made aware.   "

## 2024-04-14 NOTE — ASSESSMENT & PLAN NOTE
Patient presents with seizure likely secondary to polypharmacy  History of seizures in the past but has been off of medication for some time  Neurology consult for evaluation reviewed  Deferred MRI to outpatient neurologic providers, patient and family aware

## 2024-04-14 NOTE — NURSING NOTE
Patient discharged home. IV removed per protocol. AVS was reviewed in detail. Patient requested to to have a prescription for ativan, which provider reviewed recent PCP visit and this was not listed as a medication that patient currently taking. Patient was present with sister during discharge and sister expressed understanding. Patient agreed to follow up with PCP with in one week. All questions and concerns were addressed at this time.

## 2024-04-14 NOTE — PLAN OF CARE
Problem: SAFETY ADULT  Goal: Patient will remain free of falls  Description: INTERVENTIONS:  - Educate patient/family on patient safety including physical limitations  - Instruct patient to call for assistance with activity   - Consult OT/PT to assist with strengthening/mobility   - Keep Call bell within reach  - Keep bed low and locked with side rails adjusted as appropriate  - Keep care items and personal belongings within reach  - Initiate and maintain comfort rounds  - Make Fall Risk Sign visible to staff  - Offer Toileting every 2 Hours, in advance of need  - Initiate/Maintain bed alarm  - Obtain necessary fall risk management equipment: walker  - Apply yellow socks and bracelet for high fall risk patients  - Consider moving patient to room near nurses station  Outcome: Progressing  Goal: Maintain or return to baseline ADL function  Description: INTERVENTIONS:  -  Assess patient's ability to carry out ADLs; assess patient's baseline for ADL function and identify physical deficits which impact ability to perform ADLs (bathing, care of mouth/teeth, toileting, grooming, dressing, etc.)  - Assess/evaluate cause of self-care deficits   - Assess range of motion  - Assess patient's mobility; develop plan if impaired  - Assess patient's need for assistive devices and provide as appropriate  - Encourage maximum independence but intervene and supervise when necessary  - Involve family in performance of ADLs  - Assess for home care needs following discharge   - Consider OT consult to assist with ADL evaluation and planning for discharge  - Provide patient education as appropriate  Outcome: Progressing  Goal: Maintains/Returns to pre admission functional level  Description: INTERVENTIONS:  - Perform AM-PAC 6 Click Basic Mobility/ Daily Activity assessment daily.  - Set and communicate daily mobility goal to care team and patient/family/caregiver.   - Collaborate with rehabilitation services on mobility goals if  consulted  - Perform Range of Motion 3 times a day.  - Reposition patient every 2 hours.  - Dangle patient 3 times a day  - Stand patient 3 times a day  - Ambulate patient 3 times a day  - Out of bed to chair 3 times a day   - Out of bed for meals 3 times a day  - Out of bed for toileting  - Record patient progress and toleration of activity level   Outcome: Progressing     Problem: DISCHARGE PLANNING  Goal: Discharge to home or other facility with appropriate resources  Description: INTERVENTIONS:  - Identify barriers to discharge w/patient and caregiver  - Arrange for needed discharge resources and transportation as appropriate  - Identify discharge learning needs (meds, wound care, etc.)  - Arrange for interpretive services to assist at discharge as needed  - Refer to Case Management Department for coordinating discharge planning if the patient needs post-hospital services based on physician/advanced practitioner order or complex needs related to functional status, cognitive ability, or social support system  Outcome: Progressing     Problem: Knowledge Deficit  Goal: Patient/family/caregiver demonstrates understanding of disease process, treatment plan, medications, and discharge instructions  Description: Complete learning assessment and assess knowledge base.  Interventions:  - Provide teaching at level of understanding  - Provide teaching via preferred learning methods  Outcome: Progressing     Problem: NEUROSENSORY - ADULT  Goal: Achieves stable or improved neurological status  Description: INTERVENTIONS  - Monitor and report changes in neurological status  - Monitor vital signs such as temperature, blood pressure, glucose, and any other labs ordered   - Initiate measures to prevent increased intracranial pressure  - Monitor for seizure activity and implement precautions if appropriate      Outcome: Progressing  Goal: Remains free of injury related to seizures activity  Description: INTERVENTIONS  - Maintain  airway, patient safety  and administer oxygen as ordered  - Monitor patient for seizure activity, document and report duration and description of seizure to physician/advanced practitioner  - If seizure occurs,  ensure patient safety during seizure  - Reorient patient post seizure  - Seizure pads on all 4 side rails  - Instruct patient/family to notify RN of any seizure activity including if an aura is experienced  - Instruct patient/family to call for assistance with activity based on nursing assessment  - Administer anti-seizure medications if ordered    Outcome: Progressing     Problem: METABOLIC, FLUID AND ELECTROLYTES - ADULT  Goal: Glucose maintained within target range  Description: INTERVENTIONS:  - Monitor Blood Glucose as ordered  - Assess for signs and symptoms of hyperglycemia and hypoglycemia  - Administer ordered medications to maintain glucose within target range  - Assess nutritional intake and initiate nutrition service referral as needed  Outcome: Progressing     Problem: Prexisting or High Potential for Compromised Skin Integrity  Goal: Skin integrity is maintained or improved  Description: INTERVENTIONS:  - Identify patients at risk for skin breakdown  - Assess and monitor skin integrity  - Assess and monitor nutrition and hydration status  - Monitor labs   - Assess for incontinence   - Turn and reposition patient  - Assist with mobility/ambulation  - Relieve pressure over bony prominences  - Avoid friction and shearing  - Provide appropriate hygiene as needed including keeping skin clean and dry  - Evaluate need for skin moisturizer/barrier cream  - Collaborate with interdisciplinary team   - Patient/family teaching  - Consider wound care consult   Outcome: Progressing     Problem: Nutrition/Hydration-ADULT  Goal: Nutrient/Hydration intake appropriate for improving, restoring or maintaining nutritional needs  Description: Monitor and assess patient's nutrition/hydration status for malnutrition.  Collaborate with interdisciplinary team and initiate plan and interventions as ordered.  Monitor patient's weight and dietary intake as ordered or per policy. Utilize nutrition screening tool and intervene as necessary. Determine patient's food preferences and provide high-protein, high-caloric foods as appropriate.     INTERVENTIONS:  - Monitor oral intake, urinary output, labs, and treatment plans  - Assess nutrition and hydration status and recommend course of action  - Evaluate amount of meals eaten  - Assist patient with eating if necessary   - Allow adequate time for meals  - Recommend/ encourage appropriate diets, oral nutritional supplements, and vitamin/mineral supplements  - Order, calculate, and assess calorie counts as needed  - Recommend, monitor, and adjust tube feedings and TPN/PPN based on assessed needs  - Assess need for intravenous fluids  - Provide specific nutrition/hydration education as appropriate  - Include patient/family/caregiver in decisions related to nutrition  Outcome: Progressing

## 2024-06-12 ENCOUNTER — APPOINTMENT (EMERGENCY)
Dept: CT IMAGING | Facility: HOSPITAL | Age: 59
End: 2024-06-12
Payer: COMMERCIAL

## 2024-06-12 ENCOUNTER — HOSPITAL ENCOUNTER (EMERGENCY)
Facility: HOSPITAL | Age: 59
Discharge: HOME | End: 2024-06-12
Attending: EMERGENCY MEDICINE
Payer: COMMERCIAL

## 2024-06-12 VITALS
SYSTOLIC BLOOD PRESSURE: 159 MMHG | HEART RATE: 92 BPM | OXYGEN SATURATION: 92 % | WEIGHT: 106.8 LBS | BODY MASS INDEX: 19.53 KG/M2 | DIASTOLIC BLOOD PRESSURE: 85 MMHG | TEMPERATURE: 97.8 F | RESPIRATION RATE: 16 BRPM

## 2024-06-12 DIAGNOSIS — R10.9 ABDOMINAL PAIN: Primary | ICD-10-CM

## 2024-06-12 LAB
ALBUMIN SERPL BCG-MCNC: 3.7 G/DL (ref 3.5–5)
ALP SERPL-CCNC: 203 U/L (ref 34–104)
ALT SERPL W P-5'-P-CCNC: 19 U/L (ref 7–52)
ANION GAP SERPL CALCULATED.3IONS-SCNC: 8 MMOL/L (ref 4–13)
AST SERPL W P-5'-P-CCNC: 33 U/L (ref 13–39)
ATRIAL RATE: 92 BPM
BACTERIA UR QL AUTO: ABNORMAL /HPF
BASOPHILS # BLD AUTO: 0.02 THOUSANDS/ÂΜL (ref 0–0.1)
BASOPHILS NFR BLD AUTO: 0 % (ref 0–1)
BILIRUB SERPL-MCNC: 0.41 MG/DL (ref 0.2–1)
BILIRUB UR QL STRIP: NEGATIVE
BUN SERPL-MCNC: 21 MG/DL (ref 5–25)
CALCIUM SERPL-MCNC: 9.3 MG/DL (ref 8.4–10.2)
CARDIAC TROPONIN I PNL SERPL HS: <2 NG/L
CHLORIDE SERPL-SCNC: 101 MMOL/L (ref 96–108)
CLARITY UR: CLEAR
CO2 SERPL-SCNC: 27 MMOL/L (ref 21–32)
COLOR UR: ABNORMAL
CREAT SERPL-MCNC: 0.57 MG/DL (ref 0.6–1.3)
EOSINOPHIL # BLD AUTO: 0.05 THOUSAND/ÂΜL (ref 0–0.61)
EOSINOPHIL NFR BLD AUTO: 1 % (ref 0–6)
ERYTHROCYTE [DISTWIDTH] IN BLOOD BY AUTOMATED COUNT: 14.2 % (ref 11.6–15.1)
GFR SERPL CREATININE-BSD FRML MDRD: 103 ML/MIN/1.73SQ M
GLUCOSE SERPL-MCNC: 124 MG/DL (ref 65–140)
GLUCOSE UR STRIP-MCNC: ABNORMAL MG/DL
HCT VFR BLD AUTO: 41.3 % (ref 34.8–46.1)
HGB BLD-MCNC: 13.6 G/DL (ref 11.5–15.4)
HGB UR QL STRIP.AUTO: NEGATIVE
IMM GRANULOCYTES # BLD AUTO: 0.04 THOUSAND/UL (ref 0–0.2)
IMM GRANULOCYTES NFR BLD AUTO: 1 % (ref 0–2)
KETONES UR STRIP-MCNC: NEGATIVE MG/DL
LEUKOCYTE ESTERASE UR QL STRIP: NEGATIVE
LIPASE SERPL-CCNC: 33 U/L (ref 11–82)
LYMPHOCYTES # BLD AUTO: 1.69 THOUSANDS/ÂΜL (ref 0.6–4.47)
LYMPHOCYTES NFR BLD AUTO: 25 % (ref 14–44)
MCH RBC QN AUTO: 32.3 PG (ref 26.8–34.3)
MCHC RBC AUTO-ENTMCNC: 32.9 G/DL (ref 31.4–37.4)
MCV RBC AUTO: 98 FL (ref 82–98)
MONOCYTES # BLD AUTO: 0.38 THOUSAND/ÂΜL (ref 0.17–1.22)
MONOCYTES NFR BLD AUTO: 6 % (ref 4–12)
NEUTROPHILS # BLD AUTO: 4.47 THOUSANDS/ÂΜL (ref 1.85–7.62)
NEUTS SEG NFR BLD AUTO: 67 % (ref 43–75)
NITRITE UR QL STRIP: NEGATIVE
NON-SQ EPI CELLS URNS QL MICRO: ABNORMAL /HPF
NRBC BLD AUTO-RTO: 0 /100 WBCS
P AXIS: 74 DEGREES
PH UR STRIP.AUTO: 5 [PH]
PLATELET # BLD AUTO: 335 THOUSANDS/UL (ref 149–390)
PMV BLD AUTO: 9.5 FL (ref 8.9–12.7)
POTASSIUM SERPL-SCNC: 4.1 MMOL/L (ref 3.5–5.3)
PR INTERVAL: 146 MS
PROT SERPL-MCNC: 7.4 G/DL (ref 6.4–8.4)
PROT UR STRIP-MCNC: NEGATIVE MG/DL
QRS AXIS: -12 DEGREES
QRSD INTERVAL: 80 MS
QT INTERVAL: 402 MS
QTC INTERVAL: 497 MS
RBC # BLD AUTO: 4.21 MILLION/UL (ref 3.81–5.12)
RBC #/AREA URNS AUTO: ABNORMAL /HPF
SODIUM SERPL-SCNC: 136 MMOL/L (ref 135–147)
SP GR UR STRIP.AUTO: 1.01 (ref 1–1.04)
T WAVE AXIS: 33 DEGREES
UROBILINOGEN UA: NEGATIVE MG/DL
VENTRICULAR RATE: 92 BPM
WBC # BLD AUTO: 6.65 THOUSAND/UL (ref 4.31–10.16)
WBC #/AREA URNS AUTO: ABNORMAL /HPF

## 2024-06-12 PROCEDURE — 85025 COMPLETE CBC W/AUTO DIFF WBC: CPT | Performed by: PHYSICIAN ASSISTANT

## 2024-06-12 PROCEDURE — 99284 EMERGENCY DEPT VISIT MOD MDM: CPT

## 2024-06-12 PROCEDURE — 99285 EMERGENCY DEPT VISIT HI MDM: CPT | Performed by: PHYSICIAN ASSISTANT

## 2024-06-12 PROCEDURE — 96361 HYDRATE IV INFUSION ADD-ON: CPT

## 2024-06-12 PROCEDURE — 74177 CT ABD & PELVIS W/CONTRAST: CPT

## 2024-06-12 PROCEDURE — 96374 THER/PROPH/DIAG INJ IV PUSH: CPT

## 2024-06-12 PROCEDURE — 81001 URINALYSIS AUTO W/SCOPE: CPT | Performed by: PHYSICIAN ASSISTANT

## 2024-06-12 PROCEDURE — 93010 ELECTROCARDIOGRAM REPORT: CPT | Performed by: INTERNAL MEDICINE

## 2024-06-12 PROCEDURE — 84484 ASSAY OF TROPONIN QUANT: CPT | Performed by: PHYSICIAN ASSISTANT

## 2024-06-12 PROCEDURE — 36415 COLL VENOUS BLD VENIPUNCTURE: CPT | Performed by: PHYSICIAN ASSISTANT

## 2024-06-12 PROCEDURE — 96375 TX/PRO/DX INJ NEW DRUG ADDON: CPT

## 2024-06-12 PROCEDURE — 93005 ELECTROCARDIOGRAM TRACING: CPT

## 2024-06-12 PROCEDURE — 80053 COMPREHEN METABOLIC PANEL: CPT | Performed by: PHYSICIAN ASSISTANT

## 2024-06-12 PROCEDURE — 83690 ASSAY OF LIPASE: CPT | Performed by: PHYSICIAN ASSISTANT

## 2024-06-12 RX ORDER — MORPHINE SULFATE 4 MG/ML
4 INJECTION, SOLUTION INTRAMUSCULAR; INTRAVENOUS ONCE
Status: COMPLETED | OUTPATIENT
Start: 2024-06-12 | End: 2024-06-12

## 2024-06-12 RX ORDER — ONDANSETRON 2 MG/ML
4 INJECTION INTRAMUSCULAR; INTRAVENOUS ONCE
Status: COMPLETED | OUTPATIENT
Start: 2024-06-12 | End: 2024-06-12

## 2024-06-12 RX ADMIN — IOHEXOL 50 ML: 240 INJECTION, SOLUTION INTRATHECAL; INTRAVASCULAR; INTRAVENOUS; ORAL at 06:16

## 2024-06-12 RX ADMIN — MORPHINE SULFATE 4 MG: 4 INJECTION, SOLUTION INTRAMUSCULAR; INTRAVENOUS at 06:36

## 2024-06-12 RX ADMIN — SODIUM CHLORIDE 1000 ML: 0.9 INJECTION, SOLUTION INTRAVENOUS at 06:05

## 2024-06-12 RX ADMIN — IOHEXOL 100 ML: 350 INJECTION, SOLUTION INTRAVENOUS at 08:16

## 2024-06-12 RX ADMIN — ONDANSETRON 4 MG: 2 INJECTION INTRAMUSCULAR; INTRAVENOUS at 06:13

## 2024-06-12 NOTE — ED NOTES
Morphine 4mg spilled out of the syringe onto the counter while opening it - dose was wasted in the omnicell with Austyn Reyes and Jovita Partida also wittnessed the mishap. Patient was then medicated with the morphine as ordered after another dose was removed from the omnicell.     Keyona Mcdonald RN  06/12/24 0657

## 2024-06-12 NOTE — ED CARE HANDOFF
Emergency Department Sign Out Note        Sign out and transfer of care from [facility]. See Separate Emergency Department note.     The patient, Edith Klein, was evaluated by the previous provider for Abdominal pain.    Workup Completed:  Abdominal labs, troponin, CT AP    ED Course / Workup Pending (followup):    Abdominal labs as well as troponin was negative  CT abdomen pelvis revealed possible colitis as well as small oral contrast seen within the excluded portion of the stomach  Possible communication between the excluded portion of the stomach and the gastric pouch  Pain is under control  Patient instructed to follow-up with bariatric surgery  Pain most likely due to colitis                                   Procedures  Medical Decision Making  Amount and/or Complexity of Data Reviewed  Labs: ordered.  Radiology: ordered.    Risk  Prescription drug management.            Disposition  Final diagnoses:   Abdominal pain     Time reflects when diagnosis was documented in both MDM as applicable and the Disposition within this note       Time User Action Codes Description Comment    6/12/2024  8:43 AM Russel Leyva Add [R10.9] Abdominal pain           ED Disposition       ED Disposition   Discharge    Condition   Stable    Date/Time   Wed Jun 12, 2024  8:43 AM    Comment   Edith Klein discharge to home/self care.                   Follow-up Information       Follow up With Specialties Details Why Contact Info    Deana Higginbotham PA-C Physician Assistant Schedule an appointment as soon as possible for a visit   [address]  [address]  [place] [address] [phone]      [facility]  Schedule an appointment as soon as possible for a visit   [phone]          Discharge Medication List as of 6/12/2024  8:53 AM        CONTINUE these medications which have NOT CHANGED    Details   !! amitriptyline (ELAVIL) 25 mg tablet Starting Tue 6/20/2023, Historical Med      !! amitriptyline (ELAVIL) 50 mg  tablet Take 50 mg by mouth daily at bedtime, Starting Mon 7/3/2023, Historical Med      aspirin (ECOTRIN LOW STRENGTH) 81 mg EC tablet Take 81 mg by mouth daily  , Starting Mon 12/18/2017, Historical Med      azaTHIOprine (IMURAN) 50 mg tablet Take 100 mg by mouth daily, Historical Med      famotidine (PEPCID) 40 MG tablet Starting Tue 5/30/2023, Historical Med      FLUoxetine (PROzac) 40 MG capsule Take 2 capsules (80 mg total) by mouth daily, Starting Sun 4/14/2024, Until Tue 5/14/2024, Normal      gabapentin (NEURONTIN) 400 mg capsule Take 400 mg by mouth 3 (three) times a day, Historical Med      Jardiance 25 MG TABS Starting Mon 5/1/2023, Historical Med      linaCLOtide (Linzess) 72 MCG CAPS Take 1 capsule by mouth daily, Historical Med      Lubricating Plus Eye Drops 0.5 % SOLN Starting Fri 6/16/2023, Historical Med      pantoprazole (PROTONIX) 40 mg tablet Take 40 mg by mouth daily, Historical Med      QUEtiapine (SEROquel) 400 MG tablet Take 800 mg by mouth daily at bedtime, Starting Mon 7/31/2023, Historical Med      topiramate (TOPAMAX) 25 mg tablet Take 25 mg by mouth 2 (two) times a day, Starting Fri 3/3/2023, Until Sat 3/2/2024, Historical Med      zolpidem (AMBIEN) 10 mg tablet Take 10 mg by mouth daily at bedtime, Starting Mon 7/31/2023, Historical Med       !! - Potential duplicate medications found. Please discuss with provider.        No discharge procedures on file.       ED Provider  Electronically Signed by     Russel Leyva MD  06/12/24 0951

## 2024-06-12 NOTE — DISCHARGE INSTRUCTIONS
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 This may be due to retrograde reflux although cannot exclude communication between the excluded portion of the stomach and the gastric pouch. If clinically warranted, this can be further evaluated with an upper GI.    Please follow up with your bariatric surgeon

## 2024-06-12 NOTE — ED PROVIDER NOTES
History  Chief Complaint   Patient presents with    Abdominal Pain     Pt having sharp abdominal pain that's been going on for two weeks URQ/ULQ/LRQ. C/o nausea. No meds pta.      59-year-old female with history of gastric bypass, H. pylori, diabetes and nephrolithiasis presents complaining of diffuse abdominal pain right worse than left for the past few weeks.  Patient states that the pain has been getting worse and that she could not tolerate it at home anymore.  Patient also states that she is concerned about some unintentional weight loss she has been experiencing for the past few months.  Patient states that in the past 7 months she has lost 40 pounds.  Patient saw her primary care provider for this but was unable to complete the workup that was ordered yet.  Has not taken medications prior to arrival.  Admits to nausea without vomiting.  Tolerating p.o. intake with normal appetite.  Denies changes of bowel or bladder habits.  Denies recent travel or antibiotic use. Denies any other complaints       History provided by:  Patient   used: No        Prior to Admission Medications   Prescriptions Last Dose Informant Patient Reported? Taking?   FLUoxetine (PROzac) 40 MG capsule   No No   Sig: Take 2 capsules (80 mg total) by mouth daily   Jardiance 25 MG TABS   Yes No   Lubricating Plus Eye Drops 0.5 % SOLN   Yes No   QUEtiapine (SEROquel) 400 MG tablet   Yes No   Sig: Take 800 mg by mouth daily at bedtime   amitriptyline (ELAVIL) 25 mg tablet   Yes No   Patient not taking: Reported on 4/13/2024   amitriptyline (ELAVIL) 50 mg tablet   Yes No   Sig: Take 50 mg by mouth daily at bedtime   aspirin (ECOTRIN LOW STRENGTH) 81 mg EC tablet  Self Yes No   Sig: Take 81 mg by mouth daily     azaTHIOprine (IMURAN) 50 mg tablet  Self Yes No   Sig: Take 100 mg by mouth daily   famotidine (PEPCID) 40 MG tablet   Yes No   gabapentin (NEURONTIN) 400 mg capsule   Yes No   Sig: Take 400 mg by mouth 3 (three) times  a day   linaCLOtide (Linzess) 72 MCG CAPS   Yes No   Sig: Take 1 capsule by mouth daily   pantoprazole (PROTONIX) 40 mg tablet   Yes No   Sig: Take 40 mg by mouth daily   topiramate (TOPAMAX) 25 mg tablet   Yes No   Sig: Take 25 mg by mouth 2 (two) times a day   zolpidem (AMBIEN) 10 mg tablet   Yes No   Sig: Take 10 mg by mouth daily at bedtime      Facility-Administered Medications: None       Past Medical History:   Diagnosis Date    Autoimmune hepatitis (HCC)     Bipolar 1 disorder (HCC)     Chronic headaches 2021    Diabetes mellitus (HCC)     Kidney stone     Renal disorder     kidney stones    Seizure disorder (HCC)        Past Surgical History:   Procedure Laterality Date    BARIATRIC SURGERY  05/2017    BUNIONECTOMY      CYSTOSCOPY  07/12/2018    Stent Removal    FL RETROGRADE PYELOGRAM  6/16/2022    GASTRIC BYPASS  05/22/2017    HIATAL HERNIA REPAIR  05/22/2017    HYSTERECTOMY      JOINT REPLACEMENT      KIDNEY STONE SURGERY      WI CYSTO/URETERO W/LITHOTRIPSY &INDWELL STENT INSRT Bilateral 7/6/2018    Procedure: CYSTOSCOPY GALDINO. URETEROSCOPY;GALDINO.  RETROGRADE PYELOGRAM AND INSERTION GALDINO.STENT URETERAL;  Surgeon: Jacob Gerber MD;  Location: [facility];  Service: Urology    WI CYSTO/URETERO W/LITHOTRIPSY &INDWELL STENT INSRT Left 6/16/2022    Procedure: CYSTOSCOPY URETEROSCOPY WITH LITHOTRIPSY HOLMIUM LASER, RETROGRADE PYELOGRAM AND INSERTION STENT URETERAL;  Surgeon: Asad Geiger MD;  Location: [facility];  Service: Urology    TOTAL SHOULDER REPLACEMENT  2002    VAGINAL HYSTERECTOMY      PARTIAL       Family History   Problem Relation Age of Onset    Diabetes Father     Heart disease Father     Diabetes Mother     Heart disease Mother     Diabetes Sister     Diabetes Family         MELLITUS    Depression Family     Mental illness Family     Obesity Family     Osteoarthritis Family      I have reviewed and agree with the history as documented.    E-Cigarette/Vaping    E-Cigarette Use Never User       E-Cigarette/Vaping Substances    Nicotine No     THC No     CBD No     Flavoring No     Other No     Unknown No      Social History     Tobacco Use    Smoking status: Every Day     Current packs/day: 0.25     Average packs/day: 0.3 packs/day for 30.0 years (7.5 ttl pk-yrs)     Types: Cigarettes     Passive exposure: Current    Smokeless tobacco: Never    Tobacco comments:     N/a   Vaping Use    Vaping status: Never Used   Substance Use Topics    Alcohol use: No     Comment: n/a    Drug use: No     Comment: n/a       Review of Systems   Constitutional:  Positive for unexpected weight change. Negative for chills and fatigue.   HENT:  Negative for ear pain and sore throat.    Eyes:  Negative for photophobia and redness.   Respiratory:  Negative for apnea, cough and shortness of breath.    Cardiovascular:  Negative for chest pain.   Gastrointestinal:  Positive for abdominal pain and nausea. Negative for vomiting.   Genitourinary:  Negative for dysuria.   Musculoskeletal:  Negative for arthralgias, neck pain and neck stiffness.   Skin:  Negative for rash.   Neurological:  Negative for dizziness, tremors, syncope and weakness.   Psychiatric/Behavioral:  Negative for suicidal ideas.        Physical Exam  Physical Exam  Constitutional:       General: She is not in acute distress.     Appearance: She is well-developed. She is not diaphoretic.   Eyes:      Pupils: Pupils are equal, round, and reactive to light.   Cardiovascular:      Rate and Rhythm: Normal rate and regular rhythm.   Pulmonary:      Effort: Pulmonary effort is normal. No respiratory distress.      Breath sounds: Normal breath sounds.   Abdominal:      General: Bowel sounds are normal. There is no distension.      Palpations: Abdomen is soft.      Tenderness: There is abdominal tenderness. There is guarding. There is no right CVA tenderness or left CVA tenderness.      Comments: RUQ and LLQ tenderness and guarding    Musculoskeletal:         General:  Normal range of motion.      Cervical back: Normal range of motion and neck supple.   Skin:     General: Skin is warm and dry.   Neurological:      Mental Status: She is alert and oriented to person, place, and time.         Vital Signs  ED Triage Vitals   Temperature Pulse Respirations Blood Pressure SpO2   06/12/24 0539 06/12/24 0539 06/12/24 0539 06/12/24 0539 06/12/24 0539   97.6 °F (36.4 °C) 98 16 122/73 97 %      Temp Source Heart Rate Source Patient Position - Orthostatic VS BP Location FiO2 (%)   06/12/24 0539 06/12/24 0539 06/12/24 0539 06/12/24 0539 --   Tympanic Monitor Lying Left arm       Pain Score       06/12/24 0636       8           Vitals:    06/12/24 0539 06/12/24 0824   BP: 122/73 159/85   Pulse: 98 92   Patient Position - Orthostatic VS: Lying Lying         Visual Acuity      ED Medications  Medications   ondansetron (ZOFRAN) injection 4 mg (4 mg Intravenous Given 6/12/24 0613)   sodium chloride 0.9 % bolus 1,000 mL (0 mL Intravenous Stopped 6/12/24 0901)   iohexol (OMNIPAQUE) 240 MG/ML solution 50 mL (50 mL Oral Given 6/12/24 0616)   morphine injection 4 mg (4 mg Intravenous Given 6/12/24 0636)   iohexol (OMNIPAQUE) 350 MG/ML injection (MULTI-DOSE) 100 mL (100 mL Intravenous Given 6/12/24 0816)       Diagnostic Studies  Results Reviewed       Procedure Component Value Units Date/Time    Urine Microscopic [[id number]]  (Abnormal) Collected: 06/12/24 0734    Lab Status: Final result Specimen: Urine, Clean Catch Updated: 06/12/24 0800     RBC, UA 0-1 /hpf      WBC, UA 0-1 /hpf      Epithelial Cells Moderate /hpf      Bacteria, UA None Seen /hpf     UA (URINE) with reflex to Scope [[id number]]  (Abnormal) Collected: 06/12/24 0734    Lab Status: Final result Specimen: Urine, Clean Catch Updated: 06/12/24 0753     Color, UA Straw     Clarity, UA Clear     Specific Gravity, UA 1.015     pH, UA 5.0     Leukocytes, UA Negative     Nitrite, UA Negative     Protein, UA Negative mg/dl      Glucose, UA >=1000  (1%) mg/dl      Ketones, UA Negative mg/dl      Bilirubin, UA Negative     Occult Blood, UA Negative     UROBILINOGEN UA Negative mg/dL     HS Troponin 0hr (reflex protocol) [[id number]]  (Normal) Collected: 06/12/24 0559    Lab Status: Final result Specimen: Blood from Arm, Left Updated: 06/12/24 0636     hs TnI 0hr <2 ng/L     Lipase [[id number]]  (Normal) Collected: 06/12/24 0559    Lab Status: Final result Specimen: Blood from Arm, Left Updated: 06/12/24 0633     Lipase 33 u/L     Comprehensive metabolic panel [[id number]]  (Abnormal) Collected: 06/12/24 0559    Lab Status: Final result Specimen: Blood from Arm, Left Updated: 06/12/24 0633     Sodium 136 mmol/L      Potassium 4.1 mmol/L      Chloride 101 mmol/L      CO2 27 mmol/L      ANION GAP 8 mmol/L      BUN 21 mg/dL      Creatinine 0.57 mg/dL      Glucose 124 mg/dL      Calcium 9.3 mg/dL      AST 33 U/L      ALT 19 U/L      Alkaline Phosphatase 203 U/L      Total Protein 7.4 g/dL      Albumin 3.7 g/dL      Total Bilirubin 0.41 mg/dL      eGFR 103 ml/min/1.73sq m     Narrative:      National Kid[employer] Foundation guidelines for Chronic Kidney Disease (CKD):     Stage 1 with normal or high GFR (GFR > 90 mL/min/1.73 square meters)    Stage 2 Mild CKD (GFR = 60-89 mL/min/1.73 square meters)    Stage 3A Moderate CKD (GFR = 45-59 mL/min/1.73 square meters)    Stage 3B Moderate CKD (GFR = 30-44 mL/min/1.73 square meters)    Stage 4 Severe CKD (GFR = 15-29 mL/min/1.73 square meters)    Stage 5 End Stage CKD (GFR <15 mL/min/1.73 square meters)  Note: GFR calculation is accurate only with a steady state creatinine    CBC and differential [[id number]] Collected: 06/12/24 0559    Lab Status: Final result Specimen: Blood from Arm, Left Updated: 06/12/24 0614     WBC 6.65 Thousand/uL      RBC 4.21 Million/uL      Hemoglobin 13.6 g/dL      Hematocrit 41.3 %      MCV 98 fL      MCH 32.3 pg      MCHC 32.9 g/dL      RDW 14.2 %      MPV 9.5 fL      Platelets 335 Thousands/uL       nRBC 0 /100 WBCs      Segmented % 67 %      Immature Grans % 1 %      Lymphocytes % 25 %      Monocytes % 6 %      Eosinophils Relative 1 %      Basophils Relative 0 %      Absolute Neutrophils 4.47 Thousands/µL      Absolute Immature Grans 0.04 Thousand/uL      Absolute Lymphocytes 1.69 Thousands/µL      Absolute Monocytes 0.38 Thousand/µL      Eosinophils Absolute 0.05 Thousand/µL      Basophils Absolute 0.02 Thousands/µL                    CT abdomen pelvis with contrast   Final Result by Chirag Neff MD (06/12 0828)      Areas of colonic wall thickening as above concerning for a colitis. Moderate amount of stool seen scattered throughout the colon may indicate superimposed constipation.      There is oral contrast seen within the excluded portion of the stomach status post gastric bypass. This may be due to retrograde reflux although cannot exclude communication between the excluded portion of the stomach and the gastric pouch. If clinically    warranted, this can be further evaluated with an upper GI.      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      Trace free fluid in the pelvis.      Slight fullness of the bilateral renal collecting system without donald hydronephrosis. This may be due to moderate bladder distention.               Workstation performed: [id number]                    Procedures  ECG 12 Lead Documentation Only    Date/Time: 6/12/2024 6:02 AM    Performed by: Marii Brown PA-C  Authorized by: Marii Brown PA-C    Indications / Diagnosis:  Epigastric pain  ECG reviewed by me, the ED Provider: yes    Patient location:  ED  Interpretation:     Interpretation: normal    Rate:     ECG rate:  92    ECG rate assessment: normal    Rhythm:     Rhythm: sinus rhythm    Ectopy:     Ectopy: none    QRS:     QRS axis:  Normal    QRS intervals:  Normal  Conduction:     Conduction: normal    ST segments:     ST segments:  Normal  T waves:      T waves: normal    Other findings:     Other findings: prolonged qTc interval             ED Course                               SBIRT 22yo+      Flowsheet Row Most Recent Value   Initial Alcohol Screen: US AUDIT-C     1. How often do you have a drink containing alcohol? 0 Filed at: 06/12/2024 0539   2. How many drinks containing alcohol do you have on a typical day you are drinking?  0 Filed at: 06/12/2024 0539   3b. FEMALE Any Age, or MALE 65+: How often do you have 4 or more drinks on one occassion? 0 Filed at: 06/12/2024 0539   Audit-C Score 0 Filed at: 06/12/2024 0539   SARITA: How many times in the past year have you...    Used an illegal drug or used a prescription medication for non-medical reasons? Never Filed at: 06/12/2024 0539                      Medical Decision Making  Patient presenting complaining of nonspecific abdominal pain in the setting of prior gastric bypass.  Patient also had a recent unexpected weight change.  Laboratory evaluation without focal abnormality.  Patient required oral contrast secondary to prior gastric bypass.  Care was signed out to attending pending CT scan further reevaluation and disposition.  Please see separate note.    Amount and/or Complexity of Data Reviewed  Labs: ordered.  Radiology: ordered.    Risk  Prescription drug management.             Disposition  Final diagnoses:   Abdominal pain     Time reflects when diagnosis was documented in both MDM as applicable and the Disposition within this note       Time User Action Codes Description Comment    6/12/2024  8:43 AM Russel Leyva Add [R10.9] Abdominal pain           ED Disposition       ED Disposition   Discharge    Condition   Stable    Date/Time   Wed Jun 12, 2024 0843    Comment   Edith Klein discharge to home/self care.                   Follow-up Information       Follow up With Specialties Details Why Contact Info    Deana Higginbotham PA-C Physician Assistant Schedule an appointment as soon as possible for a  visit   [address]  [address]  [place] [address] [phone]      [facility]  Schedule an appointment as soon as possible for a visit   [phone]            Discharge Medication List as of 6/12/2024  8:53 AM        CONTINUE these medications which have NOT CHANGED    Details   !! amitriptyline (ELAVIL) 25 mg tablet Starting Tue 6/20/2023, Historical Med      !! amitriptyline (ELAVIL) 50 mg tablet Take 50 mg by mouth daily at bedtime, Starting Mon 7/3/2023, Historical Med      aspirin (ECOTRIN LOW STRENGTH) 81 mg EC tablet Take 81 mg by mouth daily  , Starting Mon 12/18/2017, Historical Med      azaTHIOprine (IMURAN) 50 mg tablet Take 100 mg by mouth daily, Historical Med      famotidine (PEPCID) 40 MG tablet Starting Tue 5/30/2023, Historical Med      FLUoxetine (PROzac) 40 MG capsule Take 2 capsules (80 mg total) by mouth daily, Starting Sun 4/14/2024, Until Tue 5/14/2024, Normal      gabapentin (NEURONTIN) 400 mg capsule Take 400 mg by mouth 3 (three) times a day, Historical Med      Jardiance 25 MG TABS Starting Mon 5/1/2023, Historical Med      linaCLOtide (Linzess) 72 MCG CAPS Take 1 capsule by mouth daily, Historical Med      Lubricating Plus Eye Drops 0.5 % SOLN Starting Fri 6/16/2023, Historical Med      pantoprazole (PROTONIX) 40 mg tablet Take 40 mg by mouth daily, Historical Med      QUEtiapine (SEROquel) 400 MG tablet Take 800 mg by mouth daily at bedtime, Starting Mon 7/31/2023, Historical Med      topiramate (TOPAMAX) 25 mg tablet Take 25 mg by mouth 2 (two) times a day, Starting Fri 3/3/2023, Until Sat 3/2/2024, Historical Med      zolpidem (AMBIEN) 10 mg tablet Take 10 mg by mouth daily at bedtime, Starting Mon 7/31/2023, Historical Med       !! - Potential duplicate medications found. Please discuss with provider.          No discharge procedures on file.    PDMP Review         Value Time User    PDMP Reviewed  Yes 6/14/2022  4:48 PM Amanda Carlson MD             ED Provider  Electronically Signed by             Marii Brown PA-C  06/15/24 2014

## 2024-06-16 ENCOUNTER — HOSPITAL ENCOUNTER (EMERGENCY)
Facility: HOSPITAL | Age: 59
Discharge: HOME | End: 2024-06-16
Attending: EMERGENCY MEDICINE | Admitting: EMERGENCY MEDICINE
Payer: COMMERCIAL

## 2024-06-16 VITALS
DIASTOLIC BLOOD PRESSURE: 46 MMHG | WEIGHT: 107.4 LBS | SYSTOLIC BLOOD PRESSURE: 85 MMHG | HEART RATE: 105 BPM | BODY MASS INDEX: 19.64 KG/M2 | OXYGEN SATURATION: 95 % | RESPIRATION RATE: 16 BRPM | TEMPERATURE: 97.5 F

## 2024-06-16 DIAGNOSIS — R10.9 ABDOMINAL PAIN: Primary | ICD-10-CM

## 2024-06-16 PROCEDURE — 99284 EMERGENCY DEPT VISIT MOD MDM: CPT

## 2024-06-16 PROCEDURE — 96372 THER/PROPH/DIAG INJ SC/IM: CPT

## 2024-06-16 PROCEDURE — 99284 EMERGENCY DEPT VISIT MOD MDM: CPT | Performed by: EMERGENCY MEDICINE

## 2024-06-16 RX ORDER — ONDANSETRON 2 MG/ML
4 INJECTION INTRAMUSCULAR; INTRAVENOUS ONCE
Status: DISCONTINUED | OUTPATIENT
Start: 2024-06-16 | End: 2024-06-16

## 2024-06-16 RX ORDER — HYDROCODONE BITARTRATE AND ACETAMINOPHEN 5; 325 MG/1; MG/1
1 TABLET ORAL ONCE
Status: COMPLETED | OUTPATIENT
Start: 2024-06-16 | End: 2024-06-16

## 2024-06-16 RX ORDER — DICYCLOMINE HCL 20 MG
20 TABLET ORAL ONCE
Status: COMPLETED | OUTPATIENT
Start: 2024-06-16 | End: 2024-06-16

## 2024-06-16 RX ORDER — ONDANSETRON 2 MG/ML
4 INJECTION INTRAMUSCULAR; INTRAVENOUS ONCE
Status: COMPLETED | OUTPATIENT
Start: 2024-06-16 | End: 2024-06-16

## 2024-06-16 RX ADMIN — HYDROCODONE BITARTRATE AND ACETAMINOPHEN 1 TABLET: 5; 325 TABLET ORAL at 21:33

## 2024-06-16 RX ADMIN — DICYCLOMINE HYDROCHLORIDE 20 MG: 20 TABLET ORAL at 21:33

## 2024-06-16 RX ADMIN — ONDANSETRON 4 MG: 2 INJECTION INTRAMUSCULAR; INTRAVENOUS at 21:35

## 2024-06-17 NOTE — ED PROVIDER NOTES
History  Chief Complaint   Patient presents with    Abdominal Pain     Pt reports ongoing abdominal pain since appr. 8 days ago.  Was seen for same on 6/12.  Reports pain has progressed from RLQ to mid epigastric/LUQ.       Patient is a 59-year-old female with a h/o gastric bypass who presents with continued abdominal pain.  RUQ to LUQ constant pain.  Seen here on 6/14/24 for same.  Had full workup including neg CT ab/pel.  Was written scripts for zofran and hydrocodone.  States has an issue with insurance and cannot get meds from pharmacy.  Does have an appointment with her PCP tomorrow.  +NV.  Has been taking motrin for pain.  Adivsed to stop.         Prior to Admission Medications   Prescriptions Last Dose Informant Patient Reported? Taking?   FLUoxetine (PROzac) 40 MG capsule   No No   Sig: Take 2 capsules (80 mg total) by mouth daily   Jardiance 25 MG TABS   Yes No   Lubricating Plus Eye Drops 0.5 % SOLN   Yes No   QUEtiapine (SEROquel) 400 MG tablet   Yes No   Sig: Take 800 mg by mouth daily at bedtime   amitriptyline (ELAVIL) 25 mg tablet   Yes No   Patient not taking: Reported on 4/13/2024   amitriptyline (ELAVIL) 50 mg tablet   Yes No   Sig: Take 50 mg by mouth daily at bedtime   aspirin (ECOTRIN LOW STRENGTH) 81 mg EC tablet  Self Yes No   Sig: Take 81 mg by mouth daily     azaTHIOprine (IMURAN) 50 mg tablet  Self Yes No   Sig: Take 100 mg by mouth daily   famotidine (PEPCID) 40 MG tablet   Yes No   gabapentin (NEURONTIN) 400 mg capsule   Yes No   Sig: Take 400 mg by mouth 3 (three) times a day   linaCLOtide (Linzess) 72 MCG CAPS   Yes No   Sig: Take 1 capsule by mouth daily   pantoprazole (PROTONIX) 40 mg tablet   Yes No   Sig: Take 40 mg by mouth daily   topiramate (TOPAMAX) 25 mg tablet   Yes No   Sig: Take 25 mg by mouth 2 (two) times a day   zolpidem (AMBIEN) 10 mg tablet   Yes No   Sig: Take 10 mg by mouth daily at bedtime      Facility-Administered Medications: None       Past Medical History:    Diagnosis Date    Autoimmune hepatitis (HCC)     Bipolar 1 disorder (HCC)     Chronic headaches 2021    Diabetes mellitus (HCC)     Kidney stone     Renal disorder     kidney stones    Seizure disorder (HCC)        Past Surgical History:   Procedure Laterality Date    BARIATRIC SURGERY  05/2017    BUNIONECTOMY      CYSTOSCOPY  07/12/2018    Stent Removal    FL RETROGRADE PYELOGRAM  6/16/2022    GASTRIC BYPASS  05/22/2017    HIATAL HERNIA REPAIR  05/22/2017    HYSTERECTOMY      JOINT REPLACEMENT      KIDNEY STONE SURGERY      MI CYSTO/URETERO W/LITHOTRIPSY &INDWELL STENT INSRT Bilateral 7/6/2018    Procedure: CYSTOSCOPY GALDINO. URETEROSCOPY;GALDINO.  RETROGRADE PYELOGRAM AND INSERTION GALDINO.STENT URETERAL;  Surgeon: Jacob Gerber MD;  Location: [facility];  Service: Urology    MI CYSTO/URETERO W/LITHOTRIPSY &INDWELL STENT INSRT Left 6/16/2022    Procedure: CYSTOSCOPY URETEROSCOPY WITH LITHOTRIPSY HOLMIUM LASER, RETROGRADE PYELOGRAM AND INSERTION STENT URETERAL;  Surgeon: Asad Geiger MD;  Location: [facility];  Service: Urology    TOTAL SHOULDER REPLACEMENT  2002    VAGINAL HYSTERECTOMY      PARTIAL       Family History   Problem Relation Age of Onset    Diabetes Father     Heart disease Father     Diabetes Mother     Heart disease Mother     Diabetes Sister     Diabetes Family         MELLITUS    Depression Family     Mental illness Family     Obesity Family     Osteoarthritis Family      I have reviewed and agree with the history as documented.    E-Cigarette/Vaping    E-Cigarette Use Never User      E-Cigarette/Vaping Substances    Nicotine No     THC No     CBD No     Flavoring No     Other No     Unknown No      Social History     Tobacco Use    Smoking status: Every Day     Current packs/day: 0.25     Average packs/day: 0.3 packs/day for 30.0 years (7.5 ttl pk-yrs)     Types: Cigarettes     Passive exposure: Current    Smokeless tobacco: Never    Tobacco comments:     N/a   Vaping Use    Vaping status: Never  Used   Substance Use Topics    Alcohol use: No     Comment: n/a    Drug use: No     Comment: n/a       Review of Systems   Constitutional: Negative.    HENT: Negative.     Eyes: Negative.    Respiratory: Negative.     Cardiovascular: Negative.    Gastrointestinal:  Positive for abdominal pain, nausea and vomiting.   Endocrine: Negative.    Genitourinary: Negative.    Musculoskeletal: Negative.    Skin: Negative.    Allergic/Immunologic: Negative.    Neurological: Negative.    Hematological: Negative.    Psychiatric/Behavioral: Negative.     All other systems reviewed and are negative.      Physical Exam  Physical Exam  Vitals and nursing note reviewed.   Constitutional:       Appearance: She is well-developed and normal weight.   HENT:      Head: Normocephalic and atraumatic.      Mouth/Throat:      Mouth: Mucous membranes are moist.   Cardiovascular:      Rate and Rhythm: Normal rate and regular rhythm.      Heart sounds: Normal heart sounds.   Abdominal:      General: Abdomen is flat. Bowel sounds are increased.      Palpations: Abdomen is soft.      Tenderness: There is no right CVA tenderness, left CVA tenderness, guarding or rebound. Negative signs include Patricio's sign and McBurney's sign.   Skin:     General: Skin is warm and dry.      Capillary Refill: Capillary refill takes less than 2 seconds.   Neurological:      General: No focal deficit present.      Mental Status: She is alert and oriented to person, place, and time.         Vital Signs  ED Triage Vitals   Temperature Pulse Respirations Blood Pressure SpO2   06/16/24 2108 06/16/24 2108 06/16/24 2108 06/16/24 2108 06/16/24 2108   97.5 °F (36.4 °C) 105 16 (!) 85/46 95 %      Temp Source Heart Rate Source Patient Position - Orthostatic VS BP Location FiO2 (%)   06/16/24 2108 06/16/24 2108 06/16/24 2108 06/16/24 2108 --   Oral Monitor Sitting Left arm       Pain Score       06/16/24 2133       10 - Worst Possible Pain           Vitals:    06/16/24 2108    BP: (!) 85/46   Pulse: 105   Patient Position - Orthostatic VS: Sitting         Visual Acuity      ED Medications  Medications   HYDROcodone-acetaminophen (NORCO) 5-325 mg per tablet 1 tablet (1 tablet Oral Given 6/16/24 2133)   dicyclomine (BENTYL) tablet 20 mg (20 mg Oral Given 6/16/24 2133)   ondansetron (ZOFRAN) injection 4 mg (4 mg Intramuscular Given 6/16/24 2135)       Diagnostic Studies  Results Reviewed       None                   No orders to display              Procedures  Procedures         ED Course                                             Medical Decision Making  Problems Addressed:  Abdominal pain: acute illness or injury     Details: Recent negative CT from 2 days ago.  Has meds at pharmacy with follow up tomorrow in office.  Will give meds here.  Non surgical abdomen.  Advised to stop taking motrin as well.       Amount and/or Complexity of Data Reviewed  External Data Reviewed: notes.     Details: ER visit from 2 days ago.     Risk  Prescription drug management.             Disposition  Final diagnoses:   Abdominal pain     Time reflects when diagnosis was documented in both MDM as applicable and the Disposition within this note       Time User Action Codes Description Comment    6/16/2024  9:19 PM Abraham Robertson Add [R10.9] Abdominal pain           ED Disposition       ED Disposition   Discharge    Condition   Stable    Date/Time   Sun Jun 16, 2024  9:20 PM    Comment   Edith Klein discharge to home/self care.                   Follow-up Information       Follow up With Specialties Details Why Contact Info    Deana Higginbotham PA-C Physician Assistant   [address] [facility] [place]  [address]  [place] [address] [phone]              Discharge Medication List as of 6/16/2024  9:20 PM        CONTINUE these medications which have NOT CHANGED    Details   !! amitriptyline (ELAVIL) 25 mg tablet Starting Tue 6/20/2023, Historical Med      !! amitriptyline (ELAVIL) 50 mg tablet Take 50 mg by mouth  daily at bedtime, Starting Mon 7/3/2023, Historical Med      aspirin (ECOTRIN LOW STRENGTH) 81 mg EC tablet Take 81 mg by mouth daily  , Starting Mon 12/18/2017, Historical Med      azaTHIOprine (IMURAN) 50 mg tablet Take 100 mg by mouth daily, Historical Med      famotidine (PEPCID) 40 MG tablet Starting Tue 5/30/2023, Historical Med      FLUoxetine (PROzac) 40 MG capsule Take 2 capsules (80 mg total) by mouth daily, Starting Sun 4/14/2024, Until Tue 5/14/2024, Normal      gabapentin (NEURONTIN) 400 mg capsule Take 400 mg by mouth 3 (three) times a day, Historical Med      Jardiance 25 MG TABS Starting Mon 5/1/2023, Historical Med      linaCLOtide (Linzess) 72 MCG CAPS Take 1 capsule by mouth daily, Historical Med      Lubricating Plus Eye Drops 0.5 % SOLN Starting Fri 6/16/2023, Historical Med      pantoprazole (PROTONIX) 40 mg tablet Take 40 mg by mouth daily, Historical Med      QUEtiapine (SEROquel) 400 MG tablet Take 800 mg by mouth daily at bedtime, Starting Mon 7/31/2023, Historical Med      topiramate (TOPAMAX) 25 mg tablet Take 25 mg by mouth 2 (two) times a day, Starting Fri 3/3/2023, Until Sat 3/2/2024, Historical Med      zolpidem (AMBIEN) 10 mg tablet Take 10 mg by mouth daily at bedtime, Starting Mon 7/31/2023, Historical Med       !! - Potential duplicate medications found. Please discuss with provider.          No discharge procedures on file.    PDMP Review         Value Time User    PDMP Reviewed  Yes 6/14/2022  4:48 PM Amanda Carlson MD            ED Provider  Electronically Signed by             Abraham Robertson MD  06/16/24 4846

## 2024-06-19 ENCOUNTER — HOSPITAL ENCOUNTER (INPATIENT)
Facility: HOSPITAL | Age: 59
LOS: 1 days | DRG: 817 | End: 2024-06-20
Attending: EMERGENCY MEDICINE | Admitting: INTERNAL MEDICINE
Payer: COMMERCIAL

## 2024-06-19 ENCOUNTER — APPOINTMENT (EMERGENCY)
Dept: CT IMAGING | Facility: HOSPITAL | Age: 59
DRG: 817 | End: 2024-06-19
Payer: COMMERCIAL

## 2024-06-19 ENCOUNTER — TELEPHONE (OUTPATIENT)
Dept: PSYCHIATRY | Facility: CLINIC | Age: 59
End: 2024-06-19

## 2024-06-19 DIAGNOSIS — S09.90XA CLOSED HEAD INJURY, INITIAL ENCOUNTER: ICD-10-CM

## 2024-06-19 DIAGNOSIS — Z00.8 MEDICAL CLEARANCE FOR PSYCHIATRIC ADMISSION: ICD-10-CM

## 2024-06-19 DIAGNOSIS — R94.31 PROLONGED Q-T INTERVAL ON ECG: ICD-10-CM

## 2024-06-19 DIAGNOSIS — T50.901A POLYSUBSTANCE OVERDOSE: Primary | ICD-10-CM

## 2024-06-19 DIAGNOSIS — G93.40 ACUTE ENCEPHALOPATHY: ICD-10-CM

## 2024-06-19 DIAGNOSIS — S01.81XA LACERATION OF FOREHEAD, INITIAL ENCOUNTER: ICD-10-CM

## 2024-06-19 PROBLEM — T50.902A INTENTIONAL OVERDOSE (HCC): Status: ACTIVE | Noted: 2024-06-19

## 2024-06-19 LAB
ALBUMIN SERPL BCG-MCNC: 3.4 G/DL (ref 3.5–5)
ALP SERPL-CCNC: 242 U/L (ref 34–104)
ALT SERPL W P-5'-P-CCNC: 14 U/L (ref 7–52)
AMMONIA PLAS-SCNC: 24 UMOL/L (ref 18–72)
AMPHETAMINES SERPL QL SCN: NEGATIVE
ANION GAP SERPL CALCULATED.3IONS-SCNC: 13 MMOL/L (ref 4–13)
APAP SERPL-MCNC: <2 UG/ML (ref 10–20)
APTT PPP: 29 SECONDS (ref 23–37)
AST SERPL W P-5'-P-CCNC: 26 U/L (ref 13–39)
ATRIAL RATE: 127 BPM
ATRIAL RATE: 90 BPM
ATRIAL RATE: 91 BPM
BARBITURATES UR QL: NEGATIVE
BASE EX.OXY STD BLDV CALC-SCNC: 69.1 % (ref 60–80)
BASE EX.OXY STD BLDV CALC-SCNC: 83.3 % (ref 60–80)
BASE EXCESS BLDV CALC-SCNC: -0.4 MMOL/L
BASE EXCESS BLDV CALC-SCNC: -2.2 MMOL/L
BASOPHILS # BLD AUTO: 0.02 THOUSANDS/ÂΜL (ref 0–0.1)
BASOPHILS NFR BLD AUTO: 0 % (ref 0–1)
BENZODIAZ UR QL: NEGATIVE
BILIRUB SERPL-MCNC: 0.52 MG/DL (ref 0.2–1)
BUN SERPL-MCNC: 17 MG/DL (ref 5–25)
CALCIUM ALBUM COR SERPL-MCNC: 9.6 MG/DL (ref 8.3–10.1)
CALCIUM SERPL-MCNC: 9.1 MG/DL (ref 8.4–10.2)
CHLORIDE SERPL-SCNC: 99 MMOL/L (ref 96–108)
CK SERPL-CCNC: 41 U/L (ref 26–192)
CO2 SERPL-SCNC: 22 MMOL/L (ref 21–32)
COCAINE UR QL: NEGATIVE
CREAT SERPL-MCNC: 0.71 MG/DL (ref 0.6–1.3)
EOSINOPHIL # BLD AUTO: 0.09 THOUSAND/ÂΜL (ref 0–0.61)
EOSINOPHIL NFR BLD AUTO: 1 % (ref 0–6)
ERYTHROCYTE [DISTWIDTH] IN BLOOD BY AUTOMATED COUNT: 14 % (ref 11.6–15.1)
EST. AVERAGE GLUCOSE BLD GHB EST-MCNC: 157 MG/DL
ETHANOL SERPL-MCNC: <10 MG/DL
FENTANYL UR QL SCN: NEGATIVE
GFR SERPL CREATININE-BSD FRML MDRD: 94 ML/MIN/1.73SQ M
GLUCOSE SERPL-MCNC: 119 MG/DL (ref 65–140)
GLUCOSE SERPL-MCNC: 142 MG/DL (ref 65–140)
GLUCOSE SERPL-MCNC: 218 MG/DL (ref 65–140)
GLUCOSE SERPL-MCNC: 253 MG/DL (ref 65–140)
GLUCOSE SERPL-MCNC: 77 MG/DL (ref 65–140)
GLUCOSE SERPL-MCNC: 86 MG/DL (ref 65–140)
GLUCOSE SERPL-MCNC: 88 MG/DL (ref 65–140)
HBA1C MFR BLD: 7.1 %
HCO3 BLDV-SCNC: 22.8 MMOL/L (ref 24–30)
HCO3 BLDV-SCNC: 23.3 MMOL/L (ref 24–30)
HCT VFR BLD AUTO: 39.8 % (ref 34.8–46.1)
HGB BLD-MCNC: 12.9 G/DL (ref 11.5–15.4)
HYDROCODONE UR QL SCN: NEGATIVE
IMM GRANULOCYTES # BLD AUTO: 0.04 THOUSAND/UL (ref 0–0.2)
IMM GRANULOCYTES NFR BLD AUTO: 1 % (ref 0–2)
INR PPP: 1.02 (ref 0.84–1.19)
LACTATE SERPL-SCNC: 0.9 MMOL/L (ref 0.5–2)
LYMPHOCYTES # BLD AUTO: 2.2 THOUSANDS/ÂΜL (ref 0.6–4.47)
LYMPHOCYTES NFR BLD AUTO: 32 % (ref 14–44)
MAGNESIUM SERPL-MCNC: 2.2 MG/DL (ref 1.9–2.7)
MCH RBC QN AUTO: 32.4 PG (ref 26.8–34.3)
MCHC RBC AUTO-ENTMCNC: 32.4 G/DL (ref 31.4–37.4)
MCV RBC AUTO: 100 FL (ref 82–98)
METHADONE UR QL: NEGATIVE
MONOCYTES # BLD AUTO: 0.45 THOUSAND/ÂΜL (ref 0.17–1.22)
MONOCYTES NFR BLD AUTO: 7 % (ref 4–12)
NEUTROPHILS # BLD AUTO: 4.15 THOUSANDS/ÂΜL (ref 1.85–7.62)
NEUTS SEG NFR BLD AUTO: 59 % (ref 43–75)
NRBC BLD AUTO-RTO: 0 /100 WBCS
O2 CT BLDV-SCNC: 13.4 ML/DL
O2 CT BLDV-SCNC: 14.4 ML/DL
OPIATES UR QL SCN: NEGATIVE
OXYCODONE+OXYMORPHONE UR QL SCN: NEGATIVE
P AXIS: -38 DEGREES
P AXIS: 57 DEGREES
P AXIS: 59 DEGREES
PCO2 BLDV: 35.2 MM HG (ref 42–50)
PCO2 BLDV: 39.9 MM HG (ref 42–50)
PCP UR QL: NEGATIVE
PH BLDV: 7.37 [PH] (ref 7.3–7.4)
PH BLDV: 7.44 [PH] (ref 7.3–7.4)
PLATELET # BLD AUTO: 316 THOUSANDS/UL (ref 149–390)
PLATELET # BLD AUTO: 405 THOUSANDS/UL (ref 149–390)
PMV BLD AUTO: 9.4 FL (ref 8.9–12.7)
PMV BLD AUTO: 9.6 FL (ref 8.9–12.7)
PO2 BLDV: 40.4 MM HG (ref 35–45)
PO2 BLDV: 56.9 MM HG (ref 35–45)
POTASSIUM SERPL-SCNC: 3.8 MMOL/L (ref 3.5–5.3)
PR INTERVAL: 112 MS
PR INTERVAL: 142 MS
PR INTERVAL: 150 MS
PROT SERPL-MCNC: 6.4 G/DL (ref 6.4–8.4)
PROTHROMBIN TIME: 13.7 SECONDS (ref 11.6–14.5)
QRS AXIS: 13 DEGREES
QRS AXIS: 20 DEGREES
QRS AXIS: 5 DEGREES
QRSD INTERVAL: 80 MS
QRSD INTERVAL: 83 MS
QRSD INTERVAL: 84 MS
QT INTERVAL: 392 MS
QT INTERVAL: 402 MS
QT INTERVAL: 408 MS
QTC INTERVAL: 480 MS
QTC INTERVAL: 501 MS
QTC INTERVAL: 584 MS
RBC # BLD AUTO: 3.98 MILLION/UL (ref 3.81–5.12)
SALICYLATES SERPL-MCNC: <5 MG/DL (ref 3–20)
SODIUM SERPL-SCNC: 134 MMOL/L (ref 135–147)
T WAVE AXIS: 45 DEGREES
T WAVE AXIS: 59 DEGREES
T WAVE AXIS: 67 DEGREES
THC UR QL: NEGATIVE
TSH SERPL DL<=0.05 MIU/L-ACNC: 3.97 UIU/ML (ref 0.45–4.5)
VENTRICULAR RATE: 127 BPM
VENTRICULAR RATE: 90 BPM
VENTRICULAR RATE: 91 BPM
WBC # BLD AUTO: 6.95 THOUSAND/UL (ref 4.31–10.16)

## 2024-06-19 PROCEDURE — 72125 CT NECK SPINE W/O DYE: CPT

## 2024-06-19 PROCEDURE — 82805 BLOOD GASES W/O2 SATURATION: CPT | Performed by: EMERGENCY MEDICINE

## 2024-06-19 PROCEDURE — 82948 REAGENT STRIP/BLOOD GLUCOSE: CPT

## 2024-06-19 PROCEDURE — 70450 CT HEAD/BRAIN W/O DYE: CPT

## 2024-06-19 PROCEDURE — 99291 CRITICAL CARE FIRST HOUR: CPT | Performed by: EMERGENCY MEDICINE

## 2024-06-19 PROCEDURE — 0HQ1XZZ REPAIR FACE SKIN, EXTERNAL APPROACH: ICD-10-PCS | Performed by: INTERNAL MEDICINE

## 2024-06-19 PROCEDURE — 93005 ELECTROCARDIOGRAM TRACING: CPT

## 2024-06-19 PROCEDURE — 99285 EMERGENCY DEPT VISIT HI MDM: CPT

## 2024-06-19 PROCEDURE — 96367 TX/PROPH/DG ADDL SEQ IV INF: CPT

## 2024-06-19 PROCEDURE — 80053 COMPREHEN METABOLIC PANEL: CPT | Performed by: EMERGENCY MEDICINE

## 2024-06-19 PROCEDURE — 85025 COMPLETE CBC W/AUTO DIFF WBC: CPT | Performed by: EMERGENCY MEDICINE

## 2024-06-19 PROCEDURE — 82805 BLOOD GASES W/O2 SATURATION: CPT

## 2024-06-19 PROCEDURE — 96365 THER/PROPH/DIAG IV INF INIT: CPT

## 2024-06-19 PROCEDURE — 85730 THROMBOPLASTIN TIME PARTIAL: CPT | Performed by: EMERGENCY MEDICINE

## 2024-06-19 PROCEDURE — 80307 DRUG TEST PRSMV CHEM ANLYZR: CPT | Performed by: EMERGENCY MEDICINE

## 2024-06-19 PROCEDURE — NC001 PR NO CHARGE

## 2024-06-19 PROCEDURE — 97166 OT EVAL MOD COMPLEX 45 MIN: CPT

## 2024-06-19 PROCEDURE — 99223 1ST HOSP IP/OBS HIGH 75: CPT | Performed by: INTERNAL MEDICINE

## 2024-06-19 PROCEDURE — 83735 ASSAY OF MAGNESIUM: CPT | Performed by: EMERGENCY MEDICINE

## 2024-06-19 PROCEDURE — 83036 HEMOGLOBIN GLYCOSYLATED A1C: CPT

## 2024-06-19 PROCEDURE — 93010 ELECTROCARDIOGRAM REPORT: CPT | Performed by: INTERNAL MEDICINE

## 2024-06-19 PROCEDURE — 82550 ASSAY OF CK (CPK): CPT | Performed by: EMERGENCY MEDICINE

## 2024-06-19 PROCEDURE — 80179 DRUG ASSAY SALICYLATE: CPT | Performed by: EMERGENCY MEDICINE

## 2024-06-19 PROCEDURE — 99255 IP/OBS CONSLTJ NEW/EST HI 80: CPT | Performed by: PSYCHIATRY & NEUROLOGY

## 2024-06-19 PROCEDURE — 93010 ELECTROCARDIOGRAM REPORT: CPT | Performed by: STUDENT IN AN ORGANIZED HEALTH CARE EDUCATION/TRAINING PROGRAM

## 2024-06-19 PROCEDURE — 36415 COLL VENOUS BLD VENIPUNCTURE: CPT | Performed by: EMERGENCY MEDICINE

## 2024-06-19 PROCEDURE — 85610 PROTHROMBIN TIME: CPT | Performed by: EMERGENCY MEDICINE

## 2024-06-19 PROCEDURE — 83605 ASSAY OF LACTIC ACID: CPT | Performed by: EMERGENCY MEDICINE

## 2024-06-19 PROCEDURE — 80143 DRUG ASSAY ACETAMINOPHEN: CPT | Performed by: EMERGENCY MEDICINE

## 2024-06-19 PROCEDURE — 99447 NTRPROF PH1/NTRNET/EHR 11-20: CPT | Performed by: EMERGENCY MEDICINE

## 2024-06-19 PROCEDURE — 82077 ASSAY SPEC XCP UR&BREATH IA: CPT | Performed by: EMERGENCY MEDICINE

## 2024-06-19 PROCEDURE — 84443 ASSAY THYROID STIM HORMONE: CPT | Performed by: EMERGENCY MEDICINE

## 2024-06-19 PROCEDURE — 96375 TX/PRO/DX INJ NEW DRUG ADDON: CPT

## 2024-06-19 PROCEDURE — 97161 PT EVAL LOW COMPLEX 20 MIN: CPT

## 2024-06-19 PROCEDURE — 12013 RPR F/E/E/N/L/M 2.6-5.0 CM: CPT

## 2024-06-19 PROCEDURE — 87040 BLOOD CULTURE FOR BACTERIA: CPT

## 2024-06-19 PROCEDURE — 12020 TX SUPFC WND DEHSN SMPL CLSR: CPT

## 2024-06-19 PROCEDURE — 85049 AUTOMATED PLATELET COUNT: CPT

## 2024-06-19 PROCEDURE — 82140 ASSAY OF AMMONIA: CPT

## 2024-06-19 RX ORDER — INSULIN LISPRO 100 [IU]/ML
1-5 INJECTION, SOLUTION INTRAVENOUS; SUBCUTANEOUS
Status: DISCONTINUED | OUTPATIENT
Start: 2024-06-19 | End: 2024-06-19 | Stop reason: SDUPTHER

## 2024-06-19 RX ORDER — SODIUM CHLORIDE, SODIUM GLUCONATE, SODIUM ACETATE, POTASSIUM CHLORIDE, MAGNESIUM CHLORIDE, SODIUM PHOSPHATE, DIBASIC, AND POTASSIUM PHOSPHATE .53; .5; .37; .037; .03; .012; .00082 G/100ML; G/100ML; G/100ML; G/100ML; G/100ML; G/100ML; G/100ML
125 INJECTION, SOLUTION INTRAVENOUS CONTINUOUS
Status: DISPENSED | OUTPATIENT
Start: 2024-06-19 | End: 2024-06-19

## 2024-06-19 RX ORDER — GINSENG 100 MG
1 CAPSULE ORAL 2 TIMES DAILY
Status: DISCONTINUED | OUTPATIENT
Start: 2024-06-19 | End: 2024-06-20 | Stop reason: HOSPADM

## 2024-06-19 RX ORDER — ALBUTEROL SULFATE 90 UG/1
2 AEROSOL, METERED RESPIRATORY (INHALATION) EVERY 4 HOURS PRN
COMMUNITY
Start: 2024-03-29

## 2024-06-19 RX ORDER — FLUOXETINE HYDROCHLORIDE 20 MG/1
20 CAPSULE ORAL DAILY
Status: DISCONTINUED | OUTPATIENT
Start: 2024-06-20 | End: 2024-06-20 | Stop reason: HOSPADM

## 2024-06-19 RX ORDER — CHLORHEXIDINE GLUCONATE ORAL RINSE 1.2 MG/ML
15 SOLUTION DENTAL EVERY 12 HOURS SCHEDULED
Status: DISCONTINUED | OUTPATIENT
Start: 2024-06-19 | End: 2024-06-19

## 2024-06-19 RX ORDER — ALBUTEROL SULFATE 90 UG/1
2 AEROSOL, METERED RESPIRATORY (INHALATION) EVERY 4 HOURS PRN
Status: DISCONTINUED | OUTPATIENT
Start: 2024-06-19 | End: 2024-06-20 | Stop reason: HOSPADM

## 2024-06-19 RX ORDER — AZATHIOPRINE 50 MG/1
100 TABLET ORAL DAILY
Status: DISCONTINUED | OUTPATIENT
Start: 2024-06-20 | End: 2024-06-20 | Stop reason: HOSPADM

## 2024-06-19 RX ORDER — ACETAMINOPHEN 325 MG/1
650 TABLET ORAL EVERY 6 HOURS PRN
Status: DISCONTINUED | OUTPATIENT
Start: 2024-06-19 | End: 2024-06-20 | Stop reason: HOSPADM

## 2024-06-19 RX ORDER — ACETAMINOPHEN 325 MG/1
650 TABLET ORAL EVERY 6 HOURS PRN
Status: DISCONTINUED | OUTPATIENT
Start: 2024-06-19 | End: 2024-06-19

## 2024-06-19 RX ORDER — LUBIPROSTONE 8 UG/1
8 CAPSULE ORAL 2 TIMES DAILY WITH MEALS
Status: DISCONTINUED | OUTPATIENT
Start: 2024-06-20 | End: 2024-06-20 | Stop reason: HOSPADM

## 2024-06-19 RX ORDER — INSULIN LISPRO 100 [IU]/ML
1-5 INJECTION, SOLUTION INTRAVENOUS; SUBCUTANEOUS
Status: DISCONTINUED | OUTPATIENT
Start: 2024-06-19 | End: 2024-06-20 | Stop reason: HOSPADM

## 2024-06-19 RX ORDER — ZOLPIDEM TARTRATE 5 MG/1
5 TABLET ORAL
Status: DISCONTINUED | OUTPATIENT
Start: 2024-06-19 | End: 2024-06-20 | Stop reason: HOSPADM

## 2024-06-19 RX ORDER — TOPIRAMATE 25 MG/1
25 TABLET ORAL 2 TIMES DAILY
Status: DISCONTINUED | OUTPATIENT
Start: 2024-06-19 | End: 2024-06-20 | Stop reason: HOSPADM

## 2024-06-19 RX ORDER — ENOXAPARIN SODIUM 100 MG/ML
40 INJECTION SUBCUTANEOUS DAILY
Status: DISCONTINUED | OUTPATIENT
Start: 2024-06-19 | End: 2024-06-20 | Stop reason: HOSPADM

## 2024-06-19 RX ORDER — INSULIN LISPRO 100 [IU]/ML
1-5 INJECTION, SOLUTION INTRAVENOUS; SUBCUTANEOUS EVERY 6 HOURS SCHEDULED
Status: DISCONTINUED | OUTPATIENT
Start: 2024-06-19 | End: 2024-06-19

## 2024-06-19 RX ORDER — PANTOPRAZOLE SODIUM 40 MG/1
40 TABLET, DELAYED RELEASE ORAL
Status: DISCONTINUED | OUTPATIENT
Start: 2024-06-20 | End: 2024-06-20 | Stop reason: HOSPADM

## 2024-06-19 RX ORDER — MAGNESIUM SULFATE HEPTAHYDRATE 40 MG/ML
2 INJECTION, SOLUTION INTRAVENOUS ONCE
Status: COMPLETED | OUTPATIENT
Start: 2024-06-19 | End: 2024-06-19

## 2024-06-19 RX ORDER — GABAPENTIN 400 MG/1
400 CAPSULE ORAL 3 TIMES DAILY
Status: DISCONTINUED | OUTPATIENT
Start: 2024-06-20 | End: 2024-06-20 | Stop reason: HOSPADM

## 2024-06-19 RX ORDER — FAMOTIDINE 20 MG/1
40 TABLET, FILM COATED ORAL DAILY
Status: DISCONTINUED | OUTPATIENT
Start: 2024-06-19 | End: 2024-06-20 | Stop reason: HOSPADM

## 2024-06-19 RX ORDER — LORAZEPAM 2 MG/ML
2 INJECTION INTRAMUSCULAR ONCE
Status: COMPLETED | OUTPATIENT
Start: 2024-06-19 | End: 2024-06-19

## 2024-06-19 RX ADMIN — LORAZEPAM 2 MG: 2 INJECTION INTRAMUSCULAR; INTRAVENOUS at 00:43

## 2024-06-19 RX ADMIN — Medication 20 ML: at 05:14

## 2024-06-19 RX ADMIN — BACITRACIN ZINC 1 LARGE APPLICATION: 500 OINTMENT TOPICAL at 18:32

## 2024-06-19 RX ADMIN — TOPIRAMATE 25 MG: 25 TABLET, FILM COATED ORAL at 09:20

## 2024-06-19 RX ADMIN — MAGNESIUM SULFATE HEPTAHYDRATE 2 G: 40 INJECTION, SOLUTION INTRAVENOUS at 00:43

## 2024-06-19 RX ADMIN — ZOLPIDEM TARTRATE 5 MG: 5 TABLET, COATED ORAL at 21:56

## 2024-06-19 RX ADMIN — SODIUM CHLORIDE, SODIUM LACTATE, POTASSIUM CHLORIDE, AND CALCIUM CHLORIDE 2000 ML: .6; .31; .03; .02 INJECTION, SOLUTION INTRAVENOUS at 00:59

## 2024-06-19 RX ADMIN — CHLORHEXIDINE GLUCONATE 15 ML: 1.2 RINSE ORAL at 09:20

## 2024-06-19 RX ADMIN — SODIUM CHLORIDE, SODIUM GLUCONATE, SODIUM ACETATE, POTASSIUM CHLORIDE, MAGNESIUM CHLORIDE, SODIUM PHOSPHATE, DIBASIC, AND POTASSIUM PHOSPHATE 125 ML/HR: .53; .5; .37; .037; .03; .012; .00082 INJECTION, SOLUTION INTRAVENOUS at 04:13

## 2024-06-19 RX ADMIN — TOPIRAMATE 25 MG: 25 TABLET, FILM COATED ORAL at 18:32

## 2024-06-19 RX ADMIN — ENOXAPARIN SODIUM 40 MG: 40 INJECTION SUBCUTANEOUS at 09:20

## 2024-06-19 RX ADMIN — FAMOTIDINE 40 MG: 20 TABLET, FILM COATED ORAL at 09:20

## 2024-06-19 RX ADMIN — ACETAMINOPHEN 650 MG: 325 TABLET, FILM COATED ORAL at 09:20

## 2024-06-19 RX ADMIN — SODIUM CHLORIDE, SODIUM GLUCONATE, SODIUM ACETATE, POTASSIUM CHLORIDE, MAGNESIUM CHLORIDE, SODIUM PHOSPHATE, DIBASIC, AND POTASSIUM PHOSPHATE 125 ML/HR: .53; .5; .37; .037; .03; .012; .00082 INJECTION, SOLUTION INTRAVENOUS at 12:16

## 2024-06-19 RX ADMIN — SODIUM CHLORIDE, SODIUM GLUCONATE, SODIUM ACETATE, POTASSIUM CHLORIDE, MAGNESIUM CHLORIDE, SODIUM PHOSPHATE, DIBASIC, AND POTASSIUM PHOSPHATE 125 ML/HR: .53; .5; .37; .037; .03; .012; .00082 INJECTION, SOLUTION INTRAVENOUS at 03:50

## 2024-06-19 RX ADMIN — QUETIAPINE FUMARATE 400 MG: 300 TABLET ORAL at 21:10

## 2024-06-19 NOTE — PROCEDURES
"Universal Protocol:  Consent: Verbal consent obtained.  Risks and benefits: risks, benefits and alternatives were discussed  Consent given by: patient  Time out: Immediately prior to procedure a \"time out\" was called to verify the correct patient, procedure, equipment, support staff and site/side marked as required.  Timeout called at: 6/19/2024 5:03 AM.  Patient understanding: patient states understanding of the procedure being performed  Patient consent: the patient's understanding of the procedure matches consent given  Procedure consent: procedure consent matches procedure scheduled  Relevant documents: relevant documents present and verified  Test results: test results available and properly labeled  Site marked: the operative site was marked  Radiology Images displayed and confirmed. If images not available, report reviewed: imaging studies available  Required items: required blood products, implants, devices, and special equipment available  Patient identity confirmed: verbally with patient and arm band  Laceration repair    Date/Time: 6/19/2024 5:10 AM    Performed by: Mandi Alicea PA-C  Authorized by: Mandi Alicea PA-C  Body area: head/neck  Location details: right eyelid  Laceration length: 3 cm  Tendon involvement: none  Nerve involvement: none  Vascular damage: no  Anesthesia: local infiltration    Anesthesia:  Local Anesthetic: lidocaine 1% with epinephrine  Anesthetic total: 12 mL    Sedation:  Patient sedated: no      Wound Dehiscence:  Superficial Wound Dehiscence: simple closure      Procedure Details:  Irrigation solution: saline  Irrigation method: syringe  Amount of cleaning: standard  Debridement: none  Degree of undermining: none  Skin closure: 5-0 nylon  Number of sutures: 5  Technique: simple  Approximation: close  Approximation difficulty: simple  Dressing: antibiotic ointment              "

## 2024-06-19 NOTE — Clinical Note
Case was discussed with Critical Care and the patient's admission status was agreed to be Admission Status: inpatient status to the service of Dr. peralta .

## 2024-06-19 NOTE — ASSESSMENT & PLAN NOTE
Hx of   Lactic acid WNL on arrival  Reviewed neurology notes from April 2024-they believed seizure was likely related/provoked by medication intoxication.    Plan:  CT head within normal limits  Maintain normal lytes, normothermia normal natremia  Previously was recommended [facility] follow-up for cognitive decline-it does not appeared she f/u  We will not consult neurology right now given seizures likely related to medication overdose as was episode similar to months prior

## 2024-06-19 NOTE — TELEMEDICINE
TeleConsultation - Behavioral Health   Edith Klein 59 y.o. female MRN: [id number]  Unit/Bed#: ICU 08 Encounter: [id number]        VIRTUAL CARE DOCUMENTATION:     1. This service was provided via Telemedicine using Teams Virtual Rounding      2. Parties in the room with patient during teleconsult Patient only    3. Confidentiality My office door was closed     4. Participants No one else was in the room    5. Patient acknowledged consent and understanding of privacy and security of the  Telemedicine consult. I informed the patient that I have reviewed their record in Epic and presented the opportunity for them to ask any questions regarding the visit today.  The patient agreed to participate.    6. Time spent 20 minutes            Assessment & Plan     Principal Problem:    Intentional overdose (HCC)  Active Problems:    Bipolar affective disorder (HCC)    COPD (chronic obstructive pulmonary disease) (HCC)    Type 2 diabetes mellitus without complication (HCC)    Headache    Seizure (HCC)    QT prolongation    Forehead laceration    Assessment:    Bipolar Disorder, type I, depressed, severe without psychotic features    Treatment Plan:    Recommend inpatient psychiatric hospitalization with involuntary 302 status after medical stabilization    Planned Medication Changes:    Patient has prolonged Qtc:  Discontinue Elavil  Hold Seroquel and only restart when Qtc goes below 500 and repeat EKG and if QTc is still prolonged then discontinue consider switching to a different medication like Lamictal 25 mg once daily if patient agreed.  Decrease Ambien to 5 mg at bedtime for insomnia  Decreased Prozac to 20 mg once daily  Consider starting mood stabilizer like Depakote if patient agreed after medical stabilization.      Current Medications:     Current Facility-Administered Medications   Medication Dose Route Frequency Provider Last Rate    acetaminophen  650 mg Oral Q6H PRN OSMAR Cortez      albuterol  2  puff Inhalation Q4H PRN Mandi Deanne, PA-C      bacitracin  1 large application Topical BID Mandi Deanne, PA-C      chlorhexidine  15 mL Mouth/Throat Q12H SHAHID Mandi Deanne, PA-C      enoxaparin  40 mg Subcutaneous Daily Mandi Deanne, PA-C      famotidine  40 mg Oral Daily Mandi Deanne, PA-C      insulin lispro  1-5 Units Subcutaneous Q6H SHAHID Mandi Deanne, PA-C      insulin lispro  1-5 Units Subcutaneous HS Mandi Deanne, PA-C      multi-electrolyte  125 mL/hr Intravenous Continuous Mandi Deanne, PA-C 125 mL/hr (06/19/24 9975)    topiramate  25 mg Oral BID Mandi Deanne, PA-C         Risks / Benefits of Treatment:    Patient does not verbalize understanding at this time and will require further explanation.      Other treatment modalities ordered as indicated:    Psychotherapy      Inpatient consult to Psychiatry  Consult performed by: Haley Fischer MD  Consult ordered by: Mandi Alicea PA-C        Physician Requesting Consult: Arley Hernández MD  Principal Problem:Intentional overdose (HCC)    Reason for Consult: depressive symptoms and intentional overdose on multiple medication      History of Present Illness        As per ED physician notes:       Chief Complaint   Patient presents with    Overdose - Intentional       Pt BIBA from home, per pts family at home pt has been abusing prescribed medications. Pt found down in BR after taking unknown amount of Amitriptyline and Sertraline. Pt drowsy during triage opening eyes to verbal stimulation. Family states pt had seizure in BR where she then hit her head, pt has head lac on right eyebrow.       Patient brought in by ambulance for an intentional overdose.  Multiple different histories given to EMS.  There is a reported witnessed seizure in the bathroom by other members in the household.  EMS also states that the patient was found down in the bathroom and was unsure how long she was there.  Patient is altered at this time.  No  history can be provided by her.  There is no active seizure activity.  Overdose was reported as amitriptyline and sertraline.  Unable to confirm this.        Patient is a 59 y.o. female with a history of Bipolar Disorder, type I who was presented to the hospital due to seizure and questionable overdose on medication. Reportedly, patient has been compliant with medications and follow-up care.  Has been guarded during evaluation and minimizing symptoms . Over the past few weeks, patient's  depressive symptoms has been worsening due to increased psychosocial stressors which includes having medical issues, her dog is sick, poor social support.  Depressive symptoms includes: depressed mood most of the time, irritability, anhedonia, low energy and motivation, and difficulty concentrating.  She denied any suicidal ideation but also that she does not allow the physician to talk to her family and when requested she stated that she wanted to leave the hospital.  No recent aggressive behavior was reported.  No homicidal ideation was reported.  No recent manic and psychotic symptoms was reported.  She denied any substance abuse or alcohol abuse problem.  Change in sleep or appetite reported.         Psychiatric Review Of Systems:     Sleep changes: no  appetite changes: no  weight changes: no  anxiety/panic: no  kandis: no  guilty/hopeless: no  self injurious behavior/risky behavior: no    Historical Information     Past Psychiatric History:     Psychiatric Hospitalizations: One past inpatient psychiatric admission Outpatient Treatment History: current treatment with psychiatrist/Advanced Practitioner  Suicide Attempts:  Yes, one attempt reported History of self-harm: No History of self injurious behavior was reported Violence History: No History of physically aggressive  behavior was reported    Substance Abuse History:    E-Cigarette/Vaping    E-Cigarette Use Never User           Social History       Tobacco History        Smoking Status  Every Day Current Packs/Day  0.3 packs/day Average Packs/Day  0.3 packs/day for 30.0 years (7.5 ttl pk-yrs) Smoking Tobacco Type  Cigarettes   Pack Year History     Packs/Day From To Years    0.25   30.0      Passive Exposure  Current      Smokeless Tobacco Use  Never      Tobacco Comments  N/a              Alcohol History       Alcohol Use Status  No Comment  n/a              Drug Use       Drug Use Status  No Comment  n/a              Sexual Activity       Sexually Active  Not Currently Comment  n/a              Activities of Daily Living    Not Asked                       Social History:    Social History       Social History     Socioeconomic History    Marital status: Single     Spouse name: Not on file    Number of children: Not on file    Years of education: Not on file    Highest education level: Not on file   Occupational History    Not on file   Tobacco Use    Smoking status: Every Day     Current packs/day: 0.25     Average packs/day: 0.3 packs/day for 30.0 years (7.5 ttl pk-yrs)     Types: Cigarettes     Passive exposure: Current    Smokeless tobacco: Never    Tobacco comments:     N/a   Vaping Use    Vaping status: Never Used   Substance and Sexual Activity    Alcohol use: No     Comment: n/a    Drug use: No     Comment: n/a    Sexual activity: Not Currently     Comment: n/a   Other Topics Concern    Not on file   Social History Narrative    Not on file     Social Determinants of Health     Financial Resource Strain: Not on file   Food Insecurity: Not on file   Transportation Needs: Not on file   Physical Activity: Not on file   Stress: Not on file   Social Connections: Not on file   Intimate Partner Violence: Unknown (1/10/2024)    [facility]    Humiliation, Afraid, Rape, and Kick questionnaire     Fear of Current or Ex-Partner: Patient declined     Emotionally Abused: Not on file     Physically Abused: Not on file      Sexually Abused: Not on file   Housing Stability: Not on file                   Past Medical History:    Past Medical History:   Diagnosis Date    Autoimmune hepatitis (HCC)     Bipolar 1 disorder (HCC)     Chronic headaches 2021    Diabetes mellitus (HCC)     Kidney stone     Renal disorder     kidney stones    Seizure disorder (HCC)           Meds/Allergies     No Known Allergies  all current active meds have been reviewed    Medical Review Of Systems:    Review of Systems      Objective     Vital signs in last 24 hours:  Temp:  [97.8 °F (36.6 °C)-98.6 °F (37 °C)] 97.8 °F (36.6 °C)  HR:  [] 84  Resp:  [13-21] 16  BP: ()/(53-66) 125/66      Intake/Output Summary (Last 24 hours) at 6/19/2024 0929  Last data filed at 6/19/2024 0630  Gross per 24 hour   Intake 2383.33 ml   Output 675 ml   Net 1708.33 ml       Mental Status Evaluation::    Appearance disheveled   Behavior angry, guarded   Speech normal rate, normal volume, normal pitch   Mood dysphoric, angry   Affect constricted   Thought Processes organized, goal directed   Associations intact associations   Thought Content no overt delusions   Perceptual Disturbances: no auditory hallucinations, no visual hallucinations   Abnormal Thoughts  Risk Potential Suicidal ideation - None  Homicidal ideation - None  Potential for aggression - No   Orientation oriented to person, place, and time/date   Memory recent and remote memory grossly intact   Consciousness alert and awake   Attention Span Concentration Span attention span and concentration are age appropriate   Intellect appears to be of average intelligence   Insight intact   Judgement intact   Muscle Strength and  Gait No assessed   Motor Activity no abnormal movements   Language no difficulty naming common objects, no difficulty repeating a phrase, no difficulty writing a sentence   Fund of Knowledge adequate knowledge of current events  adequate fund of knowledge regarding past history  adequate fund  of knowledge regarding vocabulary                Lab Results: I have personally reviewed all pertinent laboratory/tests results.     Most Recent Labs:   Lab Results   Component Value Date    WBC 6.95 06/19/2024    RBC 3.98 06/19/2024    HGB 12.9 06/19/2024    HCT 39.8 06/19/2024     06/19/2024    RDW 14.0 06/19/2024    NEUTROABS 4.15 06/19/2024     (L) 06/05/2018    SODIUM 134 (L) 06/19/2024    K 3.8 06/19/2024    CL 99 06/19/2024    CO2 22 06/19/2024    BUN 17 06/19/2024    CREATININE 0.71 06/19/2024    GLUCOSE 171 (H) 04/23/2018    CALCIUM 9.1 06/19/2024    AST 26 06/19/2024    ALT 14 06/19/2024    ALKPHOS 242 (H) 06/19/2024    PROT 6.8 06/05/2018    TP 6.4 06/19/2024    BILITOT 0.4 06/05/2018    TBILI 0.52 06/19/2024    CHOL 168 06/05/2018    CHOLESTEROL 178 11/21/2023    TRIG 101 11/21/2023    HDL 63 11/21/2023    LDLCALC 95 11/21/2023    NONHDLC 115 11/21/2023    AMMONIA 24 06/19/2024    TLC7NATGSLKO 3.973 06/19/2024    FREET4 0.67 (L) 02/21/2022    PREGTESTUR Negative 08/16/2016    HCGQUANT <2 12/29/2016       Imaging Studies: CT cervical spine without contrast    Result Date: 6/19/2024  Narrative: CT CERVICAL SPINE - WITHOUT CONTRAST INDICATION:   injury. COMPARISON: 4/13/2024. TECHNIQUE:  CT examination of the cervical spine was performed without intravenous contrast.  Contiguous axial images were obtained. Multiplanar 2D reformatted images were created from the source data. Radiation dose length product (DLP) for this visit:  333 mGy-cm .  This examination, like all CT scans performed in the [facility], was performed utilizing techniques to minimize radiation dose exposure, including the use of iterative reconstruction and automated exposure control. IMAGE QUALITY:  Diagnostic. FINDINGS: ALIGNMENT:  Normal alignment of the cervical spine. No subluxation. VERTEBRAE:  No acute cervical spine fracture. Vertebral body heights are preserved. DEGENERATIVE CHANGES:  Moderate  multilevel cervical degenerative changes are noted. No critical central canal stenosis. PREVERTEBRAL AND PARASPINAL SOFT TISSUES: Unremarkable THORACIC INLET:  Normal.     Impression: Multilevel degenerative changes without acute cervical spine fracture or traumatic malalignment. Workstation performed: [id number]     CT head without contrast    Result Date: 6/19/2024  Narrative: CT BRAIN - WITHOUT CONTRAST INDICATION:   injury. COMPARISON: 4/13/2024. TECHNIQUE:  CT examination of the brain was performed.  Multiplanar 2D reformatted images were created from the source data. Radiation dose length product (DLP) for this visit:  863 mGy-cm .  This examination, like all CT scans performed in the [facility], was performed utilizing techniques to minimize radiation dose exposure, including the use of iterative reconstruction and automated exposure control. IMAGE QUALITY:  Diagnostic. FINDINGS: PARENCHYMA:  No intracranial mass, mass effect or midline shift. No CT signs of acute infarction.  No acute parenchymal hemorrhage. A few parafalcine calcifications are again seen. VENTRICLES AND EXTRA-AXIAL SPACES:  Normal for the patient's age. VISUALIZED ORBITS:  Normal visualized orbits. PARANASAL SINUSES:  Normal visualized paranasal sinuses. CALVARIUM AND EXTRACRANIAL SOFT TISSUES: Unremarkable.     Impression: No acute intracranial abnormality. Workstation performed: [id number]     CT abdomen pelvis with contrast    Result Date: 6/12/2024  Narrative: CT ABDOMEN AND PELVIS WITH IV CONTRAST INDICATION: abd pain hx gastric bypass. COMPARISON: 4/13/2024. TECHNIQUE: CT examination of the abdomen and pelvis was performed. Multiplanar 2D reformatted images were created from the source data. This examination, like all CT scans performed in the [facility], was performed utilizing techniques to minimize radiation dose exposure, including the use of iterative reconstruction and automated exposure control.  Radiation dose length product (DLP) for this visit: 254 mGy-cm IV Contrast: 100 mL of iohexol (OMNIPAQUE) Enteric Contrast: Administered. FINDINGS: ABDOMEN LOWER CHEST: Minimal patchy dependent atelectasis/scarring seen in the lower lobes right greater than left. There is linear subsegmental atelectasis or scarring in the right middle lobe. Minimal patchy atelectasis or scarring in the right middle lobe. LIVER/BILIARY TREE: Unremarkable. GALLBLADDER: No calcified gallstones. No pericholecystic inflammatory change. SPLEEN: Unremarkable. PANCREAS: Atrophic as before. ADRENAL GLANDS: Unremarkable. KIDNEYS/URETERS: 2 mm hypodense lesion laterally in the right kidney, too small to accurately characterize but may reflect a tiny cyst. There is slight fullness of the bilateral renal collecting systems without donald hydronephrosis. STOMACH AND BOWEL: Status post gastric bypass. There is contrast seen within the excluded stomach which may be secondary to retrograde reflux although cannot exclude dehiscence or communication between the gastric pouch and excluded stomach. No bowel obstruction seen. There is moderate amount of stool seen scattered throughout the colon. There is colonic wall thickening in the ascending colon with portions of clonic wall thickening also seen in the transverse colon, descending colon, and sigmoid colon concerning for a colitis. Stable cystic focus to the left of the distal esophagus. APPENDIX: Normal. ABDOMINOPELVIC CAVITY: There is trace free fluid within the pelvis. No free air or lymphadenopathy. VESSELS: The abdominal aorta is calcified. PELVIS REPRODUCTIVE ORGANS: Status post hysterectomy. URINARY BLADDER: The bladder is moderately distended. Otherwise appears unremarkable. ABDOMINAL WALL/INGUINAL REGIONS: Unremarkable. BONES: No acute fracture or suspicious osseous lesion.     Impression: Areas of colonic wall thickening as above concerning for a colitis. Moderate amount of stool seen  scattered throughout the colon may indicate superimposed constipation. There is oral contrast seen within the excluded portion of the stomach status post gastric bypass. This may be due to retrograde reflux although cannot exclude communication between the excluded portion of the stomach and the gastric pouch. If clinically  warranted, this can be further evaluated with an upper GI. 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 Trace free fluid in the pelvis. Slight fullness of the bilateral renal collecting system without donald hydronephrosis. This may be due to moderate bladder distention. Workstation performed: [id number]     EKG/Pathology/Other Studies:   Lab Results   Component Value Date    VENTRATE 91 06/19/2024    ATRIALRATE 91 06/19/2024    PRINT 150 06/19/2024    QRSDINT 84 06/19/2024    QTINT 408 06/19/2024    QTCINT 501 06/19/2024    PAXIS 57 06/19/2024    QRSAXIS 5 06/19/2024    TWAVEAXIS 45 06/19/2024        Code Status: Level 1 - Full Code  Advance Directive and Living Will:      Power of :    POLST:      Screenings:    1. Nutrition Screening  Nutrition Assessment (completed by Staff): Nutrition  Diet Type: NPO    2. Pain Screening  Pain Screening: Pain Assessment  Pain Assessment Tool: 0-10  Pain Score: 0    3. Suicide Screening                                                         [place]-SUICIDE SEVERITY RATING SCALE (C-SSRS)                            1. In the last month have you wished you were dead or wished you could go to sleep and not wake up? No  2. In the last month, have you actually had thoughts about killing yourself? No  6. Have you done anything, started to do anything, or prepared to do anything to end your life in the last 3 months? No  C-SSRS - is not reliable -as patient has not been fully cooperative and does not allow physician to talk to the family member    Counseling / Coordination of Care:    Total floor / unit time  spent today 50 minutes. Greater than 50% of total time was spent with the patient and / or family counseling and / or coordination of care. A description of the counseling / coordination of care: Direct Patient Care, Chart Review, and Documentation

## 2024-06-19 NOTE — PHYSICAL THERAPY NOTE
PHYSICAL THERAPY EVALUATION          Patient Name: Edith Klein  Today's Date: 6/19/2024 06/19/24 1511   PT Last Visit   PT Visit Date 06/19/24   Note Type   Note type Evaluation   Pain Assessment   Pain Assessment Tool 0-10   Pain Score No Pain   Restrictions/Precautions   Other Precautions 1:1;Multiple lines;Telemetry;Suicidal   Home Living   Type of Home Apartment   Home Layout One level   Additional Comments 2 CHERIE. no DME   Prior Function   Level of [place] Independent with ADLs;Independent with functional mobility;Independent with IADLS   Lives With Alone   Receives Help From   (reports sister is in and out)   IADLs Independent with driving   Falls in the last 6 months 1 to 4   Comments indep without an AD   General   Additional Pertinent History pt admitted to ICU 6/19/24 for intentional OD. oob to chair orders. PMHx significant for bipolar, COPD, T2DM, seizure, arthritis   Cognition   Overall Cognitive Status WFL   Arousal/Participation Cooperative   Orientation Level Oriented to person;Oriented to place;Oriented to time   Memory Within functional limits   Following Commands Follows one step commands without difficulty   Bed Mobility   Supine to Sit 6  Modified independent   Additional items HOB elevated   Sit to Supine 6  Modified independent   Additional items HOB elevated   Transfers   Sit to Stand 6  Modified independent   Additional items Bedrails   Stand to Sit 6  Modified independent   Additional items Increased time required   Toilet transfer 6  Modified independent   Additional items Standard toilet   Ambulation/Elevation   Gait pattern WNL;Excessively slow   Gait Assistance 5  Supervision   Additional items Assist x 1   Assistive Device None   Distance 125', 15'x2   Balance   Static Standing Fair +   Dynamic Standing Fair   Ambulatory Fair   Endurance Deficit   Endurance Deficit No  (109, 96% post amb)   Activity Tolerance   Activity Tolerance Patient  tolerated treatment well   Medical Staff Made Aware Jaclyn OT   Nurse Made Aware Jasper RN   Assessment   Prognosis Good   Assessment Edith Klein is a 59 y.o. female admitted to [facility] on [facility]/19/2024 for Intentional overdose (HCC). PT was consulted and pt was seen on 6/19/2024 for mobility assessment and d/c planning. Pt presents w medium fall risk, 1:1 monitoring, multiple lines. At baseline is indep without an AD. Pt is currently functioning at a mod I bed mobility and transfers, S for ambulation. Pt demonstrated ability to ambulate community distances. No acute deficits impacting functional mobility. No adverse sx reported during session. Denies concerns w dc from functional standpoint. Pt will benefit from continued mobilization via nsg. The patient's AM-PAC Basic Mobility Inpatient Short Form Raw Score is 22. A Raw score of greater than 16 suggests the patient may benefit from discharge to home.   Barriers to Discharge None   Plan   PT Frequency   (d/c PT)   Discharge Recommendation   Rehab Resource Intensity Level, PT No post-acute rehabilitation needs   AM-PAC Basic Mobility Inpatient   Turning in Flat Bed Without Bedrails 4   Lying on Back to Sitting on Edge of Flat Bed Without Bedrails 4   Moving Bed to Chair 4   Standing Up From Chair Using Arms 4   Walk in Room 3   Climb 3-5 Stairs With Railing 3   Basic Mobility Inpatient Raw Score 22   Basic Mobility Standardized Score 47.4   [facility] Highest Level Of Mobility   -HLM Goal 7: Walk 25 feet or more   JH-HLM Achieved 7: Walk 25 feet or more   End of Consult   Patient Position at End of Consult Supine;All needs within reach;Other (comment)  (1:1 present)     Alanna Escobar, PT

## 2024-06-19 NOTE — OCCUPATIONAL THERAPY NOTE
Occupational Therapy Evaluation     Patient Name: Edith Klein  Today's Date: 6/19/2024  Problem List  Principal Problem:    Intentional overdose (HCC)  Active Problems:    Bipolar affective disorder (HCC)    COPD (chronic obstructive pulmonary disease) (HCC)    Type 2 diabetes mellitus without complication (HCC)    Headache    Seizure (HCC)    QT prolongation    Forehead laceration    Past Medical History  Past Medical History:   Diagnosis Date    Autoimmune hepatitis (HCC)     Bipolar 1 disorder (HCC)     Chronic headaches 2021    Diabetes mellitus (HCC)     Kidney stone     Renal disorder     kidney stones    Seizure disorder (HCC)      Past Surgical History  Past Surgical History:   Procedure Laterality Date    BARIATRIC SURGERY  05/2017    BUNIONECTOMY      CYSTOSCOPY  07/12/2018    Stent Removal    FL RETROGRADE PYELOGRAM  6/16/2022    GASTRIC BYPASS  05/22/2017    HIATAL HERNIA REPAIR  05/22/2017    HYSTERECTOMY      JOINT REPLACEMENT      KIDNEY STONE SURGERY      TN CYSTO/URETERO W/LITHOTRIPSY &INDWELL STENT INSRT Bilateral 7/6/2018    Procedure: CYSTOSCOPY GALDINO. URETEROSCOPY;GALDINO.  RETROGRADE PYELOGRAM AND INSERTION GALDINO.STENT URETERAL;  Surgeon: Jacob Gerber MD;  Location: [facility];  Service: Urology    TN CYSTO/URETERO W/LITHOTRIPSY &INDWELL STENT INSRT Left 6/16/2022    Procedure: CYSTOSCOPY URETEROSCOPY WITH LITHOTRIPSY HOLMIUM LASER, RETROGRADE PYELOGRAM AND INSERTION STENT URETERAL;  Surgeon: Asad Geiger MD;  [facility];  Service: Urology    TOTAL SHOULDER REPLACEMENT  2002    VAGINAL HYSTERECTOMY      PARTIAL         06/19/24 1502   OT Last Visit   OT Visit Date 06/19/24   Note Type   Note type Evaluation   Additional Comments greeted in supine and agreeable.   Pain Assessment   Pain Assessment Tool 0-10   Pain Score No Pain   Restrictions/Precautions   Weight Bearing Precautions Per Order No   Other Precautions Fall Risk;Suicidal;1:1   Home Living   Type of Home Apartment    Home Layout One level   Home Equipment   (no dme)   Prior Function   Level of [place] Independent with functional mobility;Independent with ADLs;Independent with IADLS   Lives With Alone   Receives Help From Family  (sister)   IADLs Independent with driving;Independent with meal prep;Independent with medication management   Falls in the last 6 months 0   ADL   Where Assessed Edge of bed   Eating Assistance 7  Independent   Grooming Assistance 7  Independent   UB Bathing Assistance 6  Modified Independent   LB Bathing Assistance 6  Modified Independent   UB Dressing Assistance 6  Modified independent   LB Dressing Assistance 6  Modified independent   Toileting Assistance  6  Modified independent   Bed Mobility   Supine to Sit 6  Modified independent   Sit to Supine 6  Modified independent   Transfers   Sit to Stand 6  Modified independent   Stand to Sit 6  Modified independent   Functional Mobility   Functional Mobility 6  Modified independent   Additional Comments household distances no device.   Balance   Static Sitting Normal   Dynamic Sitting Good   Static Standing Fair +   Dynamic Standing Fair   Ambulatory Fair   Activity Tolerance   Activity Tolerance Patient tolerated treatment well   Medical Staff Made Aware PT Alanna   Nurse Made Aware GRACIELA SANDOVAL Assessment   RUE Assessment WFL   LUE Assessment   LUE Assessment WFL   Hand Function   Gross Motor Coordination Functional   Fine Motor Coordination Functional   Psychosocial   Psychosocial (WDL) X   Cognition   Overall Cognitive Status WFL   Arousal/Participation Cooperative   Attention Within functional limits   Orientation Level Oriented to person;Oriented to place;Oriented to time   Following Commands Follows one step commands without difficulty   Comments cooperative   Assessment   Assessment Edith Klein is a 59 y.o. female seen for OT evaluation s/p admit to [facility] on 6/19/2024 w/ Intentional overdose (HCC).  Comorbidities affecting pt's functional  performance at time of assessment include:  bipolar 1 disorder, seizure disorder, chronic headaches, gastric bypass, DM  . Pt with active OT orders and activity orders for Up and OOB as tolerated. Personal factors affecting pt at time of IE include:limited home support. Prior to admission, pt lives alone in apt. I with ADLS, IADLS and mobility with no device. 1 fall. Drives. Upon evaluation: Pt currently requires Tonya for UB ADLs, tonya for LB ADLs, tonya for toileting, tonya for bed mobility, tonya for functional transfers, and tonya mobility 2* the following deficits impacting occupational performance:weakness. Pt is currently at their functional baseline and no skilled OT is warranted at this time. From OT standpoint, recommendation at time of d/c would be no rehab needs.   Plan   OT Frequency Eval only  (DC OT)   Discharge Recommendation   Rehab Resource Intensity Level, OT No post-acute rehabilitation needs   AM-PAC Daily Activity Inpatient   Lower Body Dressing 4   Bathing 4   Toileting 4   Upper Body Dressing 4   Grooming 4   Eating 4   Daily Activity Raw Score 24   Daily Activity Standardized Score (Calc for Raw Score >=11) 57.54   AM-PAC Applied Cognition Inpatient   Following a Speech/Presentation 4   Understanding Ordinary Conversation 4   Taking Medications 4   Remembering Where Things Are Placed or Put Away 3   Remembering List of 4-5 Errands 3   Taking Care of Complicated Tasks 3   Applied Cognition Raw Score 21   Applied Cognition Standardized Score 44.3   Marta Lindo, OT

## 2024-06-19 NOTE — CASE MANAGEMENT
Case Management Assessment & Discharge Planning Note    Patient name Edith Klein  Location ICU 08/ICU 08 MRN [id number]  : 1965 Date 2024       Current Admission Date: 2024  Current Admission Diagnosis:Intentional overdose (HCC)   Patient Active Problem List    Diagnosis Date Noted Date Diagnosed    Intentional overdose (HCC) 2024     Forehead laceration 2024     Seizure (HCC) 2024     Dependence on nicotine from cigarettes 2024     QT prolongation 2024     Closed nondisplaced fracture of proximal phalanx of left great toe 2023     Acute metabolic encephalopathy 2023     Headache 2023     Abnormal drug screen 2023     Unresponsiveness 2023     Abnormal CT of the head 2023     Radiculopathy, cervical 2023     Dysuria 2022     S/P gastric bypass 2022     Nephrolithiasis 2018     COPD (chronic obstructive pulmonary disease) (HCC) 2017     Arthritis 2016     Autoimmune hepatitis (HCC) 2016     Type 2 diabetes mellitus without complication (HCC) 2015     Hypertension 2015     Bipolar affective disorder (HCC) 2010       LOS (days): 0  Geometric Mean LOS (GMLOS) (days):   Days to GMLOS:     OBJECTIVE:    Risk of Unplanned Readmission Score: 15.36       Current admission status: Inpatient     Preferred Pharmacy:   [facility] [place] [id number]  Phone: [phone] Fax: [phone]    Primary Care Provider: Deana Higginbotham PA-C    Primary Insurance: Triggit  Secondary Insurance:     ASSESSMENT:  Active Health Care Proxies       Susana Klein [facility] Representative - [facility] Phone: [phone] (Mobile)                 Advance Directives  Does patient have a Health Care POA?: No  Was patient offered paperwork?: Yes (declined)  Does patient have Advance Directives?: No  Was patient  offered paperwork?: Yes (declined)  Primary Contact: Susana Klein (sister) [phone]     Readmission Root Cause  30 Day Readmission: No    Patient Information  Admitted from::  (pt declined to answer)  Mental Status: Alert  During Assessment patient was accompanied by:  (one to one tech)  Assessment information provided by:: Patient  Primary Caregiver: Self  Support Systems: Family members  County of Residence: [place]  What [facility] do you live in?: [place]  Home entry access options. Select all that apply.: Stairs  Number of steps to enter home.: 2  Do the steps have railings?:  (unknown- pt declined to answer)  Type of Current Residence: Apartment  Floor Level: 2  Upon entering residence, is there a bedroom on the main floor (no further steps)?: Yes  Upon entering residence, is there a bathroom on the main floor (no further steps)?: Yes  Is patient a ?: No    Activities of Daily Living Prior to Admission  Functional Status: Independent  Completes ADLs independently?: Yes  Ambulates independently?: Yes  Does patient use assisted devices?: No  Does patient currently own DME?: No  Does patient have a history of Outpatient Therapy (PT/OT)?: No  Does the patient have a history of Short-Term Rehab?: No  Does patient have a history of HHC?: No  Does patient currently have HHC?: No     Patient Information Continued  Income Source: SSI/SSD  Does patient have prescription coverage?: Yes  Does patient receive dialysis treatments?: No  Does patient have a history of substance abuse?:  (denies)  Does patient have a history of Mental Health Diagnosis?: Yes (Bipolar disorder)  Is patient receiving treatment for mental health?: Yes    PHQ 2/9 Screening   Reviewed PHQ 2/9 Depression Screening Score?: No (pt denies)    Means of Transportation  Means of Transport to Appts:: Drives Self    Social Determinants of Health (SDOH)      Flowsheet Row Most Recent Value   Housing Stability    At any time in the past 12 months,  were you homeless or living in a shelter (including now)? Pt Declined   Transportation Needs    In the past 12 months, has lack of transportation kept you from medical appointments or from getting medications? Pt Declined   In the past 12 months, has lack of transportation kept you from meetings, work, or from getting things needed for daily living? Pt Declined   Food Insecurity    Within the past 12 months, you worried that your food would run out before you got the money to buy more. Pt Declined   Within the past 12 months, the food you bought just didn't last and you didn't have money to get more. Pt Declined   Utilities    In the past 12 months has the electric, gas, oil, or water company threatened to shut off services in your home? Pt Declined            DISCHARGE DETAILS:    Discharge planning discussed with:: patient     Comments - Freedom of Choice: Crisis intervention     Contacts  Patient Contacts: Susana Klein (sister) [phone]  Relationship to Patient:: Family  Contact Method: Phone  Phone Number: [phone]  Reason/Outcome: Emergency Contact, Continuity of Care, Discharge Planning    Requested Home Health Care         Is the patient interested in HHC at discharge?: No    DME Referral Provided  Referral made for DME?: No    Other Referral/Resources/Interventions Provided:  Interventions: Other (Specify) (Crisis intervention)    Would you like to participate in our [facility] service program?  : No - Declined    Treatment Team Recommendation: Inpatient Behavioral Health  Discharge Destination Plan:: Inpatient Behavioral Health     Additional Comments: CM met with the patient in her hospital room.  Patient has a one to one aide present.  Patient initially declined to speak with care manager but later agreed when CM once again reviewed her role.  Patient denies needing to be in the hospital and informed CM that she is being kept against her will.  CM and the one to one explained the  circumstance of patient's hospitalization.  Patient became silent and reluctantly answered Care Manager's questions.  Crisis will be seeing the patient and will working on the inpatient admission.  CM will continue to follow patient through hospital discharge/ inpatient transfer.
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
## 2024-06-19 NOTE — CONSULTS
INTERPROFESSIONAL (PHONE) CONSULTATION - Medical Toxicology  Edith Klein 59 y.o. female MRN: [id number]  Unit/Bed#: ICU 08 Encounter: [id number]      Reason for Consult / Principal Problem: possible overdose   Inpatient consult to Toxicology  Consult performed by: Óscar Schreiber DO  Consult ordered by: Mandi Alicea PA-C        06/19/24      ASSESSMENT:  59-year-old female with loss of consciousness and concern for medication abuse versus overdose.    RECOMMENDATIONS:  Please continue supportive care and follow-up with psychiatry regarding any recommendations for the self-harm concerns.  From my standpoint, after reviewing the chart and discussing with the primary team, her presentation is concerning for excessive intake of quetiapine.  Supporting factors would include her presenting tachycardia and subsequent hypotension, QT prolongation, somnolence, miosis, recent evidence of urinary retention, and likely loss of consciousness precipitating fall which could have been the result of orthostasis versus hypotension at home.  There does not seem to be any evidence of seizure to indicate amitriptyline overdose, nor is or any evidence of anticholinergic toxicity or QRS prolongation which would also occur in the setting of seizures with amitriptyline overdose.  Review of psychiatry to determine any sort of self-harm intentions.  Please reach out with any additional questions or concerns.    For further questions, please contact the medical  on call via [facility] [employer] between 8am and 9pm. If between 9pm and 8am, please reach out to the [facility] at [phone].     Please see additional teaching note below:    Hx and PE limited by the dynamics of a phone consultation. I have not personally interviewed or evaluated the patient, but only advised based on the information provided to me. Primary provider is responsible for all clinical decisions.     Review of systems and physical exam not performed  by me.    Historical Information   Past Medical History:   Diagnosis Date    Autoimmune hepatitis (HCC)     Bipolar 1 disorder (HCC)     Chronic headaches 2021    Diabetes mellitus (HCC)     Kidney stone     Renal disorder     kidney stones    Seizure disorder (HCC)      Past Surgical History:   Procedure Laterality Date    BARIATRIC SURGERY  05/2017    BUNIONECTOMY      CYSTOSCOPY  07/12/2018    Stent Removal    FL RETROGRADE PYELOGRAM  6/16/2022    GASTRIC BYPASS  05/22/2017    HIATAL HERNIA REPAIR  05/22/2017    HYSTERECTOMY      JOINT REPLACEMENT      KIDNEY STONE SURGERY      NH CYSTO/URETERO W/LITHOTRIPSY &INDWELL STENT INSRT Bilateral 7/6/2018    Procedure: CYSTOSCOPY GALDINO. URETEROSCOPY;GALDINO.  RETROGRADE PYELOGRAM AND INSERTION GALDINO.STENT URETERAL;  Surgeon: Jacob Gerber MD;  Location: [facility];  Service: Urology    NH CYSTO/URETERO W/LITHOTRIPSY &INDWELL STENT INSRT Left 6/16/2022    Procedure: CYSTOSCOPY URETEROSCOPY WITH LITHOTRIPSY HOLMIUM LASER, RETROGRADE PYELOGRAM AND INSERTION STENT URETERAL;  Surgeon: Asad Geiger MD;  Location[facility];  Service: Urology    TOTAL SHOULDER REPLACEMENT  2002    VAGINAL HYSTERECTOMY      PARTIAL     Social History   Social History     Substance and Sexual Activity   Alcohol Use No    Comment: n/a     Social History     Substance and Sexual Activity   Drug Use No    Comment: n/a     Social History     Tobacco Use   Smoking Status Every Day    Current packs/day: 0.25    Average packs/day: 0.3 packs/day for 30.0 years (7.5 ttl pk-yrs)    Types: Cigarettes    Passive exposure: Current   Smokeless Tobacco Never   Tobacco Comments    N/a     Family History   Problem Relation Age of Onset    Diabetes Father     Heart disease Father     Diabetes Mother     Heart disease Mother     Diabetes Sister     Diabetes Family         MELLITUS    Depression Family     Mental illness Family     Obesity Family     Osteoarthritis Family         Prior to Admission medications     Medication Sig Start Date End Date Taking? Authorizing Provider   albuterol (PROVENTIL HFA,VENTOLIN HFA) 90 mcg/act inhaler Inhale 2 puffs every 4 (four) hours as needed for wheezing or shortness of breath 3/29/24  Yes Historical Provider, MD   amitriptyline (ELAVIL) 25 mg tablet  6/20/23   Historical Provider, MD   amitriptyline (ELAVIL) 50 mg tablet Take 50 mg by mouth daily at bedtime 7/3/23   Historical Provider, MD   aspirin (ECOTRIN LOW STRENGTH) 81 mg EC tablet Take 81 mg by mouth daily   12/18/17   Historical Provider, MD   azaTHIOprine (IMURAN) 50 mg tablet Take 100 mg by mouth daily    Historical Provider, MD   famotidine (PEPCID) 40 MG tablet  5/30/23   Historical Provider, MD   FLUoxetine (PROzac) 40 MG capsule Take 2 capsules (80 mg total) by mouth daily 4/14/24 5/14/24  Chente Horton DO   gabapentin (NEURONTIN) 400 mg capsule Take 400 mg by mouth 3 (three) times a day    Historical Provider, MD   Jardiance 25 MG TABS  5/1/23   Historical Provider, MD   linaCLOtide (Linzess) 72 MCG CAPS Take 1 capsule by mouth daily    Historical Provider, MD   Lubricating Plus Eye Drops 0.5 % SOLN  6/16/23   Historical Provider, MD   pantoprazole (PROTONIX) 40 mg tablet Take 40 mg by mouth daily    Historical Provider, MD   QUEtiapine (SEROquel) 400 MG tablet Take 800 mg by mouth daily at bedtime 7/31/23   Historical Provider, MD   topiramate (TOPAMAX) 25 mg tablet Take 25 mg by mouth 2 (two) times a day 3/3/23 3/2/24  Historical Provider, MD   zolpidem (AMBIEN) 10 mg tablet Take 10 mg by mouth daily at bedtime 7/31/23   Historical Provider, MD       Current Facility-Administered Medications   Medication Dose Route Frequency    acetaminophen (TYLENOL) tablet 650 mg  650 mg Oral Q6H PRN    albuterol (PROVENTIL HFA,VENTOLIN HFA) inhaler 2 puff  2 puff Inhalation Q4H PRN    bacitracin topical ointment 1 large application  1 large application Topical BID    chlorhexidine (PERIDEX) 0.12 % oral rinse 15 mL  15 mL  Mouth/Throat Q12H SHAHID    enoxaparin (LOVENOX) subcutaneous injection 40 mg  40 mg Subcutaneous Daily    famotidine (PEPCID) tablet 40 mg  40 mg Oral Daily    insulin lispro (HumALOG/ADMELOG) 100 units/mL subcutaneous injection 1-5 Units  1-5 Units Subcutaneous Q6H SHAHID    insulin lispro (HumALOG/ADMELOG) 100 units/mL subcutaneous injection 1-5 Units  1-5 Units Subcutaneous HS    multi-electrolyte (PLASMALYTE-A/ISOLYTE-S PH 7.4) IV solution  125 mL/hr Intravenous Continuous    topiramate (TOPAMAX) tablet 25 mg  25 mg Oral BID       No Known Allergies    Objective       Intake/Output Summary (Last 24 hours) at 6/19/2024 1113  Last data filed at 6/19/2024 1029  Gross per 24 hour   Intake 2383.33 ml   Output 1125 ml   Net 1258.33 ml       Invasive Devices:   Peripheral IV 06/19/24 Right;Ventral (anterior) Wrist (Active)   Site Assessment [employer] 06/19/24 0430   Dressing Type Transparent 06/19/24 0430   Line Status Blood return noted;Flushed;Infusing 06/19/24 0430   Dressing Status Clean;Dry;New drainage 06/19/24 0430   Dressing Change Due 06/23/24 06/19/24 0430   Reason Not Rotated Not due 06/19/24 0430       Peripheral IV 06/19/24 Dorsal (posterior);Left Hand (Active)   Site Assessment [employer] 06/19/24 0430   Dressing Type Transparent 06/19/24 0430   Line Status Blood return noted;Flushed 06/19/24 0430   Dressing Status Clean;Dry;Intact 06/19/24 0430   Dressing Change Due 06/23/24 06/19/24 0430   Reason Not Rotated Not due 06/19/24 0430       Vitals   Vitals:    06/19/24 0400 06/19/24 0500 06/19/24 0600 06/19/24 0700   BP: 118/63  125/66    TempSrc:    Oral   Pulse: 88 86 84    Resp: 14 17 16    Patient Position - Orthostatic VS: Lying      Temp:    97.8 °F (36.6 °C)         EKG, Pathology, and/or Other Studies: I have personally reviewed pertinent reports.        Lab Results: I have personally reviewed pertinent reports.      Labs:    Results from last 7 days   Lab Units 06/19/24  0246 06/19/24  0050   WBC Thousand/uL  --   "6.95   HEMOGLOBIN g/dL  --  12.9   HEMATOCRIT %  --  39.8   PLATELETS Thousands/uL 316 405*   SEGS PCT %  --  59   LYMPHO PCT %  --  32   MONO PCT %  --  7   EOS PCT %  --  1      Results from last 7 days   Lab Units 06/19/24  0050   SODIUM mmol/L 134*   POTASSIUM mmol/L 3.8   CHLORIDE mmol/L 99   CO2 mmol/L 22   BUN mg/dL 17   CREATININE mg/dL 0.71   CALCIUM mg/dL 9.1   ALK PHOS U/L 242*   ALT U/L 14   AST U/L 26   MAGNESIUM mg/dL 2.2      Results from last 7 days   Lab Units 06/19/24  0140   INR  1.02   PTT seconds 29     Results from last 7 days   Lab Units 06/19/24  0050   LACTIC ACID mmol/L 0.9     No results found for: \"TROPONINI\"  Results from last 7 days   Lab Units 06/19/24  0246   PH MIKEY  7.439*   PCO2 MIKEY mm Hg 35.2*   PO2 MIKEY mm Hg 56.9*   HCO3 MIKEY mmol/L 23.3*   O2 CONTENT MIKEY ml/dL 14.4   O2 HGB, VENOUS % 83.3*     Results from last 7 days   Lab Units 06/19/24  0050   ACETAMINOPHEN LVL ug/mL <2*   ETHANOL LVL mg/dL <10   SALICYLATE LVL mg/dL <5         Counseling / Coordination of Care  Total time spent today 20 minutes. This was a phone consultation.       "

## 2024-06-19 NOTE — NURSING NOTE
Upon transferring [facility] to [facility] from [facility] 08, patient refused to allow me to hook her back up to fluids. Order is for plasmalyte 125mL/Hr for an additional 7 hours. [facility] 2 RN made aware.

## 2024-06-19 NOTE — ASSESSMENT & PLAN NOTE
[facility]- 580s    Avoid QTC prolonging meds-holding home psych meds  Maintain mag greater than 2, Phos greater than 3 potassium greater than 4 and calcium greater than 1.12  EKG for any rhythm changes  To monitor on telemetry

## 2024-06-19 NOTE — PLAN OF CARE
Patient refused continuous IV fluids. Per report, patient has been refusing IVF and SLIM has previously been already made aware.     Problem: PAIN - ADULT  Goal: Verbalizes/displays adequate comfort level or baseline comfort level  Description: Interventions:  - Encourage patient to monitor pain and request assistance  - Assess pain using appropriate pain scale  - Administer analgesics based on type and severity of pain and evaluate response  - Implement non-pharmacological measures as appropriate and evaluate response  - Consider cultural and social influences on pain and pain management  - Notify physician/advanced practitioner if interventions unsuccessful or patient reports new pain  Outcome: Progressing     Problem: INFECTION - ADULT  Goal: Absence or prevention of progression during hospitalization  Description: INTERVENTIONS:  - Assess and monitor for signs and symptoms of infection  - Monitor lab/diagnostic results  - Monitor all insertion sites, i.e. indwelling lines, tubes, and drains  - Monitor endotracheal if appropriate and nasal secretions for changes in amount and color  - [place] appropriate cooling/warming therapies per order  - Administer medications as ordered  - Instruct and encourage patient and family to use good hand hygiene technique  - Identify and instruct in appropriate isolation precautions for identified infection/condition  Outcome: Progressing  Goal: Absence of fever/infection during neutropenic period  Description: INTERVENTIONS:  - Monitor WBC    Outcome: Progressing     Problem: SAFETY ADULT  Goal: Patient will remain free of falls  Description: INTERVENTIONS:  - Educate patient/family on patient safety including physical limitations  - Instruct patient to call for assistance with activity   - Consult OT/PT to assist with strengthening/mobility   - Keep Call bell within reach  - Keep bed low and locked with side rails adjusted as appropriate  - Keep care items and personal  belongings within reach  - Initiate and maintain comfort rounds  - Make Fall Risk Sign visible to staff  - Offer Toileting every 2 Hours, in advance of need  - Initiate/Maintain bed/chair alarm  - Apply yellow socks and bracelet for high fall risk patients  - Consider moving patient to room near nurses station  Outcome: Progressing  Goal: Maintain or return to baseline ADL function  Description: INTERVENTIONS:  -  Assess patient's ability to carry out ADLs; assess patient's baseline for ADL function and identify physical deficits which impact ability to perform ADLs (bathing, care of mouth/teeth, toileting, grooming, dressing, etc.)  - Assess/evaluate cause of self-care deficits   - Assess range of motion  - Assess patient's mobility; develop plan if impaired  - Assess patient's need for assistive devices and provide as appropriate  - Encourage maximum independence but intervene and supervise when necessary  - Involve family in performance of ADLs  - Assess for home care needs following discharge   - Consider OT consult to assist with ADL evaluation and planning for discharge  - Provide patient education as appropriate  Outcome: Progressing  Goal: Maintains/Returns to pre admission functional level  Description: INTERVENTIONS:  - Perform AM-PAC 6 Click Basic Mobility/ Daily Activity assessment daily.  - Set and communicate daily mobility goal to care team and patient/family/caregiver.   - Collaborate with rehabilitation services on mobility goals if consulted  - Perform Range of Motion 3 times a day.  - Reposition patient every 2 hours.  - Dangle patient 3 times a day  - Stand patient 3 times a day  - Ambulate patient 3 times a day  - Out of bed to chair 3 times a day   - Out of bed for meals 3 times a day  - Out of bed for toileting  - Record patient progress and toleration of activity level   Outcome: Progressing     Problem: DISCHARGE PLANNING  Goal: Discharge to home or other facility with appropriate  resources  Description: INTERVENTIONS:  - Identify barriers to discharge w/patient and caregiver  - Arrange for needed discharge resources and transportation as appropriate  - Identify discharge learning needs (meds, wound care, etc.)  - Arrange for interpretive services to assist at discharge as needed  - Refer to Case Management Department for coordinating discharge planning if the patient needs post-hospital services based on physician/advanced practitioner order or complex needs related to functional status, cognitive ability, or social support system  Outcome: Progressing     Problem: Knowledge Deficit  Goal: Patient/family/caregiver demonstrates understanding of disease process, treatment plan, medications, and discharge instructions  Description: Complete learning assessment and assess knowledge base.  Interventions:  - Provide teaching at level of understanding  - Provide teaching via preferred learning methods  Outcome: Progressing

## 2024-06-19 NOTE — H&P
[facility]  H&P  Name: Edith Klein 59 y.o. female I MRN: [id number]  Unit/Bed#: ICU 08 I Date of Admission: 6/19/2024   Date of Service: 6/19/2024 I Hospital Day: 0      Assessment & Plan   * Intentional overdose (HCC)  Assessment & Plan    Plan:  Continue isolyte at 125 cc/hr   Patient will need medication adjustment given to overdose for the last 6 months with seizure episodes, she is on multiple medications for her bipolar 1 disorder and she has abused these frequently.  She is on high risk and high dose medications including Elavil and Ambien as well as was previously on narcotics  Toxicology consult- appreciate eval and recs  Psych consult   One-to-one  Suicide precautions and seizure precautions in place      Seizure (HCC)  Assessment & Plan  Hx of   Lactic acid WNL on arrival  Reviewed neurology notes from April 2024-they believed seizure was likely related/provoked by medication intoxication.    Plan:  CT head within normal limits  Maintain normal lytes, normothermia normal natremia  Previously was recommended [facility] follow-up for cognitive decline-it does not appeared she f/u  We will not consult neurology right now given seizures likely related to medication overdose as was episode similar to months prior    Bipolar affective disorder (HCC)  Assessment & Plan  Holding home elavil 50mg qhs, seroquel 80mg qhs, prozac 80mg daily given sedated state she is in  Holding home gabapentin 400mg TID and tramadol 50mg (4x a day approx?) given her sedated nature   It is likely best patient transition off of TCA permanently given 2 episodes of abuse with seizures in the past 2 months and TCAs are known to lower seizure threshold when abused    Psych consult appreciated    QT prolongation  Assessment & Plan  Peak- 580s    Avoid QTC prolonging meds-holding home psych meds  Maintain mag greater than 2, Phos greater than 3 potassium greater than 4 and calcium greater than  1.12  EKG for any rhythm changes  To monitor on telemetry    Forehead laceration  Assessment & Plan  Bacitracin to wound  daily  Remove sutures 6/23-6/25  Daily wound surveillance    Headache  Assessment & Plan  Hx of chronic migraines- Continue home Topamax 25 mg twice daily  PRN Tylenol     Type 2 diabetes mellitus without complication (HCC)  Assessment & Plan  Lab Results   Component Value Date    HGBA1C 7.0 (H) 03/14/2024       Recent Labs     06/19/24  0021   POCGLU 218*       Blood Sugar Average: Last 72 hrs:  (P) 218    Obtain new A1c  Start insulin SSI algorithm 1Q6 while n.p.o. will adjust when tolerating diet  Maintain n.p.o. given altered mental status from overdose, every 6 hr fingersticks  Continue holding home Jardiance      COPD (chronic obstructive pulmonary disease) ([facility])  Assessment & Plan  Not in exacerbation    Plan:  PRN albuterol inhaler           History of Present Illness     HPI: Edith Klein is a 59 y.o. female with past medical history of bipolar 1 disorder, seizure disorder, chronic headaches, gastric bypass, DM who presents to the ICU for treatment of an intentional overdose.  Patient was brought into [facility] by ambulance from home and per patient's friends via EMS she was found down in the bathroom, last seen approximately the a.m. of 6/18.  When they found her they believe she was having a seizure and hit her head, and there was an open bottle of pills including amitriptyline and sertraline, EMS is unable to confirm this.  Exam in ED notable for GCS of 7 E3 V2 M5, 2 mm sluggishly reactive pupils, and what appears to be a open laceration.  In the ED she was given 2 of Ativan for movement during CAT scan, CT head negative.  All labs are within normal limits.  Given polysubstance abuse with prolonged QTc and decreased GCS patient admitted to ICU.  She was started on high dose IV fluids. When patient arrived upstairs in the ICU, she was more alert and interactive communicating in full  sentences.     PDMP and outpatient med rec reviewed, patient has recent prescription for tramadol and Ambien.  Tramadol 120 tablets and Ambien 30 tablets.  Of note reviewed April admission for very similar occurrence of seizures witnessed at home and overdose event likely also on tramadol and Seroquel as well as possible Elavil.  At this time patient was seen by neurology who believes seizures were provoked by intentional overdose, and toxicology who recommended medical management.  Patient was never seen in the ICU at this time.  History obtained from chart review and unobtainable from patient due to mental status.  Review of Systems: See HPI for Review of Systems  Disposition: Stepdown Level 1  Historical Information   Past Medical History:  No date: Autoimmune hepatitis (HCC)  No date: Bipolar 1 disorder (HCC)  2021: Chronic headaches  No date: Diabetes mellitus (HCC)  No date: Kidney stone  No date: Renal disorder      Comment:  kidney stones  No date: Seizure disorder (HCC) Past Surgical History:  05/2017: BARIATRIC SURGERY  No date: BUNIONECTOMY  07/12/2018: CYSTOSCOPY      Comment:  Stent Removal  6/16/2022: FL RETROGRADE PYELOGRAM  05/22/2017: GASTRIC BYPASS  05/22/2017: HIATAL HERNIA REPAIR  No date: HYSTERECTOMY  No date: JOINT REPLACEMENT  No date: KIDNEY STONE SURGERY  7/6/2018: IL CYSTO/URETERO W/LITHOTRIPSY &INDWELL STENT INSRT;   Bilateral      Comment:  Procedure: CYSTOSCOPY GALDINO. URETEROSCOPY;GALDINO.  RETROGRADE               PYELOGRAM AND INSERTION GALDINO.STENT URETERAL;  Surgeon:                Jacob Gerber MD;  Location:  MAIN OR;  Service:                Urology  6/16/2022: IL CYSTO/URETERO W/LITHOTRIPSY &INDWELL STENT INSRT; Left      Comment:  Procedure: CYSTOSCOPY URETEROSCOPY WITH LITHOTRIPSY                HOLMIUM LASER, RETROGRADE PYELOGRAM AND INSERTION STENT                URETERAL;  Surgeon: Asad Geiger MD;  Location: [facility]                MAIN OR;  Service: Urology  2002: TOTAL SHOULDER  REPLACEMENT  No date: VAGINAL HYSTERECTOMY      Comment:  PARTIAL   Current Outpatient Medications   Medication Instructions    albuterol (PROVENTIL HFA,VENTOLIN HFA) 90 mcg/act inhaler 2 puffs, Inhalation, Every 4 hours PRN    amitriptyline (ELAVIL) 25 mg tablet No dose, route, or frequency recorded.    amitriptyline (ELAVIL) 50 mg, Oral, Daily at bedtime    aspirin (ECOTRIN LOW STRENGTH) 81 mg, Oral, Daily    azaTHIOprine (IMURAN) 100 mg, Oral, Daily    famotidine (PEPCID) 40 MG tablet No dose, route, or frequency recorded.    FLUoxetine (PROZAC) 80 mg, Oral, Daily    gabapentin (NEURONTIN) 400 mg, Oral, 3 times daily    Jardiance 25 MG TABS No dose, route, or frequency recorded.    linaCLOtide (Linzess) 72 MCG CAPS 1 capsule, Oral, Daily    Lubricating Plus Eye Drops 0.5 % SOLN No dose, route, or frequency recorded.    pantoprazole (PROTONIX) 40 mg, Oral, Daily    QUEtiapine (SEROQUEL) 800 mg, Oral, Daily at bedtime    topiramate (TOPAMAX) 25 mg, Oral, 2 times daily    zolpidem (AMBIEN) 10 mg, Oral, Daily at bedtime    No Known Allergies   Social History     Tobacco Use    Smoking status: Every Day     Current packs/day: 0.25     Average packs/day: 0.3 packs/day for 30.0 years (7.5 ttl pk-yrs)     Types: Cigarettes     Passive exposure: Current    Smokeless tobacco: Never    Tobacco comments:     N/a   Vaping Use    Vaping status: Never Used   Substance Use Topics    Alcohol use: No     Comment: n/a    Drug use: No     Comment: n/a    Family History   Problem Relation Age of Onset    Diabetes Father     Heart disease Father     Diabetes Mother     Heart disease Mother     Diabetes Sister     Diabetes Family         MELLITUS    Depression Family     Mental illness Family     Obesity Family     Osteoarthritis Family           Objective                            Vitals I/O      Most Recent Min/Max in 24hrs   Temp 98.6 °F (37 °C) Temp  Min: 98.6 °F (37 °C)  Max: 98.6 °F (37 °C)   Pulse 83 Pulse  Min: 81  Max: 124    Resp 13 Resp  Min: 13  Max: 21   /64 BP  Min: 84/53  Max: 125/60   O2 Sat 98 % SpO2  Min: 93 %  Max: 98 %      Intake/Output Summary (Last 24 hours) at 6/19/2024 0513  Last data filed at 6/19/2024 0324  Gross per 24 hour   Intake 2050 ml   Output --   Net 2050 ml       Diet NPO    Invasive Monitoring           Physical Exam   Physical Exam  Vitals and nursing note reviewed.   Eyes:      General: Vision grossly intact.      Extraocular Movements: Extraocular movements intact.      Conjunctiva/sclera: Conjunctivae normal.      Pupils: Pupils are equal, round, and reactive to light.      Comments: Sluggish pupils 2mm   Skin:     General: Skin is warm.   HENT:      Head: Normocephalic and atraumatic.      Comments: Forehead lac r side with old dried blood, approximated. subacute appearing     Pleas see picture in chart      Right Ear: Tympanic membrane normal.      Left Ear: Tympanic membrane normal.      Mouth/Throat:      Mouth: Mucous membranes are moist.   Neck:   no midline tenderness Cardiovascular:      Rate and Rhythm: Normal rate and regular rhythm.      Heart sounds: Normal heart sounds.   Musculoskeletal:         General: Normal range of motion.      Right lower leg: No edema.      Left lower leg: No edema.   Abdominal: General: Bowel sounds are normal. There is no distension.      Palpations: Abdomen is soft.      Tenderness: There is no abdominal tenderness.   Constitutional:       General: She is not in acute distress.     Appearance: She is well-developed and well-nourished. She is toxic-appearing.      Interventions: She is not sedated and not restrained.  Pulmonary:      Effort: Pulmonary effort is normal.      Breath sounds: Normal breath sounds.   Psychiatric:         Mood and Affect: Mood and affect normal.   Neurological:      General: No focal deficit present.      Mental Status: She is oriented to person, place and time. She is lethargic and confused. She is CAM ICU negative.      GCS: GCS  eye subscore is 2. GCS verbal subscore is 4. GCS motor subscore is 4.      Cranial Nerves: No dysarthria or facial asymmetry.      Sensory: Sensation is intact.      Motor: gross motor function is at baseline for patient. Strength full and intact in all extremities.      Comments: Unable to assess strength and bilaterally given stated sticking  No Babinski or clonus            Diagnostic Studies      EKG: NSR with 1st degree AV block and prolonged qtc   Imagin/19- CTH WNL, CT cspine neg for aucte abn  I have personally reviewed pertinent reports.   and I have personally reviewed pertinent films in PACS     Medications:  Scheduled PRN   bacitracin, 1 large application, BID  chlorhexidine, 15 mL, Q12H SHAHID  enoxaparin, 40 mg, Daily  famotidine, 40 mg, Daily  insulin lispro, 1-5 Units, Q6H SHAHID  insulin lispro, 1-5 Units, HS  lidocaine-epinephrine 1%-1:[id number] buffered, 20 mL, Once  topiramate, 25 mg, BID      albuterol, 2 puff, Q4H PRN       Continuous    multi-electrolyte, 125 mL/hr, Last Rate: 125 mL/hr (24 0413)         Labs:    CBC    Recent Labs     24  0050 24  0246   WBC 6.95  --    HGB 12.9  --    HCT 39.8  --    * 316     BMP    Recent Labs     24  0655 24  0050   SODIUM 134* 134*   K 4.3 3.8    99   CO2 28 22   AGAP 6 13   BUN 16 17   CREATININE 0.57 0.71   CALCIUM 8.7 9.1       Coags    Recent Labs     24  0140   INR 1.02   PTT 29        Additional Electrolytes  Recent Labs     24  0050   MG 2.2          Blood Gas    No recent results  Recent Labs     24  0246   PHVEN 7.439*   [id number] 35.2*   PO2VEN 56.9*   EKY3HWU 23.3*   BEVEN -0.4   H9OLJMY 83.3*    LFTs  Recent Labs     24  0655 24  0050   ALT 16 14   AST 21 26   ALKPHOS 209* 242*   ALB 3.4* 3.4*   TBILI 0.4 0.52       Infectious  No recent results  Glucose  Recent Labs     24  0655 24  0050   GLUC 150* 253*             Anticipated Length of Stay is > 2  midnights  Mandi Alicea PA-C

## 2024-06-19 NOTE — ED PROVIDER NOTES
History  Chief Complaint   Patient presents with    Overdose - Intentional     Pt BIBA from home, per pts family at home pt has been abusing prescribed medications. Pt found down in BR after taking unknown amount of Amitriptyline and Sertraline. Pt drowsy during triage opening eyes to verbal stimulation. Family states pt had seizure in BR where she then hit her head, pt has head lac on right eyebrow.      Patient brought in by ambulance for an intentional overdose.  Multiple different histories given to EMS.  There is a reported witnessed seizure in the bathroom by other members in the household.  EMS also states that the patient was found down in the bathroom and was unsure how long she was there.  Patient is altered at this time.  No history can be provided by her.  There is no active seizure activity.  Overdose was reported as amitriptyline and sertraline.  Unable to confirm this.      History provided by:  EMS personnel  History limited by:  Mental status change   used: No    Overdose - Intentional      Prior to Admission Medications   Prescriptions Last Dose Informant Patient Reported? Taking?   FLUoxetine (PROzac) 40 MG capsule   No No   Sig: Take 2 capsules (80 mg total) by mouth daily   Jardiance 25 MG TABS   Yes No   Lubricating Plus Eye Drops 0.5 % SOLN   Yes No   QUEtiapine (SEROquel) 400 MG tablet   Yes No   Sig: Take 800 mg by mouth daily at bedtime   amitriptyline (ELAVIL) 25 mg tablet   Yes No   Patient not taking: Reported on 4/13/2024   amitriptyline (ELAVIL) 50 mg tablet   Yes No   Sig: Take 50 mg by mouth daily at bedtime   aspirin (ECOTRIN LOW STRENGTH) 81 mg EC tablet  Self Yes No   Sig: Take 81 mg by mouth daily     azaTHIOprine (IMURAN) 50 mg tablet  Self Yes No   Sig: Take 100 mg by mouth daily   famotidine (PEPCID) 40 MG tablet   Yes No   gabapentin (NEURONTIN) 400 mg capsule   Yes No   Sig: Take 400 mg by mouth 3 (three) times a day   linaCLOtide (Linzess) 72 MCG CAPS    Yes No   Sig: Take 1 capsule by mouth daily   pantoprazole (PROTONIX) 40 mg tablet   Yes No   Sig: Take 40 mg by mouth daily   topiramate (TOPAMAX) 25 mg tablet   Yes No   Sig: Take 25 mg by mouth 2 (two) times a day   zolpidem (AMBIEN) 10 mg tablet   Yes No   Sig: Take 10 mg by mouth daily at bedtime      Facility-Administered Medications: None       Past Medical History:   Diagnosis Date    Autoimmune hepatitis (HCC)     Bipolar 1 disorder (HCC)     Chronic headaches 2021    Diabetes mellitus (HCC)     Kidney stone     Renal disorder     kidney stones    Seizure disorder (HCC)        Past Surgical History:   Procedure Laterality Date    BARIATRIC SURGERY  05/2017    BUNIONECTOMY      CYSTOSCOPY  07/12/2018    Stent Removal    FL RETROGRADE PYELOGRAM  6/16/2022    GASTRIC BYPASS  05/22/2017    HIATAL HERNIA REPAIR  05/22/2017    HYSTERECTOMY      JOINT REPLACEMENT      KIDNEY STONE SURGERY      CT CYSTO/URETERO W/LITHOTRIPSY &INDWELL STENT INSRT Bilateral 7/6/2018    Procedure: CYSTOSCOPY GALDINO. URETEROSCOPY;GALDINO.  RETROGRADE PYELOGRAM AND INSERTION GALDINO.STENT URETERAL;  Surgeon: Jacob Gerber MD;  Location:  MAIN OR;  Service: Urology    CT CYSTO/URETERO W/LITHOTRIPSY &INDWELL STENT INSRT Left 6/16/2022    Procedure: CYSTOSCOPY URETEROSCOPY WITH LITHOTRIPSY HOLMIUM LASER, RETROGRADE PYELOGRAM AND INSERTION STENT URETERAL;  Surgeon: Asad Geiger MD;  Location:  MAIN OR;  Service: Urology    TOTAL SHOULDER REPLACEMENT  2002    VAGINAL HYSTERECTOMY      PARTIAL       Family History   Problem Relation Age of Onset    Diabetes Father     Heart disease Father     Diabetes Mother     Heart disease Mother     Diabetes Sister     Diabetes Family         MELLITUS    Depression Family     Mental illness Family     Obesity Family     Osteoarthritis Family      I have reviewed and agree with the history as documented.    E-Cigarette/Vaping    E-Cigarette Use Never User      E-Cigarette/Vaping Substances    Nicotine No      THC No     CBD No     Flavoring No     Other No     Unknown No      Social History     Tobacco Use    Smoking status: Every Day     Current packs/day: 0.25     Average packs/day: 0.3 packs/day for 30.0 years (7.5 ttl pk-yrs)     Types: Cigarettes     Passive exposure: Current    Smokeless tobacco: Never    Tobacco comments:     N/a   Vaping Use    Vaping status: Never Used   Substance Use Topics    Alcohol use: No     Comment: n/a    Drug use: No     Comment: n/a       Review of Systems   Unable to perform ROS: Mental status change       Physical Exam  Physical Exam  Vitals and nursing note reviewed.   Constitutional:       General: She is not in acute distress.     Appearance: She is underweight. She is ill-appearing and toxic-appearing. She is not diaphoretic.      Interventions: Face mask in place.   HENT:      Head: Normocephalic. No raccoon eyes, Rainey's sign, abrasion, contusion, right periorbital erythema, left periorbital erythema or laceration.        Right Ear: External ear normal.      Left Ear: External ear normal.      Nose: Nose normal. No nasal deformity, congestion or rhinorrhea.      Mouth/Throat:      Mouth: Oropharynx is clear and moist and mucous membranes are normal. Mucous membranes are not cyanotic.   Eyes:      General: Lids are normal. No scleral icterus.        Right eye: No discharge.         Left eye: No discharge.      Conjunctiva/sclera: Conjunctivae normal.      Right eye: Right conjunctiva is not injected. No chemosis or exudate.     Left eye: Left conjunctiva is not injected. No chemosis or exudate.     Comments: Pupils are 2 mm bilaterally and sluggish to react to light   Neck:      Trachea: No tracheal deviation.   Cardiovascular:      Rate and Rhythm: Regular rhythm. Tachycardia present.      Pulses: Normal pulses.      Heart sounds: Normal heart sounds. No murmur heard.  Pulmonary:      Effort: Pulmonary effort is normal. No tachypnea, accessory muscle usage or respiratory  distress.      Breath sounds: Normal breath sounds. No stridor. No wheezing or rales.   Abdominal:      General: There is no distension.      Palpations: Abdomen is soft.      Tenderness: There is no abdominal tenderness. There is no guarding or rebound.   Musculoskeletal:         General: No deformity or edema.      Cervical back: Normal range of motion and neck supple. No edema, erythema or rigidity.      Right lower leg: No edema.      Left lower leg: No edema.   Skin:     General: Skin is warm and dry.      Coloration: Skin is not pale.      Findings: Laceration present. No abrasion, bruising, burn, ecchymosis, erythema, lesion, petechiae or rash.   Neurological:      Mental Status: She is not disoriented.      GCS: GCS eye subscore is 3. GCS verbal subscore is 2. GCS motor subscore is 5.      Motor: No tremor or seizure activity.      Deep Tendon Reflexes:      Reflex Scores:       Brachioradialis reflexes are 0 on the right side and 0 on the left side.       Patellar reflexes are 0 on the right side and 0 on the left side.  Psychiatric:         Mood and Affect: Mood and affect normal.         Vital Signs  ED Triage Vitals   Temperature Pulse Respirations Blood Pressure SpO2   06/19/24 0022 06/19/24 0020 06/19/24 0024 06/19/24 0020 06/19/24 0020   98.6 °F (37 °C) (S) (!) 124 21 125/60 97 %      Temp Source Heart Rate Source Patient Position - Orthostatic VS BP Location FiO2 (%)   06/19/24 0022 06/19/24 0020 06/19/24 0020 06/19/24 0020 --   Oral Monitor Lying Right arm       Pain Score       --                  Vitals:    06/19/24 0020 06/19/24 0115 06/19/24 0200   BP: 125/60 97/53 (!) 84/53   Pulse: (S) (!) 124 98 81   Patient Position - Orthostatic VS: Lying Lying          Visual Acuity  Visual Acuity      Flowsheet Row Most Recent Value   L Pupil Size (mm) 2    R Pupil Size (mm) 2             ED Medications  Medications   lactated ringers bolus 2,000 mL (2,000 mL Intravenous New Bag 6/19/24 0059)    multi-electrolyte (PLASMALYTE-A/ISOLYTE-S PH 7.4) IV solution (has no administration in time range)   chlorhexidine (PERIDEX) 0.12 % oral rinse 15 mL (has no administration in time range)   enoxaparin (LOVENOX) subcutaneous injection 40 mg (has no administration in time range)   insulin lispro (HumALOG/ADMELOG) 100 units/mL subcutaneous injection 1-5 Units (has no administration in time range)   insulin lispro (HumALOG/ADMELOG) 100 units/mL subcutaneous injection 1-5 Units (has no administration in time range)   magnesium sulfate 2 g/50 mL IVPB (premix) 2 g (0 g Intravenous Stopped 6/19/24 0116)   LORazepam (ATIVAN) injection 2 mg (2 mg Intravenous Given 6/19/24 0043)       Diagnostic Studies  Results Reviewed       Procedure Component Value Units Date/Time    Platelet count [[id number]]     Lab Status: No result Specimen: Blood     Blood culture [[id number]]     Lab Status: No result Specimen: Blood     Blood culture [[id number]]     Lab Status: No result Specimen: Blood     Ammonia [[id number]]     Lab Status: No result Specimen: Blood     Blood gas, venous [[id number]]     Lab Status: No result Specimen: Blood     Protime-INR [[id number]]  (Normal) Collected: 06/19/24 0140    Lab Status: Final result Specimen: Blood from Arm, Left Updated: 06/19/24 0203     Protime 13.7 seconds      INR 1.02    APTT [[id number]]  (Normal) Collected: 06/19/24 0140    Lab Status: Final result Specimen: Blood from Arm, Left Updated: 06/19/24 0203     PTT 29 seconds     TSH, 3rd generation with Free T4 reflex [[id number]]  (Normal) Collected: 06/19/24 0050    Lab Status: Final result Specimen: Blood from Arm, Left Updated: 06/19/24 0132     TSH 3RD GENERATON 3.973 uIU/mL     Lactic acid, plasma (w/reflex if result > 2.0) [[id number]]  (Normal) Collected: 06/19/24 0050    Lab Status: Final result Specimen: Blood from Arm, Left Updated: 06/19/24 0121     LACTIC ACID 0.9 mmol/L     Narrative:      Result may be elevated if tourniquet was used  during collection.    Comprehensive metabolic panel [[id number]]  (Abnormal) Collected: 06/19/24 0050    Lab Status: Final result Specimen: Blood from Arm, Left Updated: 06/19/24 0116     Sodium 134 mmol/L      Potassium 3.8 mmol/L      Chloride 99 mmol/L      CO2 22 mmol/L      ANION GAP 13 mmol/L      BUN 17 mg/dL      Creatinine 0.71 mg/dL      Glucose 253 mg/dL      Calcium 9.1 mg/dL      Corrected Calcium 9.6 mg/dL      AST 26 U/L      ALT 14 U/L      Alkaline Phosphatase 242 U/L      Total Protein 6.4 g/dL      Albumin 3.4 g/dL      Total Bilirubin 0.52 mg/dL      eGFR 94 ml/min/1.73sq m     Narrative:      [employer] guidelines for Chronic Kidney Disease (CKD):     Stage 1 with normal or high GFR (GFR > 90 mL/min/1.73 square meters)    Stage 2 Mild CKD (GFR = 60-89 mL/min/1.73 square meters)    Stage 3A Moderate CKD (GFR = 45-59 mL/min/1.73 square meters)    Stage 3B Moderate CKD (GFR = 30-44 mL/min/1.73 square meters)    Stage 4 Severe CKD (GFR = 15-29 mL/min/1.73 square meters)    Stage 5 End Stage CKD (GFR <15 mL/min/1.73 square meters)  Note: GFR calculation is accurate only with a steady state creatinine    Magnesium [[id number]]  (Normal) Collected: 06/19/24 0050    Lab Status: Final result Specimen: Blood from Arm, Left Updated: 06/19/24 0116     Magnesium 2.2 mg/dL     CK [[id number]]  (Normal) Collected: 06/19/24 0050    Lab Status: Final result Specimen: Blood from Arm, Left Updated: 06/19/24 0116     Total CK 41 U/L     Ethanol [[id number]]  (Normal) Collected: 06/19/24 0050    Lab Status: Final result Specimen: Blood from Arm, Left Updated: 06/19/24 0116     Ethanol Lvl <10 mg/dL     Salicylate level [[id number]]  (Normal) Collected: 06/19/24 0050    Lab Status: Final result Specimen: Blood from Arm, Left Updated: 06/19/24 0116     Salicylate Lvl <5 mg/dL     Acetaminophen level-If concentration is detectable, please discuss with medical  on call. [[id number]]   (Abnormal) Collected: 06/19/24 0050    Lab Status: Final result Specimen: Blood from Arm, Left Updated: 06/19/24 0116     Acetaminophen Level <2 ug/mL     CBC and differential [[id number]]  (Abnormal) Collected: 06/19/24 0050    Lab Status: Final result Specimen: Blood from Arm, Left Updated: 06/19/24 0058     WBC 6.95 Thousand/uL      RBC 3.98 Million/uL      Hemoglobin 12.9 g/dL      Hematocrit 39.8 %       fL      MCH 32.4 pg      MCHC 32.4 g/dL      RDW 14.0 %      MPV 9.6 fL      Platelets 405 Thousands/uL      nRBC 0 /100 WBCs      Segmented % 59 %      Immature Grans % 1 %      Lymphocytes % 32 %      Monocytes % 7 %      Eosinophils Relative 1 %      Basophils Relative 0 %      Absolute Neutrophils 4.15 Thousands/µL      Absolute Immature Grans 0.04 Thousand/uL      Absolute Lymphocytes 2.20 Thousands/µL      Absolute Monocytes 0.45 Thousand/µL      Eosinophils Absolute 0.09 Thousand/µL      Basophils Absolute 0.02 Thousands/µL     Blood gas, venous [[id number]]  (Abnormal) Collected: 06/19/24 0050    Lab Status: Final result Specimen: Blood from Arm, Left Updated: 06/19/24 0058     pH, Sekou 7.374     pCO2, Sekou 39.9 mm Hg      pO2, Sekou 40.4 mm Hg      HCO3, Sekou 22.8 mmol/L      Base Excess, Sekou -2.2 mmol/L      O2 Content, Sekou 13.4 ml/dL      O2 HGB, VENOUS 69.1 %     Rapid drug screen, urine [[id number]]     Lab Status: No result Specimen: Urine     Fingerstick Glucose (POCT) [[id number]]  (Abnormal) Collected: 06/19/24 0021    Lab Status: Final result Specimen: Blood Updated: 06/19/24 0021     POC Glucose 218 mg/dl                    CT head without contrast   Final Result by Americo Guevara MD (06/19 0117)      No acute intracranial abnormality.                  Workstation performed: [id number]         CT cervical spine without contrast   Final Result by Americo Guevara MD (06/19 0122)      Multilevel degenerative changes without acute cervical spine fracture or traumatic malalignment.                   Workstation performed: [id number]                    Procedures  ECG 12 Lead Documentation Only    Date/Time: 6/19/2024 12:28 AM    Performed by: Valentín Zabala Jr., DO  Authorized by: Valentín Zabala Jr., DO    Indications / Diagnosis:  Overdose  ECG reviewed by me, the ED Provider: yes    Patient location:  ED  Previous ECG:     Previous ECG:  Compared to current    Similarity:  Changes noted  Interpretation:     Interpretation: abnormal    Rate:     ECG rate:  127    ECG rate assessment: tachycardic    Rhythm:     Rhythm: sinus rhythm and sinus tachycardia    Ectopy:     Ectopy: none    QRS:     QRS intervals:  Normal  Conduction:     Conduction: normal    ST segments:     ST segments:  Abnormal    Depression:  II, III and aVF  T waves:     T waves: non-specific    Other findings:     Other findings: prolonged qTc interval      Other findings comment:  [id number]  ECG 12 Lead Documentation Only    Date/Time: 6/19/2024 1:39 AM    Performed by: Valentín Zabala Jr., DO  Authorized by: Valentín Zabala Jr., DO    Indications / Diagnosis:  Repeat after Mg treatment  ECG reviewed by me, the ED Provider: yes    Patient location:  ED  Previous ECG:     Previous ECG:  Compared to current    Comparison ECG info:  Improved QTc    Similarity:  Changes noted  Interpretation:     Interpretation: abnormal    Rate:     ECG rate:  91    ECG rate assessment: normal    Rhythm:     Rhythm: sinus rhythm    Ectopy:     Ectopy: none    QRS:     QRS intervals:  Normal  Conduction:     Conduction: normal    ST segments:     ST segments:  Normal  T waves:     T waves: normal    Other findings:     Other findings: prolonged qTc interval      Other findings comment:  501  CriticalCare Time    Date/Time: 6/19/2024 2:08 AM    Performed by: Valentín Zabala Jr., DO  Authorized by: Valentín Zabala Jr., DO    Critical care provider statement:     Critical care time (minutes):  45    Critical care time was exclusive of:  Separately billable  procedures and treating other patients and teaching time    Critical care was necessary to treat or prevent imminent or life-threatening deterioration of the following conditions:  Toxidrome    Critical care was time spent personally by me on the following activities:  Blood draw for specimens, obtaining history from patient or surrogate, development of treatment plan with patient or surrogate, discussions with consultants, evaluation of patient's response to treatment, examination of patient, interpretation of cardiac output measurements, ordering and performing treatments and interventions, ordering and review of laboratory studies, ordering and review of radiographic studies, review of old charts and re-evaluation of patient's condition    I assumed direction of critical care for this patient from another provider in my specialty: no             ED Course  ED Course as of 06/19/24 0229 Wed Jun 19, 2024   0101 Blood gas, venous(!)  No evidence of acidosis or alkalosis.  No hypercapnia.     0102 CBC and differential(!)  Normal    0118 Coma panel(!)  Normal   0119 Comprehensive metabolic panel(!)  No major abnormalities    0156 CK  Normal    0156 Magnesium  Normal                                              Medical Decision Making  Patient is an altered polypharmacy overdose.  Does have a laceration on her forehead but it is hard to tell how it is.  History is extremely limited.  Her QTc is prolonged and she is tachycardic.  Will obtain a broad tox workup, CT scan for possible underlying injury, seizure precautions with pads, Ativan and will give magnesium for a prolonged QT.  Tetanus is up-to-date.  There is no bleeding to the laceration and it is approximated.  Unsure how long it has been there so we will hold off on closure at this time.  Continue with cardiac and pulse ox monitoring.  High likelihood of admission to ICU.    Patient was admitted a few months ago for a similar presentation due to an overdose.   Was found on the bathroom floor and had seizure-like activity at that time.  Patient is not on any antiepileptic medications and it seems that the seizure activity was related to a drug overdose.  Unsure what the seizure activity was like tonight but will continue with current plan of care.  No obvious intracranial hemorrhage on my interpretation of the head CT.    1:41 AM  Repeat EKG is much improved.  QTc is now 501 and ST and T changes are normalized.  BP is 97/53 so will hold off on any more Mg at this time.    2:07 AM  Overall workup is negative.  No acute traumatic injuries noted, patient has a stabilizing EKG and has not had any seizure-like activity here.  Will have critical care evaluate to aid with disposition.    Amount and/or Complexity of Data Reviewed  Labs: ordered. Decision-making details documented in ED Course.  Radiology: ordered.    Risk  Prescription drug management.  Decision regarding hospitalization.             Disposition  Final diagnoses:   Polysubstance overdose   Closed head injury, initial encounter   Laceration of forehead, initial encounter   Prolonged Q-T interval on ECG   Acute encephalopathy     Time reflects when diagnosis was documented in both MDM as applicable and the Disposition within this note       Time User Action Codes Description Comment    6/19/2024  1:01 AM Valentín Zabala [T50.901A] Polysubstance overdose     6/19/2024  1:57 AM Valentín Zabala [S09.90XA] Closed head injury, initial encounter     6/19/2024  1:57 AM Valentín Zabala [S01.81XA] Laceration of forehead, initial encounter     6/19/2024  1:58 AM Valentín Zabala [R94.31] Prolonged Q-T interval on ECG     6/19/2024  1:58 AM Valentín Zabala [G93.40] Acute encephalopathy           ED Disposition       ED Disposition   Admit    Condition   Stable    Date/Time   Wed Jun 19, 2024  2:29 AM    Comment   Case was discussed with Critical Care and the patient's admission status was  agreed to be Admission Status: inpatient status to the service of Dr. Valencia .               Follow-up Information    None         Patient's Medications   Discharge Prescriptions    No medications on file       No discharge procedures on file.    PDMP Review         Value Time User    PDMP Reviewed  Yes 6/14/2022  4:48 PM Amanda Carlson MD            ED Provider  Electronically Signed by             Valentín Zabala Jr.,   06/19/24 0229

## 2024-06-19 NOTE — ASSESSMENT & PLAN NOTE
Lab Results   Component Value Date    HGBA1C 7.0 (H) 03/14/2024       Recent Labs     06/19/24  0021   POCGLU 218*       Blood Sugar Average: Last 72 hrs:  (P) 218    Obtain new A1c  Start insulin SSI algorithm 1Q6 while n.p.o. will adjust when tolerating diet  Maintain n.p.o. given altered mental status from overdose, every 6 hr fingersticks  Continue holding home Jardiance

## 2024-06-20 ENCOUNTER — HOSPITAL ENCOUNTER (INPATIENT)
Facility: HOSPITAL | Age: 59
LOS: 6 days | Discharge: HOME | DRG: 753 | End: 2024-06-26
Attending: STUDENT IN AN ORGANIZED HEALTH CARE EDUCATION/TRAINING PROGRAM | Admitting: STUDENT IN AN ORGANIZED HEALTH CARE EDUCATION/TRAINING PROGRAM
Payer: COMMERCIAL

## 2024-06-20 VITALS
BODY MASS INDEX: 19.31 KG/M2 | RESPIRATION RATE: 18 BRPM | DIASTOLIC BLOOD PRESSURE: 80 MMHG | HEART RATE: 92 BPM | OXYGEN SATURATION: 99 % | TEMPERATURE: 97.3 F | WEIGHT: 105.6 LBS | SYSTOLIC BLOOD PRESSURE: 108 MMHG

## 2024-06-20 DIAGNOSIS — F31.4 BIPOLAR AFFECTIVE DISORDER, DEPRESSED, SEVERE (HCC): Primary | ICD-10-CM

## 2024-06-20 DIAGNOSIS — M79.7 FIBROMYALGIA: ICD-10-CM

## 2024-06-20 DIAGNOSIS — Z00.8 MEDICAL CLEARANCE FOR PSYCHIATRIC ADMISSION: ICD-10-CM

## 2024-06-20 DIAGNOSIS — R51.9 HEADACHE: ICD-10-CM

## 2024-06-20 DIAGNOSIS — E11.9 TYPE 2 DIABETES MELLITUS WITHOUT COMPLICATION, WITHOUT LONG-TERM CURRENT USE OF INSULIN (HCC): ICD-10-CM

## 2024-06-20 DIAGNOSIS — E55.9 VITAMIN D DEFICIENCY: ICD-10-CM

## 2024-06-20 DIAGNOSIS — E11.9 DIABETES (HCC): ICD-10-CM

## 2024-06-20 DIAGNOSIS — Z98.84 S/P GASTRIC BYPASS: ICD-10-CM

## 2024-06-20 PROBLEM — G92.9 TOXIC ENCEPHALOPATHY: Status: ACTIVE | Noted: 2024-06-20

## 2024-06-20 PROBLEM — T50.904A OVERDOSE OF UNDETERMINED INTENT: Status: ACTIVE | Noted: 2024-06-19

## 2024-06-20 LAB
ATRIAL RATE: 106 BPM
ATRIAL RATE: 106 BPM
ATRIAL RATE: 92 BPM
GLUCOSE SERPL-MCNC: 107 MG/DL (ref 65–140)
GLUCOSE SERPL-MCNC: 130 MG/DL (ref 65–140)
GLUCOSE SERPL-MCNC: 133 MG/DL (ref 65–140)
GLUCOSE SERPL-MCNC: 192 MG/DL (ref 65–140)
P AXIS: 47 DEGREES
P AXIS: 49 DEGREES
P AXIS: 66 DEGREES
PR INTERVAL: 132 MS
PR INTERVAL: 134 MS
PR INTERVAL: 134 MS
QRS AXIS: -15 DEGREES
QRS AXIS: -17 DEGREES
QRS AXIS: 25 DEGREES
QRSD INTERVAL: 74 MS
QRSD INTERVAL: 76 MS
QRSD INTERVAL: 80 MS
QT INTERVAL: 364 MS
QT INTERVAL: 368 MS
QT INTERVAL: 392 MS
QTC INTERVAL: 483 MS
QTC INTERVAL: 484 MS
QTC INTERVAL: 488 MS
T WAVE AXIS: 20 DEGREES
T WAVE AXIS: 28 DEGREES
T WAVE AXIS: 51 DEGREES
TSH SERPL DL<=0.05 MIU/L-ACNC: 0.81 UIU/ML (ref 0.45–4.5)
VENTRICULAR RATE: 106 BPM
VENTRICULAR RATE: 106 BPM
VENTRICULAR RATE: 92 BPM

## 2024-06-20 PROCEDURE — 84443 ASSAY THYROID STIM HORMONE: CPT

## 2024-06-20 PROCEDURE — 93005 ELECTROCARDIOGRAM TRACING: CPT

## 2024-06-20 PROCEDURE — 82948 REAGENT STRIP/BLOOD GLUCOSE: CPT

## 2024-06-20 PROCEDURE — 93010 ELECTROCARDIOGRAM REPORT: CPT | Performed by: STUDENT IN AN ORGANIZED HEALTH CARE EDUCATION/TRAINING PROGRAM

## 2024-06-20 PROCEDURE — 99232 SBSQ HOSP IP/OBS MODERATE 35: CPT | Performed by: INTERNAL MEDICINE

## 2024-06-20 RX ORDER — ACETAMINOPHEN 325 MG/1
975 TABLET ORAL EVERY 6 HOURS PRN
Status: CANCELLED | OUTPATIENT
Start: 2024-06-20

## 2024-06-20 RX ORDER — HYDROXYZINE HYDROCHLORIDE 25 MG/1
25 TABLET, FILM COATED ORAL
Status: CANCELLED | OUTPATIENT
Start: 2024-06-20

## 2024-06-20 RX ORDER — POLYETHYLENE GLYCOL 3350 17 G/17G
17 POWDER, FOR SOLUTION ORAL DAILY PRN
Status: DISCONTINUED | OUTPATIENT
Start: 2024-06-20 | End: 2024-06-26 | Stop reason: HOSPADM

## 2024-06-20 RX ORDER — LORAZEPAM 2 MG/ML
1 INJECTION INTRAMUSCULAR
Status: DISCONTINUED | OUTPATIENT
Start: 2024-06-20 | End: 2024-06-26 | Stop reason: HOSPADM

## 2024-06-20 RX ORDER — TRAZODONE HYDROCHLORIDE 50 MG/1
50 TABLET ORAL
Status: CANCELLED | OUTPATIENT
Start: 2024-06-20

## 2024-06-20 RX ORDER — RISPERIDONE 1 MG/1
0.5 TABLET ORAL
Status: CANCELLED | OUTPATIENT
Start: 2024-06-20

## 2024-06-20 RX ORDER — AMOXICILLIN 250 MG
1 CAPSULE ORAL DAILY PRN
Status: DISCONTINUED | OUTPATIENT
Start: 2024-06-20 | End: 2024-06-26 | Stop reason: HOSPADM

## 2024-06-20 RX ORDER — FLUOXETINE HYDROCHLORIDE 20 MG/1
20 CAPSULE ORAL DAILY
Status: DISCONTINUED | OUTPATIENT
Start: 2024-06-21 | End: 2024-06-24

## 2024-06-20 RX ORDER — HALOPERIDOL 5 MG/ML
5 INJECTION INTRAMUSCULAR
Status: DISCONTINUED | OUTPATIENT
Start: 2024-06-20 | End: 2024-06-26 | Stop reason: HOSPADM

## 2024-06-20 RX ORDER — ACETAMINOPHEN 325 MG/1
650 TABLET ORAL EVERY 4 HOURS PRN
Status: DISCONTINUED | OUTPATIENT
Start: 2024-06-20 | End: 2024-06-26 | Stop reason: HOSPADM

## 2024-06-20 RX ORDER — QUETIAPINE FUMARATE 400 MG/1
400 TABLET, FILM COATED ORAL
Status: DISCONTINUED | OUTPATIENT
Start: 2024-06-20 | End: 2024-06-23

## 2024-06-20 RX ORDER — HYDROXYZINE HYDROCHLORIDE 25 MG/1
25 TABLET, FILM COATED ORAL
Status: DISCONTINUED | OUTPATIENT
Start: 2024-06-20 | End: 2024-06-26 | Stop reason: HOSPADM

## 2024-06-20 RX ORDER — LORAZEPAM 1 MG/1
1 TABLET ORAL
Status: DISCONTINUED | OUTPATIENT
Start: 2024-06-20 | End: 2024-06-26 | Stop reason: HOSPADM

## 2024-06-20 RX ORDER — INSULIN LISPRO 100 [IU]/ML
1-5 INJECTION, SOLUTION INTRAVENOUS; SUBCUTANEOUS
Status: DISCONTINUED | OUTPATIENT
Start: 2024-06-20 | End: 2024-06-26 | Stop reason: HOSPADM

## 2024-06-20 RX ORDER — TOPIRAMATE 25 MG/1
25 TABLET ORAL 2 TIMES DAILY
Status: DISCONTINUED | OUTPATIENT
Start: 2024-06-21 | End: 2024-06-26 | Stop reason: HOSPADM

## 2024-06-20 RX ORDER — TRAZODONE HYDROCHLORIDE 50 MG/1
50 TABLET ORAL
Status: DISCONTINUED | OUTPATIENT
Start: 2024-06-20 | End: 2024-06-26 | Stop reason: HOSPADM

## 2024-06-20 RX ORDER — LORAZEPAM 2 MG/ML
1 INJECTION INTRAMUSCULAR
Status: CANCELLED | OUTPATIENT
Start: 2024-06-20

## 2024-06-20 RX ORDER — HYDROXYZINE 50 MG/1
50 TABLET, FILM COATED ORAL
Status: DISCONTINUED | OUTPATIENT
Start: 2024-06-20 | End: 2024-06-26 | Stop reason: HOSPADM

## 2024-06-20 RX ORDER — LANOLIN ALCOHOL/MO/W.PET/CERES
3 CREAM (GRAM) TOPICAL
Status: DISCONTINUED | OUTPATIENT
Start: 2024-06-20 | End: 2024-06-26 | Stop reason: HOSPADM

## 2024-06-20 RX ORDER — GABAPENTIN 400 MG/1
400 CAPSULE ORAL 3 TIMES DAILY
Status: CANCELLED | OUTPATIENT
Start: 2024-06-20

## 2024-06-20 RX ORDER — ACETAMINOPHEN 325 MG/1
975 TABLET ORAL EVERY 6 HOURS PRN
Status: DISCONTINUED | OUTPATIENT
Start: 2024-06-20 | End: 2024-06-26 | Stop reason: HOSPADM

## 2024-06-20 RX ORDER — HYDROXYZINE HYDROCHLORIDE 25 MG/1
50 TABLET, FILM COATED ORAL
Status: CANCELLED | OUTPATIENT
Start: 2024-06-20

## 2024-06-20 RX ORDER — LORAZEPAM 1 MG/1
1 TABLET ORAL
Status: CANCELLED | OUTPATIENT
Start: 2024-06-20

## 2024-06-20 RX ORDER — AMOXICILLIN 250 MG
1 CAPSULE ORAL DAILY PRN
Status: CANCELLED | OUTPATIENT
Start: 2024-06-20

## 2024-06-20 RX ORDER — TOPIRAMATE 25 MG/1
25 TABLET ORAL 2 TIMES DAILY
Status: CANCELLED | OUTPATIENT
Start: 2024-06-20

## 2024-06-20 RX ORDER — ACETAMINOPHEN 325 MG/1
650 TABLET ORAL EVERY 4 HOURS PRN
Status: CANCELLED | OUTPATIENT
Start: 2024-06-20

## 2024-06-20 RX ORDER — RISPERIDONE 0.25 MG/1
0.25 TABLET ORAL
Status: CANCELLED | OUTPATIENT
Start: 2024-06-20

## 2024-06-20 RX ORDER — RISPERIDONE 1 MG/1
1 TABLET ORAL
Status: DISCONTINUED | OUTPATIENT
Start: 2024-06-20 | End: 2024-06-26 | Stop reason: HOSPADM

## 2024-06-20 RX ORDER — RISPERIDONE 0.5 MG/1
0.5 TABLET ORAL
Status: DISCONTINUED | OUTPATIENT
Start: 2024-06-20 | End: 2024-06-26 | Stop reason: HOSPADM

## 2024-06-20 RX ORDER — HALOPERIDOL 5 MG/ML
5 INJECTION INTRAMUSCULAR
Status: CANCELLED | OUTPATIENT
Start: 2024-06-20

## 2024-06-20 RX ORDER — RISPERIDONE 0.25 MG/1
0.25 TABLET ORAL
Status: DISCONTINUED | OUTPATIENT
Start: 2024-06-20 | End: 2024-06-26 | Stop reason: HOSPADM

## 2024-06-20 RX ORDER — RISPERIDONE 1 MG/1
1 TABLET ORAL
Status: CANCELLED | OUTPATIENT
Start: 2024-06-20

## 2024-06-20 RX ORDER — POLYETHYLENE GLYCOL 3350 17 G/17G
17 POWDER, FOR SOLUTION ORAL DAILY PRN
Status: CANCELLED | OUTPATIENT
Start: 2024-06-20

## 2024-06-20 RX ORDER — FLUOXETINE HYDROCHLORIDE 20 MG/1
20 CAPSULE ORAL DAILY
Status: CANCELLED | OUTPATIENT
Start: 2024-06-21

## 2024-06-20 RX ORDER — GABAPENTIN 400 MG/1
400 CAPSULE ORAL 3 TIMES DAILY
Status: DISCONTINUED | OUTPATIENT
Start: 2024-06-20 | End: 2024-06-24

## 2024-06-20 RX ORDER — LANOLIN ALCOHOL/MO/W.PET/CERES
3 CREAM (GRAM) TOPICAL
Status: CANCELLED | OUTPATIENT
Start: 2024-06-20

## 2024-06-20 RX ADMIN — MELATONIN TAB 3 MG 3 MG: 3 TAB at 21:56

## 2024-06-20 RX ADMIN — TOPIRAMATE 25 MG: 25 TABLET, FILM COATED ORAL at 08:43

## 2024-06-20 RX ADMIN — TOPIRAMATE 25 MG: 25 TABLET, FILM COATED ORAL at 17:26

## 2024-06-20 RX ADMIN — GABAPENTIN 400 MG: 400 CAPSULE ORAL at 21:56

## 2024-06-20 RX ADMIN — ENOXAPARIN SODIUM 40 MG: 40 INJECTION SUBCUTANEOUS at 08:50

## 2024-06-20 RX ADMIN — LUBIPROSTONE 8 MCG: 8 CAPSULE, GELATIN COATED ORAL at 16:26

## 2024-06-20 RX ADMIN — AZATHIOPRINE 100 MG: 50 TABLET ORAL at 08:44

## 2024-06-20 RX ADMIN — FAMOTIDINE 40 MG: 20 TABLET, FILM COATED ORAL at 08:43

## 2024-06-20 RX ADMIN — FLUOXETINE 20 MG: 20 CAPSULE ORAL at 08:43

## 2024-06-20 RX ADMIN — BACITRACIN ZINC 1 LARGE APPLICATION: 500 OINTMENT TOPICAL at 08:52

## 2024-06-20 RX ADMIN — QUETIAPINE 400 MG: 400 TABLET ORAL at 21:56

## 2024-06-20 RX ADMIN — GABAPENTIN 400 MG: 400 CAPSULE ORAL at 08:43

## 2024-06-20 RX ADMIN — GABAPENTIN 400 MG: 400 CAPSULE ORAL at 16:26

## 2024-06-20 RX ADMIN — LUBIPROSTONE 8 MCG: 8 CAPSULE, GELATIN COATED ORAL at 08:47

## 2024-06-20 RX ADMIN — PANTOPRAZOLE SODIUM 40 MG: 40 TABLET, DELAYED RELEASE ORAL at 06:08

## 2024-06-20 RX ADMIN — INSULIN LISPRO 1 UNITS: 100 INJECTION, SOLUTION INTRAVENOUS; SUBCUTANEOUS at 22:16

## 2024-06-20 RX ADMIN — ASPIRIN 81 MG: 81 TABLET, COATED ORAL at 08:43

## 2024-06-20 RX ADMIN — BACITRACIN ZINC 1 LARGE APPLICATION: 500 OINTMENT TOPICAL at 17:26

## 2024-06-20 NOTE — UTILIZATION REVIEW
Initial Clinical Review    Admission: Date/Time/Statement:   Admission Orders (From admission, onward)       Ordered        06/19/24 0221  Inpatient Admission  Once                          Orders Placed This Encounter   Procedures    Inpatient Admission     Standing Status:   Standing     Number of Occurrences:   1     Order Specific Question:   Level of Care     Answer:   Level 1 Stepdown [13]     Order Specific Question:   Estimated length of stay     Answer:   More than 2 Midnights     Order Specific Question:   Certification     Answer:   I certify that inpatient services are medically necessary for this patient for a duration of greater than two midnights. See H&P and MD Progress Notes for additional information about the patient's course of treatment.     ED Arrival Information       Expected   -    Arrival   6/19/2024 00:14    Acuity   Emergent              Means of arrival   Ambulance    Escorted by   [facility] ([facility])    Service   Hospitalist    Admission type   Emergency              Arrival complaint   -             Chief Complaint   Patient presents with    Overdose - Intentional     Pt BIBA from home, per pts family at home pt has been abusing prescribed medications. Pt found down in BR after taking unknown amount of Amitriptyline and Sertraline. Pt drowsy during triage opening eyes to verbal stimulation. Family states pt had seizure in BR where she then hit her head, pt has head lac on right eyebrow.        Initial Presentation: 59 y.o. female presents to ED via  EMS  from home after an  intentional overdose.  Was found down in  BR after taking an unknown  amount  of elavil and  sertraline.  Last seen  the  morning  of  6/18.  Friends  felt she was having a seizure, hit head, open pill bottles  found.   EMS unable to confirm.   Drowsy in triage, opens  eyes  to  verbal stimuli.  GCS  7,  pupils sluggish, reactive, appears  to be an open laceration.  Given IV ativan in ED.  Ct head  negative.  Labs normal. PMH  is  Bipolar, seizure disorder, chronic headaches,  COPD, gastric bypass  and  DM.  Admit  IP  ICU LOC  with  Intentional Overdose, Seizure  and plan is  1:1  observation, suicide/seizure precautions, pschy/toxicology  consult, PT/OT,  tele, monitor forehead wound and continue home meds.    Pschy consult  Needs  IP pschy admission  after medical stabilization.    Anticipated Length of Stay/Certification Statement:  > 2  midnights    Date: 6/20      Day 2:   D/C  to IP pschy      ED Triage Vitals   Temperature Pulse Respirations Blood Pressure SpO2 Pain Score   06/19/24 0022 06/19/24 0020 06/19/24 0024 06/19/24 0020 06/19/24 0020 06/19/24 0400   98.6 °F (37 °C) (S) (!) 124 21 125/60 97 % No Pain     Weight (last 2 days)       Date/Time Weight    06/20/24 0600 47.9 (105.6)    06/19/24 0600 52.2 (115.08)    06/19/24 0222 52.2 (115.08)    06/19/24 0020 48.9 (107.81)            Vital Signs (last 3 days)       Date/Time Temp Pulse Resp BP MAP (mmHg) SpO2 Calculated FIO2 (%) - Nasal Cannula Nasal Cannula O2 Flow Rate (L/min) O2 Device Patient Position - Orthostatic VS [place] Coma Scale Score Pain    06/20/24 0811 -- 87 -- 110/70 -- -- -- -- -- Sitting -- --    06/20/24 0804 97.7 °F (36.5 °C) -- 20 -- -- 95 % -- -- None (Room air) Sitting -- --    06/20/24 0722 -- -- -- -- -- -- -- -- -- -- 14 No Pain    06/20/24 0336 97.7 °F (36.5 °C) 95 20 104/80 85 95 % -- -- None (Room air) Lying -- --    06/19/24 2055 97.6 °F (36.4 °C) 81 18 104/69 82 93 % -- -- None (Room air) Lying -- --    06/19/24 2002 -- -- -- -- -- -- -- -- None (Room air) -- 15 No Pain    06/19/24 1627 97.4 °F (36.3 °C) 94 17 116/82 88 96 % -- -- None (Room air) Lying 14 No Pain    06/19/24 1511 -- -- -- -- -- -- -- -- -- -- -- No Pain    06/19/24 1502 -- -- -- -- -- -- -- -- -- -- -- No Pain    06/19/24 1500 98.4 °F (36.9 °C) -- -- -- -- -- -- -- -- -- -- --    06/19/24 1100 98.4 °F (36.9 °C) -- -- -- -- -- -- -- -- -- 14 8     06/19/24 0749 -- -- -- -- -- -- -- -- -- -- 14 No Pain    06/19/24 0700 97.8 °F (36.6 °C) -- -- -- -- -- -- -- -- -- -- --    06/19/24 0600 -- 84 16 125/66 90 98 % 28 2 L/min Nasal cannula -- -- --    06/19/24 0500 -- 86 17 -- -- 99 % -- -- -- -- -- --    06/19/24 0415 -- -- -- -- -- -- -- -- -- -- 14 No Pain    06/19/24 0400 -- 88 14 118/63 87 98 % 28 2 L/min Nasal cannula Lying -- No Pain    06/19/24 0300 -- 83 13 108/64 79 98 % 28 2 L/min Nasal cannula Lying -- --    06/19/24 0211 -- -- -- -- -- -- -- -- -- -- 8 --    06/19/24 0200 -- 81 13 84/53 64 97 % -- -- None (Room air) -- -- --    06/19/24 0115 -- 98 13 97/53 71 93 % -- -- None (Room air) Lying -- --    06/19/24 0025 -- -- -- -- -- -- -- -- -- -- 13 --    06/19/24 0024 -- -- 21 -- -- -- -- -- -- -- -- --    06/19/24 0022 98.6 °F (37 °C) -- -- -- -- -- -- -- -- -- -- --    06/19/24 0020 -- 124  -- 125/60 -- 97 % -- -- None (Room air) Lying -- --              Pertinent Labs/Diagnostic Test Results:   EKG    NSR   1st degree  AV block  prolonged qtc  Radiology:  CT head without contrast   Final Interpretation by Americo Guevara MD (06/19 0117)      No acute intracranial abnormality.                  Workstation performed: [id number]         CT cervical spine without contrast   Final Interpretation by Americo Guevara MD (06/19 0122)      Multilevel degenerative changes without acute cervical spine fracture or traumatic malalignment.                  Workstation performed: [id number]           Cardiology:  ECG 12 lead   Final Result by Hossein Durham MD (06/20 0841)   Sinus tachycardia   Possible Left atrial enlargement   Borderline ECG   When compared with ECG of 20-JUN-2024 08:35, (unconfirmed)   No significant change was found   Confirmed by Hossein Durham ([id number]) on 6/20/2024 8:46:10 AM      ECG 12 lead   Final Result by Hossein Durham MD (06/19 1508)   Normal sinus rhythm   Low voltage QRS   Borderline ECG   When compared with ECG of  19-JUN-2024 01:35,   No significant change was found   Confirmed by Hossein Durham ([id number]) on 6/19/2024 3:03:20 PM      ECG 12 lead   Final Result by Keven Lisa DO (06/19 0815)   Normal sinus rhythm   Prolonged QT   Abnormal ECG   When compared with ECG of 19-JUN-2024 00:24, (unconfirmed)   Sinus rhythm has replaced Ectopic atrial rhythm   Confirmed by Keven Lisa ([id number]) on 6/19/2024 8:15:01 AM      ECG 12 lead   Final Result by Keven Lisa DO (06/19 0814)   Sinus tachycardia   Low voltage QRS   Nonspecific ST and T wave abnormality   Abnormal ECG   When compared with ECG of 12-JUN-2024 06:00,   Sinus tachycardia has replaced Sinus rhythm   Confirmed by Keven Lisa ([id number]) on 6/19/2024 8:14:51 AM            Results from last 7 days   Lab Units 06/19/24  0246 06/19/24  0050   WBC Thousand/uL  --  6.95   HEMOGLOBIN g/dL  --  12.9   HEMATOCRIT %  --  39.8   PLATELETS Thousands/uL 316 405*   TOTAL NEUT ABS Thousands/µL  --  4.15         Results from last 7 days   Lab Units 06/19/24  0050 06/18/24  0655   SODIUM mmol/L 134* 134*   POTASSIUM mmol/L 3.8 4.3   CHLORIDE mmol/L 99 100   CO2 mmol/L 22 28   ANION GAP mmol/L 13 6   BUN mg/dL 17 16   CREATININE mg/dL 0.71 0.57   EGFR ml/min/1.73sq m 94 106   CALCIUM mg/dL 9.1 8.7   MAGNESIUM mg/dL 2.2  --      Results from last 7 days   Lab Units 06/19/24  0246 06/19/24  0050 06/18/24  0655   AST U/L  --  26 21   ALT U/L  --  14 16   ALK PHOS U/L  --  242* 209*   TOTAL PROTEIN g/dL  --  6.4 6.7   ALBUMIN g/dL  --  3.4* 3.4*   TOTAL BILIRUBIN mg/dL  --  0.52 0.4   AMMONIA umol/L 24  --   --      Results from last 7 days   Lab Units 06/20/24  0633 06/19/24  2332 06/19/24  1753 06/19/24  1245 06/19/24  1051 06/19/24  0616 06/19/24  0021   POC GLUCOSE mg/dl 130 119 142* 86 77 88 218*     Results from last 7 days   Lab Units 06/19/24  0050 06/18/24  0655   GLUCOSE RANDOM mg/dL 253* 150*         Results from last 7 days   Lab Units 06/19/24  0507   HEMOGLOBIN  A1C % 7.1*   EAG mg/dl 157          Results from last 7 days   Lab Units 06/19/24  0246 06/19/24  0050   PH MIKEY  7.439* 7.374   PCO2 MIKEY mm Hg 35.2* 39.9*   PO2 MIKEY mm Hg 56.9* 40.4   HCO3 MIKEY mmol/L 23.3* 22.8*   BASE EXC MIKEY mmol/L -0.4 -2.2   O2 CONTENT MIKEY ml/dL 14.4 13.4   O2 HGB, VENOUS % 83.3* 69.1         Results from last 7 days   Lab Units 06/19/24  0050   CK TOTAL U/L 41             Results from last 7 days   Lab Units 06/19/24  0140   PROTIME seconds 13.7   INR  1.02   PTT seconds 29     Results from last 7 days   Lab Units 06/19/24  0050   TSH 3RD GENERATON uIU/mL 3.973         Results from last 7 days   Lab Units 06/19/24  0050   LACTIC ACID mmol/L 0.9           Results from last 7 days   Lab Units 06/18/24  0655   C-REACTIVE PROTEIN mg/L 39.5*                         Results from last 7 days   Lab Units 06/19/24  0628   AMPH/METH  Negative   BARBITURATE UR  Negative   BENZODIAZEPINE UR  Negative   COCAINE UR  Negative   METHADONE URINE  Negative   OPIATE UR  Negative   PCP UR  Negative   THC UR  Negative     Results from last 7 days   Lab Units 06/19/24  0050   ETHANOL LVL mg/dL <10   ACETAMINOPHEN LVL ug/mL <2*   SALICYLATE LVL mg/dL <5                 Results from last 7 days   Lab Units 06/19/24  0246 06/19/24  0245   BLOOD CULTURE  No Growth at 24 hrs. No Growth at 24 hrs.                   ED Treatment-Medication Administration from 06/19/2024 0014 to 06/19/2024 0338         Date/Time Order Dose Route Action     06/19/2024 0059 lactated ringers bolus 2,000 mL 2,000 mL Intravenous New Bag     06/19/2024 0043 magnesium sulfate 2 g/50 mL IVPB (premix) 2 g 2 g Intravenous New Bag     06/19/2024 0043 LORazepam (ATIVAN) injection 2 mg 2 mg Intravenous Given              Present on Admission:   Bipolar affective disorder (HCC)   QT prolongation   Seizure (HCC)   COPD (chronic obstructive pulmonary disease) (HCC)   Type 2 diabetes mellitus without complication (HCC)   Headache      Admitting Diagnosis:  Overdose [T50.901A]  Prolonged Q-T interval on ECG [R94.31]  Polysubstance overdose [T50.901A]  Acute encephalopathy [G93.40]  Closed head injury, initial encounter [S09.90XA]  Laceration of forehead, initial encounter [S01.81XA]  Age/Sex: 59 y.o. female  Admission Orders:  Scheduled Medications:  aspirin, 81 mg, Oral, Daily  azaTHIOprine, 100 mg, Oral, Daily  bacitracin, 1 large application, Topical, BID  enoxaparin, 40 mg, Subcutaneous, Daily  famotidine, 40 mg, Oral, Daily  FLUoxetine, 20 mg, Oral, Daily  gabapentin, 400 mg, Oral, TID  insulin lispro, 1-5 Units, Subcutaneous, 4x Daily (AC & HS)  lubiprostone, 8 mcg, Oral, BID With Meals  pantoprazole, 40 mg, Oral, Daily Before Breakfast  QUEtiapine, 400 mg, Oral, HS  topiramate, 25 mg, Oral, BID      Continuous IV Infusions:     PRN Meds:  acetaminophen, 650 mg, Oral, Q6H PRN  albuterol, 2 puff, Inhalation, Q4H PRN  zolpidem, 5 mg, Oral, HS PRN        IP CONSULT TO CASE MANAGEMENT  IP CONSULT TO PSYCHIATRY  IP CONSULT TO TOXICOLOGY  IP CONSULT TO ED CRISIS WORKER    Network Utilization Review Department  ATTENTION: Please call with any questions or concerns to [phone] and carefully listen to the prompts so that you are directed to the right person. All voicemails are confidential.   For Discharge needs, contact Care Management DC Support Team at [phone] opt. [phone]  Send all requests for admission clinical reviews, approved or denied determinations and any other requests to dedicated fax number below belonging to the campus where the patient is receiving treatment. List of dedicated fax numbers for the Facilities:  FACILITY NAME UR FAX NUMBER   ADMISSION DENIALS (Administrative/Medical Necessity) [phone]   DISCHARGE SUPPORT TEAM (NETWORK) [phone]   PARENT CHILD HEALTH (Maternity/NICU/Pediatrics) [phone]   [facility] [phone]   [facility] [phone]   [facility]  [place] [phone]   [facility] [phone]   [facility] [phone]   [facility] [phone]   [facility] [phone]   [facility] [phone]   [facility] [phone]   [facility] [phone]   [facility] [phone]   [facility] [phone]

## 2024-06-20 NOTE — PLAN OF CARE

## 2024-06-20 NOTE — ASSESSMENT & PLAN NOTE
Not in acute exacerbation  Continue albuterol as needed   89 yo female comes to ed with fever, elevated wbc , and increased in renal function from [employer]; ; code sepsis called; pt ? altered mental status

## 2024-06-20 NOTE — ED NOTES
Met with patient and discussed inpatient treatment.  She says this was not an intentional overdose and says she has been irresponsible with her medications by stretching them out.  She says some days she takes too many and other days she doesn't take enough.  She says last night she couldn't sleep so she took more than she should.  She is aware she will need to stay for treatment but is worried about her dog.  She says she will sign a 201 and will try to get her sister to watch the dog.

## 2024-06-20 NOTE — ASSESSMENT & PLAN NOTE
Lab Results   Component Value Date    HGBA1C 7.1 (H) 06/19/2024       Recent Labs     06/19/24  1753 06/19/24  2332 06/20/24  0633 06/20/24  1102   POCGLU 142* 119 130 133       Blood Sugar Average: Last 72 hrs:  (P) 124.125  Oral medications on hold  Continue sliding scale while inpatient

## 2024-06-20 NOTE — ASSESSMENT & PLAN NOTE
Prior neurology notes suspect seizures provoked by medication intoxication  It is of seizure at this time  Outpatient follow-up with neurology

## 2024-06-20 NOTE — ASSESSMENT & PLAN NOTE
Concern for intentional overdose versus medication abuse with excessive intake of Seroquel  She presented with tachycardia, hypotension, QT prolongation, somnolence and syncopal event  Patient was monitored under supportive care in the intensive care unit  Patient's QTc was monitored and remained below 500 with reinitiation of Seroquel  Additional medication changes per behavioral health  Patient signed 201 for involuntary inpatient psychiatry admission  Medically stable for discharge medicine perspective

## 2024-06-20 NOTE — PLAN OF CARE
Problem: Potential for Falls  Goal: Patient will remain free of falls  Description: INTERVENTIONS:  - Educate patient on patient safety including physical limitations  - If one:one observation discontinued, Instruct patient to call for assistance with activity   - If one:one discontinued, keep call bell within reach  - Keep bed low and locked with side rails adjusted as appropriate  - Remove all unnecessary items from room while on suicide precautions   - Initiate and maintain comfort rounds  - Apply yellow socks and bracelet for high fall risk patients  Outcome: Progressing     Problem: PAIN - ADULT  Goal: Verbalizes/displays adequate comfort level or baseline comfort level  Description: Interventions:  - Encourage patient to monitor pain and request assistance  - Assess pain using appropriate pain scale  - Administer analgesics based on type and severity of pain and evaluate response  - Implement non-pharmacological measures as appropriate and evaluate response  - Consider cultural and social influences on pain and pain management  - Notify physician/advanced practitioner if interventions unsuccessful or patient reports new pain  Outcome: Progressing     Problem: SAFETY ADULT  Goal: Patient will remain free of falls  Description: INTERVENTIONS:  - Educate patient on patient safety including physical limitations  - If one:one observation discontinued, Instruct patient to call for assistance with activity   - If one:one discontinued, keep call bell within reach  - Keep bed low and locked with side rails adjusted as appropriate  - Remove all unnecessary items from room while on suicide precautions   - Initiate and maintain comfort rounds  - Apply yellow socks and bracelet for high fall risk patients  Outcome: Progressing  Goal: Maintains/Returns to pre admission functional level  Description: INTERVENTIONS:  - Perform AM-PAC 6 Click Basic Mobility/ Daily Activity assessment daily.  - Set and communicate daily  mobility goal to care team and patient/family/caregiver.   - Encourage pt to do mobilize in room  - Out of bed to chair 3 times a day   - Out of bed for meals  - Out of bed for toileting  - Record patient progress and toleration of activity level   Outcome: Progressing     Problem: DISCHARGE PLANNING  Goal: Discharge to home or other facility with appropriate resources  Description: INTERVENTIONS:  - Identify barriers to discharge w/patient  - Arrange for needed discharge resources and transportation as appropriate  - Identify discharge learning needs (meds, wound care, etc.)  - Refer to Case Management Department for coordinating discharge planning if the patient needs post-hospital services based on physician/advanced practitioner order or complex needs related to functional status, cognitive ability, or social support system  Outcome: Progressing     Problem: SELF HARM  Goal: Effect of psychiatric condition will be minimized and patient will be protected from self harm  Description: INTERVENTIONS:  - Assess impact of patient's symptoms on level of functioning, self-care needs and offer support as indicated  - Assess patient/family knowledge of depression, impact on illness and need for teaching  - Provide emotional support, presence and reassurance  - Assess for possible suicidal thoughts, ideation or self-harm. If patient expresses suicidal thoughts or statements do not leave alone, notify physician/AP immediately, initiate suicide precautions, and determine need for continual observation.  - initiate consults and referrals as appropriate (a mental health professional, Spirit[facility]  Outcome: Progressing

## 2024-06-20 NOTE — ED NOTES
[facility]  Patient is accepted by Dr. Greta Kerns per Annabelle in Intake.     Transportation is arranged with [facility].     Transportation is scheduled for 7pm with [facility].    Patient may go to the floor anytime.       Nurse report is to be called to [phone] prior to patient transfer.    Attending Pshysician is aware of transport, as is Annabelle in Intake.     ORIGINAL 201 MUST ACCOMPANY PATIENT TO [facility].     Madeline Tesfaye, MARII  06/20/24 1525

## 2024-06-20 NOTE — ED NOTES
Insurance Authorization:   Phone call placed to [facility]  Phone number: [phone]     Spoke to: Mynor Martínez approved- 5  Level of care: Inpatient treatment  Review on 6/24/24  Authorization # Facility to call on arrival      EVS (Eligibility Verification System) called [phone]  Automated system indicates: [facility]

## 2024-06-20 NOTE — ASSESSMENT & PLAN NOTE
QTc remained below 500 with reinitiation of Seroquel  If patient remains hospitalized we will continue to check EKG daily

## 2024-06-20 NOTE — PROGRESS NOTES
[facility]  Progress Note  Name: Edith Klein I  MRN: [id number]  Unit/Bed#: E2 -01 I Date of Admission: 6/19/2024   Date of Service: 6/20/2024  Hospital Day: 1    Assessment & Plan   * Overdose of undetermined intent  Assessment & Plan  Concern for intentional overdose versus medication abuse with excessive intake of Seroquel  She presented with tachycardia, hypotension, QT prolongation, somnolence and syncopal event  Patient was monitored under supportive care in the intensive care unit  Patient's QTc was monitored and remained below 500 with reinitiation of Seroquel  Additional medication changes per behavioral health  Patient signed 201 for involuntary inpatient psychiatry admission  Medically stable for discharge medicine perspective    Seizure (HCC)  Assessment & Plan  Prior neurology notes suspect seizures provoked by medication intoxication  It is of seizure at this time  Outpatient follow-up with neurology    Toxic encephalopathy  Assessment & Plan  Polypharmacy with concern for intentional overdose  Resolved  Mental status appears at baseline    Forehead laceration  Assessment & Plan  Sutured in emergency department  Sutures to be removed 6/23 - 6/25    QT prolongation  Assessment & Plan  QTc remained below 500 with reinitiation of Seroquel  If patient remains hospitalized we will continue to check EKG daily    Type 2 diabetes mellitus without complication ([facility])  Assessment & Plan  Lab Results   Component Value Date    HGBA1C 7.1 (H) 06/19/2024       Recent Labs     06/19/24  1753 06/19/24  2332 06/20/24  0633 06/20/24  1102   POCGLU 142* 119 130 133       Blood Sugar Average: Last 72 hrs:  (P) 124.125  Oral medications on hold  Continue sliding scale while inpatient      COPD (chronic obstructive pulmonary disease) ([facility])  Assessment & Plan  Not in acute exacerbation  Continue albuterol as needed    Bipolar affective disorder ([facility])  Assessment & Plan  Appreciate  psychiatric consultation  Decrease Prozac to 20 mg daily  Discontinue Elavil  Seroquel resumed and QTc remains below 500         VTE Pharmacologic Prophylaxis: lovenox     Patient Centered Rounds:  Patient care rounds were performed with nursing    Education and Discussions with Family / Patient: Patient     Time Spent for Care: I have spent a total time of 35 minutes on 24 in caring for this patient including Diagnostic results, Importance of tx compliance, Impressions, Counseling / Coordination of care, Documenting in the medical record, and Communicating with other healthcare professionals .      Current Length of Stay: 1 day(s)    Current Patient Status: Inpatient   Certification Statement: The patient will continue to require additional inpatient hospital stay due to need for placement to inpatient psych     Discharge Plan: Pending placement to inpatient psych    Code Status: Level 1 - Full Code      Subjective:   Patient seen and evaluated at bedside. We discussed medical plans. She adamantly refuses that this was an intentional overdose but does agree that her medication regimen has not been working for her.     Objective:     Vitals:   Temp (24hrs), Av.8 °F (36.6 °C), Min:97.4 °F (36.3 °C), Max:98.4 °F (36.9 °C)    Temp:  [97.4 °F (36.3 °C)-98.4 °F (36.9 °C)] 97.7 °F (36.5 °C)  HR:  [81-95] 87  Resp:  [17-20] 20  BP: (104-116)/(69-82) 110/70  SpO2:  [93 %-96 %] 95 %  Body mass index is 19.31 kg/m².     Input and Output Summary (last 24 hours):       Intake/Output Summary (Last 24 hours) at 2024 1458  Last data filed at 2024 0721  Gross per 24 hour   Intake 540 ml   Output --   Net 540 ml       Physical Exam:     Physical Exam  Vitals reviewed.   Constitutional:       General: She is not in acute distress.     Appearance: She is well-developed. She is not ill-appearing, toxic-appearing or diaphoretic.   HENT:      Head: Normocephalic.      Comments: Sutures clean dry      Mouth/Throat:       Mouth: Mucous membranes are moist.   Eyes:      General: No scleral icterus.     Extraocular Movements: Extraocular movements intact.   Cardiovascular:      Rate and Rhythm: Normal rate and regular rhythm.      Heart sounds: Normal heart sounds.   Pulmonary:      Effort: Pulmonary effort is normal. No respiratory distress.      Breath sounds: Normal breath sounds. No wheezing or rales.   Abdominal:      General: There is no distension.      Palpations: Abdomen is soft.      Tenderness: There is no abdominal tenderness. There is no guarding or rebound.   Musculoskeletal:         General: No swelling, tenderness or deformity.   Skin:     General: Skin is warm and dry.   Neurological:      General: No focal deficit present.      Mental Status: She is alert.   Psychiatric:         Mood and Affect: Mood normal.         Behavior: Behavior normal.         Thought Content: Thought content normal.         Judgment: Judgment normal.         Additional Data:     Labs: I have reviewed pertinent results     Results from last 7 days   Lab Units 06/19/24  0246 06/19/24  0050   WBC Thousand/uL  --  6.95   HEMOGLOBIN g/dL  --  12.9   HEMATOCRIT %  --  39.8   PLATELETS Thousands/uL 316 405*   SEGS PCT %  --  59   LYMPHO PCT %  --  32   MONO PCT %  --  7   EOS PCT %  --  1     Results from last 7 days   Lab Units 06/19/24  0050   SODIUM mmol/L 134*   POTASSIUM mmol/L 3.8   CHLORIDE mmol/L 99   CO2 mmol/L 22   BUN mg/dL 17   CREATININE mg/dL 0.71   ANION GAP mmol/L 13   CALCIUM mg/dL 9.1   ALBUMIN g/dL 3.4*   TOTAL BILIRUBIN mg/dL 0.52   ALK PHOS U/L 242*   ALT U/L 14   AST U/L 26   GLUCOSE RANDOM mg/dL 253*     Results from last 7 days   Lab Units 06/19/24  0140   INR  1.02     Results from last 7 days   Lab Units 06/20/24  1102 06/20/24  0633 06/19/24  2332 06/19/24  1753 06/19/24  1245 06/19/24  1051 06/19/24  0616 06/19/24  0021   POC GLUCOSE mg/dl 133 130 119 142* 86 77 88 218*     Results from last 7 days   Lab Units  06/19/24  0507   HEMOGLOBIN A1C % 7.1*     Results from last 7 days   Lab Units 06/19/24  0050   LACTIC ACID mmol/L 0.9         Imaging: I have reviewed pertinent imaging       Recent Cultures (last 7 days):     Results from last 7 days   Lab Units 06/19/24  0246 06/19/24  0245   BLOOD CULTURE  No Growth at 24 hrs. No Growth at 24 hrs.       Last 24 Hours Medication List:   Current Facility-Administered Medications   Medication Dose Route Frequency Provider Last Rate    acetaminophen  650 mg Oral Q6H PRN Mariia Cai, OSMAR      albuterol  2 puff Inhalation Q4H PRN Keon Dahlday, CRNP      aspirin  81 mg Oral Daily Keon Arley Sonday, CRNP      azaTHIOprine  100 mg Oral Daily Keon Arley Sonday, CRNP      bacitracin  1 large application Topical BID Keon Arley Sonday, CRNP      enoxaparin  40 mg Subcutaneous Daily Keon Arley Sonday, CRNP      famotidine  40 mg Oral Daily Charles Joseph Sonday, CRNP      FLUoxetine  20 mg Oral Daily Charles Joseph Sonday, CRNP      gabapentin  400 mg Oral TID Koen Arley Sonday, CRNP      insulin lispro  1-5 Units Subcutaneous 4x Daily (AC & HS) Mariia Cai, OSMAR      lubiprostone  8 mcg Oral BID With Meals Keon Arley Sonday, CRNP      pantoprazole  40 mg Oral Daily Before Breakfast Keon Arley Sonday, CRNP      QUEtiapine  400 mg Oral HS Charles Joseph Sonday, CRNP      topiramate  25 mg Oral BID Charles Joseph Sonday, CRNP      zolpidem  5 mg Oral HS PRN Keon Dahlday, OSMAR          Today, Patient Was Seen By: Jesse Moreno DO    ** Please Note: Dictation voice to text software may have been used in the creation of this document. **

## 2024-06-20 NOTE — ASSESSMENT & PLAN NOTE
Appreciate psychiatric consultation  Decrease Prozac to 20 mg daily  Discontinue Elavil  Seroquel resumed and QTc remains below 500

## 2024-06-21 PROBLEM — Z79.899 LONG TERM CURRENT USE OF ANTIPSYCHOTIC MEDICATION: Status: ACTIVE | Noted: 2024-04-29

## 2024-06-21 PROBLEM — N64.4 PAIN OF RIGHT BREAST: Status: ACTIVE | Noted: 2018-12-03

## 2024-06-21 PROBLEM — F31.32 BIPOLAR AFFECTIVE DISORDER, CURRENTLY DEPRESSED, MODERATE (HCC): Status: ACTIVE | Noted: 2017-04-26

## 2024-06-21 PROBLEM — N63.20 LEFT BREAST MASS: Status: ACTIVE | Noted: 2018-12-03

## 2024-06-21 PROBLEM — G43.109 MIGRAINE WITH AURA AND WITHOUT STATUS MIGRAINOSUS, NOT INTRACTABLE: Status: ACTIVE | Noted: 2021-03-08

## 2024-06-21 PROBLEM — M79.7 FIBROMYALGIA: Status: ACTIVE | Noted: 2018-08-30

## 2024-06-21 PROBLEM — Z00.8 MEDICAL CLEARANCE FOR PSYCHIATRIC ADMISSION: Status: ACTIVE | Noted: 2024-06-21

## 2024-06-21 PROBLEM — Z91.81 PERSONAL HISTORY OF FALL: Status: ACTIVE | Noted: 2024-06-21

## 2024-06-21 PROBLEM — S06.9XAS LATE EFFECT OF INTRACRANIAL INJURY (HCC): Status: ACTIVE | Noted: 2023-10-17

## 2024-06-21 PROBLEM — R41.3 MEMORY LOSS: Status: ACTIVE | Noted: 2021-03-08

## 2024-06-21 PROBLEM — F40.298 NEEDLE PHOBIA: Status: ACTIVE | Noted: 2022-07-13

## 2024-06-21 PROBLEM — E55.9 VITAMIN D DEFICIENCY: Status: ACTIVE | Noted: 2024-06-21

## 2024-06-21 PROBLEM — F31.4 BIPOLAR AFFECTIVE DISORDER, DEPRESSED, SEVERE (HCC): Status: ACTIVE | Noted: 2017-04-26

## 2024-06-21 LAB
25(OH)D3 SERPL-MCNC: 13.7 NG/ML (ref 30–100)
ALBUMIN SERPL BCG-MCNC: 3.3 G/DL (ref 3.5–5)
ALP SERPL-CCNC: 205 U/L (ref 34–104)
ALT SERPL W P-5'-P-CCNC: 10 U/L (ref 7–52)
ANION GAP SERPL CALCULATED.3IONS-SCNC: 10 MMOL/L (ref 4–13)
AST SERPL W P-5'-P-CCNC: 18 U/L (ref 13–39)
ATRIAL RATE: 95 BPM
BASOPHILS # BLD AUTO: 0.01 THOUSANDS/ÂΜL (ref 0–0.1)
BASOPHILS NFR BLD AUTO: 0 % (ref 0–1)
BILIRUB SERPL-MCNC: 0.29 MG/DL (ref 0.2–1)
BUN SERPL-MCNC: 18 MG/DL (ref 5–25)
CALCIUM ALBUM COR SERPL-MCNC: 9.6 MG/DL (ref 8.3–10.1)
CALCIUM SERPL-MCNC: 9 MG/DL (ref 8.4–10.2)
CHLORIDE SERPL-SCNC: 105 MMOL/L (ref 96–108)
CO2 SERPL-SCNC: 23 MMOL/L (ref 21–32)
CREAT SERPL-MCNC: 0.5 MG/DL (ref 0.6–1.3)
EOSINOPHIL # BLD AUTO: 0.11 THOUSAND/ÂΜL (ref 0–0.61)
EOSINOPHIL NFR BLD AUTO: 3 % (ref 0–6)
ERYTHROCYTE [DISTWIDTH] IN BLOOD BY AUTOMATED COUNT: 14 % (ref 11.6–15.1)
FOLATE SERPL-MCNC: 6.5 NG/ML
GFR SERPL CREATININE-BSD FRML MDRD: 107 ML/MIN/1.73SQ M
GLUCOSE P FAST SERPL-MCNC: 107 MG/DL (ref 65–99)
GLUCOSE SERPL-MCNC: 107 MG/DL (ref 65–140)
GLUCOSE SERPL-MCNC: 132 MG/DL (ref 65–140)
GLUCOSE SERPL-MCNC: 156 MG/DL (ref 65–140)
GLUCOSE SERPL-MCNC: 286 MG/DL (ref 65–140)
GLUCOSE SERPL-MCNC: 96 MG/DL (ref 65–140)
HCT VFR BLD AUTO: 38.8 % (ref 34.8–46.1)
HGB BLD-MCNC: 13.5 G/DL (ref 11.5–15.4)
IMM GRANULOCYTES # BLD AUTO: 0.02 THOUSAND/UL (ref 0–0.2)
IMM GRANULOCYTES NFR BLD AUTO: 1 % (ref 0–2)
LYMPHOCYTES # BLD AUTO: 1.3 THOUSANDS/ÂΜL (ref 0.6–4.47)
LYMPHOCYTES NFR BLD AUTO: 30 % (ref 14–44)
MAGNESIUM SERPL-MCNC: 2.2 MG/DL (ref 1.9–2.7)
MCH RBC QN AUTO: 33.2 PG (ref 26.8–34.3)
MCHC RBC AUTO-ENTMCNC: 34.8 G/DL (ref 31.4–37.4)
MCV RBC AUTO: 95 FL (ref 82–98)
MONOCYTES # BLD AUTO: 0.33 THOUSAND/ÂΜL (ref 0.17–1.22)
MONOCYTES NFR BLD AUTO: 8 % (ref 4–12)
NEUTROPHILS # BLD AUTO: 2.52 THOUSANDS/ÂΜL (ref 1.85–7.62)
NEUTS SEG NFR BLD AUTO: 58 % (ref 43–75)
NRBC BLD AUTO-RTO: 0 /100 WBCS
P AXIS: 62 DEGREES
PHOSPHATE SERPL-MCNC: 3.4 MG/DL (ref 2.7–4.5)
PLATELET # BLD AUTO: 465 THOUSANDS/UL (ref 149–390)
PMV BLD AUTO: 9.4 FL (ref 8.9–12.7)
POTASSIUM SERPL-SCNC: 3.7 MMOL/L (ref 3.5–5.3)
PR INTERVAL: 128 MS
PROT SERPL-MCNC: 6.8 G/DL (ref 6.4–8.4)
QRS AXIS: -11 DEGREES
QRSD INTERVAL: 74 MS
QT INTERVAL: 374 MS
QTC INTERVAL: 469 MS
RBC # BLD AUTO: 4.07 MILLION/UL (ref 3.81–5.12)
SODIUM SERPL-SCNC: 138 MMOL/L (ref 135–147)
T WAVE AXIS: 39 DEGREES
VENTRICULAR RATE: 95 BPM
VIT B12 SERPL-MCNC: 430 PG/ML (ref 180–914)
WBC # BLD AUTO: 4.29 THOUSAND/UL (ref 4.31–10.16)

## 2024-06-21 PROCEDURE — 93010 ELECTROCARDIOGRAM REPORT: CPT | Performed by: INTERNAL MEDICINE

## 2024-06-21 PROCEDURE — 82948 REAGENT STRIP/BLOOD GLUCOSE: CPT

## 2024-06-21 PROCEDURE — 82306 VITAMIN D 25 HYDROXY: CPT

## 2024-06-21 PROCEDURE — 99253 IP/OBS CNSLTJ NEW/EST LOW 45: CPT | Performed by: NURSE PRACTITIONER

## 2024-06-21 PROCEDURE — 97163 PT EVAL HIGH COMPLEX 45 MIN: CPT

## 2024-06-21 PROCEDURE — 82746 ASSAY OF FOLIC ACID SERUM: CPT

## 2024-06-21 PROCEDURE — 99223 1ST HOSP IP/OBS HIGH 75: CPT | Performed by: STUDENT IN AN ORGANIZED HEALTH CARE EDUCATION/TRAINING PROGRAM

## 2024-06-21 PROCEDURE — 85025 COMPLETE CBC W/AUTO DIFF WBC: CPT

## 2024-06-21 PROCEDURE — 83735 ASSAY OF MAGNESIUM: CPT

## 2024-06-21 PROCEDURE — 84100 ASSAY OF PHOSPHORUS: CPT

## 2024-06-21 PROCEDURE — 80053 COMPREHEN METABOLIC PANEL: CPT

## 2024-06-21 PROCEDURE — 82607 VITAMIN B-12: CPT

## 2024-06-21 RX ORDER — ONDANSETRON 4 MG/1
TABLET, ORALLY DISINTEGRATING ORAL
COMMUNITY
Start: 2024-03-19 | End: 2024-06-26

## 2024-06-21 RX ORDER — ALBUTEROL SULFATE 90 UG/1
2 AEROSOL, METERED RESPIRATORY (INHALATION) EVERY 4 HOURS PRN
Status: DISCONTINUED | OUTPATIENT
Start: 2024-06-21 | End: 2024-06-26 | Stop reason: HOSPADM

## 2024-06-21 RX ORDER — PSEUDOEPHEDRINE HCL 30 MG
TABLET ORAL
COMMUNITY
End: 2024-06-26

## 2024-06-21 RX ORDER — QUETIAPINE FUMARATE 50 MG/1
TABLET, FILM COATED ORAL
COMMUNITY
Start: 2024-05-28 | End: 2024-06-26

## 2024-06-21 RX ORDER — ERGOCALCIFEROL 1.25 MG/1
50000 CAPSULE ORAL WEEKLY
Status: DISCONTINUED | OUTPATIENT
Start: 2024-06-22 | End: 2024-06-26 | Stop reason: HOSPADM

## 2024-06-21 RX ORDER — FLUTICASONE PROPIONATE 50 MCG
SPRAY, SUSPENSION (ML) NASAL
COMMUNITY
Start: 2024-05-28 | End: 2024-06-26

## 2024-06-21 RX ORDER — SITAGLIPTIN 50 MG/1
TABLET, FILM COATED ORAL
COMMUNITY
Start: 2024-06-20 | End: 2024-06-26

## 2024-06-21 RX ORDER — RIZATRIPTAN BENZOATE 10 MG/1
10 TABLET ORAL
COMMUNITY
Start: 2024-05-08 | End: 2024-06-26

## 2024-06-21 RX ORDER — BIOTIN 10 MG
TABLET ORAL
COMMUNITY
End: 2024-06-26

## 2024-06-21 RX ORDER — POLYETHYLENE GLYCOL-3350 AND ELECTROLYTES 236; 6.74; 5.86; 2.97; 22.74 G/274.31G; G/274.31G; G/274.31G; G/274.31G; G/274.31G
POWDER, FOR SOLUTION ORAL
COMMUNITY
Start: 2024-06-18 | End: 2024-06-26

## 2024-06-21 RX ORDER — BLOOD SUGAR DIAGNOSTIC
STRIP MISCELLANEOUS
COMMUNITY
Start: 2024-05-13

## 2024-06-21 RX ORDER — DULAGLUTIDE 1.5 MG/.5ML
INJECTION, SOLUTION SUBCUTANEOUS
COMMUNITY
Start: 2024-04-22

## 2024-06-21 RX ORDER — CETIRIZINE HYDROCHLORIDE 10 MG/1
TABLET ORAL
COMMUNITY
End: 2024-06-26

## 2024-06-21 RX ORDER — TRAMADOL HYDROCHLORIDE 50 MG/1
TABLET ORAL
COMMUNITY
Start: 2024-06-13 | End: 2024-06-26

## 2024-06-21 RX ORDER — SUMATRIPTAN 100 MG/1
100 TABLET, FILM COATED ORAL
COMMUNITY
Start: 2024-03-13 | End: 2024-06-26

## 2024-06-21 RX ORDER — FOLIC ACID 1 MG/1
1 TABLET ORAL DAILY
Status: DISCONTINUED | OUTPATIENT
Start: 2024-06-22 | End: 2024-06-26 | Stop reason: HOSPADM

## 2024-06-21 RX ADMIN — FLUOXETINE 20 MG: 20 CAPSULE ORAL at 08:12

## 2024-06-21 RX ADMIN — MELATONIN TAB 3 MG 3 MG: 3 TAB at 21:08

## 2024-06-21 RX ADMIN — INSULIN LISPRO 1 UNITS: 100 INJECTION, SOLUTION INTRAVENOUS; SUBCUTANEOUS at 08:16

## 2024-06-21 RX ADMIN — SITAGLIPTIN 100 MG: 100 TABLET, FILM COATED ORAL at 12:38

## 2024-06-21 RX ADMIN — GABAPENTIN 400 MG: 400 CAPSULE ORAL at 21:08

## 2024-06-21 RX ADMIN — TOPIRAMATE 25 MG: 25 TABLET, FILM COATED ORAL at 08:12

## 2024-06-21 RX ADMIN — SENNOSIDES AND DOCUSATE SODIUM 1 TABLET: 50; 8.6 TABLET ORAL at 12:03

## 2024-06-21 RX ADMIN — GABAPENTIN 400 MG: 400 CAPSULE ORAL at 15:04

## 2024-06-21 RX ADMIN — INSULIN LISPRO 2 UNITS: 100 INJECTION, SOLUTION INTRAVENOUS; SUBCUTANEOUS at 16:50

## 2024-06-21 RX ADMIN — HYDROXYZINE HYDROCHLORIDE 25 MG: 25 TABLET, FILM COATED ORAL at 15:03

## 2024-06-21 RX ADMIN — QUETIAPINE 400 MG: 400 TABLET ORAL at 21:08

## 2024-06-21 RX ADMIN — GABAPENTIN 400 MG: 400 CAPSULE ORAL at 08:12

## 2024-06-21 RX ADMIN — TOPIRAMATE 25 MG: 25 TABLET, FILM COATED ORAL at 17:17

## 2024-06-21 NOTE — ASSESSMENT & PLAN NOTE
Patient has a laceration on her right eyelid where she had 5 sutures that were put in in the ER on 6/19/2024  Those sutures need to be removed somewhere between June 26 and June 28, 2024

## 2024-06-21 NOTE — ASSESSMENT & PLAN NOTE
Patient's vitamin D level is 13.1  She will start ergocalciferol 50,000 units p.o. weekly x 8 weeks  She needs a repeat vitamin D level drawn with her PCP in 10 to 12 weeks

## 2024-06-21 NOTE — CASE MANAGEMENT
Case Management Assessment      Psychosocial Assessment 1:1:   CM met with the patient privately to introduce self, role of CM, and to gather background information. The patient was calm and pleasant throughout the assessment. The patient reports she lives alone with her dog in the same apartment for 8 years. She denied having any unmet needs. The patient reports it was not her intention to OD on medications. She adamantly denied SI/depression/anxiety to CM. She reports she is active with [facility] for medication management and therapy. The patient reports her main concern right now is her dog being home alone. The patient reports her sister has house keys and agreed to care for the dog, but the patient has not been able to get a hold of her sister to confirm. The patient became tearful when asking about her dog. The patient reports she would like to sign herself out. CM spoke to the patient about 201 and 72 hour notice vs 302 and that she would need to speak to the provider. The patient reported understanding. The patient declined a referral for [facility] services at this time.      Admission / Details:   The patient is currently admitted on a 201 status from [facility] due to OD on medications.     Residence:[address] [place] [address]   Lives with: Self  County:    [facility]  Commitment Status: 201  Insurance: [facility] [facility] [facility]  Rx coverage: [employer]  Pharmacy:  [facility]  Marital Status: Single  Children: None  Support System: Aunt, Sister, Niece   Level of Ed: 8th grade  Work History: Unemployed  Income/Source: SSI  Baptism: Denied  Transportation: Drives self, has own car  Legal Issues: Denied  Tobacco: 1 pack per day   Hx: Denied  Access to firearms: Denied  MH Treatment Hx: Patient reported 1x in the past  Past Suicide  "Attempt: 3x 20 years ago  Current SI/HI: Denied  Trauma Hx: Denied  Family Hx: Dementia- father, 2 aunts, uncle  D&A Hx: Denied  UDS Results: Negative  Medical: Please see chart  DME: None  PCP: [facility]  Psych: [facility]  Therapist:[facility]  ICM/ACT:  Declined referral  Stressors: \"nothing really\"  Strengths:  family support  Coping Skills: smoking a cigarette, relaxing   ROIs Signed: Susana Klein (sister) [phone]; [facility]   Treatment Plan Signed: Declined  IMM Signed: N/A  Disposition Plan: Return home with OP Mental Health Services   "

## 2024-06-21 NOTE — ASSESSMENT & PLAN NOTE
Patient had an intentional overdose and family member witnessed a seizure  Patient had a neurology consult they feel that these seizure was provoked by medications from her overdose.  She did the same thing few months ago in April 2024  Seizure precautions

## 2024-06-21 NOTE — UTILIZATION REVIEW
Notification of Unplanned, Urgent, or   Emergency Inpatient Admission   AUTHORIZATION REQUEST   [place] [address]  Tax ID: [id number]  NPI: [id number]  Place of Service: Acute [facility] Hospital  Admission Level of Care: Inpatient  Place of Service Code: 21     Attending Physician Information  Attending Name and NPI#: Jesse Moreno Do [[id number]]  Phone: [phone]     Admission Information  Inpatient Admission Date/Time: 6/19/24  2:21 AM  Discharge Date/Time: 6/20/2024  7:22 PM  Admitting Diagnosis Code/Description:  Overdose [T50.901A]  Prolonged Q-T interval on ECG [R94.31]  Polysubstance overdose [T50.901A]  Acute encephalopathy [G93.40]  Closed head injury, initial encounter [S09.90XA]  Laceration of forehead, initial encounter [S01.81XA]     Utilization Review Contact  Louise Hankins, Utilization   Phone: [phone]  Fax: [phone]  Email: [email or web address]  Contact for approvals/pending authorizations, clinical reviews, and discharge.     Physician Advisory Services Contact  Medical Necessity Denial & Jsyy-np-Fofl Discussion  Phone: [phone]  Fax: [phone]  Email: [email or web address]     DISCHARGE SUPPORT TEAM:  For Patients Discharge Needs & Updates  Phone: [phone] opt. 2 Fax: [phone]  Email: [email or web address]

## 2024-06-21 NOTE — ASSESSMENT & PLAN NOTE
Patient has COPD without exacerbation  She will have an albuterol metered-dose inhaler to her as needed every 4 hours shortness of breath cough or wheeze

## 2024-06-21 NOTE — PROGRESS NOTES
06/21/24 1100   Housing Stability   In the last 12 months, was there a time when you were not able to pay the mortgage or rent on time? N   In the past 12 months, how many times have you moved where you were living? 0   At any time in the past 12 months, were you homeless or living in a shelter (including now)? N   Transportation Needs   In the past 12 months, has lack of transportation kept you from medical appointments or from getting medications? no   In the past 12 months, has lack of transportation kept you from meetings, work, or from getting things needed for daily living? No   Food Insecurity   Within the past 12 months, you worried that your food would run out before you got the money to buy more. Never true   Within the past 12 months, the food you bought just didn't last and you didn't have money to get more. Never true   Intimate Partner Violence   Within the last year, have you been afraid of your partner or ex-partner? No   Within the last year, have you been humiliated or emotionally abused in other ways by your partner or ex-partner? No   Within the last year, have you been kicked, hit, slapped, or otherwise physically hurt by your partner or ex-partner? No   Within the last year, have you been raped or forced to have any kind of sexual activity by your partner or ex-partner? No   Utilities   In the past 12 months has the electric, gas, oil, or water company threatened to shut off services in your home? No
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
## 2024-06-21 NOTE — MALNUTRITION/BMI
This medical record reflects one or more clinical indicators suggestive of malnutrition and/or morbid obesity.    Malnutrition Findings:   Adult Malnutrition type: Chronic illness  Adult Degree of Malnutrition: Other severe protein calorie malnutrition  Malnutrition Characteristics: Inadequate energy, Weight loss, Muscle loss, Fat loss                  360 Statement: Severe protein/calorie malnutrition r/t inadequate inake, hx of gastric bypass surgery as evidenced by 27% unplanned wt loss since 7/5/23, consuming < 75% energy intake compared to estimated energy needs > 1 month. Treated with Glucerna tid    BMI Findings:           Body mass index is 19.28 kg/m².     See Nutrition note dated 6/21/24 for additional details.  Completed nutrition assessment is viewable in the nutrition documentation.

## 2024-06-21 NOTE — PROGRESS NOTES
06/21/24 1100   Team Meeting   Meeting Type Tx Team Meeting   Initial Conference Date 06/21/24   Team Members Present   Team Members Present Physician;Nurse;   Physician Team Member Dr. Milner   Nursing Team Member Austyn   Care Management Team Member Stephenie   Patient/Family Present   Patient Present Yes   Patient's Family Present No     Treatment plan reviewed with the patient; patient declined to sign at this time reporting she would like to go home. A copy of the patient's treatment plan was put into her discharge folder and the original was put in for scanning.

## 2024-06-21 NOTE — TREATMENT TEAM
06/21/24 0807   Team Meeting   Meeting Type Daily Rounds   Initial Conference Date 06/21/24   Team Members Present   Team Members Present Physician;Nurse;;   Physician Team Member Dr. Milner, Dr. Phillips, Greta PRASAD   Nursing Team Member Kathleen   Care Management Team Member Stephenie   Social Work Team Member Mehreen   Patient/Family Present   Patient Present No   Patient's Family Present No     Patient is new 201 admission from [facility] due to OD on medications. The patient has a history of seizures and a history of abusing psych meds. Patient admitted with stitched laceration to head from fall at home. Cooperative with assessment and medication compliant. Denying all psych symptoms. Hep C +. No discharge date pending at this time.

## 2024-06-21 NOTE — PROGRESS NOTES
06/21/24 1103   Referral Data   Referral Source Other (Comment)  ([facility])   Referral Reason Psych   County Information   County of Residence [facility]   Readmission Root Cause   30 Day Readmission No   Patient Information   Mental Status Alert   Primary Caregiver Self   Support System Immediate family   Buddhism/Cultural Requests Denied   Legal Information   Tx Plan Signed No (Comment)   Current Status: 201   Legal Issues Denied   Activities of Daily Living Prior to Admission   Functional Status Independent   Assistive Device No device needed   Living Arrangement Apartment;Lives alone   Ambulation Independent   Access to Firearms   Access to Firearms No   Income Information   Income Source SSI/SSD   Means of Transportation   Means of Transport to Appts: Drives Self

## 2024-06-21 NOTE — ASSESSMENT & PLAN NOTE
Vital signs stable afebrile patient seen and examined by myself Labs from today were reviewed by myself sodium 138 potassium 3.7 creatinine 0.50  Patient medically stable for admit  Please call Slim with any medical questions or concerns

## 2024-06-21 NOTE — PROGRESS NOTES
Pt attempted 1 group last 10 minutes late afternoon and visible.     06/21/24 1330   Activity/Group Checklist   Group Other (Comment)  (Self expression and self esteem)   Attendance Attended;Other (Comment)  (Last 10 minutes group in progress)   Interactions Did not interact

## 2024-06-21 NOTE — PLAN OF CARE
Pt did not attend any groups when prompted or offered.   Problem: Risk for Self Injury/Neglect  Goal: Refrain from harming self  Description: Interventions:  - Monitor patient closely, per order  - Develop a trusting relationship  - Supervise medication ingestion, monitor effects and side effects   Outcome: Not Progressing

## 2024-06-21 NOTE — CMS CERTIFICATION NOTE
Certification: Based upon physical, mental and social evaluations, I certify that inpatient psychiatric services are medically necessary for this patient for a duration of 21 midnights for the treatment of Bipolar affective disorder, depressed, severe (HCC)  Available alternative community resources do not meet the patient's mental health care needs.  I further attest that an established written individualized plan of care has been implemented and is outlined in the patient's medical records.

## 2024-06-21 NOTE — UTILIZATION REVIEW
NOTIFICATION OF ADMISSION DISCHARGE   This is a Notification of Discharge from [facility]. Please be advised that this patient has been discharge from our facility. Below you will find the admission and discharge date and time including the patient’s disposition.   UTILIZATION REVIEW CONTACT:  Louise Hankins  Utilization   Network Utilization Review Department  Phone: [phone] x carefully listen to the prompts. All voicemails are confidential.  Email: [email or web address]     ADMISSION INFORMATION  PRESENTATION DATE: 6/19/2024 12:14 AM  OBERVATION ADMISSION DATE:   INPATIENT ADMISSION DATE: 6/19/24  2:21 AM   DISCHARGE DATE: 6/20/2024  7:22 PM   DISPOSITION:[facility] Utilization Review Department  ATTENTION: Please call with any questions or concerns to [phone] and carefully listen to the prompts so that you are directed to the right person. All voicemails are confidential.   For Discharge needs, contact Care Management DC Support Team at [phone] opt. 2  Send all requests for admission clinical reviews, approved or denied determinations and any other requests to dedicated fax number below belonging to the campus where the patient is receiving treatment. List of dedicated fax numbers for the Facilities:  FACILITY NAME UR FAX NUMBER   ADMISSION DENIALS (Administrative/Medical Necessity) [phone]   DISCHARGE SUPPORT TEAM ([facility]) [phone]   PARENT CHILD HEALTH (Maternity/NICU/Pediatrics) [phone]   [facility] [phone]   [facility] [phone]   [facility] [phone]   [facility] [phone]   [facility] [phone]   [facility] [phone]   [facility] [phone]   [facility]  [phone]   [facility] [phone]   [facility] [phone]   [facility] [phone]   [facility] [phone]

## 2024-06-21 NOTE — CONSULTS
[facility]  Consult  Name: Edith Klein 59 y.o. female I MRN: [id number]  Unit/Bed#: [facility] 656-02 I Date of Admission: 6/20/2024   Date of Service: 6/21/2024 I Hospital Day: 1    Inpatient consult for Medical Clearance for BH patient  Consult performed by: OSMAR Polanco  Consult ordered by: OSMAR Myers          Assessment & Plan   Medical clearance for psychiatric admission  Assessment & Plan  Vital signs stable afebrile patient seen and examined by myself Labs from today were reviewed by myself sodium 138 potassium 3.7 creatinine 0.50  Patient medically stable for admit  Please call Slim with any medical questions or concerns    Vitamin D deficiency  Assessment & Plan  Patient's vitamin D level is 13.1  She will start ergocalciferol 50,000 units p.o. weekly x 8 weeks  She needs a repeat vitamin D level drawn with her PCP in 10 to 12 weeks    Personal history of fall  Assessment & Plan  Patient has had 2 falls in the last 6 months  Fall precautions  PT consult    Migraine with aura and without status migrainosus, not intractable  Assessment & Plan  Patient will continue on her Topamax 25 mg p.o. twice daily    Forehead laceration  Assessment & Plan  Patient has a laceration on her right eyelid where she had 5 sutures that were put in in the ER on 6/19/2024  Those sutures need to be removed somewhere between June 26 and June 28, 2024    Overdose of undetermined intent  Assessment & Plan  Per patient this was not an intentional overdose  However it looks like she did have an intentional overdose and was found in the bathroom after taking a unknown amount of amitriptyline and sertraline  Patient in April 2024 also had an intentional overdose and as well as a seizure after taking tramadol, Seroquel and Elavil    Seizure (HCC)  Assessment & Plan  Patient had an intentional overdose and family member witnessed a seizure  Patient had a neurology consult they feel that  these seizure was provoked by medications from her overdose.  She did the same thing few months ago in April 2024  Seizure precautions    S/P gastric bypass  Assessment & Plan  Patient has a history of gastric bypass 2017    Type 2 diabetes mellitus without complication ([facility])  Assessment & Plan  Lab Results   Component Value Date    HGBA1C 7.1 (H) 06/19/2024       Recent Labs     06/20/24  1102 06/20/24  1539 06/20/24  1948 06/21/24  0814   POCGLU 133 107 192* 156*       Blood Sugar Average: Last 72 hrs:  (P) 174  Patient's A1c was 7.12 days ago  Will be on POC 4 times daily with sliding scale insulin  Patient will be on a carb controlled 5 g carbohydrate diet  She will continue on her outpatient Jardiance and Januvia her home doses, she is not interested in starting metformin  Her BMI is 19.28  I put in a nutrition consult  She would like some vanilla protein shakes  She would like some help and how to pay for them on the outside as she has poor nutrition    COPD (chronic obstructive pulmonary disease) ([facility])  Assessment & Plan  Patient has COPD without exacerbation  She will have an albuterol metered-dose inhaler to her as needed every 4 hours shortness of breath cough or wheeze             Counseling / Coordination of Care Time: 45 minutes.  Greater than 50% of total time spent on patient counseling and coordination of care.    Collaboration of Care: Were Recommendations Directly Discussed with Primary Treatment Team? - No     History of Present Illness:    Edith Klein is a 59 y.o. female who is originally admitted to the psychiatry service due to intentional overdose with amitriptyline and sertraline. We are consulted for medical clearance for admission to Behavioral Health Unit and treatment of underlying psychiatric illness.      Patient presents to [place] ED brought in by ambulance from home where she was found down in the bathroom with what the family said was a seizure and she had hit her head.  She had  an intentional overdose with amitriptyline and sertraline.  Patient has a longstanding history of bipolar disorder greater than 1 year.  Patient had an intentional overdose in April 2024 with tramadol and Seroquel as well as Elavil.  At that time facial patient's family witnessed a seizure.  Patient has past medical history of chronic migraines, COPD without exacerbation, non-insulin-dependent diabetes mellitus with an A1c of 7.1, gastric bypass.  Patient has had prior suicide attempts 3 in the last 20 years.  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  She has poor dentition.  She sustained a right eyelid laceration during this last episode and required 5 sutures.  She was seen at the [facility] hospital by neurology and they felt that the seizures were provoked by medications.    Review of Systems:    Review of Systems   Constitutional:  Negative for chills and fever.   HENT:  Negative for ear pain and sore throat.    Eyes:  Negative for pain and visual disturbance.   Respiratory:  Negative for cough and shortness of breath.    Cardiovascular:  Negative for chest pain and palpitations.   Gastrointestinal:  Negative for abdominal pain and vomiting.   Genitourinary:  Negative for dysuria and hematuria.   Musculoskeletal:  Negative for arthralgias and back pain.   Skin:  Negative for color change and rash.   Neurological:  Negative for seizures and syncope.   Psychiatric/Behavioral:  Positive for dysphoric mood.    All other systems reviewed and are negative.      Past Medical and Surgical History:     Past Medical History:   Diagnosis Date    Autoimmune hepatitis (HCC)     Bipolar 1 disorder (HCC)     Chronic headaches 2021    Diabetes mellitus (HCC)     Kidney stone     Renal disorder     kidney stones    Seizure disorder (HCC)        Past Surgical History:   Procedure Laterality Date    BARIATRIC SURGERY  05/2017    BUNIONECTOMY      CYSTOSCOPY  07/12/2018    Stent Removal    FL RETROGRADE PYELOGRAM  6/16/2022     "GASTRIC BYPASS  05/22/2017    HIATAL HERNIA REPAIR  05/22/2017    HYSTERECTOMY      JOINT REPLACEMENT      KIDNEY STONE SURGERY      MA CYSTO/URETERO W/LITHOTRIPSY &INDWELL STENT INSRT Bilateral 7/6/2018    Procedure: CYSTOSCOPY GALDINO. URETEROSCOPY;GALDINO.  RETROGRADE PYELOGRAM AND INSERTION GALDINO.STENT URETERAL;  Surgeon: Jacob Gerber MD;  Location: [facility];  Service: Urology    MA CYSTO/URETERO W/LITHOTRIPSY &INDWELL STENT INSRT Left 6/16/2022    Procedure: CYSTOSCOPY URETEROSCOPY WITH LITHOTRIPSY HOLMIUM LASER, RETROGRADE PYELOGRAM AND INSERTION STENT URETERAL;  Surgeon: Asad Geiger MD;  Location: [facility];  Service: Urology    TOTAL SHOULDER REPLACEMENT  2002    VAGINAL HYSTERECTOMY      PARTIAL       Meds/Allergies:    all medications and allergies reviewed    Allergies: No Known Allergies    Social History:     Marital Status: Single    Substance Use History:   Social History     Substance and Sexual Activity   Alcohol Use No    Comment: n/a     Social History     Tobacco Use   Smoking Status Every Day    Current packs/day: 1.00    Average packs/day: 0.6 packs/day for 50.5 years (28.0 ttl pk-yrs)    Types: Cigarettes    Start date: 2004    Passive exposure: Current   Smokeless Tobacco Never   Tobacco Comments    N/a     Social History     Substance and Sexual Activity   Drug Use No    Comment: n/a       Family History:    non-contributory    Physical Exam:     Vitals:   Blood Pressure: 110/68 (06/21/24 0754)  Pulse: 88 (06/21/24 0754)  Temperature: (!) 96.7 °F (35.9 °C) (06/21/24 0754)  Temp Source: Temporal (06/21/24 0754)  Respirations: 18 (06/21/24 0754)  Height: 5' 2\" (157.5 cm) (06/20/24 1958)  Weight - Scale: 47.8 kg (105 lb 6.4 oz) (06/20/24 1958)  SpO2: 97 % (06/21/24 0754)    Physical Exam  Constitutional:       General: She is not in acute distress.     Appearance: She is not ill-appearing.      Comments: She is then, she is frail, she does not have her dentures which could be contributing to a " lot of her apperance   HENT:      Head: Normocephalic and atraumatic.      Nose: Nose normal.      Mouth/Throat:      Mouth: Mucous membranes are moist.   Eyes:      Extraocular Movements: Extraocular movements intact.   Cardiovascular:      Rate and Rhythm: Normal rate and regular rhythm.      Heart sounds: Normal heart sounds.   Pulmonary:      Breath sounds: Normal breath sounds. No wheezing.   Abdominal:      General: Abdomen is flat. Bowel sounds are normal.      Palpations: Abdomen is soft.   Skin:     General: Skin is warm and dry.      Capillary Refill: Capillary refill takes 2 to 3 seconds.   Neurological:      Mental Status: She is alert and oriented to person, place, and time.   Psychiatric:      Comments: She is calm, behavior is good, she is just very worried about her dog is at home in the care of another person and she really would like to go home today.         Additional Data:     Lab Results: I have personally reviewed pertinent reports.      Results from last 7 days   Lab Units 06/21/24  0520   WBC Thousand/uL 4.29*   HEMOGLOBIN g/dL 13.5   HEMATOCRIT % 38.8   PLATELETS Thousands/uL 465*   SEGS PCT % 58   LYMPHO PCT % 30   MONO PCT % 8   EOS PCT % 3     Results from last 7 days   Lab Units 06/21/24  0520   SODIUM mmol/L 138   POTASSIUM mmol/L 3.7   CHLORIDE mmol/L 105   CO2 mmol/L 23   BUN mg/dL 18   CREATININE mg/dL 0.50*   ANION GAP mmol/L 10   CALCIUM mg/dL 9.0   ALBUMIN g/dL 3.3*   TOTAL BILIRUBIN mg/dL 0.29   ALK PHOS U/L 205*   ALT U/L 10   AST U/L 18   GLUCOSE RANDOM mg/dL 107     Results from last 7 days   Lab Units 06/19/24  0140   INR  1.02         Lab Results   Component Value Date/Time    HGBA1C 7.1 (H) 06/19/2024 05:07 AM    HGBA1C 7.0 (H) 03/14/2024 08:36 AM    HGBA1C 7.2 (H) 11/21/2023 07:06 AM    HGBA1C 7.7 (H) 10/17/2023 09:42 AM    HGBA1C 7.5 (H) 10/19/2022 09:38 AM     Results from last 7 days   Lab Units 06/21/24  1126 06/21/24  0814 06/20/24  1948 06/20/24  1539 06/20/24  1109  06/20/24  0633 06/19/24  2332 06/19/24  1753 06/19/24  1245 06/19/24  1051 06/19/24  0616 06/19/24  0021   POC GLUCOSE mg/dl 132 156* 192* 107 133 130 119 142* 86 77 88 218*       EKG, Pathology, and Other Studies Reviewed on Admission:   EKG 6/20/2024 twelve-lead EKG reviewed by myself as well as interpreted by myself ventricular rate 95 QT interval 374 QTc 469 normal sinus rhythm normal EKG when compared with an EKG from 6/12/2024 there are no new significant EKG findings.  There are no acute EKG findings in this EKG.    ** Please Note: This note has been constructed using a voice recognition system. **    I have spent a total time of 45 minutes on 06/21/24 in caring for this patient including Diagnostic results, Prognosis, Risks and benefits of tx options, Instructions for management, Patient and family education, Importance of tx compliance, Risk factor reductions, Impressions, Counseling / Coordination of care, Documenting in the medical record, Reviewing / ordering tests, medicine, procedures  , Obtaining or reviewing history  , and Communicating with other healthcare professionals .

## 2024-06-21 NOTE — TREATMENT PLAN
TREATMENT PLAN REVIEW - Behavioral Health Edith Klein 59 y.o. 1965 female MRN: [id number]    [facility] 6B [facility] Room / Bed: [facility] Encounter: [id number]          Admit Date/Time:  6/20/2024  7:45 PM    Treatment Team:   MD Stephenie Ratliff, DENNISESW  Jeannette Comer, GRACIELA Stein    Diagnosis: Principal Problem:    Bipolar affective disorder, depressed, severe (HCC)  Active Problems:    COPD (chronic obstructive pulmonary disease) (HCC)    Type 2 diabetes mellitus without complication (HCC)    S/P gastric bypass    Seizure ([facility])    Overdose of undetermined intent    Forehead laceration    Migraine with aura and without status migrainosus, not intractable    Medical clearance for psychiatric admission    Personal history of fall      Patient Strengths/Assets: ability for insight, cooperative, communication skills, family ties, patient is on a voluntary commitment    Patient Barriers/Limitations: difficulty adapting, limited support system    Short Term Goals: decrease in depressive symptoms, decrease in suicidal thoughts, ability to stay safe on the unit, ability to stay free of restraints, improvement in ability to express basic needs, improvement in insight, improvement in reality testing, sleep improvement, mood stabilization, increase in socialization with peers on the unit, acceptance of need for psychiatric treatment    Long Term Goals: improvement in depression, improvement in anxiety, stabilization of mood, free of suicidal thoughts, free of homicidal thoughts, no self abusive behavior, improvement in reality testing, improvement in reasoning ability, improved insight, acceptance of need for psychiatric follow up after discharge, adequate sleep, adequate appetite, stable living arrangements upon discharge, establishment of family support upon discharge    Progress Towards Goals:  starting psychiatric medications as prescribed    Recommended Treatment: medication management, patient medication education, group therapy, milieu therapy, continued Behavioral Health psychiatric evaluation/assessment process    Treatment Frequency: daily medication monitoring, group and milieu therapy daily, monitoring through interdisciplinary rounds, monitoring through weekly patient care conferences    Expected Discharge Date:  14 days    Discharge Plan: referral for outpatient medication management with a psychiatrist, referral for outpatient psychotherapy, return to previous living arrangement    Treatment Plan Created/Updated By: Candace Milner MD

## 2024-06-21 NOTE — NURSING NOTE
"Patient is visible on the unit and social with peers. She brightens on approach. Patient endorses anxiety this shift and reports that PRNs are not helping her. She is often seen laughing and smiling during peer interactions. Patient required encouragement to take sliding scale insulin stating \"I don't want insulin. I only want Jardiance and Januvia.\" She reports having poor snack choices, eating a peanut butter and jelly, causing increased blood sugar levels for dinner. Patient encouraged to make smarter choices to avoid insulin coverage to which she agreed. Patient is medication compliant. Encouraged to inform staff of any needs or concerns. Safety checks ongoing.    "

## 2024-06-21 NOTE — ASSESSMENT & PLAN NOTE
Per patient this was not an intentional overdose  However it looks like she did have an intentional overdose and was found in the bathroom after taking a unknown amount of amitriptyline and sertraline  Patient in April 2024 also had an intentional overdose and as well as a seizure after taking tramadol, Seroquel and Elavil

## 2024-06-21 NOTE — PLAN OF CARE
Problem: Ineffective Coping  Goal: Cooperates with admission process  Description: Interventions:   - Complete admission process  Outcome: Progressing  Goal: Identifies healthy coping skills  Outcome: Progressing     Problem: Risk for Self Injury/Neglect  Goal: Verbalize thoughts and feelings  Description: Interventions:  - Assess and re-assess patient's lethality and potential for self-injury  - Engage patient in 1:1 interactions, daily, for a minimum of 15 minutes  - Encourage patient to express feelings, fears, frustrations, hopes  - Establish rapport/trust with patient   Outcome: Progressing     Problem: Potential for Falls  Goal: Patient will remain free of falls  Description: INTERVENTIONS:  - Educate patient/family on patient safety including physical limitations  - Instruct patient to call for assistance with activity   - Consult OT/PT to assist with strengthening/mobility   - Keep Call bell within reach  - Keep bed low and locked with side rails adjusted as appropriate  - Keep care items and personal belongings within reach  - Initiate and maintain comfort rounds  - Make Fall Risk Sign visible to staff  - Obtain necessary fall risk management equipment: non-skid socks, call bell, side rails  - Apply yellow socks and bracelet for high fall risk patients  - Consider moving patient to room near nurses station  Outcome: Progressing

## 2024-06-21 NOTE — PROGRESS NOTES
06/21/24 1100   Activity/Group Checklist   Group Personal control  (Open art)   Attendance Attended   Attendance Duration (min) 0-15   Interactions Interacted appropriately   Affect/Mood Appropriate   Goals Achieved Able to engage in interactions;Able to listen to others;Able to self-disclose;Able to recieve feedback;Able to give feedback to another

## 2024-06-21 NOTE — ASSESSMENT & PLAN NOTE
Lab Results   Component Value Date    HGBA1C 7.1 (H) 06/19/2024       Recent Labs     06/20/24  1102 06/20/24  1539 06/20/24  1948 06/21/24  0814   POCGLU 133 107 192* 156*       Blood Sugar Average: Last 72 hrs:  (P) 174  Patient's A1c was 7.12 days ago  Will be on POC 4 times daily with sliding scale insulin  Patient will be on a carb controlled 5 g carbohydrate diet  She will continue on her outpatient Jardiance and Januvia her home doses, she is not interested in starting metformin  Her BMI is 19.28  I put in a nutrition consult  She would like some vanilla protein shakes  She would like some help and how to pay for them on the outside as she has poor nutrition

## 2024-06-21 NOTE — NURSING NOTE
Pt is pleasant on approach. She reports anxiety and depression because her dogs are at home and no one is taking care of them. Pt says that she wants to go home and that she does not need inpatient treatment. Pt reassured and told to wait for the doctor. Pt is med/meal compliant. Denies any unmet needs.
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
## 2024-06-22 PROBLEM — R10.11 RIGHT UPPER QUADRANT ABDOMINAL PAIN: Status: ACTIVE | Noted: 2024-06-22

## 2024-06-22 PROBLEM — E43 SEVERE PROTEIN-CALORIE MALNUTRITION (HCC): Status: ACTIVE | Noted: 2024-06-22

## 2024-06-22 LAB
CHOLEST SERPL-MCNC: 177 MG/DL
GLUCOSE SERPL-MCNC: 123 MG/DL (ref 65–140)
GLUCOSE SERPL-MCNC: 123 MG/DL (ref 65–140)
GLUCOSE SERPL-MCNC: 139 MG/DL (ref 65–140)
GLUCOSE SERPL-MCNC: 204 MG/DL (ref 65–140)
HDLC SERPL-MCNC: 67 MG/DL
LDLC SERPL CALC-MCNC: 94 MG/DL (ref 0–100)
NONHDLC SERPL-MCNC: 110 MG/DL
TRIGL SERPL-MCNC: 79 MG/DL

## 2024-06-22 PROCEDURE — 80061 LIPID PANEL: CPT | Performed by: STUDENT IN AN ORGANIZED HEALTH CARE EDUCATION/TRAINING PROGRAM

## 2024-06-22 PROCEDURE — 99232 SBSQ HOSP IP/OBS MODERATE 35: CPT | Performed by: INTERNAL MEDICINE

## 2024-06-22 PROCEDURE — 86780 TREPONEMA PALLIDUM: CPT | Performed by: STUDENT IN AN ORGANIZED HEALTH CARE EDUCATION/TRAINING PROGRAM

## 2024-06-22 PROCEDURE — 82948 REAGENT STRIP/BLOOD GLUCOSE: CPT

## 2024-06-22 PROCEDURE — 99232 SBSQ HOSP IP/OBS MODERATE 35: CPT | Performed by: STUDENT IN AN ORGANIZED HEALTH CARE EDUCATION/TRAINING PROGRAM

## 2024-06-22 PROCEDURE — 86592 SYPHILIS TEST NON-TREP QUAL: CPT | Performed by: STUDENT IN AN ORGANIZED HEALTH CARE EDUCATION/TRAINING PROGRAM

## 2024-06-22 PROCEDURE — 87389 HIV-1 AG W/HIV-1&-2 AB AG IA: CPT | Performed by: STUDENT IN AN ORGANIZED HEALTH CARE EDUCATION/TRAINING PROGRAM

## 2024-06-22 RX ORDER — MIRTAZAPINE 7.5 MG/1
7.5 TABLET, FILM COATED ORAL
Status: DISCONTINUED | OUTPATIENT
Start: 2024-06-22 | End: 2024-06-26 | Stop reason: HOSPADM

## 2024-06-22 RX ADMIN — TOPIRAMATE 25 MG: 25 TABLET, FILM COATED ORAL at 08:19

## 2024-06-22 RX ADMIN — INSULIN LISPRO 1 UNITS: 100 INJECTION, SOLUTION INTRAVENOUS; SUBCUTANEOUS at 21:14

## 2024-06-22 RX ADMIN — GABAPENTIN 400 MG: 400 CAPSULE ORAL at 17:23

## 2024-06-22 RX ADMIN — GABAPENTIN 400 MG: 400 CAPSULE ORAL at 21:07

## 2024-06-22 RX ADMIN — FLUOXETINE 20 MG: 20 CAPSULE ORAL at 08:19

## 2024-06-22 RX ADMIN — EMPAGLIFLOZIN 25 MG: 25 TABLET, FILM COATED ORAL at 08:19

## 2024-06-22 RX ADMIN — MELATONIN TAB 3 MG 3 MG: 3 TAB at 21:07

## 2024-06-22 RX ADMIN — GABAPENTIN 400 MG: 400 CAPSULE ORAL at 08:19

## 2024-06-22 RX ADMIN — MIRTAZAPINE 7.5 MG: 7.5 TABLET, FILM COATED ORAL at 21:07

## 2024-06-22 RX ADMIN — SITAGLIPTIN 100 MG: 100 TABLET, FILM COATED ORAL at 08:19

## 2024-06-22 RX ADMIN — TOPIRAMATE 25 MG: 25 TABLET, FILM COATED ORAL at 17:23

## 2024-06-22 RX ADMIN — QUETIAPINE 400 MG: 400 TABLET ORAL at 21:07

## 2024-06-22 RX ADMIN — HYDROXYZINE HYDROCHLORIDE 50 MG: 50 TABLET, FILM COATED ORAL at 08:55

## 2024-06-22 NOTE — ASSESSMENT & PLAN NOTE
I was notified by RN stating that patient complains of abdominal pain and history of gastric bypass  Vital signs are stable, no leukocytosis  Recent CT on 6/12/22 showed concerning colitis, superimposed constipation. There is oral contrast seen within the excluded portion of the stomach status post gastric bypass. This may be due to retrograde reflux although cannot exclude communication between the excluded portion of the stomach and the gastric pouch. If clinically warranted, this can be further evaluated with an upper GI.  Patient had gastric bypass in 2017 and she got done in [facility]  Pain  started few months ago and she usually takes tramadol at home  Patient does not want to take anything at this time  Ordered upper GI

## 2024-06-22 NOTE — PLAN OF CARE
Problem: Ineffective Coping  Goal: Cooperates with admission process  Description: Interventions:   - Complete admission process  Outcome: Completed     Problem: Risk for Self Injury/Neglect  Goal: Verbalize thoughts and feelings  Description: Interventions:  - Assess and re-assess patient's lethality and potential for self-injury  - Engage patient in 1:1 interactions, daily, for a minimum of 15 minutes  - Encourage patient to express feelings, fears, frustrations, hopes  - Establish rapport/trust with patient   Outcome: Progressing     Problem: SAFETY ADULT  Goal: Patient will remain free of falls  Description: INTERVENTIONS:  - Educate patient/family on patient safety including physical limitations  - Instruct patient to call for assistance with activity   - Consult OT/PT to assist with strengthening/mobility   - Keep Call bell within reach  - Keep bed low and locked with side rails adjusted as appropriate  - Keep care items and personal belongings within reach  - Initiate and maintain comfort rounds  - Make Fall Risk Sign visible to staff  - Apply yellow socks and bracelet for high fall risk patients  - Consider moving patient to room near nurses station  Outcome: Progressing

## 2024-06-22 NOTE — PROGRESS NOTES
[facility]  Progress Note  Name: Edith Klein I  MRN: [id number]  Unit/Bed#: [facility] 645-01 I Date of Admission: 6/20/2024   Date of Service: 6/22/2024 I Hospital Day: 2    Assessment & Plan   Right upper quadrant abdominal pain  Assessment & Plan  I was notified by RN stating that patient complains of abdominal pain and history of gastric bypass  Vital signs are stable, no leukocytosis  Recent CT on 6/12/22 showed concerning colitis, superimposed constipation. There is oral contrast seen within the excluded portion of the stomach status post gastric bypass. This may be due to retrograde reflux although cannot exclude communication between the excluded portion of the stomach and the gastric pouch. If clinically warranted, this can be further evaluated with an upper GI.  Patient had gastric bypass in 2017 and she got done in [facility]  Pain  started few months ago and she usually takes tramadol at home  Patient does not want to take anything at this time  Since patient is afebrile, no leukocytosis, and benign abdominal exam - hold off antibiotic for possible colitis  Ordered upper GI          Patient was seen and examined at bedside. The patient was sitting comfortably in the dining room.  During my encounter, patient complains of abdominal pain.  Per RN, patient has been eating and drinking fine.  Patient seemed not to have pain when she was distracted with questions.    General: no acute distress  HEENT: NC/AT, PERRL, EOM - normal  Neck: Supple  PAbd: soft, there was no sign of tenderness when patient was distracted, non distended, bowel sounds +  MSK: move all 4 extremities spontaneously  Skin: warm  CNS: no acute focal neuro deficit

## 2024-06-22 NOTE — NURSING NOTE
"Patient cooperative with care. Patient c/o anxiety. Patient states \" my anxiety is a 10/10 and I wasn't able to sleep last night, I feel like a mess. \" Patient denies SI/HI/AVH. Patient states \" I never tried to overdose,I forgot I had taken a dose, than took it again, and now I'm here.\" Patient medication compliant. Patient visible on the milieu. Patient able and encouraged to notify staff of any needs.   "

## 2024-06-22 NOTE — NURSING NOTE
"Patient refused lunch and ate 25% of dinner. Meals are chosen by patient but she does not eat them and only wants to eat snacks, frequently asking for chips, ice cream, and pb&j sandwiches. Patient educated on proper food choices and eating meals instead of relying on unhealthy snacks d/t her diagnosis of diabetes. Patient then called sister and stated \"They're starving me. They're torturing me. Come visit me and bring me food and coffee.\" Patient educated on visitation policy for which she was dismissive, stated \"My sister used to work here and I know they have visitation,\" and called her sister back. Patient's sister, Susana, then called the unit and requested a visit. She was also educated on the visitation policy. Patient's sister stated \"I'm afraid no visit will halt her progress and make her worse. I'm afraid she will sign herself out and it sounds like she still needs help.\" Patient's family offered reassurance and support.   "

## 2024-06-22 NOTE — PROGRESS NOTES
"  Progress Note - Behavioral Health   Edith Klein 59 y.o. female MRN: [id number]  Unit/Bed#: [facility] 645-01 Encounter: [id number]    Patient was visited on unit for continuing care; chart reviewed and discussed with the nursing staff.  On approach, the patient was calm and superficially cooperative. Remains somatically preoccupied. Continues to ruminate on going home. She reported feeling \"depressed\" due to \"being in the hospital\". Denied any changes in appetite, and energy level. Reported interrupted sleep. Denied A/VH currently.  Denied SI/HI, intent or plan upon direct inquiry at this time.    Patient continues to be selectively interactive with staff and peers. No reports of aggression or self-injurious behavior on unit. No PRN medications used in the past 24 hours.    Patient accepted all offered medications and no adverse effects of medications noted or reported. The patient was started on Prozac 20 mg and Seroquel 400 mg nightly yesterday and to be started on Remeron 7.5 mg nightly for sleep, appetite and adjusnt treatment for depression; doses to be adjusted as indicated. Given the history of fall risk and parasomnia, Ambien was discontinued and should not be restarted in outpatient setting.  Will expand collateral information.      Current Mental Status Evaluation:  Appearance and attitude: appeared as stated age, dressed in hospital attire, underweight, with good hygiene  Eye contact: good  Motor Function: within normal limits, intact gait, No PMA/PMR  Gait/station: normal gait/station and normal balance  Speech: normal for rate, rhythm, volume, latency, amount  Language: No overt abnormality  Mood/affect: depressed / Affect was constricted but reactive, mood congruent  Thought Processes: perseverative  Thought content: denied suicidal ideations or homicidal ideations, no overt delusions elicited, negative thinking, ruminations  Associations: concrete associations, perseverative  Perceptual disturbances: " denies Auditory/Visual/Tactile Hallucinations  Orientation: oriented to time, person, place and to the situational context  Cognitive Function: intact  Memory: not cooperative with formal MMSE  Intellect: unable to assess  Fund of knowledge: aware of current events, aware of past history, and vocabulary average  Impulse control: good  Insight/judgment: limited/limited      Vital signs in last 24 hours:    Temp:  [97.1 °F (36.2 °C)-97.5 °F (36.4 °C)] 97.1 °F (36.2 °C)  HR:  [] 83  Resp:  [19-20] 19  BP: ()/(60-65) 102/64    Laboratory results: I have personally reviewed all pertinent laboratory/tests results    Results from the past 24 hours:   Recent Results (from the past 24 hour(s))   Fingerstick Glucose (POCT)    Collection Time: 06/21/24  4:00 PM   Result Value Ref Range    POC Glucose 286 (H) 65 - 140 mg/dl   Fingerstick Glucose (POCT)    Collection Time: 06/21/24  9:02 PM   Result Value Ref Range    POC Glucose 96 65 - 140 mg/dl   Lipid panel    Collection Time: 06/22/24  5:37 AM   Result Value Ref Range    Cholesterol 177 See Comment mg/dL    Triglycerides 79 See Comment mg/dL    HDL, Direct 67 >=50 mg/dL    LDL Calculated 94 0 - 100 mg/dL    Non-HDL-Chol (CHOL-HDL) 110 mg/dl   Fingerstick Glucose (POCT)    Collection Time: 06/22/24  7:41 AM   Result Value Ref Range    POC Glucose 139 65 - 140 mg/dl   Fingerstick Glucose (POCT)    Collection Time: 06/22/24 11:22 AM   Result Value Ref Range    POC Glucose 123 65 - 140 mg/dl       Progress Toward Goals: minimal improvement    Assessment:  Principal Problem:    Bipolar affective disorder, depressed, severe (HCC)  Active Problems:    COPD (chronic obstructive pulmonary disease) (HCC)    Type 2 diabetes mellitus without complication (HCC)    S/P gastric bypass    Seizure (HCC)    Overdose of undetermined intent    Forehead laceration    Migraine with aura and without status migrainosus, not intractable    Medical clearance for psychiatric admission     Personal history of fall    Vitamin D deficiency    Severe protein-calorie malnutrition (HCC)        Plan:   - Seizure precaution  - Fall precaution  - f/u SLIM recs regarding the medical problems   - Continue medication titration and treatment plan; adjust medication to optimize treatment response and as clinically indicated.       Scheduled medications:  Current Facility-Administered Medications   Medication Dose Route Frequency Provider Last Rate    acetaminophen  650 mg Oral Q4H PRN Greta Kerns, CRNP      acetaminophen  650 mg Oral Q4H PRN Greta Kerns, CRNP      acetaminophen  975 mg Oral Q6H PRN Greta Kerns, CRNP      albuterol  2 puff Inhalation Q4H PRN Alicia Javier-Romeo, CRNP      cyanocobalamin  1,000 mcg Oral Daily Alicia Ayad, CRNP      Empagliflozin  25 mg Oral Daily Alicia Lion, CRNP      ergocalciferol  50,000 Units Oral Weekly Alicia Lion, CRNP      FLUoxetine  20 mg Oral Daily Greta Kerns, CRNP      folic acid  1 mg Oral Daily Alicia Lion, CRNP      gabapentin  400 mg Oral TID Greta Kerns, CRNP      haloperidol lactate  5 mg Intramuscular Q4H PRN Max 4/day Greta Kerns, CRNP      hydrOXYzine HCL  25 mg Oral Q6H PRN Max 4/day Greta Kerns, CRNP      hydrOXYzine HCL  50 mg Oral Q6H PRN Max 4/day Greta Kerns, CRNP      insulin lispro  1-5 Units Subcutaneous 4x Daily (AC & HS) Jose L Verma, CRNP      LORazepam  1 mg Intramuscular Q6H PRN Max 3/day Greta Kerns, CRNP      LORazepam  1 mg Oral Q6H PRN Max 3/day Greta Kerns, CRNP      melatonin  3 mg Oral HS Greta Kerns, CRNP      mirtazapine  7.5 mg Oral HS Candace Milner MD      nicotine polacrilex  4 mg Oral Q2H PRN Greta Kerns, CRNP      polyethylene glycol  17 g Oral Daily PRN Greta Kerns, CRNP      QUEtiapine  400 mg Oral HS Greta Kerns, CRNP      risperiDONE  0.25 mg Oral Q4H PRN Max 6/day Greta Kerns, CRNP      risperiDONE  0.5 mg Oral Q4H PRN Max  3/day OSMAR Myers      risperiDONE  1 mg Oral Q2H PRN Max 3/day OSMAR Myers      senna-docusate sodium  1 tablet Oral Daily PRN OSMAR Myers      sitaGLIPtin  100 mg Oral Daily OSMAR Polanco      topiramate  25 mg Oral BID OSMAR Myers      traZODone  50 mg Oral HS PRN OSMAR Myers          PRN:    acetaminophen    acetaminophen    acetaminophen    albuterol    haloperidol lactate    hydrOXYzine HCL    hydrOXYzine HCL    LORazepam    LORazepam    nicotine polacrilex    polyethylene glycol    risperiDONE    risperiDONE    risperiDONE    senna-docusate sodium    traZODone    - Observation: routine            - VS: as per unit protocol  - Diet: Regular diet     - Psychoeducation (benefits and potential risks) discussed, importance of compliance with the psychiatric treatment reiterated, and the patient verbalized understanding of the matter  - Encourage group attendance and milieu therapy      - The pt was educated and agreed to verbalize any suicidal thoughts, frustrations or concerns to the nursing staff, immediately.   - Dispo: To be determined         Next of Kin  Extended Emergency Contact Information  Primary Emergency Contact: Susana Klein  Mobile Phone: [phone]  Relation: Sister      Counseling / Coordination of Care    Patient's progress reviewed with nursing staff.  Medications, treatment progress and treatment plan reviewed with patient.  Medication education provided to patient.  Reassurance and supportive therapy provided.  Reoriented to reality and reassured.  Encouraged participation in milieu and group therapy on the unit.     Candace Milner MD  Attending Psychiatrist   [facility]         This note was completed in part utilizing Dragon dictation Software. Grammatical, translation, syntax errors, random word insertions, spelling mistakes, and incomplete sentences may be an occasional consequence of this system  secondary to software limitations with voice recognition, ambient noise, and hardware issues. If you have any questions or concerns about the content, text, or information contained within the body of this dictation, please contact the provider for clarification.

## 2024-06-22 NOTE — TREATMENT TEAM
06/22/24 1804   Provider Notification   Reason for Communication Evaluate   Provider Name Dr. Solis   Provider Role Hospitalist   Method of Communication Face to face   Response At bedside   Notification Time 1720     Patient c/o upper right abdominal pain throughout the day. Provider notified while on the unit and assessed patient. See provider note.

## 2024-06-22 NOTE — PROGRESS NOTES
06/22/24 0900   Activity/Group Checklist   Group Other (Comment)  ([employer] Therapy/Social Group, Music)   Attendance Attended   Attendance Duration (min) Greater than 60  (75 min group)   Interactions Interacted appropriately   Affect/Mood Appropriate   Goals Achieved Identified feelings;Discussed coping strategies;Increased hopefulness;Identified resources and support systems;Able to listen to others;Able to engage in interactions;Able to reflect/comment on own behavior;Able to manage/cope with feelings;Verbalized increased hopefulness;Able to recieve feedback;Able to experience relief/decrease in symptoms

## 2024-06-22 NOTE — NURSING NOTE
"Edith requested potato chips often through out the shift. Patient reports that she does not like the food and will only eat snacks. \"I am not eating your shity food.\"  Patient is an diabetic and education provided on the importance of a good diet. Patient also with poor hygiene and needs much encouragement to do her ADLs. Will continue to monitor and support.  "

## 2024-06-23 LAB
BACTERIA BLD CULT: NORMAL
BACTERIA BLD CULT: NORMAL
GLUCOSE SERPL-MCNC: 139 MG/DL (ref 65–140)
GLUCOSE SERPL-MCNC: 153 MG/DL (ref 65–140)
GLUCOSE SERPL-MCNC: 157 MG/DL (ref 65–140)
GLUCOSE SERPL-MCNC: 208 MG/DL (ref 65–140)

## 2024-06-23 PROCEDURE — 99232 SBSQ HOSP IP/OBS MODERATE 35: CPT | Performed by: STUDENT IN AN ORGANIZED HEALTH CARE EDUCATION/TRAINING PROGRAM

## 2024-06-23 PROCEDURE — 82948 REAGENT STRIP/BLOOD GLUCOSE: CPT

## 2024-06-23 RX ORDER — QUETIAPINE FUMARATE 300 MG/1
600 TABLET, FILM COATED ORAL
Status: DISCONTINUED | OUTPATIENT
Start: 2024-06-23 | End: 2024-06-24

## 2024-06-23 RX ADMIN — INSULIN LISPRO 1 UNITS: 100 INJECTION, SOLUTION INTRAVENOUS; SUBCUTANEOUS at 21:22

## 2024-06-23 RX ADMIN — MELATONIN TAB 3 MG 3 MG: 3 TAB at 21:22

## 2024-06-23 RX ADMIN — GABAPENTIN 400 MG: 400 CAPSULE ORAL at 17:10

## 2024-06-23 RX ADMIN — INSULIN LISPRO 1 UNITS: 100 INJECTION, SOLUTION INTRAVENOUS; SUBCUTANEOUS at 11:40

## 2024-06-23 RX ADMIN — SITAGLIPTIN 100 MG: 100 TABLET, FILM COATED ORAL at 08:23

## 2024-06-23 RX ADMIN — FOLIC ACID 1 MG: 1 TABLET ORAL at 08:24

## 2024-06-23 RX ADMIN — TOPIRAMATE 25 MG: 25 TABLET, FILM COATED ORAL at 17:10

## 2024-06-23 RX ADMIN — HYDROXYZINE HYDROCHLORIDE 25 MG: 25 TABLET, FILM COATED ORAL at 13:02

## 2024-06-23 RX ADMIN — EMPAGLIFLOZIN 25 MG: 25 TABLET, FILM COATED ORAL at 08:55

## 2024-06-23 RX ADMIN — MIRTAZAPINE 7.5 MG: 7.5 TABLET, FILM COATED ORAL at 21:22

## 2024-06-23 RX ADMIN — GABAPENTIN 400 MG: 400 CAPSULE ORAL at 21:22

## 2024-06-23 RX ADMIN — CYANOCOBALAMIN TAB 1000 MCG 1000 MCG: 1000 TAB at 08:24

## 2024-06-23 RX ADMIN — TOPIRAMATE 25 MG: 25 TABLET, FILM COATED ORAL at 08:23

## 2024-06-23 RX ADMIN — FLUOXETINE 20 MG: 20 CAPSULE ORAL at 08:24

## 2024-06-23 RX ADMIN — QUETIAPINE FUMARATE 600 MG: 300 TABLET ORAL at 21:22

## 2024-06-23 RX ADMIN — INSULIN LISPRO 1 UNITS: 100 INJECTION, SOLUTION INTRAVENOUS; SUBCUTANEOUS at 17:10

## 2024-06-23 RX ADMIN — GABAPENTIN 400 MG: 400 CAPSULE ORAL at 08:23

## 2024-06-23 NOTE — NURSING NOTE
"Patient continues to be paranoid about the food she is provided. This writer sat with Edith this morning and allowed her to modify her meals for lunch and dinner today that she chose yesterday. When the food arrived, patient stated \"This food has a funny smell. All of the food here smells like this. I can only eat potato chips.\" Patient educated on making nutritional food choices and not only eating potato chips throughout the day. She then proceeded to go to every staff member to see if they would give her more chips. Patient also required encouragement to take scheduled insulin for her elevated blood sugar.   "

## 2024-06-23 NOTE — PROGRESS NOTES
Progress Note - Behavioral Health   Edith Klein 59 y.o. female MRN: [id number]  Unit/Bed#: [facility] 645-01 Encounter: [id number]    Patient was visited on unit for continuing care; chart reviewed and discussed with the nursing staff.  On approach, the patient was calm and superficially cooperative. Denied any changes in mood, appetite, and energy level. Remains somatically preoccupied. Continues to ruminate on somatic sxs, and medications, perseverative on discharge. Remains preoccupied with food on the unit. Endorsed better sleep last night. The patient was paranoid towards staff, ruminating on receiving insulin against her will, and also paranoid about the food on the unit, asking is his sister can bring food and coffee, and reportedly has been only eating ships and snack. The patient was educated about the importance of compliance with medical regimen, proper diabetic diet, rule and regulations on the unit, and she was receptive but on irritable edge. Denied A/VH currently.  Denied SI/HI, intent or plan upon direct inquiry at this time.    Patient continues to be selectively interactive with staff and peers. No reports of aggression or self-injurious behavior on unit. No PRN medications used in the past 24 hours.    Patient accepted all offered medications and no adverse effects of medications noted or reported. Dr. Solis visited the patient yesterday for ongoing c/o abdominal pain, and ordered upper GI series.  The patient was started on Prozac 20 mg and Seroquel 400 mg nightly on 6/21/2024 and on Remeron 7.5 mg on 6/22/2024.  Seroquel is being uptitrated to 600 mg nightly.  Given the history of fall and parasomnia, Ambien was discontinued and should not be restarted in outpatient setting.     Current Mental Status Evaluation:  Appearance and attitude: appeared as stated age, dressed in hospital attire, underweight, wearing eyeglasses, with good hygiene  Eye contact: good  Motor Function: within normal limits,  intact gait, No PMA/PMR  Gait/station: normal gait/station and normal balance  Speech: normal for rate, rhythm, volume, latency, amount  Language: No overt abnormality  Mood/affect: dysphoric / Affect was constricted but reactive, mood congruent  Thought Processes: concrete, perseverative, ruminating  Thought content: denied suicidal ideations or homicidal ideations, ruminating thoughts, somatic preoccupation, some paranoia, negative thinking, preoccupied with medications and food  Associations: concrete associations, perseverative  Perceptual disturbances: denies Auditory/Visual/Tactile Hallucinations  Orientation: oriented to time, person, place and to the situational context  Cognitive Function: intact  Memory: not cooperative with formal MMSE  Intellect: unable to assess  Fund of knowledge: aware of current events, aware of past history, and vocabulary average  Impulse control: impulsive at times  Insight/judgment: limited/limited    Pain: reported having pain in right upper quadrant  Pain scale: 10      Vital signs in last 24 hours:    Temp:  [97.2 °F (36.2 °C)-97.6 °F (36.4 °C)] 97.3 °F (36.3 °C)  HR:  [] 87  Resp:  [18-20] 18  BP: (103-110)/(61-70) 105/61    Laboratory results: I have personally reviewed all pertinent laboratory/tests results    Results from the past 24 hours:   Recent Results (from the past 24 hour(s))   Fingerstick Glucose (POCT)    Collection Time: 06/22/24  4:00 PM   Result Value Ref Range    POC Glucose 123 65 - 140 mg/dl   Fingerstick Glucose (POCT)    Collection Time: 06/22/24  9:06 PM   Result Value Ref Range    POC Glucose 204 (H) 65 - 140 mg/dl   Fingerstick Glucose (POCT)    Collection Time: 06/23/24  7:45 AM   Result Value Ref Range    POC Glucose 139 65 - 140 mg/dl   Fingerstick Glucose (POCT)    Collection Time: 06/23/24 11:25 AM   Result Value Ref Range    POC Glucose 153 (H) 65 - 140 mg/dl       Progress Toward Goals: limited improvement    Assessment:  Principal  Problem:    Bipolar affective disorder, depressed, severe (HCC)  Active Problems:    COPD (chronic obstructive pulmonary disease) (HCC)    Type 2 diabetes mellitus without complication (HCC)    S/P gastric bypass    Seizure (HCC)    Overdose of undetermined intent    Forehead laceration    Migraine with aura and without status migrainosus, not intractable    Medical clearance for psychiatric admission    Personal history of fall    Vitamin D deficiency    Severe protein-calorie malnutrition (HCC)    Right upper quadrant abdominal pain        Plan:   - Seizure precaution  - Fall precaution  - f/u SLIM recs regarding the medical problems   - f/u upper GI series as per SLIM  - Continue medication titration and treatment plan; adjust medication to optimize treatment response and as clinically indicated.       Scheduled medications:  Current Facility-Administered Medications   Medication Dose Route Frequency Provider Last Rate    acetaminophen  650 mg Oral Q4H PRN Greta Kerns, CRNP      acetaminophen  650 mg Oral Q4H PRN Greta Kerns, CRNP      acetaminophen  975 mg Oral Q6H PRN Greta Kerns, CRNP      albuterol  2 puff Inhalation Q4H PRN Alicia Lion, CRNP      cyanocobalamin  1,000 mcg Oral Daily Alicia Lion, CRNP      Empagliflozin  25 mg Oral Daily Alicia Lion, CRNP      ergocalciferol  50,000 Units Oral Weekly Alicia Lion, CRNP      FLUoxetine  20 mg Oral Daily Greta Kerns, CRNP      folic acid  1 mg Oral Daily Alicia Lion, CRNP      gabapentin  400 mg Oral TID Greta Kerns, CRNP      haloperidol lactate  5 mg Intramuscular Q4H PRN Max 4/day Greta Kerns, CRNP      hydrOXYzine HCL  25 mg Oral Q6H PRN Max 4/day Greta Kerns, CRNP      hydrOXYzine HCL  50 mg Oral Q6H PRN Max 4/day Greta Kerns, CRNP      insulin lispro  1-5 Units Subcutaneous 4x Daily (AC & HS) Jose L Verma, DENP      LORazepam  1 mg Intramuscular Q6H PRN Max 3/day Greta  Luis F, CRXUAN      LORazepam  1 mg Oral Q6H PRN Max 3/day Greta Kerns, CRNP      melatonin  3 mg Oral HS Greta Kerns, CRNP      mirtazapine  7.5 mg Oral HS Candace Milner MD      nicotine polacrilex  4 mg Oral Q2H PRN Greta Kerns, CRNP      polyethylene glycol  17 g Oral Daily PRN Greta Kerns, CRNP      QUEtiapine  600 mg Oral HS Candace Milner MD      risperiDONE  0.25 mg Oral Q4H PRN Max 6/day Greta Kerns, CRNP      risperiDONE  0.5 mg Oral Q4H PRN Max 3/day Greta Kerns, CRNP      risperiDONE  1 mg Oral Q2H PRN Max 3/day Greta Kerns, CRNP      senna-docusate sodium  1 tablet Oral Daily PRN Greta Kerns, CRNP      sitaGLIPtin  100 mg Oral Daily Alicia Lion, CRXUAN      topiramate  25 mg Oral BID Greta Kerns, CRNP      traZODone  50 mg Oral HS PRN Greta Kerns, CRNP          PRN:    acetaminophen    acetaminophen    acetaminophen    albuterol    haloperidol lactate    hydrOXYzine HCL    hydrOXYzine HCL    LORazepam    LORazepam    nicotine polacrilex    polyethylene glycol    risperiDONE    risperiDONE    risperiDONE    senna-docusate sodium    traZODone    - Observation: routine            - VS: as per unit protocol  - Diet: Diabetic diet     - Psychoeducation (benefits and potential risks) discussed, importance of compliance with the psychiatric treatment reiterated, and the patient verbalized understanding of the matter  - Encourage group attendance and milieu therapy      - The pt was educated and agreed to verbalize any suicidal thoughts, frustrations or concerns to the nursing staff, immediately.   - Dispo: To be determined         Next of Kin  Extended Emergency Contact Information  Primary Emergency Contact: Susana Klein  Mobile Phone: [phone]  Relation: Sister      Counseling / Coordination of Care    Patient's progress reviewed with nursing staff.  Medications, treatment progress and treatment plan reviewed with patient.  Medication education provided to  patient.  Reassurance and supportive therapy provided.  Reoriented to reality and reassured.  Encouraged participation in milieu and group therapy on the unit.  Crisis/safety plan discussed with patient.     Candace Milner MD  Attending Psychiatrist   [facility]         This note was completed in part utilizing Dragon dictation Software. Grammatical, translation, syntax errors, random word insertions, spelling mistakes, and incomplete sentences may be an occasional consequence of this system secondary to software limitations with voice recognition, ambient noise, and hardware issues. If you have any questions or concerns about the content, text, or information contained within the body of this dictation, please contact the provider for clarification.

## 2024-06-23 NOTE — PLAN OF CARE
Problem: Risk for Self Injury/Neglect  Goal: Treatment Goal: Remain safe during length of stay, learn and adopt new coping skills, and be free of self-injurious ideation, impulses and acts at the time of discharge  Outcome: Progressing  Goal: Verbalize thoughts and feelings  Description: Interventions:  - Assess and re-assess patient's lethality and potential for self-injury  - Engage patient in 1:1 interactions, daily, for a minimum of 15 minutes  - Encourage patient to express feelings, fears, frustrations, hopes  - Establish rapport/trust with patient   Outcome: Progressing  Goal: Refrain from harming self  Description: Interventions:  - Monitor patient closely, per order  - Develop a trusting relationship  - Supervise medication ingestion, monitor effects and side effects   Outcome: Progressing  Goal: Recognize maladaptive responses and adopt new coping mechanisms  Outcome: Progressing     Problem: SAFETY ADULT  Goal: Patient will remain free of falls  Description: INTERVENTIONS:  - Educate patient/family on patient safety including physical limitations  - Instruct patient to call for assistance with activity   - Consult OT/PT to assist with strengthening/mobility   - Keep Call bell within reach  - Keep bed low and locked with side rails adjusted as appropriate  - Keep care items and personal belongings within reach  - Initiate and maintain comfort rounds  - Make Fall Risk Sign visible to staff  - Offer Toileting every 2 Hours, in advance of need  - Initiate/Maintain bed alarm  - Obtain necessary fall risk management equipment:   - Apply yellow socks and bracelet for high fall risk patients  - Consider moving patient to room near nurses station  Outcome: Progressing  Goal: Maintain or return to baseline ADL function  Description: INTERVENTIONS:  -  Assess patient's ability to carry out ADLs; assess patient's baseline for ADL function and identify physical deficits which impact ability to perform ADLs (bathing,  care of mouth/teeth, toileting, grooming, dressing, etc.)  - Assess/evaluate cause of self-care deficits   - Assess range of motion  - Assess patient's mobility; develop plan if impaired  - Assess patient's need for assistive devices and provide as appropriate  - Encourage maximum independence but intervene and supervise when necessary  - Involve family in performance of ADLs  - Assess for home care needs following discharge   - Consider OT consult to assist with ADL evaluation and planning for discharge  - Provide patient education as appropriate  Outcome: Progressing  Goal: Maintains/Returns to pre admission functional level  Description: INTERVENTIONS:  - Perform AM-PAC 6 Click Basic Mobility/ Daily Activity assessment daily.  - Set and communicate daily mobility goal to care team and patient/family/caregiver.   - Collaborate with rehabilitation services on mobility goals if consulted  - Perform Range of Motion 4 times a day.  - Reposition patient every 2 hours.  - Dangle patient 4 times a day  - Stand patient 4 times a day  - Ambulate patient 3 times a day  - Out of bed to chair 3 times a day   - Out of bed for meals 3 times a day  - Out of bed for toileting  - Record patient progress and toleration of activity level   Outcome: Progressing     Problem: Nutrition/Hydration-ADULT  Goal: Nutrient/Hydration intake appropriate for improving, restoring or maintaining nutritional needs  Description: Monitor and assess patient's nutrition/hydration status for malnutrition. Collaborate with interdisciplinary team and initiate plan and interventions as ordered.  Monitor patient's weight and dietary intake as ordered or per policy. Utilize nutrition screening tool and intervene as necessary. Determine patient's food preferences and provide high-protein, high-caloric foods as appropriate.     INTERVENTIONS:  - Monitor oral intake, urinary output, labs, and treatment plans  - Assess nutrition and hydration status and  recommend course of action  - Evaluate amount of meals eaten  - Assist patient with eating if necessary   - Allow adequate time for meals  - Recommend/ encourage appropriate diets, oral nutritional supplements, and vitamin/mineral supplements  - Order, calculate, and assess calorie counts as needed  - Recommend, monitor, and adjust tube feedings and TPN/PPN based on assessed needs  - Assess need for intravenous fluids  - Provide specific nutrition/hydration education as appropriate  - Include patient/family/caregiver in decisions related to nutrition  Outcome: Progressing

## 2024-06-23 NOTE — NURSING NOTE
Patient cooperative with assessment. Patient is anxious. Patient preoccupied with discharge. Patient states she doesn't like the food here and being on the unit is causing her anxiety. Patient educated on using coping mechanisms to help reduce anxiety. Patient denies all other psych S/S. Patient able and encouraged to notify staff of any needs.

## 2024-06-23 NOTE — NURSING NOTE
"Patient is irritable and argumentative with staff. Patient initially refused scheduled nighttime Humalog stating \" I don't take insulin at home, why you people keep trying to force me to take this. Give me back my pills, I'm not taking that.\" Medication and dietary education was provided by this writer and patient was dismissive. Patient continues to state \" you worried about giving life sustaining insulin, where's my liver medication. I keep asking for my liver medication and all of you refusing to give it to me.\" Patient then states \" just give me the fucking insulin.\" Patient redirected and medication was administered. Patient denies any needs at this time. Safety checks ongoing.  "

## 2024-06-23 NOTE — PHYSICAL THERAPY NOTE
Physical Therapy Evaluation    Patient's Name: Edith Klein    Admitting Diagnosis  Major depressive disorder, single episode, unspecified [F32.9]  Bipolar 2 disorder, major depressive episode (HCC) [F31.81]    Problem List  Patient Active Problem List   Diagnosis    Nephrolithiasis    Arthritis    Autoimmune hepatitis (HCC)    Bipolar affective disorder (HCC)    COPD (chronic obstructive pulmonary disease) (HCC)    Hypertension    Type 2 diabetes mellitus without complication (HCC)    S/P gastric bypass    Dysuria    Abnormal CT of the head    Radiculopathy, cervical    Abnormal drug screen    Unresponsiveness    Acute metabolic encephalopathy    Headache    Closed nondisplaced fracture of proximal phalanx of left great toe    Seizure (HCC)    Dependence on nicotine from cigarettes    QT prolongation    Overdose of undetermined intent    Forehead laceration    Toxic encephalopathy    Bipolar affective disorder, depressed, severe (HCC)    Diabetes (HCC)    Flank pain    Irritable bowel syndrome    Late effect of intracranial injury (HCC)    Left breast mass    Long term current use of antipsychotic medication    Memory loss    Menopausal symptoms    Migraine with aura and without status migrainosus, not intractable    Mild intermittent asthma without complication    Needle phobia    Pain of right breast    Tobacco user    Chronic right shoulder pain    Fibromyalgia    Medical clearance for psychiatric admission    Personal history of fall    Vitamin D deficiency    Severe protein-calorie malnutrition (HCC)    Right upper quadrant abdominal pain       Past Medical History  Past Medical History:   Diagnosis Date    Autoimmune hepatitis (HCC)     Bipolar 1 disorder (HCC)     Chronic headaches 2021    Diabetes mellitus (HCC)     Kidney stone     Renal disorder     kidney stones    Seizure disorder (HCC)        Past Surgical History  Past Surgical History:   Procedure Laterality Date    BARIATRIC SURGERY  05/2017     BUNIONECTOMY      CYSTOSCOPY  07/12/2018    Stent Removal    FL RETROGRADE PYELOGRAM  6/16/2022    GASTRIC BYPASS  05/22/2017    HIATAL HERNIA REPAIR  05/22/2017    HYSTERECTOMY      JOINT REPLACEMENT      KIDNEY STONE SURGERY      NV CYSTO/URETERO W/LITHOTRIPSY &INDWELL STENT INSRT Bilateral 7/6/2018    Procedure: CYSTOSCOPY GALDINO. URETEROSCOPY;GALDINO.  RETROGRADE PYELOGRAM AND INSERTION GALDINO.STENT URETERAL;  Surgeon: Jacob Gerber MD;  Location: [facility];  Service: Urology    NV CYSTO/URETERO W/LITHOTRIPSY &INDWELL STENT INSRT Left 6/16/2022    Procedure: CYSTOSCOPY URETEROSCOPY WITH LITHOTRIPSY HOLMIUM LASER, RETROGRADE PYELOGRAM AND INSERTION STENT URETERAL;  Surgeon: Asad Geiger MD;  Location: [facility];  Service: Urology    TOTAL SHOULDER REPLACEMENT  2002    VAGINAL HYSTERECTOMY      PARTIAL       Recent Imaging  FL upper GI UGI    (Results Pending)       Recent Vital Signs  Vitals:    06/22/24 0725 06/22/24 1534 06/22/24 2017 06/23/24 0732   BP: 102/64 103/70 110/65 105/61   BP Location: Right arm Right arm Right arm Right arm   Pulse: 83 100 99 87   Resp: 19 20 19 18   Temp: (!) 97.1 °F (36.2 °C) (!) 97.2 °F (36.2 °C) 97.6 °F (36.4 °C) (!) 97.3 °F (36.3 °C)   TempSrc: Temporal Temporal Temporal Temporal   SpO2: 100% 100% 99% 99%   Weight:       Height:            06/21/24 1400   PT Last Visit   PT Visit Date 06/21/24   Note Type   Note type Evaluation   Pain Assessment   Pain Assessment Tool 0-10   Pain Score No Pain   Restrictions/Precautions   Weight Bearing Precautions Per Order No   Cognition   Overall Cognitive Status WFL   Arousal/Participation Cooperative   Attention Within functional limits   Orientation Level Oriented to person;Oriented to place;Oriented to time   Memory Within functional limits   Following Commands Follows one step commands without difficulty   RLE Assessment   RLE Assessment   (4/5)   LLE Assessment   LLE Assessment   (4/5)   Coordination   Movements are Fluid and Coordinated  0   Coordination and Movement Description mild coodination deficit,   Sensation X   Light Touch   RLE Light Touch Impaired   RLE Light Touch Comments tinlging in the LEs   LLE Light Touch Impaired   LLE Light Touch Comments tingling in the LES   Bed Mobility   Supine to Sit 7  Independent   Sit to Supine 7  Independent   Transfers   Sit to Stand 7  Independent   Stand to Sit 7  Independent   Ambulation/Elevation   Gait pattern Step through pattern;Decreased toe off;Decreased heel strike;Decreased foot clearance   Gait Assistance 7  Independent   Assistive Device None   Distance 450ft   Balance   Static Sitting Fair +   Dynamic Sitting Fair +   Static Standing Fair   Dynamic Standing Fair -   Ambulatory Fair -   Endurance Deficit   Endurance Deficit Yes   Endurance Deficit Description reduced from baseline   Activity Tolerance   Activity Tolerance Patient tolerated treatment well   Medical Staff Made Aware spoke to CM   Nurse Made Aware spoke to RN   Assessment   Prognosis Good   Problem List Decreased strength;Decreased range of motion;Decreased endurance;Impaired balance;Decreased mobility;Decreased coordination;Impaired sensation   Barriers to Discharge Inaccessible home environment;Decreased caregiver support   Goals   Patient Goals to go home   Discharge Recommendation   Rehab Resource Intensity Level, PT III (Minimum Resource Intensity)  (for balance training)   AM-PAC Basic Mobility Inpatient   Turning in Flat Bed Without Bedrails 4   Lying on Back to Sitting on Edge of Flat Bed Without Bedrails 4   Moving Bed to Chair 4   Standing Up From Chair Using Arms 4   Walk in Room 4   Climb 3-5 Stairs With Railing 3   Basic Mobility Inpatient Raw Score 23   Basic Mobility Standardized Score 50.88   [facility] Highest Level Of Mobility   -HLM Goal 7: Walk 25 feet or more   -HLM Achieved 8: Walk 250 feet ot more   End of Consult   Patient Position at End of Consult Bedside chair;All needs within reach            ASSESSMENT                                                                                                                     Edith Klein is a 59 y.o. female admitted to [facility] on 6/20/2024 for Bipolar affective disorder, depressed, severe (HCC). Pt  has a past medical history of Autoimmune hepatitis (HCC), Bipolar 1 disorder (HCC), Chronic headaches (2021), Diabetes mellitus (HCC), Kidney stone, Renal disorder, and Seizure disorder (HCC).. PT was consulted and pt was seen on 6/23/2024 for mobility assessment and d/c planning.  Impairments limiting pt at this time include decreased ROM, impaired balance, decreased endurance, decreased coordination, decreased sensation, and decreased strength. Pt is currently functioning at a independent level for bed mobility, independent level for transfers, independent level for ambulation with no assistive device. The patient's AM-PAC Basic Mobility Inpatient Short Form Raw Score is 23. A Raw score of greater than 16 suggests the patient may benefit from discharge to home. Please also refer to the recommendation of the Physical Therapist for safe discharge planning.    Recommendations                                                                                                                DME: None    Discharge Disposition:  Home with Outpatient Physical Therapy       Edison Gandhi PT, DPT

## 2024-06-23 NOTE — NURSING NOTE
Patient is noncompliant with diabetic diet after many education sessions from various members of the healthcare team. She was found taking food from other peers' trays and when informed that she could not do that, she shoved as much food in her mouth as she could before it was taken back. Patient's meals were again reviewed with her by a tech who allowed her to change her meals for the third time today.

## 2024-06-23 NOTE — NURSING NOTE
"Patient states \"I was surrounded and forced to take my insulin yesterday by staff.\" Patient reports she has rights and will be suing the hospital. Patient also requesting liver medication for Hep C.   "

## 2024-06-24 ENCOUNTER — APPOINTMENT (INPATIENT)
Dept: RADIOLOGY | Facility: HOSPITAL | Age: 59
DRG: 753 | End: 2024-06-24
Payer: COMMERCIAL

## 2024-06-24 LAB
BACTERIA BLD CULT: NORMAL
BACTERIA BLD CULT: NORMAL
GLUCOSE SERPL-MCNC: 140 MG/DL (ref 65–140)
GLUCOSE SERPL-MCNC: 145 MG/DL (ref 65–140)
GLUCOSE SERPL-MCNC: 157 MG/DL (ref 65–140)
GLUCOSE SERPL-MCNC: 201 MG/DL (ref 65–140)
HIV 1+2 AB+HIV1 P24 AG SERPL QL IA: NORMAL
HIV 2 AB SERPL QL IA: NORMAL
HIV1 AB SERPL QL IA: NORMAL
HIV1 P24 AG SERPL QL IA: NORMAL
RPR SER QL: NORMAL
TREPONEMA PALLIDUM IGG+IGM AB [PRESENCE] IN SERUM OR PLASMA BY IMMUNOASSAY: REACTIVE

## 2024-06-24 PROCEDURE — 99232 SBSQ HOSP IP/OBS MODERATE 35: CPT | Performed by: STUDENT IN AN ORGANIZED HEALTH CARE EDUCATION/TRAINING PROGRAM

## 2024-06-24 PROCEDURE — 74240 X-RAY XM UPR GI TRC 1CNTRST: CPT

## 2024-06-24 PROCEDURE — 82948 REAGENT STRIP/BLOOD GLUCOSE: CPT

## 2024-06-24 RX ORDER — BISACODYL 5 MG
TABLET, DELAYED RELEASE (ENTERIC COATED) ORAL
COMMUNITY
Start: 2024-04-05 | End: 2024-06-26

## 2024-06-24 RX ORDER — FLUOXETINE HYDROCHLORIDE 20 MG/1
40 CAPSULE ORAL DAILY
Status: DISCONTINUED | OUTPATIENT
Start: 2024-06-25 | End: 2024-06-26 | Stop reason: HOSPADM

## 2024-06-24 RX ORDER — GABAPENTIN 300 MG/1
600 CAPSULE ORAL 3 TIMES DAILY
Status: DISCONTINUED | OUTPATIENT
Start: 2024-06-24 | End: 2024-06-26 | Stop reason: HOSPADM

## 2024-06-24 RX ORDER — QUETIAPINE FUMARATE 400 MG/1
800 TABLET, FILM COATED ORAL
Status: DISCONTINUED | OUTPATIENT
Start: 2024-06-24 | End: 2024-06-26 | Stop reason: HOSPADM

## 2024-06-24 RX ADMIN — INSULIN LISPRO 1 UNITS: 100 INJECTION, SOLUTION INTRAVENOUS; SUBCUTANEOUS at 21:43

## 2024-06-24 RX ADMIN — INSULIN LISPRO 1 UNITS: 100 INJECTION, SOLUTION INTRAVENOUS; SUBCUTANEOUS at 17:07

## 2024-06-24 RX ADMIN — MELATONIN TAB 3 MG 3 MG: 3 TAB at 21:02

## 2024-06-24 RX ADMIN — TOPIRAMATE 25 MG: 25 TABLET, FILM COATED ORAL at 09:28

## 2024-06-24 RX ADMIN — HYDROXYZINE HYDROCHLORIDE 25 MG: 25 TABLET, FILM COATED ORAL at 13:07

## 2024-06-24 RX ADMIN — FOLIC ACID 1 MG: 1 TABLET ORAL at 09:28

## 2024-06-24 RX ADMIN — SITAGLIPTIN 100 MG: 100 TABLET, FILM COATED ORAL at 09:28

## 2024-06-24 RX ADMIN — GABAPENTIN 600 MG: 300 CAPSULE ORAL at 21:02

## 2024-06-24 RX ADMIN — MIRTAZAPINE 7.5 MG: 7.5 TABLET, FILM COATED ORAL at 21:02

## 2024-06-24 RX ADMIN — GABAPENTIN 400 MG: 400 CAPSULE ORAL at 09:28

## 2024-06-24 RX ADMIN — CYANOCOBALAMIN TAB 1000 MCG 1000 MCG: 1000 TAB at 09:27

## 2024-06-24 RX ADMIN — TOPIRAMATE 25 MG: 25 TABLET, FILM COATED ORAL at 17:07

## 2024-06-24 RX ADMIN — EMPAGLIFLOZIN 25 MG: 25 TABLET, FILM COATED ORAL at 09:28

## 2024-06-24 RX ADMIN — GABAPENTIN 600 MG: 300 CAPSULE ORAL at 17:07

## 2024-06-24 RX ADMIN — QUETIAPINE 800 MG: 400 TABLET ORAL at 21:02

## 2024-06-24 RX ADMIN — FLUOXETINE 20 MG: 20 CAPSULE ORAL at 09:27

## 2024-06-24 NOTE — NURSING NOTE
"Patient cooperative with care. Patient endorses anxiety. Patient denies all other psych S/S.Patient states \"being here in this place makes me so anxious, I just want to go home.\" Patient visible in the milieu. Patient social with peers and staff. Patient able and encouraged to notify staff of any needs.   "

## 2024-06-24 NOTE — PROGRESS NOTES
Progress Note - Behavioral Health   Edith Klein 59 y.o. female MRN: [id number]  Unit/Bed#: [facility] 645-01 Encounter: [id number]    Assessment & Plan   Principal Problem:    Bipolar affective disorder, depressed, severe ([facility])  Active Problems:    COPD (chronic obstructive pulmonary disease) ([facility])    Type 2 diabetes mellitus without complication (HCC)    S/P gastric bypass    Seizure ([facility])    Overdose of undetermined intent    Forehead laceration    Migraine with aura and without status migrainosus, not intractable    Medical clearance for psychiatric admission    Personal history of fall    Vitamin D deficiency    Severe protein-calorie malnutrition (HCC)    Right upper quadrant abdominal pain      Recommended Treatment:      Will make the following medication changes:  Continue with current medications:  Vitamin B12 1000 mcg daily for supplementation.  Vitamin D2 50,000 units q. weekly for supplementation.  Increase Prozac to 40 mg daily for mood symptoms.  Increase gabapentin to 600 mg 3 times daily for anxiety and neuropathy.  Melatonin 3 mg at bedtime for insomnia.  Remeron 7.5 mg at bedtime for mood, appetite and insomnia.  Increase Seroquel to 800 mg at bedtime for mood stabilization and psychotic symptoms.  Topamax 25 mg twice daily for migraine.  Continue with group therapy, milieu therapy and occupational therapy.    Continue frequent safety checks and vitals per unit protocol.  Continue with SLIM medical management as indicated  Continue coordinating with case management regarding disposition    Legal Status: 201  Disposition: coordinating with case management, tentative discharge Wednesday, 6/26/2024    Case discussed with treatment team.  Risks, benefits and possible side effects of Medications: Risks, benefits, and possible side effects of medications have been explained to the patient, who verbalizes understanding    ------------------------------------------------------------    Subjective: Patient's  "chart was reviewed, and patient's progress and plan was discussed with treatment team. Per nursing report, Edith has been preoccupied with discharge, endorsing anxiety, demanding at times with staff and noncompliant with diabetic diet, found taking food from peers trays but ultimately redirected and cooperative on the unit and compliant with medications. Patient has remained in behavioral control for the last 24 hours. Last night patient was documented to have slept throughout the night.    Edith was evaluated this morning for continuity of care. On examination, Edith is superficially cooperative, preoccupied with her medications and upcoming discharge, and stating she is having less GI discomfort following her scope this morning but that her appetite has been diminished. She states her mood is \" okay,\" and endorses some anxiety throughout the day. She reports sleeping poorly and her energy level today is low. Her appetite has been decreased from baseline but appears to be improving following her scope. She denies adverse effects from medications and is amenable to the above proposed changes. She denies suicidal ideation, homicidal ideation, auditory hallucinations, and visual hallucinations. Her goals for today are to remain in behavioral control and medication compliant in addition to continuing to go to groups.    VS: Reviewed,  BMI of 18.78, otherwise within normal limits    Progress Toward Goals: Gradual improvement    Psychiatric Review of Systems:  Behavior over the last 24 hours: improved  Sleep: insomnia  Appetite: improving, decreased from baseline  Medication side effects: none verbalized  Medical ROS:  No cough, chest pain, nausea, vomiting, diarrhea, all other symptoms are negative.    Vital signs in last 24 hours:  Temp:  [97.2 °F (36.2 °C)-98.5 °F (36.9 °C)] 97.2 °F (36.2 °C)  HR:  [] 97  Resp:  [17-18] 18  BP: (100-113)/(56-62) 113/62    Mental Status Exam:    Appearance:  alert, good eye contact, " "appears older than stated age, casually dressed, appropriate grooming and hygiene, poor dentition, thin, and smiling   Behavior:  calm, sitting comfortably, and superficially cooperative   Speech:  spontaneous, clear, normal rate, normal volume, and coherent   Mood:  \"Okay\"   Affect:  slightly brighter, mood-congruent   Thought Process:  generally linear and goal-directed but perseverative at times   Associations: intact associations   Thought Content:  no verbalized delusions or overt paranoia, somatic preoccupation, pharmacologic preoccupation   Perceptual Disturbances: denies current hallucinations and does not appear to be responding to internal stimuli at this time   Risk Potential: Suicidal ideation - None at present  Homicidal ideation - None at present  Potential for aggression - Not at present   Sensorium:  oriented to person, place, time/date, and situation   Memory:  recent and remote memory grossly intact   Consciousness:  alert and awake   Attention/Concentration: attention span and concentration are age appropriate   Insight:  improving   Judgment: improving   Gait/Station: normal gait/station   Motor Activity: no abnormal movements     Current Medications:  Current Facility-Administered Medications   Medication Dose Route Frequency Provider Last Rate    acetaminophen  650 mg Oral Q4H PRN OSMAR Myers      acetaminophen  650 mg Oral Q4H PRN OSMAR Myers      acetaminophen  975 mg Oral Q6H PRN OSMAR Myers      albuterol  2 puff Inhalation Q4H PRN OSMAR Polanco      cyanocobalamin  1,000 mcg Oral Daily OSMAR Polanco      Empagliflozin  25 mg Oral Daily OSMAR Polanco      ergocalciferol  50,000 Units Oral Weekly OSMAR Polanco      [START ON 6/25/2024] FLUoxetine  40 mg Oral Daily Candace Milner MD      folic acid  1 mg Oral Daily OSMAR Polanco      gabapentin  600 mg Oral TID Candace Milner MD      haloperidol lactate  " 5 mg Intramuscular Q4H PRN Max 4/day Greta Kerns, CRNP      hydrOXYzine HCL  25 mg Oral Q6H PRN Max 4/day Greta Kerns, CRNP      hydrOXYzine HCL  50 mg Oral Q6H PRN Max 4/day Greta Kerns, CRNP      insulin lispro  1-5 Units Subcutaneous 4x Daily (AC & HS) Jose L Kenney Luci, CRNP      LORazepam  1 mg Intramuscular Q6H PRN Max 3/day Gretayenni Kerns, CRNP      LORazepam  1 mg Oral Q6H PRN Max 3/day Greta Kerns, CRNP      melatonin  3 mg Oral HS Greta Kerns, CRNP      mirtazapine  7.5 mg Oral HS Candace Milner MD      nicotine polacrilex  4 mg Oral Q2H PRN Greta Kerns, CRNP      polyethylene glycol  17 g Oral Daily PRN Greta Kerns, CRNP      QUEtiapine  800 mg Oral HS Candace Milner MD      risperiDONE  0.25 mg Oral Q4H PRN Max 6/day Greta Kerns, CRNP      risperiDONE  0.5 mg Oral Q4H PRN Max 3/day Greta Kerns, CRNP      risperiDONE  1 mg Oral Q2H PRN Max 3/day Greta Kerns, CRNP      senna-docusate sodium  1 tablet Oral Daily PRN Greta Kerns, CRNP      sitaGLIPtin  100 mg Oral Daily Alicia Lion, CRNP      topiramate  25 mg Oral BID Greta Kerns, CRNP      traZODone  50 mg Oral HS PRN Greta Kerns, CRNP         Behavioral Health Medications: all current active meds have been reviewed. Changes as in plan section above.    Laboratory results:  I have personally reviewed all pertinent laboratory/tests results.   Recent Results (from the past 48 hour(s))   Fingerstick Glucose (POCT)    Collection Time: 06/22/24 11:22 AM   Result Value Ref Range    POC Glucose 123 65 - 140 mg/dl   Fingerstick Glucose (POCT)    Collection Time: 06/22/24  4:00 PM   Result Value Ref Range    POC Glucose 123 65 - 140 mg/dl   Fingerstick Glucose (POCT)    Collection Time: 06/22/24  9:06 PM   Result Value Ref Range    POC Glucose 204 (H) 65 - 140 mg/dl   Fingerstick Glucose (POCT)    Collection Time: 06/23/24  7:45 AM   Result Value Ref Range    POC Glucose 139 65 - 140 mg/dl    Fingerstick Glucose (POCT)    Collection Time: 06/23/24 11:25 AM   Result Value Ref Range    POC Glucose 153 (H) 65 - 140 mg/dl   Fingerstick Glucose (POCT)    Collection Time: 06/23/24  4:05 PM   Result Value Ref Range    POC Glucose 208 (H) 65 - 140 mg/dl   Fingerstick Glucose (POCT)    Collection Time: 06/23/24  9:14 PM   Result Value Ref Range    POC Glucose 157 (H) 65 - 140 mg/dl   Fingerstick Glucose (POCT)    Collection Time: 06/24/24  8:42 AM   Result Value Ref Range    POC Glucose 145 (H) 65 - 140 mg/dl        This note has been constructed using a voice recognition system. There may be translation, syntax, or grammatical errors. If you have any questions, please contact the dictating author.    Kulwant Phillips, DO  Psychiatry Residency, PGY-2

## 2024-06-24 NOTE — DISCHARGE INSTR - OTHER ORDERS
You are being discharged to home: [address] [facility] [address]     [facility] Residents:   *Emergencies:   If you are experiencing a mental health emergency, you may call the [facility] Crisis Intervention Office 24 hours a day, 7 days per week at [phone] or 640.   *Telephone Support Services:   When you need someone to listen, the Warmline is available from the hours of 6am- 2am, 7 days a week. No one is available from the hours of 2am-6am. A representative can be reached at [phone].     If you or someone you know is struggling or in crisis, help is available. Call or text 298 or chat [email or web address]. You can also reach Crisis Text Line by texting [facility] to 867647.

## 2024-06-24 NOTE — PLAN OF CARE
Pt attended group and able to self reflect.     Problem: Ineffective Coping  Goal: Participates in unit activities  Description: Interventions:  - Provide therapeutic environment   - Provide required programming   - Redirect inappropriate behaviors   Outcome: Progressing

## 2024-06-24 NOTE — PLAN OF CARE
Problem: Risk for Self Injury/Neglect  Goal: Treatment Goal: Remain safe during length of stay, learn and adopt new coping skills, and be free of self-injurious ideation, impulses and acts at the time of discharge  Outcome: Progressing  Goal: Verbalize thoughts and feelings  Description: Interventions:  - Assess and re-assess patient's lethality and potential for self-injury  - Engage patient in 1:1 interactions, daily, for a minimum of 15 minutes  - Encourage patient to express feelings, fears, frustrations, hopes  - Establish rapport/trust with patient   Outcome: Progressing  Goal: Refrain from harming self  Description: Interventions:  - Monitor patient closely, per order  - Develop a trusting relationship  - Supervise medication ingestion, monitor effects and side effects   Outcome: Progressing  Goal: Recognize maladaptive responses and adopt new coping mechanisms  Outcome: Progressing  Goal: Complete daily ADLs, including personal hygiene independently, as able  Description: Interventions:  - Observe, teach, and assist patient with ADLS  - Monitor and promote a balance of rest/activity, with adequate nutrition and elimination  Outcome: Progressing     Problem: SAFETY ADULT  Goal: Patient will remain free of falls  Description: INTERVENTIONS:  - Educate patient/family on patient safety including physical limitations  - Instruct patient to call for assistance with activity   - Consult OT/PT to assist with strengthening/mobility   - Keep Call bell within reach  - Keep bed low and locked with side rails adjusted as appropriate  - Keep care items and personal belongings within reach  - Initiate and maintain comfort rounds  - Make Fall Risk Sign visible to staff  - Offer Toileting every 2 Hours, in advance of need  - Initiate/Maintain bed alarm  - Obtain necessary fall risk management equipment:   - Apply yellow socks and bracelet for high fall risk patients  - Consider moving patient to room near nurses  station  Outcome: Progressing  Goal: Maintain or return to baseline ADL function  Description: INTERVENTIONS:  -  Assess patient's ability to carry out ADLs; assess patient's baseline for ADL function and identify physical deficits which impact ability to perform ADLs (bathing, care of mouth/teeth, toileting, grooming, dressing, etc.)  - Assess/evaluate cause of self-care deficits   - Assess range of motion  - Assess patient's mobility; develop plan if impaired  - Assess patient's need for assistive devices and provide as appropriate  - Encourage maximum independence but intervene and supervise when necessary  - Involve family in performance of ADLs  - Assess for home care needs following discharge   - Consider OT consult to assist with ADL evaluation and planning for discharge  - Provide patient education as appropriate  Outcome: Progressing  Goal: Maintains/Returns to pre admission functional level  Description: INTERVENTIONS:  - Perform AM-PAC 6 Click Basic Mobility/ Daily Activity assessment daily.  - Set and communicate daily mobility goal to care team and patient/family/caregiver.   - Collaborate with rehabilitation services on mobility goals if consulted  - Perform Range of Motion 4 times a day.  - Reposition patient every 2 hours.  - Dangle patient 4 times a day  - Stand patient 4 times a day  - Ambulate patient 4 times a day  - Out of bed to chair 3 times a day   - Out of bed for meals 3 times a day  - Out of bed for toileting  - Record patient progress and toleration of activity level   Outcome: Progressing     Problem: Potential for Falls  Goal: Patient will remain free of falls  Description: INTERVENTIONS:  - Educate patient/family on patient safety including physical limitations  - Instruct patient to call for assistance with activity   - Consult OT/PT to assist with strengthening/mobility   - Keep Call bell within reach  - Keep bed low and locked with side rails adjusted as appropriate  - Keep care  items and personal belongings within reach  - Initiate and maintain comfort rounds  - Make Fall Risk Sign visible to staff  - Offer Toileting every 2 Hours, in advance of need  - Initiate/Maintain bed alarm  - Obtain necessary fall risk management equipment:   - Apply yellow socks and bracelet for high fall risk patients  - Consider moving patient to room near nurses station  Outcome: Progressing

## 2024-06-24 NOTE — TREATMENT TEAM
06/24/24 0735   Team Meeting   Meeting Type Daily Rounds   Initial Conference Date 06/24/24   Team Members Present   Team Members Present Physician;Nurse;;   Physician Team Member Dr. Milner, Dr. Phillips, Greta PRASAD   Nursing Team Member Noreen   Care Management Team Member Stephenie   Social Work Team Member Mehreen   Patient/Family Present   Patient Present No   Patient's Family Present No     Last bowel movement on 6/21. Pleasant last night, but hyper focused on insulin and non compliant with some medications. Remeron added, Seroquel increased. Patient can be demanding with staff. Potential discharge to home on Wednesday 6/26/24.

## 2024-06-24 NOTE — PROGRESS NOTES
Pt attended ALL groups.  Pt guarded asking if information spoken in group will be discussed with doctor.  Left group early. Pt in afternoon preoccupied with d/c date.       06/24/24 1000   Activity/Group Checklist   Group Community meeting   Attendance Attended;Other (Comment)  (left early)   Attendance Duration (min) 31-45   Interactions Other (Comment)  (suspicious)   Affect/Mood Incongruent   Goals Achieved Identified feelings;Identified triggers;Identified relapse prevention strategies;Able to reflect/comment on own behavior;Able to engage in interactions;Able to listen to others

## 2024-06-24 NOTE — NURSING NOTE
Patient is pleasant and cooperative with care. Denies all psych s/s. No behaviors noted during shift. Medication compliant. Patient is visible in the dayroom watching television. Patient denies any needs at this time. Safety checks ongoing.

## 2024-06-24 NOTE — DISCHARGE INSTR - APPOINTMENTS
Rosana, or Eliza, our Behavioral Health Nurse Navigators, will be calling you after your discharge, on the phone number that you provided.  They will be available as an additional support, if needed.   If you wish to speak with one of them, you may contact Rosana at [phone] or Eliza at [phone].

## 2024-06-24 NOTE — CASE MANAGEMENT
CM spoke to the patient who is in agreement with a tentative discharge to home on Wednesday 6/26/24 at 11h.     CM called [facility] [phone]. The patient is scheduled for a follow up therapy appt on Wednesday 7/3/24 @ 10:15am and for a follow up medication management appt on 7/24/24 @ 10am.

## 2024-06-25 PROBLEM — T50.904A OVERDOSE OF UNDETERMINED INTENT: Status: RESOLVED | Noted: 2024-06-19 | Resolved: 2024-06-25

## 2024-06-25 PROBLEM — Z00.8 MEDICAL CLEARANCE FOR PSYCHIATRIC ADMISSION: Status: RESOLVED | Noted: 2024-06-21 | Resolved: 2024-06-25

## 2024-06-25 PROBLEM — R56.9 SEIZURE (HCC): Status: RESOLVED | Noted: 2024-04-13 | Resolved: 2024-06-25

## 2024-06-25 LAB
GLUCOSE SERPL-MCNC: 116 MG/DL (ref 65–140)
GLUCOSE SERPL-MCNC: 191 MG/DL (ref 65–140)
GLUCOSE SERPL-MCNC: 208 MG/DL (ref 65–140)
GLUCOSE SERPL-MCNC: 243 MG/DL (ref 65–140)
T PALLIDUM IGG+IGM SER QL: POSITIVE

## 2024-06-25 PROCEDURE — 99232 SBSQ HOSP IP/OBS MODERATE 35: CPT | Performed by: STUDENT IN AN ORGANIZED HEALTH CARE EDUCATION/TRAINING PROGRAM

## 2024-06-25 PROCEDURE — 82948 REAGENT STRIP/BLOOD GLUCOSE: CPT

## 2024-06-25 RX ORDER — FOLIC ACID 1 MG/1
1 TABLET ORAL DAILY
Qty: 30 TABLET | Refills: 1 | Status: SHIPPED | OUTPATIENT
Start: 2024-06-26 | End: 2024-08-25

## 2024-06-25 RX ORDER — QUETIAPINE FUMARATE 400 MG/1
800 TABLET, FILM COATED ORAL
Qty: 60 TABLET | Refills: 1 | Status: SHIPPED | OUTPATIENT
Start: 2024-06-25 | End: 2024-08-24

## 2024-06-25 RX ORDER — ERGOCALCIFEROL 1.25 MG/1
50000 CAPSULE ORAL WEEKLY
Qty: 4 CAPSULE | Refills: 0 | Status: SHIPPED | OUTPATIENT
Start: 2024-06-29 | End: 2024-08-18

## 2024-06-25 RX ORDER — MIRTAZAPINE 7.5 MG/1
7.5 TABLET, FILM COATED ORAL
Qty: 30 TABLET | Refills: 1 | Status: SHIPPED | OUTPATIENT
Start: 2024-06-25 | End: 2024-08-24

## 2024-06-25 RX ORDER — GABAPENTIN 300 MG/1
600 CAPSULE ORAL 3 TIMES DAILY
Qty: 180 CAPSULE | Refills: 1 | Status: SHIPPED | OUTPATIENT
Start: 2024-06-25 | End: 2024-08-24

## 2024-06-25 RX ORDER — TOPIRAMATE 25 MG/1
25 TABLET ORAL 2 TIMES DAILY
Qty: 60 TABLET | Refills: 1 | Status: SHIPPED | OUTPATIENT
Start: 2024-06-25 | End: 2024-08-24

## 2024-06-25 RX ORDER — FLUOXETINE HYDROCHLORIDE 40 MG/1
40 CAPSULE ORAL DAILY
Qty: 30 CAPSULE | Refills: 1 | Status: SHIPPED | OUTPATIENT
Start: 2024-06-26 | End: 2024-08-25

## 2024-06-25 RX ADMIN — INSULIN LISPRO 2 UNITS: 100 INJECTION, SOLUTION INTRAVENOUS; SUBCUTANEOUS at 17:00

## 2024-06-25 RX ADMIN — FOLIC ACID 1 MG: 1 TABLET ORAL at 08:48

## 2024-06-25 RX ADMIN — TOPIRAMATE 25 MG: 25 TABLET, FILM COATED ORAL at 17:00

## 2024-06-25 RX ADMIN — GABAPENTIN 600 MG: 300 CAPSULE ORAL at 21:05

## 2024-06-25 RX ADMIN — INSULIN LISPRO 1 UNITS: 100 INJECTION, SOLUTION INTRAVENOUS; SUBCUTANEOUS at 21:04

## 2024-06-25 RX ADMIN — MELATONIN TAB 3 MG 3 MG: 3 TAB at 21:04

## 2024-06-25 RX ADMIN — TOPIRAMATE 25 MG: 25 TABLET, FILM COATED ORAL at 08:48

## 2024-06-25 RX ADMIN — GABAPENTIN 600 MG: 300 CAPSULE ORAL at 17:00

## 2024-06-25 RX ADMIN — FLUOXETINE 40 MG: 20 CAPSULE ORAL at 08:48

## 2024-06-25 RX ADMIN — EMPAGLIFLOZIN 25 MG: 25 TABLET, FILM COATED ORAL at 08:50

## 2024-06-25 RX ADMIN — MIRTAZAPINE 7.5 MG: 7.5 TABLET, FILM COATED ORAL at 21:04

## 2024-06-25 RX ADMIN — GABAPENTIN 600 MG: 300 CAPSULE ORAL at 08:48

## 2024-06-25 RX ADMIN — CYANOCOBALAMIN TAB 1000 MCG 1000 MCG: 1000 TAB at 08:48

## 2024-06-25 RX ADMIN — SITAGLIPTIN 100 MG: 100 TABLET, FILM COATED ORAL at 08:48

## 2024-06-25 RX ADMIN — INSULIN LISPRO 1 UNITS: 100 INJECTION, SOLUTION INTRAVENOUS; SUBCUTANEOUS at 11:53

## 2024-06-25 RX ADMIN — QUETIAPINE 800 MG: 400 TABLET ORAL at 21:05

## 2024-06-25 NOTE — CASE MANAGEMENT
CM called the patient's sister, Susana Klein, [phone] to confirm the patient's discharge for tomorrow. No answer, CM left a VM requesting a call back.

## 2024-06-25 NOTE — NURSING NOTE
Pt anxious with poor insight into a diabetic diet, about ten sugar packets were taken from her tray at lunch.   at 1100.  Good appetite and steady gait.  VSS.  Did not attend group.  Denied SI.  Monitored for safety and support.

## 2024-06-25 NOTE — NURSING NOTE
Pt anxious and irritable visible in milieu requiring frequent redirection during meals. Pt has poor insight into diabetic diet. Pt requested information about 72 hour notice. Nurse explained to Pt that if she signed a 72 hour notice the attending physician would determine if an involuntary commitment was necessary. Pt understood. Pt cooperative with care and medication compliant.

## 2024-06-25 NOTE — BH TRANSITION RECORD
Contact Information: If you have any questions, concerns, pended studies, tests and/or procedures, or emergencies regarding your inpatient behavioral health visit. Please contact [facility] [phone] and ask to speak to a , nurse or physician. A contact is available 24 hours/ 7 days a week at this number.     Summary of Procedures Performed During your Stay:  Below is a list of major procedures performed during your hospital stay and a summary of results:  - Cardiac Procedures/Studies: ECG.  - Major Imaging Studies: [place] Upper GI .    ECG 12 lead  Order: [id number]   Status: Final result       Visible to patient: Yes (not seen)       Next appt: None    0 Result Notes            Component  Ref Range & Units 6/20/24 2022 6/20/24 0835 6/20/24 0835 6/19/24 2028 6/19/24 1328 6/19/24 0135 6/19/24 0024   Ventricular Rate  BPM 95 106 106 92 90 91 127   Atrial Rate  BPM 95 106 106 92 90 91 127   DE Interval  ms 128 132 134 134 142 150 112   QRSD Interval  ms 74 76 74 80 83 84 80   QT Interval  ms 374 368 364 392 392 408 402   QTC Interval  ms 469 488 483 484 480 501 584   P Axis  degrees 62 49 47 66 59 57 -38   QRS Axis  degrees -11 -15 -17 25 13 5 20   T Wave Axis  degrees 39 28 20 51 59 45 67              Narrative & Impression    Normal sinus rhythm  Normal ECG  When compared with ECG of 20-JUN-2024 08:35,  No significant change was found             Study Result    Narrative & Impression   UPPER GI SERIES -     INDICATION: h/o gastric bypass.     COMPARISON: CT 6/12/2024.     TECHNIQUE: The patient was given thin barium by mouth and images of the esophagus, stomach, and small bowel were obtained.     IMAGES: 21     FLUOROSCOPY TIME: .5 min     FINDINGS:     The esophagus is normal in caliber. Esophageal motility is normal and emptying of contrast from the esophagus is prompt. There is no mucosal mass, ulceration or fold thickening identified.     Patient is status  post gastric bypass. Free passage of contrast into the anastomosed small bowel is identified. No findings to suggest gastrogastric fistula noted during the exam.     Gastroesophageal reflux was not observed.     There is no hiatal hernia.     IMPRESSION:     Status post gastric bypass, no findings to suggest gastrogastric fistula.       Pending Studies (From admission, onward)       Start     Ordered    06/24/24 1430  T pallidum Antibody, EIA  Once         06/24/24 1429                  Please follow up on the above pending studies with your PCP and/or referring provider.

## 2024-06-25 NOTE — TREATMENT TEAM
06/25/24 0742   Team Meeting   Meeting Type Daily Rounds   Initial Conference Date 06/25/24   Team Members Present   Team Members Present Physician;Nurse;;   Physician Team Member Dr. Milner, Dr. Phillips, Greta PRASAD   Nursing Team Member Noreen   Care Management Team Member Stephenie   Social Work Team Member Mehreen   Patient/Family Present   Patient Present No   Patient's Family Present No     PRN Atarax at 13h07. Attended groups. Patient will need GI referral for HepAB&C. Planned discharge to home tomorrow 6/26/24 @ 11h.

## 2024-06-25 NOTE — PLAN OF CARE
Problem: Ineffective Coping  Goal: Identifies ineffective coping skills  Outcome: Progressing  Goal: Identifies healthy coping skills  Outcome: Progressing  Goal: Demonstrates healthy coping skills  Outcome: Progressing  Goal: Participates in unit activities  Description: Interventions:  - Provide therapeutic environment   - Provide required programming   - Redirect inappropriate behaviors   Outcome: Progressing  Goal: Patient/Family participate in treatment and DC plans  Description: Interventions:  - Provide therapeutic environment  Outcome: Progressing  Goal: Patient/Family verbalizes awareness of resources  Outcome: Progressing  Goal: Understands least restrictive measures  Description: Interventions:  - Utilize least restrictive behavior  Outcome: Progressing  Goal: Free from restraint events  Description: - Utilize least restrictive measures   - Provide behavioral interventions   - Redirect inappropriate behaviors   Outcome: Progressing     Problem: Risk for Self Injury/Neglect  Goal: Treatment Goal: Remain safe during length of stay, learn and adopt new coping skills, and be free of self-injurious ideation, impulses and acts at the time of discharge  Outcome: Progressing  Goal: Verbalize thoughts and feelings  Description: Interventions:  - Assess and re-assess patient's lethality and potential for self-injury  - Engage patient in 1:1 interactions, daily, for a minimum of 15 minutes  - Encourage patient to express feelings, fears, frustrations, hopes  - Establish rapport/trust with patient   Outcome: Progressing  Goal: Refrain from harming self  Description: Interventions:  - Monitor patient closely, per order  - Develop a trusting relationship  - Supervise medication ingestion, monitor effects and side effects   Outcome: Progressing  Goal: Attend and participate in unit activities, including therapeutic, recreational, and educational groups  Description: Interventions:  - Provide therapeutic and  educational activities daily, encourage attendance and participation, and document same in the medical record  - Obtain collateral information, encourage visitation and family involvement in care   Outcome: Progressing  Goal: Recognize maladaptive responses and adopt new coping mechanisms  Outcome: Progressing  Goal: Complete daily ADLs, including personal hygiene independently, as able  Description: Interventions:  - Observe, teach, and assist patient with ADLS  - Monitor and promote a balance of rest/activity, with adequate nutrition and elimination  Outcome: Progressing    Goal: Maintain or return to baseline ADL function  Description: INTERVENTIONS:  -  Assess patient's ability to carry out ADLs; assess patient's baseline for ADL function and identify physical deficits which impact ability to perform ADLs (bathing, care of mouth/teeth, toileting, grooming, dressing, etc.)  - Assess/evaluate cause of self-care deficits   - Assess range of motion  - Assess patient's mobility; develop plan if impaired  - Assess patient's need for assistive devices and provide as appropriate  - Encourage maximum independence but intervene and supervise when necessary  - Involve family in performance of ADLs  - Assess for home care needs following discharge   - Consider OT consult to assist with ADL evaluation and planning for discharge  - Provide patient education as appropriate  Outcome: Progressing     Problem: Nutrition/Hydration-ADULT  Goal: Nutrient/Hydration intake appropriate for improving, restoring or maintaining nutritional needs  Description: Monitor and assess patient's nutrition/hydration status for malnutrition. Collaborate with interdisciplinary team and initiate plan and interventions as ordered.  Monitor patient's weight and dietary intake as ordered or per policy. Utilize nutrition screening tool and intervene as necessary. Determine patient's food preferences and provide high-protein, high-caloric foods as  appropriate.     INTERVENTIONS:  - Monitor oral intake, urinary output, labs, and treatment plans  - Assess nutrition and hydration status and recommend course of action  - Evaluate amount of meals eaten  - Assist patient with eating if necessary   - Allow adequate time for meals  - Recommend/ encourage appropriate diets, oral nutritional supplements, and vitamin/mineral supplements  - Order, calculate, and assess calorie counts as needed  - Recommend, monitor, and adjust tube feedings and TPN/PPN based on assessed needs  - Assess need for intravenous fluids  - Provide specific nutrition/hydration education as appropriate  - Include patient/family/caregiver in decisions related to nutrition  Outcome: Progressing
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
## 2024-06-25 NOTE — PROGRESS NOTES
Progress Note - Behavioral Health   Edith Klein 59 y.o. female MRN: [id number]  Unit/Bed#: [facility] 645-01 Encounter: [id number]    Assessment & Plan   Principal Problem:    Bipolar affective disorder, depressed, severe (HCC)  Active Problems:    COPD (chronic obstructive pulmonary disease) (HCC)    Type 2 diabetes mellitus without complication (HCC)    S/P gastric bypass    Seizure (HCC)    Overdose of undetermined intent    Forehead laceration    Migraine with aura and without status migrainosus, not intractable    Medical clearance for psychiatric admission    Personal history of fall    Vitamin D deficiency    Severe protein-calorie malnutrition (HCC)    Right upper quadrant abdominal pain      Recommended Treatment:      No psychopharmacologic changes necessary at this time; will continue to assess for further optimization.  Continue with current medications:  Vitamin B12 1000 mcg daily for supplementation.  Vitamin D2 50,000 units q. weekly for supplementation.  Prozac 40 mg daily for mood symptoms.  Gabapentin 600 mg 3 times daily for anxiety and neuropathy.  Melatonin 3 mg at bedtime for insomnia.  Remeron 7.5 mg at bedtime for mood, appetite and insomnia.  Seroquel 800 mg at bedtime for mood stabilization and psychotic symptoms.  Topamax 25 mg twice daily for migraine.   Continue with group therapy, milieu therapy and occupational therapy.    Continue frequent safety checks and vitals per unit protocol.  Continue with SLIM medical management as indicated  Continue coordinating with case management regarding disposition    Legal Status: 201  Disposition: coordinating with case management, tentative discharge Wednesday on 6/26/2024    Case discussed with treatment team.  Risks, benefits and possible side effects of Medications: Risks, benefits, and possible side effects of medications have previously been explained. No new medications at this  "time.    ------------------------------------------------------------    Subjective: Patient's chart was reviewed, and patient's progress and plan was discussed with treatment team. Per nursing report, Edith has been endorsing anxiety but denying other psychiatric symptoms, visible, social with peers, polite and cooperative on the unit and compliant with medications. Last night the patient reported anxiety and received PRN Atarax 25 mg, which was ineffective. Patient has remained in behavioral control for the last 24 hours. Last night patient was documented to have slept throughout the night.    Edith was evaluated this morning for continuity of care. On examination, Edith is superficially cooperative, calm, sitting comfortably, and looking forward to discharge tomorrow. She states her mood is \" excellent.\" She reports sleeping well and her energy level today is good. Her appetite has been improving and she is seen finishing breakfast and denies any abdominal discomfort. She denies adverse effects from medications. She denies suicidal ideation, homicidal ideation, auditory hallucinations, and visual hallucinations. Her goals for today are to remain in behavioral control, medication compliant, and attend some groups in preparation for upcoming discharge tomorrow.    VS: Reviewed,  BMI of 18.70, otherwise within normal limits    Progress Toward Goals: Continued improvement    Psychiatric Review of Systems:  Behavior over the last 24 hours: improved  Sleep: improving  Appetite: adequate  Medication side effects: none verbalized  Medical ROS:  No cough, chest pain, nausea, vomiting, diarrhea, all other symptoms are negative.    Vital signs in last 24 hours:  Temp:  [97 °F (36.1 °C)-97.8 °F (36.6 °C)] 97 °F (36.1 °C)  HR:  [80-92] 91  Resp:  [16-20] 16  BP: (103-125)/(58-77) 114/67    Mental Status Exam:    Appearance:  alert, good eye contact, appears older than stated age, casually dressed, appropriate grooming and " "hygiene, poor dentition, thin, and smiling   Behavior:  calm, sitting comfortably, and superficially cooperative   Speech:  spontaneous, clear, normal rate, normal volume, and coherent   Mood:  \"Excellent\"   Affect:  Brighter, euthymic, mood congruent   Thought Process:  Organized, logical, goal-directed   Associations: intact associations   Thought Content:  no verbalized delusions or overt paranoia   Perceptual Disturbances: denies current hallucinations and does not appear to be responding to internal stimuli at this time   Risk Potential: Suicidal ideation - None at present  Homicidal ideation - None at present  Potential for aggression - Not at present   Sensorium:  oriented to person, place, time/date, and situation   Memory:  recent and remote memory grossly intact   Consciousness:  alert and awake   Attention/Concentration: attention span and concentration are age appropriate   Insight:  improving   Judgment: improving   Gait/Station: normal gait/station   Motor Activity: no abnormal movements     Current Medications:  Current Facility-Administered Medications   Medication Dose Route Frequency Provider Last Rate    acetaminophen  650 mg Oral Q4H PRN DE MyersNP      acetaminophen  650 mg Oral Q4H PRN OSMAR Myers      acetaminophen  975 mg Oral Q6H PRN OSMAR Myers      albuterol  2 puff Inhalation Q4H PRN OSMAR Polanco      cyanocobalamin  1,000 mcg Oral Daily OSMAR Polanco      Empagliflozin  25 mg Oral Daily OSMAR Polanco      ergocalciferol  50,000 Units Oral Weekly OSMAR Polanco      FLUoxetine  40 mg Oral Daily Candace Milner MD      folic acid  1 mg Oral Daily OSMAR Polanco      gabapentin  600 mg Oral TID Candace Milner MD      haloperidol lactate  5 mg Intramuscular Q4H PRN Max 4/day OSMAR Myers      hydrOXYzine HCL  25 mg Oral Q6H PRN Max 4/day OSMAR Myers      hydrOXYzine HCL  50 mg Oral Q6H " PRN Max 4/day Greta Kerns, CRNP      insulin lispro  1-5 Units Subcutaneous 4x Daily (AC & HS) Jose L Kenney Luci, CRNP      LORazepam  1 mg Intramuscular Q6H PRN Max 3/day Greta Kerns, CRNP      LORazepam  1 mg Oral Q6H PRN Max 3/day Greta Kerns, CRNP      melatonin  3 mg Oral HS Greta Kerns, CRNP      mirtazapine  7.5 mg Oral HS Candace Milner MD      nicotine polacrilex  4 mg Oral Q2H PRN Greta Kerns, CRNP      polyethylene glycol  17 g Oral Daily PRN Greta Kerns, CRNP      QUEtiapine  800 mg Oral HS Candace Milner MD      risperiDONE  0.25 mg Oral Q4H PRN Max 6/day Greta Kerns, CRNP      risperiDONE  0.5 mg Oral Q4H PRN Max 3/day Greta Kerns, CRNP      risperiDONE  1 mg Oral Q2H PRN Max 3/day Greta Kerns, CRNP      senna-docusate sodium  1 tablet Oral Daily PRN Greta Kerns, CRNP      sitaGLIPtin  100 mg Oral Daily Alicia Lion, CRNP      topiramate  25 mg Oral BID Greta Kerns, CRNP      traZODone  50 mg Oral HS PRN Greta Kerns, CRNP         Behavioral Health Medications: all current active meds have been reviewed. Changes as in plan section above.    Laboratory results:  I have personally reviewed all pertinent laboratory/tests results.   Recent Results (from the past 48 hour(s))   Fingerstick Glucose (POCT)    Collection Time: 06/23/24 11:25 AM   Result Value Ref Range    POC Glucose 153 (H) 65 - 140 mg/dl   Fingerstick Glucose (POCT)    Collection Time: 06/23/24  4:05 PM   Result Value Ref Range    POC Glucose 208 (H) 65 - 140 mg/dl   Fingerstick Glucose (POCT)    Collection Time: 06/23/24  9:14 PM   Result Value Ref Range    POC Glucose 157 (H) 65 - 140 mg/dl   Fingerstick Glucose (POCT)    Collection Time: 06/24/24  8:42 AM   Result Value Ref Range    POC Glucose 145 (H) 65 - 140 mg/dl   Fingerstick Glucose (POCT)    Collection Time: 06/24/24 11:39 AM   Result Value Ref Range    POC Glucose 140 65 - 140 mg/dl   Fingerstick Glucose (POCT)     Collection Time: 06/24/24  4:29 PM   Result Value Ref Range    POC Glucose 157 (H) 65 - 140 mg/dl   Fingerstick Glucose (POCT)    Collection Time: 06/24/24  9:20 PM   Result Value Ref Range    POC Glucose 201 (H) 65 - 140 mg/dl   Fingerstick Glucose (POCT)    Collection Time: 06/25/24  7:11 AM   Result Value Ref Range    POC Glucose 116 65 - 140 mg/dl        This note has been constructed using a voice recognition system. There may be translation, syntax, or grammatical errors. If you have any questions, please contact the dictating author.    Kulwant Phillips, DO  Psychiatry Residency, PGY-2

## 2024-06-26 VITALS
TEMPERATURE: 97.5 F | DIASTOLIC BLOOD PRESSURE: 61 MMHG | SYSTOLIC BLOOD PRESSURE: 107 MMHG | BODY MASS INDEX: 18.9 KG/M2 | HEART RATE: 94 BPM | RESPIRATION RATE: 18 BRPM | WEIGHT: 102.7 LBS | OXYGEN SATURATION: 100 % | HEIGHT: 62 IN

## 2024-06-26 LAB — GLUCOSE SERPL-MCNC: 163 MG/DL (ref 65–140)

## 2024-06-26 PROCEDURE — 82948 REAGENT STRIP/BLOOD GLUCOSE: CPT

## 2024-06-26 PROCEDURE — 99238 HOSP IP/OBS DSCHRG MGMT 30/<: CPT | Performed by: STUDENT IN AN ORGANIZED HEALTH CARE EDUCATION/TRAINING PROGRAM

## 2024-06-26 RX ADMIN — HYDROXYZINE HYDROCHLORIDE 25 MG: 25 TABLET, FILM COATED ORAL at 09:38

## 2024-06-26 RX ADMIN — FLUOXETINE 40 MG: 20 CAPSULE ORAL at 08:29

## 2024-06-26 RX ADMIN — TOPIRAMATE 25 MG: 25 TABLET, FILM COATED ORAL at 08:29

## 2024-06-26 RX ADMIN — INSULIN LISPRO 1 UNITS: 100 INJECTION, SOLUTION INTRAVENOUS; SUBCUTANEOUS at 08:29

## 2024-06-26 RX ADMIN — CYANOCOBALAMIN TAB 1000 MCG 1000 MCG: 1000 TAB at 08:29

## 2024-06-26 RX ADMIN — SITAGLIPTIN 100 MG: 100 TABLET, FILM COATED ORAL at 08:29

## 2024-06-26 RX ADMIN — GABAPENTIN 600 MG: 300 CAPSULE ORAL at 08:29

## 2024-06-26 RX ADMIN — FOLIC ACID 1 MG: 1 TABLET ORAL at 08:29

## 2024-06-26 NOTE — TREATMENT TEAM
06/26/24 0752   Team Meeting   Meeting Type Daily Rounds   Initial Conference Date 06/26/24   Team Members Present   Team Members Present Physician;Nurse;;   Physician Team Member Dr. Milner, Gerta Guzman   Nursing Team Member Kathleen Sorto   Care Management Team Member Stephenie   Social Work Team Member Mehreen   Patient/Family Present   Patient Present No   Patient's Family Present No     Discharge to home today at 11h.    English

## 2024-06-26 NOTE — PROGRESS NOTES
06/26/24 0746   Discharge Planning   Living Arrangements Lives Alone   Support Systems Self;Family members;Therapist;Psychiatrist   Assistance Needed None   Type of Current Residence Private residence   Current Home Care Services No   DME Referral Provided   DME Needed: None   Other Referral/Resources/Interventions Provided:   Referrals Provided: Crisis Hotline   Discharge Communications   Discharge planning discussed with: Patient   Freedom of Choice No   Transportation at Discharge? No  (Patient's sister is picking her up)   Contacts   Patient Contacts Susana Klein (sister)   Relationship to Patient: Family   Contact Method Phone   Phone Number    Reason/Outcome Emergency Contact   [facility]   Would you like to participate in our [facility] service program?   No - Declined

## 2024-06-26 NOTE — PLAN OF CARE
Problem: SAFETY ADULT  Goal: Patient will remain free of falls  Description: INTERVENTIONS:  - Educate patient/family on patient safety including physical limitations  - Instruct patient to call for assistance with activity   - Consult OT/PT to assist with strengthening/mobility   - Keep Call bell within reach  - Keep bed low and locked with side rails adjusted as appropriate  - Keep care items and personal belongings within reach  - Initiate and maintain comfort rounds  - Make Fall Risk Sign visible to staff  - Offer Toileting every 2 Hours, in advance of need  - Initiate/Maintain bed alarm  - Obtain necessary fall risk management equipment:   - Apply yellow socks and bracelet for high fall risk patients  - Consider moving patient to room near nurses station  Outcome: Progressing  Goal: Maintain or return to baseline ADL function  Description: INTERVENTIONS:  -  Assess patient's ability to carry out ADLs; assess patient's baseline for ADL function and identify physical deficits which impact ability to perform ADLs (bathing, care of mouth/teeth, toileting, grooming, dressing, etc.)  - Assess/evaluate cause of self-care deficits   - Assess range of motion  - Assess patient's mobility; develop plan if impaired  - Assess patient's need for assistive devices and provide as appropriate  - Encourage maximum independence but intervene and supervise when necessary  - Involve family in performance of ADLs  - Assess for home care needs following discharge   - Consider OT consult to assist with ADL evaluation and planning for discharge  - Provide patient education as appropriate  Outcome: Progressing  Goal: Maintains/Returns to pre admission functional level  Description: INTERVENTIONS:  - Perform AM-PAC 6 Click Basic Mobility/ Daily Activity assessment daily.  - Set and communicate daily mobility goal to care team and patient/family/caregiver.   - Collaborate with rehabilitation services on mobility goals if consulted  -  Perform Range of Motion 3 times a day.  - Reposition patient every 2 hours.  - Dangle patient 3 times a day  - Stand patient 3 times a day  - Ambulate patient 3 times a day  - Out of bed to chair 3 times a day   - Out of bed for meals 3 times a day  - Out of bed for toileting  - Record patient progress and toleration of activity level   Outcome: Progressing     Problem: Potential for Falls  Goal: Patient will remain free of falls  Description: INTERVENTIONS:  - Educate patient/family on patient safety including physical limitations  - Instruct patient to call for assistance with activity   - Consult OT/PT to assist with strengthening/mobility   - Keep Call bell within reach  - Keep bed low and locked with side rails adjusted as appropriate  - Keep care items and personal belongings within reach  - Initiate and maintain comfort rounds  - Make Fall Risk Sign visible to staff  - Offer Toileting every 2 Hours, in advance of need  - Initiate/Maintain bed alarm  - Obtain necessary fall risk management equipment:   - Apply yellow socks and bracelet for high fall risk patients  - Consider moving patient to room near nurses station  Outcome: Progressing

## 2024-06-26 NOTE — PLAN OF CARE
Problem: DISCHARGE PLANNING - CARE MANAGEMENT  Goal: Discharge to post-acute care or home with appropriate resources  Description: INTERVENTIONS:  - Conduct assessment to determine patient/family and health care team treatment goals, and need for post-acute services based on payer coverage, community resources, and patient preferences, and barriers to discharge  - Address psychosocial, clinical, and financial barriers to discharge as identified in assessment in conjunction with the patient/family and health care team  - Arrange appropriate level of post-acute services according to patient’s   needs and preference and payer coverage in collaboration with the physician and health care team  - Communicate with and update the patient/family, physician, and health care team regarding progress on the discharge plan  - Arrange appropriate transportation to post-acute venues  Outcome: Adequate for Discharge    The patient is cleared to discharge home today.  The patient will discharge at 11h with her sister. The patient is in agreement with her discharge plan. The patient will continue following up with [facility] for OP medication management and therapy. The patient's medications were sent to her preferred pharmacy, [facility]. Additional resources were put onto the patient's AVS including the crisis number.

## 2024-06-26 NOTE — NURSING NOTE
"Patient is visible on the unit. She is restless and reporting anxiety but states \"Once I leave out that door I'll be great!\" PRN Atarax given at 0938 was effective. She reports readiness for discharge and is excited to leave. Patient reports that she will not be compliant with insulin regimen once at home. Education provided. Patient left with all belongings brought in with her. AVS discharge summary reviewed with patient. While reviewing medications she stated \"I'm not taking that when I get home.\" Patient encouraged to continue medication regimen that she was prescribed during this admission. She was discharged home and picked up by her sister.   "

## 2024-07-07 ENCOUNTER — HOSPITAL ENCOUNTER (EMERGENCY)
Facility: HOSPITAL | Age: 59
Discharge: HOME | End: 2024-07-07
Attending: EMERGENCY MEDICINE
Payer: COMMERCIAL

## 2024-07-07 VITALS
RESPIRATION RATE: 18 BRPM | TEMPERATURE: 97.6 F | SYSTOLIC BLOOD PRESSURE: 154 MMHG | HEART RATE: 104 BPM | DIASTOLIC BLOOD PRESSURE: 81 MMHG | BODY MASS INDEX: 19.55 KG/M2 | WEIGHT: 106.9 LBS | OXYGEN SATURATION: 96 %

## 2024-07-07 DIAGNOSIS — R10.11 RUQ ABDOMINAL PAIN: Primary | ICD-10-CM

## 2024-07-07 DIAGNOSIS — E87.1 HYPONATREMIA: ICD-10-CM

## 2024-07-07 DIAGNOSIS — Z98.890 STATUS POST GASTRIC SURGERY: ICD-10-CM

## 2024-07-07 LAB
ALBUMIN SERPL BCG-MCNC: 3.6 G/DL (ref 3.5–5)
ALP SERPL-CCNC: 201 U/L (ref 34–104)
ALT SERPL W P-5'-P-CCNC: 12 U/L (ref 7–52)
ANION GAP SERPL CALCULATED.3IONS-SCNC: 7 MMOL/L (ref 4–13)
AST SERPL W P-5'-P-CCNC: 14 U/L (ref 13–39)
BASOPHILS # BLD AUTO: 0.02 THOUSANDS/ÂΜL (ref 0–0.1)
BASOPHILS NFR BLD AUTO: 0 % (ref 0–1)
BILIRUB SERPL-MCNC: 0.26 MG/DL (ref 0.2–1)
BUN SERPL-MCNC: 10 MG/DL (ref 5–25)
CALCIUM SERPL-MCNC: 9.1 MG/DL (ref 8.4–10.2)
CHLORIDE SERPL-SCNC: 97 MMOL/L (ref 96–108)
CO2 SERPL-SCNC: 29 MMOL/L (ref 21–32)
CREAT SERPL-MCNC: 0.46 MG/DL (ref 0.6–1.3)
EOSINOPHIL # BLD AUTO: 0.1 THOUSAND/ÂΜL (ref 0–0.61)
EOSINOPHIL NFR BLD AUTO: 2 % (ref 0–6)
ERYTHROCYTE [DISTWIDTH] IN BLOOD BY AUTOMATED COUNT: 14.6 % (ref 11.6–15.1)
GFR SERPL CREATININE-BSD FRML MDRD: 110 ML/MIN/1.73SQ M
GLUCOSE SERPL-MCNC: 122 MG/DL (ref 65–140)
HCT VFR BLD AUTO: 36.4 % (ref 34.8–46.1)
HGB BLD-MCNC: 12.1 G/DL (ref 11.5–15.4)
IMM GRANULOCYTES # BLD AUTO: 0.02 THOUSAND/UL (ref 0–0.2)
IMM GRANULOCYTES NFR BLD AUTO: 0 % (ref 0–2)
LIPASE SERPL-CCNC: 10 U/L (ref 11–82)
LYMPHOCYTES # BLD AUTO: 2.17 THOUSANDS/ÂΜL (ref 0.6–4.47)
LYMPHOCYTES NFR BLD AUTO: 35 % (ref 14–44)
MCH RBC QN AUTO: 33.3 PG (ref 26.8–34.3)
MCHC RBC AUTO-ENTMCNC: 33.2 G/DL (ref 31.4–37.4)
MCV RBC AUTO: 100 FL (ref 82–98)
MONOCYTES # BLD AUTO: 0.37 THOUSAND/ÂΜL (ref 0.17–1.22)
MONOCYTES NFR BLD AUTO: 6 % (ref 4–12)
NEUTROPHILS # BLD AUTO: 3.46 THOUSANDS/ÂΜL (ref 1.85–7.62)
NEUTS SEG NFR BLD AUTO: 57 % (ref 43–75)
NRBC BLD AUTO-RTO: 0 /100 WBCS
PLATELET # BLD AUTO: 432 THOUSANDS/UL (ref 149–390)
PMV BLD AUTO: 8.9 FL (ref 8.9–12.7)
POTASSIUM SERPL-SCNC: 3.7 MMOL/L (ref 3.5–5.3)
PROT SERPL-MCNC: 6.8 G/DL (ref 6.4–8.4)
RBC # BLD AUTO: 3.63 MILLION/UL (ref 3.81–5.12)
SODIUM SERPL-SCNC: 133 MMOL/L (ref 135–147)
WBC # BLD AUTO: 6.14 THOUSAND/UL (ref 4.31–10.16)

## 2024-07-07 PROCEDURE — 36415 COLL VENOUS BLD VENIPUNCTURE: CPT

## 2024-07-07 PROCEDURE — 83690 ASSAY OF LIPASE: CPT

## 2024-07-07 PROCEDURE — 80053 COMPREHEN METABOLIC PANEL: CPT

## 2024-07-07 PROCEDURE — 99284 EMERGENCY DEPT VISIT MOD MDM: CPT

## 2024-07-07 PROCEDURE — 96365 THER/PROPH/DIAG IV INF INIT: CPT

## 2024-07-07 PROCEDURE — 96375 TX/PRO/DX INJ NEW DRUG ADDON: CPT

## 2024-07-07 PROCEDURE — 85025 COMPLETE CBC W/AUTO DIFF WBC: CPT

## 2024-07-07 RX ORDER — ZOLPIDEM TARTRATE 10 MG/1
10 TABLET ORAL
COMMUNITY
Start: 2024-07-01

## 2024-07-07 RX ORDER — ONDANSETRON 2 MG/ML
4 INJECTION INTRAMUSCULAR; INTRAVENOUS ONCE
Status: COMPLETED | OUTPATIENT
Start: 2024-07-07 | End: 2024-07-07

## 2024-07-07 RX ADMIN — Medication 700 MG: at 13:39

## 2024-07-07 RX ADMIN — ONDANSETRON 4 MG: 2 INJECTION INTRAMUSCULAR; INTRAVENOUS at 13:27

## 2024-07-07 RX ADMIN — SODIUM CHLORIDE 1000 ML: 0.9 INJECTION, SOLUTION INTRAVENOUS at 13:27

## 2024-07-07 NOTE — DISCHARGE INSTRUCTIONS
Please follow up with your bariatric surgeon.    UPPER GI SERIES -     INDICATION: h/o gastric bypass.     COMPARISON: CT 6/12/2024.     TECHNIQUE: The patient was given thin barium by mouth and images of the esophagus, stomach, and small bowel were obtained.     IMAGES: 21     FLUOROSCOPY TIME: .5 min     FINDINGS:     The esophagus is normal in caliber. Esophageal motility is normal and emptying of contrast from the esophagus is prompt. There is no mucosal mass, ulceration or fold thickening identified.     Patient is status post gastric bypass. Free passage of contrast into the anastomosed small bowel is identified. No findings to suggest gastrogastric fistula noted during the exam.     Gastroesophageal reflux was not observed.     There is no hiatal hernia.     IMPRESSION:     Status post gastric bypass, no findings to suggest gastrogastric fistula.

## 2024-07-07 NOTE — ED PROVIDER NOTES
History  Chief Complaint   Patient presents with    Abdominal Pain     States she had gastric bypass surgery in 2017, ongoing issues since. States she has an appt with GI in a few weeks. +pain, +nausea, +vomiting. Taking tramadol for pain, last dose 7:30am.      Edith is a 59 year old female with a PMHx DM, gastric bypass in 2017 presenting to the ED for epigastric abdominal pain x several months. Was seen here last month for same complaint 2x, was told her gastric bypass is leaking. Followed up with the bariatric surgeon six days ago and he is recommending a colonoscopy at this time to evaluate, this is scheduled in another month or so. The pain remains a constant 10/10, never goes down, there is no radiation of the pain. Takes Tramadol without relief, last dose at 0730 this morning. Is having difficulty eating and is losing weight as a result due to the nausea and vomiting. Eats a little bit of everything. Having normal bowel movements.         Prior to Admission Medications   Prescriptions Last Dose Informant Patient Reported? Taking?   Empagliflozin 25 MG TABS Unknown  No No   Sig: Take 1 tablet (25 mg total) by mouth daily   FLUoxetine (PROzac) 40 MG capsule   No No   Sig: Take 1 capsule (40 mg total) by mouth daily   OneTouch Ultra test strip   Yes No   QUEtiapine (SEROquel) 400 MG tablet   No No   Sig: Take 2 tablets (800 mg total) by mouth daily at bedtime   Trulicity 1.5 MG/0.5ML injection Not Taking  Yes No   Patient not taking: Reported on 7/7/2024   Varenicline Tartrate (CHANTIX STARTING MONTH JENNIFER PO) Not Taking  Yes No   Patient not taking: Reported on 7/7/2024   albuterol (PROVENTIL HFA,VENTOLIN HFA) 90 mcg/act inhaler Not Taking  Yes No   Sig: Inhale 2 puffs every 4 (four) hours as needed for wheezing or shortness of breath   Patient not taking: Reported on 6/20/2024   azaTHIOprine (IMURAN) 50 mg tablet Unknown Self Yes No   Sig: Take 100 mg by mouth daily   cyanocobalamin (VITAMIN B-12) 1000 MCG  tablet Not Taking  No No   Sig: Take 1 tablet (1,000 mcg total) by mouth daily   Patient not taking: Reported on 7/7/2024   ergocalciferol (VITAMIN D2) 50,000 units Not Taking  No No   Sig: Take 1 capsule (50,000 Units total) by mouth once a week for 8 doses Do not start before June 29, 2024.   Patient not taking: Reported on 7/7/2024   folic acid (FOLVITE) 1 mg tablet Not Taking  No No   Sig: Take 1 tablet (1 mg total) by mouth daily   Patient not taking: Reported on 7/7/2024   gabapentin (NEURONTIN) 300 mg capsule   No No   Sig: Take 2 capsules (600 mg total) by mouth 3 (three) times a day   linaCLOtide (Linzess) 72 MCG CAPS   Yes No   Sig: Take 1 capsule by mouth daily   mirtazapine (REMERON) 7.5 MG tablet Not Taking  No No   Sig: Take 1 tablet (7.5 mg total) by mouth daily at bedtime   Patient not taking: Reported on 7/7/2024   pantoprazole (PROTONIX) 40 mg tablet Not Taking  Yes No   Sig: Take 40 mg by mouth daily   Patient not taking: Reported on 7/7/2024   sitaGLIPtin (JANUVIA) 100 mg tablet   No No   Sig: Take 1 tablet (100 mg total) by mouth daily   topiramate (TOPAMAX) 25 mg tablet   No No   Sig: Take 1 tablet (25 mg total) by mouth 2 (two) times a day   zolpidem (AMBIEN) 10 mg tablet   Yes Yes   Sig: Take 10 mg by mouth daily at bedtime as needed      Facility-Administered Medications: None       Past Medical History:   Diagnosis Date    Autoimmune hepatitis (HCC)     Bipolar 1 disorder (HCC)     Chronic headaches 2021    Diabetes mellitus (HCC)     Kidney stone     Renal disorder     kidney stones    Seizure disorder (HCC)        Past Surgical History:   Procedure Laterality Date    BARIATRIC SURGERY  05/2017    BUNIONECTOMY      CYSTOSCOPY  07/12/2018    Stent Removal    FL RETROGRADE PYELOGRAM  6/16/2022    GASTRIC BYPASS  05/22/2017    HIATAL HERNIA REPAIR  05/22/2017    HYSTERECTOMY      JOINT REPLACEMENT      KIDNEY STONE SURGERY      TN CYSTO/URETERO W/LITHOTRIPSY &INDWELL STENT INSRT Bilateral  7/6/2018    Procedure: CYSTOSCOPY GALDINO. URETEROSCOPY;GALDINO.  RETROGRADE PYELOGRAM AND INSERTION GALDINO.STENT URETERAL;  Surgeon: Jacob Gerber MD;  Location: [facility];  Service: Urology    UT CYSTO/URETERO W/LITHOTRIPSY &INDWELL STENT INSRT Left 6/16/2022    Procedure: CYSTOSCOPY URETEROSCOPY WITH LITHOTRIPSY HOLMIUM LASER, RETROGRADE PYELOGRAM AND INSERTION STENT URETERAL;  Surgeon: Asad Geiger MD;  Location: [facility];  Service: Urology    TOTAL SHOULDER REPLACEMENT  2002    VAGINAL HYSTERECTOMY      PARTIAL       Family History   Problem Relation Age of Onset    Diabetes Father     Heart disease Father     Diabetes Mother     Heart disease Mother     Diabetes Sister     Diabetes Family         MELLITUS    Depression Family     Mental illness Family     Obesity Family     Osteoarthritis Family      I have reviewed and agree with the history as documented.    E-Cigarette/Vaping    E-Cigarette Use Never User      E-Cigarette/Vaping Substances    Nicotine No     THC No     CBD No     Flavoring No     Other No     Unknown No      Social History     Tobacco Use    Smoking status: Every Day     Current packs/day: 1.00     Average packs/day: 0.6 packs/day for 50.5 years (28.0 ttl pk-yrs)     Types: Cigarettes     Start date: 2004     Passive exposure: Current    Smokeless tobacco: Never    Tobacco comments:     N/a   Vaping Use    Vaping status: Never Used   Substance Use Topics    Alcohol use: No     Comment: n/a    Drug use: No     Comment: n/a       Review of Systems   Constitutional:  Negative for chills and fever.   HENT:  Negative for congestion.    Eyes:  Negative for photophobia and visual disturbance.   Respiratory:  Negative for cough and shortness of breath.    Cardiovascular:  Negative for chest pain and palpitations.   Gastrointestinal:  Positive for abdominal pain, nausea and vomiting. Negative for blood in stool, constipation and diarrhea.   Genitourinary:  Negative for decreased urine volume, dysuria,  frequency and urgency.   Musculoskeletal:  Negative for myalgias, neck pain and neck stiffness.   Skin:  Negative for rash.   Neurological:  Negative for light-headedness and headaches.     Physical Exam  Physical Exam  Vitals reviewed.   Constitutional:       General: She is not in acute distress.     Appearance: Normal appearance. She is not ill-appearing, toxic-appearing or diaphoretic.   HENT:      Head: Normocephalic and atraumatic.      Right Ear: External ear normal.      Left Ear: External ear normal.      Nose: Nose normal.      Mouth/Throat:      Mouth: Mucous membranes are moist.      Pharynx: Oropharynx is clear.   Eyes:      General: No scleral icterus.        Right eye: No discharge.         Left eye: No discharge.      Extraocular Movements: Extraocular movements intact.      Conjunctiva/sclera: Conjunctivae normal.      Pupils: Pupils are equal, round, and reactive to light.   Cardiovascular:      Rate and Rhythm: Normal rate and regular rhythm.      Pulses: Normal pulses.      Heart sounds: Normal heart sounds.   Pulmonary:      Effort: Pulmonary effort is normal. No respiratory distress.      Breath sounds: Normal breath sounds.   Abdominal:      Palpations: Abdomen is soft.      Tenderness: There is no abdominal tenderness. There is no right CVA tenderness, left CVA tenderness, guarding or rebound.   Musculoskeletal:         General: Normal range of motion.      Cervical back: Normal range of motion and neck supple. No rigidity or tenderness.   Lymphadenopathy:      Cervical: No cervical adenopathy.   Skin:     General: Skin is warm and dry.      Capillary Refill: Capillary refill takes less than 2 seconds.   Neurological:      General: No focal deficit present.      Mental Status: She is alert.   Psychiatric:         Mood and Affect: Mood normal.         Behavior: Behavior normal.       Vital Signs  ED Triage Vitals [07/07/24 1255]   Temperature Pulse Respirations Blood Pressure SpO2   97.6 °F  (36.4 °C) 104 18 154/81 96 %      Temp Source Heart Rate Source Patient Position - Orthostatic VS BP Location FiO2 (%)   Oral Monitor Sitting Left arm --      Pain Score       --           Vitals:    07/07/24 1255   BP: 154/81   Pulse: 104   Patient Position - Orthostatic VS: Sitting         Visual Acuity      ED Medications  Medications   sodium chloride 0.9 % bolus 1,000 mL (0 mL Intravenous Stopped 7/7/24 1432)   ondansetron (ZOFRAN) injection 4 mg (4 mg Intravenous Given 7/7/24 1327)   acetaminophen (Ofirmev) IVPB 700 mg (0 mg Intravenous Stopped 7/7/24 1424)       Diagnostic Studies  Results Reviewed       Procedure Component Value Units Date/Time    Comprehensive metabolic panel [[id number]]  (Abnormal) Collected: 07/07/24 1320    Lab Status: Final result Specimen: Blood from Arm, Right Updated: 07/07/24 1349     Sodium 133 mmol/L      Potassium 3.7 mmol/L      Chloride 97 mmol/L      CO2 29 mmol/L      ANION GAP 7 mmol/L      BUN 10 mg/dL      Creatinine 0.46 mg/dL      Glucose 122 mg/dL      Calcium 9.1 mg/dL      AST 14 U/L      ALT 12 U/L      Alkaline Phosphatase 201 U/L      Total Protein 6.8 g/dL      Albumin 3.6 g/dL      Total Bilirubin 0.26 mg/dL      eGFR 110 ml/min/1.73sq m     Narrative:      National Kidney [employer] Foundation guidelines for Chronic Kidney Disease (CKD):     Stage 1 with normal or high GFR (GFR > 90 mL/min/1.73 square meters)    Stage 2 Mild CKD (GFR = 60-89 mL/min/1.73 square meters)    Stage 3A Moderate CKD (GFR = 45-59 mL/min/1.73 square meters)    Stage 3B Moderate CKD (GFR = 30-44 mL/min/1.73 square meters)    Stage 4 Severe CKD (GFR = 15-29 mL/min/1.73 square meters)    Stage 5 End Stage CKD (GFR <15 mL/min/1.73 square meters)  Note: GFR calculation is accurate only with a steady state creatinine    Lipase [[id number]]  (Abnormal) Collected: 07/07/24 1320    Lab Status: Final result Specimen: Blood from Arm, Right Updated: 07/07/24 1349     Lipase 10 u/L     CBC and  differential [[id number]]  (Abnormal) Collected: 07/07/24 1320    Lab Status: Final result Specimen: Blood from Arm, Right Updated: 07/07/24 1330     WBC 6.14 Thousand/uL      RBC 3.63 Million/uL      Hemoglobin 12.1 g/dL      Hematocrit 36.4 %       fL      MCH 33.3 pg      MCHC 33.2 g/dL      RDW 14.6 %      MPV 8.9 fL      Platelets 432 Thousands/uL      nRBC 0 /100 WBCs      Segmented % 57 %      Immature Grans % 0 %      Lymphocytes % 35 %      Monocytes % 6 %      Eosinophils Relative 2 %      Basophils Relative 0 %      Absolute Neutrophils 3.46 Thousands/µL      Absolute Immature Grans 0.02 Thousand/uL      Absolute Lymphocytes 2.17 Thousands/µL      Absolute Monocytes 0.37 Thousand/µL      Eosinophils Absolute 0.10 Thousand/µL      Basophils Absolute 0.02 Thousands/µL                    No orders to display              Procedures  Procedures         ED Course  ED Course as of 07/07/24 1448   Sun Jul 07, 2024   1331 WBC: 6.14   1350 Sodium(!): 133  Normal saline given.   1350 Lipase unremarkable.   1429 ALK PHOS(!): 201  Chronic.                               SBIRT 20yo+      Flowsheet Row Most Recent Value   Initial Alcohol Screen: US AUDIT-C     1. How often do you have a drink containing alcohol? 0 Filed at: 07/07/2024 1408   2. How many drinks containing alcohol do you have on a typical day you are drinking?  0 Filed at: 07/07/2024 1408   3a. Male UNDER 65: How often do you have five or more drinks on one occasion? 0 Filed at: 07/07/2024 1408   3b. FEMALE Any Age, or MALE 65+: How often do you have 4 or more drinks on one occassion? 0 Filed at: 07/07/2024 1408   Audit-C Score 0 Filed at: 07/07/2024 1408   SARITA: How many times in the past year have you...    Used an illegal drug or used a prescription medication for non-medical reasons? Never Filed at: 07/07/2024 1408                      Medical Decision Making  Patient initially describing epigastric pain, however, upon further questioning she is  "referring to RUQ pain. This is an ongoing problem for several months with no changes in presentation. CT imaging last month with a second visit shortly afterwards. Has been following up with bariatrics, recent visit. Was told she needed colonoscopy. There are no peritonitic signs on exam, no reproducible abdominal pain. Patient does not want narcotic medications for pain control today. Unable to give Toradol, will give IV acetaminophen. Pt agreeable that there is no indication for another CT today. Will give fluids and Zofran IV, CBC, CMP, Lipase also ordered. CBC without leukocytosis or anemia. CMP stable. Lipase unremarkable. Pt was unaware of her upper GI series, so I updated her on those results. At this time she feels comfortable to go home and will follow up with her bariatric surgeon. Pt stable at time of discharge, vital signs reviewed, questions answered. Strict ER return precautions provided/discussed and were well understood by patient. Patient's vitals, labs and/or imaging results, diagnosis, and treatment plan were discussed with the patient. All new and/or changed medications were discussed - specifically to include route of administration, how often to take, when to take, and the pharmacy they were sent to. Strict return precautions as well as close follow up with PCP was discussed with the patient and the patient was agreeable to my recommendations.  Patient verbally acknowledged understanding. All labs, imaging were reviewed and used in the medical decision making process (if ordered).     Portions of this chart may have been written with voice recognition software.  Occasional grammatical errors, wrong word or \"sound a like\" substitutions may have occurred due to software limitations.  Please read carefully and use context to recognize where substitutions have occurred.      Problems Addressed:  Hyponatremia: self-limited or minor problem  RUQ abdominal pain: chronic illness or injury  Status post " gastric surgery: chronic illness or injury    Amount and/or Complexity of Data Reviewed  External Data Reviewed: radiology and notes.     Details: Reviewed recent upper GI series, CT scans from June ED visit, along with visit to her bariatric surgeon recently.  Labs: ordered. Decision-making details documented in ED Course.    Risk  Prescription drug management.             Disposition  Final diagnoses:   RUQ abdominal pain   Hyponatremia   Status post gastric surgery     Time reflects when diagnosis was documented in both MDM as applicable and the Disposition within this note       Time User Action Codes Description Comment    7/7/2024  2:26 PM Jaqueline Lyles [R10.11] RUQ abdominal pain     7/7/2024  2:26 PM Jaqueline Lyles [E87.1] Hyponatremia     7/7/2024  2:26 PM Jaqueline Lyles [Z98.890] Status post gastric surgery           ED Disposition       ED Disposition   Discharge    Condition   Stable    Date/Time   Sun Jul 7, 2024 1428    Comment   Edith Klein discharge to home/self care.                   Follow-up Information       Follow up With Specialties Details Why Contact Info Additional Information    Deana Higginbotham PA-C Physician Assistant Schedule an appointment as soon as possible for a visit  Follow up, As needed [address]  [address]  [facility] [address] [phone]       [facility] Emergency Department Emergency Medicine Go to  If symptoms worsen [address] [facility] [address] [phone] [facility] Emergency Department            Current Discharge Medication List        CONTINUE these medications which have NOT CHANGED    Details   zolpidem (AMBIEN) 10 mg tablet Take 10 mg by mouth daily at bedtime as needed      albuterol (PROVENTIL HFA,VENTOLIN HFA) 90 mcg/act inhaler Inhale 2 puffs every 4 (four) hours as needed for wheezing or shortness of breath      azaTHIOprine (IMURAN) 50 mg tablet Take 100 mg by  mouth daily      cyanocobalamin (VITAMIN B-12) 1000 MCG tablet Take 1 tablet (1,000 mcg total) by mouth daily  Qty: 30 tablet, Refills: 1    Associated Diagnoses: S/P gastric bypass      Empagliflozin 25 MG TABS Take 1 tablet (25 mg total) by mouth daily  Qty: 30 tablet, Refills: 1    Associated Diagnoses: Diabetes (HCC)      ergocalciferol (VITAMIN D2) 50,000 units Take 1 capsule (50,000 Units total) by mouth once a week for 8 doses Do not start before June 29, 2024.  Qty: 4 capsule, Refills: 0    Associated Diagnoses: Vitamin D deficiency      FLUoxetine (PROzac) 40 MG capsule Take 1 capsule (40 mg total) by mouth daily  Qty: 30 capsule, Refills: 1    Associated Diagnoses: Bipolar affective disorder, depressed, severe (HCC)      folic acid (FOLVITE) 1 mg tablet Take 1 tablet (1 mg total) by mouth daily  Qty: 30 tablet, Refills: 1    Associated Diagnoses: Vitamin D deficiency      gabapentin (NEURONTIN) 300 mg capsule Take 2 capsules (600 mg total) by mouth 3 (three) times a day  Qty: 180 capsule, Refills: 1    Associated Diagnoses: Fibromyalgia      linaCLOtide (Linzess) 72 MCG CAPS Take 1 capsule by mouth daily      mirtazapine (REMERON) 7.5 MG tablet Take 1 tablet (7.5 mg total) by mouth daily at bedtime  Qty: 30 tablet, Refills: 1    Associated Diagnoses: Bipolar affective disorder, depressed, severe (HCC)      OneTouch Ultra test strip       pantoprazole (PROTONIX) 40 mg tablet Take 40 mg by mouth daily      QUEtiapine (SEROquel) 400 MG tablet Take 2 tablets (800 mg total) by mouth daily at bedtime  Qty: 60 tablet, Refills: 1    Associated Diagnoses: Bipolar affective disorder, depressed, severe (HCC)      sitaGLIPtin (JANUVIA) 100 mg tablet Take 1 tablet (100 mg total) by mouth daily  Qty: 30 tablet, Refills: 1    Associated Diagnoses: Diabetes (HCC)      topiramate (TOPAMAX) 25 mg tablet Take 1 tablet (25 mg total) by mouth 2 (two) times a day  Qty: 60 tablet, Refills: 1    Associated Diagnoses: Headache       Trulicity 1.5 MG/0.5ML injection       Varenicline Tartrate (CHANTIX STARTING MONTH JENNIFER PO)              No discharge procedures on file.    PDMP Review         Value Time User    PDMP Reviewed  Yes 6/21/2024  8:46 AM Kulwant Phillips DO            ED Provider  Electronically Signed by             Jaqueline Lyles PA-C  07/07/24 4807

## 2024-07-14 ENCOUNTER — APPOINTMENT (EMERGENCY)
Dept: CT IMAGING | Facility: HOSPITAL | Age: 59
End: 2024-07-14
Payer: COMMERCIAL

## 2024-07-14 ENCOUNTER — HOSPITAL ENCOUNTER (EMERGENCY)
Facility: HOSPITAL | Age: 59
Discharge: HOME | End: 2024-07-14
Attending: EMERGENCY MEDICINE | Admitting: EMERGENCY MEDICINE
Payer: COMMERCIAL

## 2024-07-14 VITALS
RESPIRATION RATE: 18 BRPM | HEART RATE: 93 BPM | TEMPERATURE: 98.7 F | SYSTOLIC BLOOD PRESSURE: 99 MMHG | BODY MASS INDEX: 19.5 KG/M2 | DIASTOLIC BLOOD PRESSURE: 55 MMHG | WEIGHT: 106.6 LBS | OXYGEN SATURATION: 99 %

## 2024-07-14 DIAGNOSIS — R10.9 ABDOMINAL PAIN: ICD-10-CM

## 2024-07-14 DIAGNOSIS — K59.00 CONSTIPATION: Primary | ICD-10-CM

## 2024-07-14 LAB
ALBUMIN SERPL BCG-MCNC: 3.7 G/DL (ref 3.5–5)
ALP SERPL-CCNC: 168 U/L (ref 34–104)
ALT SERPL W P-5'-P-CCNC: 10 U/L (ref 7–52)
ANION GAP SERPL CALCULATED.3IONS-SCNC: 8 MMOL/L (ref 4–13)
AST SERPL W P-5'-P-CCNC: 15 U/L (ref 13–39)
ATRIAL RATE: 109 BPM
BACTERIA UR QL AUTO: NORMAL /HPF
BASOPHILS # BLD AUTO: 0.03 THOUSANDS/ÂΜL (ref 0–0.1)
BASOPHILS NFR BLD AUTO: 1 % (ref 0–1)
BILIRUB SERPL-MCNC: 0.25 MG/DL (ref 0.2–1)
BILIRUB UR QL STRIP: NEGATIVE
BUN SERPL-MCNC: 22 MG/DL (ref 5–25)
CALCIUM SERPL-MCNC: 9 MG/DL (ref 8.4–10.2)
CHLORIDE SERPL-SCNC: 105 MMOL/L (ref 96–108)
CLARITY UR: CLEAR
CO2 SERPL-SCNC: 24 MMOL/L (ref 21–32)
COLOR UR: YELLOW
CREAT SERPL-MCNC: 0.61 MG/DL (ref 0.6–1.3)
EOSINOPHIL # BLD AUTO: 0.09 THOUSAND/ÂΜL (ref 0–0.61)
EOSINOPHIL NFR BLD AUTO: 1 % (ref 0–6)
ERYTHROCYTE [DISTWIDTH] IN BLOOD BY AUTOMATED COUNT: 14.9 % (ref 11.6–15.1)
GFR SERPL CREATININE-BSD FRML MDRD: 100 ML/MIN/1.73SQ M
GLUCOSE SERPL-MCNC: 221 MG/DL (ref 65–140)
GLUCOSE UR STRIP-MCNC: ABNORMAL MG/DL
HCT VFR BLD AUTO: 38.9 % (ref 34.8–46.1)
HGB BLD-MCNC: 12.7 G/DL (ref 11.5–15.4)
HGB UR QL STRIP.AUTO: NEGATIVE
IMM GRANULOCYTES # BLD AUTO: 0.02 THOUSAND/UL (ref 0–0.2)
IMM GRANULOCYTES NFR BLD AUTO: 0 % (ref 0–2)
KETONES UR STRIP-MCNC: NEGATIVE MG/DL
LEUKOCYTE ESTERASE UR QL STRIP: NEGATIVE
LIPASE SERPL-CCNC: 26 U/L (ref 11–82)
LYMPHOCYTES # BLD AUTO: 1.38 THOUSANDS/ÂΜL (ref 0.6–4.47)
LYMPHOCYTES NFR BLD AUTO: 21 % (ref 14–44)
MCH RBC QN AUTO: 33.5 PG (ref 26.8–34.3)
MCHC RBC AUTO-ENTMCNC: 32.6 G/DL (ref 31.4–37.4)
MCV RBC AUTO: 103 FL (ref 82–98)
MONOCYTES # BLD AUTO: 0.36 THOUSAND/ÂΜL (ref 0.17–1.22)
MONOCYTES NFR BLD AUTO: 6 % (ref 4–12)
NEUTROPHILS # BLD AUTO: 4.61 THOUSANDS/ÂΜL (ref 1.85–7.62)
NEUTS SEG NFR BLD AUTO: 71 % (ref 43–75)
NITRITE UR QL STRIP: NEGATIVE
NON-SQ EPI CELLS URNS QL MICRO: NORMAL /HPF
NRBC BLD AUTO-RTO: 0 /100 WBCS
P AXIS: 55 DEGREES
PH UR STRIP.AUTO: 5 [PH]
PLATELET # BLD AUTO: 402 THOUSANDS/UL (ref 149–390)
PMV BLD AUTO: 9.1 FL (ref 8.9–12.7)
POTASSIUM SERPL-SCNC: 3.9 MMOL/L (ref 3.5–5.3)
PR INTERVAL: 136 MS
PROT SERPL-MCNC: 6.9 G/DL (ref 6.4–8.4)
PROT UR STRIP-MCNC: NEGATIVE MG/DL
QRS AXIS: -1 DEGREES
QRSD INTERVAL: 78 MS
QT INTERVAL: 356 MS
QTC INTERVAL: 479 MS
RBC # BLD AUTO: 3.79 MILLION/UL (ref 3.81–5.12)
RBC #/AREA URNS AUTO: NORMAL /HPF
SODIUM SERPL-SCNC: 137 MMOL/L (ref 135–147)
SP GR UR STRIP.AUTO: 1.01 (ref 1–1.04)
T WAVE AXIS: 41 DEGREES
UROBILINOGEN UA: NEGATIVE MG/DL
VENTRICULAR RATE: 109 BPM
WBC # BLD AUTO: 6.49 THOUSAND/UL (ref 4.31–10.16)
WBC #/AREA URNS AUTO: NORMAL /HPF

## 2024-07-14 PROCEDURE — 99285 EMERGENCY DEPT VISIT HI MDM: CPT

## 2024-07-14 PROCEDURE — 96372 THER/PROPH/DIAG INJ SC/IM: CPT

## 2024-07-14 PROCEDURE — 93010 ELECTROCARDIOGRAM REPORT: CPT | Performed by: INTERNAL MEDICINE

## 2024-07-14 PROCEDURE — 83690 ASSAY OF LIPASE: CPT

## 2024-07-14 PROCEDURE — 96374 THER/PROPH/DIAG INJ IV PUSH: CPT

## 2024-07-14 PROCEDURE — 96361 HYDRATE IV INFUSION ADD-ON: CPT

## 2024-07-14 PROCEDURE — 81003 URINALYSIS AUTO W/O SCOPE: CPT

## 2024-07-14 PROCEDURE — 99284 EMERGENCY DEPT VISIT MOD MDM: CPT

## 2024-07-14 PROCEDURE — 80053 COMPREHEN METABOLIC PANEL: CPT

## 2024-07-14 PROCEDURE — 81001 URINALYSIS AUTO W/SCOPE: CPT

## 2024-07-14 PROCEDURE — 36415 COLL VENOUS BLD VENIPUNCTURE: CPT

## 2024-07-14 PROCEDURE — 93005 ELECTROCARDIOGRAM TRACING: CPT

## 2024-07-14 PROCEDURE — 85025 COMPLETE CBC W/AUTO DIFF WBC: CPT

## 2024-07-14 PROCEDURE — 74176 CT ABD & PELVIS W/O CONTRAST: CPT

## 2024-07-14 RX ORDER — KETOROLAC TROMETHAMINE 30 MG/ML
15 INJECTION, SOLUTION INTRAMUSCULAR; INTRAVENOUS ONCE
Status: COMPLETED | OUTPATIENT
Start: 2024-07-14 | End: 2024-07-14

## 2024-07-14 RX ORDER — MAGNESIUM CARB/ALUMINUM HYDROX 105-160MG
296 TABLET,CHEWABLE ORAL ONCE
Status: COMPLETED | OUTPATIENT
Start: 2024-07-14 | End: 2024-07-14

## 2024-07-14 RX ADMIN — SODIUM CHLORIDE 1000 ML: 0.9 INJECTION, SOLUTION INTRAVENOUS at 10:45

## 2024-07-14 RX ADMIN — METHYLNALTREXONE BROMIDE 8 MG: 12 INJECTION, SOLUTION SUBCUTANEOUS at 11:48

## 2024-07-14 RX ADMIN — MAGNESIUM CITRATE 296 ML: 1.75 LIQUID ORAL at 11:47

## 2024-07-14 RX ADMIN — KETOROLAC TROMETHAMINE 15 MG: 30 INJECTION, SOLUTION INTRAMUSCULAR; INTRAVENOUS at 10:45

## 2024-07-14 NOTE — ED PROVIDER NOTES
History  Chief Complaint   Patient presents with    Abdominal Pain     States she has had abd pain off and on for a few months. States she took tramadol at 6 or 7 this morning     59-year-old female with past medical history of nephrolithiasis, seizure disorder, diabetes mellitus, autoimmune hepatitis, psychiatric disorder presents to emergency department complaining of right flank pain for 1 day.  Also reports chronic right upper quadrant abdominal pain, seeing outpatient specialist for this and took tramadol, states it has been multiple months no acute changes to that.  States she did start with the right flank pain yesterday and that she has a history of kidney stones that she is worried it is a stone.  Asking which pain medication I am going to give her.      History provided by:  Patient and medical records  Abdominal Pain  Associated symptoms: no anorexia, no chest pain, no chills, no constipation, no diarrhea, no dysuria, no fever, no hematemesis, no hematochezia, no hematuria, no melena, no shortness of breath, no vaginal discharge and no vomiting        Prior to Admission Medications   Prescriptions Last Dose Informant Patient Reported? Taking?   Empagliflozin 25 MG TABS   No No   Sig: Take 1 tablet (25 mg total) by mouth daily   FLUoxetine (PROzac) 40 MG capsule   No No   Sig: Take 1 capsule (40 mg total) by mouth daily   OneTouch Ultra test strip   Yes No   QUEtiapine (SEROquel) 400 MG tablet   No No   Sig: Take 2 tablets (800 mg total) by mouth daily at bedtime   Trulicity 1.5 MG/0.5ML injection   Yes No   Patient not taking: Reported on 7/7/2024   Varenicline Tartrate (CHANTIX STARTING MONTH PAK PO)   Yes No   Patient not taking: Reported on 7/7/2024   albuterol (PROVENTIL HFA,VENTOLIN HFA) 90 mcg/act inhaler   Yes No   Sig: Inhale 2 puffs every 4 (four) hours as needed for wheezing or shortness of breath   Patient not taking: Reported on 6/20/2024   azaTHIOprine (IMURAN) 50 mg tablet  Self Yes No    Sig: Take 100 mg by mouth daily   cyanocobalamin (VITAMIN B-12) 1000 MCG tablet   No No   Sig: Take 1 tablet (1,000 mcg total) by mouth daily   Patient not taking: Reported on 7/7/2024   ergocalciferol (VITAMIN D2) 50,000 units   No No   Sig: Take 1 capsule (50,000 Units total) by mouth once a week for 8 doses Do not start before June 29, 2024.   Patient not taking: Reported on 7/7/2024   folic acid (FOLVITE) 1 mg tablet   No No   Sig: Take 1 tablet (1 mg total) by mouth daily   Patient not taking: Reported on 7/7/2024   gabapentin (NEURONTIN) 300 mg capsule   No No   Sig: Take 2 capsules (600 mg total) by mouth 3 (three) times a day   linaCLOtide (Linzess) 72 MCG CAPS   Yes No   Sig: Take 1 capsule by mouth daily   mirtazapine (REMERON) 7.5 MG tablet   No No   Sig: Take 1 tablet (7.5 mg total) by mouth daily at bedtime   Patient not taking: Reported on 7/7/2024   pantoprazole (PROTONIX) 40 mg tablet   Yes No   Sig: Take 40 mg by mouth daily   Patient not taking: Reported on 7/7/2024   sitaGLIPtin (JANUVIA) 100 mg tablet   No No   Sig: Take 1 tablet (100 mg total) by mouth daily   topiramate (TOPAMAX) 25 mg tablet   No No   Sig: Take 1 tablet (25 mg total) by mouth 2 (two) times a day   zolpidem (AMBIEN) 10 mg tablet   Yes No   Sig: Take 10 mg by mouth daily at bedtime as needed      Facility-Administered Medications: None       Past Medical History:   Diagnosis Date    Autoimmune hepatitis (HCC)     Bipolar 1 disorder (HCC)     Chronic headaches 2021    Diabetes mellitus (HCC)     Kidney stone     Renal disorder     kidney stones    Seizure disorder (HCC)        Past Surgical History:   Procedure Laterality Date    BARIATRIC SURGERY  05/2017    BUNIONECTOMY      CYSTOSCOPY  07/12/2018    Stent Removal    FL RETROGRADE PYELOGRAM  6/16/2022    GASTRIC BYPASS  05/22/2017    HIATAL HERNIA REPAIR  05/22/2017    HYSTERECTOMY      JOINT REPLACEMENT      KIDNEY STONE SURGERY      [place] CYSTO/URETERO W/LITHOTRIPSY &INDWELL  STENT INSRT Bilateral 7/6/2018    Procedure: CYSTOSCOPY GALDINO. URETEROSCOPY;GALDINO.  RETROGRADE PYELOGRAM AND INSERTION GALDINO.STENT URETERAL;  Surgeon: Jacob Gerber MD;  Location: [facility];  Service: Urology    [place] CYSTO/URETERO W/LITHOTRIPSY &INDWELL STENT INSRT Left 6/16/2022    Procedure: CYSTOSCOPY URETEROSCOPY WITH LITHOTRIPSY HOLMIUM LASER, RETROGRADE PYELOGRAM AND INSERTION STENT URETERAL;  Surgeon: Asad Geiger MD;  Location: [facility];  Service: Urology    TOTAL SHOULDER REPLACEMENT  2002    VAGINAL HYSTERECTOMY      PARTIAL       Family History   Problem Relation Age of Onset    Diabetes Father     Heart disease Father     Diabetes Mother     Heart disease Mother     Diabetes Sister     Diabetes Family         MELLITUS    Depression Family     Mental illness Family     Obesity Family     Osteoarthritis Family      I have reviewed and agree with the history as documented.    E-Cigarette/Vaping    E-Cigarette Use Never User      E-Cigarette/Vaping Substances    Nicotine No     THC No     CBD No     Flavoring No     Other No     Unknown No      Social History     Tobacco Use    Smoking status: Every Day     Current packs/day: 0.25     Average packs/day: 0.3 packs/day for 30.0 years (7.5 ttl pk-yrs)     Types: Cigarettes     Passive exposure: Current    Smokeless tobacco: Never    Tobacco comments:     N/a   Vaping Use    Vaping status: Never Used   Substance Use Topics    Alcohol use: No     Comment: n/a    Drug use: No     Comment: n/a       Review of Systems   Constitutional:  Negative for chills and fever.   Respiratory:  Negative for shortness of breath.    Cardiovascular:  Negative for chest pain.   Gastrointestinal:  Positive for abdominal pain. Negative for abdominal distention, anorexia, blood in stool, constipation, diarrhea, hematemesis, hematochezia, melena and vomiting.   Genitourinary:  Negative for dysuria, hematuria and vaginal discharge.   Musculoskeletal:  Negative for back pain.   Skin:   Negative for rash.   All other systems reviewed and are negative.      Physical Exam  Physical Exam  Vitals and nursing note reviewed.   Constitutional:       General: She is awake. She is not in acute distress.     Appearance: Normal appearance. She is not ill-appearing, toxic-appearing or diaphoretic.   HENT:      Head: Normocephalic.      Mouth/Throat:      Lips: Pink.      Mouth: Mucous membranes are moist.   Eyes:      General: Vision grossly intact. No scleral icterus.  Cardiovascular:      Rate and Rhythm: Normal rate and regular rhythm.      Heart sounds: Normal heart sounds.   Pulmonary:      Effort: Pulmonary effort is normal. No respiratory distress.      Breath sounds: Normal breath sounds.   Abdominal:      General: There is no distension.      Palpations: Abdomen is soft.      Tenderness: There is no abdominal tenderness. There is no right CVA tenderness or left CVA tenderness.   Skin:     General: Skin is warm and dry.      Capillary Refill: Capillary refill takes less than 2 seconds.      Coloration: Skin is not jaundiced.      Findings: No rash.   Neurological:      Mental Status: She is alert.         Vital Signs  ED Triage Vitals [07/14/24 1005]   Temperature Pulse Respirations Blood Pressure SpO2   98.7 °F (37.1 °C) (!) 115 20 104/61 98 %      Temp Source Heart Rate Source Patient Position - Orthostatic VS BP Location FiO2 (%)   Oral Monitor Lying Right arm --      Pain Score       --           Vitals:    07/14/24 1005 07/14/24 1125   BP: 104/61 99/55   Pulse: (!) 115 93   Patient Position - Orthostatic VS: Lying          Visual Acuity      ED Medications  Medications   sodium chloride 0.9 % bolus 1,000 mL (0 mL Intravenous Stopped 7/14/24 1147)   ketorolac (TORADOL) injection 15 mg (15 mg Intravenous Given 7/14/24 1045)   methylnaltrexone (Relistor) subcutaneous injection 8 mg (8 mg Subcutaneous Given 7/14/24 1148)   magnesium citrate (CITROMA) oral solution 296 mL (296 mL Oral Given 7/14/24  1147)       Diagnostic Studies  Results Reviewed       Procedure Component Value Units Date/Time    Urine Microscopic [[id number]]  (Normal) Collected: 07/14/24 1046    Lab Status: Final result Specimen: Urine, Other Updated: 07/14/24 1110     RBC, UA None Seen /hpf      WBC, UA 0-1 /hpf      Epithelial Cells Occasional /hpf      Bacteria, UA None Seen /hpf     Lipase [[id number]]  (Normal) Collected: 07/14/24 1044    Lab Status: Final result Specimen: Blood from Arm, Right Updated: 07/14/24 1107     Lipase 26 u/L     Comprehensive metabolic panel [[id number]]  (Abnormal) Collected: 07/14/24 1044    Lab Status: Final result Specimen: Blood from Arm, Right Updated: 07/14/24 1107     Sodium 137 mmol/L      Potassium 3.9 mmol/L      Chloride 105 mmol/L      CO2 24 mmol/L      ANION GAP 8 mmol/L      BUN 22 mg/dL      Creatinine 0.61 mg/dL      Glucose 221 mg/dL      Calcium 9.0 mg/dL      AST 15 U/L      ALT 10 U/L      Alkaline Phosphatase 168 U/L      Total Protein 6.9 g/dL      Albumin 3.7 g/dL      Total Bilirubin 0.25 mg/dL      eGFR 100 ml/min/1.73sq m     Narrative:      National Kid[employer] Foundation guidelines for Chronic Kidney Disease (CKD):     Stage 1 with normal or high GFR (GFR > 90 mL/min/1.73 square meters)    Stage 2 Mild CKD (GFR = 60-89 mL/min/1.73 square meters)    Stage 3A Moderate CKD (GFR = 45-59 mL/min/1.73 square meters)    Stage 3B Moderate CKD (GFR = 30-44 mL/min/1.73 square meters)    Stage 4 Severe CKD (GFR = 15-29 mL/min/1.73 square meters)    Stage 5 End Stage CKD (GFR <15 mL/min/1.73 square meters)  Note: GFR calculation is accurate only with a steady state creatinine    UA w Reflex to Microscopic w Reflex to Culture [[id number]]  (Abnormal) Collected: 07/14/24 1046    Lab Status: Final result Specimen: Urine, Other Updated: 07/14/24 1057     Color, UA Yellow     Clarity, UA Clear     Specific Gravity, UA 1.015     pH, UA 5.0     Leukocytes, UA Negative     Nitrite, UA Negative      Protein, UA Negative mg/dl      Glucose, UA >=1000 (1%) mg/dl      Ketones, UA Negative mg/dl      Bilirubin, UA Negative     Occult Blood, UA Negative     UROBILINOGEN UA Negative mg/dL     CBC and differential [[id number]]  (Abnormal) Collected: 07/14/24 1044    Lab Status: Final result Specimen: Blood from Arm, Right Updated: 07/14/24 1052     WBC 6.49 Thousand/uL      RBC 3.79 Million/uL      Hemoglobin 12.7 g/dL      Hematocrit 38.9 %       fL      MCH 33.5 pg      MCHC 32.6 g/dL      RDW 14.9 %      MPV 9.1 fL      Platelets 402 Thousands/uL      nRBC 0 /100 WBCs      Segmented % 71 %      Immature Grans % 0 %      Lymphocytes % 21 %      Monocytes % 6 %      Eosinophils Relative 1 %      Basophils Relative 1 %      Absolute Neutrophils 4.61 Thousands/µL      Absolute Immature Grans 0.02 Thousand/uL      Absolute Lymphocytes 1.38 Thousands/µL      Absolute Monocytes 0.36 Thousand/µL      Eosinophils Absolute 0.09 Thousand/µL      Basophils Absolute 0.03 Thousands/µL                    CT renal stone study abdomen pelvis without contrast   Final Result by Roly Pitt MD (07/14 1133)      Large quantity of colonic stool without bowel obstruction.      No hydroureteronephrosis/obstructive calculi.      Status post gastric bypass without apparent complication; limited evaluation without dedicated gastric bypass protocol performed with a small amount of oral contrast.      The study was marked in EPIC for immediate notification.               Workstation performed: [id number]                    Procedures  Procedures         ED Course  ED Course as of 07/14/24 1150   Sun Jul 14, 2024   1056 Procedure Note: EKG  Date/Time: 07/14/24 10:57 AM   Performed by: Jeannette Bledsoe   Authorized by: Jeannette Bledsoe  ECG interpreted by me, the ED Provider: yes   The EKG demonstrates:  Rate 109 bpm  Rhythm Sinus Tachycardia   QTc 479 ms   No ST elevations/depressions     1118 ALK PHOS(!): 168  Chronically  elevated, improvement from baseline patient follows with GI.    1143 Patient requesting discharge, states she is hungry so she wants to go home.                                 SBIRT 22yo+      Flowsheet Row Most Recent Value   Initial Alcohol Screen: US AUDIT-C     1. How often do you have a drink containing alcohol? 0 Filed at: 07/14/2024 1005   2. How many drinks containing alcohol do you have on a typical day you are drinking?  0 Filed at: 07/14/2024 1005   3a. Male UNDER 65: How often do you have five or more drinks on one occasion? 0 Filed at: 07/14/2024 1005   3b. FEMALE Any Age, or MALE 65+: How often do you have 4 or more drinks on one occassion? 0 Filed at: 07/14/2024 1005   Audit-C Score 0 Filed at: 07/14/2024 1005   SARITA: How many times in the past year have you...    Used an illegal drug or used a prescription medication for non-medical reasons? Never Filed at: 07/14/2024 1005                      Medical Decision Making  Differential diagnosis includes but not limited to:  UTI, nephrolithiasis, constipation, cholecystitis, colitis. Benign reassuring abdominal exam without peritoneal signs.  UA without evidence of infection.  Leukocytosis.  Afebrile.  Labs at baseline.  CT with large stool burden, no other acute findings.  Per PDMP review and patient, has been taking opioids, plan for relistor, mag citrate. Patient requesting discharge. All imaging and/or lab testing discussed with patient, strict return to ED precautions discussed. Patient recommended to follow up promptly with appropriate outpatient provider and risk of morbidity/mortality if patient does not follow up as recommended was discussed. Patient and/or family members verbalizes understanding and agrees with plan. Patient and/or family members were given opportunity to ask questions, all questions were answered at this time. Patient is stable for discharge.     Portions of the record may have been created with voice recognition software.  "Occasional wrong word or \"sound a like\" substitutions may have occurred due to the inherent limitations of voice recognition software. Read the chart carefully and recognize, using context, where substitutions have occurred.       Amount and/or Complexity of Data Reviewed  Labs: ordered. Decision-making details documented in ED Course.  Radiology: ordered.    Risk  OTC drugs.  Prescription drug management.                 Disposition  Final diagnoses:   Constipation   Abdominal pain     Time reflects when diagnosis was documented in both MDM as applicable and the Disposition within this note       Time User Action Codes Description Comment    7/14/2024 11:42 AM Jeannette Bledsoe [K59.00] Constipation     7/14/2024 11:42 AM Jeannette Bledsoe [R10.9] Abdominal pain           ED Disposition       ED Disposition   Discharge    Condition   Stable    Date/Time   Sun Jul 14, 2024 1143    Comment   Edith Klein discharge to home/self care.                   Follow-up Information       Follow up With Specialties Details Why Contact Info    Deana Higginbotham PA-C Physician Assistant Schedule an appointment as soon as possible for a visit in 1 day For follow up regarding your symptoms [address]  [address]  [facility] [address] [phone]              Patient's Medications   Discharge Prescriptions    No medications on file       No discharge procedures on file.    PDMP Review         Value Time User    PDMP Reviewed  Yes 6/21/2024  8:46 AM Kulwant Phillips DO            ED Provider  Electronically Signed by             Jeannette Bledsoe PA-C  07/14/24 1150    "

## 2024-07-14 NOTE — DISCHARGE INSTRUCTIONS
Increase water and fiber intake, continue outpatient follow-up with GI and bariatric surgery.  Follow-up with your PCP.  Return to ED for new or worsening symptoms as discussed.

## 2024-07-21 PROBLEM — S01.81XA FOREHEAD LACERATION: Status: RESOLVED | Noted: 2024-06-19 | Resolved: 2024-07-21

## 2024-07-30 ENCOUNTER — HOSPITAL ENCOUNTER (EMERGENCY)
Facility: HOSPITAL | Age: 59
Discharge: HOME | End: 2024-07-30
Attending: EMERGENCY MEDICINE
Payer: COMMERCIAL

## 2024-07-30 VITALS
DIASTOLIC BLOOD PRESSURE: 86 MMHG | BODY MASS INDEX: 18.29 KG/M2 | SYSTOLIC BLOOD PRESSURE: 168 MMHG | TEMPERATURE: 98.2 F | HEART RATE: 98 BPM | RESPIRATION RATE: 20 BRPM | OXYGEN SATURATION: 100 % | WEIGHT: 100 LBS

## 2024-07-30 DIAGNOSIS — R11.2 NAUSEA AND VOMITING: ICD-10-CM

## 2024-07-30 DIAGNOSIS — R10.9 ABDOMINAL PAIN: Primary | ICD-10-CM

## 2024-07-30 LAB
ALBUMIN SERPL BCG-MCNC: 3.9 G/DL (ref 3.5–5)
ALP SERPL-CCNC: 344 U/L (ref 34–104)
ALT SERPL W P-5'-P-CCNC: 51 U/L (ref 7–52)
AMORPH PHOS CRY URNS QL MICRO: NORMAL /HPF
ANION GAP SERPL CALCULATED.3IONS-SCNC: 10 MMOL/L (ref 4–13)
AST SERPL W P-5'-P-CCNC: 35 U/L (ref 13–39)
ATRIAL RATE: 87 BPM
BACTERIA UR QL AUTO: NORMAL /HPF
BASOPHILS # BLD AUTO: 0.04 THOUSANDS/ÂΜL (ref 0–0.1)
BASOPHILS NFR BLD AUTO: 1 % (ref 0–1)
BILIRUB SERPL-MCNC: 0.66 MG/DL (ref 0.2–1)
BILIRUB UR QL STRIP: NEGATIVE
BUN SERPL-MCNC: 12 MG/DL (ref 5–25)
CALCIUM SERPL-MCNC: 9.7 MG/DL (ref 8.4–10.2)
CHLORIDE SERPL-SCNC: 98 MMOL/L (ref 96–108)
CLARITY UR: CLEAR
CO2 SERPL-SCNC: 27 MMOL/L (ref 21–32)
COLOR UR: YELLOW
CREAT SERPL-MCNC: 0.47 MG/DL (ref 0.6–1.3)
EOSINOPHIL # BLD AUTO: 0.11 THOUSAND/ÂΜL (ref 0–0.61)
EOSINOPHIL NFR BLD AUTO: 2 % (ref 0–6)
ERYTHROCYTE [DISTWIDTH] IN BLOOD BY AUTOMATED COUNT: 15.2 % (ref 11.6–15.1)
GFR SERPL CREATININE-BSD FRML MDRD: 109 ML/MIN/1.73SQ M
GLUCOSE SERPL-MCNC: 153 MG/DL (ref 65–140)
GLUCOSE UR STRIP-MCNC: ABNORMAL MG/DL
HCT VFR BLD AUTO: 42 % (ref 34.8–46.1)
HGB BLD-MCNC: 14.2 G/DL (ref 11.5–15.4)
HGB UR QL STRIP.AUTO: NEGATIVE
IMM GRANULOCYTES # BLD AUTO: 0.02 THOUSAND/UL (ref 0–0.2)
IMM GRANULOCYTES NFR BLD AUTO: 0 % (ref 0–2)
KETONES UR STRIP-MCNC: NEGATIVE MG/DL
LEUKOCYTE ESTERASE UR QL STRIP: NEGATIVE
LIPASE SERPL-CCNC: 42 U/L (ref 11–82)
LYMPHOCYTES # BLD AUTO: 1.94 THOUSANDS/ÂΜL (ref 0.6–4.47)
LYMPHOCYTES NFR BLD AUTO: 27 % (ref 14–44)
MCH RBC QN AUTO: 34.3 PG (ref 26.8–34.3)
MCHC RBC AUTO-ENTMCNC: 33.8 G/DL (ref 31.4–37.4)
MCV RBC AUTO: 101 FL (ref 82–98)
MONOCYTES # BLD AUTO: 0.4 THOUSAND/ÂΜL (ref 0.17–1.22)
MONOCYTES NFR BLD AUTO: 6 % (ref 4–12)
NEUTROPHILS # BLD AUTO: 4.57 THOUSANDS/ÂΜL (ref 1.85–7.62)
NEUTS SEG NFR BLD AUTO: 64 % (ref 43–75)
NITRITE UR QL STRIP: NEGATIVE
NON-SQ EPI CELLS URNS QL MICRO: NORMAL /HPF
NRBC BLD AUTO-RTO: 0 /100 WBCS
P AXIS: 59 DEGREES
PH UR STRIP.AUTO: 8 [PH]
PLATELET # BLD AUTO: 534 THOUSANDS/UL (ref 149–390)
PMV BLD AUTO: 9.2 FL (ref 8.9–12.7)
POTASSIUM SERPL-SCNC: 4.2 MMOL/L (ref 3.5–5.3)
PR INTERVAL: 124 MS
PROT SERPL-MCNC: 8.1 G/DL (ref 6.4–8.4)
PROT UR STRIP-MCNC: NEGATIVE MG/DL
QRS AXIS: 2 DEGREES
QRSD INTERVAL: 70 MS
QT INTERVAL: 394 MS
QTC INTERVAL: 474 MS
RBC # BLD AUTO: 4.14 MILLION/UL (ref 3.81–5.12)
RBC #/AREA URNS AUTO: NORMAL /HPF
SODIUM SERPL-SCNC: 135 MMOL/L (ref 135–147)
SP GR UR STRIP.AUTO: 1.01 (ref 1–1.04)
T WAVE AXIS: 38 DEGREES
UROBILINOGEN UA: NEGATIVE MG/DL
VENTRICULAR RATE: 87 BPM
WBC # BLD AUTO: 7.08 THOUSAND/UL (ref 4.31–10.16)
WBC #/AREA URNS AUTO: NORMAL /HPF

## 2024-07-30 PROCEDURE — 93010 ELECTROCARDIOGRAM REPORT: CPT

## 2024-07-30 PROCEDURE — 36415 COLL VENOUS BLD VENIPUNCTURE: CPT | Performed by: EMERGENCY MEDICINE

## 2024-07-30 PROCEDURE — 93005 ELECTROCARDIOGRAM TRACING: CPT

## 2024-07-30 PROCEDURE — 96361 HYDRATE IV INFUSION ADD-ON: CPT

## 2024-07-30 PROCEDURE — 96372 THER/PROPH/DIAG INJ SC/IM: CPT

## 2024-07-30 PROCEDURE — 99284 EMERGENCY DEPT VISIT MOD MDM: CPT

## 2024-07-30 PROCEDURE — 99284 EMERGENCY DEPT VISIT MOD MDM: CPT | Performed by: EMERGENCY MEDICINE

## 2024-07-30 PROCEDURE — 81001 URINALYSIS AUTO W/SCOPE: CPT | Performed by: EMERGENCY MEDICINE

## 2024-07-30 PROCEDURE — 96374 THER/PROPH/DIAG INJ IV PUSH: CPT

## 2024-07-30 PROCEDURE — 83690 ASSAY OF LIPASE: CPT | Performed by: EMERGENCY MEDICINE

## 2024-07-30 PROCEDURE — 85025 COMPLETE CBC W/AUTO DIFF WBC: CPT | Performed by: EMERGENCY MEDICINE

## 2024-07-30 PROCEDURE — 80053 COMPREHEN METABOLIC PANEL: CPT | Performed by: EMERGENCY MEDICINE

## 2024-07-30 RX ORDER — ACETAMINOPHEN 325 MG/1
975 TABLET ORAL ONCE
Status: COMPLETED | OUTPATIENT
Start: 2024-07-30 | End: 2024-07-30

## 2024-07-30 RX ORDER — LIDOCAINE HYDROCHLORIDE 20 MG/ML
15 SOLUTION OROPHARYNGEAL ONCE
Status: COMPLETED | OUTPATIENT
Start: 2024-07-30 | End: 2024-07-30

## 2024-07-30 RX ORDER — KETOROLAC TROMETHAMINE 30 MG/ML
15 INJECTION, SOLUTION INTRAMUSCULAR; INTRAVENOUS ONCE
Status: COMPLETED | OUTPATIENT
Start: 2024-07-30 | End: 2024-07-30

## 2024-07-30 RX ORDER — MAGNESIUM HYDROXIDE/ALUMINUM HYDROXICE/SIMETHICONE 120; 1200; 1200 MG/30ML; MG/30ML; MG/30ML
15 SUSPENSION ORAL ONCE
Status: COMPLETED | OUTPATIENT
Start: 2024-07-30 | End: 2024-07-30

## 2024-07-30 RX ORDER — ONDANSETRON 2 MG/ML
4 INJECTION INTRAMUSCULAR; INTRAVENOUS ONCE
Status: COMPLETED | OUTPATIENT
Start: 2024-07-30 | End: 2024-07-30

## 2024-07-30 RX ORDER — ONDANSETRON 4 MG/1
4 TABLET, ORALLY DISINTEGRATING ORAL EVERY 8 HOURS PRN
Qty: 20 TABLET | Refills: 0 | Status: SHIPPED | OUTPATIENT
Start: 2024-07-30

## 2024-07-30 RX ADMIN — ONDANSETRON 4 MG: 2 INJECTION INTRAMUSCULAR; INTRAVENOUS at 07:03

## 2024-07-30 RX ADMIN — ACETAMINOPHEN 975 MG: 325 TABLET ORAL at 07:20

## 2024-07-30 RX ADMIN — SODIUM CHLORIDE 1000 ML: 0.9 INJECTION, SOLUTION INTRAVENOUS at 07:03

## 2024-07-30 RX ADMIN — LIDOCAINE HYDROCHLORIDE 15 ML: 20 SOLUTION ORAL at 06:40

## 2024-07-30 RX ADMIN — KETOROLAC TROMETHAMINE 15 MG: 30 INJECTION, SOLUTION INTRAMUSCULAR; INTRAVENOUS at 07:20

## 2024-07-30 RX ADMIN — ALUMINUM HYDROXIDE, MAGNESIUM HYDROXIDE, AND SIMETHICONE 15 ML: 1200; 120; 1200 SUSPENSION ORAL at 06:40

## 2024-07-30 NOTE — ED PROVIDER NOTES
History  Chief Complaint   Patient presents with    Abdominal Pain     C/o of RUQ abd pain ongoing for several months. Reports vomiting and chills, unable to keep any food or drink down. Hx of gastric bypass in 2017     59-year-old female presents with complaint of recurrent abdominal pain.  She states that over the past day or so she has had recurrence of abdominal pain along with nausea and vomiting.  She states that she did not take any of her nausea medicine after stated she took nothing for pain and then later admitted that she took Tylenol but it did not help relieve her pain symptoms.  Pain is sharp and stabbing across her upper abdomen primarily in the right upper quadrant.  She states that this is the same pain she is experienced previously and now has been able to figure out what is going on.      Abdominal Pain  Pain location:  RUQ  Pain quality: sharp and stabbing    Pain radiates to:  Does not radiate  Pain severity:  Severe  Onset quality:  Gradual  Duration: days.  Timing:  Constant  Progression:  Waxing and waning  Chronicity:  Recurrent  Relieved by:  Nothing  Worsened by:  Nothing  Ineffective treatments:  Acetaminophen  Associated symptoms: nausea and vomiting    Associated symptoms: no chest pain, no fever and no shortness of breath        Prior to Admission Medications   Prescriptions Last Dose Informant Patient Reported? Taking?   Empagliflozin 25 MG TABS   No No   Sig: Take 1 tablet (25 mg total) by mouth daily   FLUoxetine (PROzac) 40 MG capsule   No No   Sig: Take 1 capsule (40 mg total) by mouth daily   OneTouch Ultra test strip   Yes No   QUEtiapine (SEROquel) 400 MG tablet   No No   Sig: Take 2 tablets (800 mg total) by mouth daily at bedtime   Trulicity 1.5 MG/0.5ML injection   Yes No   Patient not taking: Reported on 7/7/2024   Varenicline Tartrate (CHANTIX STARTING MONTH PAK PO)   Yes No   Patient not taking: Reported on 7/7/2024   albuterol (PROVENTIL HFA,VENTOLIN HFA) 90 mcg/act  inhaler   Yes No   Sig: Inhale 2 puffs every 4 (four) hours as needed for wheezing or shortness of breath   Patient not taking: Reported on 6/20/2024   azaTHIOprine (IMURAN) 50 mg tablet  Self Yes No   Sig: Take 100 mg by mouth daily   cyanocobalamin (VITAMIN B-12) 1000 MCG tablet   No No   Sig: Take 1 tablet (1,000 mcg total) by mouth daily   Patient not taking: Reported on 7/7/2024   ergocalciferol (VITAMIN D2) 50,000 units   No No   Sig: Take 1 capsule (50,000 Units total) by mouth once a week for 8 doses Do not start before June 29, 2024.   Patient not taking: Reported on 7/7/2024   folic acid (FOLVITE) 1 mg tablet   No No   Sig: Take 1 tablet (1 mg total) by mouth daily   Patient not taking: Reported on 7/7/2024   gabapentin (NEURONTIN) 300 mg capsule   No No   Sig: Take 2 capsules (600 mg total) by mouth 3 (three) times a day   linaCLOtide (Linzess) 72 MCG CAPS   Yes No   Sig: Take 1 capsule by mouth daily   mirtazapine (REMERON) 7.5 MG tablet   No No   Sig: Take 1 tablet (7.5 mg total) by mouth daily at bedtime   Patient not taking: Reported on 7/7/2024   pantoprazole (PROTONIX) 40 mg tablet   Yes No   Sig: Take 40 mg by mouth daily   Patient not taking: Reported on 7/7/2024   sitaGLIPtin (JANUVIA) 100 mg tablet   No No   Sig: Take 1 tablet (100 mg total) by mouth daily   topiramate (TOPAMAX) 25 mg tablet   No No   Sig: Take 1 tablet (25 mg total) by mouth 2 (two) times a day   zolpidem (AMBIEN) 10 mg tablet   Yes No   Sig: Take 10 mg by mouth daily at bedtime as needed      Facility-Administered Medications: None       Past Medical History:   Diagnosis Date    Autoimmune hepatitis (HCC)     Bipolar 1 disorder (HCC)     Chronic headaches 2021    Diabetes mellitus (HCC)     Kidney stone     Renal disorder     kidney stones    Seizure disorder (HCC)        Past Surgical History:   Procedure Laterality Date    BARIATRIC SURGERY  05/2017    BUNIONECTOMY      CYSTOSCOPY  07/12/2018    Stent Removal    FL  RETROGRADE PYELOGRAM  6/16/2022    GASTRIC BYPASS  05/22/2017    HIATAL HERNIA REPAIR  05/22/2017    HYSTERECTOMY      JOINT REPLACEMENT      KIDNEY STONE SURGERY      ID CYSTO/URETERO W/LITHOTRIPSY &INDWELL STENT INSRT Bilateral 7/6/2018    Procedure: CYSTOSCOPY GALDINO. URETEROSCOPY;GALDINO.  RETROGRADE PYELOGRAM AND INSERTION GALDINO.STENT URETERAL;  Surgeon: Jacob Gerber MD;  Location: [facility];  Service: Urology    ID CYSTO/URETERO W/LITHOTRIPSY &INDWELL STENT INSRT Left 6/16/2022    Procedure: CYSTOSCOPY URETEROSCOPY WITH LITHOTRIPSY HOLMIUM LASER, RETROGRADE PYELOGRAM AND INSERTION STENT URETERAL;  Surgeon: Asad Geiger MD;  Location: [facility];  Service: Urology    TOTAL SHOULDER REPLACEMENT  2002    VAGINAL HYSTERECTOMY      PARTIAL       Family History   Problem Relation Age of Onset    Diabetes Father     Heart disease Father     Diabetes Mother     Heart disease Mother     Diabetes Sister     Diabetes Family         MELLITUS    Depression Family     Mental illness Family     Obesity Family     Osteoarthritis Family      I have reviewed and agree with the history as documented.    E-Cigarette/Vaping    E-Cigarette Use Never User      E-Cigarette/Vaping Substances    Nicotine No     THC No     CBD No     Flavoring No     Other No     Unknown No      Social History     Tobacco Use    Smoking status: Every Day     Current packs/day: 0.25     Average packs/day: 0.3 packs/day for 30.0 years (7.5 ttl pk-yrs)     Types: Cigarettes     Passive exposure: Current    Smokeless tobacco: Never    Tobacco comments:     N/a   Vaping Use    Vaping status: Never Used   Substance Use Topics    Alcohol use: No     Comment: n/a    Drug use: No     Comment: n/a       Review of Systems   Constitutional:  Negative for fever.   Respiratory:  Negative for shortness of breath.    Cardiovascular:  Negative for chest pain.   Gastrointestinal:  Positive for abdominal pain, nausea and vomiting.   All other systems reviewed and are  negative.      Physical Exam  Physical Exam  Vitals and nursing note reviewed.   Constitutional:       Appearance: Normal appearance. She is well-developed. She is not ill-appearing, toxic-appearing or diaphoretic.   HENT:      Head: Normocephalic and atraumatic.      Right Ear: External ear normal.      Left Ear: External ear normal.      Nose: Nose normal. No congestion.      Mouth/Throat:      Mouth: Mucous membranes are moist.      Pharynx: Oropharynx is clear.   Eyes:      Conjunctiva/sclera: Conjunctivae normal.      Pupils: Pupils are equal, round, and reactive to light.   Cardiovascular:      Rate and Rhythm: Normal rate and regular rhythm.      Heart sounds: Normal heart sounds.   Pulmonary:      Effort: Pulmonary effort is normal. No respiratory distress.      Breath sounds: Normal breath sounds. No wheezing.   Abdominal:      General: Bowel sounds are normal.      Palpations: Abdomen is soft.      Tenderness: There is abdominal tenderness in the right upper quadrant and epigastric area. There is no guarding or rebound.   Musculoskeletal:         General: No tenderness or deformity.      Cervical back: Normal range of motion and neck supple. No rigidity.   Skin:     General: Skin is warm and dry.      Capillary Refill: Capillary refill takes less than 2 seconds.      Findings: No rash.   Neurological:      General: No focal deficit present.      Mental Status: She is alert and oriented to person, place, and time.   Psychiatric:         Mood and Affect: Mood normal.         Behavior: Behavior normal.         Vital Signs  ED Triage Vitals [07/30/24 0604]   Temperature Pulse Respirations Blood Pressure SpO2   98.2 °F (36.8 °C) 98 20 168/86 100 %      Temp Source Heart Rate Source Patient Position - Orthostatic VS BP Location FiO2 (%)   Oral Monitor Lying Left arm --      Pain Score       --           Vitals:    07/30/24 0604   BP: 168/86   Pulse: 98   Patient Position - Orthostatic VS: Lying         Visual  Acuity      ED Medications  Medications   sodium chloride 0.9 % bolus 1,000 mL (0 mL Intravenous Stopped 7/30/24 0745)   ondansetron (ZOFRAN) injection 4 mg (4 mg Intravenous Given 7/30/24 0703)   aluminum-magnesium hydroxide-simethicone (MAALOX) oral suspension 15 mL (15 mL Oral Given 7/30/24 0640)   Lidocaine Viscous HCl (XYLOCAINE) 2 % mucosal solution 15 mL (15 mL Swish & Spit Given 7/30/24 0640)   ketorolac (TORADOL) injection 15 mg (15 mg Intramuscular Given 7/30/24 0720)   acetaminophen (TYLENOL) tablet 975 mg (975 mg Oral Given 7/30/24 0720)       Diagnostic Studies  Results Reviewed       Procedure Component Value Units Date/Time    Urine Microscopic [[id number]] Collected: 07/30/24 0744    Lab Status: Final result Specimen: Urine, Clean Catch Updated: 07/30/24 0818     RBC, UA None Seen /hpf      WBC, UA None Seen /hpf      Epithelial Cells Occasional /hpf      Bacteria, UA Occasional /hpf      AMORPH PHOSPATES Occasional /hpf     UA (URINE) with reflex to Scope [[id number]]  (Abnormal) Collected: 07/30/24 0744    Lab Status: Final result Specimen: Urine, Clean Catch Updated: 07/30/24 0800     Color, UA Yellow     Clarity, UA Clear     Specific Gravity, UA 1.015     pH, UA 8.0     Leukocytes, UA Negative     Nitrite, UA Negative     Protein, UA Negative mg/dl      Glucose, UA >=1000 (1%) mg/dl      Ketones, UA Negative mg/dl      Bilirubin, UA Negative     Occult Blood, UA Negative     UROBILINOGEN UA Negative mg/dL     Comprehensive metabolic panel [[id number]]  (Abnormal) Collected: 07/30/24 0628    Lab Status: Final result Specimen: Blood from Arm, Left Updated: 07/30/24 0653     Sodium 135 mmol/L      Potassium 4.2 mmol/L      Chloride 98 mmol/L      CO2 27 mmol/L      ANION GAP 10 mmol/L      BUN 12 mg/dL      Creatinine 0.47 mg/dL      Glucose 153 mg/dL      Calcium 9.7 mg/dL      AST 35 U/L      ALT 51 U/L      Alkaline Phosphatase 344 U/L      Total Protein 8.1 g/dL      Albumin 3.9 g/dL      Total  Bilirubin 0.66 mg/dL      eGFR 109 ml/min/1.73sq m     Narrative:      National Kid[employer] Foundation guidelines for Chronic Kidney Disease (CKD):     Stage 1 with normal or high GFR (GFR > 90 mL/min/1.73 square meters)    Stage 2 Mild CKD (GFR = 60-89 mL/min/1.73 square meters)    Stage 3A Moderate CKD (GFR = 45-59 mL/min/1.73 square meters)    Stage 3B Moderate CKD (GFR = 30-44 mL/min/1.73 square meters)    Stage 4 Severe CKD (GFR = 15-29 mL/min/1.73 square meters)    Stage 5 End Stage CKD (GFR <15 mL/min/1.73 square meters)  Note: GFR calculation is accurate only with a steady state creatinine    Lipase [[id number]]  (Normal) Collected: 07/30/24 0628    Lab Status: Final result Specimen: Blood from Arm, Left Updated: 07/30/24 0653     Lipase 42 u/L     CBC and differential [[id number]]  (Abnormal) Collected: 07/30/24 0628    Lab Status: Final result Specimen: Blood from Arm, Left Updated: 07/30/24 0638     WBC 7.08 Thousand/uL      RBC 4.14 Million/uL      Hemoglobin 14.2 g/dL      Hematocrit 42.0 %       fL      MCH 34.3 pg      MCHC 33.8 g/dL      RDW 15.2 %      MPV 9.2 fL      Platelets 534 Thousands/uL      nRBC 0 /100 WBCs      Segmented % 64 %      Immature Grans % 0 %      Lymphocytes % 27 %      Monocytes % 6 %      Eosinophils Relative 2 %      Basophils Relative 1 %      Absolute Neutrophils 4.57 Thousands/µL      Absolute Immature Grans 0.02 Thousand/uL      Absolute Lymphocytes 1.94 Thousands/µL      Absolute Monocytes 0.40 Thousand/µL      Eosinophils Absolute 0.11 Thousand/µL      Basophils Absolute 0.04 Thousands/µL                    No orders to display              Procedures  ECG 12 Lead Documentation Only    Date/Time: 7/30/2024 6:34 AM    Performed by: Barrera Major DO  Authorized by: Barrera Major DO    ECG reviewed by me, the ED Provider: yes    Patient location:  ED  Rate:     ECG rate:  87    ECG rate assessment: normal    Rhythm:     Rhythm: sinus rhythm    Ectopy:      Ectopy: none    QRS:     QRS axis:  Normal  Conduction:     Conduction: normal    ST segments:     ST segments:  Normal  T waves:     T waves: normal             ED Course                                 SBIRT 22yo+      Flowsheet Row Most Recent Value   Initial Alcohol Screen: US AUDIT-C     1. How often do you have a drink containing alcohol? 0 Filed at: 07/30/2024 0606   2. How many drinks containing alcohol do you have on a typical day you are drinking?  0 Filed at: 07/30/2024 0606   3b. FEMALE Any Age, or MALE 65+: How often do you have 4 or more drinks on one occassion? 0 Filed at: 07/30/2024 0606   Audit-C Score 0 Filed at: 07/30/2024 0606   SARITA: How many times in the past year have you...    Used an illegal drug or used a prescription medication for non-medical reasons? Never Filed at: 07/30/2024 0606                      Medical Decision Making  Amount and/or Complexity of Data Reviewed  Labs: ordered.    Risk  OTC drugs.  Prescription drug management.                 Disposition  Final diagnoses:   Abdominal pain   Nausea and vomiting     Time reflects when diagnosis was documented in both MDM as applicable and the Disposition within this note       Time User Action Codes Description Comment    7/30/2024  6:48 AM Barrera Major Add [R10.9] Abdominal pain     7/30/2024  6:48 AM Barrera Major Add [R11.2] Nausea and vomiting           ED Disposition       ED Disposition   Discharge    Condition   Stable    Date/Time   Tue Jul 30, 2024 0654    Comment   Edith Klein discharge to home/self care.                   Follow-up Information       Follow up With Specialties Details Why Contact Philly Higginbotham PA-C Physician Assistant Call   [address] [place] [address]  [address] [facility]  [place] [address] [phone]              Discharge Medication List as of 7/30/2024  6:55 AM        CONTINUE these medications which have NOT CHANGED    Details   albuterol (PROVENTIL HFA,VENTOLIN HFA) 90 mcg/act inhaler Inhale 2  puffs every 4 (four) hours as needed for wheezing or shortness of breath, Starting Fri 3/29/2024, Historical Med      azaTHIOprine (IMURAN) 50 mg tablet Take 100 mg by mouth daily, Historical Med      cyanocobalamin (VITAMIN B-12) 1000 MCG tablet Take 1 tablet (1,000 mcg total) by mouth daily, Starting Wed 6/26/2024, Until Sun 8/25/2024, Normal      Empagliflozin 25 MG TABS Take 1 tablet (25 mg total) by mouth daily, Starting Wed 6/26/2024, Until Sun 8/25/2024, Normal      ergocalciferol (VITAMIN D2) 50,000 units Take 1 capsule (50,000 Units total) by mouth once a week for 8 doses Do not start before June 29, 2024., Starting Sat 6/29/2024, Until Sun 8/18/2024, Normal      FLUoxetine (PROzac) 40 MG capsule Take 1 capsule (40 mg total) by mouth daily, Starting Wed 6/26/2024, Until Sun 8/25/2024, Normal      folic acid (FOLVITE) 1 mg tablet Take 1 tablet (1 mg total) by mouth daily, Starting Wed 6/26/2024, Until Sun 8/25/2024, Normal      gabapentin (NEURONTIN) 300 mg capsule Take 2 capsules (600 mg total) by mouth 3 (three) times a day, Starting Tue 6/25/2024, Until Sat 8/24/2024, Normal      linaCLOtide (Linzess) 72 MCG CAPS Take 1 capsule by mouth daily, Historical Med      mirtazapine (REMERON) 7.5 MG tablet Take 1 tablet (7.5 mg total) by mouth daily at bedtime, Starting Tue 6/25/2024, Until Sat 8/24/2024, Normal      OneTouch Ultra test strip Historical Med      pantoprazole (PROTONIX) 40 mg tablet Take 40 mg by mouth daily, Historical Med      QUEtiapine (SEROquel) 400 MG tablet Take 2 tablets (800 mg total) by mouth daily at bedtime, Starting Tue 6/25/2024, Until Sat 8/24/2024, Normal      sitaGLIPtin (JANUVIA) 100 mg tablet Take 1 tablet (100 mg total) by mouth daily, Starting Wed 6/26/2024, Until Sun 8/25/2024, Normal      topiramate (TOPAMAX) 25 mg tablet Take 1 tablet (25 mg total) by mouth 2 (two) times a day, Starting Tue 6/25/2024, Until Sat 8/24/2024, Normal      Trulicity 1.5 MG/0.5ML injection  Historical Med      Varenicline Tartrate (CHANTIX STARTING MONTH JENNIFER PO) Historical Med      zolpidem (AMBIEN) 10 mg tablet Take 10 mg by mouth daily at bedtime as needed, Starting Mon 7/1/2024, Historical Med             No discharge procedures on file.    PDMP Review         Value Time User    PDMP Reviewed  Yes 6/21/2024  8:46 AM Kulwant Phillips DO            ED Provider  Electronically Signed by             Barrera Major DO  07/30/24 7128

## 2024-07-30 NOTE — ED NOTES
Two attempts at ultrasound IV by this nurse unsuccessful.      Thomas J Ruzicka, RN  07/30/24 7955

## 2024-07-31 ENCOUNTER — HOSPITAL ENCOUNTER (EMERGENCY)
Facility: HOSPITAL | Age: 59
Discharge: HOME | End: 2024-07-31
Payer: COMMERCIAL

## 2024-07-31 ENCOUNTER — APPOINTMENT (EMERGENCY)
Dept: ULTRASOUND IMAGING | Facility: HOSPITAL | Age: 59
End: 2024-07-31
Payer: COMMERCIAL

## 2024-07-31 ENCOUNTER — HOSPITAL ENCOUNTER (EMERGENCY)
Facility: HOSPITAL | Age: 59
Discharge: HOME | End: 2024-07-31
Attending: EMERGENCY MEDICINE
Payer: COMMERCIAL

## 2024-07-31 ENCOUNTER — APPOINTMENT (EMERGENCY)
Dept: CT IMAGING | Facility: HOSPITAL | Age: 59
End: 2024-07-31
Payer: COMMERCIAL

## 2024-07-31 VITALS
BODY MASS INDEX: 18.91 KG/M2 | DIASTOLIC BLOOD PRESSURE: 73 MMHG | SYSTOLIC BLOOD PRESSURE: 159 MMHG | OXYGEN SATURATION: 95 % | HEART RATE: 92 BPM | WEIGHT: 103.4 LBS | TEMPERATURE: 98.3 F | RESPIRATION RATE: 16 BRPM

## 2024-07-31 VITALS
TEMPERATURE: 97.6 F | SYSTOLIC BLOOD PRESSURE: 163 MMHG | WEIGHT: 102 LBS | HEART RATE: 90 BPM | BODY MASS INDEX: 18.66 KG/M2 | DIASTOLIC BLOOD PRESSURE: 87 MMHG | OXYGEN SATURATION: 99 % | RESPIRATION RATE: 20 BRPM

## 2024-07-31 DIAGNOSIS — N20.0 RENAL CALCULI: ICD-10-CM

## 2024-07-31 DIAGNOSIS — R10.11 RUQ ABDOMINAL PAIN: Primary | ICD-10-CM

## 2024-07-31 DIAGNOSIS — R10.9 ABDOMINAL PAIN, UNSPECIFIED ABDOMINAL LOCATION: Primary | ICD-10-CM

## 2024-07-31 LAB
ALBUMIN SERPL BCG-MCNC: 3.8 G/DL (ref 3.5–5)
ALP SERPL-CCNC: 287 U/L (ref 34–104)
ALT SERPL W P-5'-P-CCNC: 39 U/L (ref 7–52)
ANION GAP SERPL CALCULATED.3IONS-SCNC: 7 MMOL/L (ref 4–13)
AST SERPL W P-5'-P-CCNC: 33 U/L (ref 13–39)
BACTERIA UR QL AUTO: NORMAL /HPF
BASOPHILS # BLD AUTO: 0.05 THOUSANDS/ÂΜL (ref 0–0.1)
BASOPHILS NFR BLD AUTO: 1 % (ref 0–1)
BILIRUB SERPL-MCNC: 0.51 MG/DL (ref 0.2–1)
BILIRUB UR QL STRIP: NEGATIVE
BUN SERPL-MCNC: 16 MG/DL (ref 5–25)
CALCIUM SERPL-MCNC: 9.3 MG/DL (ref 8.4–10.2)
CHLORIDE SERPL-SCNC: 100 MMOL/L (ref 96–108)
CLARITY UR: CLEAR
CO2 SERPL-SCNC: 27 MMOL/L (ref 21–32)
COLOR UR: ABNORMAL
CREAT SERPL-MCNC: 0.45 MG/DL (ref 0.6–1.3)
EOSINOPHIL # BLD AUTO: 0.15 THOUSAND/ÂΜL (ref 0–0.61)
EOSINOPHIL NFR BLD AUTO: 2 % (ref 0–6)
ERYTHROCYTE [DISTWIDTH] IN BLOOD BY AUTOMATED COUNT: 15.1 % (ref 11.6–15.1)
GFR SERPL CREATININE-BSD FRML MDRD: 111 ML/MIN/1.73SQ M
GLUCOSE SERPL-MCNC: 148 MG/DL (ref 65–140)
GLUCOSE UR STRIP-MCNC: ABNORMAL MG/DL
HCT VFR BLD AUTO: 40 % (ref 34.8–46.1)
HGB BLD-MCNC: 13.3 G/DL (ref 11.5–15.4)
HGB UR QL STRIP.AUTO: NEGATIVE
IMM GRANULOCYTES # BLD AUTO: 0.04 THOUSAND/UL (ref 0–0.2)
IMM GRANULOCYTES NFR BLD AUTO: 0 % (ref 0–2)
KETONES UR STRIP-MCNC: NEGATIVE MG/DL
LEUKOCYTE ESTERASE UR QL STRIP: NEGATIVE
LIPASE SERPL-CCNC: 35 U/L (ref 11–82)
LYMPHOCYTES # BLD AUTO: 1.86 THOUSANDS/ÂΜL (ref 0.6–4.47)
LYMPHOCYTES NFR BLD AUTO: 21 % (ref 14–44)
MCH RBC QN AUTO: 34 PG (ref 26.8–34.3)
MCHC RBC AUTO-ENTMCNC: 33.3 G/DL (ref 31.4–37.4)
MCV RBC AUTO: 102 FL (ref 82–98)
MONOCYTES # BLD AUTO: 0.59 THOUSAND/ÂΜL (ref 0.17–1.22)
MONOCYTES NFR BLD AUTO: 7 % (ref 4–12)
NEUTROPHILS # BLD AUTO: 6.34 THOUSANDS/ÂΜL (ref 1.85–7.62)
NEUTS SEG NFR BLD AUTO: 69 % (ref 43–75)
NITRITE UR QL STRIP: NEGATIVE
NON-SQ EPI CELLS URNS QL MICRO: NORMAL /HPF
NRBC BLD AUTO-RTO: 0 /100 WBCS
PH UR STRIP.AUTO: 5 [PH]
PLATELET # BLD AUTO: 529 THOUSANDS/UL (ref 149–390)
PMV BLD AUTO: 9.2 FL (ref 8.9–12.7)
POTASSIUM SERPL-SCNC: 4.8 MMOL/L (ref 3.5–5.3)
PROT SERPL-MCNC: 7.4 G/DL (ref 6.4–8.4)
PROT UR STRIP-MCNC: NEGATIVE MG/DL
RBC # BLD AUTO: 3.91 MILLION/UL (ref 3.81–5.12)
RBC #/AREA URNS AUTO: NORMAL /HPF
SODIUM SERPL-SCNC: 134 MMOL/L (ref 135–147)
SP GR UR STRIP.AUTO: 1.01 (ref 1–1.04)
UROBILINOGEN UA: NEGATIVE MG/DL
WBC # BLD AUTO: 9.03 THOUSAND/UL (ref 4.31–10.16)
WBC #/AREA URNS AUTO: NORMAL /HPF

## 2024-07-31 PROCEDURE — 96361 HYDRATE IV INFUSION ADD-ON: CPT

## 2024-07-31 PROCEDURE — 99284 EMERGENCY DEPT VISIT MOD MDM: CPT

## 2024-07-31 PROCEDURE — 96375 TX/PRO/DX INJ NEW DRUG ADDON: CPT

## 2024-07-31 PROCEDURE — 83690 ASSAY OF LIPASE: CPT | Performed by: PHYSICIAN ASSISTANT

## 2024-07-31 PROCEDURE — 96374 THER/PROPH/DIAG INJ IV PUSH: CPT

## 2024-07-31 PROCEDURE — 76705 ECHO EXAM OF ABDOMEN: CPT

## 2024-07-31 PROCEDURE — 36415 COLL VENOUS BLD VENIPUNCTURE: CPT | Performed by: PHYSICIAN ASSISTANT

## 2024-07-31 PROCEDURE — 99284 EMERGENCY DEPT VISIT MOD MDM: CPT | Performed by: PHYSICIAN ASSISTANT

## 2024-07-31 PROCEDURE — 81001 URINALYSIS AUTO W/SCOPE: CPT | Performed by: PHYSICIAN ASSISTANT

## 2024-07-31 PROCEDURE — 85025 COMPLETE CBC W/AUTO DIFF WBC: CPT | Performed by: PHYSICIAN ASSISTANT

## 2024-07-31 PROCEDURE — 80053 COMPREHEN METABOLIC PANEL: CPT | Performed by: PHYSICIAN ASSISTANT

## 2024-07-31 PROCEDURE — 74176 CT ABD & PELVIS W/O CONTRAST: CPT

## 2024-07-31 RX ORDER — ONDANSETRON 2 MG/ML
4 INJECTION INTRAMUSCULAR; INTRAVENOUS ONCE
Status: COMPLETED | OUTPATIENT
Start: 2024-07-31 | End: 2024-07-31

## 2024-07-31 RX ORDER — MORPHINE SULFATE 4 MG/ML
4 INJECTION, SOLUTION INTRAMUSCULAR; INTRAVENOUS ONCE
Status: COMPLETED | OUTPATIENT
Start: 2024-07-31 | End: 2024-07-31

## 2024-07-31 RX ORDER — ACETAMINOPHEN 325 MG/1
975 TABLET ORAL ONCE
Status: DISCONTINUED | OUTPATIENT
Start: 2024-07-31 | End: 2024-07-31 | Stop reason: HOSPADM

## 2024-07-31 RX ORDER — RIZATRIPTAN BENZOATE 10 MG/1
10 TABLET ORAL DAILY
COMMUNITY
Start: 2024-07-08

## 2024-07-31 RX ORDER — LIDOCAINE HYDROCHLORIDE 20 MG/ML
15 SOLUTION OROPHARYNGEAL ONCE
Status: COMPLETED | OUTPATIENT
Start: 2024-07-31 | End: 2024-07-31

## 2024-07-31 RX ORDER — DICYCLOMINE HCL 20 MG
20 TABLET ORAL ONCE
Status: DISCONTINUED | OUTPATIENT
Start: 2024-07-31 | End: 2024-07-31 | Stop reason: HOSPADM

## 2024-07-31 RX ORDER — DAPAGLIFLOZIN 10 MG/1
10 TABLET, FILM COATED ORAL DAILY
COMMUNITY
Start: 2024-07-10

## 2024-07-31 RX ORDER — ONDANSETRON 4 MG/1
4 TABLET, ORALLY DISINTEGRATING ORAL EVERY 8 HOURS PRN
Qty: 20 TABLET | Refills: 0 | Status: SHIPPED | OUTPATIENT
Start: 2024-07-31 | End: 2024-08-10

## 2024-07-31 RX ORDER — LINAGLIPTIN 5 MG/1
5 TABLET, FILM COATED ORAL DAILY
COMMUNITY
Start: 2024-07-10 | End: 2025-07-10

## 2024-07-31 RX ORDER — NORTRIPTYLINE HYDROCHLORIDE 50 MG/1
50 CAPSULE ORAL
COMMUNITY
Start: 2024-07-10 | End: 2025-07-10

## 2024-07-31 RX ORDER — FLUTICASONE PROPIONATE 50 MCG
SPRAY, SUSPENSION (ML) NASAL
COMMUNITY
Start: 2024-07-22

## 2024-07-31 RX ORDER — KETOROLAC TROMETHAMINE 10 MG/1
10 TABLET, FILM COATED ORAL EVERY 6 HOURS PRN
Qty: 20 TABLET | Refills: 0 | Status: SHIPPED | OUTPATIENT
Start: 2024-07-31 | End: 2024-08-05

## 2024-07-31 RX ORDER — KETOROLAC TROMETHAMINE 30 MG/ML
15 INJECTION, SOLUTION INTRAMUSCULAR; INTRAVENOUS ONCE
Status: COMPLETED | OUTPATIENT
Start: 2024-07-31 | End: 2024-07-31

## 2024-07-31 RX ORDER — VARENICLINE TARTRATE 1 MG/1
1 TABLET, FILM COATED ORAL 2 TIMES DAILY
COMMUNITY
Start: 2024-07-26

## 2024-07-31 RX ADMIN — ONDANSETRON 4 MG: 2 INJECTION INTRAMUSCULAR; INTRAVENOUS at 06:09

## 2024-07-31 RX ADMIN — LIDOCAINE HYDROCHLORIDE 15 ML: 20 SOLUTION ORAL at 06:17

## 2024-07-31 RX ADMIN — ONDANSETRON 4 MG: 2 INJECTION INTRAMUSCULAR; INTRAVENOUS at 10:55

## 2024-07-31 RX ADMIN — KETOROLAC TROMETHAMINE 15 MG: 30 INJECTION, SOLUTION INTRAMUSCULAR; INTRAVENOUS at 10:55

## 2024-07-31 RX ADMIN — IOHEXOL 50 ML: 240 INJECTION, SOLUTION INTRATHECAL; INTRAVASCULAR; INTRAVENOUS; ORAL at 06:16

## 2024-07-31 RX ADMIN — SODIUM CHLORIDE 1000 ML: 0.9 INJECTION, SOLUTION INTRAVENOUS at 11:00

## 2024-07-31 RX ADMIN — MORPHINE SULFATE 4 MG: 4 INJECTION, SOLUTION INTRAMUSCULAR; INTRAVENOUS at 06:11

## 2024-07-31 NOTE — ED CARE HANDOFF
Emergency Department Sign Out Note        Sign out and transfer of care from Marii Brown. See Separate Emergency Department note.     The patient, Edith Klein, was evaluated by the previous provider for abdominal pain.    Workup Completed:  Abd labs    ED Course / Workup Pending (followup):  CT abd                                  ED Course as of 07/31/24 0839   Wed Jul 31, 2024   0655 SO: gastric bypass, abd pain, getting CT, dispo pending scan   0837 Pt still having pain RUQ. Discussed CT finding concerning for possible gallbladder pathology. Pt wants to leave to make other appointment. Understands risk/benefits with verbal understanding. Pt states she will come back. Pt signed out AMA.     Procedures  Medical Decision Making  CT abd with possible gallbladder pathology consistent with area of patient's pain. Recommend formal u/s. Pt states she wants to leave due to other medical appointment. See ED course. Pt signed out AMA.     Amount and/or Complexity of Data Reviewed  Labs: ordered.  Radiology: ordered.    Risk  OTC drugs.  Prescription drug management.            Disposition  Final diagnoses:   Abdominal pain, unspecified abdominal location     Time reflects when diagnosis was documented in both MDM as applicable and the Disposition within this note       Time User Action Codes Description Comment    7/31/2024  6:53 AM Marii Brown Add [R10.9] Abdominal pain, unspecified abdominal location           ED Disposition       ED Disposition   AMA    Condition   --    Date/Time   Wed Jul 31, 2024  8:19 AM    Comment   Date: 7/31/2024  Patient: Edith Klein  Admitted: 7/31/2024  5:23 AM  Attending Provider: Uziel Castanon MD    Edith Klein or her authorized caregiver has made the decision for the patient to leave the emergency department against the advice of her  attending physician. She or her authorized caregiver has been informed and understands the inherent risks, including death, permanent  disability, decreased quality of life.  She or her authorized caregiver has decided to accept the responsibility fo r this decision. Edith Klein and all necessary parties have been advised that she may return for further evaluation or treatment. Her condition at time of discharge was stable.  Edith Klein had current vital signs as follows:  BP (!) 170/102   Pul se 74   Temp 97.6 °F (36.4 °C) (Oral)   Resp 16   Wt 46.3 kg (102 lb)                Follow-up Information    None       Patient's Medications   Discharge Prescriptions    No medications on file     No discharge procedures on file.       ED Provider  Electronically Signed by     Uziel Castanon MD  07/31/24 0818

## 2024-07-31 NOTE — ED PROVIDER NOTES
History  Chief Complaint   Patient presents with    Abdominal Pain     Reports RUQ abdominal pain - going on for several months now. Took pain medication several hours ago. (Hydrocodone.) Some nausea, no vomiting because she hasn't been able to eat. Reports that the toradol given yesterday helped but by the time she got home the pain had returned. (Reports she needs a colonoscopy). No painful urination. Last BM yesterday.     59-year-old female with history of gastric bypass surgery in 2017 presents complaining of epigastric and right upper quadrant pain.  Patient states that she has been having pain for the past few days and that she typically has some degree of pain but states that recently it has been worse than usual.  Also admits to nausea and vomiting.  Has not taken medications prior to arrival.  Denies any other complaints.      History provided by:  Patient   used: No        Prior to Admission Medications   Prescriptions Last Dose Informant Patient Reported? Taking?   Empagliflozin 25 MG TABS   No No   Sig: Take 1 tablet (25 mg total) by mouth daily   FLUoxetine (PROzac) 40 MG capsule   No No   Sig: Take 1 capsule (40 mg total) by mouth daily   Nutritional Supplements (ENSURE PO)   Yes Yes   Sig: Take 1 Can by mouth daily   OneTouch Ultra test strip   Yes No   QUEtiapine (SEROquel) 400 MG tablet   No No   Sig: Take 2 tablets (800 mg total) by mouth daily at bedtime   Tradjenta 5 MG TABS   Yes Yes   Sig: Take 5 mg by mouth daily   Trulicity 1.5 MG/0.5ML injection   Yes No   Patient not taking: Reported on 7/7/2024   Varenicline Tartrate (CHANTIX STARTING MONTH JENNIFER PO)   Yes No   Patient not taking: Reported on 7/7/2024   albuterol (PROVENTIL HFA,VENTOLIN HFA) 90 mcg/act inhaler   Yes No   Sig: Inhale 2 puffs every 4 (four) hours as needed for wheezing or shortness of breath   Patient not taking: Reported on 6/20/2024   azaTHIOprine (IMURAN) 50 mg tablet  Self Yes No   Sig: Take 100 mg by  mouth daily   cyanocobalamin (VITAMIN B-12) 1000 MCG tablet   No No   Sig: Take 1 tablet (1,000 mcg total) by mouth daily   Patient not taking: Reported on 7/7/2024   dapagliflozin (Farxiga) 10 MG tablet   Yes Yes   Sig: Take 10 mg by mouth daily   ergocalciferol (VITAMIN D2) 50,000 units   No No   Sig: Take 1 capsule (50,000 Units total) by mouth once a week for 8 doses Do not start before June 29, 2024.   Patient not taking: Reported on 7/7/2024   fluticasone (FLONASE) 50 mcg/act nasal spray   Yes Yes   folic acid (FOLVITE) 1 mg tablet   No No   Sig: Take 1 tablet (1 mg total) by mouth daily   Patient not taking: Reported on 7/7/2024   gabapentin (NEURONTIN) 300 mg capsule   No No   Sig: Take 2 capsules (600 mg total) by mouth 3 (three) times a day   linaCLOtide (Linzess) 72 MCG CAPS   Yes No   Sig: Take 1 capsule by mouth daily   mirtazapine (REMERON) 7.5 MG tablet   No No   Sig: Take 1 tablet (7.5 mg total) by mouth daily at bedtime   Patient not taking: Reported on 7/7/2024   nortriptyline (PAMELOR) 50 mg capsule   Yes Yes   Sig: Take 50 mg by mouth daily at bedtime   ondansetron (ZOFRAN-ODT) 4 mg disintegrating tablet   No No   Sig: Take 1 tablet (4 mg total) by mouth every 8 (eight) hours as needed for nausea or vomiting   pantoprazole (PROTONIX) 40 mg tablet   Yes No   Sig: Take 40 mg by mouth daily   Patient not taking: Reported on 7/7/2024   rizatriptan (MAXALT) 10 mg tablet   Yes Yes   Sig: Take 10 mg by mouth daily   sitaGLIPtin (JANUVIA) 100 mg tablet   No No   Sig: Take 1 tablet (100 mg total) by mouth daily   topiramate (TOPAMAX) 25 mg tablet   No No   Sig: Take 1 tablet (25 mg total) by mouth 2 (two) times a day   varenicline (Chantix) 1 mg tablet   Yes Yes   Sig: Take 1 mg by mouth 2 (two) times a day   zolpidem (AMBIEN) 10 mg tablet   Yes No   Sig: Take 10 mg by mouth daily at bedtime as needed      Facility-Administered Medications: None       Past Medical History:   Diagnosis Date    Autoimmune  hepatitis (HCC)     Bipolar 1 disorder (HCC)     Chronic headaches 2021    Diabetes mellitus (HCC)     Kidney stone     Renal disorder     kidney stones    Seizure disorder (HCC)        Past Surgical History:   Procedure Laterality Date    BARIATRIC SURGERY  05/2017    BUNIONECTOMY      CYSTOSCOPY  07/12/2018    Stent Removal    FL RETROGRADE PYELOGRAM  6/16/2022    GASTRIC BYPASS  05/22/2017    HIATAL HERNIA REPAIR  05/22/2017    HYSTERECTOMY      JOINT REPLACEMENT      KIDNEY STONE SURGERY      NH CYSTO/URETERO W/LITHOTRIPSY &INDWELL STENT INSRT Bilateral 7/6/2018    Procedure: CYSTOSCOPY GALDINO. URETEROSCOPY;GALDINO.  RETROGRADE PYELOGRAM AND INSERTION GALDINO.STENT URETERAL;  Surgeon: Jacob Gerber MD;  Location: [facility];  Service: Urology    NH CYSTO/URETERO W/LITHOTRIPSY &INDWELL STENT INSRT Left 6/16/2022    Procedure: CYSTOSCOPY URETEROSCOPY WITH LITHOTRIPSY HOLMIUM LASER, RETROGRADE PYELOGRAM AND INSERTION STENT URETERAL;  Surgeon: Asad Geiger MD;  Location: [facility];  Service: Urology    TOTAL SHOULDER REPLACEMENT  2002    VAGINAL HYSTERECTOMY      PARTIAL       Family History   Problem Relation Age of Onset    Diabetes Father     Heart disease Father     Diabetes Mother     Heart disease Mother     Diabetes Sister     Diabetes Family         MELLITUS    Depression Family     Mental illness Family     Obesity Family     Osteoarthritis Family      I have reviewed and agree with the history as documented.    E-Cigarette/Vaping    E-Cigarette Use Never User      E-Cigarette/Vaping Substances    Nicotine No     THC No     CBD No     Flavoring No     Other No     Unknown No      Social History     Tobacco Use    Smoking status: Every Day     Current packs/day: 0.25     Average packs/day: 0.3 packs/day for 30.0 years (7.5 ttl pk-yrs)     Types: Cigarettes     Passive exposure: Current    Smokeless tobacco: Never    Tobacco comments:     N/a   Vaping Use    Vaping status: Never Used   Substance Use Topics     Alcohol use: No     Comment: n/a    Drug use: No     Comment: n/a       Review of Systems   Constitutional: Negative.  Negative for chills and fatigue.   HENT:  Negative for ear pain and sore throat.    Eyes:  Negative for photophobia and redness.   Respiratory:  Negative for apnea, cough and shortness of breath.    Cardiovascular:  Negative for chest pain.   Gastrointestinal:  Positive for abdominal pain and nausea. Negative for vomiting.   Genitourinary:  Negative for dysuria.   Musculoskeletal:  Negative for arthralgias, neck pain and neck stiffness.   Skin:  Negative for rash.   Neurological:  Negative for dizziness, tremors, syncope and weakness.   Psychiatric/Behavioral:  Negative for suicidal ideas.        Physical Exam  Physical Exam  Constitutional:       General: She is not in acute distress.     Appearance: She is well-developed. She is not diaphoretic.   Eyes:      Pupils: Pupils are equal, round, and reactive to light.   Cardiovascular:      Rate and Rhythm: Normal rate and regular rhythm.   Pulmonary:      Effort: Pulmonary effort is normal. No respiratory distress.      Breath sounds: Normal breath sounds.   Abdominal:      General: Bowel sounds are normal. There is no distension.      Palpations: Abdomen is soft.      Tenderness: There is abdominal tenderness. There is guarding. There is no right CVA tenderness or left CVA tenderness.      Comments: RUQ and epigastric pain    Musculoskeletal:         General: Normal range of motion.      Cervical back: Normal range of motion and neck supple.   Skin:     General: Skin is warm and dry.   Neurological:      Mental Status: She is alert and oriented to person, place, and time.         Vital Signs  ED Triage Vitals   Temperature Pulse Respirations Blood Pressure SpO2   07/31/24 0634 07/31/24 0528 07/31/24 0528 07/31/24 0528 07/31/24 0528   97.6 °F (36.4 °C) 89 18 126/77 98 %      Temp Source Heart Rate Source Patient Position - Orthostatic VS BP Location  FiO2 (%)   07/31/24 0634 07/31/24 0626 07/31/24 0528 07/31/24 0528 --   Oral Monitor Sitting Left arm       Pain Score       07/31/24 0611       10 - Worst Possible Pain           Vitals:    07/31/24 0528 07/31/24 0626   BP: 126/77 143/83   Pulse: 89 84   Patient Position - Orthostatic VS: Sitting Lying         Visual Acuity      ED Medications  Medications   ondansetron (ZOFRAN) injection 4 mg (4 mg Intravenous Given 7/31/24 0609)   Lidocaine Viscous HCl (XYLOCAINE) 2 % mucosal solution 15 mL (15 mL Swish & Spit Given 7/31/24 0617)   morphine injection 4 mg (4 mg Intravenous Given 7/31/24 0611)   iohexol (OMNIPAQUE) 240 MG/ML solution 50 mL (50 mL Oral Given 7/31/24 0616)       Diagnostic Studies  Results Reviewed       Procedure Component Value Units Date/Time    Comprehensive metabolic panel [[id number]]  (Abnormal) Collected: 07/31/24 0615    Lab Status: Final result Specimen: Blood from Arm, Right Updated: 07/31/24 0645     Sodium 134 mmol/L      Potassium 4.8 mmol/L      Chloride 100 mmol/L      CO2 27 mmol/L      ANION GAP 7 mmol/L      BUN 16 mg/dL      Creatinine 0.45 mg/dL      Glucose 148 mg/dL      Calcium 9.3 mg/dL      AST 33 U/L      ALT 39 U/L      Alkaline Phosphatase 287 U/L      Total Protein 7.4 g/dL      Albumin 3.8 g/dL      Total Bilirubin 0.51 mg/dL      eGFR 111 ml/min/1.73sq m     Narrative:      [employer] guidelines for Chronic Kidney Disease (CKD):     Stage 1 with normal or high GFR (GFR > 90 mL/min/1.73 square meters)    Stage 2 Mild CKD (GFR = 60-89 mL/min/1.73 square meters)    Stage 3A Moderate CKD (GFR = 45-59 mL/min/1.73 square meters)    Stage 3B Moderate CKD (GFR = 30-44 mL/min/1.73 square meters)    Stage 4 Severe CKD (GFR = 15-29 mL/min/1.73 square meters)    Stage 5 End Stage CKD (GFR <15 mL/min/1.73 square meters)  Note: GFR calculation is accurate only with a steady state creatinine    Lipase [[id number]]  (Normal) Collected: 07/31/24 0615    Lab  Status: Final result Specimen: Blood from Arm, Right Updated: 07/31/24 0645     Lipase 35 u/L     UA (URINE) with reflex to Scope [[id number]]  (Abnormal) Collected: 07/31/24 0625    Lab Status: Final result Specimen: Urine, Clean Catch Updated: 07/31/24 0638     Color, UA Mila     Clarity, UA Clear     Specific Gravity, UA 1.015     pH, UA 5.0     Leukocytes, UA Negative     Nitrite, UA Negative     Protein, UA Negative mg/dl      Glucose, UA >=1000 (1%) mg/dl      Ketones, UA Negative mg/dl      Bilirubin, UA Negative     Occult Blood, UA Negative     UROBILINOGEN UA Negative mg/dL     Urine Microscopic [[id number]] Collected: 07/31/24 0625    Lab Status: In process Specimen: Urine, Clean Catch Updated: 07/31/24 0636    CBC and differential [[id number]]  (Abnormal) Collected: 07/31/24 0615    Lab Status: Final result Specimen: Blood from Arm, Right Updated: 07/31/24 0627     WBC 9.03 Thousand/uL      RBC 3.91 Million/uL      Hemoglobin 13.3 g/dL      Hematocrit 40.0 %       fL      MCH 34.0 pg      MCHC 33.3 g/dL      RDW 15.1 %      MPV 9.2 fL      Platelets 529 Thousands/uL      nRBC 0 /100 WBCs      Segmented % 69 %      Immature Grans % 0 %      Lymphocytes % 21 %      Monocytes % 7 %      Eosinophils Relative 2 %      Basophils Relative 1 %      Absolute Neutrophils 6.34 Thousands/µL      Absolute Immature Grans 0.04 Thousand/uL      Absolute Lymphocytes 1.86 Thousands/µL      Absolute Monocytes 0.59 Thousand/µL      Eosinophils Absolute 0.15 Thousand/µL      Basophils Absolute 0.05 Thousands/µL                    CT abdomen pelvis wo contrast    (Results Pending)              Procedures  Procedures         ED Course                                 SBIRT 20yo+      Flowsheet Row Most Recent Value   Initial Alcohol Screen: US AUDIT-C     1. How often do you have a drink containing alcohol? 0 Filed at: 07/31/2024 0528   2. How many drinks containing alcohol do you have on a typical day you are drinking?  0  Filed at: 07/31/2024 0528   3a. Male UNDER 65: How often do you have five or more drinks on one occasion? 0 Filed at: 07/31/2024 0528   3b. FEMALE Any Age, or MALE 65+: How often do you have 4 or more drinks on one occassion? 0 Filed at: 07/31/2024 0528   Audit-C Score 0 Filed at: 07/31/2024 0528   SARITA: How many times in the past year have you...    Used an illegal drug or used a prescription medication for non-medical reasons? Never Filed at: 07/31/2024 0528                      Medical Decision Making  Patient presented complaining of right upper quadrant and epigastric abdominal pain with history of gastric bypass.  Patient had evaluation yesterday with laboratory evaluation however reports continued pain today.  CT scan performed to rule out further pathology.  Care signed out to attending at change of shift.  Please see separate note for further evaluation and disposition.    Amount and/or Complexity of Data Reviewed  Labs: ordered.  Radiology: ordered.    Risk  Prescription drug management.                 Disposition  Final diagnoses:   Abdominal pain, unspecified abdominal location     Time reflects when diagnosis was documented in both MDM as applicable and the Disposition within this note       Time User Action Codes Description Comment    7/31/2024  6:53 AM Marii Brown Add [R10.9] Abdominal pain, unspecified abdominal location           ED Disposition       None          Follow-up Information    None         Patient's Medications   Discharge Prescriptions    No medications on file       No discharge procedures on file.    PDMP Review         Value Time User    PDMP Reviewed  Yes 6/21/2024  8:46 AM Kulwant Phillips DO            ED Provider  Electronically Signed by             Marii Brown PA-C  07/31/24 0653

## 2024-07-31 NOTE — DISCHARGE INSTRUCTIONS
No cholelithiasis or sonographic findings of acute cholecystitis.  The common bile duct measures 7 mm proximally with smooth distal tapering.  3 mm nonobstructing right upper pole calculus.

## 2024-07-31 NOTE — ED PROVIDER NOTES
History  Chief Complaint   Patient presents with    Abdominal Pain     Pt states she is back bc the doctor wants to do a scan. Reports abd pain for months. Reports nausea. Denies fevers. RUQ     59-year-old female presenting for evaluation of continued abdominal pain she has been seen recently and left AGAINST MEDICAL ADVICE earlier today after recommended ultrasound and reporting she was unable to stay for this study.  She returns reporting nausea has returned and states continued pain is requesting morphine and opiates by name however no Toradol administered previously, kidney function is within normal limits, Toradol trialed initially due to potential for decreased blood flow secondary to opiates.  No other new symptoms reported.      Abdominal Pain  Associated symptoms: nausea    Associated symptoms: no chest pain, no chills, no dysuria, no fatigue, no fever, no hematuria, no shortness of breath, no sore throat and no vomiting        Prior to Admission Medications   Prescriptions Last Dose Informant Patient Reported? Taking?   Empagliflozin 25 MG TABS   No No   Sig: Take 1 tablet (25 mg total) by mouth daily   FLUoxetine (PROzac) 40 MG capsule   No No   Sig: Take 1 capsule (40 mg total) by mouth daily   Nutritional Supplements (ENSURE PO)   Yes No   Sig: Take 1 Can by mouth daily   OneTouch Ultra test strip   Yes No   QUEtiapine (SEROquel) 400 MG tablet   No No   Sig: Take 2 tablets (800 mg total) by mouth daily at bedtime   Tradjenta 5 MG TABS   Yes No   Sig: Take 5 mg by mouth daily   Trulicity 1.5 MG/0.5ML injection   Yes No   Patient not taking: Reported on 7/7/2024   Varenicline Tartrate (CHANTIX STARTING MONTH PAK PO)   Yes No   Patient not taking: Reported on 7/7/2024   albuterol (PROVENTIL HFA,VENTOLIN HFA) 90 mcg/act inhaler   Yes No   Sig: Inhale 2 puffs every 4 (four) hours as needed for wheezing or shortness of breath   Patient not taking: Reported on 6/20/2024   azaTHIOprine (IMURAN) 50 mg tablet   Self Yes No   Sig: Take 100 mg by mouth daily   cyanocobalamin (VITAMIN B-12) 1000 MCG tablet   No No   Sig: Take 1 tablet (1,000 mcg total) by mouth daily   Patient not taking: Reported on 7/7/2024   dapagliflozin (Farxiga) 10 MG tablet   Yes No   Sig: Take 10 mg by mouth daily   ergocalciferol (VITAMIN D2) 50,000 units   No No   Sig: Take 1 capsule (50,000 Units total) by mouth once a week for 8 doses Do not start before June 29, 2024.   Patient not taking: Reported on 7/7/2024   fluticasone (FLONASE) 50 mcg/act nasal spray   Yes No   folic acid (FOLVITE) 1 mg tablet   No No   Sig: Take 1 tablet (1 mg total) by mouth daily   Patient not taking: Reported on 7/7/2024   gabapentin (NEURONTIN) 300 mg capsule   No No   Sig: Take 2 capsules (600 mg total) by mouth 3 (three) times a day   linaCLOtide (Linzess) 72 MCG CAPS   Yes No   Sig: Take 1 capsule by mouth daily   mirtazapine (REMERON) 7.5 MG tablet   No No   Sig: Take 1 tablet (7.5 mg total) by mouth daily at bedtime   Patient not taking: Reported on 7/7/2024   nortriptyline (PAMELOR) 50 mg capsule   Yes No   Sig: Take 50 mg by mouth daily at bedtime   ondansetron (ZOFRAN-ODT) 4 mg disintegrating tablet   No No   Sig: Take 1 tablet (4 mg total) by mouth every 8 (eight) hours as needed for nausea or vomiting   pantoprazole (PROTONIX) 40 mg tablet   Yes No   Sig: Take 40 mg by mouth daily   Patient not taking: Reported on 7/7/2024   rizatriptan (MAXALT) 10 mg tablet   Yes No   Sig: Take 10 mg by mouth daily   sitaGLIPtin (JANUVIA) 100 mg tablet   No No   Sig: Take 1 tablet (100 mg total) by mouth daily   topiramate (TOPAMAX) 25 mg tablet   No No   Sig: Take 1 tablet (25 mg total) by mouth 2 (two) times a day   varenicline (Chantix) 1 mg tablet   Yes No   Sig: Take 1 mg by mouth 2 (two) times a day   zolpidem (AMBIEN) 10 mg tablet   Yes No   Sig: Take 10 mg by mouth daily at bedtime as needed      Facility-Administered Medications: None       Past Medical History:    Diagnosis Date    Autoimmune hepatitis (HCC)     Bipolar 1 disorder (HCC)     Chronic headaches 2021    Diabetes mellitus (HCC)     Kidney stone     Renal disorder     kidney stones    Seizure disorder (HCC)        Past Surgical History:   Procedure Laterality Date    BARIATRIC SURGERY  05/2017    BUNIONECTOMY      CYSTOSCOPY  07/12/2018    Stent Removal    FL RETROGRADE PYELOGRAM  6/16/2022    GASTRIC BYPASS  05/22/2017    HIATAL HERNIA REPAIR  05/22/2017    HYSTERECTOMY      JOINT REPLACEMENT      KIDNEY STONE SURGERY      CT CYSTO/URETERO W/LITHOTRIPSY &INDWELL STENT INSRT Bilateral 7/6/2018    Procedure: CYSTOSCOPY GALDINO. URETEROSCOPY;GALDINO.  RETROGRADE PYELOGRAM AND INSERTION GALDINO.STENT URETERAL;  Surgeon: Jacob Gerber MD;  Location: [facility];  Service: Urology    CT CYSTO/URETERO W/LITHOTRIPSY &INDWELL STENT INSRT Left 6/16/2022    Procedure: CYSTOSCOPY URETEROSCOPY WITH LITHOTRIPSY HOLMIUM LASER, RETROGRADE PYELOGRAM AND INSERTION STENT URETERAL;  Surgeon: Asad Geiger MD;  Location: [facility];  Service: Urology    TOTAL SHOULDER REPLACEMENT  2002    VAGINAL HYSTERECTOMY      PARTIAL       Family History   Problem Relation Age of Onset    Diabetes Father     Heart disease Father     Diabetes Mother     Heart disease Mother     Diabetes Sister     Diabetes Family         MELLITUS    Depression Family     Mental illness Family     Obesity Family     Osteoarthritis Family      I have reviewed and agree with the history as documented.    E-Cigarette/Vaping    E-Cigarette Use Never User      E-Cigarette/Vaping Substances    Nicotine No     THC No     CBD No     Flavoring No     Other No     Unknown No      Social History     Tobacco Use    Smoking status: Every Day     Current packs/day: 0.25     Average packs/day: 0.3 packs/day for 30.0 years (7.5 ttl pk-yrs)     Types: Cigarettes     Passive exposure: Current    Smokeless tobacco: Never    Tobacco comments:     N/a   Vaping Use    Vaping status: Never  Used   Substance Use Topics    Alcohol use: No     Comment: n/a    Drug use: No     Comment: n/a       Review of Systems   Constitutional:  Negative for chills, fatigue and fever.   HENT:  Negative for congestion, ear pain, rhinorrhea and sore throat.    Eyes:  Negative for redness.   Respiratory:  Negative for chest tightness and shortness of breath.    Cardiovascular:  Negative for chest pain and palpitations.   Gastrointestinal:  Positive for abdominal pain and nausea. Negative for vomiting.   Genitourinary:  Negative for dysuria and hematuria.   Musculoskeletal: Negative.    Skin:  Negative for rash.   Neurological:  Negative for dizziness, syncope, light-headedness and numbness.       Physical Exam  Physical Exam  Vitals and nursing note reviewed.   Constitutional:       Appearance: She is well-developed.   HENT:      Head: Normocephalic.   Eyes:      General: No scleral icterus.  Cardiovascular:      Rate and Rhythm: Normal rate and regular rhythm.   Pulmonary:      Effort: Pulmonary effort is normal.      Breath sounds: Normal breath sounds. No stridor.   Abdominal:      General: There is no distension.      Palpations: Abdomen is soft.      Tenderness: There is abdominal tenderness in the right upper quadrant and epigastric area.   Musculoskeletal:         General: Normal range of motion.   Skin:     General: Skin is warm and dry.      Capillary Refill: Capillary refill takes less than 2 seconds.   Neurological:      Mental Status: She is alert and oriented to person, place, and time.   Psychiatric:         Mood and Affect: Mood and affect normal.         Vital Signs  ED Triage Vitals   Temperature Pulse Respirations Blood Pressure SpO2   07/31/24 1038 07/31/24 1038 07/31/24 1038 07/31/24 1038 07/31/24 1038   98.3 °F (36.8 °C) 93 20 147/78 97 %      Temp Source Heart Rate Source Patient Position - Orthostatic VS BP Location FiO2 (%)   07/31/24 1038 07/31/24 1038 07/31/24 1038 07/31/24 1038 --   Tympanic  Monitor Lying Left arm       Pain Score       07/31/24 1055       10 - Worst Possible Pain           Vitals:    07/31/24 1038 07/31/24 1307   BP: 147/78 159/73   Pulse: 93 92   Patient Position - Orthostatic VS: Lying Lying         Visual Acuity      ED Medications  Medications   ondansetron (ZOFRAN) injection 4 mg (4 mg Intravenous Given 7/31/24 1055)   ketorolac (TORADOL) injection 15 mg (15 mg Intravenous Given 7/31/24 1055)   sodium chloride 0.9 % bolus 1,000 mL (1,000 mL Intravenous New Bag 7/31/24 1100)       Diagnostic Studies  Results Reviewed       None                   US right upper quadrant   Final Result by Gilberto Ramírez MD (07/31 1248)      No cholelithiasis or sonographic findings of acute cholecystitis.      The common bile duct measures 7 mm proximally with smooth distal tapering.      3 mm nonobstructing right upper pole calculus.      Workstation performed: [id number]                    Procedures  Procedures         ED Course  ED Course as of 07/31/24 1308   Wed Jul 31, 2024   1235 Patient reports resolution of her nausea and abdominal pain status post Toradol administration.  Awaiting ultrasound review   1301   IMPRESSION:     No cholelithiasis or sonographic findings of acute cholecystitis.     The common bile duct measures 7 mm proximally with smooth distal tapering.     3 mm nonobstructing right upper pole calculus.     Workstation performed: [id number]        1301 Patient was reexamined at this time and informed of laboratory and/or imaging results and was found to be stable for discharge.  Return to emergency department criteria was reviewed with the patient who verbalized understanding and was agreeable to discharge and the treatment plan at this time.                                                   Medical Decision Making  59-year-old female recently seen and evaluated for abdominal pain, CT abdomen pelvis noting increased lumen of gallbladder prompting concern for potential  "cholecystitis in the absence of other acute findings on the CT scan, ultrasound was ordered earlier however patient left AGAINST MEDICAL ADVICE at that time did return for the scan.  Ultrasound of the gallbladder was ordered, Toradol and Zofran and 1 L normal saline provided for symptomatic relief in the emergency department.    Ultrasound demonstrates: no evidence for choleycystitis. Patient advised to follow with gi, surgery, and family care.     Strict return to ED precautions discussed. Patient recommended to follow up promptly with appropriate outpatient provider.Patient and/or family members verbalizes understanding and agrees with plan. Patient is stable for discharge.     Portions of the record may have been created with voice recognition software. Occasional wrong word or \"sound a like\" substitutions may have occurred due to the inherent limitations of voice recognition software. Read the chart carefully and recognize, using context, where substitutions have occurred.        Amount and/or Complexity of Data Reviewed  Radiology: ordered.    Risk  Prescription drug management.                 Disposition  Final diagnoses:   RUQ abdominal pain   Renal calculi     Time reflects when diagnosis was documented in both MDM as applicable and the Disposition within this note       Time User Action Codes Description Comment    7/31/2024  1:06 PM Tayler De Jesus Add [R10.11] RUQ abdominal pain     7/31/2024  1:07 PM Tayler De Jesus Add [N20.0] Renal calculi           ED Disposition       ED Disposition   Discharge    Condition   Good    Date/Time   Wed Jul 31, 2024  1:05 PM    Comment   Edith Klein discharge to home/self care.                   Follow-up Information       Follow up With Specialties Details Why Contact Info Additional Information    [facility]   [address] [address] [phone] [facility] [place], " [address], [place], [place], [address]   [phone]    [facility]   [address]  [address]  [facility] [address] [phone] [facility] [place], [address], [place], [place], [address] [phone]    [facility] Schedule an appointment as soon as possible for a visit in 2 days for family care follow up [address] [place] [address] [phone] [address], [address] [phone]            Patient's Medications   Discharge Prescriptions    KETOROLAC (TORADOL) 10 MG TABLET    Take 1 tablet (10 mg total) by mouth every 6 (six) hours as needed for moderate pain for up to 5 days       Start Date: 7/31/2024 End Date: 8/5/2024       Order Dose: 10 mg       Quantity: 20 tablet    Refills: 0    ONDANSETRON (ZOFRAN-ODT) 4 MG DISINTEGRATING TABLET    Take 1 tablet (4 mg total) by mouth every 8 (eight) hours as needed for nausea for up to 10 days       Start Date: 7/31/2024 End Date: 8/10/2024       Order Dose: 4 mg       Quantity: 20 tablet    Refills: 0       No discharge procedures on file.    PDMP Review         Value Time User    PDMP Reviewed  Yes 6/21/2024  8:46 AM Kulwant Phillips DO            ED Provider  Electronically Signed by             Tayler De Jesus PA-C  07/31/24 5022

## 2024-07-31 NOTE — ED NOTES
Patient reports relief of her abdominal pain within a few minutes after receiving the morphine. Patient is thankful for the relief.     Keyona Mcdonald RN  07/31/24 0622

## 2024-08-01 ENCOUNTER — HOSPITAL ENCOUNTER (EMERGENCY)
Facility: HOSPITAL | Age: 59
Discharge: HOME | End: 2024-08-01
Attending: EMERGENCY MEDICINE
Payer: COMMERCIAL

## 2024-08-01 VITALS
SYSTOLIC BLOOD PRESSURE: 135 MMHG | OXYGEN SATURATION: 97 % | HEART RATE: 105 BPM | DIASTOLIC BLOOD PRESSURE: 94 MMHG | TEMPERATURE: 97.9 F | RESPIRATION RATE: 20 BRPM

## 2024-08-01 DIAGNOSIS — R10.10 PAIN OF UPPER ABDOMEN: Primary | ICD-10-CM

## 2024-08-01 LAB
ALBUMIN SERPL BCG-MCNC: 3.8 G/DL (ref 3.5–5)
ALP SERPL-CCNC: 314 U/L (ref 34–104)
ALT SERPL W P-5'-P-CCNC: 29 U/L (ref 7–52)
AMPHETAMINES SERPL QL SCN: NEGATIVE
ANION GAP SERPL CALCULATED.3IONS-SCNC: 7 MMOL/L (ref 4–13)
AST SERPL W P-5'-P-CCNC: 24 U/L (ref 13–39)
ATRIAL RATE: 96 BPM
BACTERIA UR QL AUTO: NORMAL /HPF
BARBITURATES UR QL: NEGATIVE
BASOPHILS # BLD AUTO: 0.04 THOUSANDS/ÂΜL (ref 0–0.1)
BASOPHILS NFR BLD AUTO: 1 % (ref 0–1)
BENZODIAZ UR QL: NEGATIVE
BILIRUB SERPL-MCNC: 0.57 MG/DL (ref 0.2–1)
BILIRUB UR QL STRIP: NEGATIVE
BUN SERPL-MCNC: 21 MG/DL (ref 5–25)
CALCIUM SERPL-MCNC: 9.5 MG/DL (ref 8.4–10.2)
CHLORIDE SERPL-SCNC: 102 MMOL/L (ref 96–108)
CLARITY UR: CLEAR
CO2 SERPL-SCNC: 29 MMOL/L (ref 21–32)
COCAINE UR QL: NEGATIVE
COLOR UR: ABNORMAL
CREAT SERPL-MCNC: 0.58 MG/DL (ref 0.6–1.3)
EOSINOPHIL # BLD AUTO: 0.16 THOUSAND/ÂΜL (ref 0–0.61)
EOSINOPHIL NFR BLD AUTO: 2 % (ref 0–6)
ERYTHROCYTE [DISTWIDTH] IN BLOOD BY AUTOMATED COUNT: 15.1 % (ref 11.6–15.1)
FENTANYL UR QL SCN: NEGATIVE
GFR SERPL CREATININE-BSD FRML MDRD: 102 ML/MIN/1.73SQ M
GLUCOSE SERPL-MCNC: 148 MG/DL (ref 65–140)
GLUCOSE UR STRIP-MCNC: ABNORMAL MG/DL
HCT VFR BLD AUTO: 38.9 % (ref 34.8–46.1)
HGB BLD-MCNC: 13.3 G/DL (ref 11.5–15.4)
HGB UR QL STRIP.AUTO: NEGATIVE
HYDROCODONE UR QL SCN: POSITIVE
IMM GRANULOCYTES # BLD AUTO: 0.02 THOUSAND/UL (ref 0–0.2)
IMM GRANULOCYTES NFR BLD AUTO: 0 % (ref 0–2)
KETONES UR STRIP-MCNC: NEGATIVE MG/DL
LEUKOCYTE ESTERASE UR QL STRIP: NEGATIVE
LIPASE SERPL-CCNC: 33 U/L (ref 11–82)
LYMPHOCYTES # BLD AUTO: 2.04 THOUSANDS/ÂΜL (ref 0.6–4.47)
LYMPHOCYTES NFR BLD AUTO: 31 % (ref 14–44)
MCH RBC QN AUTO: 35.4 PG (ref 26.8–34.3)
MCHC RBC AUTO-ENTMCNC: 34.2 G/DL (ref 31.4–37.4)
MCV RBC AUTO: 104 FL (ref 82–98)
METHADONE UR QL: NEGATIVE
MONOCYTES # BLD AUTO: 0.47 THOUSAND/ÂΜL (ref 0.17–1.22)
MONOCYTES NFR BLD AUTO: 7 % (ref 4–12)
NEUTROPHILS # BLD AUTO: 3.92 THOUSANDS/ÂΜL (ref 1.85–7.62)
NEUTS SEG NFR BLD AUTO: 59 % (ref 43–75)
NITRITE UR QL STRIP: NEGATIVE
NON-SQ EPI CELLS URNS QL MICRO: NORMAL /HPF
NRBC BLD AUTO-RTO: 0 /100 WBCS
OPIATES UR QL SCN: POSITIVE
OXYCODONE+OXYMORPHONE UR QL SCN: NEGATIVE
P AXIS: 54 DEGREES
PCP UR QL: NEGATIVE
PH UR STRIP.AUTO: 5 [PH]
PLATELET # BLD AUTO: 473 THOUSANDS/UL (ref 149–390)
PMV BLD AUTO: 9.4 FL (ref 8.9–12.7)
POTASSIUM SERPL-SCNC: 3.8 MMOL/L (ref 3.5–5.3)
PR INTERVAL: 120 MS
PROT SERPL-MCNC: 7.3 G/DL (ref 6.4–8.4)
PROT UR STRIP-MCNC: NEGATIVE MG/DL
QRS AXIS: 11 DEGREES
QRSD INTERVAL: 72 MS
QT INTERVAL: 368 MS
QTC INTERVAL: 464 MS
RBC # BLD AUTO: 3.76 MILLION/UL (ref 3.81–5.12)
RBC #/AREA URNS AUTO: NORMAL /HPF
SODIUM SERPL-SCNC: 138 MMOL/L (ref 135–147)
SP GR UR STRIP.AUTO: 1.02 (ref 1–1.04)
T WAVE AXIS: 46 DEGREES
THC UR QL: NEGATIVE
UROBILINOGEN UA: 1 MG/DL
VENTRICULAR RATE: 96 BPM
WBC # BLD AUTO: 6.65 THOUSAND/UL (ref 4.31–10.16)
WBC #/AREA URNS AUTO: NORMAL /HPF

## 2024-08-01 PROCEDURE — 99284 EMERGENCY DEPT VISIT MOD MDM: CPT

## 2024-08-01 PROCEDURE — 93010 ELECTROCARDIOGRAM REPORT: CPT | Performed by: STUDENT IN AN ORGANIZED HEALTH CARE EDUCATION/TRAINING PROGRAM

## 2024-08-01 PROCEDURE — 85025 COMPLETE CBC W/AUTO DIFF WBC: CPT | Performed by: EMERGENCY MEDICINE

## 2024-08-01 PROCEDURE — 96375 TX/PRO/DX INJ NEW DRUG ADDON: CPT

## 2024-08-01 PROCEDURE — 80307 DRUG TEST PRSMV CHEM ANLYZR: CPT | Performed by: EMERGENCY MEDICINE

## 2024-08-01 PROCEDURE — 96374 THER/PROPH/DIAG INJ IV PUSH: CPT

## 2024-08-01 PROCEDURE — 81001 URINALYSIS AUTO W/SCOPE: CPT | Performed by: EMERGENCY MEDICINE

## 2024-08-01 PROCEDURE — 36415 COLL VENOUS BLD VENIPUNCTURE: CPT | Performed by: EMERGENCY MEDICINE

## 2024-08-01 PROCEDURE — 96361 HYDRATE IV INFUSION ADD-ON: CPT

## 2024-08-01 PROCEDURE — 99285 EMERGENCY DEPT VISIT HI MDM: CPT | Performed by: EMERGENCY MEDICINE

## 2024-08-01 PROCEDURE — 80053 COMPREHEN METABOLIC PANEL: CPT | Performed by: EMERGENCY MEDICINE

## 2024-08-01 PROCEDURE — 93005 ELECTROCARDIOGRAM TRACING: CPT

## 2024-08-01 PROCEDURE — 83690 ASSAY OF LIPASE: CPT | Performed by: EMERGENCY MEDICINE

## 2024-08-01 RX ORDER — DICYCLOMINE HCL 20 MG
20 TABLET ORAL ONCE
Status: COMPLETED | OUTPATIENT
Start: 2024-08-01 | End: 2024-08-01

## 2024-08-01 RX ORDER — KETOROLAC TROMETHAMINE 30 MG/ML
15 INJECTION, SOLUTION INTRAMUSCULAR; INTRAVENOUS ONCE
Status: COMPLETED | OUTPATIENT
Start: 2024-08-01 | End: 2024-08-01

## 2024-08-01 RX ORDER — ONDANSETRON 2 MG/ML
4 INJECTION INTRAMUSCULAR; INTRAVENOUS ONCE
Status: COMPLETED | OUTPATIENT
Start: 2024-08-01 | End: 2024-08-01

## 2024-08-01 RX ADMIN — DICYCLOMINE HYDROCHLORIDE 20 MG: 20 TABLET ORAL at 22:49

## 2024-08-01 RX ADMIN — ONDANSETRON 4 MG: 2 INJECTION INTRAMUSCULAR; INTRAVENOUS at 22:46

## 2024-08-01 RX ADMIN — SODIUM CHLORIDE 1000 ML: 0.9 INJECTION, SOLUTION INTRAVENOUS at 22:46

## 2024-08-01 RX ADMIN — KETOROLAC TROMETHAMINE 15 MG: 30 INJECTION, SOLUTION INTRAMUSCULAR; INTRAVENOUS at 22:46

## 2024-08-02 ENCOUNTER — HOSPITAL ENCOUNTER (EMERGENCY)
Facility: HOSPITAL | Age: 59
Discharge: HOME | End: 2024-08-02
Attending: EMERGENCY MEDICINE
Payer: COMMERCIAL

## 2024-08-02 VITALS
SYSTOLIC BLOOD PRESSURE: 138 MMHG | DIASTOLIC BLOOD PRESSURE: 76 MMHG | OXYGEN SATURATION: 96 % | HEART RATE: 106 BPM | BODY MASS INDEX: 19.19 KG/M2 | WEIGHT: 104.9 LBS | RESPIRATION RATE: 21 BRPM | TEMPERATURE: 97.9 F

## 2024-08-02 DIAGNOSIS — R10.9 ABDOMINAL PAIN: Primary | ICD-10-CM

## 2024-08-02 PROCEDURE — 99283 EMERGENCY DEPT VISIT LOW MDM: CPT

## 2024-08-02 NOTE — ED PROVIDER NOTES
"History  Chief Complaint   Patient presents with    Abdominal Pain     Pt having strong stabbing R sided abdominal pain and states \"I've been taking the medication you gave me by the handful, nothing helps\". No meds pta.      Patient is a 59-year-old female presenting for concerns of upper abdominal pain, history of gastric bypass in 2017.  Patient states pain has been present for several months sharp not worsened by any particular activity like eating or drinking, movement does not change her symptoms.  Patient explains that she has had multiple CAT scans, ultrasounds, follow-up with her PCP.  Ultimately was told she needs to see GI, has appointment on 8/27/2024.  However presents to the ED as pain is persistent.  No fevers no chills some nausea no vomiting or diarrhea.  No dysuria or frequency.      Abdominal Pain  Associated symptoms: nausea    Associated symptoms: no chest pain, no chills, no cough, no diarrhea, no dysuria, no fever, no shortness of breath, no sore throat and no vomiting        Prior to Admission Medications   Prescriptions Last Dose Informant Patient Reported? Taking?   Empagliflozin 25 MG TABS   No No   Sig: Take 1 tablet (25 mg total) by mouth daily   FLUoxetine (PROzac) 40 MG capsule   No No   Sig: Take 1 capsule (40 mg total) by mouth daily   Nutritional Supplements (ENSURE PO)   Yes No   Sig: Take 1 Can by mouth daily   OneTouch Ultra test strip   Yes No   QUEtiapine (SEROquel) 400 MG tablet   No No   Sig: Take 2 tablets (800 mg total) by mouth daily at bedtime   Tradjenta 5 MG TABS   Yes No   Sig: Take 5 mg by mouth daily   Trulicity 1.5 MG/0.5ML injection   Yes No   Patient not taking: Reported on 7/7/2024   Varenicline Tartrate (CHANTIX STARTING MONTH JENNIFER PO)   Yes No   Patient not taking: Reported on 7/7/2024   albuterol (PROVENTIL HFA,VENTOLIN HFA) 90 mcg/act inhaler   Yes No   Sig: Inhale 2 puffs every 4 (four) hours as needed for wheezing or shortness of breath   Patient not taking: " Reported on 6/20/2024   azaTHIOprine (IMURAN) 50 mg tablet  Self Yes No   Sig: Take 100 mg by mouth daily   cyanocobalamin (VITAMIN B-12) 1000 MCG tablet   No No   Sig: Take 1 tablet (1,000 mcg total) by mouth daily   Patient not taking: Reported on 7/7/2024   dapagliflozin (Farxiga) 10 MG tablet   Yes No   Sig: Take 10 mg by mouth daily   ergocalciferol (VITAMIN D2) 50,000 units   No No   Sig: Take 1 capsule (50,000 Units total) by mouth once a week for 8 doses Do not start before June 29, 2024.   Patient not taking: Reported on 7/7/2024   fluticasone (FLONASE) 50 mcg/act nasal spray   Yes No   folic acid (FOLVITE) 1 mg tablet   No No   Sig: Take 1 tablet (1 mg total) by mouth daily   Patient not taking: Reported on 7/7/2024   gabapentin (NEURONTIN) 300 mg capsule   No No   Sig: Take 2 capsules (600 mg total) by mouth 3 (three) times a day   ketorolac (TORADOL) 10 mg tablet   No No   Sig: Take 1 tablet (10 mg total) by mouth every 6 (six) hours as needed for moderate pain for up to 5 days   linaCLOtide (Linzess) 72 MCG CAPS   Yes No   Sig: Take 1 capsule by mouth daily   mirtazapine (REMERON) 7.5 MG tablet   No No   Sig: Take 1 tablet (7.5 mg total) by mouth daily at bedtime   Patient not taking: Reported on 7/7/2024   nortriptyline (PAMELOR) 50 mg capsule   Yes No   Sig: Take 50 mg by mouth daily at bedtime   ondansetron (ZOFRAN-ODT) 4 mg disintegrating tablet   No No   Sig: Take 1 tablet (4 mg total) by mouth every 8 (eight) hours as needed for nausea or vomiting   ondansetron (ZOFRAN-ODT) 4 mg disintegrating tablet   No No   Sig: Take 1 tablet (4 mg total) by mouth every 8 (eight) hours as needed for nausea for up to 10 days   pantoprazole (PROTONIX) 40 mg tablet   Yes No   Sig: Take 40 mg by mouth daily   Patient not taking: Reported on 7/7/2024   rizatriptan (MAXALT) 10 mg tablet   Yes No   Sig: Take 10 mg by mouth daily   sitaGLIPtin (JANUVIA) 100 mg tablet   No No   Sig: Take 1 tablet (100 mg total) by mouth  daily   topiramate (TOPAMAX) 25 mg tablet   No No   Sig: Take 1 tablet (25 mg total) by mouth 2 (two) times a day   varenicline (Chantix) 1 mg tablet   Yes No   Sig: Take 1 mg by mouth 2 (two) times a day   zolpidem (AMBIEN) 10 mg tablet   Yes No   Sig: Take 10 mg by mouth daily at bedtime as needed      Facility-Administered Medications: None       Past Medical History:   Diagnosis Date    Autoimmune hepatitis (HCC)     Bipolar 1 disorder (HCC)     Chronic headaches 2021    Diabetes mellitus (HCC)     Kidney stone     Renal disorder     kidney stones    Seizure disorder (HCC)        Past Surgical History:   Procedure Laterality Date    BARIATRIC SURGERY  05/2017    BUNIONECTOMY      CYSTOSCOPY  07/12/2018    Stent Removal    FL RETROGRADE PYELOGRAM  6/16/2022    GASTRIC BYPASS  05/22/2017    HIATAL HERNIA REPAIR  05/22/2017    HYSTERECTOMY      JOINT REPLACEMENT      KIDNEY STONE SURGERY      WA CYSTO/URETERO W/LITHOTRIPSY &INDWELL STENT INSRT Bilateral 7/6/2018    Procedure: CYSTOSCOPY GALDINO. URETEROSCOPY;GALDINO.  RETROGRADE PYELOGRAM AND INSERTION GALDINO.STENT URETERAL;  Surgeon: Jacob Gerber MD;  Location:  MAIN OR;  Service: Urology    WA CYSTO/URETERO W/LITHOTRIPSY &INDWELL STENT INSRT Left 6/16/2022    Procedure: CYSTOSCOPY URETEROSCOPY WITH LITHOTRIPSY HOLMIUM LASER, RETROGRADE PYELOGRAM AND INSERTION STENT URETERAL;  Surgeon: Asad Geiger MD;  Location:  MAIN OR;  Service: Urology    TOTAL SHOULDER REPLACEMENT  2002    VAGINAL HYSTERECTOMY      PARTIAL       Family History   Problem Relation Age of Onset    Diabetes Father     Heart disease Father     Diabetes Mother     Heart disease Mother     Diabetes Sister     Diabetes Family         MELLITUS    Depression Family     Mental illness Family     Obesity Family     Osteoarthritis Family      I have reviewed and agree with the history as documented.    E-Cigarette/Vaping    E-Cigarette Use Never User      E-Cigarette/Vaping Substances    Nicotine No      THC No     CBD No     Flavoring No     Other No     Unknown No      Social History     Tobacco Use    Smoking status: Every Day     Current packs/day: 0.25     Average packs/day: 0.3 packs/day for 30.0 years (7.5 ttl pk-yrs)     Types: Cigarettes     Passive exposure: Current    Smokeless tobacco: Never    Tobacco comments:     N/a   Vaping Use    Vaping status: Never Used   Substance Use Topics    Alcohol use: No     Comment: n/a    Drug use: No     Comment: n/a       Review of Systems   Constitutional: Negative.  Negative for chills and fever.   HENT: Negative.  Negative for rhinorrhea, sore throat, trouble swallowing and voice change.    Eyes: Negative.  Negative for pain and visual disturbance.   Respiratory: Negative.  Negative for cough, shortness of breath and wheezing.    Cardiovascular: Negative.  Negative for chest pain and palpitations.   Gastrointestinal:  Positive for abdominal pain and nausea. Negative for diarrhea and vomiting.   Genitourinary: Negative.  Negative for dysuria and frequency.   Musculoskeletal: Negative.  Negative for neck pain and neck stiffness.   Skin: Negative.  Negative for rash.   Neurological: Negative.  Negative for dizziness, speech difficulty, weakness, light-headedness and numbness.       Physical Exam  Physical Exam  Vitals and nursing note reviewed.   Constitutional:       General: She is not in acute distress.     Appearance: She is well-developed.   HENT:      Head: Normocephalic and atraumatic.   Eyes:      Conjunctiva/sclera: Conjunctivae normal.      Pupils: Pupils are equal, round, and reactive to light.   Neck:      Trachea: No tracheal deviation.   Cardiovascular:      Rate and Rhythm: Normal rate and regular rhythm.   Pulmonary:      Effort: Pulmonary effort is normal. No respiratory distress.      Breath sounds: Normal breath sounds. No wheezing or rales.   Abdominal:      General: Bowel sounds are normal. There is no distension.      Palpations: Abdomen is soft.       Tenderness: There is no abdominal tenderness. There is no guarding or rebound.   Musculoskeletal:         General: No tenderness or deformity. Normal range of motion.      Cervical back: Normal range of motion and neck supple.   Skin:     General: Skin is warm and dry.      Capillary Refill: Capillary refill takes less than 2 seconds.      Findings: No rash.   Neurological:      Mental Status: She is alert and oriented to person, place, and time.   Psychiatric:         Behavior: Behavior normal.         Vital Signs  ED Triage Vitals [08/02/24 0639]   Temperature Pulse Respirations Blood Pressure SpO2   97.9 °F (36.6 °C) (!) 106 21 138/76 96 %      Temp Source Heart Rate Source Patient Position - Orthostatic VS BP Location FiO2 (%)   Tympanic Monitor Sitting Left arm --      Pain Score       --           Vitals:    08/02/24 0639   BP: 138/76   Pulse: (!) 106   Patient Position - Orthostatic VS: Sitting         Visual Acuity      ED Medications  Medications - No data to display    Diagnostic Studies  Results Reviewed       None                   No orders to display              Procedures  Procedures         ED Course                                 SBIRT 20yo+      Flowsheet Row Most Recent Value   Initial Alcohol Screen: US AUDIT-C     1. How often do you have a drink containing alcohol? 0 Filed at: 08/02/2024 0639   2. How many drinks containing alcohol do you have on a typical day you are drinking?  0 Filed at: 08/02/2024 0639   3a. Male UNDER 65: How often do you have five or more drinks on one occasion? 0 Filed at: 08/02/2024 0639   Audit-C Score 0 Filed at: 08/02/2024 0639   SARITA: How many times in the past year have you...    Used an illegal drug or used a prescription medication for non-medical reasons? Never Filed at: 08/02/2024 0639                      Medical Decision Making  59-year-old female who arrives for evaluation of abdominal pain.  Medical records reviewed, she has multiple CT scans and  ultrasounds over the last 3 months, none of which have shown acute surgical or infectious pathologies.  She does have a history of substance use disorder, likely limiting her ability to be on chronic opioids.  She does state that she has been taking handfuls of Toradol at home.  She was advised that this is not an appropriate pain management resident for somebody had a history of gastric bypass.  She verbalized her understanding.  She states that the hydrocodone that she has been taking at home has not been helpful.  I did review with the patient that given the multiple tests and labs that are showing that she is not having acute surgical or infectious emergencies and her history of surgery may mean that she is now having chronic pain postoperatively which is a rare but known complication of any surgical procedure.  Advised her that given that the narcotics that she has been using at home have not been helpful that we could try nonnarcotic management in the ED and do our best to avoid NSAIDs which can worsen things like gastric ulcers.  When told that narcotics would not be a part of the primary part of her management, patient became upset.  She was able to stand up without difficulty, and decided to leave the emergency room without discharge paperwork or signing AMA paperwork.  She was told that she is allowed to return anytime for any reason for further care.  She does not appear to be under the influence of alcohol recreational drugs at the time of her visit.  There were no grounds to hold her against her will.                 Disposition  Final diagnoses:   None     ED Disposition       None          Follow-up Information    None         Patient's Medications   Discharge Prescriptions    No medications on file       No discharge procedures on file.    PDMP Review         Value Time User    PDMP Reviewed  Yes 6/21/2024  8:46 AM Kulwant Phillips DO            ED Provider  Electronically Signed by             Jimmy  JAVIER Duvall DO  08/02/24 0734

## 2024-08-02 NOTE — ED NOTES
Answering patient's call light, patient appeared to be crying and stating that her pain was not relieved and that she needed further pain medication. Then calmly asked how the nurse was and if another nurse she was friendly with was working. Patient had stopped crying when she was told that I would make the provider aware of her discomfort.     Keyona Mcdonald RN  08/01/24 3010

## 2024-08-02 NOTE — ED NOTES
Patient on her cell phone and resting quietly when provider went in to speak with patient -speaking to patient calmly and informing her that it would be best for her to be admitted to the hospital for evaluation by GI and for pain control.Patient jumped out of bed, ripped out her IV herself and stormed out of the room and the department.     Keyona Mcdonald RN  08/01/24 3182

## 2024-08-02 NOTE — ED NOTES
Pt walked out of ED after speaking with provider. Did not wait for discharge instructions     Rudy Rose RN  08/02/24 0746       Rudy Rose RN  08/02/24 0747

## 2024-08-02 NOTE — Clinical Note
Edith Klein was seen and treated in our emergency department on 8/2/2024.                Diagnosis:     Edith  .    She may return on this date: 08/05/2024         If you have any questions or concerns, please don't hesitate to call.      Jimmy Duvall, DO    ______________________________           _______________          _______________  Hospital Representative                              Date                                Time

## 2024-08-02 NOTE — ED PROVIDER NOTES
History  Chief Complaint   Patient presents with    Abdominal Pain     Reports abd pain and 47lb weight loss for 4 months. Seen the last three days per patient. C/o nausea. Taking zofran with relief- did not take any tonight.      HPI    58 yo F, hx of DM COPD gastric bypass 2017, seizures, autoimmunie hepatitis, fibromyalgia, STDs, bipolar disorder, polysubstance abuse (last use crack cocaine heroin 20 years ago per chart review), 6th ER visit in the past month for abd pain, presents today for abd pain.  Ct scan yesterday showed dilated gallbladder, but ultrasound showed no signs of cholecystitis. Patient scheduled appt with GI for Aug 27. Patient tried toradol tablets at home that were prescribed without relief. Colonoscopy scheduled for 27. Pain ongoing for past 4 months. Patient states she loast 47 pounds in past 4 months. 10/10 pain. Still in RUQ. Pain is preventing patient from eating. Zofran does help. No urinary complaints. Post menopausal. Last BM yesterday, normal. Denies drugs or alcohol.         Prior to Admission Medications   Prescriptions Last Dose Informant Patient Reported? Taking?   Empagliflozin 25 MG TABS   No No   Sig: Take 1 tablet (25 mg total) by mouth daily   FLUoxetine (PROzac) 40 MG capsule   No No   Sig: Take 1 capsule (40 mg total) by mouth daily   Nutritional Supplements (ENSURE PO)   Yes No   Sig: Take 1 Can by mouth daily   OneTouch Ultra test strip   Yes No   QUEtiapine (SEROquel) 400 MG tablet   No No   Sig: Take 2 tablets (800 mg total) by mouth daily at bedtime   Tradjenta 5 MG TABS   Yes No   Sig: Take 5 mg by mouth daily   Trulicity 1.5 MG/0.5ML injection   Yes No   Patient not taking: Reported on 7/7/2024   Varenicline Tartrate (CHANTIX STARTING MONTH PAK PO)   Yes No   Patient not taking: Reported on 7/7/2024   albuterol (PROVENTIL HFA,VENTOLIN HFA) 90 mcg/act inhaler   Yes No   Sig: Inhale 2 puffs every 4 (four) hours as needed for wheezing or shortness of breath   Patient  not taking: Reported on 6/20/2024   azaTHIOprine (IMURAN) 50 mg tablet  Self Yes No   Sig: Take 100 mg by mouth daily   cyanocobalamin (VITAMIN B-12) 1000 MCG tablet   No No   Sig: Take 1 tablet (1,000 mcg total) by mouth daily   Patient not taking: Reported on 7/7/2024   dapagliflozin (Farxiga) 10 MG tablet   Yes No   Sig: Take 10 mg by mouth daily   ergocalciferol (VITAMIN D2) 50,000 units   No No   Sig: Take 1 capsule (50,000 Units total) by mouth once a week for 8 doses Do not start before June 29, 2024.   Patient not taking: Reported on 7/7/2024   fluticasone (FLONASE) 50 mcg/act nasal spray   Yes No   folic acid (FOLVITE) 1 mg tablet   No No   Sig: Take 1 tablet (1 mg total) by mouth daily   Patient not taking: Reported on 7/7/2024   gabapentin (NEURONTIN) 300 mg capsule   No No   Sig: Take 2 capsules (600 mg total) by mouth 3 (three) times a day   ketorolac (TORADOL) 10 mg tablet   No No   Sig: Take 1 tablet (10 mg total) by mouth every 6 (six) hours as needed for moderate pain for up to 5 days   linaCLOtide (Linzess) 72 MCG CAPS   Yes No   Sig: Take 1 capsule by mouth daily   mirtazapine (REMERON) 7.5 MG tablet   No No   Sig: Take 1 tablet (7.5 mg total) by mouth daily at bedtime   Patient not taking: Reported on 7/7/2024   nortriptyline (PAMELOR) 50 mg capsule   Yes No   Sig: Take 50 mg by mouth daily at bedtime   ondansetron (ZOFRAN-ODT) 4 mg disintegrating tablet   No No   Sig: Take 1 tablet (4 mg total) by mouth every 8 (eight) hours as needed for nausea or vomiting   ondansetron (ZOFRAN-ODT) 4 mg disintegrating tablet   No No   Sig: Take 1 tablet (4 mg total) by mouth every 8 (eight) hours as needed for nausea for up to 10 days   pantoprazole (PROTONIX) 40 mg tablet   Yes No   Sig: Take 40 mg by mouth daily   Patient not taking: Reported on 7/7/2024   rizatriptan (MAXALT) 10 mg tablet   Yes No   Sig: Take 10 mg by mouth daily   sitaGLIPtin (JANUVIA) 100 mg tablet   No No   Sig: Take 1 tablet (100 mg  total) by mouth daily   topiramate (TOPAMAX) 25 mg tablet   No No   Sig: Take 1 tablet (25 mg total) by mouth 2 (two) times a day   varenicline (Chantix) 1 mg tablet   Yes No   Sig: Take 1 mg by mouth 2 (two) times a day   zolpidem (AMBIEN) 10 mg tablet   Yes No   Sig: Take 10 mg by mouth daily at bedtime as needed      Facility-Administered Medications: None       Past Medical History:   Diagnosis Date    Autoimmune hepatitis (HCC)     Bipolar 1 disorder (HCC)     Chronic headaches 2021    Diabetes mellitus (HCC)     Kidney stone     Renal disorder     kidney stones    Seizure disorder (HCC)        Past Surgical History:   Procedure Laterality Date    BARIATRIC SURGERY  05/2017    BUNIONECTOMY      CYSTOSCOPY  07/12/2018    Stent Removal    FL RETROGRADE PYELOGRAM  6/16/2022    GASTRIC BYPASS  05/22/2017    HIATAL HERNIA REPAIR  05/22/2017    HYSTERECTOMY      JOINT REPLACEMENT      KIDNEY STONE SURGERY      CT CYSTO/URETERO W/LITHOTRIPSY &INDWELL STENT INSRT Bilateral 7/6/2018    Procedure: CYSTOSCOPY GALDINO. URETEROSCOPY;GALDINO.  RETROGRADE PYELOGRAM AND INSERTION GALDINO.STENT URETERAL;  Surgeon: Jacob Gerber MD;  Location: [facility];  Service: Urology    CT CYSTO/URETERO W/LITHOTRIPSY &INDWELL STENT INSRT Left 6/16/2022    Procedure: CYSTOSCOPY URETEROSCOPY WITH LITHOTRIPSY HOLMIUM LASER, RETROGRADE PYELOGRAM AND INSERTION STENT URETERAL;  Surgeon: Asad Geiger MD;  Location: [facility];  Service: Urology    TOTAL SHOULDER REPLACEMENT  2002    VAGINAL HYSTERECTOMY      PARTIAL       Family History   Problem Relation Age of Onset    Diabetes Father     Heart disease Father     Diabetes Mother     Heart disease Mother     Diabetes Sister     Diabetes Family         MELLITUS    Depression Family     Mental illness Family     Obesity Family     Osteoarthritis Family      I have reviewed and agree with the history as documented.    E-Cigarette/Vaping    E-Cigarette Use Never User      E-Cigarette/Vaping Substances     Nicotine No     THC No     CBD No     Flavoring No     Other No     Unknown No      Social History     Tobacco Use    Smoking status: Every Day     Current packs/day: 0.25     Average packs/day: 0.3 packs/day for 30.0 years (7.5 ttl pk-yrs)     Types: Cigarettes     Passive exposure: Current    Smokeless tobacco: Never    Tobacco comments:     N/a   Vaping Use    Vaping status: Never Used   Substance Use Topics    Alcohol use: No     Comment: n/a    Drug use: No     Comment: n/a       Review of Systems   Constitutional:  Negative for chills, fatigue and fever.   HENT:  Negative for sore throat.    Eyes:  Negative for redness and visual disturbance.   Respiratory:  Negative for cough and shortness of breath.    Cardiovascular:  Negative for chest pain.   Gastrointestinal:  Positive for abdominal pain and nausea. Negative for constipation and diarrhea.   Genitourinary:  Negative for difficulty urinating, dysuria and pelvic pain.   Musculoskeletal:  Negative for back pain.   Skin:  Negative for rash.   Neurological:  Negative for syncope, weakness and headaches.   All other systems reviewed and are negative.      Physical Exam  Physical Exam  Vitals and nursing note reviewed.   Constitutional:       General: She is not in acute distress.  HENT:      Head: Normocephalic and atraumatic.      Right Ear: External ear normal.      Left Ear: External ear normal.   Eyes:      Extraocular Movements: Extraocular movements intact.      Conjunctiva/sclera: Conjunctivae normal.   Cardiovascular:      Rate and Rhythm: Normal rate and regular rhythm.      Heart sounds: Normal heart sounds.   Pulmonary:      Effort: Pulmonary effort is normal. No respiratory distress.      Breath sounds: Normal breath sounds.   Abdominal:      General: Abdomen is flat.      Tenderness: There is abdominal tenderness in the right upper quadrant and epigastric area.   Musculoskeletal:         General: Normal range of motion.      Cervical back: Normal  range of motion.   Skin:     General: Skin is warm and dry.   Neurological:      Mental Status: She is alert and oriented to person, place, and time.      Cranial Nerves: No cranial nerve deficit.      Motor: No abnormal muscle tone.      Coordination: Coordination normal.         Vital Signs  ED Triage Vitals   Temperature Pulse Respirations Blood Pressure SpO2   08/01/24 2221 08/01/24 2221 08/01/24 2221 08/01/24 2222 08/01/24 2221   97.9 °F (36.6 °C) 105 20 135/94 97 %      Temp Source Heart Rate Source Patient Position - Orthostatic VS BP Location FiO2 (%)   08/01/24 2221 -- -- -- --   Oral          Pain Score       08/01/24 2221       10 - Worst Possible Pain           Vitals:    08/01/24 2221 08/01/24 2222   BP:  135/94   Pulse: 105          Visual Acuity      ED Medications  Medications   sodium chloride 0.9 % bolus 1,000 mL (0 mL Intravenous Stopped 8/1/24 2341)   ketorolac (TORADOL) injection 15 mg (15 mg Intravenous Given 8/1/24 2246)   ondansetron (ZOFRAN) injection 4 mg (4 mg Intravenous Given 8/1/24 2246)   dicyclomine (BENTYL) tablet 20 mg (20 mg Oral Given 8/1/24 2249)       Diagnostic Studies  Results Reviewed       Procedure Component Value Units Date/Time    Comprehensive metabolic panel [[id number]]  (Abnormal) Collected: 08/01/24 2247    Lab Status: Final result Specimen: Blood from Arm, Left Updated: 08/01/24 2318     Sodium 138 mmol/L      Potassium 3.8 mmol/L      Chloride 102 mmol/L      CO2 29 mmol/L      ANION GAP 7 mmol/L      BUN 21 mg/dL      Creatinine 0.58 mg/dL      Glucose 148 mg/dL      Calcium 9.5 mg/dL      AST 24 U/L      ALT 29 U/L      Alkaline Phosphatase 314 U/L      Total Protein 7.3 g/dL      Albumin 3.8 g/dL      Total Bilirubin 0.57 mg/dL      eGFR 102 ml/min/1.73sq m     Narrative:      [employer] guidelines for Chronic Kidney Disease (CKD):     Stage 1 with normal or high GFR (GFR > 90 mL/min/1.73 square meters)    Stage 2 Mild CKD (GFR = 60-89  mL/min/1.73 square meters)    Stage 3A Moderate CKD (GFR = 45-59 mL/min/1.73 square meters)    Stage 3B Moderate CKD (GFR = 30-44 mL/min/1.73 square meters)    Stage 4 Severe CKD (GFR = 15-29 mL/min/1.73 square meters)    Stage 5 End Stage CKD (GFR <15 mL/min/1.73 square meters)  Note: GFR calculation is accurate only with a steady state creatinine    Lipase [[id number]]  (Normal) Collected: 08/01/24 2247    Lab Status: Final result Specimen: Blood from Arm, Left Updated: 08/01/24 2318     Lipase 33 u/L     Rapid drug screen, urine [[id number]]  (Abnormal) Collected: 08/01/24 2247    Lab Status: Final result Specimen: Urine, Clean Catch Updated: 08/01/24 2313     Amph/Meth UR Negative     Barbiturate Ur Negative     Benzodiazepine Urine Negative     Cocaine Urine Negative     Methadone Urine Negative     Opiate Urine Positive     PCP Ur Negative     THC Urine Negative     Oxycodone Urine Negative     Fentanyl Urine Negative     HYDROCODONE URINE Positive    Narrative:      Presumptive report. If requested, specimen will be sent to reference lab for confirmation.  FOR MEDICAL PURPOSES ONLY.   IF CONFIRMATION NEEDED PLEASE CONTACT THE LAB WITHIN 5 DAYS.    Drug Screen Cutoff Levels:  AMPHETAMINE/METHAMPHETAMINES  1000 ng/mL  BARBITURATES     200 ng/mL  BENZODIAZEPINES     200 ng/mL  COCAINE      300 ng/mL  METHADONE      300 ng/mL  OPIATES      300 ng/mL  PHENCYCLIDINE     25 ng/mL  THC       50 ng/mL  OXYCODONE      100 ng/mL  FENTANYL      5 ng/mL  HYDROCODONE     300 ng/mL    Urine Microscopic [[id number]]  (Normal) Collected: 08/01/24 2247    Lab Status: Final result Specimen: Urine, Clean Catch Updated: 08/01/24 2311     RBC, UA None Seen /hpf      WBC, UA None Seen /hpf      Epithelial Cells None Seen /hpf      Bacteria, UA Occasional /hpf     UA (URINE) with reflex to Scope [[id number]]  (Abnormal) Collected: 08/01/24 2247    Lab Status: Final result Specimen: Urine, Clean Catch Updated: 08/01/24 2302     Color, UA  Mila     Clarity, UA Clear     Specific Gravity, UA 1.020     pH, UA 5.0     Leukocytes, UA Negative     Nitrite, UA Negative     Protein, UA Negative mg/dl      Glucose, UA >=1000 (1%) mg/dl      Ketones, UA Negative mg/dl      Bilirubin, UA Negative     Occult Blood, UA Negative     UROBILINOGEN UA 1.0 mg/dL     CBC and differential [[id number]]  (Abnormal) Collected: 08/01/24 2247    Lab Status: Final result Specimen: Blood from Arm, Left Updated: 08/01/24 2301     WBC 6.65 Thousand/uL      RBC 3.76 Million/uL      Hemoglobin 13.3 g/dL      Hematocrit 38.9 %       fL      MCH 35.4 pg      MCHC 34.2 g/dL      RDW 15.1 %      MPV 9.4 fL      Platelets 473 Thousands/uL      nRBC 0 /100 WBCs      Segmented % 59 %      Immature Grans % 0 %      Lymphocytes % 31 %      Monocytes % 7 %      Eosinophils Relative 2 %      Basophils Relative 1 %      Absolute Neutrophils 3.92 Thousands/µL      Absolute Immature Grans 0.02 Thousand/uL      Absolute Lymphocytes 2.04 Thousands/µL      Absolute Monocytes 0.47 Thousand/µL      Eosinophils Absolute 0.16 Thousand/µL      Basophils Absolute 0.04 Thousands/µL                    No orders to display              Procedures  ECG 12 Lead Documentation Only    Date/Time: 8/1/2024 11:00 PM    Performed by: Marina Carbajal MD  Authorized by: Marina Carbajal MD    Comments:      ECG 12 Lead Documentation  Date/Time: today/date: 8/1/2024  Performed by: Marina Carbajal  Authorized by: Marina Carbajal    ECG reviewed by me, the ED Provider: yes    Patient location:  ED   Previous ECG: If available: NSR  Rate:  ECG rate assessment: normal    Rhythm: sinus rhythm    Ectopy: none    QRS axis:  Normal  Intervals: normal   Q waves: None   ST segments:  No acute ST changes  T waves: normal      Impression: Normal Sinus EKG               ED Course  ED Course as of 08/02/24 0337   u Aug 01, 2024   2326 WBC: 6.65  No leukocytosis   2326 Patient texting on her phone on re evaluation. When asked about if  her pain improved, patient states she would like an opioid. Informed would only provide opioid if she agrees to admission, given multiple ER visits for same issue, likely needs in person GI evaluation. Offered admission several times, patient refused.Patient pulled her IV and walked out of room after provider ed.                                 SBIRT 22yo+      Flowsheet Row Most Recent Value   Initial Alcohol Screen: US AUDIT-C     1. How often do you have a drink containing alcohol? 0 Filed at: 08/01/2024 2222   2. How many drinks containing alcohol do you have on a typical day you are drinking?  0 Filed at: 08/01/2024 2222   3a. Male UNDER 65: How often do you have five or more drinks on one occasion? 0 Filed at: 08/01/2024 2222   3b. FEMALE Any Age, or MALE 65+: How often do you have 4 or more drinks on one occassion? 0 Filed at: 08/01/2024 2222   Audit-C Score 0 Filed at: 08/01/2024 2222   SARITA: How many times in the past year have you...    Used an illegal drug or used a prescription medication for non-medical reasons? Never Filed at: 08/01/2024 2222                      Medical Decision Making  Amount and/or Complexity of Data Reviewed  Labs: ordered. Decision-making details documented in ED Course.    Risk  Prescription drug management.      Amount and/or Complexity of Data Reviewed  Clinical lab tests: ordered and reviewed yes  Reviewed past medical records: yes     History Provided by patient     Differential considered infectious or inflammatory etiology  Upper abd pain: GERD, at risk for cholecystitis cholelithiasis pancreatitis given upper abd pain     Labs looking for any acute changes to wbc count or any acute electrolyte abnormalities. LFTs for acute liver pathology, lipase for pancreatitis.   UA for ketones / infection.   EKG for arrhythmias.   Symptomatic treatments provided.     Labs within normal limits.   No signs of acute infection.   No leukocytosis or LFT elevation.    Lipase wnl.      Patient continues to have pain. Was informed given multiple ER visits, recommend admission or intractable pain and to see GI. Se ed course. Informed that we cannot keep providing one time dosages of narcotics and discharging patient. Patient refused admission, pulled IV and left from room.                  Disposition  Final diagnoses:   Pain of upper abdomen     Time reflects when diagnosis was documented in both MDM as applicable and the Disposition within this note       Time User Action Codes Description Comment    8/1/2024 11:32 PM Marina Carbajal Add [R10.10] Pain of upper abdomen           ED Disposition       ED Disposition   Left from Room after Provider Exam    Condition   --    Date/Time   Thu Aug 1, 2024 5027    Comment   Patient apparently did not want to be admitted to the hospital for GI evaluation and for pain control and appeared to be upset that she did not receive stronger pain medication. Patient had ripped out her IV and stormed out of the room.             Follow-up Information    None         Discharge Medication List as of 8/1/2024 11:44 PM        CONTINUE these medications which have NOT CHANGED    Details   albuterol (PROVENTIL HFA,VENTOLIN HFA) 90 mcg/act inhaler Inhale 2 puffs every 4 (four) hours as needed for wheezing or shortness of breath, Starting Fri 3/29/2024, Historical Med      azaTHIOprine (IMURAN) 50 mg tablet Take 100 mg by mouth daily, Historical Med      cyanocobalamin (VITAMIN B-12) 1000 MCG tablet Take 1 tablet (1,000 mcg total) by mouth daily, Starting Wed 6/26/2024, Until Sun 8/25/2024, Normal      dapagliflozin (Farxiga) 10 MG tablet Take 10 mg by mouth daily, Starting Wed 7/10/2024, Historical Med      Empagliflozin 25 MG TABS Take 1 tablet (25 mg total) by mouth daily, Starting Wed 6/26/2024, Until Sun 8/25/2024, Normal      ergocalciferol (VITAMIN D2) 50,000 units Take 1 capsule (50,000 Units total) by mouth once a week for 8 doses Do not start before June 29,  2024., Starting Sat 6/29/2024, Until Sun 8/18/2024, Normal      FLUoxetine (PROzac) 40 MG capsule Take 1 capsule (40 mg total) by mouth daily, Starting Wed 6/26/2024, Until Sun 8/25/2024, Normal      fluticasone (FLONASE) 50 mcg/act nasal spray Historical Med      folic acid (FOLVITE) 1 mg tablet Take 1 tablet (1 mg total) by mouth daily, Starting Wed 6/26/2024, Until Sun 8/25/2024, Normal      gabapentin (NEURONTIN) 300 mg capsule Take 2 capsules (600 mg total) by mouth 3 (three) times a day, Starting Tue 6/25/2024, Until Sat 8/24/2024, Normal      ketorolac (TORADOL) 10 mg tablet Take 1 tablet (10 mg total) by mouth every 6 (six) hours as needed for moderate pain for up to 5 days, Starting Wed 7/31/2024, Until Mon 8/5/2024 at 2359, Normal      linaCLOtide (Linzess) 72 MCG CAPS Take 1 capsule by mouth daily, Historical Med      mirtazapine (REMERON) 7.5 MG tablet Take 1 tablet (7.5 mg total) by mouth daily at bedtime, Starting Tue 6/25/2024, Until Sat 8/24/2024, Normal      nortriptyline (PAMELOR) 50 mg capsule Take 50 mg by mouth daily at bedtime, Starting Wed 7/10/2024, Until Thu 7/10/2025, Historical Med      Nutritional Supplements (ENSURE PO) Take 1 Can by mouth daily, Starting Mon 7/8/2024, Historical Med      !! ondansetron (ZOFRAN-ODT) 4 mg disintegrating tablet Take 1 tablet (4 mg total) by mouth every 8 (eight) hours as needed for nausea or vomiting, Starting Tue 7/30/2024, Print      !! ondansetron (ZOFRAN-ODT) 4 mg disintegrating tablet Take 1 tablet (4 mg total) by mouth every 8 (eight) hours as needed for nausea for up to 10 days, Starting Wed 7/31/2024, Until Sat 8/10/2024 at 2359, Normal      OneTouch Ultra test strip Historical Med      pantoprazole (PROTONIX) 40 mg tablet Take 40 mg by mouth daily, Historical Med      QUEtiapine (SEROquel) 400 MG tablet Take 2 tablets (800 mg total) by mouth daily at bedtime, Starting Tue 6/25/2024, Until Sat 8/24/2024, Normal      rizatriptan (MAXALT) 10 mg  tablet Take 10 mg by mouth daily, Starting Mon 7/8/2024, Historical Med      sitaGLIPtin (JANUVIA) 100 mg tablet Take 1 tablet (100 mg total) by mouth daily, Starting Wed 6/26/2024, Until Sun 8/25/2024, Normal      topiramate (TOPAMAX) 25 mg tablet Take 1 tablet (25 mg total) by mouth 2 (two) times a day, Starting Tue 6/25/2024, Until Sat 8/24/2024, Normal      Tradjenta 5 MG TABS Take 5 mg by mouth daily, Starting Wed 7/10/2024, Until Thu 7/10/2025, Historical Med      Trulicity 1.5 MG/0.5ML injection Historical Med      varenicline (Chantix) 1 mg tablet Take 1 mg by mouth 2 (two) times a day, Starting Fri 7/26/2024, Historical Med      Varenicline Tartrate (CHANTIX STARTING MONTH PAK PO) Historical Med      zolpidem (AMBIEN) 10 mg tablet Take 10 mg by mouth daily at bedtime as needed, Starting Mon 7/1/2024, Historical Med       !! - Potential duplicate medications found. Please discuss with provider.          No discharge procedures on file.    PDMP Review         Value Time User    PDMP Reviewed  Yes 6/21/2024  8:46 AM Kulwant Phillips DO            ED Provider  Electronically Signed by             Marina Carbajal MD  08/02/24 9298

## 2024-08-02 NOTE — ED NOTES
Patient states that she is still in a lot of pain and is in need of further pain medication. She states that her sister will leave work andreea take her home if  she is given medication. Provider made aware and in to speak with patient.     Keyona Mcdonald RN  08/01/24 1927

## 2024-08-15 ENCOUNTER — APPOINTMENT (EMERGENCY)
Dept: RADIOLOGY | Facility: HOSPITAL | Age: 59
End: 2024-08-15
Payer: COMMERCIAL

## 2024-08-15 ENCOUNTER — HOSPITAL ENCOUNTER (EMERGENCY)
Facility: HOSPITAL | Age: 59
Discharge: HOME | End: 2024-08-15
Payer: COMMERCIAL

## 2024-08-15 ENCOUNTER — APPOINTMENT (EMERGENCY)
Dept: CT IMAGING | Facility: HOSPITAL | Age: 59
End: 2024-08-15
Payer: COMMERCIAL

## 2024-08-15 VITALS
DIASTOLIC BLOOD PRESSURE: 59 MMHG | RESPIRATION RATE: 14 BRPM | SYSTOLIC BLOOD PRESSURE: 103 MMHG | OXYGEN SATURATION: 94 % | BODY MASS INDEX: 20.25 KG/M2 | WEIGHT: 110.7 LBS | TEMPERATURE: 98.8 F | HEART RATE: 95 BPM

## 2024-08-15 DIAGNOSIS — R41.82 ALTERED MENTAL STATUS: Primary | ICD-10-CM

## 2024-08-15 LAB
ALBUMIN SERPL BCG-MCNC: 3.6 G/DL (ref 3.5–5)
ALP SERPL-CCNC: 259 U/L (ref 34–104)
ALT SERPL W P-5'-P-CCNC: 11 U/L (ref 7–52)
AMMONIA PLAS-SCNC: 28 UMOL/L (ref 18–72)
AMPHETAMINES SERPL QL SCN: NEGATIVE
ANION GAP SERPL CALCULATED.3IONS-SCNC: 8 MMOL/L (ref 4–13)
APAP SERPL-MCNC: 2 UG/ML (ref 10–20)
AST SERPL W P-5'-P-CCNC: 18 U/L (ref 13–39)
ATRIAL RATE: 119 BPM
BACTERIA UR QL AUTO: NORMAL /HPF
BARBITURATES UR QL: NEGATIVE
BASE EX.OXY STD BLDV CALC-SCNC: 72.8 % (ref 60–80)
BASE EXCESS BLDV CALC-SCNC: 4.1 MMOL/L
BASOPHILS # BLD AUTO: 0.02 THOUSANDS/ÂΜL (ref 0–0.1)
BASOPHILS NFR BLD AUTO: 0 % (ref 0–1)
BENZODIAZ UR QL: NEGATIVE
BILIRUB SERPL-MCNC: 0.38 MG/DL (ref 0.2–1)
BILIRUB UR QL STRIP: NEGATIVE
BUN SERPL-MCNC: 20 MG/DL (ref 5–25)
CALCIUM SERPL-MCNC: 8.9 MG/DL (ref 8.4–10.2)
CARDIAC TROPONIN I PNL SERPL HS: <2 NG/L
CHLORIDE SERPL-SCNC: 96 MMOL/L (ref 96–108)
CK SERPL-CCNC: 29 U/L (ref 26–192)
CLARITY UR: CLEAR
CO2 SERPL-SCNC: 30 MMOL/L (ref 21–32)
COCAINE UR QL: NEGATIVE
COLOR UR: ABNORMAL
CREAT SERPL-MCNC: 0.6 MG/DL (ref 0.6–1.3)
EOSINOPHIL # BLD AUTO: 0.03 THOUSAND/ÂΜL (ref 0–0.61)
EOSINOPHIL NFR BLD AUTO: 0 % (ref 0–6)
ERYTHROCYTE [DISTWIDTH] IN BLOOD BY AUTOMATED COUNT: 15.1 % (ref 11.6–15.1)
ETHANOL SERPL-MCNC: <10 MG/DL
FENTANYL UR QL SCN: NEGATIVE
GFR SERPL CREATININE-BSD FRML MDRD: 101 ML/MIN/1.73SQ M
GLUCOSE SERPL-MCNC: 169 MG/DL (ref 65–140)
GLUCOSE SERPL-MCNC: 237 MG/DL (ref 65–140)
GLUCOSE UR STRIP-MCNC: ABNORMAL MG/DL
HCO3 BLDV-SCNC: 29.7 MMOL/L (ref 24–30)
HCT VFR BLD AUTO: 36.6 % (ref 34.8–46.1)
HGB BLD-MCNC: 12.3 G/DL (ref 11.5–15.4)
HGB UR QL STRIP.AUTO: NEGATIVE
HYDROCODONE UR QL SCN: POSITIVE
IMM GRANULOCYTES # BLD AUTO: 0.03 THOUSAND/UL (ref 0–0.2)
IMM GRANULOCYTES NFR BLD AUTO: 0 % (ref 0–2)
KETONES UR STRIP-MCNC: NEGATIVE MG/DL
LACTATE SERPL-SCNC: 1.3 MMOL/L (ref 0.5–2)
LEUKOCYTE ESTERASE UR QL STRIP: NEGATIVE
LIPASE SERPL-CCNC: 20 U/L (ref 11–82)
LYMPHOCYTES # BLD AUTO: 1.22 THOUSANDS/ÂΜL (ref 0.6–4.47)
LYMPHOCYTES NFR BLD AUTO: 16 % (ref 14–44)
MCH RBC QN AUTO: 34.5 PG (ref 26.8–34.3)
MCHC RBC AUTO-ENTMCNC: 33.6 G/DL (ref 31.4–37.4)
MCV RBC AUTO: 103 FL (ref 82–98)
METHADONE UR QL: NEGATIVE
MONOCYTES # BLD AUTO: 0.3 THOUSAND/ÂΜL (ref 0.17–1.22)
MONOCYTES NFR BLD AUTO: 4 % (ref 4–12)
NEUTROPHILS # BLD AUTO: 5.83 THOUSANDS/ÂΜL (ref 1.85–7.62)
NEUTS SEG NFR BLD AUTO: 80 % (ref 43–75)
NITRITE UR QL STRIP: NEGATIVE
NON-SQ EPI CELLS URNS QL MICRO: NORMAL /HPF
NRBC BLD AUTO-RTO: 0 /100 WBCS
O2 CT BLDV-SCNC: 13.8 ML/DL
OPIATES UR QL SCN: POSITIVE
OTHER STN SPEC: NORMAL
OXYCODONE+OXYMORPHONE UR QL SCN: NEGATIVE
P AXIS: 34 DEGREES
PCO2 BLDV: 48.6 MM HG (ref 42–50)
PCP UR QL: NEGATIVE
PH BLDV: 7.4 [PH] (ref 7.3–7.4)
PH UR STRIP.AUTO: 6 [PH]
PLATELET # BLD AUTO: 446 THOUSANDS/UL (ref 149–390)
PMV BLD AUTO: 9 FL (ref 8.9–12.7)
PO2 BLDV: 43 MM HG (ref 35–45)
POTASSIUM SERPL-SCNC: 4.1 MMOL/L (ref 3.5–5.3)
PR INTERVAL: 148 MS
PROT SERPL-MCNC: 6.8 G/DL (ref 6.4–8.4)
PROT UR STRIP-MCNC: NEGATIVE MG/DL
QRS AXIS: -22 DEGREES
QRSD INTERVAL: 82 MS
QT INTERVAL: 340 MS
QTC INTERVAL: 478 MS
RBC # BLD AUTO: 3.57 MILLION/UL (ref 3.81–5.12)
RBC #/AREA URNS AUTO: NORMAL /HPF
SALICYLATES SERPL-MCNC: <5 MG/DL (ref 3–20)
SODIUM SERPL-SCNC: 134 MMOL/L (ref 135–147)
SP GR UR STRIP.AUTO: 1.02 (ref 1–1.04)
T WAVE AXIS: 34 DEGREES
THC UR QL: NEGATIVE
TSH SERPL DL<=0.05 MIU/L-ACNC: 3.84 UIU/ML (ref 0.45–4.5)
UROBILINOGEN UA: NEGATIVE MG/DL
VENTRICULAR RATE: 119 BPM
WBC # BLD AUTO: 7.43 THOUSAND/UL (ref 4.31–10.16)
WBC #/AREA URNS AUTO: NORMAL /HPF

## 2024-08-15 PROCEDURE — 82077 ASSAY SPEC XCP UR&BREATH IA: CPT | Performed by: PHYSICIAN ASSISTANT

## 2024-08-15 PROCEDURE — 81001 URINALYSIS AUTO W/SCOPE: CPT | Performed by: PHYSICIAN ASSISTANT

## 2024-08-15 PROCEDURE — 82550 ASSAY OF CK (CPK): CPT | Performed by: PHYSICIAN ASSISTANT

## 2024-08-15 PROCEDURE — 93010 ELECTROCARDIOGRAM REPORT: CPT | Performed by: INTERNAL MEDICINE

## 2024-08-15 PROCEDURE — 36415 COLL VENOUS BLD VENIPUNCTURE: CPT | Performed by: PHYSICIAN ASSISTANT

## 2024-08-15 PROCEDURE — 99285 EMERGENCY DEPT VISIT HI MDM: CPT

## 2024-08-15 PROCEDURE — 96374 THER/PROPH/DIAG INJ IV PUSH: CPT

## 2024-08-15 PROCEDURE — 85025 COMPLETE CBC W/AUTO DIFF WBC: CPT | Performed by: PHYSICIAN ASSISTANT

## 2024-08-15 PROCEDURE — 82948 REAGENT STRIP/BLOOD GLUCOSE: CPT

## 2024-08-15 PROCEDURE — 84443 ASSAY THYROID STIM HORMONE: CPT | Performed by: PHYSICIAN ASSISTANT

## 2024-08-15 PROCEDURE — 80179 DRUG ASSAY SALICYLATE: CPT | Performed by: PHYSICIAN ASSISTANT

## 2024-08-15 PROCEDURE — 82140 ASSAY OF AMMONIA: CPT | Performed by: PHYSICIAN ASSISTANT

## 2024-08-15 PROCEDURE — 82805 BLOOD GASES W/O2 SATURATION: CPT | Performed by: PHYSICIAN ASSISTANT

## 2024-08-15 PROCEDURE — 93005 ELECTROCARDIOGRAM TRACING: CPT

## 2024-08-15 PROCEDURE — 80053 COMPREHEN METABOLIC PANEL: CPT | Performed by: PHYSICIAN ASSISTANT

## 2024-08-15 PROCEDURE — 84484 ASSAY OF TROPONIN QUANT: CPT | Performed by: PHYSICIAN ASSISTANT

## 2024-08-15 PROCEDURE — 71045 X-RAY EXAM CHEST 1 VIEW: CPT

## 2024-08-15 PROCEDURE — 99285 EMERGENCY DEPT VISIT HI MDM: CPT | Performed by: PHYSICIAN ASSISTANT

## 2024-08-15 PROCEDURE — 80143 DRUG ASSAY ACETAMINOPHEN: CPT | Performed by: PHYSICIAN ASSISTANT

## 2024-08-15 PROCEDURE — 83605 ASSAY OF LACTIC ACID: CPT | Performed by: PHYSICIAN ASSISTANT

## 2024-08-15 PROCEDURE — 80307 DRUG TEST PRSMV CHEM ANLYZR: CPT | Performed by: PHYSICIAN ASSISTANT

## 2024-08-15 PROCEDURE — 70450 CT HEAD/BRAIN W/O DYE: CPT

## 2024-08-15 PROCEDURE — 83690 ASSAY OF LIPASE: CPT | Performed by: PHYSICIAN ASSISTANT

## 2024-08-15 PROCEDURE — 96361 HYDRATE IV INFUSION ADD-ON: CPT

## 2024-08-15 PROCEDURE — 81003 URINALYSIS AUTO W/O SCOPE: CPT | Performed by: PHYSICIAN ASSISTANT

## 2024-08-15 RX ORDER — KETOROLAC TROMETHAMINE 30 MG/ML
15 INJECTION, SOLUTION INTRAMUSCULAR; INTRAVENOUS ONCE
Status: COMPLETED | OUTPATIENT
Start: 2024-08-15 | End: 2024-08-15

## 2024-08-15 RX ORDER — BENZTROPINE MESYLATE 1 MG/ML
1 INJECTION, SOLUTION INTRAMUSCULAR; INTRAVENOUS ONCE
Status: DISCONTINUED | OUTPATIENT
Start: 2024-08-15 | End: 2024-08-15

## 2024-08-15 RX ORDER — LORAZEPAM 2 MG/ML
2 INJECTION INTRAMUSCULAR ONCE
Status: DISCONTINUED | OUTPATIENT
Start: 2024-08-15 | End: 2024-08-15

## 2024-08-15 RX ORDER — HALOPERIDOL 5 MG/ML
10 INJECTION INTRAMUSCULAR ONCE
Status: DISCONTINUED | OUTPATIENT
Start: 2024-08-15 | End: 2024-08-15

## 2024-08-15 RX ADMIN — SODIUM CHLORIDE 1000 ML: 0.9 INJECTION, SOLUTION INTRAVENOUS at 11:01

## 2024-08-15 RX ADMIN — KETOROLAC TROMETHAMINE 15 MG: 30 INJECTION, SOLUTION INTRAMUSCULAR; INTRAVENOUS at 12:31

## 2024-08-15 NOTE — ED PROVIDER NOTES
History  Chief Complaint   Patient presents with    Medical Problem     Brought in by EMS after being found in her car asleep. Per EMS she told them she took Ambien but does not know what time she took it. Pt falling asleep at times during triage. Only complaint is back pain     59-year-old female multiple recent visits to this emergency department for evaluation for complaints of abdominal pain which have yielded no acute findings despite CT imaging, blood work analysis and subsequent recommendation for follow-up with gastroenterology which the patient has not yet completed presenting today via EMS and police for evaluation of altered mental status. Patient was reportedly found in her car and was driving erratically and then parked out of a normal parking spot - she admits to EMS and to this provider that she used ambien however cannot give timing. She denies complaints but is moving slowly and erratically, unable to maintain       Medical Problem  Associated symptoms: no abdominal pain, no chest pain, no congestion, no ear pain, no fatigue, no fever, no nausea, no rash, no rhinorrhea, no shortness of breath, no sore throat and no vomiting        Prior to Admission Medications   Prescriptions Last Dose Informant Patient Reported? Taking?   Empagliflozin 25 MG TABS   No No   Sig: Take 1 tablet (25 mg total) by mouth daily   FLUoxetine (PROzac) 40 MG capsule   No No   Sig: Take 1 capsule (40 mg total) by mouth daily   Nutritional Supplements (ENSURE PO)   Yes No   Sig: Take 1 Can by mouth daily   OneTouch Ultra test strip   Yes No   QUEtiapine (SEROquel) 400 MG tablet   No No   Sig: Take 2 tablets (800 mg total) by mouth daily at bedtime   Tradjenta 5 MG TABS   Yes No   Sig: Take 5 mg by mouth daily   Trulicity 1.5 MG/0.5ML injection   Yes No   Patient not taking: Reported on 7/7/2024   Varenicline Tartrate (CHANTIX STARTING MONTH JENNIFER PO)   Yes No   Patient not taking: Reported on 7/7/2024   albuterol (PROVENTIL  HFA,VENTOLIN HFA) 90 mcg/act inhaler   Yes No   Sig: Inhale 2 puffs every 4 (four) hours as needed for wheezing or shortness of breath   Patient not taking: Reported on 6/20/2024   azaTHIOprine (IMURAN) 50 mg tablet  Self Yes No   Sig: Take 100 mg by mouth daily   cyanocobalamin (VITAMIN B-12) 1000 MCG tablet   No No   Sig: Take 1 tablet (1,000 mcg total) by mouth daily   Patient not taking: Reported on 7/7/2024   dapagliflozin (Farxiga) 10 MG tablet   Yes No   Sig: Take 10 mg by mouth daily   ergocalciferol (VITAMIN D2) 50,000 units   No No   Sig: Take 1 capsule (50,000 Units total) by mouth once a week for 8 doses Do not start before June 29, 2024.   Patient not taking: Reported on 7/7/2024   fluticasone (FLONASE) 50 mcg/act nasal spray   Yes No   folic acid (FOLVITE) 1 mg tablet   No No   Sig: Take 1 tablet (1 mg total) by mouth daily   Patient not taking: Reported on 7/7/2024   gabapentin (NEURONTIN) 300 mg capsule   No No   Sig: Take 2 capsules (600 mg total) by mouth 3 (three) times a day   ketorolac (TORADOL) 10 mg tablet   No No   Sig: Take 1 tablet (10 mg total) by mouth every 6 (six) hours as needed for moderate pain for up to 5 days   linaCLOtide (Linzess) 72 MCG CAPS   Yes No   Sig: Take 1 capsule by mouth daily   mirtazapine (REMERON) 7.5 MG tablet   No No   Sig: Take 1 tablet (7.5 mg total) by mouth daily at bedtime   Patient not taking: Reported on 7/7/2024   nortriptyline (PAMELOR) 50 mg capsule   Yes No   Sig: Take 50 mg by mouth daily at bedtime   ondansetron (ZOFRAN-ODT) 4 mg disintegrating tablet   No No   Sig: Take 1 tablet (4 mg total) by mouth every 8 (eight) hours as needed for nausea or vomiting   ondansetron (ZOFRAN-ODT) 4 mg disintegrating tablet   No No   Sig: Take 1 tablet (4 mg total) by mouth every 8 (eight) hours as needed for nausea for up to 10 days   pantoprazole (PROTONIX) 40 mg tablet   Yes No   Sig: Take 40 mg by mouth daily   Patient not taking: Reported on 7/7/2024    rizatriptan (MAXALT) 10 mg tablet   Yes No   Sig: Take 10 mg by mouth daily   sitaGLIPtin (JANUVIA) 100 mg tablet   No No   Sig: Take 1 tablet (100 mg total) by mouth daily   topiramate (TOPAMAX) 25 mg tablet   No No   Sig: Take 1 tablet (25 mg total) by mouth 2 (two) times a day   varenicline (Chantix) 1 mg tablet   Yes No   Sig: Take 1 mg by mouth 2 (two) times a day   zolpidem (AMBIEN) 10 mg tablet   Yes No   Sig: Take 10 mg by mouth daily at bedtime as needed      Facility-Administered Medications: None       Past Medical History:   Diagnosis Date    Autoimmune hepatitis (HCC)     Bipolar 1 disorder (HCC)     Chronic headaches 2021    Diabetes mellitus (HCC)     Kidney stone     Renal disorder     kidney stones    Seizure disorder (HCC)        Past Surgical History:   Procedure Laterality Date    BARIATRIC SURGERY  05/2017    BUNIONECTOMY      CYSTOSCOPY  07/12/2018    Stent Removal    FL RETROGRADE PYELOGRAM  6/16/2022    GASTRIC BYPASS  05/22/2017    HIATAL HERNIA REPAIR  05/22/2017    HYSTERECTOMY      JOINT REPLACEMENT      KIDNEY STONE SURGERY      NY CYSTO/URETERO W/LITHOTRIPSY &INDWELL STENT INSRT Bilateral 7/6/2018    Procedure: CYSTOSCOPY GALDINO. URETEROSCOPY;GALDINO.  RETROGRADE PYELOGRAM AND INSERTION GALDINO.STENT URETERAL;  Surgeon: Jacbo Gerber MD;  Location: [facility];  Service: Urology    NY CYSTO/URETERO W/LITHOTRIPSY &INDWELL STENT INSRT Left 6/16/2022    Procedure: CYSTOSCOPY URETEROSCOPY WITH LITHOTRIPSY HOLMIUM LASER, RETROGRADE PYELOGRAM AND INSERTION STENT URETERAL;  Surgeon: Asad Geiger MD;  Location: [facility];  Service: Urology    TOTAL SHOULDER REPLACEMENT  2002    VAGINAL HYSTERECTOMY      PARTIAL       Family History   Problem Relation Age of Onset    Diabetes Father     Heart disease Father     Diabetes Mother     Heart disease Mother     Diabetes Sister     Diabetes Family         MELLITUS    Depression Family     Mental illness Family     Obesity Family     Osteoarthritis Family       I have reviewed and agree with the history as documented.    E-Cigarette/Vaping    E-Cigarette Use Never User      E-Cigarette/Vaping Substances    Nicotine No     THC No     CBD No     Flavoring No     Other No     Unknown No      Social History     Tobacco Use    Smoking status: Every Day     Current packs/day: 0.25     Average packs/day: 0.3 packs/day for 30.0 years (7.5 ttl pk-yrs)     Types: Cigarettes     Passive exposure: Current    Smokeless tobacco: Never    Tobacco comments:     N/a   Vaping Use    Vaping status: Never Used   Substance Use Topics    Alcohol use: No     Comment: n/a    Drug use: No     Comment: n/a       Review of Systems   Constitutional:  Negative for chills, fatigue and fever.   HENT:  Negative for congestion, ear pain, rhinorrhea and sore throat.    Eyes:  Negative for redness.   Respiratory:  Negative for chest tightness and shortness of breath.    Cardiovascular:  Negative for chest pain and palpitations.   Gastrointestinal:  Negative for abdominal pain, nausea and vomiting.   Genitourinary:  Negative for dysuria and hematuria.   Musculoskeletal: Negative.    Skin:  Negative for rash.   Neurological:  Negative for dizziness, syncope, light-headedness and numbness.       Physical Exam  Physical Exam  Vitals and nursing note reviewed.   Constitutional:       Appearance: She is well-developed.      Comments: Dry and chronically ill appearing - appears older than stated age   HENT:      Head: Normocephalic.      Mouth/Throat:      Mouth: Mucous membranes are dry.   Eyes:      General: No scleral icterus.        Right eye: No discharge.         Left eye: No discharge.      Extraocular Movements: Extraocular movements intact.      Comments: 2mm b/l - reactive but sluggish.   Cardiovascular:      Rate and Rhythm: Normal rate and regular rhythm.   Pulmonary:      Effort: Pulmonary effort is normal.      Breath sounds: Normal breath sounds. No stridor.      Comments: Patient in no  respiratory distress, speaking in full sentences, managing oral secretions without difficulty, no accessory muscle use, retractions, or belly breathing noted, no adventitious lung sounds auscultated bilaterally.  Abdominal:      General: There is no distension.      Palpations: Abdomen is soft.      Tenderness: There is no abdominal tenderness.   Musculoskeletal:         General: Normal range of motion.   Skin:     General: Skin is warm and dry.      Capillary Refill: Capillary refill takes less than 2 seconds.   Neurological:      Mental Status: She is alert and oriented to person, place, and time.      Coordination: Coordination abnormal.      Comments: Slow to respond - however is responsive to verbal stimuli, no slurred speech however speech is slow. Patient is oriented to place and event but cannot give year. Obeys commands   Psychiatric:         Mood and Affect: Mood and affect normal.         Vital Signs  ED Triage Vitals   Temperature Pulse Respirations Blood Pressure SpO2   08/15/24 0926 08/15/24 0926 08/15/24 0926 08/15/24 0926 08/15/24 0926   98.8 °F (37.1 °C) (!) 128 16 100/63 93 %      Temp Source Heart Rate Source Patient Position - Orthostatic VS BP Location FiO2 (%)   08/15/24 0926 08/15/24 0926 08/15/24 0926 08/15/24 0926 --   Oral Monitor Lying Right arm       Pain Score       08/15/24 1231       9           Vitals:    08/15/24 0926 08/15/24 1241 08/15/24 1314   BP: 100/63 155/90 103/59   Pulse: (!) 128 96 95   Patient Position - Orthostatic VS: Lying  Lying         Visual Acuity  Visual Acuity      Flowsheet Row Most Recent Value   L Pupil Size (mm) 2   R Pupil Size (mm) 2            ED Medications  Medications   sodium chloride 0.9 % bolus 1,000 mL (0 mL Intravenous Stopped 8/15/24 1313)   ketorolac (TORADOL) injection 15 mg (15 mg Intravenous Given 8/15/24 1231)       Diagnostic Studies  Results Reviewed       Procedure Component Value Units Date/Time    Salicylate level [[id number]]  (Normal)  Collected: 08/15/24 1024    Lab Status: Final result Specimen: Blood from Arm, Right Updated: 08/15/24 1359     Salicylate Lvl <5 mg/dL     Acetaminophen level-If concentration is detectable, please discuss with medical  on call. [[id number]]  (Abnormal) Collected: 08/15/24 1024    Lab Status: Final result Specimen: Blood from Arm, Right Updated: 08/15/24 1359     Acetaminophen Level 2 ug/mL     Rapid drug screen, urine [[id number]]  (Abnormal) Collected: 08/15/24 1146    Lab Status: Final result Specimen: Urine, Other Updated: 08/15/24 1254     Amph/Meth UR Negative     Barbiturate Ur Negative     Benzodiazepine Urine Negative     Cocaine Urine Negative     Methadone Urine Negative     Opiate Urine Positive     PCP Ur Negative     THC Urine Negative     Oxycodone Urine Negative     Fentanyl Urine Negative     HYDROCODONE URINE Positive    Narrative:      Presumptive report. If requested, specimen will be sent to reference lab for confirmation.  FOR MEDICAL PURPOSES ONLY.   IF CONFIRMATION NEEDED PLEASE CONTACT THE LAB WITHIN 5 DAYS.    Drug Screen Cutoff Levels:  AMPHETAMINE/METHAMPHETAMINES  1000 ng/mL  BARBITURATES     200 ng/mL  BENZODIAZEPINES     200 ng/mL  COCAINE      300 ng/mL  METHADONE      300 ng/mL  OPIATES      300 ng/mL  PHENCYCLIDINE     25 ng/mL  THC       50 ng/mL  OXYCODONE      100 ng/mL  FENTANYL      5 ng/mL  HYDROCODONE     300 ng/mL    Urine Microscopic [[id number]] Collected: 08/15/24 1146    Lab Status: Final result Specimen: Urine, Other Updated: 08/15/24 1249     RBC, UA None Seen /hpf      WBC, UA 2-4 /hpf      Epithelial Cells Occasional /hpf      Bacteria, UA Occasional /hpf      OTHER OBSERVATIONS Yeast Cells Present    UA w Reflex to Microscopic w Reflex to Culture [[id number]]  (Abnormal) Collected: 08/15/24 1146    Lab Status: Final result Specimen: Urine, Other Updated: 08/15/24 1227     Color, UA Straw     Clarity, UA Clear     Specific Gravity, UA 1.020     pH, UA 6.0      Leukocytes, UA Negative     Nitrite, UA Negative     Protein, UA Negative mg/dl      Glucose, UA >=1000 (1%) mg/dl      Ketones, UA Negative mg/dl      Bilirubin, UA Negative     Occult Blood, UA Negative     UROBILINOGEN UA Negative mg/dL     Comprehensive metabolic panel [[id number]]  (Abnormal) Collected: 08/15/24 1024    Lab Status: Final result Specimen: Blood from Arm, Right Updated: 08/15/24 1131     Sodium 134 mmol/L      Potassium 4.1 mmol/L      Chloride 96 mmol/L      CO2 30 mmol/L      ANION GAP 8 mmol/L      BUN 20 mg/dL      Creatinine 0.60 mg/dL      Glucose 169 mg/dL      Calcium 8.9 mg/dL      AST 18 U/L      ALT 11 U/L      Alkaline Phosphatase 259 U/L      Total Protein 6.8 g/dL      Albumin 3.6 g/dL      Total Bilirubin 0.38 mg/dL      eGFR 101 ml/min/1.73sq m     Narrative:      [employer] guidelines for Chronic Kidney Disease (CKD):     Stage 1 with normal or high GFR (GFR > 90 mL/min/1.73 square meters)    Stage 2 Mild CKD (GFR = 60-89 mL/min/1.73 square meters)    Stage 3A Moderate CKD (GFR = 45-59 mL/min/1.73 square meters)    Stage 3B Moderate CKD (GFR = 30-44 mL/min/1.73 square meters)    Stage 4 Severe CKD (GFR = 15-29 mL/min/1.73 square meters)    Stage 5 End Stage CKD (GFR <15 mL/min/1.73 square meters)  Note: GFR calculation is accurate only with a steady state creatinine    CK [[id number]]  (Normal) Collected: 08/15/24 1024    Lab Status: Final result Specimen: Blood from Arm, Right Updated: 08/15/24 1131     Total CK 29 U/L     Lipase [[id number]]  (Normal) Collected: 08/15/24 1024    Lab Status: Final result Specimen: Blood from Arm, Right Updated: 08/15/24 1131     Lipase 20 u/L     TSH [[id number]]  (Normal) Collected: 08/15/24 1024    Lab Status: Final result Specimen: Blood from Arm, Right Updated: 08/15/24 1124     TSH 3RD GENERATON 3.841 uIU/mL     Lactic acid, plasma (w/reflex if result > 2.0) [[id number]]  (Normal) Collected: 08/15/24 1024    Lab Status:  Final result Specimen: Blood from Arm, Right Updated: 08/15/24 1118     LACTIC ACID 1.3 mmol/L     Narrative:      Result may be elevated if tourniquet was used during collection.    Ammonia [[id number]]  (Normal) Collected: 08/15/24 1024    Lab Status: Final result Specimen: Blood from Arm, Right Updated: 08/15/24 1118     Ammonia 28 umol/L     Ethanol [[id number]]  (Normal) Collected: 08/15/24 1024    Lab Status: Final result Specimen: Blood from Arm, Right Updated: 08/15/24 1118     Ethanol Lvl <10 mg/dL     HS Troponin 0hr (reflex protocol) [[id number]]  (Normal) Collected: 08/15/24 1024    Lab Status: Final result Specimen: Blood from Arm, Right Updated: 08/15/24 1115     hs TnI 0hr <2 ng/L     Blood gas, venous [[id number]]  (Abnormal) Collected: 08/15/24 1024    Lab Status: Final result Specimen: Blood from Arm, Right Updated: 08/15/24 1047     pH, Sekou 7.404     pCO2, Sekou 48.6 mm Hg      pO2, Sekou 43.0 mm Hg      HCO3, Sekou 29.7 mmol/L      Base Excess, Sekou 4.1 mmol/L      O2 Content, Sekou 13.8 ml/dL      O2 HGB, VENOUS 72.8 %     CBC and differential [[id number]]  (Abnormal) Collected: 08/15/24 1024    Lab Status: Final result Specimen: Blood from Arm, Right Updated: 08/15/24 1044     WBC 7.43 Thousand/uL      RBC 3.57 Million/uL      Hemoglobin 12.3 g/dL      Hematocrit 36.6 %       fL      MCH 34.5 pg      MCHC 33.6 g/dL      RDW 15.1 %      MPV 9.0 fL      Platelets 446 Thousands/uL      nRBC 0 /100 WBCs      Segmented % 80 %      Immature Grans % 0 %      Lymphocytes % 16 %      Monocytes % 4 %      Eosinophils Relative 0 %      Basophils Relative 0 %      Absolute Neutrophils 5.83 Thousands/µL      Absolute Immature Grans 0.03 Thousand/uL      Absolute Lymphocytes 1.22 Thousands/µL      Absolute Monocytes 0.30 Thousand/µL      Eosinophils Absolute 0.03 Thousand/µL      Basophils Absolute 0.02 Thousands/µL     Fingerstick Glucose (POCT) [[id number]]  (Abnormal) Collected: 08/15/24 0908    Lab Status: Final  result Specimen: Blood Updated: 08/15/24 0909     POC Glucose 237 mg/dl                    CT head without contrast   Final Result by Mariano Byers MD (08/15 1037)      No acute intracranial abnormality.                  Workstation performed: [id number]         XR chest portable   Final Result by Verna Hernandez MD (08/15 1255)      No acute cardiopulmonary disease.            Resident: Farrukh Waters I, the attending radiologist, have reviewed the images and agree with the final report above.      Workstation performed: [id number]                    Procedures  ECG 12 Lead Documentation Only    Date/Time: 8/15/2024 9:42 AM    Performed by: Tayler De Jesus PA-C  Authorized by: Tayler De Jesus PA-C    Indications / Diagnosis:  Altered mental status  ECG reviewed by me, the ED Provider: yes    Patient location:  ED  Previous ECG:     Comparison to cardiac monitor: Yes    Interpretation:     Interpretation: normal    Rate:     ECG rate:  119    ECG rate assessment: tachycardic    Rhythm:     Rhythm: sinus tachycardia    Ectopy:     Ectopy: none    QRS:     QRS axis:  Left    QRS intervals:  Normal  Conduction:     Conduction: normal    ST segments:     ST segments:  Normal  T waves:     T waves: normal    Comments:        QRS 82      CriticalCare Time    Date/Time: 8/15/2024 3:15 PM    Performed by: Tayler De Jesus PA-C  Authorized by: Tayler De Jesus PA-C    Critical care provider statement:     Critical care time (minutes):  120    Critical care start time:  8/15/2024 12:00 PM    Critical care end time:  8/15/2024 3:15 PM    Critical care time was exclusive of:  Separately billable procedures and treating other patients    Critical care was necessary to treat or prevent imminent or life-threatening deterioration of the following conditions:  Toxidrome, CNS failure or compromise, metabolic crisis and respiratory failure    Critical care was  time spent personally by me on the following activities:  Ordering and review of laboratory studies, ordering and review of radiographic studies, re-evaluation of patient's condition, review of old charts, ordering and performing treatments and interventions, evaluation of patient's response to treatment, examination of patient, discussions with consultants, obtaining history from patient or surrogate and development of treatment plan with patient or surrogate    I assumed direction of critical care for this patient from another provider in my specialty: no             ED Course  ED Course as of 08/15/24 1522   Thu Aug 15, 2024   1040   No acute intracranial abnormality.      1210 Patient was seen by toxicology - continues to be altered - unsure of year and recommended to remain for observation x 4 additional hours. Initially not agreeable to remain - for which reason haldol and ativan and cogentin ordered - after discussion with nursing staff she became agreeable and stayed.    1251 OTHER OBSERVATIONS: Yeast Cells Present   1520 Patient reports that her car has been towed and she would like to leave to obtain it at this time.  Discussed with toxicology who agrees that the patient is competent and oriented and not suicidal she may leave.   1521 Patient was reexamined at this time and was found to be stable for discharge.  Return to emergency department criteria was reviewed with the patient who verbalized understanding and was agreeable to discharge and the treatment plan at this time.         Medical Decision Making  58 y/o m presenting for AMS - patient is responsive, slow to respond, sleepy and disoriented. Workup to evaluate for differential which includes but is not limited to - acute head bleed/intercranial abnormality, metabolic derangement/PAULA/electrolyte disturbance, dysrhythmia, intoxication, organ dysfunction, DKA.     Patient's workup is notably negative for significant abnormal organ function,  "electrolyte abnormalities, brain bleed.  Toxicology was consulted in the setting of potential substance use versus prescription drug overdose and advised observation and if return to mental status recommended discharge.     Patient was observed in the emergency department for 6 hours and had a return to normal mental status, prompt responsiveness and was oriented x 4 she requested discharge and denied suicidality.    All imaging and/or lab testing discussed with patient, strict return to ED precautions discussed. Patient recommended to follow up promptly with appropriate outpatient provider.Patient and/or family members verbalizes understanding and agrees with plan. Patient is stable for discharge.     Portions of the record may have been created with voice recognition software. Occasional wrong word or \"sound a like\" substitutions may have occurred due to the inherent limitations of voice recognition software. Read the chart carefully and recognize, using context, where substitutions have occurred.    Amount and/or Complexity of Data Reviewed  Labs: ordered. Decision-making details documented in ED Course.  Radiology: ordered.    Risk  Prescription drug management.             Disposition  Final diagnoses:   Altered mental status     Time reflects when diagnosis was documented in both MDM as applicable and the Disposition within this note       Time User Action Codes Description Comment    8/15/2024 11:54 AM Tayler De Jesus Add [R41.82] Altered mental status           ED Disposition       ED Disposition   Discharge    Condition   Good    Date/Time   Thu Aug 15, 2024  3:21 PM    Comment   Edith Klein discharge to home/self care.                   Follow-up Information       Follow up With Specialties Details Why Contact Info    Deana Higginbotham PA-C Physician Assistant Schedule an appointment as soon as possible for a visit in 2 days As needed [address] [place] [address] [place] [address] [phone]   "            Patient's Medications   Discharge Prescriptions    No medications on file       No discharge procedures on file.    PDMP Review         Value Time User    PDMP Reviewed  Yes 6/21/2024  8:46 AM Kulwant Phillips DO            ED Provider  Electronically Signed by             Tayler De Jesus PA-C  08/15/24 1525
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
## 2024-09-20 ENCOUNTER — OFFICE VISIT (OUTPATIENT)
Dept: GASTROENTEROLOGY | Facility: CLINIC | Age: 59
End: 2024-09-20
Payer: COMMERCIAL

## 2024-09-20 VITALS
TEMPERATURE: 97.4 F | DIASTOLIC BLOOD PRESSURE: 70 MMHG | WEIGHT: 98.2 LBS | BODY MASS INDEX: 17.96 KG/M2 | SYSTOLIC BLOOD PRESSURE: 104 MMHG

## 2024-09-20 DIAGNOSIS — Z12.11 COLON CANCER SCREENING: ICD-10-CM

## 2024-09-20 DIAGNOSIS — R22.2 SUBCUTANEOUS NODULE OF ABDOMINAL WALL: ICD-10-CM

## 2024-09-20 DIAGNOSIS — L72.3 SEBACEOUS CYST: ICD-10-CM

## 2024-09-20 DIAGNOSIS — R63.4 WEIGHT LOSS, ABNORMAL: ICD-10-CM

## 2024-09-20 DIAGNOSIS — K83.8 DILATED CBD, ACQUIRED: ICD-10-CM

## 2024-09-20 DIAGNOSIS — R10.9 CHRONIC ABDOMINAL PAIN: Primary | ICD-10-CM

## 2024-09-20 DIAGNOSIS — G89.29 CHRONIC ABDOMINAL PAIN: Primary | ICD-10-CM

## 2024-09-20 PROCEDURE — 99204 OFFICE O/P NEW MOD 45 MIN: CPT | Performed by: INTERNAL MEDICINE

## 2024-09-20 RX ORDER — POLYETHYLENE GLYCOL 3350 17 G/17G
238 POWDER, FOR SOLUTION ORAL ONCE
Qty: 238 G | Refills: 0 | Status: SHIPPED | OUTPATIENT
Start: 2024-09-20 | End: 2024-09-20

## 2024-09-20 RX ORDER — BISACODYL 5 MG/1
20 TABLET, DELAYED RELEASE ORAL ONCE
Qty: 4 TABLET | Refills: 0 | Status: SHIPPED | OUTPATIENT
Start: 2024-09-20 | End: 2024-09-20

## 2024-09-20 NOTE — PROGRESS NOTES
[facility] - Outpatient Consultation  Edith Klein 59 y.o. female MRN: [id number]  Encounter: [id number]          ASSESSMENT AND PLAN:      1. Chronic abdominal pain  2.  Abnormal weight loss  3.  Dilated common bile duct  4.  Colon cancer screening  5.  Abdominal wall subcutaneous nodule    Patient has chronic symptoms and significant weight loss.  Differentials include peptic ulcer disease, malabsorption disorders such as celiac disease, tumors.  She has had abdominal imaging recently which has not shown any malignancy.  Ultrasound did show dilated common bile duct which could be secondary to stone, mass, stricture or normal finding.  She is due for colon cancer screening as well.  Discussed with patient to get above findings evaluated with EGD, endoscopic ultrasound and colonoscopy.  Patient agreeable.  Regarding the abdominal wall nodule which is likely a sebaceous cyst, I will refer her to general surgery for evaluation since patient having pain in the area of the cyst.    RTC 3 to 4 months.    Frankie Villanueva MD  Gastroenterology  [facility]  Date: September 20, 2024    - EGD; Future  - Endoscopic ultrasonography, GI (Upper); Future  - Ambulatory Referral to General Surgery; Future  - Colonoscopy; Future  - bisacodyl (DULCOLAX) 5 mg EC tablet; Take 4 tablets (20 mg total) by mouth once for 1 dose Colonoscopy prep  Dispense: 4 tablet; Refill: 0  - polyethylene glycol (MIRALAX) 17 g packet; Take 238 g by mouth once for 1 dose For bowel prep. Mix in 64 oz of gatorade. Drink 32 oz at the rate of 8 oz/1 glass every 15 mins starting at 5 pm on the evening prior to day of procedure. Drink the remaining 32 oz 5 hours prior to procedure time at at the rate of 8 oz/1 glass every 15 mins until finished.  Dispense: 238 g; Refill: 0  - Ambulatory Referral to General Surgery; Future    ______________________________________________________________________    HPI:  59-year-old with history of gastric bypass in 2017, constipation, presents for evaluation.    Patient reports that she has been having abdominal pain mainly on the right side intermittently for the last 6 months.  It has been increasing in frequency and severity.  She has nausea almost on a daily basis and has vomited as well.  No blood in vomiting.  She has a history of constipation but currently normal consistency bowel movements with an Linzess intake.  No blood in stools.  She is lost about 50 pounds of weight in the last few months.  No family history of colon cancer.  EGD was attempted last week but aborted due to food in the stomach.  This was done at outside facility.  No records available in the chart for me to review today.  Patient had colonoscopy about 10 years ago.    Patient had CT scan done in July 2023 which showed high stool burden in the colon.  Patient had ultrasound of the right upper quadrant done in July 2024 the results of which were reviewed by me today and showed mildly dilated CBD with smooth distal tapering.  No gallstones were seen.  She recently had ultrasound of the abdominal wall which showed a nodule likely sebaceous cyst.  Patient reports that she has been having abdominal pain in that area more than in other parts of the abdomen.      REVIEW OF SYSTEMS:    CONSTITUTIONAL: Denies any fever, chills, rigors, and weight loss.  HEENT: No earache or tinnitus. Denies hearing loss or visual disturbances.  CARDIOVASCULAR: No chest pain or palpitations.   RESPIRATORY: Denies any cough, hemoptysis, shortness of breath or dyspnea on exertion.  GASTROINTESTINAL: As noted in the History of Present Illness.   GENITOURINARY: No problems with urination. Denies any hematuria or dysuria.  NEUROLOGIC: No dizziness or vertigo, denies headaches.   MUSCULOSKELETAL: Denies any muscle or joint pain.   SKIN: Denies skin rashes or itching.   ENDOCRINE: Denies excessive thirst. Denies intolerance to heat or  cold.  PSYCHOSOCIAL: Denies depression or anxiety. Denies any recent memory loss.       Historical Information   Past Medical History:   Diagnosis Date    Autoimmune hepatitis (HCC)     Bipolar 1 disorder (HCC)     Chronic headaches 2021    Diabetes mellitus (HCC)     Kidney stone     Renal disorder     kidney stones    Seizure disorder (HCC)      Past Surgical History:   Procedure Laterality Date    BARIATRIC SURGERY  05/2017    BUNIONECTOMY      COLONOSCOPY      CYSTOSCOPY  07/12/2018    Stent Removal    FL RETROGRADE PYELOGRAM  06/16/2022    GASTRIC BYPASS  05/22/2017    HIATAL HERNIA REPAIR  05/22/2017    HYSTERECTOMY      JOINT REPLACEMENT      KIDNEY STONE SURGERY      OR CYSTO/URETERO W/LITHOTRIPSY &INDWELL STENT INSRT Bilateral 07/06/2018    Procedure: CYSTOSCOPY GALDINO. URETEROSCOPY;GALDINO.  RETROGRADE PYELOGRAM AND INSERTION GALDINO.STENT URETERAL;  Surgeon: Jacob Gerber MD;  Location: [facility];  Service: Urology    OR CYSTO/URETERO W/LITHOTRIPSY &INDWELL STENT INSRT Left 06/16/2022    Procedure: CYSTOSCOPY URETEROSCOPY WITH LITHOTRIPSY HOLMIUM LASER, RETROGRADE PYELOGRAM AND INSERTION STENT URETERAL;  Surgeon: Asad Geiger MD;  Location: [facility];  Service: Urology    TOTAL SHOULDER REPLACEMENT  2002    VAGINAL HYSTERECTOMY      PARTIAL     Social History   Social History     Substance and Sexual Activity   Alcohol Use No    Comment: n/a     Social History     Substance and Sexual Activity   Drug Use No    Comment: n/a     Social History     Tobacco Use   Smoking Status Every Day    Current packs/day: 0.25    Average packs/day: 0.3 packs/day for 30.0 years (7.5 ttl pk-yrs)    Types: Cigarettes    Passive exposure: Current   Smokeless Tobacco Never   Tobacco Comments    N/a     Family History   Problem Relation Age of Onset    Diabetes Father     Heart disease Father     Diabetes Mother     Heart disease Mother     Diabetes Sister     Diabetes Family         MELLITUS    Depression Family     Mental  illness Family     Obesity Family     Osteoarthritis Family        Meds/Allergies       Current Outpatient Medications:     azaTHIOprine (IMURAN) 50 mg tablet    bisacodyl (DULCOLAX) 5 mg EC tablet    fluticasone (FLONASE) 50 mcg/act nasal spray    linaCLOtide (Linzess) 72 MCG CAPS    nortriptyline (PAMELOR) 50 mg capsule    Nutritional Supplements (ENSURE PO)    ondansetron (ZOFRAN-ODT) 4 mg disintegrating tablet    pantoprazole (PROTONIX) 40 mg tablet    polyethylene glycol (MIRALAX) 17 g packet    rizatriptan (MAXALT) 10 mg tablet    Tradjenta 5 MG TABS    varenicline (Chantix) 1 mg tablet    zolpidem (AMBIEN) 10 mg tablet    albuterol (PROVENTIL HFA,VENTOLIN HFA) 90 mcg/act inhaler    cyanocobalamin (VITAMIN B-12) 1000 MCG tablet    dapagliflozin (Farxiga) 10 MG tablet    Empagliflozin 25 MG TABS    ergocalciferol (VITAMIN D2) 50,000 units    FLUoxetine (PROzac) 40 MG capsule    folic acid (FOLVITE) 1 mg tablet    gabapentin (NEURONTIN) 300 mg capsule    ketorolac (TORADOL) 10 mg tablet    mirtazapine (REMERON) 7.5 MG tablet    ondansetron (ZOFRAN-ODT) 4 mg disintegrating tablet    OneTouch Ultra test strip    QUEtiapine (SEROquel) 400 MG tablet    sitaGLIPtin (JANUVIA) 100 mg tablet    topiramate (TOPAMAX) 25 mg tablet    Trulicity 1.5 MG/0.5ML injection    Varenicline Tartrate (CHANTIX STARTING MONTH JENNIFER PO)    No Known Allergies        Objective     Blood pressure 104/70, temperature (!) 97.4 °F (36.3 °C), weight 44.5 kg (98 lb 3.2 oz). Body mass index is 17.96 kg/m².        PHYSICAL EXAM:      General Appearance:   Alert, cooperative, no distress   HEENT:   Normocephalic, atraumatic, anicteric.     Neck:  Supple, symmetrical, trachea midline   Lungs:   Clear to auscultation bilaterally; no rales, rhonchi or wheezing; respirations unlabored    Heart::   Regular rate and rhythm; no murmur, rub, or gallop.   Abdomen:   Soft, non-tender, non-distended; normal bowel sounds; no masses, no organomegaly     Genitalia:   Deferred    Rectal:   Deferred    Extremities:  No cyanosis, clubbing or edema    Pulses:  2+ and symmetric    Skin:  No jaundice, rashes, or lesions    Lymph nodes:  No palpable cervical lymphadenopathy        Lab Results:   No visits with results within 1 Day(s) from this visit.   Latest known visit with results is:   Admission on 08/15/2024, Discharged on 08/15/2024   Component Date Value    POC Glucose 08/15/2024 237 (H)     Ammonia 08/15/2024 28     WBC 08/15/2024 7.43     RBC 08/15/2024 3.57 (L)     Hemoglobin 08/15/2024 12.3     Hematocrit 08/15/2024 36.6     MCV 08/15/2024 103 (H)     MCH 08/15/2024 34.5 (H)     MCHC 08/15/2024 33.6     RDW 08/15/2024 15.1     MPV 08/15/2024 9.0     Platelets 08/15/2024 446 (H)     nRBC 08/15/2024 0     Segmented % 08/15/2024 80 (H)     Immature Grans % 08/15/2024 0     Lymphocytes % 08/15/2024 16     Monocytes % 08/15/2024 4     Eosinophils Relative 08/15/2024 0     Basophils Relative 08/15/2024 0     Absolute Neutrophils 08/15/2024 5.83     Absolute Immature Grans 08/15/2024 0.03     Absolute Lymphocytes 08/15/2024 1.22     Absolute Monocytes 08/15/2024 0.30     Eosinophils Absolute 08/15/2024 0.03     Basophils Absolute 08/15/2024 0.02     Sodium 08/15/2024 134 (L)     Potassium 08/15/2024 4.1     Chloride 08/15/2024 96     CO2 08/15/2024 30     ANION GAP 08/15/2024 8     BUN 08/15/2024 20     Creatinine 08/15/2024 0.60     Glucose 08/15/2024 169 (H)     Calcium 08/15/2024 8.9     AST 08/15/2024 18     ALT 08/15/2024 11     Alkaline Phosphatase 08/15/2024 259 (H)     Total Protein 08/15/2024 6.8     Albumin 08/15/2024 3.6     Total Bilirubin 08/15/2024 0.38     eGFR 08/15/2024 101     TSH 3RD GENERATON 08/15/2024 3.841     Total CK 08/15/2024 29     Color, UA 08/15/2024 Straw     Clarity, UA 08/15/2024 Clear     Specific Gravity, UA 08/15/2024 1.020     pH, UA 08/15/2024 6.0     Leukocytes, UA 08/15/2024 Negative     Nitrite, UA 08/15/2024 Negative      Protein, UA 08/15/2024 Negative     Glucose, UA 08/15/2024 >=1000 (1%) (A)     Ketones, UA 08/15/2024 Negative     Bilirubin, UA 08/15/2024 Negative     Occult Blood, UA 08/15/2024 Negative     UROBILINOGEN UA 08/15/2024 Negative     hs TnI 0hr 08/15/2024 <2     Lipase 08/15/2024 20     LACTIC ACID 08/15/2024 1.3     Ethanol Lvl 08/15/2024 <10     Salicylate Lvl 08/15/2024 <5     Acetaminophen Level 08/15/2024 2 (L)     Amph/Meth UR 08/15/2024 Negative     Barbiturate Ur 08/15/2024 Negative     Benzodiazepine Urine 08/15/2024 Negative     Cocaine Urine 08/15/2024 Negative     Methadone Urine 08/15/2024 Negative     Opiate Urine 08/15/2024 Positive (A)     PCP Ur 08/15/2024 Negative     THC Urine 08/15/2024 Negative     Oxycodone Urine 08/15/2024 Negative     Fentanyl Urine 08/15/2024 Negative     HYDROCODONE URINE 08/15/2024 Positive (A)     pH, [place] 08/15/2024 7.404 (H)     pCO2, [place] 08/15/2024 48.6     pO2, Sek[place] 08/15/2024 43.0     HCO3, [place] 08/15/2024 29.7     Base Excess, Sek[place] 08/15/2024 4.1     O2 Content, Sekou 08/15/2024 13.8     O2 HGB, VENOUS 08/15/2024 72.8     RBC, UA 08/15/2024 None Seen     WBC, UA 08/15/2024 2-4     Epithelial Cells 08/15/2024 Occasional     Bacteria, UA 08/15/2024 Occasional     OTHER OBSERVATIONS 08/15/2024 Yeast Cells Present     Ventricular Rate 08/15/2024 119     Atrial Rate 08/15/2024 119     NV Interval 08/15/2024 148     QRSD Interval 08/15/2024 82     QT Interval 08/15/2024 340     QTC Interval 08/15/2024 478     P Axis 08/15/2024 34     QRS [place] 08/15/2024 -22     T Wave [place] 08/15/2024 34          Radiology Results:   US ABDOMEN LIMITED (SUPERFICIAL LUMP/FLUID COLLECTION)    Result Date: 9/14/2024  Narrative: Clinical history: Abdominal wall mass. Comment: A limited ultrasound of the ventral abdominal wall within the region of clinical concern was performed. There is no prior study for comparison. There is a well-defined heterogeneous complex primarily solid hypoechoic nodule  with mild posterior acoustic through transmission and lack of color Doppler flow measuring 8 x 4 x 8 mm in diameter. This is nonspecific but may reflect a sebaceous cyst. Ultrasound guided sampling can be performed for further evaluation if there is a clinical concern.    Impression: IMPRESSION: 8 x 4 x 8 mm complex but early solid hypoechoic nodule within the ventral abdominal wall in the region of clinical concern which is nonspecific but may reflect a sebaceous cyst. Ultrasound guided sampling can be performed for further evaluation if there is a clinical concern. Workstation:[id number]

## 2024-09-20 NOTE — PATIENT INSTRUCTIONS
Scheduled date of colonoscopy (as of today): 10/09/2024  Physician performing colonoscopy: Dr. Villanueva   Location of colonoscopy:   Bowel prep reviewed with patient: 2 Day [facility]   Instructions reviewed with patient by:  Clearances:  N/A     Diabetic/Farxiga/Yovana/Januvia  Pt instructed to hold     E[facility] to [facility]

## 2024-09-24 ENCOUNTER — TELEPHONE (OUTPATIENT)
Dept: GASTROENTEROLOGY | Facility: CLINIC | Age: 59
End: 2024-09-24

## 2024-09-24 ENCOUNTER — HOSPITAL ENCOUNTER (EMERGENCY)
Facility: HOSPITAL | Age: 59
Discharge: HOME | End: 2024-09-24
Attending: EMERGENCY MEDICINE
Payer: COMMERCIAL

## 2024-09-24 VITALS
SYSTOLIC BLOOD PRESSURE: 111 MMHG | BODY MASS INDEX: 18.35 KG/M2 | HEART RATE: 105 BPM | TEMPERATURE: 98.3 F | DIASTOLIC BLOOD PRESSURE: 75 MMHG | RESPIRATION RATE: 18 BRPM | OXYGEN SATURATION: 98 % | WEIGHT: 100.31 LBS

## 2024-09-24 DIAGNOSIS — R10.84 GENERALIZED ABDOMINAL PAIN: Primary | ICD-10-CM

## 2024-09-24 PROCEDURE — 99284 EMERGENCY DEPT VISIT MOD MDM: CPT

## 2024-09-24 PROCEDURE — 99284 EMERGENCY DEPT VISIT MOD MDM: CPT | Performed by: EMERGENCY MEDICINE

## 2024-09-24 RX ORDER — DICYCLOMINE HCL 20 MG
20 TABLET ORAL ONCE
Status: COMPLETED | OUTPATIENT
Start: 2024-09-24 | End: 2024-09-24

## 2024-09-24 RX ORDER — DICYCLOMINE HCL 20 MG
20 TABLET ORAL 2 TIMES DAILY
Qty: 20 TABLET | Refills: 0 | Status: SHIPPED | OUTPATIENT
Start: 2024-09-24

## 2024-09-24 RX ORDER — SUCRALFATE ORAL 1 G/10ML
1 SUSPENSION ORAL 4 TIMES DAILY
Qty: 473 ML | Refills: 0 | Status: SHIPPED | OUTPATIENT
Start: 2024-09-24

## 2024-09-24 RX ADMIN — DICYCLOMINE HYDROCHLORIDE 20 MG: 20 TABLET ORAL at 12:43

## 2024-09-24 NOTE — TELEPHONE ENCOUNTER
Referring physician : Kay    Diagnosis : Dilated CBD/weight loss    Discussed with patient : YES/NO: yes    Symptoms : abdominal pain/weight loss    Labs : , bilirubin: normal.    Imaging : 9/13/24 US 8 x 4 x 8 mm complex but early solid hypoechoic nodule within the ventral   abdominal wall in the region of clinical concern which is nonspecific but may   reflect a sebaceous cyst. Ultrasound guided sampling can be performed for   further evaluation if there is a clinical concern.     Prior endoscopy : None    Anticoagulation/ Antiplatelet therapy :  None.     Assessment :   1. Dilated CBD, elevated ALP, weight loss    Plan :   1. EUS    Order placed : YES/NO: yes    Informed scheduling staff : YES/NO: yes     Discussed with GI lab : YES/NO: no

## 2024-09-24 NOTE — ED PROVIDER NOTES
1. Generalized abdominal pain      ED Disposition       ED Disposition   Discharge    Condition   Stable    Date/Time   Tue Sep 24, 2024 12:41 PM    Comment   Edith Klein discharge to home/self care.                   Assessment & Plan       Medical Decision Making  59-year-old female with chronic abdominal discomfort and pain, differential diagnosis includes chronic abdominal pain, gastritis, colitis,    Plan will be for Bentyl, Carafate, outpatient follow-up with gastroenterology.    Pt re-examined and evaluated after testing and treatment. Spoke with the patient and feeling improved and sxs have resolved. Will discharge home with close f/u with pcp and instructed to return to the ED if sxs worsen or continue. Pt agrees with the plan for discharge and feels comfortable to go home with proper f/u. Advised to return for worsening or additional problems. Diagnostic tests were reviewed and questions answered. Diagnosis, care plan and treatment options were discussed. The patient understand instructions and will follow up as directed.  Counseling: I had a detailed discussion with the patient and/or guardian regarding: the historical points, exam findings, and any diagnostic results supporting the discharge diagnosis, lab results, radiology results, discharge instructions reviewed with patient and/or family/caregiver and understanding was verbalized. Instructions given to return to the emergency department if symptoms worsen or persist, or if there are any questions or concerns that arise at home.  All labs reviewed and utilized in the medical decision making process  All radiology studies independently viewed by me and interpreted by the radiologist.    Risk  Prescription drug management.                     Medications   dicyclomine (BENTYL) tablet 20 mg (20 mg Oral Given 9/24/24 1243)       History of Present Illness       59-year-old presents with symptoms of chronic abdominal pain has previous history of [id number]  bypass, has been followed as an outpatient with gastroenterology, she has no fevers or chills she is taking her home medication with no improvement of symptoms, here with noted chronic abdominal discomfort and pain hide vitals she is mildly tachycardic with a pulse of 105 which is likely secondary to pain afebrile blood pressure stable,    She does have tenderness to palpation noted in the central of the abdomen      Abdominal Pain  Associated symptoms: no chest pain, no chills, no cough, no dysuria, no fever, no hematuria, no shortness of breath, no sore throat and no vomiting        Review of Systems   Constitutional:  Negative for chills and fever.   HENT:  Negative for ear pain and sore throat.    Eyes:  Negative for pain and visual disturbance.   Respiratory:  Negative for cough and shortness of breath.    Cardiovascular:  Negative for chest pain and palpitations.   Gastrointestinal:  Positive for abdominal pain. Negative for vomiting.   Genitourinary:  Negative for dysuria and hematuria.   Musculoskeletal:  Negative for arthralgias and back pain.   Skin:  Negative for color change and rash.   Neurological:  Negative for seizures and syncope.   All other systems reviewed and are negative.          Objective     ED Triage Vitals [09/24/24 1212]   Temperature Pulse Blood Pressure Respirations SpO2 Patient Position - Orthostatic VS   98.3 °F (36.8 °C) 105 111/75 18 98 % Sitting      Temp Source Heart Rate Source BP Location FiO2 (%) Pain Score    Oral -- Right arm -- --        Physical Exam  Vitals and nursing note reviewed.   Constitutional:       General: She is not in acute distress.     Appearance: She is well-developed.   HENT:      Head: Normocephalic and atraumatic.   Eyes:      Conjunctiva/sclera: Conjunctivae normal.   Cardiovascular:      Rate and Rhythm: Normal rate and regular rhythm.      Heart sounds: No murmur heard.  Pulmonary:      Effort: Pulmonary effort is normal. No respiratory distress.       Breath sounds: Normal breath sounds.   Abdominal:      Palpations: Abdomen is soft.      Tenderness: There is abdominal tenderness in the periumbilical area.   Musculoskeletal:         General: No swelling.      Cervical back: Neck supple.   Skin:     General: Skin is warm and dry.      Capillary Refill: Capillary refill takes less than 2 seconds.   Neurological:      Mental Status: She is alert.   Psychiatric:         Mood and Affect: Mood normal.         Labs Reviewed - No data to display  No orders to display       Procedures    ED Medication and Procedure Management   Prior to Admission Medications   Prescriptions Last Dose Informant Patient Reported? Taking?   Empagliflozin 25 MG TABS   No No   Sig: Take 1 tablet (25 mg total) by mouth daily   FLUoxetine (PROzac) 40 MG capsule   No No   Sig: Take 1 capsule (40 mg total) by mouth daily   Nutritional Supplements (ENSURE PO)  Self Yes No   Sig: Take 1 Can by mouth daily   OneTouch Ultra test strip  Self Yes No   Patient not taking: Reported on 9/20/2024   QUEtiapine (SEROquel) 400 MG tablet   No No   Sig: Take 2 tablets (800 mg total) by mouth daily at bedtime   Tradjenta 5 MG TABS  Self Yes No   Sig: Take 5 mg by mouth daily   Trulicity 1.5 MG/0.5ML injection  Self Yes No   Patient not taking: Reported on 7/7/2024   Varenicline Tartrate (CHANTIX STARTING MONTH JENNIFER PO)  Self Yes No   Patient not taking: Reported on 7/7/2024   albuterol (PROVENTIL HFA,VENTOLIN HFA) 90 mcg/act inhaler  Self Yes No   Sig: Inhale 2 puffs every 4 (four) hours as needed for wheezing or shortness of breath   Patient not taking: Reported on 6/20/2024   azaTHIOprine (IMURAN) 50 mg tablet  Self Yes No   Sig: Take 100 mg by mouth daily   bisacodyl (DULCOLAX) 5 mg EC tablet   No No   Sig: Take 4 tablets (20 mg total) by mouth once for 1 dose Colonoscopy prep   cyanocobalamin (VITAMIN B-12) 1000 MCG tablet   No No   Sig: Take 1 tablet (1,000 mcg total) by mouth daily   Patient not taking:  Reported on 7/7/2024   dapagliflozin (Farxiga) 10 MG tablet  Self Yes No   Sig: Take 10 mg by mouth daily   ergocalciferol (VITAMIN D2) 50,000 units   No No   Sig: Take 1 capsule (50,000 Units total) by mouth once a week for 8 doses Do not start before June 29, 2024.   Patient not taking: Reported on 7/7/2024   fluticasone (FLONASE) 50 mcg/act nasal spray  Self Yes No   folic acid (FOLVITE) 1 mg tablet   No No   Sig: Take 1 tablet (1 mg total) by mouth daily   Patient not taking: Reported on 7/7/2024   gabapentin (NEURONTIN) 300 mg capsule   No No   Sig: Take 2 capsules (600 mg total) by mouth 3 (three) times a day   ketorolac (TORADOL) 10 mg tablet   No No   Sig: Take 1 tablet (10 mg total) by mouth every 6 (six) hours as needed for moderate pain for up to 5 days   linaCLOtide (Linzess) 72 MCG CAPS  Self Yes No   Sig: Take 1 capsule by mouth daily   mirtazapine (REMERON) 7.5 MG tablet   No No   Sig: Take 1 tablet (7.5 mg total) by mouth daily at bedtime   Patient not taking: Reported on 7/7/2024   nortriptyline (PAMELOR) 50 mg capsule  Self Yes No   Sig: Take 50 mg by mouth daily at bedtime   ondansetron (ZOFRAN-ODT) 4 mg disintegrating tablet  Self No No   Sig: Take 1 tablet (4 mg total) by mouth every 8 (eight) hours as needed for nausea or vomiting   ondansetron (ZOFRAN-ODT) 4 mg disintegrating tablet   No No   Sig: Take 1 tablet (4 mg total) by mouth every 8 (eight) hours as needed for nausea for up to 10 days   pantoprazole (PROTONIX) 40 mg tablet  Self Yes No   Sig: Take 40 mg by mouth daily   polyethylene glycol (MIRALAX) 17 g packet   No No   Sig: Take 238 g by mouth once for 1 dose For bowel prep. Mix in 64 oz of gatorade. Drink 32 oz at the rate of 8 oz/1 glass every 15 mins starting at 5 pm on the evening prior to day of procedure. Drink the remaining 32 oz 5 hours prior to procedure time at at the rate of 8 oz/1 glass every 15 mins until finished.   rizatriptan (MAXALT) 10 mg tablet  Self Yes No   Sig:  Take 10 mg by mouth daily   sitaGLIPtin (JANUVIA) 100 mg tablet   No No   Sig: Take 1 tablet (100 mg total) by mouth daily   topiramate (TOPAMAX) 25 mg tablet   No No   Sig: Take 1 tablet (25 mg total) by mouth 2 (two) times a day   varenicline (Chantix) 1 mg tablet  Self Yes No   Sig: Take 1 mg by mouth 2 (two) times a day   zolpidem (AMBIEN) 10 mg tablet  Self Yes No   Sig: Take 10 mg by mouth daily at bedtime as needed      Facility-Administered Medications: None     Discharge Medication List as of 9/24/2024 12:42 PM        START taking these medications    Details   dicyclomine (BENTYL) 20 mg tablet Take 1 tablet (20 mg total) by mouth 2 (two) times a day, Starting Tue 9/24/2024, Normal      sucralfate (CARAFATE) 1 g/10 mL suspension Take 10 mL (1 g total) by mouth 4 (four) times a day, Starting Tue 9/24/2024, Normal           CONTINUE these medications which have NOT CHANGED    Details   albuterol (PROVENTIL HFA,VENTOLIN HFA) 90 mcg/act inhaler Inhale 2 puffs every 4 (four) hours as needed for wheezing or shortness of breath, Starting Fri 3/29/2024, Historical Med      azaTHIOprine (IMURAN) 50 mg tablet Take 100 mg by mouth daily, Historical Med      bisacodyl (DULCOLAX) 5 mg EC tablet Take 4 tablets (20 mg total) by mouth once for 1 dose Colonoscopy prep, Starting Fri 9/20/2024, Normal      cyanocobalamin (VITAMIN B-12) 1000 MCG tablet Take 1 tablet (1,000 mcg total) by mouth daily, Starting Wed 6/26/2024, Until Sun 8/25/2024, Normal      dapagliflozin (Farxiga) 10 MG tablet Take 10 mg by mouth daily, Starting Wed 7/10/2024, Historical Med      Empagliflozin 25 MG TABS Take 1 tablet (25 mg total) by mouth daily, Starting Wed 6/26/2024, Until Sun 8/25/2024, Normal      ergocalciferol (VITAMIN D2) 50,000 units Take 1 capsule (50,000 Units total) by mouth once a week for 8 doses Do not start before June 29, 2024., Starting Sat 6/29/2024, Until Sun 8/18/2024, Normal      FLUoxetine (PROzac) 40 MG capsule Take 1  capsule (40 mg total) by mouth daily, Starting Wed 6/26/2024, Until Sun 8/25/2024, Normal      fluticasone (FLONASE) 50 mcg/act nasal spray Historical Med      folic acid (FOLVITE) 1 mg tablet Take 1 tablet (1 mg total) by mouth daily, Starting Wed 6/26/2024, Until Sun 8/25/2024, Normal      gabapentin (NEURONTIN) 300 mg capsule Take 2 capsules (600 mg total) by mouth 3 (three) times a day, Starting Tue 6/25/2024, Until Sat 8/24/2024, Normal      ketorolac (TORADOL) 10 mg tablet Take 1 tablet (10 mg total) by mouth every 6 (six) hours as needed for moderate pain for up to 5 days, Starting Wed 7/31/2024, Until Mon 8/5/2024 at 2359, Normal      linaCLOtide (Linzess) 72 MCG CAPS Take 1 capsule by mouth daily, Historical Med      mirtazapine (REMERON) 7.5 MG tablet Take 1 tablet (7.5 mg total) by mouth daily at bedtime, Starting Tue 6/25/2024, Until Sat 8/24/2024, Normal      nortriptyline (PAMELOR) 50 mg capsule Take 50 mg by mouth daily at bedtime, Starting Wed 7/10/2024, Until Thu 7/10/2025, Historical Med      Nutritional Supplements (ENSURE PO) Take 1 Can by mouth daily, Starting Mon 7/8/2024, Historical Med      ondansetron (ZOFRAN-ODT) 4 mg disintegrating tablet Take 1 tablet (4 mg total) by mouth every 8 (eight) hours as needed for nausea or vomiting, Starting Tue 7/30/2024, Print      OneTouch Ultra test strip Historical Med      pantoprazole (PROTONIX) 40 mg tablet Take 40 mg by mouth daily, Historical Med      polyethylene glycol (MIRALAX) 17 g packet Take 238 g by mouth once for 1 dose For bowel prep. Mix in 64 oz of gatorade. Drink 32 oz at the rate of 8 oz/1 glass every 15 mins starting at 5 pm on the evening prior to day of procedure. Drink the remaining 32 oz 5 hours prior to procedure time at at  the rate of 8 oz/1 glass every 15 mins until finished., Starting Fri 9/20/2024, Normal      QUEtiapine (SEROquel) 400 MG tablet Take 2 tablets (800 mg total) by mouth daily at bedtime, Starting Tue 6/25/2024,  Until Sat 8/24/2024, Normal      rizatriptan (MAXALT) 10 mg tablet Take 10 mg by mouth daily, Starting Mon 7/8/2024, Historical Med      sitaGLIPtin (JANUVIA) 100 mg tablet Take 1 tablet (100 mg total) by mouth daily, Starting Wed 6/26/2024, Until Sun 8/25/2024, Normal      topiramate (TOPAMAX) 25 mg tablet Take 1 tablet (25 mg total) by mouth 2 (two) times a day, Starting Tue 6/25/2024, Until Sat 8/24/2024, Normal      Tradjenta 5 MG TABS Take 5 mg by mouth daily, Starting Wed 7/10/2024, Until Thu 7/10/2025, Historical Med      Trulicity 1.5 MG/0.5ML injection Historical Med      varenicline (Chantix) 1 mg tablet Take 1 mg by mouth 2 (two) times a day, Starting Fri 7/26/2024, Historical Med      Varenicline Tartrate (CHANTIX STARTING MONTH PAK PO) Historical Med      zolpidem (AMBIEN) 10 mg tablet Take 10 mg by mouth daily at bedtime as needed, Starting Mon 7/1/2024, Historical Med           No discharge procedures on file.     Martínez Lewis, DO  09/24/24 [id number]

## 2024-09-24 NOTE — TELEPHONE ENCOUNTER
----- Message from Frankie Villanueva MD sent at 9/20/2024  4:13 PM EDT -----  Patient needs EGD and EUS.  Procedures have been ordered.  Patient will need to be on 1 day of clear liquid diet prior to the day of the procedures.  She had delayed gastric emptying.  She is on multiple diabetic medications that may need to be stopped at a certain date prior to the procedure as well.  Please kindly help schedule within 4 weeks.

## 2024-09-30 ENCOUNTER — PATIENT MESSAGE (OUTPATIENT)
Dept: GASTROENTEROLOGY | Facility: CLINIC | Age: 59
End: 2024-09-30

## 2024-09-30 ENCOUNTER — OFFICE VISIT (OUTPATIENT)
Dept: SURGERY | Facility: CLINIC | Age: 59
End: 2024-09-30
Payer: COMMERCIAL

## 2024-09-30 ENCOUNTER — TELEPHONE (OUTPATIENT)
Age: 59
End: 2024-09-30

## 2024-09-30 VITALS
RESPIRATION RATE: 16 BRPM | WEIGHT: 103 LBS | DIASTOLIC BLOOD PRESSURE: 80 MMHG | TEMPERATURE: 97.6 F | HEART RATE: 100 BPM | OXYGEN SATURATION: 99 % | HEIGHT: 62 IN | BODY MASS INDEX: 18.95 KG/M2 | SYSTOLIC BLOOD PRESSURE: 142 MMHG

## 2024-09-30 DIAGNOSIS — R10.9 CHRONIC ABDOMINAL PAIN: ICD-10-CM

## 2024-09-30 DIAGNOSIS — Z72.0 TOBACCO USER: ICD-10-CM

## 2024-09-30 DIAGNOSIS — Z12.11 COLON CANCER SCREENING: ICD-10-CM

## 2024-09-30 DIAGNOSIS — Z98.84 S/P GASTRIC BYPASS: ICD-10-CM

## 2024-09-30 DIAGNOSIS — G89.29 CHRONIC ABDOMINAL PAIN: ICD-10-CM

## 2024-09-30 DIAGNOSIS — L72.3 SEBACEOUS CYST: ICD-10-CM

## 2024-09-30 DIAGNOSIS — E11.9 TYPE 2 DIABETES MELLITUS WITHOUT COMPLICATION, WITHOUT LONG-TERM CURRENT USE OF INSULIN (HCC): Primary | ICD-10-CM

## 2024-09-30 PROCEDURE — 99243 OFF/OP CNSLTJ NEW/EST LOW 30: CPT | Performed by: PHYSICIAN ASSISTANT

## 2024-09-30 RX ORDER — BISMUTH SUBSALICYLATE 262 MG/1
524 TABLET, CHEWABLE ORAL
COMMUNITY
Start: 2024-09-03

## 2024-09-30 RX ORDER — AMITRIPTYLINE HYDROCHLORIDE 50 MG/1
50 TABLET ORAL EVERY EVENING
COMMUNITY
Start: 2024-08-12

## 2024-09-30 RX ORDER — BISACODYL 5 MG/1
TABLET, DELAYED RELEASE ORAL
Qty: 4 TABLET | Refills: 0 | Status: SHIPPED | OUTPATIENT
Start: 2024-09-30

## 2024-09-30 RX ORDER — ASPIRIN 81 MG/1
81 TABLET, COATED ORAL DAILY
COMMUNITY
Start: 2024-08-26

## 2024-09-30 RX ORDER — BUPRENORPHINE HYDROCHLORIDE AND NALOXONE HYDROCHLORIDE DIHYDRATE 2; .5 MG/1; MG/1
1 TABLET SUBLINGUAL 3 TIMES DAILY
COMMUNITY
Start: 2024-09-25

## 2024-09-30 RX ORDER — AMMONIUM LACTATE 12 G/100G
LOTION TOPICAL DAILY
COMMUNITY
Start: 2024-09-17

## 2024-09-30 RX ORDER — POLYETHYLENE GLYCOL 3350 17 G/17G
238 POWDER, FOR SOLUTION ORAL ONCE
Qty: 238 G | Refills: 0 | Status: SHIPPED | OUTPATIENT
Start: 2024-09-30 | End: 2024-09-30

## 2024-09-30 NOTE — PROGRESS NOTES
"Assessment/Plan:   Edith Klein is a 59 y.o.female who is here for   Chief Complaint   Patient presents with    Abdominal Pain     Patient is here today following a ER visit a US was done a Sebaceous cyst was found in her abdomen.          Plan: I did not feel a distinct cyst today on examination where patient states she had the cyst. No hernia felt on examination from prior port sites and none seen on CT imaging. If cyst returns patient may come back to office to discuss removal. Also stated we could do a follow up US in 3-6 months but will deferred by patient due to no cyst felt on examination today. Patient's abdominal pain seems to stem from gastric as gallbladder had no gallstones. If EGD is normal GI could move forward with a HIDA to ensure no gallbladder dyskinesia. Encouraged patient t keep EGD and colonoscopy appointment. Stated she should stay away from fried, spicy, acidic, coffee foods.         _______________________________________________________  CC:Abdominal Pain (Patient is here today following a ER visit a US was done a Sebaceous cyst was found in her abdomen. )  .    HPI:  Edith Klein is a 59 y.o.female who was referred for evaluation of Abdominal Pain (Patient is here today following a ER visit a US was done a Sebaceous cyst was found in her abdomen. )  .    Currently patient reports she thought she was here for a colonoscopy and EGD, but she was here for a referral for sebaceous cyst. Patient states she was having a lump on the RUQ that is now gone and not causing pain. She states she noticed the lump 2-3 weeks ago. It was nor red but was painful. Now she can not feel it. Patient states Us showed a lump. Patient also states she is having severe epigastric and RUQ pain. No gallstones on US. Patient has not had HIDA. Patient has a colonoscopy and EGD scheduled for 10/9/24 with GI. CT 9/22 showed \"Arsh-en-Y gastric bypass with marked inflammatory thickening of the   gastrojejunostomy and remnant " "stomach.\" She also states she has had unintentional weight loss.     US 9/30/24:  8 x 4 x 8 mm complex but early solid hypoechoic nodule within the ventral   abdominal wall in the region of clinical concern which is nonspecific but may   reflect a sebaceous cyst. Ultrasound guided sampling can be performed for   further evaluation if there is a clinical concern.       A1c 7.1 three months ago.     ROS:  General ROS: negative  negative for - chills, fatigue, fever or night sweats, weight loss  Respiratory ROS: no cough, shortness of breath, or wheezing  Cardiovascular ROS: no chest pain or dyspnea on exertion  Genito-Urinary ROS: no dysuria, trouble voiding, or hematuria  Musculoskeletal ROS: negative for - gait disturbance, joint pain or muscle pain  Neurological ROS: no TIA or stroke symptoms  Skin ROS: See HPI  GI ROS: see HPI  Skin ROS: no new rashes or lesions   Lymphatic ROS: no new adenopathy noted by pt.   Psy ROS: no new mental or behavioral disturbances       Patient Active Problem List   Diagnosis    Nephrolithiasis    Arthritis    Autoimmune hepatitis (HCC)    Bipolar affective disorder (HCC)    COPD (chronic obstructive pulmonary disease) (HCC)    Hypertension    Type 2 diabetes mellitus without complication (HCC)    S/P gastric bypass    Dysuria    Abnormal CT of the head    Radiculopathy, cervical    Abnormal drug screen    Unresponsiveness    Acute metabolic encephalopathy    Headache    Closed nondisplaced fracture of proximal phalanx of left great toe    Dependence on nicotine from cigarettes    QT prolongation    Toxic encephalopathy    Bipolar affective disorder, depressed, severe (HCC)    Diabetes (HCC)    Flank pain    Irritable bowel syndrome    Late effect of intracranial injury (HCC)    Left breast mass    Long term current use of antipsychotic medication    Memory loss    Menopausal symptoms    Migraine with aura and without status migrainosus, not intractable    Mild intermittent asthma " without complication    Needle phobia    Pain of right breast    Tobacco user    Chronic right shoulder pain    Fibromyalgia    Personal history of fall    Vitamin D deficiency    Severe protein-calorie malnutrition (HCC)    Right upper quadrant abdominal pain         Allergies:  Patient has no known allergies.      Current Outpatient Medications:     albuterol (PROVENTIL HFA,VENTOLIN HFA) 90 mcg/act inhaler, Inhale 2 puffs every 4 (four) hours as needed for wheezing or shortness of breath, Disp: , Rfl:     amitriptyline (ELAVIL) 25 mg tablet, Take 25 mg by mouth daily at bedtime, Disp: , Rfl:     amitriptyline (ELAVIL) 50 mg tablet, Take 50 mg by mouth every evening, Disp: , Rfl:     ammonium lactate (LAC-HYDRIN) 12 % lotion, Apply topically daily, Disp: , Rfl:     Aspirin Low Dose 81 MG EC tablet, Take 81 mg by mouth daily, Disp: , Rfl:     azaTHIOprine (IMURAN) 50 mg tablet, Take 100 mg by mouth daily, Disp: , Rfl:     bismuth subsalicylate (PEPTO BISMOL) 262 MG chewable tablet, Chew 524 mg every hour as needed, Disp: , Rfl:     buprenorphine-naloxone (SUBOXONE) 2-0.5 mg per SL tablet, Place 1 tablet under the tongue Three times a day, Disp: , Rfl:     dicyclomine (BENTYL) 20 mg tablet, Take 1 tablet (20 mg total) by mouth 2 (two) times a day, Disp: 20 tablet, Rfl: 0    Empagliflozin 25 MG TABS, Take 1 tablet (25 mg total) by mouth daily, Disp: 30 tablet, Rfl: 1    FLUoxetine (PROzac) 40 MG capsule, Take 1 capsule (40 mg total) by mouth daily, Disp: 30 capsule, Rfl: 1    gabapentin (NEURONTIN) 300 mg capsule, Take 2 capsules (600 mg total) by mouth 3 (three) times a day, Disp: 180 capsule, Rfl: 1    linaCLOtide (Linzess) 72 MCG CAPS, Take 1 capsule by mouth daily, Disp: , Rfl:     mirtazapine (REMERON) 7.5 MG tablet, Take 1 tablet (7.5 mg total) by mouth daily at bedtime, Disp: 30 tablet, Rfl: 1    Nutritional Supplements (ENSURE PO), Take 1 Can by mouth daily, Disp: , Rfl:     ondansetron (ZOFRAN-ODT) 4 mg  disintegrating tablet, Take 1 tablet (4 mg total) by mouth every 8 (eight) hours as needed for nausea or vomiting, Disp: 20 tablet, Rfl: 0    ondansetron (ZOFRAN-ODT) 4 mg disintegrating tablet, Take 1 tablet (4 mg total) by mouth every 8 (eight) hours as needed for nausea for up to 10 days, Disp: 20 tablet, Rfl: 0    OneTouch Ultra test strip, , Disp: , Rfl:     pantoprazole (PROTONIX) 40 mg tablet, Take 40 mg by mouth daily, Disp: , Rfl:     QUEtiapine (SEROquel) 400 MG tablet, Take 2 tablets (800 mg total) by mouth daily at bedtime, Disp: 60 tablet, Rfl: 1    rizatriptan (MAXALT) 10 mg tablet, Take 10 mg by mouth daily, Disp: , Rfl:     sitaGLIPtin (JANUVIA) 100 mg tablet, Take 1 tablet (100 mg total) by mouth daily, Disp: 30 tablet, Rfl: 1    tiZANidine (ZANAFLEX) 4 mg tablet, Take 6 mg by mouth every 6 (six) hours as needed, Disp: , Rfl:     varenicline (Chantix) 1 mg tablet, Take 1 mg by mouth 2 (two) times a day, Disp: , Rfl:     zolpidem (AMBIEN) 10 mg tablet, Take 10 mg by mouth daily at bedtime as needed, Disp: , Rfl:     bisacodyl (DULCOLAX) 5 mg EC tablet, Take 4 tablets (20 mg total) by mouth once for 1 dose Colonoscopy prep (Patient not taking: Reported on 9/30/2024), Disp: 4 tablet, Rfl: 0    cyanocobalamin (VITAMIN B-12) 1000 MCG tablet, Take 1 tablet (1,000 mcg total) by mouth daily (Patient not taking: Reported on 7/7/2024), Disp: 30 tablet, Rfl: 1    dapagliflozin (Farxiga) 10 MG tablet, Take 10 mg by mouth daily (Patient not taking: Reported on 9/30/2024), Disp: , Rfl:     ergocalciferol (VITAMIN D2) 50,000 units, Take 1 capsule (50,000 Units total) by mouth once a week for 8 doses Do not start before June 29, 2024. (Patient not taking: Reported on 7/7/2024), Disp: 4 capsule, Rfl: 0    fluticasone (FLONASE) 50 mcg/act nasal spray, , Disp: , Rfl:     folic acid (FOLVITE) 1 mg tablet, Take 1 tablet (1 mg total) by mouth daily (Patient not taking: Reported on 7/7/2024), Disp: 30 tablet, Rfl: 1     ketorolac (TORADOL) 10 mg tablet, Take 1 tablet (10 mg total) by mouth every 6 (six) hours as needed for moderate pain for up to 5 days (Patient not taking: Reported on 9/30/2024), Disp: 20 tablet, Rfl: 0    nortriptyline (PAMELOR) 50 mg capsule, Take 50 mg by mouth daily at bedtime, Disp: , Rfl:     polyethylene glycol (MIRALAX) 17 g packet, Take 238 g by mouth once for 1 dose For bowel prep. Mix in 64 oz of gatorade. Drink 32 oz at the rate of 8 oz/1 glass every 15 mins starting at 5 pm on the evening prior to day of procedure. Drink the remaining 32 oz 5 hours prior to procedure time at at the rate of 8 oz/1 glass every 15 mins until finished. (Patient not taking: Reported on 9/30/2024), Disp: 238 g, Rfl: 0    sucralfate (CARAFATE) 1 g/10 mL suspension, Take 10 mL (1 g total) by mouth 4 (four) times a day (Patient not taking: Reported on 9/30/2024), Disp: 473 mL, Rfl: 0    topiramate (TOPAMAX) 25 mg tablet, Take 1 tablet (25 mg total) by mouth 2 (two) times a day (Patient not taking: Reported on 9/30/2024), Disp: 60 tablet, Rfl: 1    Tradjenta 5 MG TABS, Take 5 mg by mouth daily (Patient not taking: Reported on 9/30/2024), Disp: , Rfl:     Trulicity 1.5 MG/0.5ML injection, , Disp: , Rfl:     Varenicline Tartrate (CHANTIX STARTING MONTH JENNIFER PO), , Disp: , Rfl:     Past Medical History:   Diagnosis Date    Autoimmune hepatitis (HCC)     Bipolar 1 disorder (HCC)     Chronic headaches 2021    Diabetes mellitus (HCC)     Kidney stone     Renal disorder     kidney stones    Seizure disorder (HCC)        Past Surgical History:   Procedure Laterality Date    BARIATRIC SURGERY  05/2017    BUNIONECTOMY      COLONOSCOPY      CYSTOSCOPY  07/12/2018    Stent Removal    FL RETROGRADE PYELOGRAM  06/16/2022    GASTRIC BYPASS  05/22/2017    HIATAL HERNIA REPAIR  05/22/2017    HYSTERECTOMY      JOINT REPLACEMENT      KIDNEY STONE SURGERY      [place] CYSTO/URETERO W/LITHOTRIPSY &INDWELL STENT INSRT Bilateral 07/06/2018    Procedure:  CYSTOSCOPY GALDINO. URETEROSCOPY;GALDINO.  RETROGRADE PYELOGRAM AND INSERTION GALDINO.STENT URETERAL;  Surgeon: Jacob Gerber MD;  Location: [facility];  Service: Urology    AL CYSTO/URETERO W/LITHOTRIPSY &INDWELL STENT INSRT Left 06/16/2022    Procedure: CYSTOSCOPY URETEROSCOPY WITH LITHOTRIPSY HOLMIUM LASER, RETROGRADE PYELOGRAM AND INSERTION STENT URETERAL;  Surgeon: Asad Geiger MD;  Location: [facility];  Service: Urology    TOTAL SHOULDER REPLACEMENT  2002    VAGINAL HYSTERECTOMY      PARTIAL       Family History   Problem Relation Age of Onset    Diabetes Father     Heart disease Father     Diabetes Mother     Heart disease Mother     Diabetes Sister     Diabetes Family         MELLITUS    Depression Family     Mental illness Family     Obesity Family     Osteoarthritis Family         reports that she has been smoking cigarettes. She has a 7.5 pack-year smoking history. She has been exposed to tobacco smoke. She has never used smokeless tobacco. She reports that she does not drink alcohol and does not use drugs.    Vitals:    09/30/24 0826   BP: 142/80   Pulse: 100   Resp: 16   Temp: 97.6 °F (36.4 °C)   SpO2: 99%        PHYSICAL EXAM  General Appearance:    Alert, cooperative, no distress,    Head:    Normocephalic without obvious abnormality   Eyes:    PERRL, conjunctiva/corneas clear     Neck:   Supple, no adenopathy, no JVD   Back:     Symmetric, no spinal or CVA tenderness   Lungs:     Clear to auscultation bilaterally, no wheezing or rhonchi   Heart:    Regular rate and rhythm, S1 and S2 normal, no murmur   Abdomen:     Benign, no rebound or guarding.    Extremities:   Extremities normal. No clubbing, cyanosis or edema   Psych:   Normal Affect, AOx3.    Neurologic:  Skin:   CNII-XII intact. Strength symmetric, speech intact    Warm, dry, intact, no visible rashes or lesions except as follows: no cyst felt in the RUQ. No significant abdominal pain in all quadrants.                  Lissett Israel,  TERESA    Date: 9/30/2024 Time: 9:10 AM

## 2024-09-30 NOTE — TELEPHONE ENCOUNTER
Patients GI provider:  Dr. Villanueva    Number to return call: [phone]    Reason for call: GEN Surg calling requesting a new order be placed for colonoscopy Prep. Pt had taken the prep thinking her gen surg appointment was the colonoscopy.    Scheduled procedure/appointment date if applicable: Apt 1/6/25 procedure 10/9/24

## 2024-10-09 ENCOUNTER — TELEPHONE (OUTPATIENT)
Age: 59
End: 2024-10-09

## 2024-10-09 DIAGNOSIS — Z12.11 COLON CANCER SCREENING: Primary | ICD-10-CM

## 2024-10-09 RX ORDER — SODIUM CHLORIDE, SODIUM LACTATE, POTASSIUM CHLORIDE, CALCIUM CHLORIDE 600; 310; 30; 20 MG/100ML; MG/100ML; MG/100ML; MG/100ML
125 INJECTION, SOLUTION INTRAVENOUS CONTINUOUS
OUTPATIENT
Start: 2024-10-09

## 2024-10-09 RX ORDER — BISACODYL 5 MG/1
TABLET, DELAYED RELEASE ORAL
Qty: 4 TABLET | Refills: 0 | Status: SHIPPED | OUTPATIENT
Start: 2024-10-09

## 2024-10-09 NOTE — TELEPHONE ENCOUNTER
Called and left a message for the patient stating that she should do a 2 day golytely prep and that prep instructions were sent over to the [facility].

## 2024-10-09 NOTE — TELEPHONE ENCOUNTER
Patients GI provider:  Dr. Villanueva    Number to return call: [phone]    Reason for call: Pt called had to reschedule colonoscopy scheduled 10/9/2024 due to poor prep. Pt needs a different prep.Pt's pharmacy [address] Pt will prep instructions sent.    Scheduled procedure/appointment date if applicable: 10/30/2024

## 2024-10-29 RX ORDER — SODIUM CHLORIDE, SODIUM LACTATE, POTASSIUM CHLORIDE, CALCIUM CHLORIDE 600; 310; 30; 20 MG/100ML; MG/100ML; MG/100ML; MG/100ML
125 INJECTION, SOLUTION INTRAVENOUS CONTINUOUS
OUTPATIENT
Start: 2024-10-29

## 2024-10-30 ENCOUNTER — TELEPHONE (OUTPATIENT)
Dept: GASTROENTEROLOGY | Facility: HOSPITAL | Age: 59
End: 2024-10-30

## 2024-10-30 ENCOUNTER — ANESTHESIA (OUTPATIENT)
Dept: ANESTHESIOLOGY | Facility: HOSPITAL | Age: 59
End: 2024-10-30

## 2024-10-30 ENCOUNTER — ANESTHESIA EVENT (OUTPATIENT)
Dept: ANESTHESIOLOGY | Facility: HOSPITAL | Age: 59
End: 2024-10-30

## 2024-10-30 NOTE — TELEPHONE ENCOUNTER
Patient called in to [facility] she stated she had cancelled this appt yesterday kristina to 11/4

## 2024-10-31 ENCOUNTER — TELEPHONE (OUTPATIENT)
Dept: GASTROENTEROLOGY | Facility: MEDICAL CENTER | Age: 59
End: 2024-10-31

## 2024-11-04 ENCOUNTER — TELEPHONE (OUTPATIENT)
Dept: OTHER | Facility: OTHER | Age: 59
End: 2024-11-04

## 2024-11-04 NOTE — TELEPHONE ENCOUNTER
Pt called, initially to cancel her appointment for today as she says she has not had much stool.  Pt has been compliant with prep and states leading up to the procedure she had been vomiting often.  Pt's appointment is at 0945.  Advised pt that if she has been compliant with prep, she may still have bowel movements forthcoming prior to the procedure, and encouraged pt to keep appointment today.  Pt agrees and states she will be at appointment at 0915 as advised by pre-op staff.

## 2024-11-04 NOTE — TELEPHONE ENCOUNTER
Pt calling because she states she just started having bowel movements about an hour and a half ago. Transferred call to Hannah at [place]

## 2024-11-05 DIAGNOSIS — Z12.11 COLON CANCER SCREENING: ICD-10-CM

## 2024-11-05 RX ORDER — BISACODYL 5 MG/1
TABLET, DELAYED RELEASE ORAL
Qty: 4 TABLET | Refills: 0 | Status: SHIPPED | OUTPATIENT
Start: 2024-11-05

## 2024-11-05 NOTE — TELEPHONE ENCOUNTER
Scheduled date of colonoscopy (as of today): 12/11/2024  Physician performing colonoscopy: DR MITCHELL  Location of colonoscopy: [facility]  Bowel prep reviewed with patient: 2 DAY GOLYTELY  Instructions reviewed with patient by: Previously reviewed with pt. Sent 2 day Golytely prep directions and diabetic instructions via [employer].   Clearances: Diabetic-Januvia, Jardiance

## 2024-11-05 NOTE — TELEPHONE ENCOUNTER
Patient scheduled 12/11/2024 for Colonoscopy. Patient is requesting Golytely, Miralax, and dulcolax to be called into pharmacy for 2 day golytely prep.  Patient states she will pay for the over the counter medications if insurance does not pay for medication.

## 2024-11-06 ENCOUNTER — TELEPHONE (OUTPATIENT)
Dept: OTHER | Facility: OTHER | Age: 59
End: 2024-11-06

## 2024-11-06 NOTE — TELEPHONE ENCOUNTER
Patient is calling because she would like to establish care with a new primary care provider for a second opinion.  She reports that she has some chronic health issues to discuss with a PCP.    New patient appointment scheduled on 11/11/2024 at 10 AM with Farnaz Barker.  Provided address and phone number of office.    Please contact patient if the appointment needs to be rescheduled to a further future date.  Thank you!

## 2024-11-07 ENCOUNTER — TELEPHONE (OUTPATIENT)
Age: 59
End: 2024-11-07

## 2024-11-11 ENCOUNTER — TELEPHONE (OUTPATIENT)
Dept: ADMINISTRATIVE | Facility: OTHER | Age: 59
End: 2024-11-11

## 2024-11-11 NOTE — TELEPHONE ENCOUNTER
Upon review of the In Basket request and the patient's chart, initial outreach has been made via fax to facility. Please see Contacts section for details.     Thank you  Mandi Gonzalez MA

## 2024-11-11 NOTE — TELEPHONE ENCOUNTER
----- Message from Maricel STEELE sent at 11/11/2024  6:51 AM EST -----  Regarding: Care Gap Request  11/11/24 6:51 AM    Hello, our patient attached above has had CRC: Colonoscopy and Diabetic Eye Exam completed/performed. Please assist in updating the patient chart by pulling the Care Everywhere (CE) document. The date of service is 09/20/19 and 08/28/23.     Thank you,  Maricel Reynoso PG  [facility] [place]

## 2024-11-11 NOTE — TELEPHONE ENCOUNTER
Upon review of the In Basket request we were able to locate, review, and update the patient chart as requested for CRC: Colonoscopy.    DM Eye Exam has result attachment that is not available, unable to to close care gap. Fax sent to performing provider requesting DM Eye Exam form to be completed, chart will be updated once a response is received.     Any additional questions or concerns should be emailed to the Practice Liaisons via the appropriate education email address, please do not reply via In Basket.    Thank you  Mandi Gonzalez MA   [facility]

## 2024-11-11 NOTE — LETTER
Diabetic Eye Exam Form    Date Requested: 24  Patient: Edith Klein  Patient : [id number]   Referring Provider: Deana Higginbotham PA-C      DIABETIC Eye Exam Date _______________________________      Type of Exam MUST be documented for Diabetic Eye Exams. Please CHECK ONE.     Retinal Exam       Dilated Retinal Exam       OCT       Optomap-Iris Exam      Fundus Photography       Left Eye - Please check Retinopathy or No Retinopathy        Exam did show retinopathy    Exam did not show retinopathy       Right Eye - Please check Retinopathy or No Retinopathy       Exam did show retinopathy    Exam did not show retinopathy       Comments __________________________________________________________    Practice Providing Exam ______________________________________________    Exam Performed By (print name) _______________________________________      Provider Signature ___________________________________________________      These reports are needed for  compliance.  Please fax this completed form and a copy of the Diabetic Eye Exam report to our office located at [address] [place] [address] as soon as possible via Fax [phone] attention Mandi: Phone [phone]  We thank you for your assistance in treating our mutual patient.

## 2024-11-12 ENCOUNTER — HOSPITAL ENCOUNTER (EMERGENCY)
Facility: HOSPITAL | Age: 59
Discharge: HOME | End: 2024-11-12
Attending: INTERNAL MEDICINE
Payer: COMMERCIAL

## 2024-11-12 ENCOUNTER — APPOINTMENT (EMERGENCY)
Dept: RADIOLOGY | Facility: HOSPITAL | Age: 59
End: 2024-11-12
Payer: COMMERCIAL

## 2024-11-12 ENCOUNTER — APPOINTMENT (EMERGENCY)
Dept: CT IMAGING | Facility: HOSPITAL | Age: 59
End: 2024-11-12
Payer: COMMERCIAL

## 2024-11-12 VITALS
BODY MASS INDEX: 16.57 KG/M2 | DIASTOLIC BLOOD PRESSURE: 57 MMHG | WEIGHT: 90.61 LBS | HEART RATE: 94 BPM | SYSTOLIC BLOOD PRESSURE: 111 MMHG | TEMPERATURE: 98.6 F | RESPIRATION RATE: 17 BRPM | OXYGEN SATURATION: 96 %

## 2024-11-12 DIAGNOSIS — R10.9 ABDOMINAL PAIN: Primary | ICD-10-CM

## 2024-11-12 DIAGNOSIS — R93.5 ABNORMAL CT OF THE ABDOMEN: ICD-10-CM

## 2024-11-12 LAB
ALBUMIN SERPL BCG-MCNC: 3.3 G/DL (ref 3.5–5)
ALP SERPL-CCNC: 185 U/L (ref 34–104)
ALT SERPL W P-5'-P-CCNC: 8 U/L (ref 7–52)
ANION GAP SERPL CALCULATED.3IONS-SCNC: 8 MMOL/L (ref 4–13)
AST SERPL W P-5'-P-CCNC: 16 U/L (ref 13–39)
ATRIAL RATE: 99 BPM
BACTERIA UR QL AUTO: ABNORMAL /HPF
BASOPHILS # BLD AUTO: 0.04 THOUSANDS/ÂΜL (ref 0–0.1)
BASOPHILS NFR BLD AUTO: 1 % (ref 0–1)
BILIRUB SERPL-MCNC: 0.46 MG/DL (ref 0.2–1)
BILIRUB UR QL STRIP: NEGATIVE
BUN SERPL-MCNC: 21 MG/DL (ref 5–25)
CALCIUM ALBUM COR SERPL-MCNC: 9.2 MG/DL (ref 8.3–10.1)
CALCIUM SERPL-MCNC: 8.6 MG/DL (ref 8.4–10.2)
CARDIAC TROPONIN I PNL SERPL HS: 5 NG/L
CHLORIDE SERPL-SCNC: 99 MMOL/L (ref 96–108)
CLARITY UR: CLEAR
CO2 SERPL-SCNC: 27 MMOL/L (ref 21–32)
COLOR UR: ABNORMAL
CREAT SERPL-MCNC: 0.45 MG/DL (ref 0.6–1.3)
EOSINOPHIL # BLD AUTO: 0.03 THOUSAND/ÂΜL (ref 0–0.61)
EOSINOPHIL NFR BLD AUTO: 0 % (ref 0–6)
ERYTHROCYTE [DISTWIDTH] IN BLOOD BY AUTOMATED COUNT: 16.1 % (ref 11.6–15.1)
GFR SERPL CREATININE-BSD FRML MDRD: 111 ML/MIN/1.73SQ M
GLUCOSE SERPL-MCNC: 118 MG/DL (ref 65–140)
GLUCOSE UR STRIP-MCNC: ABNORMAL MG/DL
HCT VFR BLD AUTO: 38.2 % (ref 34.8–46.1)
HGB BLD-MCNC: 12.8 G/DL (ref 11.5–15.4)
HGB UR QL STRIP.AUTO: NEGATIVE
IMM GRANULOCYTES # BLD AUTO: 0.02 THOUSAND/UL (ref 0–0.2)
IMM GRANULOCYTES NFR BLD AUTO: 0 % (ref 0–2)
KETONES UR STRIP-MCNC: ABNORMAL MG/DL
LEUKOCYTE ESTERASE UR QL STRIP: NEGATIVE
LIPASE SERPL-CCNC: 10 U/L (ref 11–82)
LYMPHOCYTES # BLD AUTO: 1.21 THOUSANDS/ÂΜL (ref 0.6–4.47)
LYMPHOCYTES NFR BLD AUTO: 14 % (ref 14–44)
MCH RBC QN AUTO: 31.9 PG (ref 26.8–34.3)
MCHC RBC AUTO-ENTMCNC: 33.5 G/DL (ref 31.4–37.4)
MCV RBC AUTO: 95 FL (ref 82–98)
MONOCYTES # BLD AUTO: 0.6 THOUSAND/ÂΜL (ref 0.17–1.22)
MONOCYTES NFR BLD AUTO: 7 % (ref 4–12)
NEUTROPHILS # BLD AUTO: 6.9 THOUSANDS/ÂΜL (ref 1.85–7.62)
NEUTS SEG NFR BLD AUTO: 78 % (ref 43–75)
NITRITE UR QL STRIP: NEGATIVE
NON-SQ EPI CELLS URNS QL MICRO: ABNORMAL /HPF
NRBC BLD AUTO-RTO: 0 /100 WBCS
P AXIS: 61 DEGREES
PH UR STRIP.AUTO: 6 [PH]
PLATELET # BLD AUTO: 441 THOUSANDS/UL (ref 149–390)
PMV BLD AUTO: 9 FL (ref 8.9–12.7)
POTASSIUM SERPL-SCNC: 3.8 MMOL/L (ref 3.5–5.3)
PR INTERVAL: 138 MS
PROT SERPL-MCNC: 7.2 G/DL (ref 6.4–8.4)
PROT UR STRIP-MCNC: ABNORMAL MG/DL
QRS AXIS: -17 DEGREES
QRSD INTERVAL: 84 MS
QT INTERVAL: 386 MS
QTC INTERVAL: 496 MS
RBC # BLD AUTO: 4.01 MILLION/UL (ref 3.81–5.12)
RBC #/AREA URNS AUTO: ABNORMAL /HPF
SODIUM SERPL-SCNC: 134 MMOL/L (ref 135–147)
SP GR UR STRIP.AUTO: 1.02 (ref 1–1.04)
T WAVE AXIS: 42 DEGREES
UROBILINOGEN UA: 1 MG/DL
VENTRICULAR RATE: 99 BPM
WBC # BLD AUTO: 8.8 THOUSAND/UL (ref 4.31–10.16)
WBC #/AREA URNS AUTO: ABNORMAL /HPF

## 2024-11-12 PROCEDURE — 93010 ELECTROCARDIOGRAM REPORT: CPT | Performed by: INTERNAL MEDICINE

## 2024-11-12 PROCEDURE — 81003 URINALYSIS AUTO W/O SCOPE: CPT | Performed by: INTERNAL MEDICINE

## 2024-11-12 PROCEDURE — 84484 ASSAY OF TROPONIN QUANT: CPT | Performed by: INTERNAL MEDICINE

## 2024-11-12 PROCEDURE — 83690 ASSAY OF LIPASE: CPT | Performed by: INTERNAL MEDICINE

## 2024-11-12 PROCEDURE — 80053 COMPREHEN METABOLIC PANEL: CPT | Performed by: INTERNAL MEDICINE

## 2024-11-12 PROCEDURE — 99285 EMERGENCY DEPT VISIT HI MDM: CPT

## 2024-11-12 PROCEDURE — 81001 URINALYSIS AUTO W/SCOPE: CPT | Performed by: INTERNAL MEDICINE

## 2024-11-12 PROCEDURE — 71045 X-RAY EXAM CHEST 1 VIEW: CPT

## 2024-11-12 PROCEDURE — 93005 ELECTROCARDIOGRAM TRACING: CPT

## 2024-11-12 PROCEDURE — 85025 COMPLETE CBC W/AUTO DIFF WBC: CPT | Performed by: INTERNAL MEDICINE

## 2024-11-12 PROCEDURE — 36415 COLL VENOUS BLD VENIPUNCTURE: CPT | Performed by: INTERNAL MEDICINE

## 2024-11-12 PROCEDURE — 96374 THER/PROPH/DIAG INJ IV PUSH: CPT

## 2024-11-12 PROCEDURE — 96361 HYDRATE IV INFUSION ADD-ON: CPT

## 2024-11-12 PROCEDURE — 74177 CT ABD & PELVIS W/CONTRAST: CPT

## 2024-11-12 PROCEDURE — 99285 EMERGENCY DEPT VISIT HI MDM: CPT | Performed by: INTERNAL MEDICINE

## 2024-11-12 RX ORDER — KETOROLAC TROMETHAMINE 30 MG/ML
15 INJECTION, SOLUTION INTRAMUSCULAR; INTRAVENOUS ONCE
Status: COMPLETED | OUTPATIENT
Start: 2024-11-12 | End: 2024-11-12

## 2024-11-12 RX ORDER — ONDANSETRON 4 MG/1
4 TABLET, ORALLY DISINTEGRATING ORAL ONCE
Status: COMPLETED | OUTPATIENT
Start: 2024-11-12 | End: 2024-11-12

## 2024-11-12 RX ADMIN — KETOROLAC TROMETHAMINE 15 MG: 30 INJECTION, SOLUTION INTRAMUSCULAR; INTRAVENOUS at 13:40

## 2024-11-12 RX ADMIN — IOHEXOL 80 ML: 350 INJECTION, SOLUTION INTRAVENOUS at 14:39

## 2024-11-12 RX ADMIN — ONDANSETRON 4 MG: 4 TABLET, ORALLY DISINTEGRATING ORAL at 13:40

## 2024-11-12 RX ADMIN — SODIUM CHLORIDE 1000 ML: 0.9 INJECTION, SOLUTION INTRAVENOUS at 13:40

## 2024-11-12 NOTE — ED PROVIDER NOTES
Time reflects when diagnosis was documented in both MDM as applicable and the Disposition within this note       Time User Action Codes Description Comment    11/12/2024  3:43 PM Davina Douglass Add [R10.9] Abdominal pain     11/12/2024  3:43 PM Davina Douglass [R93.5] Abnormal CT of the abdomen           ED Disposition       ED Disposition   AMA    Condition   --    Date/Time   Tue Nov 12, 2024  3:44 PM    Comment   Date: 11/12/2024  Patient: Edith Klein  Admitted: 11/12/2024 12:36 PM  Attending Provider: Davina Douglass MD    Edith Klein or her authorized caregiver has made the decision for the patient to leave the emergency department against the advice of h er attending physician. She or her authorized caregiver has been informed and understands the inherent risks, including death, multiorgan failure, cardiac arrest, respiratory arrest, bowel obstruction, bowel ischemia.  She or her authorized caregiver  has decided to accept the responsibility for this decision. Edith Klein and all necessary parties have been advised that she may return for further evaluation or treatment. Her condition at time of discharge was stable.  Edith Klein had current vi natasha signs as follows:  /57 (BP Location: Left arm)   Pulse 94   Temp 98.6 °F (37 °C) (Oral)   Resp 17   Wt 41.1 kg (90 lb 9.7 oz)                Assessment & Plan       Medical Decision Making  Work-up to include blood work, CBC as marker of infectivity, hemoconcentration for hydration status, CMP to check for electrolytes, renal function, liver function, Lipase to check for pancreatitis, CT scan to evaluate for potential intra-abdominal surgical pathology. EKG and troponin to evaluate for potential ACS component.    Edith Klein insists on leaving against medical advice, despite my recommendation to remain for ongoing treatment.  Edith states she has to go  her grandson so she must leave now.  She also states she feels better is in no longer  needs to be here.  I printed off the CT findings and abnormalities and discussed them in depth with her.  She is aware that I am talking to a surgeon regarding her findings and that she will likely need further intervention.  Despite these facts, Edith chooses to leave AGAINST MEDICAL ADVICE.    1: Capacity: I have determined that the patient has capacity to make the decision to leave against medical advice based on the following:   A. Ability to express a choice: The patient is able to express his or her choice and communicate that choice.   B. Ability to understand relevant information: The patient is able to verbalize their diagnosis, understand information about the purpose of treatment, remember the information, and show that he or she can be part of the decision-making process.   C. Ability to appreciate the significance of the information and its consequences: The patient understands the consequences of treatment refusal and the risks and benefits of accepting or refusing treatment.   D. Ability to manipulate information: The patient is able to engage in reasoning as it applies to making treatment decisions   2: Psychiatric Consultation: There is not an indication to call psychiatry consultation to determine capacity   3. Alternative Treatment: I have discussed the recommended course of treatment and available alternatives.   4. Risks: I have discussed the specific risks of that patient refusing treatment   5. Follow-up Care: I have discussed the follow-up care and advised to see her PCP immediately   6. ED Option: I have emphasized that the patient has the option to return to the ED    I did discuss the patient with Dr. Rodriguez who recommends getting bariatric surgery involved in this patient.  Ultimately patient will not be able to stay at [facility] [place] and will need transfer if she decides to come back for treatment after she picks up her grandson.    Problems Addressed:  Abdominal pain: acute illness or  injury  Abnormal CT of the abdomen: acute illness or injury    Amount and/or Complexity of Data Reviewed  Labs: ordered.  Radiology: ordered.    Risk  Prescription drug management.  Diagnosis or treatment significantly limited by social determinants of health.        ED Course as of 11/12/24 1603   Tue Nov 12, 2024   1322 EKG: NSR, LAE, QT/Qtc 386/495   1531 CT findings reviewed, I discussed these with surgeon Dr. Kulwant Rodriguez, who will review the CT scan       Medications   sodium chloride 0.9 % bolus 1,000 mL (1,000 mL Intravenous New Bag 11/12/24 1340)   ondansetron (ZOFRAN-ODT) dispersible tablet 4 mg (4 mg Oral Given 11/12/24 1340)   ketorolac (TORADOL) injection 15 mg (15 mg Intravenous Given 11/12/24 1340)       ED Risk Strat Scores                                               History of Present Illness       Chief Complaint   Patient presents with    Abdominal Pain     Its my stomach again - I dont understand why you want give me abxs. Denies fevers. Reports vomiting and nausea. Denies diarrhea. Reports mid to RUQ       Past Medical History:   Diagnosis Date    Autoimmune hepatitis (HCC)     Bipolar 1 disorder (HCC)     Chronic headaches 2021    Diabetes mellitus (HCC)     Kidney stone     Renal disorder     kidney stones    Seizure disorder (HCC)       Past Surgical History:   Procedure Laterality Date    BARIATRIC SURGERY  05/2017    BUNIONECTOMY      COLONOSCOPY      CYSTOSCOPY  07/12/2018    Stent Removal    FL RETROGRADE PYELOGRAM  06/16/2022    GASTRIC BYPASS  05/22/2017    HIATAL HERNIA REPAIR  05/22/2017    HYSTERECTOMY      JOINT REPLACEMENT      KIDNEY STONE SURGERY      MN CYSTO/URETERO W/LITHOTRIPSY &INDWELL STENT INSRT Bilateral 07/06/2018    Procedure: CYSTOSCOPY GALDINO. URETEROSCOPY;GALDINO.  RETROGRADE PYELOGRAM AND INSERTION GALDINO.STENT URETERAL;  Surgeon: Jacob Gerber MD;  Location:  MAIN OR;  Service: Urology    MN CYSTO/URETERO W/LITHOTRIPSY &INDWELL STENT INSRT Left 06/16/2022    Procedure:  CYSTOSCOPY URETEROSCOPY WITH LITHOTRIPSY HOLMIUM LASER, RETROGRADE PYELOGRAM AND INSERTION STENT URETERAL;  Surgeon: Asad Geiger MD;  Location: [facility];  Service: Urology    TOTAL SHOULDER REPLACEMENT  2002    VAGINAL HYSTERECTOMY      PARTIAL      Family History   Problem Relation Age of Onset    Diabetes Father     Heart disease Father     Diabetes Mother     Heart disease Mother     Diabetes Sister     Diabetes Family         MELLITUS    Depression Family     Mental illness Family     Obesity Family     Osteoarthritis Family       Social History     Tobacco Use    Smoking status: Every Day     Current packs/day: 0.25     Average packs/day: 0.3 packs/day for 30.0 years (7.5 ttl pk-yrs)     Types: Cigarettes     Passive exposure: Current    Smokeless tobacco: Never    Tobacco comments:     N/a   Vaping Use    Vaping status: Never Used   Substance Use Topics    Alcohol use: No     Comment: n/a    Drug use: No     Comment: n/a      E-Cigarette/Vaping    E-Cigarette Use Never User       E-Cigarette/Vaping Substances    Nicotine No     THC No     CBD No     Flavoring No     Other No     Unknown No       I have reviewed and agree with the history as documented.     59-year-old woman presenting to the ED for evaluation of abdominal pain.  Pain is in the right upper quadrant and epigastric region.  Pain has been present for several days.  Pain initially started in September.  Workup was negative in the ED.  Patient saw GI but was unable to get a colonoscopy due to insufficient fasting.  She reports associated nausea, vomiting or constipation today.  Has a family medicine appointment in 1 week but since the pain worsened today she wanted to get checked out today. Patient denies headache, visual changes, dizziness, fevers, chills, chest pain, palpitations, diarrhea, melena, hematochezia, dysuria, new skin rashes or numbness or tingling of the extremities.          Abdominal Pain      Review of Systems    Gastrointestinal:  Positive for abdominal pain.           Objective       ED Triage Vitals   Temperature Pulse Blood Pressure Respirations SpO2 Patient Position - Orthostatic VS   11/12/24 1241 11/12/24 1241 11/12/24 1241 11/12/24 1241 11/12/24 1241 11/12/24 1241   98.6 °F (37 °C) (!) 109 137/81 16 99 % Sitting      Temp Source Heart Rate Source BP Location FiO2 (%) Pain Score    11/12/24 1241 11/12/24 1241 11/12/24 1241 -- 11/12/24 1340    Oral Monitor Left arm  10 - Worst Possible Pain      Vitals      Date and Time Temp Pulse SpO2 Resp BP Pain Score FACES Pain Rating User   11/12/24 1448 -- 94 96 % 17 111/57 No Pain -- LR   11/12/24 1410 -- -- -- -- -- 5 -- KR   11/12/24 1340 -- -- -- -- -- 10 - Worst Possible Pain -- KR   11/12/24 1241 98.6 °F (37 °C) 109 99 % 16 137/81 -- -- JW            Physical Exam  PHYSICAL EXAM    Constitutional:  Well developed, uncomfortable, tearful  HEENT:  Conjunctiva normal. Oropharynx moist  Respiratory:  No respiratory distress  Cardiovascular:  Normal rate  GI:  Soft, nondistended, TTP over RUQ and epigastric region  :  No costovertebral angle tenderness   Musculoskeletal:  No edema, no tenderness, no deformities  Integument:  Well hydrated, no rash   Lymphatic:  No lymphadenopathy noted   Neurologic:  Alert & oriented x 3, normal motor function, no focal deficits noted   Psychiatric:  Speech and behavior appropriate       Results Reviewed       Procedure Component Value Units Date/Time    HS Troponin I 4hr [[id number]]     Lab Status: No result Specimen: Blood     Urine Microscopic [[id number]]  (Abnormal) Collected: 11/12/24 1503    Lab Status: Final result Specimen: Urine, Clean Catch Updated: 11/12/24 1532     RBC, UA None Seen /hpf      WBC, UA None Seen /hpf      Epithelial Cells Moderate /hpf      Bacteria, UA Occasional /hpf     UA w Reflex to Microscopic w Reflex to Culture [[id number]]  (Abnormal) Collected: 11/12/24 1503    Lab Status: Final result Specimen: Urine,  Clean Catch Updated: 11/12/24 1522     Color, UA Mila     Clarity, UA Clear     Specific Gravity, UA 1.020     pH, UA 6.0     Leukocytes, UA Negative     Nitrite, UA Negative     Protein, UA 15 (Trace) mg/dl      Glucose, UA >=1000 (1%) mg/dl      Ketones, UA 5 (Trace) mg/dl      Bilirubin, UA Negative     Occult Blood, UA Negative     UROBILINOGEN UA 1.0 mg/dL     HS Troponin I 2hr [[id number]]     Lab Status: No result Specimen: Blood     HS Troponin 0hr (reflex protocol) [[id number]]  (Normal) Collected: 11/12/24 1338    Lab Status: Final result Specimen: Blood from Arm, Right Updated: 11/12/24 1417     hs TnI 0hr 5 ng/L     Comprehensive metabolic panel [[id number]]  (Abnormal) Collected: 11/12/24 1338    Lab Status: Final result Specimen: Blood from Arm, Right Updated: 11/12/24 1412     Sodium 134 mmol/L      Potassium 3.8 mmol/L      Chloride 99 mmol/L      CO2 27 mmol/L      ANION GAP 8 mmol/L      BUN 21 mg/dL      Creatinine 0.45 mg/dL      Glucose 118 mg/dL      Calcium 8.6 mg/dL      Corrected Calcium 9.2 mg/dL      AST 16 U/L      ALT 8 U/L      Alkaline Phosphatase 185 U/L      Total Protein 7.2 g/dL      Albumin 3.3 g/dL      Total Bilirubin 0.46 mg/dL      eGFR 111 ml/min/1.73sq m     Narrative:      National Kid[employer] Foundation guidelines for Chronic Kidney Disease (CKD):     Stage 1 with normal or high GFR (GFR > 90 mL/min/1.73 square meters)    Stage 2 Mild CKD (GFR = 60-89 mL/min/1.73 square meters)    Stage 3A Moderate CKD (GFR = 45-59 mL/min/1.73 square meters)    Stage 3B Moderate CKD (GFR = 30-44 mL/min/1.73 square meters)    Stage 4 Severe CKD (GFR = 15-29 mL/min/1.73 square meters)    Stage 5 End Stage CKD (GFR <15 mL/min/1.73 square meters)  Note: GFR calculation is accurate only with a steady state creatinine    Lipase [[id number]]  (Abnormal) Collected: 11/12/24 1338    Lab Status: Final result Specimen: Blood from Arm, Right Updated: 11/12/24 1412     Lipase 10 u/L     CBC and  differential [[id number]]  (Abnormal) Collected: 11/12/24 6598    Lab Status: Final result Specimen: Blood from Arm, Right Updated: 11/12/24 1355     WBC 8.80 Thousand/uL      RBC 4.01 Million/uL      Hemoglobin 12.8 g/dL      Hematocrit 38.2 %      MCV 95 fL      MCH 31.9 pg      MCHC 33.5 g/dL      RDW 16.1 %      MPV 9.0 fL      Platelets 441 Thousands/uL      nRBC 0 /100 WBCs      Segmented % 78 %      Immature Grans % 0 %      Lymphocytes % 14 %      Monocytes % 7 %      Eosinophils Relative 0 %      Basophils Relative 1 %      Absolute Neutrophils 6.90 Thousands/µL      Absolute Immature Grans 0.02 Thousand/uL      Absolute Lymphocytes 1.21 Thousands/µL      Absolute Monocytes 0.60 Thousand/µL      Eosinophils Absolute 0.03 Thousand/µL      Basophils Absolute 0.04 Thousands/µL             CT abdomen pelvis with contrast   Final Interpretation by Roly Pitt MD (11/12 0213)      Status post gastric bypass.      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 Consider post oral contrast CT characterization      Excluded segment gastritis.      Transient type left mid abdominal small bowel intussusception. No bowel obstruction.      Interval development of mild intrahepatic and extrahepatic biliary dilation with CBD diameter measuring up to 9 mm. No calcified biliary calculi. Distended gallbladder without pericholecystic inflammatory findings.      The study was marked in EPIC for immediate notification.            Workstation performed: [id number]         XR chest 1 view portable   Final Interpretation by Joseph Faith MD (11/12 8523)      No acute cardiopulmonary disease.            Resident: Stephen Johnson I, the attending radiologist, have reviewed the images and agree with the final report above.      Workstation performed: [id number]             Procedures    ED Medication and Procedure Management   Prior to Admission Medications    Prescriptions Last Dose Informant Patient Reported? Taking?   Aspirin Low Dose 81 MG EC tablet  Self Yes No   Sig: Take 81 mg by mouth daily   Empagliflozin 25 MG TABS  Self No No   Sig: Take 1 tablet (25 mg total) by mouth daily   FLUoxetine (PROzac) 40 MG capsule  Self No No   Sig: Take 1 capsule (40 mg total) by mouth daily   Nutritional Supplements (ENSURE PO)  Self Yes No   Sig: Take 1 Can by mouth daily   OneTouch Ultra test strip  Self Yes No   QUEtiapine (SEROquel) 400 MG tablet  Self No No   Sig: Take 2 tablets (800 mg total) by mouth daily at bedtime   Tradjenta 5 MG TABS  Self Yes No   Sig: Take 5 mg by mouth daily   Patient not taking: Reported on 9/30/2024   Trulicity 1.5 MG/0.5ML injection  Self Yes No   Patient not taking: Reported on 7/7/2024   Varenicline Tartrate (CHANTIX STARTING MONTH JENNIFER PO)  Self Yes No   Patient not taking: Reported on 7/7/2024   albuterol (PROVENTIL HFA,VENTOLIN HFA) 90 mcg/act inhaler  Self Yes No   Sig: Inhale 2 puffs every 4 (four) hours as needed for wheezing or shortness of breath   amitriptyline (ELAVIL) 25 mg tablet  Self Yes No   Sig: Take 25 mg by mouth daily at bedtime   amitriptyline (ELAVIL) 50 mg tablet  Self Yes No   Sig: Take 50 mg by mouth every evening   ammonium lactate (LAC-HYDRIN) 12 % lotion  Self Yes No   Sig: Apply topically daily   azaTHIOprine (IMURAN) 50 mg tablet  Self Yes No   Sig: Take 100 mg by mouth daily   bisacodyl (DULCOLAX) 5 mg EC tablet   No No   Sig: Take as directed as per written office instructions   bisacodyl (DULCOLAX) 5 mg EC tablet   No No   Sig: Take as directed prior to colonoscopy   bismuth subsalicylate (PEPTO BISMOL) 262 MG chewable tablet  Self Yes No   Sig: Chew 524 mg every hour as needed   buprenorphine-naloxone (SUBOXONE) 2-0.5 mg per SL tablet  Self Yes No   Sig: Place 1 tablet under the tongue Three times a day   cyanocobalamin (VITAMIN B-12) 1000 MCG tablet   No No   Sig: Take 1 tablet (1,000 mcg total) by mouth  daily   Patient not taking: Reported on 7/7/2024   dapagliflozin (Farxiga) 10 MG tablet  Self Yes No   Sig: Take 10 mg by mouth daily   Patient not taking: Reported on 9/30/2024   dicyclomine (BENTYL) 20 mg tablet  Self No No   Sig: Take 1 tablet (20 mg total) by mouth 2 (two) times a day   ergocalciferol (VITAMIN D2) 50,000 units   No No   Sig: Take 1 capsule (50,000 Units total) by mouth once a week for 8 doses Do not start before June 29, 2024.   Patient not taking: Reported on 7/7/2024   fluticasone (FLONASE) 50 mcg/act nasal spray  Self Yes No   Patient not taking: Reported on 9/30/2024   folic acid (FOLVITE) 1 mg tablet   No No   Sig: Take 1 tablet (1 mg total) by mouth daily   Patient not taking: Reported on 7/7/2024   gabapentin (NEURONTIN) 300 mg capsule  Self No No   Sig: Take 2 capsules (600 mg total) by mouth 3 (three) times a day   ketorolac (TORADOL) 10 mg tablet  Self No No   Sig: Take 1 tablet (10 mg total) by mouth every 6 (six) hours as needed for moderate pain for up to 5 days   Patient not taking: Reported on 9/30/2024   linaCLOtide (Linzess) 72 MCG CAPS  Self Yes No   Sig: Take 1 capsule by mouth daily   mirtazapine (REMERON) 7.5 MG tablet  Self No No   Sig: Take 1 tablet (7.5 mg total) by mouth daily at bedtime   nortriptyline (PAMELOR) 50 mg capsule  Self Yes No   Sig: Take 50 mg by mouth daily at bedtime   ondansetron (ZOFRAN-ODT) 4 mg disintegrating tablet  Self No No   Sig: Take 1 tablet (4 mg total) by mouth every 8 (eight) hours as needed for nausea or vomiting   ondansetron (ZOFRAN-ODT) 4 mg disintegrating tablet  Self No No   Sig: Take 1 tablet (4 mg total) by mouth every 8 (eight) hours as needed for nausea for up to 10 days   pantoprazole (PROTONIX) 40 mg tablet  Self Yes No   Sig: Take 40 mg by mouth daily   polyethylene glycol (GOLYTELY) 4000 mL solution   No No   Sig: Take 4,000 mL by mouth once for 1 dose   polyethylene glycol (MIRALAX) 17 g packet   No No   Sig: Take 238 g by  mouth once for 1 dose Take as directed per office instructions prior to colonoscopy   rizatriptan (MAXALT) 10 mg tablet  Self Yes No   Sig: Take 10 mg by mouth daily   sitaGLIPtin (JANUVIA) 100 mg tablet  Self No No   Sig: Take 1 tablet (100 mg total) by mouth daily   sucralfate (CARAFATE) 1 g/10 mL suspension  Self No No   Sig: Take 10 mL (1 g total) by mouth 4 (four) times a day   Patient not taking: Reported on 9/30/2024   tiZANidine (ZANAFLEX) 4 mg tablet  Self Yes No   Sig: Take 6 mg by mouth every 6 (six) hours as needed   topiramate (TOPAMAX) 25 mg tablet   No No   Sig: Take 1 tablet (25 mg total) by mouth 2 (two) times a day   Patient not taking: Reported on 9/30/2024   varenicline (Chantix) 1 mg tablet  Self Yes No   Sig: Take 1 mg by mouth 2 (two) times a day   zolpidem (AMBIEN) 10 mg tablet  Self Yes No   Sig: Take 10 mg by mouth daily at bedtime as needed      Facility-Administered Medications: None     Discharge Medication List as of 11/12/2024  3:44 PM        CONTINUE these medications which have NOT CHANGED    Details   albuterol (PROVENTIL HFA,VENTOLIN HFA) 90 mcg/act inhaler Inhale 2 puffs every 4 (four) hours as needed for wheezing or shortness of breath, Starting Fri 3/29/2024, Historical Med      !! amitriptyline (ELAVIL) 25 mg tablet Take 25 mg by mouth daily at bedtime, Starting Mon 9/9/2024, Historical Med      !! amitriptyline (ELAVIL) 50 mg tablet Take 50 mg by mouth every evening, Starting Mon 8/12/2024, Historical Med      ammonium lactate (LAC-HYDRIN) 12 % lotion Apply topically daily, Starting Tue 9/17/2024, Historical Med      Aspirin Low Dose 81 MG EC tablet Take 81 mg by mouth daily, Starting Mon 8/26/2024, Historical Med      azaTHIOprine (IMURAN) 50 mg tablet Take 100 mg by mouth daily, Historical Med      !! bisacodyl (DULCOLAX) 5 mg EC tablet Take as directed as per written office instructions, Normal      !! bisacodyl (DULCOLAX) 5 mg EC tablet Take as directed prior to  colonoscopy, Normal      bismuth subsalicylate (PEPTO BISMOL) 262 MG chewable tablet Chew 524 mg every hour as needed, Starting Tue 9/3/2024, Historical Med      buprenorphine-naloxone (SUBOXONE) 2-0.5 mg per SL tablet Place 1 tablet under the tongue Three times a day, Starting Wed 9/25/2024, Historical Med      cyanocobalamin (VITAMIN B-12) 1000 MCG tablet Take 1 tablet (1,000 mcg total) by mouth daily, Starting Wed 6/26/2024, Until Sun 8/25/2024, Normal      dapagliflozin (Farxiga) 10 MG tablet Take 10 mg by mouth daily, Starting Wed 7/10/2024, Historical Med      dicyclomine (BENTYL) 20 mg tablet Take 1 tablet (20 mg total) by mouth 2 (two) times a day, Starting Tue 9/24/2024, Normal      Empagliflozin 25 MG TABS Take 1 tablet (25 mg total) by mouth daily, Starting Wed 6/26/2024, Until Mon 9/30/2024, Normal      ergocalciferol (VITAMIN D2) 50,000 units Take 1 capsule (50,000 Units total) by mouth once a week for 8 doses Do not start before June 29, 2024., Starting Sat 6/29/2024, Until Sun 8/18/2024, Normal      FLUoxetine (PROzac) 40 MG capsule Take 1 capsule (40 mg total) by mouth daily, Starting Wed 6/26/2024, Until Mon 9/30/2024, Normal      fluticasone (FLONASE) 50 mcg/act nasal spray Historical Med      folic acid (FOLVITE) 1 mg tablet Take 1 tablet (1 mg total) by mouth daily, Starting Wed 6/26/2024, Until Sun 8/25/2024, Normal      gabapentin (NEURONTIN) 300 mg capsule Take 2 capsules (600 mg total) by mouth 3 (three) times a day, Starting Tue 6/25/2024, Until Mon 9/30/2024, Normal      ketorolac (TORADOL) 10 mg tablet Take 1 tablet (10 mg total) by mouth every 6 (six) hours as needed for moderate pain for up to 5 days, Starting Wed 7/31/2024, Until Mon 9/30/2024 at 2359, Normal      linaCLOtide (Linzess) 72 MCG CAPS Take 1 capsule by mouth daily, Historical Med      mirtazapine (REMERON) 7.5 MG tablet Take 1 tablet (7.5 mg total) by mouth daily at bedtime, Starting Tue 6/25/2024, Until Mon 9/30/2024,  Normal      nortriptyline (PAMELOR) 50 mg capsule Take 50 mg by mouth daily at bedtime, Starting Wed 7/10/2024, Until Thu 7/10/2025, Historical Med      Nutritional Supplements (ENSURE PO) Take 1 Can by mouth daily, Starting Mon 7/8/2024, Historical Med      ondansetron (ZOFRAN-ODT) 4 mg disintegrating tablet Take 1 tablet (4 mg total) by mouth every 8 (eight) hours as needed for nausea or vomiting, Starting Tue 7/30/2024, Print      OneTouch Ultra test strip Historical Med      pantoprazole (PROTONIX) 40 mg tablet Take 40 mg by mouth daily, Historical Med      polyethylene glycol (GOLYTELY) 4000 mL solution Take 4,000 mL by mouth once for 1 dose, Starting Tue 11/5/2024, Normal      polyethylene glycol (MIRALAX) 17 g packet Take 238 g by mouth once for 1 dose Take as directed per office instructions prior to colonoscopy, Starting Mon 9/30/2024, Normal      QUEtiapine (SEROquel) 400 MG tablet Take 2 tablets (800 mg total) by mouth daily at bedtime, Starting Tue 6/25/2024, Until Mon 9/30/2024, Normal      rizatriptan (MAXALT) 10 mg tablet Take 10 mg by mouth daily, Starting Mon 7/8/2024, Historical Med      sitaGLIPtin (JANUVIA) 100 mg tablet Take 1 tablet (100 mg total) by mouth daily, Starting Wed 6/26/2024, Until Mon 9/30/2024, Normal      sucralfate (CARAFATE) 1 g/10 mL suspension Take 10 mL (1 g total) by mouth 4 (four) times a day, Starting Tue 9/24/2024, Normal      tiZANidine (ZANAFLEX) 4 mg tablet Take 6 mg by mouth every 6 (six) hours as needed, Starting Fri 8/30/2024, Historical Med      topiramate (TOPAMAX) 25 mg tablet Take 1 tablet (25 mg total) by mouth 2 (two) times a day, Starting Tue 6/25/2024, Until Sat 8/24/2024, Normal      Tradjenta 5 MG TABS Take 5 mg by mouth daily, Starting Wed 7/10/2024, Until Thu 7/10/2025, Historical Med      Trulicity 1.5 MG/0.5ML injection Historical Med      varenicline (Chantix) 1 mg tablet Take 1 mg by mouth 2 (two) times a day, Starting Fri 7/26/2024, Historical Med       Varenicline Tartrate (CHANTIX STARTING MONTH JENNIFER PO) Historical Med      zolpidem (AMBIEN) 10 mg tablet Take 10 mg by mouth daily at bedtime as needed, Starting Mon 7/1/2024, Historical Med       !! - Potential duplicate medications found. Please discuss with provider.        No discharge procedures on file.  ED SEPSIS DOCUMENTATION   Time reflects when diagnosis was documented in both MDM as applicable and the Disposition within this note       Time User Action Codes Description Comment    11/12/2024  3:43 PM Davina Douglass Add [R10.9] Abdominal pain     11/12/2024  3:43 PM Davina Douglass Add [R93.5] Abnormal CT of the abdomen                  Davina Douglass MD  11/12/24 0788

## 2024-11-12 NOTE — ED NOTES
Pt states she wants to leave and come back. Provider aware.      Esperanza Mckeon RN  11/12/24 8830

## 2024-11-12 NOTE — DISCHARGE INSTRUCTIONS
XR chest 1 view portable    Result Date: 11/12/2024  Impression: No acute cardiopulmonary disease. Resident: Stephen Johnson I, the attending radiologist, have reviewed the images and agree with the final report above. Workstation performed: [id number]     CT abdomen pelvis with contrast    Result Date: 11/12/2024  Impression: Status post gastric bypass. 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 Consider post oral contrast CT characterization Excluded segment gastritis. Transient type left mid abdominal small bowel intussusception. No bowel obstruction. Interval development of mild intrahepatic and extrahepatic biliary dilation with CBD diameter measuring up to 9 mm. No calcified biliary calculi. Distended gallbladder without pericholecystic inflammatory findings. The study was marked in EPIC for immediate notification. Workstation performed: [id number]

## 2024-11-14 NOTE — TELEPHONE ENCOUNTER
As a follow-up, a second attempt has been made for outreach via fax to facility. Please see Contacts section for details.    Thank you  Mandi Gonzalez MA

## 2024-11-17 ENCOUNTER — HOSPITAL ENCOUNTER (EMERGENCY)
Facility: HOSPITAL | Age: 59
Discharge: HOME | End: 2024-11-17
Attending: EMERGENCY MEDICINE
Payer: COMMERCIAL

## 2024-11-17 ENCOUNTER — APPOINTMENT (EMERGENCY)
Dept: CT IMAGING | Facility: HOSPITAL | Age: 59
End: 2024-11-17
Payer: COMMERCIAL

## 2024-11-17 VITALS
BODY MASS INDEX: 16.68 KG/M2 | SYSTOLIC BLOOD PRESSURE: 120 MMHG | HEART RATE: 96 BPM | OXYGEN SATURATION: 95 % | RESPIRATION RATE: 18 BRPM | DIASTOLIC BLOOD PRESSURE: 67 MMHG | TEMPERATURE: 98.2 F | WEIGHT: 91.2 LBS

## 2024-11-17 DIAGNOSIS — R11.2 NAUSEA AND VOMITING: ICD-10-CM

## 2024-11-17 DIAGNOSIS — Z98.84 H/O GASTRIC BYPASS: ICD-10-CM

## 2024-11-17 DIAGNOSIS — R10.11 RIGHT UPPER QUADRANT ABDOMINAL PAIN: ICD-10-CM

## 2024-11-17 DIAGNOSIS — R10.11 RUQ PAIN: ICD-10-CM

## 2024-11-17 DIAGNOSIS — R10.13 EPIGASTRIC PAIN: Primary | ICD-10-CM

## 2024-11-17 DIAGNOSIS — K83.8 DILATED CBD, ACQUIRED: ICD-10-CM

## 2024-11-17 LAB
ALBUMIN SERPL BCG-MCNC: 3.3 G/DL (ref 3.5–5)
ALP SERPL-CCNC: 208 U/L (ref 34–104)
ALT SERPL W P-5'-P-CCNC: 11 U/L (ref 7–52)
ANION GAP SERPL CALCULATED.3IONS-SCNC: 6 MMOL/L (ref 4–13)
AST SERPL W P-5'-P-CCNC: 29 U/L (ref 13–39)
BASOPHILS # BLD AUTO: 0.03 THOUSANDS/ÂΜL (ref 0–0.1)
BASOPHILS NFR BLD AUTO: 1 % (ref 0–1)
BILIRUB SERPL-MCNC: 0.3 MG/DL (ref 0.2–1)
BUN SERPL-MCNC: 22 MG/DL (ref 5–25)
CALCIUM ALBUM COR SERPL-MCNC: 9.3 MG/DL (ref 8.3–10.1)
CALCIUM SERPL-MCNC: 8.7 MG/DL (ref 8.4–10.2)
CHLORIDE SERPL-SCNC: 100 MMOL/L (ref 96–108)
CO2 SERPL-SCNC: 31 MMOL/L (ref 21–32)
CREAT SERPL-MCNC: 0.56 MG/DL (ref 0.6–1.3)
EOSINOPHIL # BLD AUTO: 0.09 THOUSAND/ÂΜL (ref 0–0.61)
EOSINOPHIL NFR BLD AUTO: 2 % (ref 0–6)
ERYTHROCYTE [DISTWIDTH] IN BLOOD BY AUTOMATED COUNT: 17 % (ref 11.6–15.1)
GFR SERPL CREATININE-BSD FRML MDRD: 103 ML/MIN/1.73SQ M
GLUCOSE SERPL-MCNC: 107 MG/DL (ref 65–140)
HCT VFR BLD AUTO: 40.4 % (ref 34.8–46.1)
HGB BLD-MCNC: 13 G/DL (ref 11.5–15.4)
IMM GRANULOCYTES # BLD AUTO: 0.01 THOUSAND/UL (ref 0–0.2)
IMM GRANULOCYTES NFR BLD AUTO: 0 % (ref 0–2)
LIPASE SERPL-CCNC: 18 U/L (ref 11–82)
LYMPHOCYTES # BLD AUTO: 1.59 THOUSANDS/ÂΜL (ref 0.6–4.47)
LYMPHOCYTES NFR BLD AUTO: 39 % (ref 14–44)
MCH RBC QN AUTO: 31.3 PG (ref 26.8–34.3)
MCHC RBC AUTO-ENTMCNC: 32.2 G/DL (ref 31.4–37.4)
MCV RBC AUTO: 97 FL (ref 82–98)
MONOCYTES # BLD AUTO: 0.33 THOUSAND/ÂΜL (ref 0.17–1.22)
MONOCYTES NFR BLD AUTO: 8 % (ref 4–12)
NEUTROPHILS # BLD AUTO: 2.08 THOUSANDS/ÂΜL (ref 1.85–7.62)
NEUTS SEG NFR BLD AUTO: 50 % (ref 43–75)
NRBC BLD AUTO-RTO: 0 /100 WBCS
PLATELET # BLD AUTO: 453 THOUSANDS/UL (ref 149–390)
PMV BLD AUTO: 8.7 FL (ref 8.9–12.7)
POTASSIUM SERPL-SCNC: 4.2 MMOL/L (ref 3.5–5.3)
PROT SERPL-MCNC: 7.6 G/DL (ref 6.4–8.4)
RBC # BLD AUTO: 4.15 MILLION/UL (ref 3.81–5.12)
SODIUM SERPL-SCNC: 137 MMOL/L (ref 135–147)
WBC # BLD AUTO: 4.13 THOUSAND/UL (ref 4.31–10.16)

## 2024-11-17 PROCEDURE — 74177 CT ABD & PELVIS W/CONTRAST: CPT

## 2024-11-17 PROCEDURE — 99284 EMERGENCY DEPT VISIT MOD MDM: CPT

## 2024-11-17 PROCEDURE — 99285 EMERGENCY DEPT VISIT HI MDM: CPT

## 2024-11-17 PROCEDURE — 36415 COLL VENOUS BLD VENIPUNCTURE: CPT

## 2024-11-17 PROCEDURE — 96375 TX/PRO/DX INJ NEW DRUG ADDON: CPT

## 2024-11-17 PROCEDURE — 96361 HYDRATE IV INFUSION ADD-ON: CPT

## 2024-11-17 PROCEDURE — 80053 COMPREHEN METABOLIC PANEL: CPT

## 2024-11-17 PROCEDURE — 85025 COMPLETE CBC W/AUTO DIFF WBC: CPT

## 2024-11-17 PROCEDURE — 96374 THER/PROPH/DIAG INJ IV PUSH: CPT

## 2024-11-17 PROCEDURE — 83690 ASSAY OF LIPASE: CPT

## 2024-11-17 RX ORDER — ONDANSETRON 2 MG/ML
4 INJECTION INTRAMUSCULAR; INTRAVENOUS ONCE
Status: COMPLETED | OUTPATIENT
Start: 2024-11-17 | End: 2024-11-17

## 2024-11-17 RX ORDER — ONDANSETRON 4 MG/1
4 TABLET, FILM COATED ORAL EVERY 6 HOURS
Qty: 12 TABLET | Refills: 0 | Status: SHIPPED | OUTPATIENT
Start: 2024-11-17

## 2024-11-17 RX ORDER — FAMOTIDINE 20 MG/1
20 TABLET, FILM COATED ORAL 2 TIMES DAILY
Qty: 30 TABLET | Refills: 0 | Status: SHIPPED | OUTPATIENT
Start: 2024-11-17

## 2024-11-17 RX ORDER — MORPHINE SULFATE 4 MG/ML
4 INJECTION, SOLUTION INTRAMUSCULAR; INTRAVENOUS ONCE
Status: COMPLETED | OUTPATIENT
Start: 2024-11-17 | End: 2024-11-17

## 2024-11-17 RX ADMIN — SODIUM CHLORIDE 1000 ML: 0.9 INJECTION, SOLUTION INTRAVENOUS at 18:20

## 2024-11-17 RX ADMIN — IOHEXOL 70 ML: 350 INJECTION, SOLUTION INTRAVENOUS at 19:35

## 2024-11-17 RX ADMIN — ONDANSETRON 4 MG: 2 INJECTION INTRAMUSCULAR; INTRAVENOUS at 18:21

## 2024-11-17 RX ADMIN — MORPHINE SULFATE 4 MG: 4 INJECTION, SOLUTION INTRAMUSCULAR; INTRAVENOUS at 18:21

## 2024-11-17 NOTE — Clinical Note
Edith Klein was seen and treated in our emergency department on 11/17/2024.    No restrictions            Diagnosis:     Edith  is off the rest of the shift today, may return to work on return date.    She may return on this date: 11/20/2024         If you have any questions or concerns, please don't hesitate to call.      Marii Brown PA-C    ______________________________           _______________          _______________  Hospital Representative                              Date                                Time

## 2024-11-18 NOTE — DISCHARGE INSTRUCTIONS
Please follow up with general surgery, and get the right upper quadrant ultrasound done prior, for evaluation of the dilated Common bile duct.

## 2024-11-18 NOTE — ED PROVIDER NOTES
Time reflects when diagnosis was documented in both MDM as applicable and the Disposition within this note       Time User Action Codes Description Comment    11/17/2024  8:56 PM Matias Gorman [R10.13] Epigastric pain     11/17/2024  8:58 PM Matias Gorman [R10.11] RUQ pain     11/17/2024  8:58 PM Matias Gorman [R11.2] Nausea and vomiting     11/17/2024  8:58 PM Matias Gorman [Z98.84] H/O gastric bypass     11/17/2024  8:59 PM Matias Gorman [R10.11] Right upper quadrant abdominal pain     11/17/2024  8:59 PM Matias Gorman [K83.8] Dilated cbd, acquired     11/17/2024  8:59 PM Matias Gorman Remove [K83.8] Dilated cbd, acquired     11/17/2024  8:59 PM Matias Gorman [K83.8] Dilated cbd, acquired           ED Disposition       None          Assessment & Plan       Medical Decision Making  59-year-old female presents today with concerns of epigastric and right upper quadrant pain, persistent, unchanged since 5 days ago.  Associate with nausea and vomiting and anorexia.  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  Repeat labs, reach out to general surgery for recommendations.  General surgery recommended follow-up ultrasound outpatient for the common bile duct elevation, no transaminitis and no other signs of pericholecystic disease.  Recommended to reach out to bariatrics regarding the fistula.  I did speak with bariatrics, recommend repeat computerized tomography with enteric and intravenous contrast.  CT of abdomen pelvis with p.o. and IV contrast ordered.  Pain medication and nausea meds. Labs reassuring. Pending CT scan, patient to be signed out to Guanghet[facility] LOUISE for disposition.    Amount and/or Complexity of Data Reviewed  Labs: ordered. Decision-making details documented in ED Course.  Radiology: ordered.    Risk  OTC drugs.  Prescription drug management.        ED Course as of 11/17/24 2102   Sun Nov 17, 2024    1847 Comprehensive metabolic panel(!)  Same as previous   1849 LIPASE: 18       Medications   iohexol (OMNIPAQUE) 240 MG/ML solution 50 mL (has no administration in time range)   sodium chloride 0.9 % bolus 1,000 mL (0 mL Intravenous Stopped 11/17/24 2020)   morphine injection 4 mg (4 mg Intravenous Given 11/17/24 1821)   ondansetron (ZOFRAN) injection 4 mg (4 mg Intravenous Given 11/17/24 1821)   iohexol (OMNIPAQUE) 350 MG/ML injection (MULTI-DOSE) 70 mL (70 mL Intravenous Given 11/17/24 1935)       ED Risk Strat Scores                                               History of Present Illness       Chief Complaint   Patient presents with    Abdominal Pain     Chronic abdominal pain. Pt states she is taking suboxone for pain.        Past Medical History:   Diagnosis Date    Autoimmune hepatitis (HCC)     Bipolar 1 disorder (HCC)     Chronic headaches 2021    Diabetes mellitus (HCC)     Kidney stone     Renal disorder     kidney stones    Seizure disorder (HCC)       Past Surgical History:   Procedure Laterality Date    BARIATRIC SURGERY  05/2017    BUNIONECTOMY      COLONOSCOPY      CYSTOSCOPY  07/12/2018    Stent Removal    FL RETROGRADE PYELOGRAM  06/16/2022    GASTRIC BYPASS  05/22/2017    HIATAL HERNIA REPAIR  05/22/2017    HYSTERECTOMY      JOINT REPLACEMENT      KIDNEY STONE SURGERY      MA CYSTO/URETERO W/LITHOTRIPSY &INDWELL STENT INSRT Bilateral 07/06/2018    Procedure: CYSTOSCOPY GALDINO. URETEROSCOPY;GALDINO.  RETROGRADE PYELOGRAM AND INSERTION GALDINO.STENT URETERAL;  Surgeon: Jacob Gerber MD;  Location: [facility];  Service: Urology    MA CYSTO/URETERO W/LITHOTRIPSY &INDWELL STENT INSRT Left 06/16/2022    Procedure: CYSTOSCOPY URETEROSCOPY WITH LITHOTRIPSY HOLMIUM LASER, RETROGRADE PYELOGRAM AND INSERTION STENT URETERAL;  Surgeon: Asad Geiger MD;  Location: [facility];  Service: Urology    TOTAL SHOULDER REPLACEMENT  2002    VAGINAL HYSTERECTOMY      PARTIAL      Family History   Problem Relation Age of  Onset    Diabetes Father     Heart disease Father     Diabetes Mother     Heart disease Mother     Diabetes Sister     Diabetes Family         MELLITUS    Depression Family     Mental illness Family     Obesity Family     Osteoarthritis Family       Social History     Tobacco Use    Smoking status: Every Day     Current packs/day: 0.25     Average packs/day: 0.3 packs/day for 30.0 years (7.5 ttl pk-yrs)     Types: Cigarettes     Passive exposure: Current    Smokeless tobacco: Never    Tobacco comments:     N/a   Vaping Use    Vaping status: Never Used   Substance Use Topics    Alcohol use: No     Comment: n/a    Drug use: No     Comment: n/a      E-Cigarette/Vaping    E-Cigarette Use Never User       E-Cigarette/Vaping Substances    Nicotine No     THC No     CBD No     Flavoring No     Other No     Unknown No       I have reviewed and agree with the history as documented.     59-year-old female presents today with continuation of epigastric and right upper quadrant pain for the last several weeks.  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  Also did reveal enlargement of her common bile duct.  States that pain and nausea/vomiting have been persistent.  She denies any fever or chills or any change from 5 days ago, but has tried the medications for the pain which did not seem to be helpful.  Denies any other symptoms including any blood in the vomit, diarrhea.  Some constipation although she has not been eating much.        Review of Systems   Constitutional:  Negative for chills and fever.   HENT:  Negative for ear pain and sore throat.    Eyes:  Negative for pain and visual disturbance.   Respiratory:  Negative for cough and shortness of breath.    Cardiovascular:  Negative for chest pain and palpitations.   Gastrointestinal:  Positive for abdominal pain, constipation, nausea and vomiting. Negative for abdominal distention, anal bleeding, blood in stool,  diarrhea and rectal pain.   Genitourinary:  Negative for dysuria and hematuria.   Musculoskeletal:  Negative for arthralgias and back pain.   Skin:  Negative for color change and rash.   Neurological:  Negative for dizziness, seizures, syncope, weakness, light-headedness and headaches.   All other systems reviewed and are negative.          Objective       ED Triage Vitals   Temperature Pulse Blood Pressure Respirations SpO2 Patient Position - Orthostatic VS   11/17/24 1736 11/17/24 1736 11/17/24 1736 11/17/24 1736 11/17/24 1736 11/17/24 1736   98.2 °F (36.8 °C) 96 120/67 18 95 % Sitting      Temp Source Heart Rate Source BP Location FiO2 (%) Pain Score    11/17/24 1736 11/17/24 1736 11/17/24 1736 -- 11/17/24 2058    Oral Monitor Left arm  4      Vitals      Date and Time Temp Pulse SpO2 Resp BP Pain Score FACES Pain Rating User   11/17/24 2058 -- -- -- -- -- 4 -- SD   11/17/24 1736 98.2 °F (36.8 °C) 96 95 % 18 120/67 -- -- SB            Physical Exam  Vitals and nursing note reviewed.   Constitutional:       General: She is not in acute distress.     Appearance: She is well-developed. She is not ill-appearing.   HENT:      Head: Normocephalic and atraumatic.   Eyes:      Conjunctiva/sclera: Conjunctivae normal.   Cardiovascular:      Rate and Rhythm: Normal rate and regular rhythm.      Heart sounds: No murmur heard.  Pulmonary:      Effort: Pulmonary effort is normal. No respiratory distress.      Breath sounds: Normal breath sounds.   Abdominal:      Palpations: Abdomen is soft.      Tenderness: There is abdominal tenderness in the right upper quadrant and epigastric area. There is guarding. There is no right CVA tenderness, left CVA tenderness or rebound. Negative signs include Patricio's sign and Rovsing's sign.      Hernia: A hernia is present. Hernia is present in the umbilical area. There is no hernia in the right inguinal area.   Musculoskeletal:         General: No swelling.      Cervical back: Neck supple.    Skin:     General: Skin is warm and dry.      Capillary Refill: Capillary refill takes less than 2 seconds.   Neurological:      Mental Status: She is alert.   Psychiatric:         Mood and Affect: Mood normal.         Results Reviewed       Procedure Component Value Units Date/Time    Comprehensive metabolic panel [[id number]]  (Abnormal) Collected: 11/17/24 1821    Lab Status: Final result Specimen: Blood from Arm, Right Updated: 11/17/24 1845     Sodium 137 mmol/L      Potassium 4.2 mmol/L      Chloride 100 mmol/L      CO2 31 mmol/L      ANION GAP 6 mmol/L      BUN 22 mg/dL      Creatinine 0.56 mg/dL      Glucose 107 mg/dL      Calcium 8.7 mg/dL      Corrected Calcium 9.3 mg/dL      AST 29 U/L      ALT 11 U/L      Alkaline Phosphatase 208 U/L      Total Protein 7.6 g/dL      Albumin 3.3 g/dL      Total Bilirubin 0.30 mg/dL      eGFR 103 ml/min/1.73sq m     Narrative:      National Kidney Disease Foundation guidelines for Chronic Kidney Disease (CKD):     Stage 1 with normal or high GFR (GFR > 90 mL/min/1.73 square meters)    Stage 2 Mild CKD (GFR = 60-89 mL/min/1.73 square meters)    Stage 3A Moderate CKD (GFR = 45-59 mL/min/1.73 square meters)    Stage 3B Moderate CKD (GFR = 30-44 mL/min/1.73 square meters)    Stage 4 Severe CKD (GFR = 15-29 mL/min/1.73 square meters)    Stage 5 End Stage CKD (GFR <15 mL/min/1.73 square meters)  Note: GFR calculation is accurate only with a steady state creatinine    Lipase [[id number]]  (Normal) Collected: 11/17/24 1821    Lab Status: Final result Specimen: Blood from Arm, Right Updated: 11/17/24 1845     Lipase 18 u/L     CBC and differential [[id number]]  (Abnormal) Collected: 11/17/24 1821    Lab Status: Final result Specimen: Blood from Arm, Right Updated: 11/17/24 1828     WBC 4.13 Thousand/uL      RBC 4.15 Million/uL      Hemoglobin 13.0 g/dL      Hematocrit 40.4 %      MCV 97 fL      MCH 31.3 pg      MCHC 32.2 g/dL      RDW 17.0 %      MPV 8.7 fL      Platelets 453  Thousands/uL      nRBC 0 /100 WBCs      Segmented % 50 %      Immature Grans % 0 %      Lymphocytes % 39 %      Monocytes % 8 %      Eosinophils Relative 2 %      Basophils Relative 1 %      Absolute Neutrophils 2.08 Thousands/µL      Absolute Immature Grans 0.01 Thousand/uL      Absolute Lymphocytes 1.59 Thousands/µL      Absolute Monocytes 0.33 Thousand/µL      Eosinophils Absolute 0.09 Thousand/µL      Basophils Absolute 0.03 Thousands/µL             CT abdomen pelvis with contrast    (Results Pending)   US right upper quadrant    (Results Pending)       Procedures    ED Medication and Procedure Management   Prior to Admission Medications   Prescriptions Last Dose Informant Patient Reported? Taking?   Aspirin Low Dose 81 MG EC tablet  Self Yes No   Sig: Take 81 mg by mouth daily   Empagliflozin 25 MG TABS  Self No No   Sig: Take 1 tablet (25 mg total) by mouth daily   FLUoxetine (PROzac) 40 MG capsule  Self No No   Sig: Take 1 capsule (40 mg total) by mouth daily   Nutritional Supplements (ENSURE PO)  Self Yes No   Sig: Take 1 Can by mouth daily   OneTouch Ultra test strip  Self Yes No   QUEtiapine (SEROquel) 400 MG tablet  Self No No   Sig: Take 2 tablets (800 mg total) by mouth daily at bedtime   Tradjenta 5 MG TABS  Self Yes No   Sig: Take 5 mg by mouth daily   Patient not taking: Reported on 9/30/2024   Trulicity 1.5 MG/0.5ML injection  Self Yes No   Patient not taking: Reported on 7/7/2024   Varenicline Tartrate (CHANTIX STARTING MONTH JENNIFER PO)  Self Yes No   Patient not taking: Reported on 7/7/2024   albuterol (PROVENTIL HFA,VENTOLIN HFA) 90 mcg/act inhaler  Self Yes No   Sig: Inhale 2 puffs every 4 (four) hours as needed for wheezing or shortness of breath   amitriptyline (ELAVIL) 25 mg tablet  Self Yes No   Sig: Take 25 mg by mouth daily at bedtime   amitriptyline (ELAVIL) 50 mg tablet  Self Yes No   Sig: Take 50 mg by mouth every evening   ammonium lactate (LAC-HYDRIN) 12 % lotion  Self Yes No   Sig:  Apply topically daily   azaTHIOprine (IMURAN) 50 mg tablet  Self Yes No   Sig: Take 100 mg by mouth daily   bisacodyl (DULCOLAX) 5 mg EC tablet   No No   Sig: Take as directed as per written office instructions   bisacodyl (DULCOLAX) 5 mg EC tablet   No No   Sig: Take as directed prior to colonoscopy   bismuth subsalicylate (PEPTO BISMOL) 262 MG chewable tablet  Self Yes No   Sig: Chew 524 mg every hour as needed   buprenorphine-naloxone (SUBOXONE) 2-0.5 mg per SL tablet  Self Yes No   Sig: Place 1 tablet under the tongue Three times a day   cyanocobalamin (VITAMIN B-12) 1000 MCG tablet   No No   Sig: Take 1 tablet (1,000 mcg total) by mouth daily   Patient not taking: Reported on 7/7/2024   dapagliflozin (Farxiga) 10 MG tablet  Self Yes No   Sig: Take 10 mg by mouth daily   Patient not taking: Reported on 9/30/2024   dicyclomine (BENTYL) 20 mg tablet  Self No No   Sig: Take 1 tablet (20 mg total) by mouth 2 (two) times a day   ergocalciferol (VITAMIN D2) 50,000 units   No No   Sig: Take 1 capsule (50,000 Units total) by mouth once a week for 8 doses Do not start before June 29, 2024.   Patient not taking: Reported on 7/7/2024   fluticasone (FLONASE) 50 mcg/act nasal spray  Self Yes No   Patient not taking: Reported on 9/30/2024   folic acid (FOLVITE) 1 mg tablet   No No   Sig: Take 1 tablet (1 mg total) by mouth daily   Patient not taking: Reported on 7/7/2024   gabapentin (NEURONTIN) 300 mg capsule  Self No No   Sig: Take 2 capsules (600 mg total) by mouth 3 (three) times a day   ketorolac (TORADOL) 10 mg tablet  Self No No   Sig: Take 1 tablet (10 mg total) by mouth every 6 (six) hours as needed for moderate pain for up to 5 days   Patient not taking: Reported on 9/30/2024   linaCLOtide (Linzess) 72 MCG CAPS  Self Yes No   Sig: Take 1 capsule by mouth daily   mirtazapine (REMERON) 7.5 MG tablet  Self No No   Sig: Take 1 tablet (7.5 mg total) by mouth daily at bedtime   nortriptyline (PAMELOR) 50 mg capsule  Self  Yes No   Sig: Take 50 mg by mouth daily at bedtime   ondansetron (ZOFRAN-ODT) 4 mg disintegrating tablet  Self No No   Sig: Take 1 tablet (4 mg total) by mouth every 8 (eight) hours as needed for nausea or vomiting   ondansetron (ZOFRAN-ODT) 4 mg disintegrating tablet  Self No No   Sig: Take 1 tablet (4 mg total) by mouth every 8 (eight) hours as needed for nausea for up to 10 days   pantoprazole (PROTONIX) 40 mg tablet  Self Yes No   Sig: Take 40 mg by mouth daily   polyethylene glycol (GOLYTELY) 4000 mL solution   No No   Sig: Take 4,000 mL by mouth once for 1 dose   polyethylene glycol (MIRALAX) 17 g packet   No No   Sig: Take 238 g by mouth once for 1 dose Take as directed per office instructions prior to colonoscopy   rizatriptan (MAXALT) 10 mg tablet  Self Yes No   Sig: Take 10 mg by mouth daily   sitaGLIPtin (JANUVIA) 100 mg tablet  Self No No   Sig: Take 1 tablet (100 mg total) by mouth daily   sucralfate (CARAFATE) 1 g/10 mL suspension  Self No No   Sig: Take 10 mL (1 g total) by mouth 4 (four) times a day   Patient not taking: Reported on 9/30/2024   tiZANidine (ZANAFLEX) 4 mg tablet  Self Yes No   Sig: Take 6 mg by mouth every 6 (six) hours as needed   topiramate (TOPAMAX) 25 mg tablet   No No   Sig: Take 1 tablet (25 mg total) by mouth 2 (two) times a day   Patient not taking: Reported on 9/30/2024   varenicline (Chantix) 1 mg tablet  Self Yes No   Sig: Take 1 mg by mouth 2 (two) times a day   zolpidem (AMBIEN) 10 mg tablet  Self Yes No   Sig: Take 10 mg by mouth daily at bedtime as needed      Facility-Administered Medications: None     Patient's Medications   Discharge Prescriptions    ALUMINUM-MAGNESIUM HYDROXIDE 200-200 MG/5ML SUSPENSION    Take 10 mL by mouth every 6 (six) hours as needed for heartburn       Start Date: 11/17/2024End Date: --       Order Dose: 10 mL       Quantity: 355 mL    Refills: 0    FAMOTIDINE (PEPCID) 20 MG TABLET    Take 1 tablet (20 mg total) by mouth 2 (two) times a day        Start Date: 11/17/2024End Date: --       Order Dose: 20 mg       Quantity: 30 tablet    Refills: 0    ONDANSETRON (ZOFRAN) 4 MG TABLET    Take 1 tablet (4 mg total) by mouth every 6 (six) hours       Start Date: 11/17/2024End Date: --       Order Dose: 4 mg       Quantity: 12 tablet    Refills: 0     Outpatient Discharge Orders   US right upper quadrant   Standing Status: Future Standing Exp. Date: 11/17/28     ED SEPSIS DOCUMENTATION   Time reflects when diagnosis was documented in both MDM as applicable and the Disposition within this note       Time User Action Codes Description Comment    11/17/2024  8:56 PM Matias Gorman [R10.13] Epigastric pain     11/17/2024  8:58 PM Matias Gorman [R10.11] RUQ pain     11/17/2024  8:58 PM Matias Gorman [R11.2] Nausea and vomiting     11/17/2024  8:58 PM Matias Gorman [Z98.84] H/O gastric bypass     11/17/2024  8:59 PM Matias Gorman [R10.11] Right upper quadrant abdominal pain     11/17/2024  8:59 PM Matias Gorman [K83.8] Dilated cbd, acquired     11/17/2024  8:59 PM Matias Gorman Remove [K83.8] Dilated cbd, acquired     11/17/2024  8:59 PM Matias Gorman [K83.8] Dilated cbd, acquired                  Matias Gorman PA-C  11/17/24 2102

## 2024-11-19 NOTE — TELEPHONE ENCOUNTER
As a final attempt, a third outreach has been made via telephone call to facility. The outcome of the telephone call was a fax request form to be sent to Office/Facility. Please see Contacts section for details. This encounter will be closed and completed by end of day. Should we receive the requested information because of previous outreach attempts, the requested patient's chart will be updated appropriately.     Thank you  Mandi Gonzalez MA

## 2024-12-06 RX ORDER — ONDANSETRON 2 MG/ML
4 INJECTION INTRAMUSCULAR; INTRAVENOUS EVERY 6 HOURS PRN
OUTPATIENT
Start: 2024-12-06

## 2024-12-06 RX ORDER — SODIUM CHLORIDE 9 MG/ML
125 INJECTION, SOLUTION INTRAVENOUS CONTINUOUS
OUTPATIENT
Start: 2024-12-06

## 2024-12-09 ENCOUNTER — TELEPHONE (OUTPATIENT)
Dept: SURGERY | Facility: CLINIC | Age: 59
End: 2024-12-09

## 2024-12-09 ENCOUNTER — TELEPHONE (OUTPATIENT)
Dept: GASTROENTEROLOGY | Facility: CLINIC | Age: 59
End: 2024-12-09

## 2024-12-09 NOTE — TELEPHONE ENCOUNTER
Patient is a no-show for EUS and colonoscopy today.     I see an appointment for 12/11 with Dr. Villanueva -- assume that is just for colonoscopy.  It appears she is also getting GI care from [facility].    I would suggest we please confirm whether pt is getting GI care at [facility] or [facility] before rescheduling the EUS.   Not sure if she is coming for the appt on 12/11 with Dr. Villanueva or not.    I would suggest can we please let the call center know to please cancel duplicate appointments when rescheduling procedures.  Today's 0730 -0900 slot with me could have been used for another patient for advanced procedure.    Thanks!

## 2024-12-09 NOTE — TELEPHONE ENCOUNTER
Kehinde Sharma,  Please ignore the first message I sent you through [employer].  Upon reviewing your chart, and the fact that you already have   a Hida scan scheduled via [facility], we  recommend you return to them for continuation of care.   Thank you,  [facility] [place]  [address] [place] [address] [phone]

## 2024-12-23 ENCOUNTER — TELEPHONE (OUTPATIENT)
Dept: SURGERY | Facility: CLINIC | Age: 59
End: 2024-12-23

## 2024-12-23 ENCOUNTER — APPOINTMENT (EMERGENCY)
Dept: CT IMAGING | Facility: HOSPITAL | Age: 59
End: 2024-12-23
Payer: COMMERCIAL

## 2024-12-23 ENCOUNTER — HOSPITAL ENCOUNTER (EMERGENCY)
Facility: HOSPITAL | Age: 59
Discharge: HOME | End: 2024-12-23
Attending: EMERGENCY MEDICINE
Payer: COMMERCIAL

## 2024-12-23 ENCOUNTER — TELEPHONE (OUTPATIENT)
Age: 59
End: 2024-12-23

## 2024-12-23 VITALS
BODY MASS INDEX: 16.08 KG/M2 | WEIGHT: 87.9 LBS | RESPIRATION RATE: 19 BRPM | TEMPERATURE: 98 F | SYSTOLIC BLOOD PRESSURE: 104 MMHG | HEART RATE: 94 BPM | OXYGEN SATURATION: 99 % | DIASTOLIC BLOOD PRESSURE: 51 MMHG

## 2024-12-23 DIAGNOSIS — R10.11 RIGHT UPPER QUADRANT ABDOMINAL PAIN: ICD-10-CM

## 2024-12-23 DIAGNOSIS — R10.9 ABDOMINAL PAIN: Primary | ICD-10-CM

## 2024-12-23 LAB
ALBUMIN SERPL BCG-MCNC: 3.1 G/DL (ref 3.5–5)
ALP SERPL-CCNC: 157 U/L (ref 34–104)
ALT SERPL W P-5'-P-CCNC: 9 U/L (ref 7–52)
ANION GAP SERPL CALCULATED.3IONS-SCNC: 9 MMOL/L (ref 4–13)
AST SERPL W P-5'-P-CCNC: 12 U/L (ref 13–39)
BACTERIA UR QL AUTO: NORMAL /HPF
BASOPHILS # BLD AUTO: 0.02 THOUSANDS/ÂΜL (ref 0–0.1)
BASOPHILS NFR BLD AUTO: 0 % (ref 0–1)
BILIRUB SERPL-MCNC: 0.41 MG/DL (ref 0.2–1)
BILIRUB UR QL STRIP: NEGATIVE
BUN SERPL-MCNC: 23 MG/DL (ref 5–25)
CALCIUM ALBUM COR SERPL-MCNC: 9.5 MG/DL (ref 8.3–10.1)
CALCIUM SERPL-MCNC: 8.8 MG/DL (ref 8.4–10.2)
CHLORIDE SERPL-SCNC: 89 MMOL/L (ref 96–108)
CLARITY UR: CLEAR
CO2 SERPL-SCNC: 36 MMOL/L (ref 21–32)
COLOR UR: YELLOW
CREAT SERPL-MCNC: 0.58 MG/DL (ref 0.6–1.3)
EOSINOPHIL # BLD AUTO: 0.01 THOUSAND/ÂΜL (ref 0–0.61)
EOSINOPHIL NFR BLD AUTO: 0 % (ref 0–6)
ERYTHROCYTE [DISTWIDTH] IN BLOOD BY AUTOMATED COUNT: 19.6 % (ref 11.6–15.1)
GFR SERPL CREATININE-BSD FRML MDRD: 102 ML/MIN/1.73SQ M
GLUCOSE SERPL-MCNC: 173 MG/DL (ref 65–140)
GLUCOSE UR STRIP-MCNC: ABNORMAL MG/DL
HCT VFR BLD AUTO: 27.7 % (ref 34.8–46.1)
HGB BLD-MCNC: 9 G/DL (ref 11.5–15.4)
HGB UR QL STRIP.AUTO: NEGATIVE
IMM GRANULOCYTES # BLD AUTO: 0.02 THOUSAND/UL (ref 0–0.2)
IMM GRANULOCYTES NFR BLD AUTO: 0 % (ref 0–2)
KETONES UR STRIP-MCNC: NEGATIVE MG/DL
LEUKOCYTE ESTERASE UR QL STRIP: NEGATIVE
LIPASE SERPL-CCNC: 11 U/L (ref 11–82)
LYMPHOCYTES # BLD AUTO: 1.17 THOUSANDS/ÂΜL (ref 0.6–4.47)
LYMPHOCYTES NFR BLD AUTO: 16 % (ref 14–44)
MCH RBC QN AUTO: 31.5 PG (ref 26.8–34.3)
MCHC RBC AUTO-ENTMCNC: 32.5 G/DL (ref 31.4–37.4)
MCV RBC AUTO: 97 FL (ref 82–98)
MONOCYTES # BLD AUTO: 0.44 THOUSAND/ÂΜL (ref 0.17–1.22)
MONOCYTES NFR BLD AUTO: 6 % (ref 4–12)
NEUTROPHILS # BLD AUTO: 5.83 THOUSANDS/ÂΜL (ref 1.85–7.62)
NEUTS SEG NFR BLD AUTO: 78 % (ref 43–75)
NITRITE UR QL STRIP: NEGATIVE
NON-SQ EPI CELLS URNS QL MICRO: NORMAL /HPF
NRBC BLD AUTO-RTO: 0 /100 WBCS
PH UR STRIP.AUTO: 6.5 [PH]
PLATELET # BLD AUTO: 692 THOUSANDS/UL (ref 149–390)
PMV BLD AUTO: 8.4 FL (ref 8.9–12.7)
POTASSIUM SERPL-SCNC: 3.6 MMOL/L (ref 3.5–5.3)
PROT SERPL-MCNC: 7 G/DL (ref 6.4–8.4)
PROT UR STRIP-MCNC: ABNORMAL MG/DL
RBC # BLD AUTO: 2.86 MILLION/UL (ref 3.81–5.12)
RBC #/AREA URNS AUTO: NORMAL /HPF
SODIUM SERPL-SCNC: 134 MMOL/L (ref 135–147)
SP GR UR STRIP.AUTO: 1.01 (ref 1–1.04)
UROBILINOGEN UA: 4 MG/DL
WBC # BLD AUTO: 7.49 THOUSAND/UL (ref 4.31–10.16)
WBC #/AREA URNS AUTO: NORMAL /HPF

## 2024-12-23 PROCEDURE — 81001 URINALYSIS AUTO W/SCOPE: CPT | Performed by: PHYSICIAN ASSISTANT

## 2024-12-23 PROCEDURE — 85025 COMPLETE CBC W/AUTO DIFF WBC: CPT | Performed by: PHYSICIAN ASSISTANT

## 2024-12-23 PROCEDURE — 99284 EMERGENCY DEPT VISIT MOD MDM: CPT

## 2024-12-23 PROCEDURE — 96375 TX/PRO/DX INJ NEW DRUG ADDON: CPT

## 2024-12-23 PROCEDURE — 83690 ASSAY OF LIPASE: CPT | Performed by: PHYSICIAN ASSISTANT

## 2024-12-23 PROCEDURE — 80053 COMPREHEN METABOLIC PANEL: CPT | Performed by: PHYSICIAN ASSISTANT

## 2024-12-23 PROCEDURE — 36415 COLL VENOUS BLD VENIPUNCTURE: CPT | Performed by: PHYSICIAN ASSISTANT

## 2024-12-23 PROCEDURE — 99284 EMERGENCY DEPT VISIT MOD MDM: CPT | Performed by: PHYSICIAN ASSISTANT

## 2024-12-23 PROCEDURE — 96374 THER/PROPH/DIAG INJ IV PUSH: CPT

## 2024-12-23 RX ORDER — MORPHINE SULFATE 4 MG/ML
4 INJECTION, SOLUTION INTRAMUSCULAR; INTRAVENOUS ONCE
Status: COMPLETED | OUTPATIENT
Start: 2024-12-23 | End: 2024-12-23

## 2024-12-23 RX ORDER — ONDANSETRON 2 MG/ML
4 INJECTION INTRAMUSCULAR; INTRAVENOUS ONCE
Status: COMPLETED | OUTPATIENT
Start: 2024-12-23 | End: 2024-12-23

## 2024-12-23 RX ORDER — MAGNESIUM HYDROXIDE/ALUMINUM HYDROXICE/SIMETHICONE 120; 1200; 1200 MG/30ML; MG/30ML; MG/30ML
30 SUSPENSION ORAL ONCE
Status: COMPLETED | OUTPATIENT
Start: 2024-12-23 | End: 2024-12-23

## 2024-12-23 RX ORDER — OXYCODONE AND ACETAMINOPHEN 5; 325 MG/1; MG/1
1 TABLET ORAL EVERY 8 HOURS PRN
Qty: 9 TABLET | Refills: 0 | Status: SHIPPED | OUTPATIENT
Start: 2024-12-23 | End: 2024-12-26

## 2024-12-23 RX ADMIN — MORPHINE SULFATE 4 MG: 4 INJECTION, SOLUTION INTRAMUSCULAR; INTRAVENOUS at 07:02

## 2024-12-23 RX ADMIN — ALUMINUM HYDROXIDE, MAGNESIUM HYDROXIDE, AND DIMETHICONE 30 ML: 200; 20; 200 SUSPENSION ORAL at 06:49

## 2024-12-23 RX ADMIN — ONDANSETRON 4 MG: 2 INJECTION INTRAMUSCULAR; INTRAVENOUS at 06:49

## 2024-12-23 NOTE — TELEPHONE ENCOUNTER
Note below was sent to the pt on 12/9/24, but then the pt rescheduled on 12/13 for 12/30. Spoke with pt today (12/23), but not sure pt understood why we had to cancel the 12/30 appt. I re-explained it to her and she said we can call her sister (her ) to explain. Left a message for the sister to call back since she didn't answer the phone. To note, the pt already has surgery scheduled on 1/23/25 with Dr Hanna of [facility].     Kehinde Sharma,  Please ignore the first message I sent you through [employer].  Upon reviewing your chart, and the fact that you already have   a Hida scan scheduled via [facility], we  recommend you return to them for continuation of care.   Thank you,  [facility] General Surgery [place]  [address] [address]  [phone]

## 2024-12-23 NOTE — TELEPHONE ENCOUNTER
Patient's sister Susana Klein called to cancel an appointment with Lissett Israel PA-C on 12/30/24 as the patient is currently under the care of Dr. Surinder Hanna at [facility] S[facility] and scheduled for a procedure with him in January 2025. She does not wish to reschedule the appointment with Lissett Israel.

## 2024-12-23 NOTE — ED CARE HANDOFF
Emergency Department Sign Out Note        Sign out and transfer of care from Dr. Carbajal. See Separate Emergency Department note.     The patient, Edith Klein, was evaluated by the previous provider for abdominal pain.    Workup Completed:  Labs    ED Course / Workup Pending (followup):  CT abdomen pelvis ordered and patient drinking contrast.  Patient called me to bedside to discuss.  Patient was that her pain has been unchanged for the past year, chronic pain.  She is on Suboxone for this chronic pain would like to try oxycodone for pain.  She notes that she does not want the CT at this point because she there is no change in pain.  She is getting a reversal of her bariatric surgery in January.  She will just follow-up with her surgeon.  I discussed complications of bariatric surgery that could arise and could be causing her symptoms today along with other infectious etiology.  Patient notes that she is not worried about any of these because there has been no change in her pain in over a year.  She notes that she mostly came to the ED for pain control.  Patient leaving AGAINST MEDICAL ADVICE                                     Procedures  Medical Decision Making  Amount and/or Complexity of Data Reviewed  Labs: ordered.  Radiology: ordered.    Risk  OTC drugs.  Prescription drug management.            Disposition  Final diagnoses:   Abdominal pain   Right upper quadrant abdominal pain     Time reflects when diagnosis was documented in both MDM as applicable and the Disposition within this note       Time User Action Codes Description Comment    12/23/2024  8:28 AM Sen Garcia Add [R10.9] Abdominal pain     12/23/2024  8:29 AM Sen Garcia Add [R10.11] Right upper quadrant abdominal pain           ED Disposition       ED Disposition   AMA    Condition   --    Date/Time   Mon Dec 23, 2024  8:28 AM    Comment   Date: 12/23/2024  Patient: Edith Klein  Admitted: 12/23/2024  6:24 AM  Attending Provider: Sen  Rafiq Garcia, *    Edith Klein or her authorized caregiver has made the decision for the patient to leave the emergency department against the advi ce of her attending physician. She or her authorized caregiver has been informed and understands the inherent risks, including death, disability.  She or her authorized caregiver has decided to accept the responsibility for this decision. Edith munson and all necessary parties have been advised that she may return for further evaluation or treatment. Her condition at time of discharge was stable.  Edith Klein had current vital signs as follows:  /51 (BP Location: Left arm)   Pulse 94    Temp 98 °F (36.7 °C) (Tympanic)   Resp 19   Wt 39.9 kg (87 lb 14.4 oz)                Follow-up Information       Follow up With Specialties Details Why Contact Info    Deana Higginbotham PA-C Physician Assistant   [address] [id number] [phone]            Patient's Medications   Discharge Prescriptions    OXYCODONE-ACETAMINOPHEN (PERCOCET) 5-325 MG PER TABLET    Take 1 tablet by mouth every 8 (eight) hours as needed for moderate pain for up to 3 days Max Daily Amount: 3 tablets       Start Date: 12/23/2024End Date: 12/26/2024       Order Dose: 1 tablet       Quantity: 9 tablet    Refills: 0     No discharge procedures on file.       ED Provider  Electronically Signed by     Sen Garcia MD  12/23/24 0053

## 2024-12-23 NOTE — ED PROVIDER NOTES
Time reflects when diagnosis was documented in both MDM as applicable and the Disposition within this note       Time User Action Codes Description Comment    12/23/2024  8:28 AM Sen Garcia Add [R10.9] Abdominal pain     12/23/2024  8:29 AM Sen Garcia [R10.11] Right upper quadrant abdominal pain           ED Disposition       ED Disposition   AMA    Condition   --    Date/Time   Mon Dec 23, 2024  8:28 AM    Comment   Date: 12/23/2024  Patient: Edith Klein  Admitted: 12/23/2024  6:24 AM  Attending Provider: Sen Garcia, *    Edith Klein or her authorized caregiver has made the decision for the patient to leave the emergency department against the advi ce of her attending physician. She or her authorized caregiver has been informed and understands the inherent risks, including death, disability.  She or her authorized caregiver has decided to accept the responsibility for this decision. Edith munson and all necessary parties have been advised that she may return for further evaluation or treatment. Her condition at time of discharge was stable.  Edith Klein had current vital signs as follows:  /51 (BP Location: Left arm)   Pulse 94    Temp 98 °F (36.7 °C) (Tympanic)   Resp 19   Wt 39.9 kg (87 lb 14.4 oz)                Assessment & Plan       Medical Decision Making  Patient was Initially seen and evaluated by myself for abd pain associated with n/v and constipation with hx of prior bariatric surgery. At this time differential was obstruction, gastroenteritis, acute abdominal pathology or exacerbation of chronic abd pain. Labs and ct with oral contrast were ordered and pt was given medicine for pain control. At this time care was signed out to attending at change of shift pending results and dispo. Please see separate note for further evaluation     Amount and/or Complexity of Data Reviewed  Labs: ordered.  Radiology: ordered.    Risk  OTC drugs.  Prescription drug management.          "    Medications   ondansetron (ZOFRAN) injection 4 mg (4 mg Intravenous Given 12/23/24 0649)   aluminum-magnesium hydroxide-simethicone (MAALOX) oral suspension 30 mL (30 mL Oral Given 12/23/24 0649)   morphine injection 4 mg (4 mg Intravenous Given 12/23/24 0702)       ED Risk Strat Scores                          SBIRT 22yo+      Flowsheet Row Most Recent Value   Initial Alcohol Screen: US AUDIT-C     1. How often do you have a drink containing alcohol? 0 Filed at: 12/23/2024 0632   2. How many drinks containing alcohol do you have on a typical day you are drinking?  0 Filed at: 12/23/2024 0632   3b. FEMALE Any Age, or MALE 65+: How often do you have 4 or more drinks on one occassion? 0 Filed at: 12/23/2024 0632   Audit-C Score 0 Filed at: 12/23/2024 0632   SARITA: How many times in the past year have you...    Used an illegal drug or used a prescription medication for non-medical reasons? Never Filed at: 12/23/2024 0632                            History of Present Illness       Chief Complaint   Patient presents with    Abdominal Pain     Reports she has been dealing with RUQ pain for over a year but \"extreme\" pain that started yesterday. Reports N/V started last night. Reports she had gastric bypass in 2018 and has had gastric issues since then. No meds PTA.        Past Medical History:   Diagnosis Date    Autoimmune hepatitis (HCC)     Bipolar 1 disorder (HCC)     Chronic headaches 2021    Diabetes mellitus (HCC)     Kidney stone     Renal disorder     kidney stones    Seizure disorder (HCC)       Past Surgical History:   Procedure Laterality Date    BARIATRIC SURGERY  05/2017    BUNIONECTOMY      COLONOSCOPY      CYSTOSCOPY  07/12/2018    Stent Removal    FL RETROGRADE PYELOGRAM  06/16/2022    GASTRIC BYPASS  05/22/2017    HIATAL HERNIA REPAIR  05/22/2017    HYSTERECTOMY      JOINT REPLACEMENT      KIDNEY STONE SURGERY      SC CYSTO/URETERO W/LITHOTRIPSY &INDWELL STENT INSRT Bilateral 07/06/2018    Procedure: " CYSTOSCOPY GALDINO. URETEROSCOPY;GALDINO.  RETROGRADE PYELOGRAM AND INSERTION GALDINO.STENT URETERAL;  Surgeon: Jacob Gerber MD;  Location: [facility];  Service: Urology    IN CYSTO/URETERO W/LITHOTRIPSY &INDWELL STENT INSRT Left 06/16/2022    Procedure: CYSTOSCOPY URETEROSCOPY WITH LITHOTRIPSY HOLMIUM LASER, RETROGRADE PYELOGRAM AND INSERTION STENT URETERAL;  Surgeon: Asad Geiger MD;  Location: [facility];  Service: Urology    TOTAL SHOULDER REPLACEMENT  2002    VAGINAL HYSTERECTOMY      PARTIAL      Family History   Problem Relation Age of Onset    Diabetes Father     Heart disease Father     Diabetes Mother     Heart disease Mother     Diabetes Sister     Diabetes Family         MELLITUS    Depression Family     Mental illness Family     Obesity Family     Osteoarthritis Family       Social History     Tobacco Use    Smoking status: Every Day     Current packs/day: 0.25     Average packs/day: 0.3 packs/day for 30.0 years (7.5 ttl pk-yrs)     Types: Cigarettes     Passive exposure: Current    Smokeless tobacco: Never    Tobacco comments:     N/a   Vaping Use    Vaping status: Never Used   Substance Use Topics    Alcohol use: No     Comment: n/a    Drug use: No     Comment: n/a      E-Cigarette/Vaping    E-Cigarette Use Never User       E-Cigarette/Vaping Substances    Nicotine No     THC No     CBD No     Flavoring No     Other No     Unknown No       I have reviewed and agree with the history as documented.     59-year-old female with history of gastric bypass surgery presents complaining of epigastric and right-sided abdominal pain.  Patient states that she has been dealing with this issue for a long time now and has been unintentionally losing weight.  Patient was diagnosed with a fistula recently and states she has a follow-up with her surgeon on January 27 but has been having severe abdominal pain associated with nausea vomiting and constipation.  Patient's last bowel movement was 4 days ago without the passage  of gas in between.  Denies any other complaints.      History provided by:  Patient   used: No        Review of Systems   Constitutional: Negative.  Negative for chills and fatigue.   HENT:  Negative for ear pain and sore throat.    Eyes:  Negative for photophobia and redness.   Respiratory:  Negative for apnea, cough and shortness of breath.    Cardiovascular:  Negative for chest pain.   Gastrointestinal:  Positive for abdominal pain, constipation, nausea and vomiting.   Genitourinary:  Negative for dysuria.   Musculoskeletal:  Negative for arthralgias, neck pain and neck stiffness.   Skin:  Negative for rash.   Neurological:  Negative for dizziness, tremors, syncope and weakness.   Psychiatric/Behavioral:  Negative for suicidal ideas.            Objective       ED Triage Vitals   Temperature Pulse Blood Pressure Respirations SpO2 Patient Position - Orthostatic VS   12/23/24 0632 12/23/24 0632 12/23/24 0632 12/23/24 0632 12/23/24 0632 12/23/24 0632   98 °F (36.7 °C) 94 104/51 19 99 % Lying      Temp Source Heart Rate Source BP Location FiO2 (%) Pain Score    12/23/24 0632 12/23/24 0632 12/23/24 0632 -- 12/23/24 0702    Tympanic Monitor Left arm  10 - Worst Possible Pain      Vitals      Date and Time Temp Pulse SpO2 Resp BP Pain Score FACES Pain Rating User   12/23/24 0824 -- -- -- -- -- 5 -- AS   12/23/24 0702 -- -- -- -- -- 10 - Worst Possible Pain -- AS   12/23/24 0632 98 °F (36.7 °C) 94 99 % 19 104/51 -- -- MM            Physical Exam  Constitutional:       General: She is not in acute distress.     Appearance: She is well-developed. She is not diaphoretic.   Eyes:      Pupils: Pupils are equal, round, and reactive to light.   Cardiovascular:      Rate and Rhythm: Normal rate and regular rhythm.   Pulmonary:      Effort: Pulmonary effort is normal. No respiratory distress.      Breath sounds: Normal breath sounds.   Abdominal:      General: Bowel sounds are normal. There is no distension.       Palpations: Abdomen is soft.      Tenderness: There is abdominal tenderness. There is guarding. There is no right CVA tenderness or left CVA tenderness.      Comments: Prior surgical scars noted.  Diffuse tenderness.  Tenderness worse in the epigastrium.  Minor guarding.  No rebound no rigidity negative CVA tenderness bilaterally.   Musculoskeletal:         General: Normal range of motion.      Cervical back: Normal range of motion and neck supple.   Skin:     General: Skin is warm and dry.   Neurological:      Mental Status: She is alert and oriented to person, place, and time.         Results Reviewed       Procedure Component Value Units Date/Time    Comprehensive metabolic panel [[id number]]  (Abnormal) Collected: 12/23/24 0648    Lab Status: Final result Specimen: Blood from Arm, Left Updated: 12/23/24 0721     Sodium 134 mmol/L      Potassium 3.6 mmol/L      Chloride 89 mmol/L      CO2 36 mmol/L      ANION GAP 9 mmol/L      BUN 23 mg/dL      Creatinine 0.58 mg/dL      Glucose 173 mg/dL      Calcium 8.8 mg/dL      Corrected Calcium 9.5 mg/dL      AST 12 U/L      ALT 9 U/L      Alkaline Phosphatase 157 U/L      Total Protein 7.0 g/dL      Albumin 3.1 g/dL      Total Bilirubin 0.41 mg/dL      eGFR 102 ml/min/1.73sq m     Narrative:      National Kid[employer] guidelines for Chronic Kidney Disease (CKD):     Stage 1 with normal or high GFR (GFR > 90 mL/min/1.73 square meters)    Stage 2 Mild CKD (GFR = 60-89 mL/min/1.73 square meters)    Stage 3A Moderate CKD (GFR = 45-59 mL/min/1.73 square meters)    Stage 3B Moderate CKD (GFR = 30-44 mL/min/1.73 square meters)    Stage 4 Severe CKD (GFR = 15-29 mL/min/1.73 square meters)    Stage 5 End Stage CKD (GFR <15 mL/min/1.73 square meters)  Note: GFR calculation is accurate only with a steady state creatinine    Lipase [[id number]]  (Normal) Collected: 12/23/24 0648    Lab Status: Final result Specimen: Blood from Arm, Left Updated: 12/23/24 0721     Lipase  11 u/L     Urine Microscopic [[id number]]  (Normal) Collected: 12/23/24 0648    Lab Status: Final result Specimen: Urine, Other Updated: 12/23/24 0709     RBC, UA 0-1 /hpf      WBC, UA 1-2 /hpf      Epithelial Cells Occasional /hpf      Bacteria, UA Occasional /hpf     UA (URINE) with reflex to Scope [[id number]]  (Abnormal) Collected: 12/23/24 0648    Lab Status: Final result Specimen: Urine, Other Updated: 12/23/24 0702     Color, UA Yellow     Clarity, UA Clear     Specific Gravity, UA 1.010     pH, UA 6.5     Leukocytes, UA Negative     Nitrite, UA Negative     Protein, UA 15 (Trace) mg/dl      Glucose, UA >=1000 (1%) mg/dl      Ketones, UA Negative mg/dl      Bilirubin, UA Negative     Occult Blood, UA Negative     UROBILINOGEN UA 4.0 mg/dL     CBC and differential [[id number]]  (Abnormal) Collected: 12/23/24 0648    Lab Status: Final result Specimen: Blood from Arm, Left Updated: 12/23/24 0657     WBC 7.49 Thousand/uL      RBC 2.86 Million/uL      Hemoglobin 9.0 g/dL      Hematocrit 27.7 %      MCV 97 fL      MCH 31.5 pg      MCHC 32.5 g/dL      RDW 19.6 %      MPV 8.4 fL      Platelets 692 Thousands/uL      nRBC 0 /100 WBCs      Segmented % 78 %      Immature Grans % 0 %      Lymphocytes % 16 %      Monocytes % 6 %      Eosinophils Relative 0 %      Basophils Relative 0 %      Absolute Neutrophils 5.83 Thousands/µL      Absolute Immature Grans 0.02 Thousand/uL      Absolute Lymphocytes 1.17 Thousands/µL      Absolute Monocytes 0.44 Thousand/µL      Eosinophils Absolute 0.01 Thousand/µL      Basophils Absolute 0.02 Thousands/µL             No orders to display       Procedures    ED Medication and Procedure Management   Prior to Admission Medications   Prescriptions Last Dose Informant Patient Reported? Taking?   Aspirin Low Dose 81 MG EC tablet  Self Yes No   Sig: Take 81 mg by mouth daily   Empagliflozin 25 MG TABS  Self No No   Sig: Take 1 tablet (25 mg total) by mouth daily   FLUoxetine (PROzac) 40 MG capsule   Self No No   Sig: Take 1 capsule (40 mg total) by mouth daily   Nutritional Supplements (ENSURE PO)  Self Yes No   Sig: Take 1 Can by mouth daily   OneTouch Ultra test strip  Self Yes No   QUEtiapine (SEROquel) 400 MG tablet  Self No No   Sig: Take 2 tablets (800 mg total) by mouth daily at bedtime   Tradjenta 5 MG TABS  Self Yes No   Sig: Take 5 mg by mouth daily   Patient not taking: Reported on 9/30/2024   Trulicity 1.5 MG/0.5ML injection  Self Yes No   Patient not taking: Reported on 7/7/2024   Varenicline Tartrate (CHANTIX STARTING MONTH PAK PO)  Self Yes No   Patient not taking: Reported on 7/7/2024   albuterol (PROVENTIL HFA,VENTOLIN HFA) 90 mcg/act inhaler  Self Yes No   Sig: Inhale 2 puffs every 4 (four) hours as needed for wheezing or shortness of breath   aluminum-magnesium hydroxide 200-200 MG/5ML suspension   No No   Sig: Take 10 mL by mouth every 6 (six) hours as needed for heartburn   amitriptyline (ELAVIL) 25 mg tablet  Self Yes No   Sig: Take 25 mg by mouth daily at bedtime   amitriptyline (ELAVIL) 50 mg tablet  Self Yes No   Sig: Take 50 mg by mouth every evening   ammonium lactate (LAC-HYDRIN) 12 % lotion  Self Yes No   Sig: Apply topically daily   azaTHIOprine (IMURAN) 50 mg tablet  Self Yes No   Sig: Take 100 mg by mouth daily   bisacodyl (DULCOLAX) 5 mg EC tablet   No No   Sig: Take as directed as per written office instructions   bisacodyl (DULCOLAX) 5 mg EC tablet   No No   Sig: Take as directed prior to colonoscopy   bismuth subsalicylate (PEPTO BISMOL) 262 MG chewable tablet  Self Yes No   Sig: Chew 524 mg every hour as needed   buprenorphine-naloxone (SUBOXONE) 2-0.5 mg per SL tablet  Self Yes No   Sig: Place 1 tablet under the tongue Three times a day   cyanocobalamin (VITAMIN B-12) 1000 MCG tablet   No No   Sig: Take 1 tablet (1,000 mcg total) by mouth daily   Patient not taking: Reported on 7/7/2024   dapagliflozin (Farxiga) 10 MG tablet  Self Yes No   Sig: Take 10 mg by mouth daily    Patient not taking: Reported on 9/30/2024   dicyclomine (BENTYL) 20 mg tablet  Self No No   Sig: Take 1 tablet (20 mg total) by mouth 2 (two) times a day   ergocalciferol (VITAMIN D2) 50,000 units   No No   Sig: Take 1 capsule (50,000 Units total) by mouth once a week for 8 doses Do not start before June 29, 2024.   Patient not taking: Reported on 7/7/2024   famotidine (PEPCID) 20 mg tablet   No No   Sig: Take 1 tablet (20 mg total) by mouth 2 (two) times a day   fluticasone (FLONASE) 50 mcg/act nasal spray  Self Yes No   Patient not taking: Reported on 9/30/2024   folic acid (FOLVITE) 1 mg tablet   No No   Sig: Take 1 tablet (1 mg total) by mouth daily   Patient not taking: Reported on 7/7/2024   gabapentin (NEURONTIN) 300 mg capsule  Self No No   Sig: Take 2 capsules (600 mg total) by mouth 3 (three) times a day   ketorolac (TORADOL) 10 mg tablet  Self No No   Sig: Take 1 tablet (10 mg total) by mouth every 6 (six) hours as needed for moderate pain for up to 5 days   Patient not taking: Reported on 9/30/2024   linaCLOtide (Linzess) 72 MCG CAPS  Self Yes No   Sig: Take 1 capsule by mouth daily   mirtazapine (REMERON) 7.5 MG tablet  Self No No   Sig: Take 1 tablet (7.5 mg total) by mouth daily at bedtime   nortriptyline (PAMELOR) 50 mg capsule  Self Yes No   Sig: Take 50 mg by mouth daily at bedtime   omeprazole (PriLOSEC) 20 mg delayed release capsule   No No   Sig: Take 1 capsule (20 mg total) by mouth daily   ondansetron (ZOFRAN) 4 mg tablet   No No   Sig: Take 1 tablet (4 mg total) by mouth every 6 (six) hours   ondansetron (ZOFRAN-ODT) 4 mg disintegrating tablet  Self No No   Sig: Take 1 tablet (4 mg total) by mouth every 8 (eight) hours as needed for nausea or vomiting   ondansetron (ZOFRAN-ODT) 4 mg disintegrating tablet  Self No No   Sig: Take 1 tablet (4 mg total) by mouth every 8 (eight) hours as needed for nausea for up to 10 days   pantoprazole (PROTONIX) 40 mg tablet  Self Yes No   Sig: Take 40 mg by  mouth daily   polyethylene glycol (GOLYTELY) 4000 mL solution   No No   Sig: Take 4,000 mL by mouth once for 1 dose   polyethylene glycol (MIRALAX) 17 g packet   No No   Sig: Take 238 g by mouth once for 1 dose Take as directed per office instructions prior to colonoscopy   rizatriptan (MAXALT) 10 mg tablet  Self Yes No   Sig: Take 10 mg by mouth daily   sitaGLIPtin (JANUVIA) 100 mg tablet  Self No No   Sig: Take 1 tablet (100 mg total) by mouth daily   sucralfate (CARAFATE) 1 g/10 mL suspension  Self No No   Sig: Take 10 mL (1 g total) by mouth 4 (four) times a day   Patient not taking: Reported on 9/30/2024   tiZANidine (ZANAFLEX) 4 mg tablet  Self Yes No   Sig: Take 6 mg by mouth every 6 (six) hours as needed   topiramate (TOPAMAX) 25 mg tablet   No No   Sig: Take 1 tablet (25 mg total) by mouth 2 (two) times a day   Patient not taking: Reported on 9/30/2024   varenicline (Chantix) 1 mg tablet  Self Yes No   Sig: Take 1 mg by mouth 2 (two) times a day   zolpidem (AMBIEN) 10 mg tablet  Self Yes No   Sig: Take 10 mg by mouth daily at bedtime as needed      Facility-Administered Medications: None     Discharge Medication List as of 12/23/2024  8:30 AM        START taking these medications    Details   oxyCODONE-acetaminophen (Percocet) 5-325 mg per tablet Take 1 tablet by mouth every 8 (eight) hours as needed for moderate pain for up to 3 days Max Daily Amount: 3 tablets, Starting Mon 12/23/2024, Until Thu 12/26/2024 at 2359, Normal           CONTINUE these medications which have NOT CHANGED    Details   albuterol (PROVENTIL HFA,VENTOLIN HFA) 90 mcg/act inhaler Inhale 2 puffs every 4 (four) hours as needed for wheezing or shortness of breath, Starting Fri 3/29/2024, Historical Med      aluminum-magnesium hydroxide 200-200 MG/5ML suspension Take 10 mL by mouth every 6 (six) hours as needed for heartburn, Starting Sun 11/17/2024, Normal      !! amitriptyline (ELAVIL) 25 mg tablet Take 25 mg by mouth daily at bedtime,  Starting Mon 9/9/2024, Historical Med      !! amitriptyline (ELAVIL) 50 mg tablet Take 50 mg by mouth every evening, Starting Mon 8/12/2024, Historical Med      ammonium lactate (LAC-HYDRIN) 12 % lotion Apply topically daily, Starting Tue 9/17/2024, Historical Med      Aspirin Low Dose 81 MG EC tablet Take 81 mg by mouth daily, Starting Mon 8/26/2024, Historical Med      azaTHIOprine (IMURAN) 50 mg tablet Take 100 mg by mouth daily, Historical Med      !! bisacodyl (DULCOLAX) 5 mg EC tablet Take as directed as per written office instructions, Normal      !! bisacodyl (DULCOLAX) 5 mg EC tablet Take as directed prior to colonoscopy, Normal      bismuth subsalicylate (PEPTO BISMOL) 262 MG chewable tablet Chew 524 mg every hour as needed, Starting Tue 9/3/2024, Historical Med      buprenorphine-naloxone (SUBOXONE) 2-0.5 mg per SL tablet Place 1 tablet under the tongue Three times a day, Starting Wed 9/25/2024, Historical Med      cyanocobalamin (VITAMIN B-12) 1000 MCG tablet Take 1 tablet (1,000 mcg total) by mouth daily, Starting Wed 6/26/2024, Until Sun 8/25/2024, Normal      dapagliflozin (Farxiga) 10 MG tablet Take 10 mg by mouth daily, Starting Wed 7/10/2024, Historical Med      dicyclomine (BENTYL) 20 mg tablet Take 1 tablet (20 mg total) by mouth 2 (two) times a day, Starting Tue 9/24/2024, Normal      Empagliflozin 25 MG TABS Take 1 tablet (25 mg total) by mouth daily, Starting Wed 6/26/2024, Until Mon 9/30/2024, Normal      ergocalciferol (VITAMIN D2) 50,000 units Take 1 capsule (50,000 Units total) by mouth once a week for 8 doses Do not start before June 29, 2024., Starting Sat 6/29/2024, Until Sun 8/18/2024, Normal      famotidine (PEPCID) 20 mg tablet Take 1 tablet (20 mg total) by mouth 2 (two) times a day, Starting Sun 11/17/2024, Normal      FLUoxetine (PROzac) 40 MG capsule Take 1 capsule (40 mg total) by mouth daily, Starting Wed 6/26/2024, Until Mon 9/30/2024, Normal      fluticasone (FLONASE) 50  mcg/act nasal spray Historical Med      folic acid (FOLVITE) 1 mg tablet Take 1 tablet (1 mg total) by mouth daily, Starting Wed 6/26/2024, Until Sun 8/25/2024, Normal      gabapentin (NEURONTIN) 300 mg capsule Take 2 capsules (600 mg total) by mouth 3 (three) times a day, Starting Tue 6/25/2024, Until Mon 9/30/2024, Normal      ketorolac (TORADOL) 10 mg tablet Take 1 tablet (10 mg total) by mouth every 6 (six) hours as needed for moderate pain for up to 5 days, Starting Wed 7/31/2024, Until Mon 9/30/2024 at 2359, Normal      linaCLOtide (Linzess) 72 MCG CAPS Take 1 capsule by mouth daily, Historical Med      mirtazapine (REMERON) 7.5 MG tablet Take 1 tablet (7.5 mg total) by mouth daily at bedtime, Starting Tue 6/25/2024, Until Mon 9/30/2024, Normal      nortriptyline (PAMELOR) 50 mg capsule Take 50 mg by mouth daily at bedtime, Starting Wed 7/10/2024, Until Thu 7/10/2025, Historical Med      Nutritional Supplements (ENSURE PO) Take 1 Can by mouth daily, Starting Mon 7/8/2024, Historical Med      omeprazole (PriLOSEC) 20 mg delayed release capsule Take 1 capsule (20 mg total) by mouth daily, Starting Sun 11/17/2024, Normal      ondansetron (ZOFRAN) 4 mg tablet Take 1 tablet (4 mg total) by mouth every 6 (six) hours, Starting Sun 11/17/2024, Normal      ondansetron (ZOFRAN-ODT) 4 mg disintegrating tablet Take 1 tablet (4 mg total) by mouth every 8 (eight) hours as needed for nausea or vomiting, Starting Tue 7/30/2024, Print      OneTouch Ultra test strip Historical Med      pantoprazole (PROTONIX) 40 mg tablet Take 40 mg by mouth daily, Historical Med      polyethylene glycol (GOLYTELY) 4000 mL solution Take 4,000 mL by mouth once for 1 dose, Starting Tue 11/5/2024, Normal      polyethylene glycol (MIRALAX) 17 g packet Take 238 g by mouth once for 1 dose Take as directed per office instructions prior to colonoscopy, Starting Mon 9/30/2024, Normal      QUEtiapine (SEROquel) 400 MG tablet Take 2 tablets (800 mg total)  by mouth daily at bedtime, Starting Tue 6/25/2024, Until Mon 9/30/2024, Normal      rizatriptan (MAXALT) 10 mg tablet Take 10 mg by mouth daily, Starting Mon 7/8/2024, Historical Med      sitaGLIPtin (JANUVIA) 100 mg tablet Take 1 tablet (100 mg total) by mouth daily, Starting Wed 6/26/2024, Until Mon 9/30/2024, Normal      sucralfate (CARAFATE) 1 g/10 mL suspension Take 10 mL (1 g total) by mouth 4 (four) times a day, Starting Tue 9/24/2024, Normal      tiZANidine (ZANAFLEX) 4 mg tablet Take 6 mg by mouth every 6 (six) hours as needed, Starting Fri 8/30/2024, Historical Med      topiramate (TOPAMAX) 25 mg tablet Take 1 tablet (25 mg total) by mouth 2 (two) times a day, Starting Tue 6/25/2024, Until Sat 8/24/2024, Normal      Tradjenta 5 MG TABS Take 5 mg by mouth daily, Starting Wed 7/10/2024, Until Thu 7/10/2025, Historical Med      Trulicity 1.5 MG/0.5ML injection Historical Med      varenicline (Chantix) 1 mg tablet Take 1 mg by mouth 2 (two) times a day, Starting Fri 7/26/2024, Historical Med      Varenicline Tartrate (CHANTIX STARTING MONTH JENNIFER PO) Historical Med      zolpidem (AMBIEN) 10 mg tablet Take 10 mg by mouth daily at bedtime as needed, Starting Mon 7/1/2024, Historical Med       !! - Potential duplicate medications found. Please discuss with provider.        No discharge procedures on file.  ED SEPSIS DOCUMENTATION   Time reflects when diagnosis was documented in both MDM as applicable and the Disposition within this note       Time User Action Codes Description Comment    12/23/2024  8:28 AM Sen Garcia Add [R10.9] Abdominal pain     12/23/2024  8:29 AM Sen Garcia Add [R10.11] Right upper quadrant abdominal pain                  Marii Brown PA-C  12/23/24 2013

## 2025-01-01 ENCOUNTER — HOSPITAL ENCOUNTER (EMERGENCY)
Facility: HOSPITAL | Age: 60
Discharge: ANOTHER HOSPITAL | End: 2025-01-01
Attending: EMERGENCY MEDICINE | Admitting: EMERGENCY MEDICINE
Payer: COMMERCIAL

## 2025-01-01 ENCOUNTER — APPOINTMENT (EMERGENCY)
Dept: CT IMAGING | Facility: HOSPITAL | Age: 60
End: 2025-01-01
Payer: COMMERCIAL

## 2025-01-01 VITALS
DIASTOLIC BLOOD PRESSURE: 52 MMHG | WEIGHT: 89.06 LBS | RESPIRATION RATE: 20 BRPM | BODY MASS INDEX: 16.29 KG/M2 | SYSTOLIC BLOOD PRESSURE: 103 MMHG | TEMPERATURE: 97.8 F | OXYGEN SATURATION: 95 % | HEART RATE: 101 BPM

## 2025-01-01 DIAGNOSIS — K56.609 SMALL BOWEL OBSTRUCTION (HCC): Primary | ICD-10-CM

## 2025-01-01 LAB
ABO GROUP BLD: NORMAL
ALBUMIN SERPL BCG-MCNC: 3.3 G/DL (ref 3.5–5)
ALP SERPL-CCNC: 203 U/L (ref 34–104)
ALT SERPL W P-5'-P-CCNC: 12 U/L (ref 7–52)
ANION GAP SERPL CALCULATED.3IONS-SCNC: 10 MMOL/L (ref 4–13)
AST SERPL W P-5'-P-CCNC: 19 U/L (ref 13–39)
ATRIAL RATE: 101 BPM
BACTERIA UR QL AUTO: ABNORMAL /HPF
BASOPHILS # BLD AUTO: 0.03 THOUSANDS/ΜL (ref 0–0.1)
BASOPHILS NFR BLD AUTO: 0 % (ref 0–1)
BILIRUB SERPL-MCNC: 0.42 MG/DL (ref 0.2–1)
BILIRUB UR QL STRIP: NEGATIVE
BLD GP AB SCN SERPL QL: NEGATIVE
BUN SERPL-MCNC: 24 MG/DL (ref 5–25)
CALCIUM ALBUM COR SERPL-MCNC: 9.9 MG/DL (ref 8.3–10.1)
CALCIUM SERPL-MCNC: 9.3 MG/DL (ref 8.4–10.2)
CARDIAC TROPONIN I PNL SERPL HS: <2 NG/L (ref ?–50)
CHLORIDE SERPL-SCNC: 89 MMOL/L (ref 96–108)
CLARITY UR: ABNORMAL
CO2 SERPL-SCNC: 32 MMOL/L (ref 21–32)
COLOR UR: YELLOW
CREAT SERPL-MCNC: 0.93 MG/DL (ref 0.6–1.3)
EOSINOPHIL # BLD AUTO: 0.02 THOUSAND/ΜL (ref 0–0.61)
EOSINOPHIL NFR BLD AUTO: 0 % (ref 0–6)
ERYTHROCYTE [DISTWIDTH] IN BLOOD BY AUTOMATED COUNT: 18.7 % (ref 11.6–15.1)
GFR SERPL CREATININE-BSD FRML MDRD: 68 ML/MIN/1.73SQ M
GLUCOSE SERPL-MCNC: 115 MG/DL (ref 65–140)
GLUCOSE SERPL-MCNC: 150 MG/DL (ref 65–140)
GLUCOSE UR STRIP-MCNC: ABNORMAL MG/DL
HCT VFR BLD AUTO: 36.5 % (ref 34.8–46.1)
HGB BLD-MCNC: 11.8 G/DL (ref 11.5–15.4)
HGB UR QL STRIP.AUTO: 25
IMM GRANULOCYTES # BLD AUTO: 0.04 THOUSAND/UL (ref 0–0.2)
IMM GRANULOCYTES NFR BLD AUTO: 1 % (ref 0–2)
KETONES UR STRIP-MCNC: NEGATIVE MG/DL
LEUKOCYTE ESTERASE UR QL STRIP: 25
LIPASE SERPL-CCNC: 22 U/L (ref 11–82)
LYMPHOCYTES # BLD AUTO: 1.47 THOUSANDS/ΜL (ref 0.6–4.47)
LYMPHOCYTES NFR BLD AUTO: 17 % (ref 14–44)
MCH RBC QN AUTO: 31.3 PG (ref 26.8–34.3)
MCHC RBC AUTO-ENTMCNC: 32.3 G/DL (ref 31.4–37.4)
MCV RBC AUTO: 97 FL (ref 82–98)
MONOCYTES # BLD AUTO: 0.48 THOUSAND/ΜL (ref 0.17–1.22)
MONOCYTES NFR BLD AUTO: 6 % (ref 4–12)
NEUTROPHILS # BLD AUTO: 6.57 THOUSANDS/ΜL (ref 1.85–7.62)
NEUTS SEG NFR BLD AUTO: 76 % (ref 43–75)
NITRITE UR QL STRIP: NEGATIVE
NON-SQ EPI CELLS URNS QL MICRO: ABNORMAL /HPF
NRBC BLD AUTO-RTO: 0 /100 WBCS
P AXIS: 63 DEGREES
PH UR STRIP.AUTO: 5 [PH]
PLATELET # BLD AUTO: 725 THOUSANDS/UL (ref 149–390)
PMV BLD AUTO: 8.3 FL (ref 8.9–12.7)
POTASSIUM SERPL-SCNC: 3.9 MMOL/L (ref 3.5–5.3)
PR INTERVAL: 132 MS
PROT SERPL-MCNC: 7.6 G/DL (ref 6.4–8.4)
PROT UR STRIP-MCNC: ABNORMAL MG/DL
QRS AXIS: -7 DEGREES
QRSD INTERVAL: 88 MS
QT INTERVAL: 386 MS
QTC INTERVAL: 501 MS
RBC # BLD AUTO: 3.77 MILLION/UL (ref 3.81–5.12)
RBC #/AREA URNS AUTO: ABNORMAL /HPF
RH BLD: NEGATIVE
SODIUM SERPL-SCNC: 131 MMOL/L (ref 135–147)
SP GR UR STRIP.AUTO: 1.01 (ref 1–1.04)
SPECIMEN EXPIRATION DATE: NORMAL
T WAVE AXIS: 61 DEGREES
UROBILINOGEN UA: NEGATIVE MG/DL
VENTRICULAR RATE: 101 BPM
WBC # BLD AUTO: 8.61 THOUSAND/UL (ref 4.31–10.16)
WBC #/AREA URNS AUTO: ABNORMAL /HPF

## 2025-01-01 PROCEDURE — 83690 ASSAY OF LIPASE: CPT

## 2025-01-01 PROCEDURE — 96375 TX/PRO/DX INJ NEW DRUG ADDON: CPT

## 2025-01-01 PROCEDURE — 84484 ASSAY OF TROPONIN QUANT: CPT

## 2025-01-01 PROCEDURE — 80053 COMPREHEN METABOLIC PANEL: CPT

## 2025-01-01 PROCEDURE — 74177 CT ABD & PELVIS W/CONTRAST: CPT

## 2025-01-01 PROCEDURE — 81001 URINALYSIS AUTO W/SCOPE: CPT

## 2025-01-01 PROCEDURE — 96361 HYDRATE IV INFUSION ADD-ON: CPT

## 2025-01-01 PROCEDURE — 99285 EMERGENCY DEPT VISIT HI MDM: CPT

## 2025-01-01 PROCEDURE — 93005 ELECTROCARDIOGRAM TRACING: CPT

## 2025-01-01 PROCEDURE — 36415 COLL VENOUS BLD VENIPUNCTURE: CPT

## 2025-01-01 PROCEDURE — 86901 BLOOD TYPING SEROLOGIC RH(D): CPT

## 2025-01-01 PROCEDURE — 85025 COMPLETE CBC W/AUTO DIFF WBC: CPT

## 2025-01-01 PROCEDURE — 82948 REAGENT STRIP/BLOOD GLUCOSE: CPT

## 2025-01-01 PROCEDURE — 86850 RBC ANTIBODY SCREEN: CPT

## 2025-01-01 PROCEDURE — 96374 THER/PROPH/DIAG INJ IV PUSH: CPT

## 2025-01-01 PROCEDURE — 81003 URINALYSIS AUTO W/O SCOPE: CPT

## 2025-01-01 PROCEDURE — 86900 BLOOD TYPING SEROLOGIC ABO: CPT

## 2025-01-01 PROCEDURE — 93010 ELECTROCARDIOGRAM REPORT: CPT | Performed by: STUDENT IN AN ORGANIZED HEALTH CARE EDUCATION/TRAINING PROGRAM

## 2025-01-01 RX ORDER — SODIUM CHLORIDE 9 MG/ML
150 INJECTION, SOLUTION INTRAVENOUS CONTINUOUS
Status: DISCONTINUED | OUTPATIENT
Start: 2025-01-01 | End: 2025-01-01 | Stop reason: HOSPADM

## 2025-01-01 RX ORDER — FENTANYL CITRATE 50 UG/ML
25 INJECTION, SOLUTION INTRAMUSCULAR; INTRAVENOUS ONCE
Refills: 0 | Status: CANCELLED | OUTPATIENT
Start: 2025-01-01 | End: 2025-01-01

## 2025-01-01 RX ORDER — FENTANYL CITRATE 50 UG/ML
25 INJECTION, SOLUTION INTRAMUSCULAR; INTRAVENOUS ONCE AS NEEDED
Refills: 0 | Status: COMPLETED | OUTPATIENT
Start: 2025-01-01 | End: 2025-01-01

## 2025-01-01 RX ORDER — KETOROLAC TROMETHAMINE 30 MG/ML
15 INJECTION, SOLUTION INTRAMUSCULAR; INTRAVENOUS ONCE
Status: COMPLETED | OUTPATIENT
Start: 2025-01-01 | End: 2025-01-01

## 2025-01-01 RX ORDER — ONDANSETRON 2 MG/ML
4 INJECTION INTRAMUSCULAR; INTRAVENOUS ONCE
Status: COMPLETED | OUTPATIENT
Start: 2025-01-01 | End: 2025-01-01

## 2025-01-01 RX ORDER — POTASSIUM CHLORIDE 14.9 MG/ML
20 INJECTION INTRAVENOUS ONCE
Status: DISCONTINUED | OUTPATIENT
Start: 2025-01-01 | End: 2025-01-01

## 2025-01-01 RX ADMIN — KETOROLAC TROMETHAMINE 15 MG: 30 INJECTION, SOLUTION INTRAMUSCULAR; INTRAVENOUS at 12:42

## 2025-01-01 RX ADMIN — IOHEXOL 50 ML: 240 INJECTION, SOLUTION INTRATHECAL; INTRAVASCULAR; INTRAVENOUS; ORAL at 12:27

## 2025-01-01 RX ADMIN — SODIUM CHLORIDE 100 ML/HR: 0.9 INJECTION, SOLUTION INTRAVENOUS at 12:52

## 2025-01-01 RX ADMIN — IOHEXOL 80 ML: 350 INJECTION, SOLUTION INTRAVENOUS at 12:27

## 2025-01-01 RX ADMIN — SODIUM CHLORIDE 500 ML: 0.9 INJECTION, SOLUTION INTRAVENOUS at 14:04

## 2025-01-01 RX ADMIN — ONDANSETRON 4 MG: 2 INJECTION INTRAMUSCULAR; INTRAVENOUS at 12:41

## 2025-01-01 RX ADMIN — SODIUM CHLORIDE 1000 ML: 0.9 INJECTION, SOLUTION INTRAVENOUS at 11:38

## 2025-01-01 RX ADMIN — FENTANYL CITRATE 25 MCG: 50 INJECTION, SOLUTION INTRAMUSCULAR; INTRAVENOUS at 13:59

## 2025-01-01 NOTE — ED NOTES
During NG tube insertion, pt grabbed tube and removed at start of insertion and stated she does not want the ng tube placed. Provider aware.        Esperanza Mckeon RN  01/01/25 0769

## 2025-01-01 NOTE — ED PROVIDER NOTES
Time reflects when diagnosis was documented in both MDM as applicable and the Disposition within this note       Time User Action Codes Description Comment    1/1/2025  1:21 PM Jeannette Bledsoe Add [K56.609] Small bowel obstruction (HCC)           ED Disposition       ED Disposition   Transfer to Another Facility - Out of Network    Condition   --    Date/Time   Wed Jan 1, 2025  3:32 PM    Comment   Edith Klein should be transferred out to [facility].               Assessment & Plan       Medical Decision Making  Differential diagnosis includes but is not limited to obstruction, perforation, Appendicitis, viral syndrome, constipation, ACS, mesenteric ischemia, mesenteric adenitis, pancreatitis, choledocholithiasis, hepatitis, ileus, gastritis, colitis, malignancy, perforation.     pmh of History of Arsh-en-Y gastric bypass (with Dr. Prince in 2017 at [facility]) with complications of subsequent excessive weight loss, severe protein-calorie malnutrition, fistula, and anastomotic ulcer. reports 3 weeks of constipation. chronic abdominal pain with recent worsening. distention, vomiting x 2 days.  Vitals unstable presentation with hypotension, tachycardia, improved with IV fluid rehydration.  ECG without acute ischemic changes or dysrhythmia, troponin within normal limits.  No anemia.  No leukocytosis.  Afebrile.  Ct with IV and PO contrast showed small bowel obstruction, repeat demonstrated other findings as listed.  Made NPO.  NG tube ordered.  Patient later refused, see ED course.  Discussed with general surgeon, bariatric surgeon who recommended transfer to outside hospital to be evaluated by bariatric surgeon who is going to possibly reverse bariatric surgery and scheduled procedure later this month.     All imaging and/or lab testing discussed with patient. Patient and/or family members verbalizes understanding and agrees with plan for transfer and admission. Patient is stable for transfer at time of sign out.    "  Portions of the record may have been created with voice recognition software. Occasional wrong word or \"sound a like\" substitutions may have occurred due to the inherent limitations of voice recognition software. Read the chart carefully and recognize, using context, where substitutions have occurred.            Amount and/or Complexity of Data Reviewed  Labs: ordered. Decision-making details documented in ED Course.  Radiology: ordered.    Risk  Prescription drug management.        ED Course as of 01/01/25 1811   Wed Jan 01, 2025   1127 Reports abdominal distention is new over past few days. Pain x 1 year. Felt dizzy today. Vomited this AM. Still passing gas. No stools.    1146 Hemoglobin: 11.8   1212 Blood Pressure: 98/60   1227 Procedure Note: EKG  Date/Time: 01/01/25 12:28 PM   Performed by: Jeannette Bledsoe   Authorized by: Jeannette Bledsoe  ECG interpreted by me, the ED Provider: yes   The EKG demonstrates:  Rate 101 bpm  Rhythm sinus tachycardia   QTc 497 ms   No ST elevations/depressions  Non specific t wave abnormality    1524 Patient refuses NG tube. Able to verbalize risks of refusal and benefits of the NG tube. Still refuses.     Discussed with Bariatric Surgeon Dr. Beck who is okay with the refusal.        Medications   sodium chloride 0.9 % infusion (150 mL/hr Intravenous Rate/Dose Change 1/1/25 1428)   sodium chloride 0.9 % bolus 1,000 mL (0 mL Intravenous Stopped 1/1/25 1239)   ondansetron (ZOFRAN) injection 4 mg (4 mg Intravenous Given 1/1/25 1241)   iohexol (OMNIPAQUE) 240 MG/ML solution 50 mL (50 mL Oral Given 1/1/25 1227)   ketorolac (TORADOL) injection 15 mg (15 mg Intravenous Given 1/1/25 1242)   iohexol (OMNIPAQUE) 350 MG/ML injection (MULTI-DOSE) 80 mL (80 mL Intravenous Given 1/1/25 1227)   fentaNYL injection 25 mcg (25 mcg Intravenous Given 1/1/25 1359)   sodium chloride 0.9 % bolus 500 mL (0 mL Intravenous Stopped 1/1/25 1525)       ED Risk Strat Scores                          SBIRT 20yo+  "     Flowsheet Row Most Recent Value   Initial Alcohol Screen: US AUDIT-C     1. How often do you have a drink containing alcohol? 0 Filed at: 01/01/2025 1118   2. How many drinks containing alcohol do you have on a typical day you are drinking?  0 Filed at: 01/01/2025 1118   3a. Male UNDER 65: How often do you have five or more drinks on one occasion? 0 Filed at: 01/01/2025 1118   3b. FEMALE Any Age, or MALE 65+: How often do you have 4 or more drinks on one occassion? 0 Filed at: 01/01/2025 1118   Audit-C Score 0 Filed at: 01/01/2025 1118   SARITA: How many times in the past year have you...    Used an illegal drug or used a prescription medication for non-medical reasons? Never Filed at: 01/01/2025 1118                            History of Present Illness       Chief Complaint   Patient presents with    Abdominal Pain     Pt reports right sided abdominal pain with abdominal bloating. Pt reports she has not had a bm in 3 weeks. Pt is scheduled for abdominal surgery on 01/23/25 for reversal of her gastric bypass.        Past Medical History:   Diagnosis Date    Autoimmune hepatitis (HCC)     Bipolar 1 disorder (HCC)     Chronic headaches 2021    Diabetes mellitus (HCC)     Kidney stone     Renal disorder     kidney stones    Seizure disorder (HCC)       Past Surgical History:   Procedure Laterality Date    BARIATRIC SURGERY  05/2017    BUNIONECTOMY      COLONOSCOPY      CYSTOSCOPY  07/12/2018    Stent Removal    FL RETROGRADE PYELOGRAM  06/16/2022    GASTRIC BYPASS  05/22/2017    HIATAL HERNIA REPAIR  05/22/2017    HYSTERECTOMY      JOINT REPLACEMENT      KIDNEY STONE SURGERY      GA CYSTO/URETERO W/LITHOTRIPSY &INDWELL STENT INSRT Bilateral 07/06/2018    Procedure: CYSTOSCOPY GALDINO. URETEROSCOPY;GALDINO.  RETROGRADE PYELOGRAM AND INSERTION GALDINO.STENT URETERAL;  Surgeon: Jacob Gerber MD;  Location:  MAIN OR;  Service: Urology    GA CYSTO/URETERO W/LITHOTRIPSY &INDWELL STENT INSRT Left 06/16/2022    Procedure: CYSTOSCOPY  URETEROSCOPY WITH LITHOTRIPSY HOLMIUM LASER, RETROGRADE PYELOGRAM AND INSERTION STENT URETERAL;  Surgeon: Asad Geiger MD;  Location: [facility];  Service: Urology    TOTAL SHOULDER REPLACEMENT  2002    VAGINAL HYSTERECTOMY      PARTIAL      Family History   Problem Relation Age of Onset    Diabetes Father     Heart disease Father     Diabetes Mother     Heart disease Mother     Diabetes Sister     Diabetes Family         MELLITUS    Depression Family     Mental illness Family     Obesity Family     Osteoarthritis Family       Social History     Tobacco Use    Smoking status: Every Day     Current packs/day: 0.25     Average packs/day: 0.3 packs/day for 30.0 years (7.5 ttl pk-yrs)     Types: Cigarettes     Passive exposure: Current    Smokeless tobacco: Never    Tobacco comments:     N/a   Vaping Use    Vaping status: Never Used   Substance Use Topics    Alcohol use: No     Comment: n/a    Drug use: No     Comment: n/a      E-Cigarette/Vaping    E-Cigarette Use Never User       E-Cigarette/Vaping Substances    Nicotine No     THC No     CBD No     Flavoring No     Other No     Unknown No       I have reviewed and agree with the history as documented.     59-year-old female past medical history of diabetes mellitus, nephrolithiasis, psychiatric disorder, autoimmune hepatitis, malnutrition, presents to emergency department complaining of abdominal pain.  Reports chronic pain, generalized, worse of right upper quadrant.  Worsening for past few weeks.  Reports abdominal distention is new over past few days. Pain x 1 year. Felt dizzy today. Vomited this AM. Still passing gas. No stools x 3 weeks.. has still been eating and drinking. Denies any bleeding.       History provided by:  Patient and medical records  Abdominal Pain  Associated symptoms: constipation, nausea and vomiting    Associated symptoms: no chest pain, no chills, no cough, no diarrhea, no dysuria, no fever, no hematemesis, no hematochezia, no  hematuria, no melena, no shortness of breath, no vaginal bleeding and no vaginal discharge        Review of Systems   Constitutional:  Negative for chills, diaphoresis and fever.   Respiratory:  Negative for cough and shortness of breath.    Cardiovascular:  Negative for chest pain.   Gastrointestinal:  Positive for abdominal pain, constipation, nausea and vomiting. Negative for abdominal distention, blood in stool, diarrhea, hematemesis, hematochezia and melena.   Genitourinary:  Negative for difficulty urinating, dysuria, hematuria, vaginal bleeding and vaginal discharge.   Musculoskeletal:  Negative for back pain.   Neurological:  Positive for dizziness. Negative for seizures, syncope, weakness, numbness and headaches.   All other systems reviewed and are negative.          Objective       ED Triage Vitals   Temperature Pulse Blood Pressure Respirations SpO2 Patient Position - Orthostatic VS   01/01/25 1122 01/01/25 1122 01/01/25 1122 01/01/25 1122 01/01/25 1122 01/01/25 1122   97.8 °F (36.6 °C) (!) 115 (!) 89/60 18 97 % Lying      Temp Source Heart Rate Source BP Location FiO2 (%) Pain Score    01/01/25 1122 01/01/25 1230 01/01/25 1122 -- 01/01/25 1242    Oral Monitor Left arm  10 - Worst Possible Pain      Vitals      Date and Time Temp Pulse SpO2 Resp BP Pain Score FACES Pain Rating User   01/01/25 1800 -- 101 95 % 20 103/52 -- --    01/01/25 1700 -- 98 94 % 18 107/54 -- --    01/01/25 1554 -- 101 95 % 20 111/60 -- --    01/01/25 1500 -- 104 98 % 20 101/50 -- --    01/01/25 1400 -- 106 95 % 18 94/55 -- --    01/01/25 1359 -- -- -- -- -- 8 --    01/01/25 1300 -- 94 95 % 18 98/61 -- --    01/01/25 1242 -- -- -- -- -- 10 - Worst Possible Pain --    01/01/25 1230 -- 98 97 % 18 100/61 -- --    01/01/25 1207 -- -- -- -- 98/60 -- -- KR   01/01/25 1122 97.8 °F (36.6 °C) 115 97 % 18 89/60 -- -- JN            Physical Exam  Vitals and nursing note reviewed.   Constitutional:       General: She is not  in acute distress.     Appearance: Normal appearance. She is cachectic. She is not toxic-appearing or diaphoretic.   HENT:      Mouth/Throat:      Pharynx: Oropharynx is clear.   Cardiovascular:      Rate and Rhythm: Regular rhythm. Tachycardia present.      Heart sounds: Normal heart sounds.   Pulmonary:      Effort: Pulmonary effort is normal. No respiratory distress.      Breath sounds: Normal breath sounds.   Abdominal:      General: There is distension.      Palpations: Abdomen is soft.      Tenderness: There is generalized abdominal tenderness. There is no right CVA tenderness, left CVA tenderness, guarding or rebound.   Skin:     General: Skin is warm and dry.      Capillary Refill: Capillary refill takes less than 2 seconds.      Findings: No rash.   Neurological:      Mental Status: She is alert.      Gait: Gait normal.         Results Reviewed       Procedure Component Value Units Date/Time    Fingerstick Glucose (POCT) [[id number]]  (Normal) Collected: 01/01/25 1625    Lab Status: Final result Specimen: Blood Updated: 01/01/25 1626     POC Glucose 115 mg/dl     Urine Microscopic [[id number]]  (Abnormal) Collected: 01/01/25 1323    Lab Status: Final result Specimen: Urine, Clean Catch Updated: 01/01/25 1338     RBC, UA 1-2 /hpf      WBC, UA 2-4 /hpf      Epithelial Cells Moderate /hpf      Bacteria, UA Occasional /hpf     UA w Reflex to Microscopic w Reflex to Culture [[id number]]  (Abnormal) Collected: 01/01/25 1323    Lab Status: Final result Specimen: Urine, Clean Catch Updated: 01/01/25 1330     Color, UA Yellow     Clarity, UA Cloudy     Specific Gravity, UA 1.010     pH, UA 5.0     Leukocytes, UA 25.0     Nitrite, UA Negative     Protein, UA 15 (Trace) mg/dl      Glucose,  (1/4%) mg/dl      Ketones, UA Negative mg/dl      Bilirubin, UA Negative     Occult Blood, UA 25.0     UROBILINOGEN UA Negative mg/dL     HS Troponin 0hr (reflex protocol) [[id number]]  (Normal) Collected: 01/01/25 1138    Lab  Status: Final result Specimen: Blood from Arm, Left Updated: 01/01/25 1207     hs TnI 0hr <2 ng/L     Comprehensive metabolic panel [[id number]]  (Abnormal) Collected: 01/01/25 1138    Lab Status: Final result Specimen: Blood from Arm, Left Updated: 01/01/25 1202     Sodium 131 mmol/L      Potassium 3.9 mmol/L      Chloride 89 mmol/L      CO2 32 mmol/L      ANION GAP 10 mmol/L      BUN 24 mg/dL      Creatinine 0.93 mg/dL      Glucose 150 mg/dL      Calcium 9.3 mg/dL      Corrected Calcium 9.9 mg/dL      AST 19 U/L      ALT 12 U/L      Alkaline Phosphatase 203 U/L      Total Protein 7.6 g/dL      Albumin 3.3 g/dL      Total Bilirubin 0.42 mg/dL      eGFR 68 ml/min/1.73sq m     Narrative:      National Kid[employer] Disease Foundation guidelines for Chronic Kidney Disease (CKD):     Stage 1 with normal or high GFR (GFR > 90 mL/min/1.73 square meters)    Stage 2 Mild CKD (GFR = 60-89 mL/min/1.73 square meters)    Stage 3A Moderate CKD (GFR = 45-59 mL/min/1.73 square meters)    Stage 3B Moderate CKD (GFR = 30-44 mL/min/1.73 square meters)    Stage 4 Severe CKD (GFR = 15-29 mL/min/1.73 square meters)    Stage 5 End Stage CKD (GFR <15 mL/min/1.73 square meters)  Note: GFR calculation is accurate only with a steady state creatinine    Lipase [[id number]]  (Normal) Collected: 01/01/25 1138    Lab Status: Final result Specimen: Blood from Arm, Left Updated: 01/01/25 1202     Lipase 22 u/L     CBC and differential [[id number]]  (Abnormal) Collected: 01/01/25 1138    Lab Status: Final result Specimen: Blood from Arm, Left Updated: 01/01/25 1145     WBC 8.61 Thousand/uL      RBC 3.77 Million/uL      Hemoglobin 11.8 g/dL      Hematocrit 36.5 %      MCV 97 fL      MCH 31.3 pg      MCHC 32.3 g/dL      RDW 18.7 %      MPV 8.3 fL      Platelets 725 Thousands/uL      nRBC 0 /100 WBCs      Segmented % 76 %      Immature Grans % 1 %      Lymphocytes % 17 %      Monocytes % 6 %      Eosinophils Relative 0 %      Basophils Relative 0 %       Absolute Neutrophils 6.57 Thousands/µL      Absolute Immature Grans 0.04 Thousand/uL      Absolute Lymphocytes 1.47 Thousands/µL      Absolute Monocytes 0.48 Thousand/µL      Eosinophils Absolute 0.02 Thousand/µL      Basophils Absolute 0.03 Thousands/µL             CT abdomen pelvis with contrast   Final Interpretation by Umberto Reveles MD (01/01 1305)   Addendum (preliminary) 1 of 1 by Umberto Reveles MD (01/01 1305)   ADDENDUM:         Findings communicated to ANDRE LANTIGUA on 1/1/2025 1:05 PM via secure    chat.            Final      1.  New small bowel obstruction. Discrete transition point is difficult to ascertain, but is felt to be in the right lower abdomen. Etiology of the obstruction is unclear.   2.  Colonic distention with large stool burden, correlate clinically for constipation.   3.  Status post gastric bypass. 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   4.  Redemonstrated hiatal hernia with contrast in the lower thoracic esophagus which could be seen with esophagitis and reflux.   5.  Tree-in-bud opacities paramediastinal right lower lobe and right middle lobe, correlate clinically for small airways disease/bronchiolitis.   6.  Severe diffuse atherosclerotic disease. Potentially occlusive plaque or high-grade stenosis at the iliac bifurcation, similar to prior exams.         The study was marked in EPIC for immediate notification.      Workstation performed: [id number]             Procedures    ED Medication and Procedure Management   Prior to Admission Medications   Prescriptions Last Dose Informant Patient Reported? Taking?   Aspirin Low Dose 81 MG EC tablet  Self Yes No   Sig: Take 81 mg by mouth daily   Empagliflozin 25 MG TABS  Self No No   Sig: Take 1 tablet (25 mg total) by mouth daily   FLUoxetine (PROzac) 40 MG capsule  Self No No   Sig: Take 1 capsule (40 mg total) by mouth daily   Nutritional Supplements (ENSURE PO)  Self Yes No   Sig:  Take 1 Can by mouth daily   OneTouch Ultra test strip  Self Yes No   QUEtiapine (SEROquel) 400 MG tablet  Self No No   Sig: Take 2 tablets (800 mg total) by mouth daily at bedtime   Tradjenta 5 MG TABS  Self Yes No   Sig: Take 5 mg by mouth daily   Patient not taking: Reported on 9/30/2024   Trulicity 1.5 MG/0.5ML injection  Self Yes No   Patient not taking: Reported on 7/7/2024   Varenicline Tartrate (CHANTIX STARTING MONTH JENNIFER PO)  Self Yes No   Patient not taking: Reported on 7/7/2024   albuterol (PROVENTIL HFA,VENTOLIN HFA) 90 mcg/act inhaler  Self Yes No   Sig: Inhale 2 puffs every 4 (four) hours as needed for wheezing or shortness of breath   aluminum-magnesium hydroxide 200-200 MG/5ML suspension   No No   Sig: Take 10 mL by mouth every 6 (six) hours as needed for heartburn   amitriptyline (ELAVIL) 25 mg tablet  Self Yes No   Sig: Take 25 mg by mouth daily at bedtime   amitriptyline (ELAVIL) 50 mg tablet  Self Yes No   Sig: Take 50 mg by mouth every evening   ammonium lactate (LAC-HYDRIN) 12 % lotion  Self Yes No   Sig: Apply topically daily   azaTHIOprine (IMURAN) 50 mg tablet  Self Yes No   Sig: Take 100 mg by mouth daily   bisacodyl (DULCOLAX) 5 mg EC tablet   No No   Sig: Take as directed as per written office instructions   bisacodyl (DULCOLAX) 5 mg EC tablet   No No   Sig: Take as directed prior to colonoscopy   bismuth subsalicylate (PEPTO BISMOL) 262 MG chewable tablet  Self Yes No   Sig: Chew 524 mg every hour as needed   buprenorphine-naloxone (SUBOXONE) 2-0.5 mg per SL tablet  Self Yes No   Sig: Place 1 tablet under the tongue Three times a day   cyanocobalamin (VITAMIN B-12) 1000 MCG tablet   No No   Sig: Take 1 tablet (1,000 mcg total) by mouth daily   Patient not taking: Reported on 7/7/2024   dapagliflozin (Farxiga) 10 MG tablet  Self Yes No   Sig: Take 10 mg by mouth daily   Patient not taking: Reported on 9/30/2024   dicyclomine (BENTYL) 20 mg tablet  Self No No   Sig: Take 1 tablet (20 mg  total) by mouth 2 (two) times a day   ergocalciferol (VITAMIN D2) 50,000 units   No No   Sig: Take 1 capsule (50,000 Units total) by mouth once a week for 8 doses Do not start before June 29, 2024.   Patient not taking: Reported on 7/7/2024   famotidine (PEPCID) 20 mg tablet   No No   Sig: Take 1 tablet (20 mg total) by mouth 2 (two) times a day   fluticasone (FLONASE) 50 mcg/act nasal spray  Self Yes No   Patient not taking: Reported on 9/30/2024   folic acid (FOLVITE) 1 mg tablet   No No   Sig: Take 1 tablet (1 mg total) by mouth daily   Patient not taking: Reported on 7/7/2024   gabapentin (NEURONTIN) 300 mg capsule  Self No No   Sig: Take 2 capsules (600 mg total) by mouth 3 (three) times a day   ketorolac (TORADOL) 10 mg tablet  Self No No   Sig: Take 1 tablet (10 mg total) by mouth every 6 (six) hours as needed for moderate pain for up to 5 days   Patient not taking: Reported on 9/30/2024   linaCLOtide (Linzess) 72 MCG CAPS  Self Yes No   Sig: Take 1 capsule by mouth daily   mirtazapine (REMERON) 7.5 MG tablet  Self No No   Sig: Take 1 tablet (7.5 mg total) by mouth daily at bedtime   nortriptyline (PAMELOR) 50 mg capsule  Self Yes No   Sig: Take 50 mg by mouth daily at bedtime   omeprazole (PriLOSEC) 20 mg delayed release capsule   No No   Sig: Take 1 capsule (20 mg total) by mouth daily   ondansetron (ZOFRAN) 4 mg tablet   No No   Sig: Take 1 tablet (4 mg total) by mouth every 6 (six) hours   ondansetron (ZOFRAN-ODT) 4 mg disintegrating tablet  Self No No   Sig: Take 1 tablet (4 mg total) by mouth every 8 (eight) hours as needed for nausea or vomiting   ondansetron (ZOFRAN-ODT) 4 mg disintegrating tablet  Self No No   Sig: Take 1 tablet (4 mg total) by mouth every 8 (eight) hours as needed for nausea for up to 10 days   pantoprazole (PROTONIX) 40 mg tablet  Self Yes No   Sig: Take 40 mg by mouth daily   polyethylene glycol (GOLYTELY) 4000 mL solution   No No   Sig: Take 4,000 mL by mouth once for 1 dose    polyethylene glycol (MIRALAX) 17 g packet   No No   Sig: Take 238 g by mouth once for 1 dose Take as directed per office instructions prior to colonoscopy   rizatriptan (MAXALT) 10 mg tablet  Self Yes No   Sig: Take 10 mg by mouth daily   sitaGLIPtin (JANUVIA) 100 mg tablet  Self No No   Sig: Take 1 tablet (100 mg total) by mouth daily   sucralfate (CARAFATE) 1 g/10 mL suspension  Self No No   Sig: Take 10 mL (1 g total) by mouth 4 (four) times a day   Patient not taking: Reported on 9/30/2024   tiZANidine (ZANAFLEX) 4 mg tablet  Self Yes No   Sig: Take 6 mg by mouth every 6 (six) hours as needed   topiramate (TOPAMAX) 25 mg tablet   No No   Sig: Take 1 tablet (25 mg total) by mouth 2 (two) times a day   Patient not taking: Reported on 9/30/2024   varenicline (Chantix) 1 mg tablet  Self Yes No   Sig: Take 1 mg by mouth 2 (two) times a day   zolpidem (AMBIEN) 10 mg tablet  Self Yes No   Sig: Take 10 mg by mouth daily at bedtime as needed      Facility-Administered Medications: None     Patient's Medications   Discharge Prescriptions    No medications on file     No discharge procedures on file.  ED SEPSIS DOCUMENTATION   Time reflects when diagnosis was documented in both MDM as applicable and the Disposition within this note       Time User Action Codes Description Comment    1/1/2025  1:21 PM Jeannette Bledsoe Add [K56.609] Small bowel obstruction (HCC)                  Jeannette Bledsoe PA-C  01/01/25 7581

## 2025-01-01 NOTE — ED NOTES
Pt provided scrub pants, underwear and wipes to clean up after pt urinated herself.      Esperanza Mckeon RN  01/01/25 [id number]

## 2025-01-01 NOTE — ED NOTES
Fluids administered with pressure bag per provider request.       Esperanza Mckeon RN  01/01/25 [id number]

## 2025-01-01 NOTE — ED NOTES
Report called to [facility] at [phone]. Spoke with Dalia RN.      Esperanza cMkeon RN  01/01/25 7729     no

## 2025-01-01 NOTE — EMTALA/ACUTE CARE TRANSFER
[facility] EMERGENCY DEPARTMENT  [address] [address]  [phone]  Dept: [phone]      EMTALA TRANSFER CONSENT    NAME Edith Klein                                         1965                              MRN [id number]    I have been informed of my rights regarding examination, treatment, and transfer   by Dr. Abraham Robertson MD    Benefits: Specialized equipment and/or services available at the receiving facility (Include comment)________________________ (Bariatric Surgery)    Risks: Potential deterioration of medical condition, Loss of IV, Possible worsening of condition or death during transfer, Increased discomfort during transfer, Potential for delay in receiving treatment      Consent for Transfer:  I acknowledge that my medical condition has been evaluated and explained to me by the emergency department physician or other qualified medical person and/or my attending physician, who has recommended that I be transferred to the service of  Accepting Physician: Dr. Beck at Accepting Facility Name, City & State : [facility]. The above potential benefits of such transfer, the potential risks associated with such transfer, and the probable risks of not being transferred have been explained to me, and I fully understand them.  The doctor has explained that, in my case, the benefits of transfer outweigh the risks.  I agree to be transferred.    I authorize the performance of emergency medical procedures and treatments upon me in both transit and upon arrival at the receiving facility.  Additionally, I authorize the release of any and all medical records to the receiving facility and request they be transported with me, if possible.  I understand that the safest mode of transportation during a medical emergency is an ambulance and that the Hospital advocates the use of this mode of transport. Risks of traveling to the receiving facility by car,  including absence of medical control, life sustaining equipment, such as oxygen, and medical personnel has been explained to me and I fully understand them.    (RIP CORRECT BOX BELOW)  [  ]  I consent to the stated transfer and to be transported by ambulance/helicopter.  [  ]  I consent to the stated transfer, but refuse transportation by ambulance and accept full responsibility for my transportation by car.  I understand the risks of non-ambulance transfers and I exonerate the Hospital and its staff from any deterioration in my condition that results from this refusal.    X___________________________________________    DATE  25  TIME________  Signature of patient or legally responsible individual signing on patient behalf           RELATIONSHIP TO PATIENT_________________________          Provider Certification    NAME Edith Klein                                         1965                              MRN [id number]    A medical screening exam was performed on the above named patient.  Based on the examination:    Condition Necessitating Transfer The encounter diagnosis was Small bowel obstruction (HCC).    Patient Condition: The patient has been stabilized such that within reasonable medical probability, no material deterioration of the patient condition or the condition of the unborn child(anastacio) is likely to result from the transfer    Reason for Transfer: Other (Include comment)____________________ (Need for bariatrics)    Transfer Requirements: Facility [facility]   Space available and qualified personnel available for treatment as acknowledged by    Agreed to accept transfer and to provide appropriate medical treatment as acknowledged by       Dr. Beck  Appropriate medical records of the examination and treatment of the patient are provided at the time of transfer   STAFF INITIAL WHEN COMPLETED _______  Transfer will be performed by qualified personnel from    and appropriate transfer  equipment as required, including the use of necessary and appropriate life support measures.    Provider Certification: I have examined the patient and explained the following risks and benefits of being transferred/refusing transfer to the patient/family:  General risk, such as traffic hazards, adverse weather conditions, rough terrain or turbulence, possible failure of equipment (including vehicle or aircraft), or consequences of actions of persons outside the control of the transport personnel, Unanticipated needs of medical equipment and personnel during transport, Risk of worsening condition, The possibility of a transport vehicle being unavailable      Based on these reasonable risks and benefits to the patient and/or the unborn child(anastacio), and based upon the information available at the time of the patient’s examination, I certify that the medical benefits reasonably to be expected from the provision of appropriate medical treatments at another medical facility outweigh the increasing risks, if any, to the individual’s medical condition, and in the case of labor to the unborn child, from effecting the transfer.    X____________________________________________ DATE 01/01/25        TIME_______      ORIGINAL - SEND TO MEDICAL RECORDS   COPY - SEND WITH PATIENT DURING TRANSFER

## 2025-01-01 NOTE — ED NOTES
Transporter stated BLS needs to be changed to ALS for transportation due to continuous fluids and cardiac monitoring. Provider aware. PACS notified.        Esperanza Mckeon RN  01/01/25 [id number]

## 2025-01-07 ENCOUNTER — HOSPITAL ENCOUNTER (EMERGENCY)
Facility: HOSPITAL | Age: 60
Discharge: HOME | End: 2025-01-07
Attending: EMERGENCY MEDICINE | Admitting: EMERGENCY MEDICINE
Payer: COMMERCIAL

## 2025-01-07 ENCOUNTER — APPOINTMENT (EMERGENCY)
Dept: CT IMAGING | Facility: HOSPITAL | Age: 60
End: 2025-01-07
Payer: COMMERCIAL

## 2025-01-07 VITALS
RESPIRATION RATE: 18 BRPM | WEIGHT: 88.18 LBS | OXYGEN SATURATION: 97 % | SYSTOLIC BLOOD PRESSURE: 103 MMHG | BODY MASS INDEX: 16.13 KG/M2 | DIASTOLIC BLOOD PRESSURE: 65 MMHG | HEART RATE: 95 BPM | TEMPERATURE: 98.2 F

## 2025-01-07 DIAGNOSIS — R10.84 GENERALIZED ABDOMINAL PAIN: Primary | ICD-10-CM

## 2025-01-07 LAB
ALBUMIN SERPL BCG-MCNC: 2.7 G/DL (ref 3.5–5)
ALP SERPL-CCNC: 190 U/L (ref 34–104)
ALT SERPL W P-5'-P-CCNC: 15 U/L (ref 7–52)
ANION GAP SERPL CALCULATED.3IONS-SCNC: 8 MMOL/L (ref 4–13)
AST SERPL W P-5'-P-CCNC: 16 U/L (ref 13–39)
BASOPHILS # BLD AUTO: 0.02 THOUSANDS/ΜL (ref 0–0.1)
BASOPHILS NFR BLD AUTO: 0 % (ref 0–1)
BILIRUB SERPL-MCNC: 0.31 MG/DL (ref 0.2–1)
BUN SERPL-MCNC: 15 MG/DL (ref 5–25)
CALCIUM ALBUM COR SERPL-MCNC: 9.4 MG/DL (ref 8.3–10.1)
CALCIUM SERPL-MCNC: 8.4 MG/DL (ref 8.4–10.2)
CHLORIDE SERPL-SCNC: 94 MMOL/L (ref 96–108)
CO2 SERPL-SCNC: 32 MMOL/L (ref 21–32)
CREAT SERPL-MCNC: 0.6 MG/DL (ref 0.6–1.3)
EOSINOPHIL # BLD AUTO: 0 THOUSAND/ΜL (ref 0–0.61)
EOSINOPHIL NFR BLD AUTO: 0 % (ref 0–6)
ERYTHROCYTE [DISTWIDTH] IN BLOOD BY AUTOMATED COUNT: 18.4 % (ref 11.6–15.1)
GFR SERPL CREATININE-BSD FRML MDRD: 101 ML/MIN/1.73SQ M
GLUCOSE SERPL-MCNC: 174 MG/DL (ref 65–140)
HCT VFR BLD AUTO: 28.8 % (ref 34.8–46.1)
HGB BLD-MCNC: 9 G/DL (ref 11.5–15.4)
IMM GRANULOCYTES # BLD AUTO: 0.04 THOUSAND/UL (ref 0–0.2)
IMM GRANULOCYTES NFR BLD AUTO: 1 % (ref 0–2)
LIPASE SERPL-CCNC: 20 U/L (ref 11–82)
LYMPHOCYTES # BLD AUTO: 0.98 THOUSANDS/ΜL (ref 0.6–4.47)
LYMPHOCYTES NFR BLD AUTO: 14 % (ref 14–44)
MCH RBC QN AUTO: 30.3 PG (ref 26.8–34.3)
MCHC RBC AUTO-ENTMCNC: 31.3 G/DL (ref 31.4–37.4)
MCV RBC AUTO: 97 FL (ref 82–98)
MONOCYTES # BLD AUTO: 0.34 THOUSAND/ΜL (ref 0.17–1.22)
MONOCYTES NFR BLD AUTO: 5 % (ref 4–12)
NEUTROPHILS # BLD AUTO: 5.46 THOUSANDS/ΜL (ref 1.85–7.62)
NEUTS SEG NFR BLD AUTO: 80 % (ref 43–75)
NRBC BLD AUTO-RTO: 0 /100 WBCS
PLATELET # BLD AUTO: 600 THOUSANDS/UL (ref 149–390)
PMV BLD AUTO: 8.2 FL (ref 8.9–12.7)
POTASSIUM SERPL-SCNC: 3.7 MMOL/L (ref 3.5–5.3)
PROT SERPL-MCNC: 6.2 G/DL (ref 6.4–8.4)
RBC # BLD AUTO: 2.97 MILLION/UL (ref 3.81–5.12)
SODIUM SERPL-SCNC: 134 MMOL/L (ref 135–147)
WBC # BLD AUTO: 6.84 THOUSAND/UL (ref 4.31–10.16)

## 2025-01-07 PROCEDURE — 96374 THER/PROPH/DIAG INJ IV PUSH: CPT

## 2025-01-07 PROCEDURE — 36415 COLL VENOUS BLD VENIPUNCTURE: CPT | Performed by: PHYSICIAN ASSISTANT

## 2025-01-07 PROCEDURE — 96375 TX/PRO/DX INJ NEW DRUG ADDON: CPT

## 2025-01-07 PROCEDURE — 80053 COMPREHEN METABOLIC PANEL: CPT | Performed by: PHYSICIAN ASSISTANT

## 2025-01-07 PROCEDURE — 96361 HYDRATE IV INFUSION ADD-ON: CPT

## 2025-01-07 PROCEDURE — 85025 COMPLETE CBC W/AUTO DIFF WBC: CPT | Performed by: PHYSICIAN ASSISTANT

## 2025-01-07 PROCEDURE — 96376 TX/PRO/DX INJ SAME DRUG ADON: CPT

## 2025-01-07 PROCEDURE — 99285 EMERGENCY DEPT VISIT HI MDM: CPT | Performed by: PHYSICIAN ASSISTANT

## 2025-01-07 PROCEDURE — 99285 EMERGENCY DEPT VISIT HI MDM: CPT

## 2025-01-07 PROCEDURE — 83690 ASSAY OF LIPASE: CPT | Performed by: PHYSICIAN ASSISTANT

## 2025-01-07 RX ORDER — FENTANYL CITRATE 50 UG/ML
25 INJECTION, SOLUTION INTRAMUSCULAR; INTRAVENOUS ONCE
Refills: 0 | Status: COMPLETED | OUTPATIENT
Start: 2025-01-07 | End: 2025-01-07

## 2025-01-07 RX ORDER — FENTANYL CITRATE 50 UG/ML
50 INJECTION, SOLUTION INTRAMUSCULAR; INTRAVENOUS ONCE
Refills: 0 | Status: COMPLETED | OUTPATIENT
Start: 2025-01-07 | End: 2025-01-07

## 2025-01-07 RX ORDER — ONDANSETRON 2 MG/ML
4 INJECTION INTRAMUSCULAR; INTRAVENOUS ONCE
Status: COMPLETED | OUTPATIENT
Start: 2025-01-07 | End: 2025-01-07

## 2025-01-07 RX ADMIN — ONDANSETRON 4 MG: 2 INJECTION INTRAMUSCULAR; INTRAVENOUS at 03:44

## 2025-01-07 RX ADMIN — FENTANYL CITRATE 25 MCG: 50 INJECTION, SOLUTION INTRAMUSCULAR; INTRAVENOUS at 05:05

## 2025-01-07 RX ADMIN — SODIUM CHLORIDE 500 ML: 0.9 INJECTION, SOLUTION INTRAVENOUS at 04:21

## 2025-01-07 RX ADMIN — FENTANYL CITRATE 50 MCG: 50 INJECTION, SOLUTION INTRAMUSCULAR; INTRAVENOUS at 03:44

## 2025-01-07 RX ADMIN — IOHEXOL 50 ML: 240 INJECTION, SOLUTION INTRATHECAL; INTRAVASCULAR; INTRAVENOUS; ORAL at 04:23

## 2025-01-07 NOTE — ED PROVIDER NOTES
Time reflects when diagnosis was documented in both MDM as applicable and the Disposition within this note       Time User Action Codes Description Comment    1/7/2025  5:25 AM Marii Brown Add [R10.84] Generalized abdominal pain           ED Disposition       ED Disposition   AMA    Condition   --    Date/Time   Tue Jan 7, 2025  5:25 AM    Comment   Date: 1/7/2025  Patient: Edith Klein  Admitted: 1/7/2025  3:05 AM  Attending Provider: Barrera Major DO    Edith Klein or her authorized caregiver has made the decision for the patient to leave the emergency department against the advice of her a ttending physician. She or her authorized caregiver has been informed and understands the inherent risks, including death, intestinal obstruction, organ failure and sepsis .  She or her authorized caregiver has decided to accept the responsibility fo r this decision. Edith Klein and all necessary parties have been advised that she may return for further evaluation or treatment. Her condition at time of discharge was guarded.  Edith Klein had current vital signs as follows:  /69   Pulse 9 4   Temp 98.2 °F (36.8 °C) (Oral)   Resp 16   Wt 40 kg (88 lb 2.9 oz)                Assessment & Plan       Medical Decision Making  Patient presenting complaining of abdominal pain with history of recent obstruction.  Patient also presented complaining of 7 days of constipation.  Patient had tenderness on exam right greater than left.  Vital signs within normal limits.  Largely benign laboratory evaluation.  After receiving 1 cup of contrast and 1 dose of pain medication patient stated that she no longer wants to wait for her CAT scan and would like to sign out.  Of note patient has history of similar behavior in the past.  Patient was advised that without CAT scan she is at risk for undiagnosed obstruction which may lead to intestinal necrosis organ failure sepsis or death.  Patient demonstrates understanding and  continues to request to sign out AGAINST MEDICAL ADVICE.  Patient was encouraged to return and ambulated the department.  Advised to follow-up with her surgeon.    Amount and/or Complexity of Data Reviewed  Labs: ordered.    Risk  Prescription drug management.             Medications   fentaNYL injection 50 mcg (50 mcg Intravenous Given 1/7/25 0344)   ondansetron (ZOFRAN) injection 4 mg (4 mg Intravenous Given 1/7/25 0344)   iohexol (OMNIPAQUE) 240 MG/ML solution 50 mL (50 mL Oral Given 1/7/25 0423)   sodium chloride 0.9 % bolus 500 mL (0 mL Intravenous Stopped 1/7/25 0507)   fentaNYL injection 25 mcg (25 mcg Intravenous Given 1/7/25 0505)       ED Risk Strat Scores                          SBIRT 22yo+      Flowsheet Row Most Recent Value   Initial Alcohol Screen: US AUDIT-C     1. How often do you have a drink containing alcohol? 0 Filed at: 01/07/2025 0318   2. How many drinks containing alcohol do you have on a typical day you are drinking?  0 Filed at: 01/07/2025 0318   3b. FEMALE Any Age, or MALE 65+: How often do you have 4 or more drinks on one occassion? 0 Filed at: 01/07/2025 0318   Audit-C Score 0 Filed at: 01/07/2025 0318   SARITA: How many times in the past year have you...    Used an illegal drug or used a prescription medication for non-medical reasons? Never Filed at: 01/07/2025 0318                            History of Present Illness       Chief Complaint   Patient presents with    Abdominal Pain     Pt reports she's been seen for the same issue recently, right sided abdominal pain, reports she's going for her gastric bypass reversal surgery on 1/23. No meds PTA.       Past Medical History:   Diagnosis Date    Autoimmune hepatitis (HCC)     Bipolar 1 disorder (HCC)     Chronic headaches 2021    Diabetes mellitus (HCC)     Kidney stone     Renal disorder     kidney stones    Seizure disorder (HCC)       Past Surgical History:   Procedure Laterality Date    BARIATRIC SURGERY  05/2017    BUNIONECTOMY       COLONOSCOPY      CYSTOSCOPY  07/12/2018    Stent Removal    FL RETROGRADE PYELOGRAM  06/16/2022    GASTRIC BYPASS  05/22/2017    HIATAL HERNIA REPAIR  05/22/2017    HYSTERECTOMY      JOINT REPLACEMENT      KIDNEY STONE SURGERY      NH CYSTO/URETERO W/LITHOTRIPSY &INDWELL STENT INSRT Bilateral 07/06/2018    Procedure: CYSTOSCOPY GALDINO. URETEROSCOPY;GALDINO.  RETROGRADE PYELOGRAM AND INSERTION GALDINO.STENT URETERAL;  Surgeon: Jacob Gerber MD;  Location: [facility];  Service: Urology    NH CYSTO/URETERO W/LITHOTRIPSY &INDWELL STENT INSRT Left 06/16/2022    Procedure: CYSTOSCOPY URETEROSCOPY WITH LITHOTRIPSY HOLMIUM LASER, RETROGRADE PYELOGRAM AND INSERTION STENT URETERAL;  Surgeon: Asad Geiger MD;  Location: [facility];  Service: Urology    TOTAL SHOULDER REPLACEMENT  2002    VAGINAL HYSTERECTOMY      PARTIAL      Family History   Problem Relation Age of Onset    Diabetes Father     Heart disease Father     Diabetes Mother     Heart disease Mother     Diabetes Sister     Diabetes Family         MELLITUS    Depression Family     Mental illness Family     Obesity Family     Osteoarthritis Family       Social History     Tobacco Use    Smoking status: Every Day     Current packs/day: 0.25     Average packs/day: 0.3 packs/day for 30.0 years (7.5 ttl pk-yrs)     Types: Cigarettes     Passive exposure: Current    Smokeless tobacco: Never    Tobacco comments:     N/a   Vaping Use    Vaping status: Never Used   Substance Use Topics    Alcohol use: No     Comment: n/a    Drug use: No     Comment: n/a      E-Cigarette/Vaping    E-Cigarette Use Never User       E-Cigarette/Vaping Substances    Nicotine No     THC No     CBD No     Flavoring No     Other No     Unknown No       I have reviewed and agree with the history as documented.     59-year-old female well-known to this facility with history of gastric bypass surgery and frequent bowel obstruction presents complaining of abdominal pain and constipation.  Patient was  recently seen on January 1 for bowel obstruction and states that she was taken to [facility] and had a bowel movement at which point she was discharged.  Patient states that since then she has not had a bowel movement and is concerned she may be obstructed again.  Admits to nausea without vomiting as well as decreased appetite.  Denies any urinary complaints.  Denies fevers.  Patient is scheduled for a bariatric surgery reversal in January 23.       History provided by:  Patient   used: No        Review of Systems   Constitutional: Negative.  Negative for chills and fatigue.   HENT:  Negative for ear pain and sore throat.    Eyes:  Negative for photophobia and redness.   Respiratory:  Negative for apnea, cough and shortness of breath.    Cardiovascular:  Negative for chest pain.   Gastrointestinal:  Positive for abdominal pain, constipation and nausea. Negative for vomiting.   Genitourinary:  Negative for dysuria.   Musculoskeletal:  Negative for arthralgias, neck pain and neck stiffness.   Skin:  Negative for rash.   Neurological:  Negative for dizziness, tremors, syncope and weakness.   Psychiatric/Behavioral:  Negative for suicidal ideas.            Objective       ED Triage Vitals   Temperature Pulse Blood Pressure Respirations SpO2 Patient Position - Orthostatic VS   01/07/25 0312 01/07/25 0312 01/07/25 0312 01/07/25 0312 01/07/25 0312 01/07/25 0312   98.2 °F (36.8 °C) (!) 120 108/63 20 100 % Sitting      Temp Source Heart Rate Source BP Location FiO2 (%) Pain Score    01/07/25 0312 01/07/25 0312 01/07/25 0312 -- 01/07/25 0344    Oral Monitor Left arm  9      Vitals      Date and Time Temp Pulse SpO2 Resp BP Pain Score FACES Pain Rating User   01/07/25 0530 -- 95 97 % 18 103/65 -- -- AM   01/07/25 0505 -- -- -- -- -- 7 -- MN   01/07/25 0447 -- -- -- -- 126/69 -- -- MS   01/07/25 0421 -- -- -- -- -- 9 -- ST   01/07/25 0400 -- 94 99 % 16 -- -- -- ST   01/07/25 0344 -- -- -- -- -- 9  -- ST   01/07/25 0312 98.2 °F (36.8 °C) 120 100 % 20 108/63 -- -- MM            Physical Exam  Constitutional:       General: She is not in acute distress.     Appearance: She is well-developed. She is not diaphoretic.   Eyes:      Pupils: Pupils are equal, round, and reactive to light.   Cardiovascular:      Rate and Rhythm: Normal rate and regular rhythm.   Pulmonary:      Effort: Pulmonary effort is normal. No respiratory distress.      Breath sounds: Normal breath sounds.   Abdominal:      General: Bowel sounds are normal. There is no distension.      Palpations: Abdomen is soft.      Tenderness: There is abdominal tenderness. There is no right CVA tenderness, left CVA tenderness or guarding.      Comments: Prior laparoscopic surgical scars noted.   Musculoskeletal:         General: Normal range of motion.      Cervical back: Normal range of motion and neck supple.   Skin:     General: Skin is warm and dry.   Neurological:      Mental Status: She is alert and oriented to person, place, and time.         Results Reviewed       Procedure Component Value Units Date/Time    Comprehensive metabolic panel [[id number]]  (Abnormal) Collected: 01/07/25 0341    Lab Status: Final result Specimen: Blood from Arm, Right Updated: 01/07/25 0415     Sodium 134 mmol/L      Potassium 3.7 mmol/L      Chloride 94 mmol/L      CO2 32 mmol/L      ANION GAP 8 mmol/L      BUN 15 mg/dL      Creatinine 0.60 mg/dL      Glucose 174 mg/dL      Calcium 8.4 mg/dL      Corrected Calcium 9.4 mg/dL      AST 16 U/L      ALT 15 U/L      Alkaline Phosphatase 190 U/L      Total Protein 6.2 g/dL      Albumin 2.7 g/dL      Total Bilirubin 0.31 mg/dL      eGFR 101 ml/min/1.73sq m     Narrative:      National Kid[employer] Foundation guidelines for Chronic Kidney Disease (CKD):     Stage 1 with normal or high GFR (GFR > 90 mL/min/1.73 square meters)    Stage 2 Mild CKD (GFR = 60-89 mL/min/1.73 square meters)    Stage 3A Moderate CKD (GFR = 45-59  mL/min/1.73 square meters)    Stage 3B Moderate CKD (GFR = 30-44 mL/min/1.73 square meters)    Stage 4 Severe CKD (GFR = 15-29 mL/min/1.73 square meters)    Stage 5 End Stage CKD (GFR <15 mL/min/1.73 square meters)  Note: GFR calculation is accurate only with a steady state creatinine    Lipase [[id number]]  (Normal) Collected: 01/07/25 0341    Lab Status: Final result Specimen: Blood from Arm, Right Updated: 01/07/25 0415     Lipase 20 u/L     CBC and differential [[id number]]  (Abnormal) Collected: 01/07/25 0341    Lab Status: Final result Specimen: Blood from Arm, Right Updated: 01/07/25 0358     WBC 6.84 Thousand/uL      RBC 2.97 Million/uL      Hemoglobin 9.0 g/dL      Hematocrit 28.8 %      MCV 97 fL      MCH 30.3 pg      MCHC 31.3 g/dL      RDW 18.4 %      MPV 8.2 fL      Platelets 600 Thousands/uL      nRBC 0 /100 WBCs      Segmented % 80 %      Immature Grans % 1 %      Lymphocytes % 14 %      Monocytes % 5 %      Eosinophils Relative 0 %      Basophils Relative 0 %      Absolute Neutrophils 5.46 Thousands/µL      Absolute Immature Grans 0.04 Thousand/uL      Absolute Lymphocytes 0.98 Thousands/µL      Absolute Monocytes 0.34 Thousand/µL      Eosinophils Absolute 0.00 Thousand/µL      Basophils Absolute 0.02 Thousands/µL     UA (URINE) with reflex to Scope [[id number]]     Lab Status: No result Specimen: Urine             No orders to display       Procedures    ED Medication and Procedure Management   Prior to Admission Medications   Prescriptions Last Dose Informant Patient Reported? Taking?   Aspirin Low Dose 81 MG EC tablet  Self Yes No   Sig: Take 81 mg by mouth daily   Empagliflozin 25 MG TABS  Self No No   Sig: Take 1 tablet (25 mg total) by mouth daily   FLUoxetine (PROzac) 40 MG capsule  Self No No   Sig: Take 1 capsule (40 mg total) by mouth daily   Nutritional Supplements (ENSURE PO)  Self Yes No   Sig: Take 1 Can by mouth daily   OneTouch Ultra test strip  Self Yes No   QUEtiapine (SEROquel) 400 MG  tablet  Self No No   Sig: Take 2 tablets (800 mg total) by mouth daily at bedtime   Tradjenta 5 MG TABS  Self Yes No   Sig: Take 5 mg by mouth daily   Patient not taking: Reported on 9/30/2024   Trulicity 1.5 MG/0.5ML injection  Self Yes No   Patient not taking: Reported on 7/7/2024   Varenicline Tartrate (CHANTIX STARTING MONTH JENNIFER PO)  Self Yes No   Patient not taking: Reported on 7/7/2024   albuterol (PROVENTIL HFA,VENTOLIN HFA) 90 mcg/act inhaler  Self Yes No   Sig: Inhale 2 puffs every 4 (four) hours as needed for wheezing or shortness of breath   aluminum-magnesium hydroxide 200-200 MG/5ML suspension   No No   Sig: Take 10 mL by mouth every 6 (six) hours as needed for heartburn   amitriptyline (ELAVIL) 25 mg tablet  Self Yes No   Sig: Take 25 mg by mouth daily at bedtime   amitriptyline (ELAVIL) 50 mg tablet  Self Yes No   Sig: Take 50 mg by mouth every evening   ammonium lactate (LAC-HYDRIN) 12 % lotion  Self Yes No   Sig: Apply topically daily   azaTHIOprine (IMURAN) 50 mg tablet  Self Yes No   Sig: Take 100 mg by mouth daily   bisacodyl (DULCOLAX) 5 mg EC tablet   No No   Sig: Take as directed as per written office instructions   bisacodyl (DULCOLAX) 5 mg EC tablet   No No   Sig: Take as directed prior to colonoscopy   bismuth subsalicylate (PEPTO BISMOL) 262 MG chewable tablet  Self Yes No   Sig: Chew 524 mg every hour as needed   buprenorphine-naloxone (SUBOXONE) 2-0.5 mg per SL tablet  Self Yes No   Sig: Place 1 tablet under the tongue Three times a day   cyanocobalamin (VITAMIN B-12) 1000 MCG tablet   No No   Sig: Take 1 tablet (1,000 mcg total) by mouth daily   Patient not taking: Reported on 7/7/2024   dapagliflozin (Farxiga) 10 MG tablet  Self Yes No   Sig: Take 10 mg by mouth daily   Patient not taking: Reported on 9/30/2024   dicyclomine (BENTYL) 20 mg tablet  Self No No   Sig: Take 1 tablet (20 mg total) by mouth 2 (two) times a day   ergocalciferol (VITAMIN D2) 50,000 units   No No   Sig: Take 1  capsule (50,000 Units total) by mouth once a week for 8 doses Do not start before June 29, 2024.   Patient not taking: Reported on 7/7/2024   famotidine (PEPCID) 20 mg tablet   No No   Sig: Take 1 tablet (20 mg total) by mouth 2 (two) times a day   fluticasone (FLONASE) 50 mcg/act nasal spray  Self Yes No   Patient not taking: Reported on 9/30/2024   folic acid (FOLVITE) 1 mg tablet   No No   Sig: Take 1 tablet (1 mg total) by mouth daily   Patient not taking: Reported on 7/7/2024   gabapentin (NEURONTIN) 300 mg capsule  Self No No   Sig: Take 2 capsules (600 mg total) by mouth 3 (three) times a day   ketorolac (TORADOL) 10 mg tablet  Self No No   Sig: Take 1 tablet (10 mg total) by mouth every 6 (six) hours as needed for moderate pain for up to 5 days   Patient not taking: Reported on 9/30/2024   linaCLOtide (Linzess) 72 MCG CAPS  Self Yes No   Sig: Take 1 capsule by mouth daily   mirtazapine (REMERON) 7.5 MG tablet  Self No No   Sig: Take 1 tablet (7.5 mg total) by mouth daily at bedtime   nortriptyline (PAMELOR) 50 mg capsule  Self Yes No   Sig: Take 50 mg by mouth daily at bedtime   omeprazole (PriLOSEC) 20 mg delayed release capsule   No No   Sig: Take 1 capsule (20 mg total) by mouth daily   ondansetron (ZOFRAN) 4 mg tablet   No No   Sig: Take 1 tablet (4 mg total) by mouth every 6 (six) hours   ondansetron (ZOFRAN-ODT) 4 mg disintegrating tablet  Self No No   Sig: Take 1 tablet (4 mg total) by mouth every 8 (eight) hours as needed for nausea or vomiting   ondansetron (ZOFRAN-ODT) 4 mg disintegrating tablet  Self No No   Sig: Take 1 tablet (4 mg total) by mouth every 8 (eight) hours as needed for nausea for up to 10 days   pantoprazole (PROTONIX) 40 mg tablet  Self Yes No   Sig: Take 40 mg by mouth daily   polyethylene glycol (GOLYTELY) 4000 mL solution   No No   Sig: Take 4,000 mL by mouth once for 1 dose   polyethylene glycol (MIRALAX) 17 g packet   No No   Sig: Take 238 g by mouth once for 1 dose Take as  directed per office instructions prior to colonoscopy   rizatriptan (MAXALT) 10 mg tablet  Self Yes No   Sig: Take 10 mg by mouth daily   sitaGLIPtin (JANUVIA) 100 mg tablet  Self No No   Sig: Take 1 tablet (100 mg total) by mouth daily   sucralfate (CARAFATE) 1 g/10 mL suspension  Self No No   Sig: Take 10 mL (1 g total) by mouth 4 (four) times a day   Patient not taking: Reported on 9/30/2024   tiZANidine (ZANAFLEX) 4 mg tablet  Self Yes No   Sig: Take 6 mg by mouth every 6 (six) hours as needed   topiramate (TOPAMAX) 25 mg tablet   No No   Sig: Take 1 tablet (25 mg total) by mouth 2 (two) times a day   Patient not taking: Reported on 9/30/2024   varenicline (Chantix) 1 mg tablet  Self Yes No   Sig: Take 1 mg by mouth 2 (two) times a day   zolpidem (AMBIEN) 10 mg tablet  Self Yes No   Sig: Take 10 mg by mouth daily at bedtime as needed      Facility-Administered Medications: None     Discharge Medication List as of 1/7/2025  5:26 AM        CONTINUE these medications which have NOT CHANGED    Details   albuterol (PROVENTIL HFA,VENTOLIN HFA) 90 mcg/act inhaler Inhale 2 puffs every 4 (four) hours as needed for wheezing or shortness of breath, Starting Fri 3/29/2024, Historical Med      aluminum-magnesium hydroxide 200-200 MG/5ML suspension Take 10 mL by mouth every 6 (six) hours as needed for heartburn, Starting Sun 11/17/2024, Normal      !! amitriptyline (ELAVIL) 25 mg tablet Take 25 mg by mouth daily at bedtime, Starting Mon 9/9/2024, Historical Med      !! amitriptyline (ELAVIL) 50 mg tablet Take 50 mg by mouth every evening, Starting Mon 8/12/2024, Historical Med      ammonium lactate (LAC-HYDRIN) 12 % lotion Apply topically daily, Starting Tue 9/17/2024, Historical Med      Aspirin Low Dose 81 MG EC tablet Take 81 mg by mouth daily, Starting Mon 8/26/2024, Historical Med      azaTHIOprine (IMURAN) 50 mg tablet Take 100 mg by mouth daily, Historical Med      !! bisacodyl (DULCOLAX) 5 mg EC tablet Take as  directed as per written office instructions, Normal      !! bisacodyl (DULCOLAX) 5 mg EC tablet Take as directed prior to colonoscopy, Normal      bismuth subsalicylate (PEPTO BISMOL) 262 MG chewable tablet Chew 524 mg every hour as needed, Starting Tue 9/3/2024, Historical Med      buprenorphine-naloxone (SUBOXONE) 2-0.5 mg per SL tablet Place 1 tablet under the tongue Three times a day, Starting Wed 9/25/2024, Historical Med      cyanocobalamin (VITAMIN B-12) 1000 MCG tablet Take 1 tablet (1,000 mcg total) by mouth daily, Starting Wed 6/26/2024, Until Sun 8/25/2024, Normal      dapagliflozin (Farxiga) 10 MG tablet Take 10 mg by mouth daily, Starting Wed 7/10/2024, Historical Med      dicyclomine (BENTYL) 20 mg tablet Take 1 tablet (20 mg total) by mouth 2 (two) times a day, Starting Tue 9/24/2024, Normal      Empagliflozin 25 MG TABS Take 1 tablet (25 mg total) by mouth daily, Starting Wed 6/26/2024, Until Mon 9/30/2024, Normal      ergocalciferol (VITAMIN D2) 50,000 units Take 1 capsule (50,000 Units total) by mouth once a week for 8 doses Do not start before June 29, 2024., Starting Sat 6/29/2024, Until Sun 8/18/2024, Normal      famotidine (PEPCID) 20 mg tablet Take 1 tablet (20 mg total) by mouth 2 (two) times a day, Starting Sun 11/17/2024, Normal      FLUoxetine (PROzac) 40 MG capsule Take 1 capsule (40 mg total) by mouth daily, Starting Wed 6/26/2024, Until Mon 9/30/2024, Normal      fluticasone (FLONASE) 50 mcg/act nasal spray Historical Med      folic acid (FOLVITE) 1 mg tablet Take 1 tablet (1 mg total) by mouth daily, Starting Wed 6/26/2024, Until Sun 8/25/2024, Normal      gabapentin (NEURONTIN) 300 mg capsule Take 2 capsules (600 mg total) by mouth 3 (three) times a day, Starting Tue 6/25/2024, Until Mon 9/30/2024, Normal      ketorolac (TORADOL) 10 mg tablet Take 1 tablet (10 mg total) by mouth every 6 (six) hours as needed for moderate pain for up to 5 days, Starting Wed 7/31/2024, Until Mon  9/30/2024 at 2359, Normal      linaCLOtide (Linzess) 72 MCG CAPS Take 1 capsule by mouth daily, Historical Med      mirtazapine (REMERON) 7.5 MG tablet Take 1 tablet (7.5 mg total) by mouth daily at bedtime, Starting Tue 6/25/2024, Until Mon 9/30/2024, Normal      nortriptyline (PAMELOR) 50 mg capsule Take 50 mg by mouth daily at bedtime, Starting Wed 7/10/2024, Until Thu 7/10/2025, Historical Med      Nutritional Supplements (ENSURE PO) Take 1 Can by mouth daily, Starting Mon 7/8/2024, Historical Med      omeprazole (PriLOSEC) 20 mg delayed release capsule Take 1 capsule (20 mg total) by mouth daily, Starting Sun 11/17/2024, Normal      ondansetron (ZOFRAN) 4 mg tablet Take 1 tablet (4 mg total) by mouth every 6 (six) hours, Starting Sun 11/17/2024, Normal      ondansetron (ZOFRAN-ODT) 4 mg disintegrating tablet Take 1 tablet (4 mg total) by mouth every 8 (eight) hours as needed for nausea or vomiting, Starting Tue 7/30/2024, Print      OneTouch Ultra test strip Historical Med      pantoprazole (PROTONIX) 40 mg tablet Take 40 mg by mouth daily, Historical Med      polyethylene glycol (GOLYTELY) 4000 mL solution Take 4,000 mL by mouth once for 1 dose, Starting Tue 11/5/2024, Normal      polyethylene glycol (MIRALAX) 17 g packet Take 238 g by mouth once for 1 dose Take as directed per office instructions prior to colonoscopy, Starting Mon 9/30/2024, Normal      QUEtiapine (SEROquel) 400 MG tablet Take 2 tablets (800 mg total) by mouth daily at bedtime, Starting Tue 6/25/2024, Until Mon 9/30/2024, Normal      rizatriptan (MAXALT) 10 mg tablet Take 10 mg by mouth daily, Starting Mon 7/8/2024, Historical Med      sitaGLIPtin (JANUVIA) 100 mg tablet Take 1 tablet (100 mg total) by mouth daily, Starting Wed 6/26/2024, Until Mon 9/30/2024, Normal      sucralfate (CARAFATE) 1 g/10 mL suspension Take 10 mL (1 g total) by mouth 4 (four) times a day, Starting Tue 9/24/2024, Normal      tiZANidine (ZANAFLEX) 4 mg tablet Take 6  mg by mouth every 6 (six) hours as needed, Starting Fri 8/30/2024, Historical Med      topiramate (TOPAMAX) 25 mg tablet Take 1 tablet (25 mg total) by mouth 2 (two) times a day, Starting Tue 6/25/2024, Until Sat 8/24/2024, Normal      Tradjenta 5 MG TABS Take 5 mg by mouth daily, Starting Wed 7/10/2024, Until Thu 7/10/2025, Historical Med      Trulicity 1.5 MG/0.5ML injection Historical Med      varenicline (Chantix) 1 mg tablet Take 1 mg by mouth 2 (two) times a day, Starting Fri 7/26/2024, Historical Med      Varenicline Tartrate (CHANTIX STARTING MONTH PAK PO) Historical Med      zolpidem (AMBIEN) 10 mg tablet Take 10 mg by mouth daily at bedtime as needed, Starting Mon 7/1/2024, Historical Med       !! - Potential duplicate medications found. Please discuss with provider.        No discharge procedures on file.  ED SEPSIS DOCUMENTATION   Time reflects when diagnosis was documented in both MDM as applicable and the Disposition within this note       Time User Action Codes Description Comment    1/7/2025  5:25 AM Marii Brown Add [R10.84] Generalized abdominal pain                  Marii Brown PA-C  01/07/25 0613

## 2025-01-08 ENCOUNTER — HOSPITAL ENCOUNTER (EMERGENCY)
Facility: HOSPITAL | Age: 60
Discharge: LEFT BEFORE DISCHARGE | End: 2025-01-08
Attending: EMERGENCY MEDICINE
Payer: COMMERCIAL

## 2025-01-08 VITALS
WEIGHT: 89.51 LBS | SYSTOLIC BLOOD PRESSURE: 95 MMHG | RESPIRATION RATE: 18 BRPM | HEART RATE: 114 BPM | BODY MASS INDEX: 16.37 KG/M2 | DIASTOLIC BLOOD PRESSURE: 58 MMHG | TEMPERATURE: 98 F | OXYGEN SATURATION: 99 %

## 2025-01-08 DIAGNOSIS — R10.9 ABDOMINAL PAIN: Primary | ICD-10-CM

## 2025-01-08 LAB
ALBUMIN SERPL BCG-MCNC: 2.5 G/DL (ref 3.5–5)
ALP SERPL-CCNC: 154 U/L (ref 34–104)
ALT SERPL W P-5'-P-CCNC: 12 U/L (ref 7–52)
ANION GAP SERPL CALCULATED.3IONS-SCNC: 8 MMOL/L (ref 4–13)
AST SERPL W P-5'-P-CCNC: 13 U/L (ref 13–39)
BASOPHILS # BLD AUTO: 0.02 THOUSANDS/ΜL (ref 0–0.1)
BASOPHILS NFR BLD AUTO: 0 % (ref 0–1)
BILIRUB DIRECT SERPL-MCNC: 0.05 MG/DL (ref 0–0.2)
BILIRUB SERPL-MCNC: 0.25 MG/DL (ref 0.2–1)
BUN SERPL-MCNC: 14 MG/DL (ref 5–25)
CALCIUM SERPL-MCNC: 8 MG/DL (ref 8.4–10.2)
CHLORIDE SERPL-SCNC: 100 MMOL/L (ref 96–108)
CO2 SERPL-SCNC: 26 MMOL/L (ref 21–32)
CREAT SERPL-MCNC: 0.5 MG/DL (ref 0.6–1.3)
EOSINOPHIL # BLD AUTO: 0.04 THOUSAND/ΜL (ref 0–0.61)
EOSINOPHIL NFR BLD AUTO: 1 % (ref 0–6)
ERYTHROCYTE [DISTWIDTH] IN BLOOD BY AUTOMATED COUNT: 18.9 % (ref 11.6–15.1)
GFR SERPL CREATININE-BSD FRML MDRD: 107 ML/MIN/1.73SQ M
GLUCOSE SERPL-MCNC: 104 MG/DL (ref 65–140)
HCT VFR BLD AUTO: 25.9 % (ref 34.8–46.1)
HGB BLD-MCNC: 8.4 G/DL (ref 11.5–15.4)
IMM GRANULOCYTES # BLD AUTO: 0.04 THOUSAND/UL (ref 0–0.2)
IMM GRANULOCYTES NFR BLD AUTO: 1 % (ref 0–2)
LIPASE SERPL-CCNC: 15 U/L (ref 11–82)
LYMPHOCYTES # BLD AUTO: 1.44 THOUSANDS/ΜL (ref 0.6–4.47)
LYMPHOCYTES NFR BLD AUTO: 20 % (ref 14–44)
MCH RBC QN AUTO: 31.6 PG (ref 26.8–34.3)
MCHC RBC AUTO-ENTMCNC: 32.4 G/DL (ref 31.4–37.4)
MCV RBC AUTO: 97 FL (ref 82–98)
MONOCYTES # BLD AUTO: 0.46 THOUSAND/ΜL (ref 0.17–1.22)
MONOCYTES NFR BLD AUTO: 6 % (ref 4–12)
NEUTROPHILS # BLD AUTO: 5.33 THOUSANDS/ΜL (ref 1.85–7.62)
NEUTS SEG NFR BLD AUTO: 72 % (ref 43–75)
NRBC BLD AUTO-RTO: 0 /100 WBCS
PLATELET # BLD AUTO: 573 THOUSANDS/UL (ref 149–390)
PMV BLD AUTO: 8.2 FL (ref 8.9–12.7)
POTASSIUM SERPL-SCNC: 3.8 MMOL/L (ref 3.5–5.3)
PROT SERPL-MCNC: 5.6 G/DL (ref 6.4–8.4)
RBC # BLD AUTO: 2.66 MILLION/UL (ref 3.81–5.12)
SODIUM SERPL-SCNC: 134 MMOL/L (ref 135–147)
WBC # BLD AUTO: 7.33 THOUSAND/UL (ref 4.31–10.16)

## 2025-01-08 PROCEDURE — 80048 BASIC METABOLIC PNL TOTAL CA: CPT | Performed by: EMERGENCY MEDICINE

## 2025-01-08 PROCEDURE — 85025 COMPLETE CBC W/AUTO DIFF WBC: CPT | Performed by: EMERGENCY MEDICINE

## 2025-01-08 PROCEDURE — 99284 EMERGENCY DEPT VISIT MOD MDM: CPT | Performed by: EMERGENCY MEDICINE

## 2025-01-08 PROCEDURE — 36415 COLL VENOUS BLD VENIPUNCTURE: CPT | Performed by: EMERGENCY MEDICINE

## 2025-01-08 PROCEDURE — 80076 HEPATIC FUNCTION PANEL: CPT | Performed by: EMERGENCY MEDICINE

## 2025-01-08 PROCEDURE — 99285 EMERGENCY DEPT VISIT HI MDM: CPT

## 2025-01-08 PROCEDURE — 83690 ASSAY OF LIPASE: CPT | Performed by: EMERGENCY MEDICINE

## 2025-01-08 RX ORDER — ACETAMINOPHEN 325 MG/1
650 TABLET ORAL ONCE
Status: DISCONTINUED | OUTPATIENT
Start: 2025-01-08 | End: 2025-01-08 | Stop reason: HOSPADM

## 2025-01-08 RX ORDER — POLYETHYLENE GLYCOL 3350 17 G/17G
POWDER, FOR SOLUTION ORAL
COMMUNITY
Start: 2024-11-29

## 2025-01-08 RX ORDER — LINACLOTIDE 145 UG/1
1 CAPSULE, GELATIN COATED ORAL DAILY
COMMUNITY
Start: 2024-12-10

## 2025-01-08 RX ORDER — SUCRALFATE 1 G/1
1 TABLET ORAL 4 TIMES DAILY
COMMUNITY
Start: 2024-12-24

## 2025-01-08 RX ORDER — QUETIAPINE FUMARATE 50 MG/1
50 TABLET, FILM COATED ORAL EVERY EVENING
COMMUNITY
Start: 2024-12-16

## 2025-01-08 RX ORDER — BUPRENORPHINE HYDROCHLORIDE AND NALOXONE HYDROCHLORIDE DIHYDRATE 8; 2 MG/1; MG/1
1 TABLET SUBLINGUAL 3 TIMES DAILY
COMMUNITY
Start: 2024-12-09

## 2025-01-08 RX ORDER — IBUPROFEN 600 MG/1
600 TABLET, FILM COATED ORAL EVERY 6 HOURS PRN
COMMUNITY
Start: 2024-12-24

## 2025-01-08 NOTE — ED NOTES
"Patient offered tylenol for pain, she stated \"I don't want that I could take that at home, please take my IV out, I'm leaving\". Provider made aware.     Shanae Bronson RN  01/08/25 0927    "

## 2025-01-11 NOTE — ED PROVIDER NOTES
Time reflects when diagnosis was documented in both MDM as applicable and the Disposition within this note       Time User Action Codes Description Comment    1/10/2025  8:15 PM Madison Fitzgerald Add [R10.9] Abdominal pain           ED Disposition       ED Disposition   Left from Room after Provider Exam    Condition   --    Date/Time   Wed Jan 8, 2025 10:02 AM    Comment   Pt had the primary RN remove her IV because she and the physician had different treatments in mind, pt requesting narcotic pain relief, which the physician didn't think was appropriate.             Assessment & Plan       Medical Decision Making  The patient presents to the ED with complaint of abdominal distention and pain.  Workup was initiated including plan for CAT scan.  The patient specifically advised the nurse that she wanted fentanyl for pain and when she was advised that we would be starting with nonnarcotic pain medication the patient pulled out her IV and left the emergency department.  I was not aware of this occurring at the time and was noted shortly thereafter by the nurse once I left another patient's room.  I was unable to provide AMA instructions or discharge documentation to the patient.    Amount and/or Complexity of Data Reviewed  External Data Reviewed: radiology and notes.     Details: See ED course.  Labs: ordered.        ED Course as of 01/10/25 2015   Wed Jan 08, 2025   0935 I reviewed recent CAT scan from [facility] for patient.  She was noted to have no bowel obstruction at that time.  She did have concern for a small lung infiltrate.   0948 Patient left the ED telling nurse that she wanted something stronger for pain. She removed her own IV and left the department.        Medications - No data to display    ED Risk Strat Scores                          SBIRT 22yo+      Flowsheet Row Most Recent Value   Initial Alcohol Screen: US AUDIT-C     1. How often do you have a drink containing alcohol? 0 Filed at:  "01/08/2025 0854   2. How many drinks containing alcohol do you have on a typical day you are drinking?  0 Filed at: 01/08/2025 0854   3a. Male UNDER 65: How often do you have five or more drinks on one occasion? 0 Filed at: 01/08/2025 0854   3b. FEMALE Any Age, or MALE 65+: How often do you have 4 or more drinks on one occassion? 0 Filed at: 01/08/2025 0854   Audit-C Score 0 Filed at: 01/08/2025 0854   SARITA: How many times in the past year have you...    Used an illegal drug or used a prescription medication for non-medical reasons? Never Filed at: 01/08/2025 0854                            History of Present Illness       Chief Complaint   Patient presents with    Pain     Pt was here yesterday for abdominal pain N/V. Pt not taking OTC pain meds \"they gave me fentanyl last night and I ain't got that at home I been using Suboxone \"       Past Medical History:   Diagnosis Date    Autoimmune hepatitis (HCC)     Bipolar 1 disorder (HCC)     Chronic headaches 2021    Diabetes mellitus (HCC)     Kidney stone     Renal disorder     kidney stones    Seizure disorder (HCC)       Past Surgical History:   Procedure Laterality Date    BARIATRIC SURGERY  05/2017    BUNIONECTOMY      COLONOSCOPY      CYSTOSCOPY  07/12/2018    Stent Removal    FL RETROGRADE PYELOGRAM  06/16/2022    GASTRIC BYPASS  05/22/2017    HIATAL HERNIA REPAIR  05/22/2017    HYSTERECTOMY      JOINT REPLACEMENT      KIDNEY STONE SURGERY      MO CYSTO/URETERO W/LITHOTRIPSY &INDWELL STENT INSRT Bilateral 07/06/2018    Procedure: CYSTOSCOPY GALDINO. URETEROSCOPY;GALDINO.  RETROGRADE PYELOGRAM AND INSERTION GALDINO.STENT URETERAL;  Surgeon: Jacob Gerber MD;  Location:  MAIN OR;  Service: Urology    MO CYSTO/URETERO W/LITHOTRIPSY &INDWELL STENT INSRT Left 06/16/2022    Procedure: CYSTOSCOPY URETEROSCOPY WITH LITHOTRIPSY HOLMIUM LASER, RETROGRADE PYELOGRAM AND INSERTION STENT URETERAL;  Surgeon: Asad Geiger MD;  Location: [facility];  Service: Urology    TOTAL " "SHOULDER REPLACEMENT  2002    VAGINAL HYSTERECTOMY      PARTIAL      Family History   Problem Relation Age of Onset    Diabetes Father     Heart disease Father     Diabetes Mother     Heart disease Mother     Diabetes Sister     Diabetes Family         MELLITUS    Depression Family     Mental illness Family     Obesity Family     Osteoarthritis Family       Social History     Tobacco Use    Smoking status: Every Day     Current packs/day: 0.25     Average packs/day: 0.3 packs/day for 30.0 years (7.5 ttl pk-yrs)     Types: Cigarettes     Passive exposure: Current    Smokeless tobacco: Never    Tobacco comments:     N/a   Vaping Use    Vaping status: Never Used   Substance Use Topics    Alcohol use: No     Comment: n/a    Drug use: No     Comment: n/a      E-Cigarette/Vaping    E-Cigarette Use Never User       E-Cigarette/Vaping Substances    Nicotine No     THC No     CBD No     Flavoring No     Other No     Unknown No       I have reviewed and agree with the history as documented.     Is a 59-year-old female presents the emergency department with complaint of abdominal pain and distention.  Tells me that she recently was at [facility] for a bowel obstruction.  She states that she was discharged after resolution.  Patient states she is having persistent distention and pain.  She specifically is requesting narcotic pain medication specifically fentanyl. Patient states she continues to have abdominal pain and states \"I think there is something still wrong with my fistula\". Patient has hx of juliette en y. No vomiting. No fevers or chills. DDX: bowel obstruction, perforation, ileus, constipation, dehydration, electrolyte abnormality.         Review of Systems   Constitutional:  Negative for chills and fever.   Respiratory:  Negative for chest tightness and shortness of breath.    Gastrointestinal:  Positive for abdominal distention, abdominal pain and nausea. Negative for diarrhea.   Genitourinary:  Negative for " dysuria and frequency.   Skin:  Negative for color change and rash.           Objective       ED Triage Vitals [01/08/25 0840]   Temperature Pulse Blood Pressure Respirations SpO2 Patient Position - Orthostatic VS   98 °F (36.7 °C) (!) 114 95/58 18 99 % Sitting      Temp Source Heart Rate Source BP Location FiO2 (%) Pain Score    Oral Monitor Left arm -- 10 - Worst Possible Pain      Vitals      Date and Time Temp Pulse SpO2 Resp BP Pain Score FACES Pain Rating User   01/08/25 0840 98 °F (36.7 °C) 114 99 % 18 95/58 10 - Worst Possible Pain -- SN            Physical Exam  Vitals and nursing note reviewed.   Constitutional:       General: She is not in acute distress.     Appearance: She is well-developed.   HENT:      Head: Normocephalic and atraumatic.      Nose: Nose normal.   Eyes:      General: No scleral icterus.     Conjunctiva/sclera: Conjunctivae normal.   Cardiovascular:      Rate and Rhythm: Normal rate and regular rhythm.      Heart sounds: Normal heart sounds.   Pulmonary:      Effort: Pulmonary effort is normal. No respiratory distress.      Breath sounds: Normal breath sounds. No stridor. No wheezing.   Abdominal:      General: There is distension.      Palpations: Abdomen is soft.      Tenderness: There is abdominal tenderness (diffuse). There is no guarding or rebound.      Hernia: No hernia is present.   Musculoskeletal:         General: No deformity.      Cervical back: Normal range of motion and neck supple.   Skin:     General: Skin is warm and dry.      Findings: No rash.   Neurological:      Mental Status: She is alert and oriented to person, place, and time.   Psychiatric:         Thought Content: Thought content normal.         Results Reviewed       Procedure Component Value Units Date/Time    Lipase [[id number]]  (Normal) Collected: 01/08/25 0924    Lab Status: Final result Specimen: Blood from Arm, Right Updated: 01/08/25 1028     Lipase 15 u/L     Hepatic function panel [[id number]]   (Abnormal) Collected: 01/08/25 0924    Lab Status: Final result Specimen: Blood from Arm, Right Updated: 01/08/25 1028     Total Bilirubin 0.25 mg/dL      Bilirubin, Direct 0.05 mg/dL      Alkaline Phosphatase 154 U/L      AST 13 U/L      ALT 12 U/L      Total Protein 5.6 g/dL      Albumin 2.5 g/dL     Basic metabolic panel [[id number]]  (Abnormal) Collected: 01/08/25 0924    Lab Status: Final result Specimen: Blood from Arm, Right Updated: 01/08/25 1028     Sodium 134 mmol/L      Potassium 3.8 mmol/L      Chloride 100 mmol/L      CO2 26 mmol/L      ANION GAP 8 mmol/L      BUN 14 mg/dL      Creatinine 0.50 mg/dL      Glucose 104 mg/dL      Calcium 8.0 mg/dL      eGFR 107 ml/min/1.73sq m     Narrative:      National Kid[employer] Disease Foundation guidelines for Chronic Kidney Disease (CKD):     Stage 1 with normal or high GFR (GFR > 90 mL/min/1.73 square meters)    Stage 2 Mild CKD (GFR = 60-89 mL/min/1.73 square meters)    Stage 3A Moderate CKD (GFR = 45-59 mL/min/1.73 square meters)    Stage 3B Moderate CKD (GFR = 30-44 mL/min/1.73 square meters)    Stage 4 Severe CKD (GFR = 15-29 mL/min/1.73 square meters)    Stage 5 End Stage CKD (GFR <15 mL/min/1.73 square meters)  Note: GFR calculation is accurate only with a steady state creatinine    CBC and differential [[id number]]  (Abnormal) Collected: 01/08/25 0924    Lab Status: Final result Specimen: Blood from Arm, Right Updated: 01/08/25 0934     WBC 7.33 Thousand/uL      RBC 2.66 Million/uL      Hemoglobin 8.4 g/dL      Hematocrit 25.9 %      MCV 97 fL      MCH 31.6 pg      MCHC 32.4 g/dL      RDW 18.9 %      MPV 8.2 fL      Platelets 573 Thousands/uL      nRBC 0 /100 WBCs      Segmented % 72 %      Immature Grans % 1 %      Lymphocytes % 20 %      Monocytes % 6 %      Eosinophils Relative 1 %      Basophils Relative 0 %      Absolute Neutrophils 5.33 Thousands/µL      Absolute Immature Grans 0.04 Thousand/uL      Absolute Lymphocytes 1.44 Thousands/µL      Absolute  Monocytes 0.46 Thousand/µL      Eosinophils Absolute 0.04 Thousand/µL      Basophils Absolute 0.02 Thousands/µL             No orders to display       Procedures    ED Medication and Procedure Management   Prior to Admission Medications   Prescriptions Last Dose Informant Patient Reported? Taking?   Aspirin Low Dose 81 MG EC tablet  Self Yes No   Sig: Take 81 mg by mouth daily   Empagliflozin 25 MG TABS  Self No No   Sig: Take 1 tablet (25 mg total) by mouth daily   FLUoxetine (PROzac) 40 MG capsule  Self No No   Sig: Take 1 capsule (40 mg total) by mouth daily   Linzess 145 MCG CAPS   Yes Yes   Sig: Take 1 capsule by mouth in the morning   Nutritional Supplements (ENSURE PO)  Self Yes No   Sig: Take 1 Can by mouth daily   OneTouch Ultra test strip  Self Yes No   QUEtiapine (SEROquel) 400 MG tablet  Self No No   Sig: Take 2 tablets (800 mg total) by mouth daily at bedtime   QUEtiapine (SEROquel) 50 mg tablet   Yes Yes   Sig: Take 50 mg by mouth every evening   Trulicity 1.5 MG/0.5ML injection  Self Yes No   Patient not taking: Reported on 7/7/2024   albuterol (PROVENTIL HFA,VENTOLIN HFA) 90 mcg/act inhaler  Self Yes No   Sig: Inhale 2 puffs every 4 (four) hours as needed for wheezing or shortness of breath   aluminum-magnesium hydroxide 200-200 MG/5ML suspension   No No   Sig: Take 10 mL by mouth every 6 (six) hours as needed for heartburn   amitriptyline (ELAVIL) 25 mg tablet  Self Yes No   Sig: Take 25 mg by mouth daily at bedtime   amitriptyline (ELAVIL) 50 mg tablet  Self Yes No   Sig: Take 50 mg by mouth every evening   ammonium lactate (LAC-HYDRIN) 12 % lotion  Self Yes No   Sig: Apply topically daily   azaTHIOprine (IMURAN) 50 mg tablet  Self Yes No   Sig: Take 100 mg by mouth daily   bisacodyl (DULCOLAX) 5 mg EC tablet   No No   Sig: Take as directed prior to colonoscopy   buprenorphine-naloxone (SUBOXONE) 2-0.5 mg per SL tablet  Self Yes No   Sig: Place 1 tablet under the tongue Three times a day    buprenorphine-naloxone (SUBOXONE) 8-2 mg per SL tablet   Yes Yes   Sig: Place 1 tablet under the tongue 3 (three) times a day   cyanocobalamin (VITAMIN B-12) 1000 MCG tablet   No No   Sig: Take 1 tablet (1,000 mcg total) by mouth daily   Patient not taking: Reported on 7/7/2024   dicyclomine (BENTYL) 20 mg tablet  Self No No   Sig: Take 1 tablet (20 mg total) by mouth 2 (two) times a day   ergocalciferol (VITAMIN D2) 50,000 units   No No   Sig: Take 1 capsule (50,000 Units total) by mouth once a week for 8 doses Do not start before June 29, 2024.   Patient not taking: Reported on 7/7/2024   famotidine (PEPCID) 20 mg tablet   No No   Sig: Take 1 tablet (20 mg total) by mouth 2 (two) times a day   folic acid (FOLVITE) 1 mg tablet   No No   Sig: Take 1 tablet (1 mg total) by mouth daily   Patient not taking: Reported on 7/7/2024   gabapentin (NEURONTIN) 300 mg capsule  Self No No   Sig: Take 2 capsules (600 mg total) by mouth 3 (three) times a day   ibuprofen (MOTRIN) 600 mg tablet   Yes Yes   Sig: Take 600 mg by mouth every 6 (six) hours as needed   mirtazapine (REMERON) 7.5 MG tablet  Self No No   Sig: Take 1 tablet (7.5 mg total) by mouth daily at bedtime   nortriptyline (PAMELOR) 50 mg capsule  Self Yes No   Sig: Take 50 mg by mouth daily at bedtime   omeprazole (PriLOSEC) 20 mg delayed release capsule   No No   Sig: Take 1 capsule (20 mg total) by mouth daily   ondansetron (ZOFRAN) 4 mg tablet   No No   Sig: Take 1 tablet (4 mg total) by mouth every 6 (six) hours   pantoprazole (PROTONIX) 40 mg tablet  Self Yes No   Sig: Take 40 mg by mouth daily   polyethylene glycol (GLYCOLAX) 17 GM/SCOOP powder   Yes Yes   Sig: take 17 grams by mouth daily   polyethylene glycol (MIRALAX) 17 g packet   No No   Sig: Take 238 g by mouth once for 1 dose Take as directed per office instructions prior to colonoscopy   rizatriptan (MAXALT) 10 mg tablet  Self Yes No   Sig: Take 10 mg by mouth daily   sitaGLIPtin (JANUVIA) 100 mg  tablet  Self No No   Sig: Take 1 tablet (100 mg total) by mouth daily   sucralfate (CARAFATE) 1 g tablet   Yes Yes   Sig: Take 1 tablet by mouth 4 times a day   sucralfate (CARAFATE) 1 g/10 mL suspension  Self No No   Sig: Take 10 mL (1 g total) by mouth 4 (four) times a day   Patient not taking: Reported on 9/30/2024   tiZANidine (ZANAFLEX) 4 mg tablet  Self Yes No   Sig: Take 6 mg by mouth every 6 (six) hours as needed   topiramate (TOPAMAX) 25 mg tablet   No No   Sig: Take 1 tablet (25 mg total) by mouth 2 (two) times a day   Patient not taking: Reported on 9/30/2024   varenicline (Chantix) 1 mg tablet  Self Yes No   Sig: Take 1 mg by mouth 2 (two) times a day   zolpidem (AMBIEN) 10 mg tablet  Self Yes No   Sig: Take 10 mg by mouth daily at bedtime as needed      Facility-Administered Medications: None     Discharge Medication List as of 1/8/2025 10:16 AM        CONTINUE these medications which have NOT CHANGED    Details   buprenorphine-naloxone (SUBOXONE) 8-2 mg per SL tablet Place 1 tablet under the tongue 3 (three) times a day, Starting Mon 12/9/2024, Historical Med      ibuprofen (MOTRIN) 600 mg tablet Take 600 mg by mouth every 6 (six) hours as needed, Starting Tue 12/24/2024, Historical Med      Linzess 145 MCG CAPS Take 1 capsule by mouth in the morning, Starting Tue 12/10/2024, Historical Med      polyethylene glycol (GLYCOLAX) 17 GM/SCOOP powder take 17 grams by mouth daily, Historical Med      QUEtiapine (SEROquel) 50 mg tablet Take 50 mg by mouth every evening, Starting Mon 12/16/2024, Historical Med      sucralfate (CARAFATE) 1 g tablet Take 1 tablet by mouth 4 times a day, Starting Tue 12/24/2024, Historical Med      albuterol (PROVENTIL HFA,VENTOLIN HFA) 90 mcg/act inhaler Inhale 2 puffs every 4 (four) hours as needed for wheezing or shortness of breath, Starting Fri 3/29/2024, Historical Med      aluminum-magnesium hydroxide 200-200 MG/5ML suspension Take 10 mL by mouth every 6 (six) hours as  needed for heartburn, Starting Sun 11/17/2024, Normal      !! amitriptyline (ELAVIL) 25 mg tablet Take 25 mg by mouth daily at bedtime, Starting Mon 9/9/2024, Historical Med      !! amitriptyline (ELAVIL) 50 mg tablet Take 50 mg by mouth every evening, Starting Mon 8/12/2024, Historical Med      ammonium lactate (LAC-HYDRIN) 12 % lotion Apply topically daily, Starting Tue 9/17/2024, Historical Med      Aspirin Low Dose 81 MG EC tablet Take 81 mg by mouth daily, Starting Mon 8/26/2024, Historical Med      azaTHIOprine (IMURAN) 50 mg tablet Take 100 mg by mouth daily, Historical Med      bisacodyl (DULCOLAX) 5 mg EC tablet Take as directed prior to colonoscopy, Normal      buprenorphine-naloxone (SUBOXONE) 2-0.5 mg per SL tablet Place 1 tablet under the tongue Three times a day, Starting Wed 9/25/2024, Historical Med      cyanocobalamin (VITAMIN B-12) 1000 MCG tablet Take 1 tablet (1,000 mcg total) by mouth daily, Starting Wed 6/26/2024, Until Sun 8/25/2024, Normal      dicyclomine (BENTYL) 20 mg tablet Take 1 tablet (20 mg total) by mouth 2 (two) times a day, Starting Tue 9/24/2024, Normal      Empagliflozin 25 MG TABS Take 1 tablet (25 mg total) by mouth daily, Starting Wed 6/26/2024, Until Mon 9/30/2024, Normal      ergocalciferol (VITAMIN D2) 50,000 units Take 1 capsule (50,000 Units total) by mouth once a week for 8 doses Do not start before June 29, 2024., Starting Sat 6/29/2024, Until Sun 8/18/2024, Normal      famotidine (PEPCID) 20 mg tablet Take 1 tablet (20 mg total) by mouth 2 (two) times a day, Starting Sun 11/17/2024, Normal      FLUoxetine (PROzac) 40 MG capsule Take 1 capsule (40 mg total) by mouth daily, Starting Wed 6/26/2024, Until Mon 9/30/2024, Normal      folic acid (FOLVITE) 1 mg tablet Take 1 tablet (1 mg total) by mouth daily, Starting Wed 6/26/2024, Until Sun 8/25/2024, Normal      gabapentin (NEURONTIN) 300 mg capsule Take 2 capsules (600 mg total) by mouth 3 (three) times a day, Starting  Tue 6/25/2024, Until Mon 9/30/2024, Normal      mirtazapine (REMERON) 7.5 MG tablet Take 1 tablet (7.5 mg total) by mouth daily at bedtime, Starting Tue 6/25/2024, Until Mon 9/30/2024, Normal      nortriptyline (PAMELOR) 50 mg capsule Take 50 mg by mouth daily at bedtime, Starting Wed 7/10/2024, Until Thu 7/10/2025, Historical Med      Nutritional Supplements (ENSURE PO) Take 1 Can by mouth daily, Starting Mon 7/8/2024, Historical Med      omeprazole (PriLOSEC) 20 mg delayed release capsule Take 1 capsule (20 mg total) by mouth daily, Starting Sun 11/17/2024, Normal      ondansetron (ZOFRAN) 4 mg tablet Take 1 tablet (4 mg total) by mouth every 6 (six) hours, Starting Sun 11/17/2024, Normal      OneTouch Ultra test strip Historical Med      pantoprazole (PROTONIX) 40 mg tablet Take 40 mg by mouth daily, Historical Med      polyethylene glycol (MIRALAX) 17 g packet Take 238 g by mouth once for 1 dose Take as directed per office instructions prior to colonoscopy, Starting Mon 9/30/2024, Normal      rizatriptan (MAXALT) 10 mg tablet Take 10 mg by mouth daily, Starting Mon 7/8/2024, Historical Med      sitaGLIPtin (JANUVIA) 100 mg tablet Take 1 tablet (100 mg total) by mouth daily, Starting Wed 6/26/2024, Until Mon 9/30/2024, Normal      sucralfate (CARAFATE) 1 g/10 mL suspension Take 10 mL (1 g total) by mouth 4 (four) times a day, Starting Tue 9/24/2024, Normal      tiZANidine (ZANAFLEX) 4 mg tablet Take 6 mg by mouth every 6 (six) hours as needed, Starting Fri 8/30/2024, Historical Med      topiramate (TOPAMAX) 25 mg tablet Take 1 tablet (25 mg total) by mouth 2 (two) times a day, Starting Tue 6/25/2024, Until Sat 8/24/2024, Normal      Trulicity 1.5 MG/0.5ML injection Historical Med      varenicline (Chantix) 1 mg tablet Take 1 mg by mouth 2 (two) times a day, Starting Fri 7/26/2024, Historical Med      zolpidem (AMBIEN) 10 mg tablet Take 10 mg by mouth daily at bedtime as needed, Starting Mon 7/1/2024, Historical  Med       !! - Potential duplicate medications found. Please discuss with provider.        No discharge procedures on file.  ED SEPSIS DOCUMENTATION   Time reflects when diagnosis was documented in both MDM as applicable and the Disposition within this note       Time User Action Codes Description Comment    1/10/2025  8:15 PM Madison Fitzgerald Add [R10.9] Abdominal pain                  Madison Fitzgerald MD  01/10/25 2015

## 2025-01-12 ENCOUNTER — HOSPITAL ENCOUNTER (EMERGENCY)
Facility: HOSPITAL | Age: 60
Discharge: ANOTHER HOSPITAL | End: 2025-01-12
Attending: EMERGENCY MEDICINE
Payer: COMMERCIAL

## 2025-01-12 ENCOUNTER — APPOINTMENT (EMERGENCY)
Dept: CT IMAGING | Facility: HOSPITAL | Age: 60
End: 2025-01-12
Payer: COMMERCIAL

## 2025-01-12 VITALS
HEART RATE: 108 BPM | BODY MASS INDEX: 17.82 KG/M2 | DIASTOLIC BLOOD PRESSURE: 63 MMHG | OXYGEN SATURATION: 94 % | SYSTOLIC BLOOD PRESSURE: 113 MMHG | TEMPERATURE: 98.2 F | RESPIRATION RATE: 22 BRPM | WEIGHT: 97.44 LBS

## 2025-01-12 DIAGNOSIS — Z98.84 HISTORY OF GASTRIC BYPASS: ICD-10-CM

## 2025-01-12 DIAGNOSIS — K56.609 SBO (SMALL BOWEL OBSTRUCTION) (HCC): ICD-10-CM

## 2025-01-12 DIAGNOSIS — R10.9 ABDOMINAL PAIN: Primary | ICD-10-CM

## 2025-01-12 LAB
ALBUMIN SERPL BCG-MCNC: 2.8 G/DL (ref 3.5–5)
ALP SERPL-CCNC: 179 U/L (ref 34–104)
ALT SERPL W P-5'-P-CCNC: 13 U/L (ref 7–52)
ANION GAP SERPL CALCULATED.3IONS-SCNC: 11 MMOL/L (ref 4–13)
APTT PPP: 32 SECONDS (ref 23–34)
AST SERPL W P-5'-P-CCNC: 26 U/L (ref 13–39)
BACTERIA UR QL AUTO: ABNORMAL /HPF
BASOPHILS # BLD AUTO: 0.03 THOUSANDS/ΜL (ref 0–0.1)
BASOPHILS NFR BLD AUTO: 0 % (ref 0–1)
BILIRUB SERPL-MCNC: 0.46 MG/DL (ref 0.2–1)
BILIRUB UR QL STRIP: NEGATIVE
BUN SERPL-MCNC: 23 MG/DL (ref 5–25)
CALCIUM ALBUM COR SERPL-MCNC: 9.5 MG/DL (ref 8.3–10.1)
CALCIUM SERPL-MCNC: 8.5 MG/DL (ref 8.4–10.2)
CHLORIDE SERPL-SCNC: 103 MMOL/L (ref 96–108)
CLARITY UR: CLEAR
CO2 SERPL-SCNC: 24 MMOL/L (ref 21–32)
COLOR UR: YELLOW
CREAT SERPL-MCNC: 0.8 MG/DL (ref 0.6–1.3)
EOSINOPHIL # BLD AUTO: 0.01 THOUSAND/ΜL (ref 0–0.61)
EOSINOPHIL NFR BLD AUTO: 0 % (ref 0–6)
ERYTHROCYTE [DISTWIDTH] IN BLOOD BY AUTOMATED COUNT: 18.7 % (ref 11.6–15.1)
GFR SERPL CREATININE-BSD FRML MDRD: 82 ML/MIN/1.73SQ M
GLUCOSE SERPL-MCNC: 415 MG/DL (ref 65–140)
GLUCOSE UR STRIP-MCNC: ABNORMAL MG/DL
HCT VFR BLD AUTO: 30.1 % (ref 34.8–46.1)
HGB BLD-MCNC: 9 G/DL (ref 11.5–15.4)
HGB UR QL STRIP.AUTO: NEGATIVE
HYALINE CASTS #/AREA URNS LPF: ABNORMAL /LPF
IMM GRANULOCYTES # BLD AUTO: 0.05 THOUSAND/UL (ref 0–0.2)
IMM GRANULOCYTES NFR BLD AUTO: 1 % (ref 0–2)
INR PPP: 1.03 (ref 0.85–1.19)
KETONES UR STRIP-MCNC: NEGATIVE MG/DL
LACTATE SERPL-SCNC: 3.1 MMOL/L (ref 0.5–2)
LACTATE SERPL-SCNC: 4.5 MMOL/L (ref 0.5–2)
LEUKOCYTE ESTERASE UR QL STRIP: NEGATIVE
LIPASE SERPL-CCNC: 8 U/L (ref 11–82)
LYMPHOCYTES # BLD AUTO: 1.42 THOUSANDS/ΜL (ref 0.6–4.47)
LYMPHOCYTES NFR BLD AUTO: 13 % (ref 14–44)
MCH RBC QN AUTO: 30.4 PG (ref 26.8–34.3)
MCHC RBC AUTO-ENTMCNC: 29.9 G/DL (ref 31.4–37.4)
MCV RBC AUTO: 102 FL (ref 82–98)
MONOCYTES # BLD AUTO: 0.52 THOUSAND/ΜL (ref 0.17–1.22)
MONOCYTES NFR BLD AUTO: 5 % (ref 4–12)
MUCOUS THREADS UR QL AUTO: ABNORMAL
NEUTROPHILS # BLD AUTO: 9 THOUSANDS/ΜL (ref 1.85–7.62)
NEUTS SEG NFR BLD AUTO: 81 % (ref 43–75)
NITRITE UR QL STRIP: NEGATIVE
NON-SQ EPI CELLS URNS QL MICRO: ABNORMAL /HPF
NRBC BLD AUTO-RTO: 0 /100 WBCS
OTHER STN SPEC: ABNORMAL
PH UR STRIP.AUTO: 6 [PH]
PLATELET # BLD AUTO: 659 THOUSANDS/UL (ref 149–390)
PMV BLD AUTO: 8.6 FL (ref 8.9–12.7)
POTASSIUM SERPL-SCNC: 3.5 MMOL/L (ref 3.5–5.3)
PROCALCITONIN SERPL-MCNC: 0.28 NG/ML
PROT SERPL-MCNC: 6.7 G/DL (ref 6.4–8.4)
PROT UR STRIP-MCNC: ABNORMAL MG/DL
PROTHROMBIN TIME: 13.8 SECONDS (ref 12.3–15)
RBC # BLD AUTO: 2.96 MILLION/UL (ref 3.81–5.12)
RBC #/AREA URNS AUTO: ABNORMAL /HPF
SODIUM SERPL-SCNC: 138 MMOL/L (ref 135–147)
SP GR UR STRIP.AUTO: 1.01 (ref 1–1.04)
UROBILINOGEN UA: NEGATIVE MG/DL
WBC # BLD AUTO: 11.03 THOUSAND/UL (ref 4.31–10.16)
WBC #/AREA URNS AUTO: ABNORMAL /HPF

## 2025-01-12 PROCEDURE — 96361 HYDRATE IV INFUSION ADD-ON: CPT

## 2025-01-12 PROCEDURE — 85730 THROMBOPLASTIN TIME PARTIAL: CPT | Performed by: EMERGENCY MEDICINE

## 2025-01-12 PROCEDURE — 85610 PROTHROMBIN TIME: CPT | Performed by: EMERGENCY MEDICINE

## 2025-01-12 PROCEDURE — 80053 COMPREHEN METABOLIC PANEL: CPT | Performed by: EMERGENCY MEDICINE

## 2025-01-12 PROCEDURE — 84145 PROCALCITONIN (PCT): CPT | Performed by: EMERGENCY MEDICINE

## 2025-01-12 PROCEDURE — 83690 ASSAY OF LIPASE: CPT | Performed by: EMERGENCY MEDICINE

## 2025-01-12 PROCEDURE — 87040 BLOOD CULTURE FOR BACTERIA: CPT | Performed by: EMERGENCY MEDICINE

## 2025-01-12 PROCEDURE — 99285 EMERGENCY DEPT VISIT HI MDM: CPT | Performed by: EMERGENCY MEDICINE

## 2025-01-12 PROCEDURE — 96375 TX/PRO/DX INJ NEW DRUG ADDON: CPT

## 2025-01-12 PROCEDURE — 85025 COMPLETE CBC W/AUTO DIFF WBC: CPT | Performed by: EMERGENCY MEDICINE

## 2025-01-12 PROCEDURE — 74177 CT ABD & PELVIS W/CONTRAST: CPT

## 2025-01-12 PROCEDURE — 96365 THER/PROPH/DIAG IV INF INIT: CPT

## 2025-01-12 PROCEDURE — 93005 ELECTROCARDIOGRAM TRACING: CPT

## 2025-01-12 PROCEDURE — 99285 EMERGENCY DEPT VISIT HI MDM: CPT

## 2025-01-12 PROCEDURE — 81001 URINALYSIS AUTO W/SCOPE: CPT | Performed by: EMERGENCY MEDICINE

## 2025-01-12 PROCEDURE — 83605 ASSAY OF LACTIC ACID: CPT | Performed by: EMERGENCY MEDICINE

## 2025-01-12 PROCEDURE — 36415 COLL VENOUS BLD VENIPUNCTURE: CPT | Performed by: EMERGENCY MEDICINE

## 2025-01-12 RX ORDER — LORAZEPAM 2 MG/ML
1 INJECTION INTRAMUSCULAR ONCE
Status: COMPLETED | OUTPATIENT
Start: 2025-01-12 | End: 2025-01-12

## 2025-01-12 RX ORDER — KETOROLAC TROMETHAMINE 30 MG/ML
15 INJECTION, SOLUTION INTRAMUSCULAR; INTRAVENOUS ONCE
Status: COMPLETED | OUTPATIENT
Start: 2025-01-12 | End: 2025-01-12

## 2025-01-12 RX ORDER — MORPHINE SULFATE 4 MG/ML
4 INJECTION, SOLUTION INTRAMUSCULAR; INTRAVENOUS ONCE
Status: COMPLETED | OUTPATIENT
Start: 2025-01-12 | End: 2025-01-12

## 2025-01-12 RX ADMIN — SODIUM CHLORIDE 1000 ML: 0.9 INJECTION, SOLUTION INTRAVENOUS at 06:13

## 2025-01-12 RX ADMIN — PIPERACILLIN AND TAZOBACTAM 4.5 G: 4; .5 INJECTION, POWDER, FOR SOLUTION INTRAVENOUS at 07:25

## 2025-01-12 RX ADMIN — MORPHINE SULFATE 4 MG: 4 INJECTION, SOLUTION INTRAMUSCULAR; INTRAVENOUS at 06:12

## 2025-01-12 RX ADMIN — IOHEXOL 50 ML: 240 INJECTION, SOLUTION INTRATHECAL; INTRAVASCULAR; INTRAVENOUS; ORAL at 08:27

## 2025-01-12 RX ADMIN — LORAZEPAM 1 MG: 2 INJECTION INTRAMUSCULAR; INTRAVENOUS at 06:17

## 2025-01-12 RX ADMIN — IOHEXOL 80 ML: 350 INJECTION, SOLUTION INTRAVENOUS at 08:28

## 2025-01-12 RX ADMIN — KETOROLAC TROMETHAMINE 15 MG: 30 INJECTION, SOLUTION INTRAMUSCULAR; INTRAVENOUS at 06:13

## 2025-01-12 RX ADMIN — SODIUM CHLORIDE 1000 ML: 0.9 INJECTION, SOLUTION INTRAVENOUS at 07:20

## 2025-01-12 NOTE — ED NOTES
Patient transported to CT via stretcher with RN, Radiology and ER tech.     Shanae Bronson RN  01/12/25 0811

## 2025-01-12 NOTE — ED NOTES
Pt's IV repositioned and re-dressed. Arm board applied to protect IV as pt is restless and is unable to sit still. Pt complaining of back pain however reports improvement in abd pain. Pt asking for more medications. Provider aware of same.      Alexia Coleman RN  01/12/25 0619

## 2025-01-12 NOTE — EMTALA/ACUTE CARE TRANSFER
Formerly [facility] EMERGENCY DEPARTMENT  [address] [facility] [address]  [phone]  Dept: [phone]      EMTALA TRANSFER CONSENT    NAME Edith HERNANDEZ 1965                              MRN [id number]    I have been informed of my rights regarding examination, treatment, and transfer   by Dr. Lorraine mitchell. providers found    Benefits: Specialized equipment and/or services available at the receiving facility (Include comment)________________________    Risks: Potential for delay in receiving treatment, Potential deterioration of medical condition, Loss of IV, Increased discomfort during transfer, Possible worsening of condition or death during transfer      Consent for Transfer:  I acknowledge that my medical condition has been evaluated and explained to me by the emergency department physician or other qualified medical person and/or my attending physician, who has recommended that I be transferred to the service of    at Accepting Facility Name, City & State : [facility]. The above potential benefits of such transfer, the potential risks associated with such transfer, and the probable risks of not being transferred have been explained to me, and I fully understand them.  The doctor has explained that, in my case, the benefits of transfer outweigh the risks.  I agree to be transferred.    I authorize the performance of emergency medical procedures and treatments upon me in both transit and upon arrival at the receiving facility.  Additionally, I authorize the release of any and all medical records to the receiving facility and request they be transported with me, if possible.  I understand that the safest mode of transportation during a medical emergency is an ambulance and that the Hospital advocates the use of this mode of transport. Risks of traveling to the receiving facility by car, including absence of medical control, life sustaining equipment, such  as oxygen, and medical personnel has been explained to me and I fully understand them.    (RIP CORRECT BOX BELOW)  [  ]  I consent to the stated transfer and to be transported by ambulance/helicopter.  [  ]  I consent to the stated transfer, but refuse transportation by ambulance and accept full responsibility for my transportation by car.  I understand the risks of non-ambulance transfers and I exonerate the Hospital and its staff from any deterioration in my condition that results from this refusal.    X___________________________________________    DATE  25  TIME________  Signature of patient or legally responsible individual signing on patient behalf           RELATIONSHIP TO PATIENT_________________________          Provider Certification    NAME Edith Klein                                         1965                              MRN [id number]    A medical screening exam was performed on the above named patient.  Based on the examination:    Condition Necessitating Transfer The primary encounter diagnosis was Abdominal pain. Diagnoses of SBO (small bowel obstruction) (HCC) and History of gastric bypass were also pertinent to this visit.    Patient Condition: The patient has been stabilized such that within reasonable medical probability, no material deterioration of the patient condition or the condition of the unborn child(anastacio) is likely to result from the transfer    Reason for Transfer: Level of Care needed not available at this facility (bariatric surgery)    [facility] Require[facility]   Space available and qualified personnel available for treatment as acknowledged by    Agreed to accept transfer and to provide appropriate medical treatment as acknowledged by          Appropriate medical records of the examination and treatment of the patient are provided at the time of transfer   STAFF INITIAL WHEN COMPLETED _______  Transfer will be performed by qualified personnel from    and  appropriate transfer equipment as required, including the use of necessary and appropriate life support measures.    Provider Certification: I have examined the patient and explained the following risks and benefits of being transferred/refusing transfer to the patient/family:  General risk, such as traffic hazards, adverse weather conditions, rough terrain or turbulence, possible failure of equipment (including vehicle or aircraft), or consequences of actions of persons outside the control of the transport personnel      Based on these reasonable risks and benefits to the patient and/or the unborn child(anastacio), and based upon the information available at the time of the patient’s examination, I certify that the medical benefits reasonably to be expected from the provision of appropriate medical treatments at another medical facility outweigh the increasing risks, if any, to the individual’s medical condition, and in the case of labor to the unborn child, from effecting the transfer.    X____________________________________________ DATE 01/12/25        TIME_______      ORIGINAL - SEND TO MEDICAL RECORDS   COPY - SEND WITH PATIENT DURING TRANSFER

## 2025-01-12 NOTE — ED PROVIDER NOTES
Time reflects when diagnosis was documented in both MDM as applicable and the Disposition within this note       Time User Action Codes Description Comment    1/12/2025  8:16 AM Russel Leyva Add [R10.9] Abdominal pain     1/12/2025  8:17 AM Russel Leyva [K56.609] SBO (small bowel obstruction) (HCC)     1/12/2025  8:17 AM Russel Leyva [Z98.84] History of gastric bypass           ED Disposition       ED Disposition   Transfer to Another Facility - Out of Network    Condition   --    Date/Time   Sun Jan 12, 2025  8:16 AM    Comment   Edith Klein should be transferred out to [facility].               Assessment & Plan       Medical Decision Making  Amount and/or Complexity of Data Reviewed  Labs: ordered. Decision-making details documented in ED Course.  Radiology: ordered.    Risk  Prescription drug management.      Amount and/or Complexity of Data Reviewed  Clinical lab tests: ordered and reviewed yes  Reviewed past medical records: yes     History Provided by patient     Differential considered:   At risk for SBO given hx, will also look for infectious vs inflammatory; diverticulitis, colitis, appendicitis, obstruction, incarcerated / strangulated hernia     Labs looking for any acute changes to wbc count or any acute electrolyte abnormalities. LFTs for acute liver pathology, lipase for pancreatitis. Imaging for any acute pathology.  UA for ketones / infection.  EKG for arrhythmias.  Symptomatic treatments provided.    Labs show elevated lactate. Sepsis protocol initiated, started zosyn after collecting cultures.     Signed out to Dr Leyva pending labs imaging and re evaluation.      ED Course as of 01/12/25 2355   Sun Jan 12, 2025   0619 Patient states if she has a surgical issue, would like to go back to [facility] if she needs a procedure performed.   0620 Hemoglobin(!): 9.0  At baseline   0655 GLUCOSE(!!): 415  No signs of dka   0711 Signed out to Dr Leyva pending labs imaging and re evaluation       Medications   sodium  chloride 0.9 % bolus 1,000 mL (0 mL Intravenous Stopped 1/12/25 0744)   ketorolac (TORADOL) injection 15 mg (15 mg Intravenous Given 1/12/25 0613)   morphine injection 4 mg (4 mg Intravenous Given 1/12/25 0612)   LORazepam (ATIVAN) injection 1 mg (1 mg Intravenous Given 1/12/25 0617)   iohexol (OMNIPAQUE) 240 MG/ML solution 50 mL (50 mL Oral Given 1/12/25 0827)   sodium chloride 0.9 % bolus 1,000 mL (0 mL Intravenous Stopped 1/12/25 0828)   piperacillin-tazobactam (ZOSYN) 4.5 g in sodium chloride 0.9 % 100 mL IVPB (0 g Intravenous Stopped 1/12/25 0828)   iohexol (OMNIPAQUE) 350 MG/ML injection (MULTI-DOSE) 80 mL (80 mL Intravenous Given 1/12/25 0828)       ED Risk Strat Scores                                              History of Present Illness       Chief Complaint   Patient presents with    Abdominal Pain     Pt seen recently for the same issue, states she is still having abdominal pain. Reports he stomach is distended and is causing a lot of pressure. No meds Pta       60 yo F hx of bipolar disorder, DM, hepatitis, presents to ed for abd pain. Recent admission 1/1/25 for obstruction, left AMA, states she had things to do at home.    Onset: chronic for patient  Duration: intermittent  Pain currently: yes  Pain meds taken: Maalox earlier in the day  Prior similar pain: yes  Where: generalized  Radiation: no  Associated N/V: denies  Associated with food: with food, abd pain worsens, last meal last night  Emesis: No  Last BM: constipated, last BM over a week ago  Urinary complaints:  no    Hx of Hysterectomy    Prior Surgeries yes, bariatric surgery patient, possible upcoming reversal    Previous Imaging     1/1/25 CTA abd pelvis w/ contrast  No evidence of bowel obstruction. Moderate diffuse distention of the   colon with large amount of retained stool and gas. Status post gastric bypass   surgery with communication between the excluded stomach in the gastric pouch as   described above. Small ascites with diffuse  mesenteric edema. Anasarca. Patchy   infiltrate in the right middle lobe. Mucoid impaction in the right lower lobe   bronchi.     Of note, patient has pcp visit 1/8/25, pcp noted chronic pain and prescribed 30 tab supply of hydrocodone tablets, however, patient unable to  prescription due to preauth required by her insurance.       Past Medical History:   Diagnosis Date    Autoimmune hepatitis (HCC)     Bipolar 1 disorder (HCC)     Chronic headaches 2021    Diabetes mellitus (HCC)     Kidney stone     Renal disorder     kidney stones    Seizure disorder (HCC)       Past Surgical History:   Procedure Laterality Date    BARIATRIC SURGERY  05/2017    BUNIONECTOMY      COLONOSCOPY      CYSTOSCOPY  07/12/2018    Stent Removal    FL RETROGRADE PYELOGRAM  06/16/2022    GASTRIC BYPASS  05/22/2017    HIATAL HERNIA REPAIR  05/22/2017    HYSTERECTOMY      JOINT REPLACEMENT      KIDNEY STONE SURGERY      PA CYSTO/URETERO W/LITHOTRIPSY &INDWELL STENT INSRT Bilateral 07/06/2018    Procedure: CYSTOSCOPY GALDINO. URETEROSCOPY;GALDINO.  RETROGRADE PYELOGRAM AND INSERTION GALDINO.STENT URETERAL;  Surgeon: Jacob Gerber MD;  Location: [facility];  Service: Urology    PA CYSTO/URETERO W/LITHOTRIPSY &INDWELL STENT INSRT Left 06/16/2022    Procedure: CYSTOSCOPY URETEROSCOPY WITH LITHOTRIPSY HOLMIUM LASER, RETROGRADE PYELOGRAM AND INSERTION STENT URETERAL;  Surgeon: Asad Geiger MD;  Location: [facility];  Service: Urology    TOTAL SHOULDER REPLACEMENT  2002    VAGINAL HYSTERECTOMY      PARTIAL      Family History   Problem Relation Age of Onset    Diabetes Father     Heart disease Father     Diabetes Mother     Heart disease Mother     Diabetes Sister     Diabetes Family         MELLITUS    Depression Family     Mental illness Family     Obesity Family     Osteoarthritis Family       Social History     Tobacco Use    Smoking status: Every Day     Current packs/day: 0.25     Average packs/day: 0.3 packs/day for 30.0 years (7.5 ttl  pk-yrs)     Types: Cigarettes     Passive exposure: Current    Smokeless tobacco: Never    Tobacco comments:     N/a   Vaping Use    Vaping status: Never Used   Substance Use Topics    Alcohol use: No     Comment: n/a    Drug use: No     Comment: n/a      E-Cigarette/Vaping    E-Cigarette Use Never User       E-Cigarette/Vaping Substances    Nicotine No     THC No     CBD No     Flavoring No     Other No     Unknown No       I have reviewed and agree with the history as documented.     HPI    Review of Systems   Constitutional:  Negative for chills, fatigue and fever.   HENT:  Negative for sore throat.    Eyes:  Negative for redness and visual disturbance.   Respiratory:  Negative for cough and shortness of breath.    Cardiovascular:  Negative for chest pain.   Gastrointestinal:  Positive for abdominal pain and constipation. Negative for diarrhea and nausea.   Genitourinary:  Negative for difficulty urinating, dysuria and pelvic pain.   Musculoskeletal:  Negative for back pain.   Skin:  Negative for rash.   Neurological:  Negative for syncope, weakness and headaches.   All other systems reviewed and are negative.          Objective       ED Triage Vitals   Temperature Pulse Blood Pressure Respirations SpO2 Patient Position - Orthostatic VS   01/12/25 0658 01/12/25 0519 01/12/25 0519 01/12/25 0519 01/12/25 0519 01/12/25 0519   (!) 94.4 °F (34.7 °C) (!) 114 98/77 20 97 % Sitting      Temp Source Heart Rate Source BP Location FiO2 (%) Pain Score    01/12/25 0658 01/12/25 0721 01/12/25 0519 -- --    Rectal Monitor Left arm        Vitals      Date and Time Temp Pulse SpO2 Resp BP Pain Score FACES Pain Rating User   01/12/25 1028 98.2 °F (36.8 °C) -- -- -- -- -- -- SB   01/12/25 1021 -- -- 94 % -- -- -- -- SB   01/12/25 0922 95.8 °F (35.4 °C) 108 94 % 22 113/63 -- -- FK   01/12/25 0844 -- 102 96 % 20 119/72 -- -- SB   01/12/25 0829 -- 101 95 % 18 122/74 -- -- SB   01/12/25 0737 -- 111 94 % -- 150/83 -- -- SB   01/12/25  0726 95 °F (35 °C) 117 93 % -- -- -- -- SB   01/12/25 0721 -- 107 -- 22 121/74 -- -- SB   01/12/25 0658 94.4 °F (34.7 °C) -- -- -- -- -- -- AW   01/12/25 0655 -- 108 -- -- -- -- -- AW   01/12/25 0519 -- 114 97 % 20 98/77 -- -- CO            Physical Exam  Vitals and nursing note reviewed.   Constitutional:       General: She is not in acute distress.     Comments: Thin appearing   HENT:      Head: Normocephalic and atraumatic.      Right Ear: External ear normal.      Left Ear: External ear normal.   Eyes:      Extraocular Movements: Extraocular movements intact.      Conjunctiva/sclera: Conjunctivae normal.   Cardiovascular:      Rate and Rhythm: Normal rate and regular rhythm.      Heart sounds: Normal heart sounds.   Pulmonary:      Effort: Pulmonary effort is normal. No respiratory distress.      Breath sounds: Normal breath sounds.   Abdominal:      General: Abdomen is flat. There is distension.      Tenderness: There is generalized abdominal tenderness.   Musculoskeletal:         General: Normal range of motion.      Cervical back: Normal range of motion.   Skin:     General: Skin is warm and dry.   Neurological:      Mental Status: She is alert and oriented to person, place, and time.      Cranial Nerves: No cranial nerve deficit.      Motor: No abnormal muscle tone.      Coordination: Coordination normal.         Results Reviewed       Procedure Component Value Units Date/Time    Blood culture #1 [[id number]] Collected: 01/12/25 0717    Lab Status: Preliminary result Specimen: Blood from Arm, Left Updated: 01/12/25 1701     Blood Culture Received in Microbiology Lab. Culture in Progress.    Blood culture #2 [[id number]] Collected: 01/12/25 0707    Lab Status: Preliminary result Specimen: Blood from Arm, Left Updated: 01/12/25 1701     Blood Culture Received in Microbiology Lab. Culture in Progress.    Procalcitonin [[id number]]  (Abnormal) Collected: 01/12/25 0602    Lab Status: Final result Specimen: Blood  from Arm, Right Updated: 01/12/25 0937     Procalcitonin 0.28 ng/ml     Lactic acid 2 Hours [[id number]]  (Abnormal) Collected: 01/12/25 0841    Lab Status: Final result Specimen: Blood from Arm, Right Updated: 01/12/25 0902     LACTIC ACID 3.1 mmol/L     Narrative:      Result may be elevated if tourniquet was used during collection.    Protime-INR [[id number]]  (Normal) Collected: 01/12/25 0707    Lab Status: Final result Specimen: Blood from Arm, Right Updated: 01/12/25 0828     Protime 13.8 seconds      INR 1.03    Narrative:      INR Therapeutic Range    Indication                                             INR Range      Atrial Fibrillation                                               2.0-3.0  Hypercoagulable State                                    2.0.2.3  Left Ventricular Asist Device                            2.0-3.0  Mechanical Heart Valve                                  -    Aortic(with afib, MI, embolism, HF, LA enlargement,    and/or coagulopathy)                                     2.0-3.0 (2.5-3.5)     Mitral                                                             2.5-3.5  Prosthetic/Bioprosthetic Heart Valve               2.0-3.0  Venous thromboembolism (VTE: VT, PE        2.0-3.0    APTT [[id number]]  (Normal) Collected: 01/12/25 0707    Lab Status: Final result Specimen: Blood from Arm, Right Updated: 01/12/25 0828     PTT 32 seconds     Lactic acid, plasma (w/reflex if result > 2.0) [[id number]]  (Abnormal) Collected: 01/12/25 0602    Lab Status: Final result Specimen: Blood from Arm, Right Updated: 01/12/25 0646     LACTIC ACID 4.5 mmol/L     Narrative:      Result may be elevated if tourniquet was used during collection.    Comprehensive metabolic panel [[id number]]  (Abnormal) Collected: 01/12/25 0602    Lab Status: Final result Specimen: Blood from Arm, Right Updated: 01/12/25 0644     Sodium 138 mmol/L      Potassium 3.5 mmol/L      Chloride 103 mmol/L      CO2 24 mmol/L      ANION GAP 11  mmol/L      BUN 23 mg/dL      Creatinine 0.80 mg/dL      Glucose 415 mg/dL      Calcium 8.5 mg/dL      Corrected Calcium 9.5 mg/dL      AST 26 U/L      ALT 13 U/L      Alkaline Phosphatase 179 U/L      Total Protein 6.7 g/dL      Albumin 2.8 g/dL      Total Bilirubin 0.46 mg/dL      eGFR 82 ml/min/1.73sq m     Narrative:      National Kid[employer] guidelines for Chronic Kidney Disease (CKD):     Stage 1 with normal or high GFR (GFR > 90 mL/min/1.73 square meters)    Stage 2 Mild CKD (GFR = 60-89 mL/min/1.73 square meters)    Stage 3A Moderate CKD (GFR = 45-59 mL/min/1.73 square meters)    Stage 3B Moderate CKD (GFR = 30-44 mL/min/1.73 square meters)    Stage 4 Severe CKD (GFR = 15-29 mL/min/1.73 square meters)    Stage 5 End Stage CKD (GFR <15 mL/min/1.73 square meters)  Note: GFR calculation is accurate only with a steady state creatinine    Lipase [[id number]]  (Abnormal) Collected: 01/12/25 0602    Lab Status: Final result Specimen: Blood from Arm, Right Updated: 01/12/25 0641     Lipase 8 u/L     Urine Microscopic [[id number]]  (Abnormal) Collected: 01/12/25 0548    Lab Status: Final result Specimen: Urine, Clean Catch Updated: 01/12/25 0632     RBC, UA None Seen /hpf      WBC, UA 1-2 /hpf      Epithelial Cells Occasional /hpf      Bacteria, UA Occasional /hpf      Hyaline Casts, UA 0-1 /lpf      OTHER OBSERVATIONS Yeast Cells Present     MUCUS THREADS Occasional    UA (URINE) with reflex to Scope [[id number]]  (Abnormal) Collected: 01/12/25 0548    Lab Status: Final result Specimen: Urine, Clean Catch Updated: 01/12/25 0616     Color, UA Yellow     Clarity, UA Clear     Specific Gravity, UA 1.015     pH, UA 6.0     Leukocytes, UA Negative     Nitrite, UA Negative     Protein, UA 15 (Trace) mg/dl      Glucose, UA >=1000 (1%) mg/dl      Ketones, UA Negative mg/dl      Bilirubin, UA Negative     Occult Blood, UA Negative     UROBILINOGEN UA Negative mg/dL     CBC and differential [[id number]]  (Abnormal)  Collected: 01/12/25 0602    Lab Status: Final result Specimen: Blood from Arm, Right Updated: 01/12/25 0616     WBC 11.03 Thousand/uL      RBC 2.96 Million/uL      Hemoglobin 9.0 g/dL      Hematocrit 30.1 %       fL      MCH 30.4 pg      MCHC 29.9 g/dL      RDW 18.7 %      MPV 8.6 fL      Platelets 659 Thousands/uL      nRBC 0 /100 WBCs      Segmented % 81 %      Immature Grans % 1 %      Lymphocytes % 13 %      Monocytes % 5 %      Eosinophils Relative 0 %      Basophils Relative 0 %      Absolute Neutrophils 9.00 Thousands/µL      Absolute Immature Grans 0.05 Thousand/uL      Absolute Lymphocytes 1.42 Thousands/µL      Absolute Monocytes 0.52 Thousand/µL      Eosinophils Absolute 0.01 Thousand/µL      Basophils Absolute 0.03 Thousands/µL             CT abdomen pelvis with contrast   Final Interpretation by Mariano Byers MD (01/12 0904)   Since January 1, 2025   1.  New multifocal pneumonia   2.  Persistent small bowel obstruction.   3.  Malpositioned enteric tube with tip apically directed in the lower esophagus above the diaphragmatic hiatus.   4.  New mild left hydronephrosis, which is likely due to mass effect from the dilated loops of small bowel.         I personally discussed this study with Dr. Leyva on 1/12/2025 9:02 AM.            Workstation performed: [id number]             Procedures    ED Medication and Procedure Management   Prior to Admission Medications   Prescriptions Last Dose Informant Patient Reported? Taking?   Aspirin Low Dose 81 MG EC tablet  Self Yes No   Sig: Take 81 mg by mouth daily   Empagliflozin 25 MG TABS  Self No No   Sig: Take 1 tablet (25 mg total) by mouth daily   FLUoxetine (PROzac) 40 MG capsule  Self No No   Sig: Take 1 capsule (40 mg total) by mouth daily   Linzess 145 MCG CAPS   Yes No   Sig: Take 1 capsule by mouth in the morning   Nutritional Supplements (ENSURE PO)  Self Yes No   Sig: Take 1 Can by mouth daily   OneTouch Ultra test strip  Self Yes No   QUEtiapine  (SEROquel) 400 MG tablet  Self No No   Sig: Take 2 tablets (800 mg total) by mouth daily at bedtime   QUEtiapine (SEROquel) 50 mg tablet   Yes No   Sig: Take 50 mg by mouth every evening   Trulicity 1.5 MG/0.5ML injection  Self Yes No   Patient not taking: Reported on 7/7/2024   albuterol (PROVENTIL HFA,VENTOLIN HFA) 90 mcg/act inhaler  Self Yes No   Sig: Inhale 2 puffs every 4 (four) hours as needed for wheezing or shortness of breath   aluminum-magnesium hydroxide 200-200 MG/5ML suspension   No No   Sig: Take 10 mL by mouth every 6 (six) hours as needed for heartburn   amitriptyline (ELAVIL) 25 mg tablet  Self Yes No   Sig: Take 25 mg by mouth daily at bedtime   amitriptyline (ELAVIL) 50 mg tablet  Self Yes No   Sig: Take 50 mg by mouth every evening   ammonium lactate (LAC-HYDRIN) 12 % lotion  Self Yes No   Sig: Apply topically daily   azaTHIOprine (IMURAN) 50 mg tablet  Self Yes No   Sig: Take 100 mg by mouth daily   bisacodyl (DULCOLAX) 5 mg EC tablet   No No   Sig: Take as directed prior to colonoscopy   buprenorphine-naloxone (SUBOXONE) 2-0.5 mg per SL tablet  Self Yes No   Sig: Place 1 tablet under the tongue Three times a day   buprenorphine-naloxone (SUBOXONE) 8-2 mg per SL tablet   Yes No   Sig: Place 1 tablet under the tongue 3 (three) times a day   cyanocobalamin (VITAMIN B-12) 1000 MCG tablet   No No   Sig: Take 1 tablet (1,000 mcg total) by mouth daily   Patient not taking: Reported on 7/7/2024   dicyclomine (BENTYL) 20 mg tablet  Self No No   Sig: Take 1 tablet (20 mg total) by mouth 2 (two) times a day   ergocalciferol (VITAMIN D2) 50,000 units   No No   Sig: Take 1 capsule (50,000 Units total) by mouth once a week for 8 doses Do not start before June 29, 2024.   Patient not taking: Reported on 7/7/2024   famotidine (PEPCID) 20 mg tablet   No No   Sig: Take 1 tablet (20 mg total) by mouth 2 (two) times a day   folic acid (FOLVITE) 1 mg tablet   No No   Sig: Take 1 tablet (1 mg total) by mouth daily    Patient not taking: Reported on 7/7/2024   gabapentin (NEURONTIN) 300 mg capsule  Self No No   Sig: Take 2 capsules (600 mg total) by mouth 3 (three) times a day   ibuprofen (MOTRIN) 600 mg tablet   Yes No   Sig: Take 600 mg by mouth every 6 (six) hours as needed   mirtazapine (REMERON) 7.5 MG tablet  Self No No   Sig: Take 1 tablet (7.5 mg total) by mouth daily at bedtime   nortriptyline (PAMELOR) 50 mg capsule  Self Yes No   Sig: Take 50 mg by mouth daily at bedtime   omeprazole (PriLOSEC) 20 mg delayed release capsule   No No   Sig: Take 1 capsule (20 mg total) by mouth daily   ondansetron (ZOFRAN) 4 mg tablet   No No   Sig: Take 1 tablet (4 mg total) by mouth every 6 (six) hours   pantoprazole (PROTONIX) 40 mg tablet  Self Yes No   Sig: Take 40 mg by mouth daily   polyethylene glycol (GLYCOLAX) 17 GM/SCOOP powder   Yes No   Sig: take 17 grams by mouth daily   polyethylene glycol (MIRALAX) 17 g packet   No No   Sig: Take 238 g by mouth once for 1 dose Take as directed per office instructions prior to colonoscopy   rizatriptan (MAXALT) 10 mg tablet  Self Yes No   Sig: Take 10 mg by mouth daily   sitaGLIPtin (JANUVIA) 100 mg tablet  Self No No   Sig: Take 1 tablet (100 mg total) by mouth daily   sucralfate (CARAFATE) 1 g tablet   Yes No   Sig: Take 1 tablet by mouth 4 times a day   sucralfate (CARAFATE) 1 g/10 mL suspension  Self No No   Sig: Take 10 mL (1 g total) by mouth 4 (four) times a day   Patient not taking: Reported on 9/30/2024   tiZANidine (ZANAFLEX) 4 mg tablet  Self Yes No   Sig: Take 6 mg by mouth every 6 (six) hours as needed   topiramate (TOPAMAX) 25 mg tablet   No No   Sig: Take 1 tablet (25 mg total) by mouth 2 (two) times a day   Patient not taking: Reported on 9/30/2024   varenicline (Chantix) 1 mg tablet  Self Yes No   Sig: Take 1 mg by mouth 2 (two) times a day   zolpidem (AMBIEN) 10 mg tablet  Self Yes No   Sig: Take 10 mg by mouth daily at bedtime as needed      Facility-Administered  Medications: None     Discharge Medication List as of 1/12/2025 10:49 AM        CONTINUE these medications which have NOT CHANGED    Details   albuterol (PROVENTIL HFA,VENTOLIN HFA) 90 mcg/act inhaler Inhale 2 puffs every 4 (four) hours as needed for wheezing or shortness of breath, Starting Fri 3/29/2024, Historical Med      aluminum-magnesium hydroxide 200-200 MG/5ML suspension Take 10 mL by mouth every 6 (six) hours as needed for heartburn, Starting Sun 11/17/2024, Normal      !! amitriptyline (ELAVIL) 25 mg tablet Take 25 mg by mouth daily at bedtime, Starting Mon 9/9/2024, Historical Med      !! amitriptyline (ELAVIL) 50 mg tablet Take 50 mg by mouth every evening, Starting Mon 8/12/2024, Historical Med      ammonium lactate (LAC-HYDRIN) 12 % lotion Apply topically daily, Starting Tue 9/17/2024, Historical Med      Aspirin Low Dose 81 MG EC tablet Take 81 mg by mouth daily, Starting Mon 8/26/2024, Historical Med      azaTHIOprine (IMURAN) 50 mg tablet Take 100 mg by mouth daily, Historical Med      bisacodyl (DULCOLAX) 5 mg EC tablet Take as directed prior to colonoscopy, Normal      buprenorphine-naloxone (SUBOXONE) 2-0.5 mg per SL tablet Place 1 tablet under the tongue Three times a day, Starting Wed 9/25/2024, Historical Med      buprenorphine-naloxone (SUBOXONE) 8-2 mg per SL tablet Place 1 tablet under the tongue 3 (three) times a day, Starting Mon 12/9/2024, Historical Med      cyanocobalamin (VITAMIN B-12) 1000 MCG tablet Take 1 tablet (1,000 mcg total) by mouth daily, Starting Wed 6/26/2024, Until Sun 8/25/2024, Normal      dicyclomine (BENTYL) 20 mg tablet Take 1 tablet (20 mg total) by mouth 2 (two) times a day, Starting Tue 9/24/2024, Normal      Empagliflozin 25 MG TABS Take 1 tablet (25 mg total) by mouth daily, Starting Wed 6/26/2024, Until Mon 9/30/2024, Normal      ergocalciferol (VITAMIN D2) 50,000 units Take 1 capsule (50,000 Units total) by mouth once a week for 8 doses Do not start before  June 29, 2024., Starting Sat 6/29/2024, Until Sun 8/18/2024, Normal      famotidine (PEPCID) 20 mg tablet Take 1 tablet (20 mg total) by mouth 2 (two) times a day, Starting Sun 11/17/2024, Normal      FLUoxetine (PROzac) 40 MG capsule Take 1 capsule (40 mg total) by mouth daily, Starting Wed 6/26/2024, Until Mon 9/30/2024, Normal      folic acid (FOLVITE) 1 mg tablet Take 1 tablet (1 mg total) by mouth daily, Starting Wed 6/26/2024, Until Sun 8/25/2024, Normal      gabapentin (NEURONTIN) 300 mg capsule Take 2 capsules (600 mg total) by mouth 3 (three) times a day, Starting Tue 6/25/2024, Until Mon 9/30/2024, Normal      ibuprofen (MOTRIN) 600 mg tablet Take 600 mg by mouth every 6 (six) hours as needed, Starting Tue 12/24/2024, Historical Med      Linzess 145 MCG CAPS Take 1 capsule by mouth in the morning, Starting Tue 12/10/2024, Historical Med      mirtazapine (REMERON) 7.5 MG tablet Take 1 tablet (7.5 mg total) by mouth daily at bedtime, Starting Tue 6/25/2024, Until Mon 9/30/2024, Normal      nortriptyline (PAMELOR) 50 mg capsule Take 50 mg by mouth daily at bedtime, Starting Wed 7/10/2024, Until Thu 7/10/2025, Historical Med      Nutritional Supplements (ENSURE PO) Take 1 Can by mouth daily, Starting Mon 7/8/2024, Historical Med      omeprazole (PriLOSEC) 20 mg delayed release capsule Take 1 capsule (20 mg total) by mouth daily, Starting Sun 11/17/2024, Normal      ondansetron (ZOFRAN) 4 mg tablet Take 1 tablet (4 mg total) by mouth every 6 (six) hours, Starting Sun 11/17/2024, Normal      OneTouch Ultra test strip Historical Med      pantoprazole (PROTONIX) 40 mg tablet Take 40 mg by mouth daily, Historical Med      polyethylene glycol (GLYCOLAX) 17 GM/SCOOP powder take 17 grams by mouth daily, Historical Med      polyethylene glycol (MIRALAX) 17 g packet Take 238 g by mouth once for 1 dose Take as directed per office instructions prior to colonoscopy, Starting Mon 9/30/2024, Normal      QUEtiapine (SEROquel)  50 mg tablet Take 50 mg by mouth every evening, Starting Mon 12/16/2024, Historical Med      rizatriptan (MAXALT) 10 mg tablet Take 10 mg by mouth daily, Starting Mon 7/8/2024, Historical Med      sitaGLIPtin (JANUVIA) 100 mg tablet Take 1 tablet (100 mg total) by mouth daily, Starting Wed 6/26/2024, Until Mon 9/30/2024, Normal      sucralfate (CARAFATE) 1 g tablet Take 1 tablet by mouth 4 times a day, Starting Tue 12/24/2024, Historical Med      sucralfate (CARAFATE) 1 g/10 mL suspension Take 10 mL (1 g total) by mouth 4 (four) times a day, Starting Tue 9/24/2024, Normal      tiZANidine (ZANAFLEX) 4 mg tablet Take 6 mg by mouth every 6 (six) hours as needed, Starting Fri 8/30/2024, Historical Med      topiramate (TOPAMAX) 25 mg tablet Take 1 tablet (25 mg total) by mouth 2 (two) times a day, Starting Tue 6/25/2024, Until Sat 8/24/2024, Normal      Trulicity 1.5 MG/0.5ML injection Historical Med      varenicline (Chantix) 1 mg tablet Take 1 mg by mouth 2 (two) times a day, Starting Fri 7/26/2024, Historical Med      zolpidem (AMBIEN) 10 mg tablet Take 10 mg by mouth daily at bedtime as needed, Starting Mon 7/1/2024, Historical Med       !! - Potential duplicate medications found. Please discuss with provider.        No discharge procedures on file.  ED SEPSIS DOCUMENTATION   Time reflects when diagnosis was documented in both MDM as applicable and the Disposition within this note       Time User Action Codes Description Comment    1/12/2025  8:16 AM Russel Leyva [R10.9] Abdominal pain     1/12/2025  8:17 AM Russel Leyva [K56.609] SBO (small bowel obstruction) (HCC)     1/12/2025  8:17 AM Russel Leyva [Z98.84] History of gastric bypass                  Marina Carbajal MD  01/12/25 [id number]

## 2025-01-13 LAB
ATRIAL RATE: 106 BPM
P AXIS: 45 DEGREES
PR INTERVAL: 120 MS
QRS AXIS: -19 DEGREES
QRSD INTERVAL: 84 MS
QT INTERVAL: 384 MS
QTC INTERVAL: 510 MS
T WAVE AXIS: 35 DEGREES
VENTRICULAR RATE: 106 BPM

## 2025-01-13 PROCEDURE — 93010 ELECTROCARDIOGRAM REPORT: CPT | Performed by: INTERNAL MEDICINE

## 2025-01-17 LAB
BACTERIA BLD CULT: NORMAL
BACTERIA BLD CULT: NORMAL

## (undated) DEVICE — PACK TUR

## (undated) DEVICE — ASTOUND STANDARD SURGICAL GOWN, XXL: Brand: CONVERTORS

## (undated) DEVICE — GLOVE SRG BIOGEL ORTHOPEDIC 7.5

## (undated) DEVICE — SKIN MARKER DUAL TIP WITH RULER CAP, FLEXIBLE RULER AND LABELS: Brand: DEVON

## (undated) DEVICE — SYRINGE 10ML LL

## (undated) DEVICE — GUIDEWIRE STRGHT TIP 0.035 IN  SOLO PLUS

## (undated) DEVICE — GLOVE SRG BIOGEL 7

## (undated) DEVICE — SHEATH URETERAL ACCESS 11/13FR 35CM PROXIS

## (undated) DEVICE — GAUZE SPONGES,16 PLY: Brand: CURITY

## (undated) DEVICE — BASKET STONE RTRVL ZERO TIP 2.4FR

## (undated) DEVICE — CATH URETERAL 5FR X 70 CM FLEX TIP POLYUR BARD

## (undated) DEVICE — CYSTO TUBING TUR Y IRRIGATION

## (undated) DEVICE — 3M™ TEGADERM™ TRANSPARENT FILM DRESSING FRAME STYLE, 1624W, 2-3/8 IN X 2-3/4 IN (6 CM X 7 CM), 100/CT 4CT/CASE: Brand: 3M™ TEGADERM™

## (undated) DEVICE — FIBER STD QUANTA 365 MICRON

## (undated) DEVICE — SURGICAL GOWN, XL SMARTSLEEVE: Brand: CONVERTORS

## (undated) DEVICE — ENDOSCOPIC VALVE WITH ADAPTER.: Brand: SURSEAL® II

## (undated) DEVICE — CYSTOSCOPY PACK: Brand: CONVERTORS

## (undated) DEVICE — GLOVE SRG BIOGEL ECLIPSE 7.5

## (undated) DEVICE — SPECIMEN CONTAINER STERILE PEEL PACK

## (undated) DEVICE — TUBING SUCTION 5MM X 12 FT

## (undated) DEVICE — SCD SEQUENTIAL COMPRESSION COMFORT SLEEVE MEDIUM KNEE LENGTH: Brand: KENDALL SCD

## (undated) DEVICE — SPONGE STICK WITH PVP-I: Brand: KENDALL

## (undated) DEVICE — UROCATCH BAG